# Patient Record
Sex: MALE | Race: WHITE | NOT HISPANIC OR LATINO | Employment: UNEMPLOYED | URBAN - METROPOLITAN AREA
[De-identification: names, ages, dates, MRNs, and addresses within clinical notes are randomized per-mention and may not be internally consistent; named-entity substitution may affect disease eponyms.]

---

## 2021-01-05 ENCOUNTER — HISTORICAL (OUTPATIENT)
Dept: ADMINISTRATIVE | Facility: HOSPITAL | Age: 66
End: 2021-01-05

## 2021-03-10 ENCOUNTER — HISTORICAL (OUTPATIENT)
Dept: ADMINISTRATIVE | Facility: HOSPITAL | Age: 66
End: 2021-03-10

## 2021-05-28 ENCOUNTER — HISTORICAL (OUTPATIENT)
Dept: ADMINISTRATIVE | Facility: HOSPITAL | Age: 66
End: 2021-05-28

## 2021-06-22 ENCOUNTER — HISTORICAL (OUTPATIENT)
Dept: CARDIOLOGY | Facility: HOSPITAL | Age: 66
End: 2021-06-22

## 2022-04-10 ENCOUNTER — HISTORICAL (OUTPATIENT)
Dept: ADMINISTRATIVE | Facility: HOSPITAL | Age: 67
End: 2022-04-10

## 2022-04-29 VITALS
SYSTOLIC BLOOD PRESSURE: 160 MMHG | BODY MASS INDEX: 29.55 KG/M2 | HEIGHT: 69 IN | WEIGHT: 199.5 LBS | DIASTOLIC BLOOD PRESSURE: 78 MMHG

## 2022-09-20 ENCOUNTER — HOSPITAL ENCOUNTER (OUTPATIENT)
Dept: WOUND CARE | Facility: HOSPITAL | Age: 67
Discharge: HOME | End: 2022-09-20
Attending: EMERGENCY MEDICINE
Payer: MEDICARE

## 2022-09-20 VITALS
BODY MASS INDEX: 22.07 KG/M2 | WEIGHT: 149 LBS | TEMPERATURE: 98 F | RESPIRATION RATE: 18 BRPM | DIASTOLIC BLOOD PRESSURE: 81 MMHG | HEART RATE: 97 BPM | HEIGHT: 69 IN | SYSTOLIC BLOOD PRESSURE: 153 MMHG

## 2022-09-20 DIAGNOSIS — I10 BENIGN HYPERTENSION: ICD-10-CM

## 2022-09-20 DIAGNOSIS — F17.290 CIGAR SMOKER: ICD-10-CM

## 2022-09-20 DIAGNOSIS — I70.243 ATHEROSCLEROSIS OF NATIVE ARTERIES OF LEFT LEG WITH ULCERATION OF ANKLE: ICD-10-CM

## 2022-09-20 DIAGNOSIS — G89.29 OTHER CHRONIC PAIN: ICD-10-CM

## 2022-09-20 DIAGNOSIS — E78.5 HYPERLIPIDEMIA, UNSPECIFIED HYPERLIPIDEMIA TYPE: ICD-10-CM

## 2022-09-20 DIAGNOSIS — L97.322 SKIN ULCER OF LEFT ANKLE WITH FAT LAYER EXPOSED: ICD-10-CM

## 2022-09-20 PROCEDURE — 27000999 HC MEDICAL RECORD PHOTO DOCUMENTATION

## 2022-09-20 PROCEDURE — 97597 DBRDMT OPN WND 1ST 20 CM/<: CPT

## 2022-09-20 RX ORDER — AMLODIPINE BESYLATE 10 MG/1
10 TABLET ORAL DAILY
Status: ON HOLD | COMMUNITY
Start: 2022-07-06

## 2022-09-20 RX ORDER — NAPROXEN SODIUM 220 MG/1
81 TABLET, FILM COATED ORAL DAILY
Status: ON HOLD | COMMUNITY

## 2022-09-20 RX ORDER — ALBUTEROL SULFATE 90 UG/1
AEROSOL, METERED RESPIRATORY (INHALATION) 4 TIMES DAILY PRN
Status: ON HOLD | COMMUNITY
Start: 2022-08-18

## 2022-09-20 RX ORDER — MULTIVITAMIN
1 TABLET ORAL DAILY
Status: ON HOLD | COMMUNITY

## 2022-09-20 RX ORDER — LOSARTAN POTASSIUM 100 MG/1
100 TABLET ORAL DAILY
Status: ON HOLD | COMMUNITY
Start: 2022-06-26

## 2022-09-20 RX ORDER — CILOSTAZOL 50 MG/1
50 TABLET ORAL 2 TIMES DAILY
Status: ON HOLD | COMMUNITY
Start: 2022-07-27

## 2022-09-20 RX ORDER — CLOPIDOGREL BISULFATE 75 MG/1
75 TABLET ORAL DAILY
Status: ON HOLD | COMMUNITY
Start: 2022-08-31

## 2022-09-20 RX ORDER — GABAPENTIN 300 MG/1
300 CAPSULE ORAL 3 TIMES DAILY PRN
Status: ON HOLD | COMMUNITY
Start: 2022-07-06 | End: 2023-07-06 | Stop reason: SDUPTHER

## 2022-09-20 NOTE — PROCEDURES
"Debridement    Date/Time: 9/20/2022 9:56 AM  Performed by: Lizabeth Mckeon MD  Authorized by: Lizabeth Mckeon MD   Associated wounds:        Altered Skin Integrity 09/20/22 1033 Left lateral Malleolus #1 Venous Ulcer Partial thickness tissue loss. Shallow open ulcer with a red or pink wound bed, without slough. Intact or Open/Ruptured Serum-filled blister.  Time out: Immediately prior to procedure a "time out" was called to verify the correct patient, procedure, equipment, support staff and site/side marked as required.    Consent Done?:  Yes (Written)    Preparation: Patient was prepped and draped in usual sterile fashion    Local anesthesia used?: Yes    Local anesthetic:  Topical anesthetic    Wound Details:    Location:  Left ankle    Type of Debridement:  Non-excisional       Length (cm):  1       Area (sq cm):  0.9       Width (cm):  0.9       Percent Debrided (%):  100       Depth (cm):  0.3       Total Area Debrided (sq cm):  0.9    Depth of debridement:  Epidermis/Dermis    Tissue debrided:  Dermis and Epidermis    Devitalized tissue debrided:  Biofilm and Slough    Instruments:  Curette    Bleeding:  None  Patient tolerance:  Patient tolerated the procedure well with no immediate complications  "

## 2022-09-20 NOTE — PROGRESS NOTES
Subjective:       Patient ID: Shaheen Christie is a 67 y.o. male.    Chief Complaint: Arterial Wound Initial      67-year-old white male chronic smoker with PAD, hypertension, neuropathy, dyslipidemia,  chronic venous hypertension and chronic back pain who has been referred to this Wound Care Clinic from [facility] to help treat a chronic left lateral arterial ulcer that began around may 2022.  PT feels it began from rubbing from his slipper. Pt tried to self treat but did see pCP Dr Russ once several months ago and received a rx for doxycycline. Pt's brother told pt to go to Dr Queen of [facility]. He saw them in July 2022 and arterial US noted occluded left SFA and left popliteal artery as well as monophasic waveforms in the infrapopliteal vessels via [facility] workup.  He underwent a left lower extremity angiogram on 8/29/22 with successful laser atherectomy balloon angioplasty and stenting of left SFA and popliteal arteries and laser atherectomy balloon angioplasty of the left posterior tibial artery. He was placed on plavix and pletal.    Patient followed up with [facility] on 9/07/22 after the angiogram and their noted indicates pt was going to see [facility] wound care on  9/9/22 but evidently that was canceled because of his insurance carrier so he was referred to this clinic.  He comes in today for his 1st visit on 09/20/2022.   He is still smoking 1/2 ppd (says years ago he was up to 3ppd). No fever/chills;some pain at ulcer    Past Medical History: : COPD (chronic obstructive pulmonary disease),  Hypertension,  Neuropathy  ,  chronic smoking, PAD, chronic venous hypertension, dyslipidemia, chronic pain    Past Surgical History:  No date: angiogram; Bilateral      Comment:  stents placed in left leg  No date: anterior neck surgery  No date: cataracts; Left  No date: CHOLECYSTECTOMY  No date: EYE SURGERY; Left  No date: HAND SURGERY; Left      Comment:  broken bone  No date: herina repair  No date: KNEE SURGERY; Bilateral  No date:  posterior neck surgery  No date: SPINE SURGERY  No date: TOTAL REPLACEMENT OF BOTH HIP JOINTS USING COMPUTER-ASSISTED   NAVIGATION; Bilateral    FH: brother with CAD/CABG  SH: +ETOH, +long term smoker; lives with his brother      Review of Systems   Constitutional: Negative.    HENT: Negative.     Respiratory: Negative.     Cardiovascular: Negative.    Gastrointestinal: Negative.    Genitourinary: Negative.    Musculoskeletal:  Positive for back pain.   Skin:  Positive for wound (see hpi). Negative for color change, pallor and rash.   Neurological: Negative.    Psychiatric/Behavioral: Negative.         Objective:      Vitals:    09/20/22 1010   BP: (!) 153/81   Pulse: 97   Resp: 18   Temp: 97.9 °F (36.6 °C)     @poctglucose@  No results for input(s): POCTGLUCOSE in the last 24 hours.  Physical Exam  Vitals reviewed.   Constitutional:       Appearance: He is normal weight.   HENT:      Head: Normocephalic and atraumatic.      Mouth/Throat:      Pharynx: Oropharynx is clear.   Eyes:      Conjunctiva/sclera: Conjunctivae normal.   Cardiovascular:      Pulses:           Dorsalis pedis pulses are detected w/ Doppler on the right side and detected w/ Doppler on the left side.      Comments: Faint palpable left dp; hair on both legs and dorsal feet;   Pulmonary:      Effort: Pulmonary effort is normal.   Abdominal:      General: Abdomen is flat.   Musculoskeletal:      Right lower leg: No edema.      Left lower leg: No edema.        Feet:    Skin:     General: Skin is warm and dry.      Capillary Refill: Capillary refill takes less than 2 seconds.   Neurological:      General: No focal deficit present.      Mental Status: He is alert and oriented to person, place, and time.            Altered Skin Integrity 09/20/22 1033 Left lateral Malleolus #1 Venous Ulcer Partial thickness tissue loss. Shallow open ulcer with a red or pink wound bed, without slough. Intact or Open/Ruptured Serum-filled blister. (Active)   09/20/22 1033    Altered Skin Integrity Present on Admission: yes   Side: Left   Orientation: lateral   Location: Malleolus   Wound Number: #1   Is this injury device related?: No   Primary Wound Type: Venous ulcer   Description of Altered Skin Integrity: Partial thickness tissue loss. Shallow open ulcer with a red or pink wound bed, without slough. Intact or Open/Ruptured Serum-filled blister.   Ankle-Brachial Index: done on 9/20/22 right dp 0.64 right pt 0.65 unable to assess left due to recent stents   Pulses: Doopler biphasic x 4   Removal Indication and Assessment:    Wound Outcome:    (Retired) Wound Length (cm):    (Retired) Wound Width (cm):    (Retired) Depth (cm):    Wound Description (Comments):    Removal Indications:    Wound Image   09/20/22 1036   Description of Altered Skin Integrity Partial thickness tissue loss. Shallow open ulcer with a red or pink wound bed, without slough. Intact or Open/Ruptured Serum-filled blister. 09/20/22 1036   Dressing Appearance Intact;Moist drainage 09/20/22 1036   Drainage Amount Moderate 09/20/22 1036   Drainage Characteristics/Odor Yellow 09/20/22 1036   Appearance Yellow 09/20/22 1036   Tissue loss description Full thickness 09/20/22 1036   Black (%), Wound Tissue Color 0 % 09/20/22 1036   Red (%), Wound Tissue Color 0 % 09/20/22 1036   Yellow (%), Wound Tissue Color 100 % 09/20/22 1036   Periwound Area Intact 09/20/22 1036   Wound Edges Defined 09/20/22 1036   Wound Length (cm) 1 cm 09/20/22 1036   Wound Width (cm) 0.9 cm 09/20/22 1036   Wound Depth (cm) 0.3 cm 09/20/22 1036   Wound Volume (cm^3) 0.27 cm^3 09/20/22 1036   Wound Surface Area (cm^2) 0.9 cm^2 09/20/22 1036   Care Cleansed with:;Wound cleanser;Applied: 09/20/22 1036   Dressing Removed;Changed;Honey;Absorptive Pad;Rolled gauze 09/20/22 1036           Assessment:       1. Skin ulcer of left ankle with fat layer exposed    2. 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 Benign  hypertension    4. Cigar smoker    5. Hyperlipidemia, unspecified hyperlipidemia type    6. Other chronic pain          Left lateral ankle ulcer: began early summer 2022: first clinic visit 9/20/22  Severe PAD  Arterial US July 2022 at [facility]:   RLE:: 30-49% stenosis right common femoral artery, right deep femoral artery with multiphasic waveforms right leg.  LLE: Totally occluded left distal superficial femoral artery and popliteal artery.  Monophasic waveforms infrapopliteal vessels  LLE angiogram on 8/29/22 with successful laser atherectomy balloon angioplasty and stenting of left SFA and popliteal arteries and laser atherectomy balloon angioplasty of the left posterior tibial artery.rx plavix and pletal  Chronic venous hypertension: venous US at [facility] 7/25/22:  Chronic smoker  Hypertension  Dyslipidemia  Chronic pain    Lab Results   Component Value Date    WBC 7.2 06/27/2021    HGB 12.2 (L) 06/27/2021    HCT 36.5 (L) 06/27/2021    MCV 97.1 (H) 06/27/2021     06/27/2021         CMP  Sodium Level   Date Value Ref Range Status   06/27/2021 133 (L) 136 - 145 mmol/L Final     Potassium Level   Date Value Ref Range Status   06/27/2021 4.7 3.5 - 5.1 mmol/L Final     Carbon Dioxide   Date Value Ref Range Status   06/27/2021 30 23 - 31 mmol/L Final     Blood Urea Nitrogen   Date Value Ref Range Status   06/27/2021 7.0 (L) 8.4 - 25.7 mg/dL Final     Creatinine   Date Value Ref Range Status   06/27/2021 0.65 (L) 0.73 - 1.18 mg/dL Final     Calcium Level Total   Date Value Ref Range Status   06/27/2021 9.7 8.8 - 10.0 mg/dL Final     Albumin Level   Date Value Ref Range Status   06/26/2021 3.7 3.4 - 4.8 gm/dL Final     Bilirubin Total   Date Value Ref Range Status   06/26/2021 0.5 <<=1.5 mg/dL Final     Alkaline Phosphatase   Date Value Ref Range Status   06/26/2021 77 40 - 150 unit/L Final     Aspartate Aminotransferase   Date Value Ref Range Status   06/26/2021 37 (H) 5 - 34 unit/L Final     Alanine Aminotransferase    Date Value Ref Range Status   06/26/2021 50 0 - 55 unit/L Final     Estimated GFR-Non    Date Value Ref Range Status   06/27/2021 >60 mL/min/1.73 m2 Final     No results found for: LABA1C, HGBA1C  No results found for: SEDRATE  No results found for: CRP    Plan:     Plan of Care:    This is a new patient to our clinic.  I reviewed the records available to me in epic as well as the notes sent from [facility]   He has a chronic left lateral malleolus arterial ulcer that began several months ago.  He attempted to self manage but did at  one point early on get a prescription from his PCP Dr. Russ for doxycycline.  His brother was the one that actually referred him to get a vascular evaluation with Dr. Queen of [facility].  He is now status post angiogram with intervention and hopefully this will help begin the healing process of this chronic ulcer.  Selective debridement today  Begin medihoney dressings and keep padded so nothing rubs on this ankle  Nutrition: Must have a high protein diet to support wound  healing; (if renal disease, see nephrologist for amount allowed):  this should be over 100g protein /day (if no kidney issues); Also rec MVI along with vit C, vit D, zinc and Pierre  Smoker: must stop smoking: I explained the negative impact this has own not only the wounds  but overall health as well.   It is absolutely vital that he stop smoking or that the intervention recently done on vasculature will be to no avail and he will ultimately have a nonhealing wound and may require future amputations.  He voiced understanding  Rtc 1 week:  patient says he cannot.  In 1 week he has an appointment with [facility] and he depends on his brother for transportation and his brother will not  be able to bring him another day so he will come back here in 2 weeks           The time spent including preparing to see the patient, obtaining patient history and assessment, evaluation of the plan of care, patient/caregiver counseling  and education, orders, documentation, coordination of care, and other professional medical management activities for today's encounter was 60 minutes.    Time spent performing procedures during today's encounter was 15 minutes.

## 2022-10-04 ENCOUNTER — HOSPITAL ENCOUNTER (OUTPATIENT)
Dept: WOUND CARE | Facility: HOSPITAL | Age: 67
Discharge: HOME | End: 2022-10-04
Attending: EMERGENCY MEDICINE
Payer: MEDICARE

## 2022-10-04 VITALS
SYSTOLIC BLOOD PRESSURE: 135 MMHG | BODY MASS INDEX: 22.22 KG/M2 | DIASTOLIC BLOOD PRESSURE: 64 MMHG | HEART RATE: 86 BPM | WEIGHT: 150 LBS | RESPIRATION RATE: 18 BRPM | HEIGHT: 69 IN | TEMPERATURE: 99 F

## 2022-10-04 DIAGNOSIS — I10 BENIGN HYPERTENSION: ICD-10-CM

## 2022-10-04 DIAGNOSIS — F17.290 CIGAR SMOKER: ICD-10-CM

## 2022-10-04 DIAGNOSIS — G89.29 OTHER CHRONIC PAIN: ICD-10-CM

## 2022-10-04 DIAGNOSIS — E78.5 HYPERLIPIDEMIA, UNSPECIFIED HYPERLIPIDEMIA TYPE: ICD-10-CM

## 2022-10-04 DIAGNOSIS — I70.243 ATHEROSCLEROSIS OF NATIVE ARTERIES OF LEFT LEG WITH ULCERATION OF ANKLE: ICD-10-CM

## 2022-10-04 DIAGNOSIS — L97.322 SKIN ULCER OF LEFT ANKLE WITH FAT LAYER EXPOSED: Primary | ICD-10-CM

## 2022-10-04 PROCEDURE — 97597 DBRDMT OPN WND 1ST 20 CM/<: CPT

## 2022-10-04 PROCEDURE — 27000999 HC MEDICAL RECORD PHOTO DOCUMENTATION

## 2022-10-04 NOTE — PROGRESS NOTES
Subjective:       Patient ID: Shaheen Christie is a 67 y.o. male.    Chief Complaint: No chief complaint on file.      67-year-old white male chronic smoker with PAD, hypertension, neuropathy, dyslipidemia,  chronic venous hypertension and chronic back pain who has been referred to this Wound Care Clinic from [facility] to help treat a chronic left lateral arterial ulcer that began around may 2022.  PT feels it began from rubbing from his slipper. Pt tried to self treat but did see pCP Dr Russ once several months ago and received a rx for doxycycline. Pt's brother told pt to go to Dr Queen of [facility]. He saw them in July 2022 and arterial US noted occluded left SFA and left popliteal artery as well as monophasic waveforms in the infrapopliteal vessels via [facility] workup.  He underwent a left lower extremity angiogram on 8/29/22 with successful laser atherectomy balloon angioplasty and stenting of left SFA and popliteal arteries and laser atherectomy balloon angioplasty of the left posterior tibial artery. He was placed on plavix and pletal.    Patient followed up with [facility] on 9/07/22 after the angiogram and their noted indicates pt was going to see [facility] wound care on  9/9/22 but evidently that was canceled because of his insurance carrier so he was referred to this clinic.  He came in for first evaluation on 9/20/22. I did a selective debridement, advised of dressings/wound care and of smoking cessation. He could not return the following week so comes in today on 10/4/22. Began medihoney dressings; no complaints today      Review of Systems   Constitutional: Negative.    HENT: Negative.     Respiratory: Negative.     Cardiovascular: Negative.    Gastrointestinal: Negative.    Genitourinary: Negative.    Musculoskeletal:  Positive for back pain.   Skin:  Positive for wound (see hpi). Negative for color change, pallor and rash.   Neurological: Negative.    Psychiatric/Behavioral: Negative.         Objective:      Vitals:    10/04/22  1011   BP: 135/64   Pulse: 86   Resp: 18   Temp: 98.5 °F (36.9 °C)     @poctglucose@  No results for input(s): POCTGLUCOSE in the last 24 hours.  Physical Exam  Vitals reviewed.   Constitutional:       Appearance: He is normal weight.   HENT:      Head: Normocephalic and atraumatic.      Mouth/Throat:      Pharynx: Oropharynx is clear.   Eyes:      Conjunctiva/sclera: Conjunctivae normal.   Cardiovascular:      Pulses:           Dorsalis pedis pulses are detected w/ Doppler on the right side and detected w/ Doppler on the left side.      Comments: Faint palpable left dp; hair on both legs and dorsal feet;   Pulmonary:      Effort: Pulmonary effort is normal.   Abdominal:      General: Abdomen is flat.   Musculoskeletal:      Right lower leg: No edema.      Left lower leg: No edema.        Feet:    Skin:     General: Skin is warm and dry.      Capillary Refill: Capillary refill takes less than 2 seconds.   Neurological:      General: No focal deficit present.      Mental Status: He is alert and oriented to person, place, and time.            Altered Skin Integrity 09/20/22 1033 Left lateral Malleolus #1 Arterial Ulcer Partial thickness tissue loss. Shallow open ulcer with a red or pink wound bed, without slough. Intact or Open/Ruptured Serum-filled blister. (Active)   09/20/22 1033   Altered Skin Integrity Present on Admission: yes   Side: Left   Orientation: lateral   Location: Malleolus   Wound Number: #1   Is this injury device related?: No   Primary Wound Type: Arterial ulc   Description of Altered Skin Integrity: Partial thickness tissue loss. Shallow open ulcer with a red or pink wound bed, without slough. Intact or Open/Ruptured Serum-filled blister.   Ankle-Brachial Index: done on 9/20/22 right dp 0.64 right pt 0.65 unable to assess left due to recent stents   Pulses: Doopler biphasic x 4   Removal Indication and Assessment:    Wound Outcome:    (Retired) Wound Length (cm):    (Retired) Wound Width (cm):     (Retired) Depth (cm):    Wound Description (Comments):    Removal Indications:    Wound Image   10/04/22 1022   Description of Altered Skin Integrity Partial thickness tissue loss. Shallow open ulcer with a red or pink wound bed, without slough. Intact or Open/Ruptured Serum-filled blister. 10/04/22 1022   Dressing Appearance Intact;Moist drainage 10/04/22 1022   Drainage Amount Moderate 10/04/22 1022   Drainage Characteristics/Odor Yellow;Serosanguineous 10/04/22 1022   Appearance Pink;Yellow 10/04/22 1022   Tissue loss description Full thickness 10/04/22 1022   Black (%), Wound Tissue Color 0 % 10/04/22 1022   Red (%), Wound Tissue Color 30 % 10/04/22 1022   Yellow (%), Wound Tissue Color 70 % 10/04/22 1022   Periwound Area Intact 10/04/22 1022   Wound Edges Defined 10/04/22 1022   Wound Length (cm) 1.2 cm 10/04/22 1022   Wound Width (cm) 0.9 cm 10/04/22 1022   Wound Depth (cm) 0.3 cm 10/04/22 1022   Wound Volume (cm^3) 0.324 cm^3 10/04/22 1022   Wound Surface Area (cm^2) 1.08 cm^2 10/04/22 1022   Care Cleansed with:;Wound cleanser;Applied: 10/04/22 1022   Dressing Removed;Changed;Honey;Bandaid 10/04/22 1022           Assessment:       1. Skin ulcer of left ankle with fat layer exposed    2. 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 Cigar smoker    4. Benign hypertension    5. Hyperlipidemia, unspecified hyperlipidemia type    6. Other chronic pain          Left lateral ankle ulcer: began early summer 2022: first clinic visit 9/20/22  Severe PAD  Arterial US July 2022 at [facility]:   RLE:: 30-49% stenosis right common femoral artery, right deep femoral artery with multiphasic waveforms right leg.  LLE: Totally occluded left distal superficial femoral artery and popliteal artery.  Monophasic waveforms infrapopliteal vessels  LLE angiogram on 8/29/22 with successful laser atherectomy balloon angioplasty and stenting of left SFA and popliteal arteries and laser atherectomy balloon  angioplasty of the left posterior tibial artery.rx plavix and pletal  Chronic venous hypertension: venous US at [facility] 7/25/22:  Chronic smoker  Hypertension  Dyslipidemia  Chronic pain        Plan:     Plan of Care:    Selective debridement today  Wound care: continue to apply the  medihoney and keep padded so nothing rubs on this ankle  Nutrition: Must have a high protein diet to support wound  healing; (if renal disease, see nephrologist for amount allowed):  this should be over 100g protein /day (if no kidney issues); Also rec MVI along with vit C, vit D, zinc and Pierre  Smoker: must stop smoking: I explained the negative impact this has own not only the wounds  but overall health as well.   It is absolutely vital that he stop smoking or that the intervention recently done on vasculature will be to no avail and he will ultimately have a nonhealing wound and may require future amputations.  He voiced understanding  Rtc 1 week           The time spent including preparing to see the patient, obtaining patient history and assessment, evaluation of the plan of care, patient/caregiver counseling and education, orders, documentation, coordination of care, and other professional medical management activities for today's encounter was 20 minutes.    Time spent performing procedures during today's encounter was 10 minutes.

## 2022-10-04 NOTE — PATIENT INSTRUCTIONS
Pt seen today by: Lizabeth Mckeon MD    Home health and self care DRESSING INSTRUCTIONS: 9/20/22 Wound care supplies ordered from [employer] [facility]        Wound location: Left lateral ankle    Dressings to be changed daily and as needed if soiled or not intact.      Cleanse wound with wound cleanser or saline  Apply thera honey gel to the wound bed  Cover with   exudry dressing and secure with silvia       Compression with: N/A    Return visit: October 18th at 10:00am      Wound may have been debrided in clinic: if so, WHAT YOU NEED TO KNOW:    Debridement is the removal of infected, damaged, or dead tissue so a wound can heal properly. Your wound may need more than one debridement. Debridement can cause bleeding, and a small amount of blood is expected.  AFTER A DEBRIDEMENT:    Keep your wound clean and dry. Do not remove the dressing unless instructed.  Follow the wound care orders provided to you or your home health care provider.  If you have pain, take over the counter pain relievers or pain medication if prescribed.  Elevate the wound and limit excessive activity to prevent bleeding and/or swelling in your wound.  If you see blood coming through the dressing, apply gauze and tape over the dressing and hold firm pressure to the wound with your hand for 5-10 minutes continuously, without peeking, to help the bleeding stop.  Contact [facility] wound care team at [phone] or go to the nearest Emergency department if:    You have a fever greater than 101 taken by mouth.  Your pain gets worse or does not go away, even after taking your regular pain medicine.  Your skin around your wound is red, hot, swollen, or draining pus.  You have bleeding that continues to come through the dressing after holding pressure for 10 minutes            Nutrition:  The current daily value (%DV) for protein is 50 grams per day and is meant as a general goal for most people. Further increasing your dietary protein intake is very  important for wound healing. Typically one needs over 100g of protein per day to help with wound healing needs.  If you are a dialysis patient or have problems with your kidneys, talk to your Nephrologist about how much protein you can take in with your condition.  Examples of high protein items that can be added to your diet include: eggs, chicken, red meats, almonds, cottage cheese, Greek yogurt, beans, and peanut butter.  Fortified protein bars, shakes and drinks can add 15-30 additional grams of protein per serving.   Also add:   1 daily general multivitamin   Pierre : 1 packet twice daily   Vitamin C : 500mg twice daily   Zinc 220 mg daily  Vit D : once daily        Call our [facility] wound clinic for questions/concerns a [phone] .

## 2022-10-04 NOTE — PROCEDURES
"Debridement    Date/Time: 10/4/2022 10:00 AM  Performed by: Lizabeth Mckeon MD  Authorized by: Lizabeth Mckeon MD   Associated wounds:        Altered Skin Integrity 09/20/22 1033 Left lateral Malleolus #1 Arterial Ulcer Partial thickness tissue loss. Shallow open ulcer with a red or pink wound bed, without slough. Intact or Open/Ruptured Serum-filled blister.  Time out: Immediately prior to procedure a "time out" was called to verify the correct patient, procedure, equipment, support staff and site/side marked as required.    Consent Done?:  Yes (Written)    Preparation: Patient was prepped and draped in usual sterile fashion    Local anesthesia used?: Yes    Local anesthetic:  Topical anesthetic    Wound Details:    Location:  Left ankle    Type of Debridement:  Non-excisional       Length (cm):  1.2       Area (sq cm):  1.08       Width (cm):  0.9       Percent Debrided (%):  100       Depth (cm):  0.3       Total Area Debrided (sq cm):  1.08    Depth of debridement:  Epidermis/Dermis    Tissue debrided:  Dermis and Epidermis    Devitalized tissue debrided:  Biofilm and Slough    Instruments:  Curette    Bleeding:  Minimal  Hemostasis Achieved: Yes    Method Used:  Pressure  Patient tolerance:  Patient tolerated the procedure well with no immediate complications  "

## 2022-10-18 ENCOUNTER — HOSPITAL ENCOUNTER (OUTPATIENT)
Dept: WOUND CARE | Facility: HOSPITAL | Age: 67
Discharge: HOME | End: 2022-10-18
Attending: EMERGENCY MEDICINE
Payer: MEDICARE

## 2022-10-18 DIAGNOSIS — I10 BENIGN HYPERTENSION: ICD-10-CM

## 2022-10-18 DIAGNOSIS — F17.290 CIGAR SMOKER: ICD-10-CM

## 2022-10-18 DIAGNOSIS — E78.5 HYPERLIPIDEMIA, UNSPECIFIED HYPERLIPIDEMIA TYPE: ICD-10-CM

## 2022-10-18 DIAGNOSIS — L97.322 SKIN ULCER OF LEFT ANKLE WITH FAT LAYER EXPOSED: Primary | ICD-10-CM

## 2022-10-18 DIAGNOSIS — I70.243 ATHEROSCLEROSIS OF NATIVE ARTERIES OF LEFT LEG WITH ULCERATION OF ANKLE: ICD-10-CM

## 2022-10-18 DIAGNOSIS — G89.29 OTHER CHRONIC PAIN: ICD-10-CM

## 2022-10-18 PROCEDURE — 11042 DBRDMT SUBQ TIS 1ST 20SQCM/<: CPT

## 2022-10-18 PROCEDURE — 27000999 HC MEDICAL RECORD PHOTO DOCUMENTATION

## 2022-10-18 NOTE — PATIENT INSTRUCTIONS
Pt seen today by: Lizabeth Mckeon MD    self care DRESSING INSTRUCTIONS:   Wound location: Left lateral ankle    Dressings to be changed daily and as needed if soiled or not intact.    Cleanse wound with wound cleanser or saline  Apply thera honey gel to the wound bed  Cover with   exudry dressing and secure with silvia    Compression with: N/A    Return visit: November 1 at 10:00am    Wound may have been debrided in clinic: if so, WHAT YOU NEED TO KNOW:    Debridement is the removal of infected, damaged, or dead tissue so a wound can heal properly. Your wound may need more than one debridement. Debridement can cause bleeding, and a small amount of blood is expected.  AFTER A DEBRIDEMENT:    Keep your wound clean and dry. Do not remove the dressing unless instructed.  Follow the wound care orders provided to you or your home health care provider.  If you have pain, take over the counter pain relievers or pain medication if prescribed.  Elevate the wound and limit excessive activity to prevent bleeding and/or swelling in your wound.  If you see blood coming through the dressing, apply gauze and tape over the dressing and hold firm pressure to the wound with your hand for 5-10 minutes continuously, without peeking, to help the bleeding stop.  Contact [facility] wound care team at [phone] or go to the nearest Emergency department if:    You have a fever greater than 101 taken by mouth.  Your pain gets worse or does not go away, even after taking your regular pain medicine.  Your skin around your wound is red, hot, swollen, or draining pus.  You have bleeding that continues to come through the dressing after holding pressure for 10 minutes     Nutrition:  The current daily value (%DV) for protein is 50 grams per day and is meant as a general goal for most people. Further increasing your dietary protein intake is very important for wound healing. Typically one needs over 100g of protein per day to help with wound  healing needs.  If you are a dialysis patient or have problems with your kidneys, talk to your Nephrologist about how much protein you can take in with your condition.  Examples of high protein items that can be added to your diet include: eggs, chicken, red meats, almonds, cottage cheese, Greek yogurt, beans, and peanut butter.  Fortified protein bars, shakes and drinks can add 15-30 additional grams of protein per serving.   Also add:   1 daily general multivitamin   Pierre : 1 packet twice daily   Vitamin C : 500mg twice daily   Zinc 220 mg daily  Vit D : once daily    Call our [facility] wound clinic for questions/concerns a [phone] .

## 2022-10-18 NOTE — PROGRESS NOTES
Subjective:       Patient ID: Shaheen Christie is a 67 y.o. male.    Chief Complaint: No chief complaint on file.      67-year-old white male chronic smoker with PAD, hypertension, neuropathy, dyslipidemia,  chronic venous hypertension and chronic back pain who has been referred to this Wound Care Clinic from [facility] to help treat a chronic left lateral arterial ulcer that began around may 2022.  PT feels it began from rubbing from his slipper. Pt tried to self treat but did see pCP Dr Russ once several months ago and received a rx for doxycycline. Pt's brother told pt to go to Dr Queen of [facility]. He saw them in July 2022 and arterial US noted occluded left SFA and left popliteal artery as well as monophasic waveforms in the infrapopliteal vessels via [facility] workup.  He underwent a left lower extremity angiogram on 8/29/22 with successful laser atherectomy balloon angioplasty and stenting of left SFA and popliteal arteries and laser atherectomy balloon angioplasty of the left posterior tibial artery. He was placed on plavix and pletal.    Patient followed up with [facility] on 9/07/22 after the angiogram and their noted indicates pt was going to see [facility] wound care on 9/9/22 but evidently that was canceled because of his insurance carrier so he was referred to this clinic.  He came in for first evaluation on 9/20/22.I debrided and advised of wound care and smoking cessation. He can only come in every 2 weeks he said....comes in today for 3rd visit on 10/18/22: he feels it is better and is pleased with progress thus far.  He says going back to [facility] this week to get report of repeat vascular US      Review of Systems   Constitutional: Negative.    HENT: Negative.     Respiratory: Negative.     Cardiovascular: Negative.    Gastrointestinal: Negative.    Genitourinary: Negative.    Musculoskeletal:  Positive for back pain.   Skin:  Positive for wound (see hpi). Negative for color change, pallor and rash.   Neurological: Negative.     Psychiatric/Behavioral: Negative.         Objective:        Physical Exam  Vitals reviewed.   Constitutional:       Appearance: He is normal weight.   HENT:      Head: Normocephalic and atraumatic.      Mouth/Throat:      Pharynx: Oropharynx is clear.   Eyes:      Conjunctiva/sclera: Conjunctivae normal.   Cardiovascular:      Pulses:           Dorsalis pedis pulses are detected w/ Doppler on the right side and detected w/ Doppler on the left side.      Comments: Faint palpable left dp; hair on both legs and dorsal feet;   Pulmonary:      Effort: Pulmonary effort is normal.   Abdominal:      General: Abdomen is flat.   Musculoskeletal:      Right lower leg: No edema.      Left lower leg: No edema.        Feet:    Skin:     General: Skin is warm and dry.      Capillary Refill: Capillary refill takes less than 2 seconds.   Neurological:      General: No focal deficit present.      Mental Status: He is alert and oriented to person, place, and time.            Altered Skin Integrity 09/20/22 1033 Left lateral Malleolus #1 Arterial Ulcer Partial thickness tissue loss. Shallow open ulcer with a red or pink wound bed, without slough. Intact or Open/Ruptured Serum-filled blister. (Active)   09/20/22 1033   Altered Skin Integrity Present on Admission:    Side: Left   Orientation: lateral   Location: Malleolus   Wound Number: #1   Is this injury device related?: No   Primary Wound Type: Arterial ulc   Description of Altered Skin Integrity: Partial thickness tissue loss. Shallow open ulcer with a red or pink wound bed, without slough. Intact or Open/Ruptured Serum-filled blister.   Ankle-Brachial Index: done on 9/20/22 right dp 0.64 right pt 0.65 unable to assess left due to recent stents   Pulses: Doopler biphasic x 4   Removal Indication and Assessment:    Wound Outcome:    (Retired) Wound Length (cm):    (Retired) Wound Width (cm):    (Retired) Depth (cm):    Wound Description (Comments):    Removal Indications:    Wound  Image   10/18/22 1114   Description of Altered Skin Integrity Full thickness tissue loss. Subcutaneous fat may be visible but bone, tendon or muscle are not exposed 10/18/22 1114   Dressing Appearance Intact 10/18/22 1114   Drainage Amount Moderate 10/18/22 1114   Drainage Characteristics/Odor Serosanguineous 10/18/22 1114   Appearance Red;Slough 10/18/22 1114   Tissue loss description Full thickness 10/18/22 1114   Black (%), Wound Tissue Color 0 % 10/18/22 1114   Red (%), Wound Tissue Color 75 % 10/18/22 1114   Yellow (%), Wound Tissue Color 25 % 10/18/22 1114   Periwound Area Intact;Dry 10/18/22 1114   Wound Edges Defined 10/18/22 1114   Wound Length (cm) 1 cm 10/18/22 1114   Wound Width (cm) 1 cm 10/18/22 1114   Wound Depth (cm) 0.2 cm 10/18/22 1114   Wound Volume (cm^3) 0.2 cm^3 10/18/22 1114   Wound Surface Area (cm^2) 1 cm^2 10/18/22 1114   Care Cleansed with:;Soap and water 10/18/22 1114   Dressing Applied 10/18/22 1114   Periwound Care Absorptive dressing applied 10/18/22 1114           Assessment:       1. Skin ulcer of left ankle with fat layer exposed    2. 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 Cigar smoker    4. Benign hypertension    5. Hyperlipidemia, unspecified hyperlipidemia type    6. Other chronic pain          Left lateral ankle ulcer: began early summer 2022: first clinic visit 9/20/22  Severe PAD  Arterial US July 2022 at [facility]:   RLE:: 30-49% stenosis right common femoral artery, right deep femoral artery with multiphasic waveforms right leg.  LLE: Totally occluded left distal superficial femoral artery and popliteal artery.  Monophasic waveforms infrapopliteal vessels  LLE angiogram on 8/29/22 with successful laser atherectomy balloon angioplasty and stenting of left SFA and popliteal arteries and laser atherectomy balloon angioplasty of the left posterior tibial artery.rx plavix and pletal  Chronic venous hypertension: venous US at [facility] 7/25/22:  Chronic  smoker  Hypertension  Dyslipidemia  Chronic pain        Plan:     Plan of Care:    Sharp subcutaneous debridement today  Wound care: continue to apply the  medihoney and keep padded so nothing rubs on this ankle  Nutrition: Must have a high protein diet to support wound  healing; (if renal disease, see nephrologist for amount allowed):  this should be over 100g protein /day (if no kidney issues); Also rec MVI along with vit C, vit D, zinc and Pierre  Smoker: must stop smoking: I explained the negative impact this has own not only the wounds  but overall health as well.   It is absolutely vital that he stop smoking or that the intervention recently done on vasculature will be to no avail and he will ultimately have a nonhealing wound and may require future amputations.  He voiced understanding  Rtc 1 week but he says he is going to have to stick with 2 weeks;   See [facility] as scheduled this week

## 2022-10-19 VITALS
HEART RATE: 72 BPM | TEMPERATURE: 98 F | BODY MASS INDEX: 22.21 KG/M2 | WEIGHT: 149.94 LBS | DIASTOLIC BLOOD PRESSURE: 74 MMHG | HEIGHT: 69 IN | RESPIRATION RATE: 16 BRPM | SYSTOLIC BLOOD PRESSURE: 135 MMHG

## 2022-10-19 NOTE — PROCEDURES
"Debridement    Date/Time: 10/18/2022 10:00 AM  Performed by: Lizabeth Mckeon MD  Authorized by: Lizabeth Mckeon MD   Associated wounds:        Altered Skin Integrity 09/20/22 1033 Left lateral Malleolus #1 Arterial Ulcer Partial thickness tissue loss. Shallow open ulcer with a red or pink wound bed, without slough. Intact or Open/Ruptured Serum-filled blister.  Time out: Immediately prior to procedure a "time out" was called to verify the correct patient, procedure, equipment, support staff and site/side marked as required.    Consent Done?:  Yes (Written)    Preparation: Patient was prepped and draped in usual sterile fashion    Local anesthesia used?: Yes    Local anesthetic:  Topical anesthetic    Wound Details:    Location:  Left ankle    Type of Debridement:  Excisional       Length (cm):  1       Area (sq cm):  1       Width (cm):  1       Percent Debrided (%):  100       Depth (cm):  0.2       Total Area Debrided (sq cm):  1    Depth of debridement:  Subcutaneous tissue    Tissue debrided:  Dermis, Epidermis and Subcutaneous    Devitalized tissue debrided:  Biofilm and Slough    Instruments:  Curette    Bleeding:  Minimal  Hemostasis Achieved: Yes    Method Used:  Pressure  Patient tolerance:  Patient tolerated the procedure well with no immediate complications  "

## 2022-11-01 ENCOUNTER — HOSPITAL ENCOUNTER (OUTPATIENT)
Dept: WOUND CARE | Facility: HOSPITAL | Age: 67
Discharge: HOME | End: 2022-11-01
Attending: EMERGENCY MEDICINE
Payer: MEDICARE

## 2022-11-01 VITALS
SYSTOLIC BLOOD PRESSURE: 122 MMHG | HEIGHT: 69 IN | DIASTOLIC BLOOD PRESSURE: 66 MMHG | RESPIRATION RATE: 18 BRPM | TEMPERATURE: 98 F | HEART RATE: 99 BPM | WEIGHT: 152 LBS | BODY MASS INDEX: 22.51 KG/M2

## 2022-11-01 DIAGNOSIS — L97.322 SKIN ULCER OF LEFT ANKLE WITH FAT LAYER EXPOSED: ICD-10-CM

## 2022-11-01 DIAGNOSIS — G89.29 OTHER CHRONIC PAIN: ICD-10-CM

## 2022-11-01 DIAGNOSIS — F17.290 CIGAR SMOKER: ICD-10-CM

## 2022-11-01 DIAGNOSIS — E78.5 HYPERLIPIDEMIA, UNSPECIFIED HYPERLIPIDEMIA TYPE: ICD-10-CM

## 2022-11-01 DIAGNOSIS — I70.243 ATHEROSCLEROSIS OF NATIVE ARTERIES OF LEFT LEG WITH ULCERATION OF ANKLE: ICD-10-CM

## 2022-11-01 DIAGNOSIS — I10 BENIGN HYPERTENSION: ICD-10-CM

## 2022-11-01 PROCEDURE — 27000999 HC MEDICAL RECORD PHOTO DOCUMENTATION

## 2022-11-01 PROCEDURE — 11042 DBRDMT SUBQ TIS 1ST 20SQCM/<: CPT

## 2022-11-01 NOTE — PATIENT INSTRUCTIONS
Pt seen today by: Lizabeth Mckeon MD    self care DRESSING INSTRUCTIONS:   Wound location: Left lateral ankle    Dressings to be changed daily and as needed if soiled or not intact.    Cleanse wound with wound cleanser or saline  Apply thera honey gel to the wound bed  Cover with   exudry dressing and secure with silvia    Compression with: N/A    Return visit: November 15th at 10:00am    Wound may have been debrided in clinic: if so, WHAT YOU NEED TO KNOW:    Debridement is the removal of infected, damaged, or dead tissue so a wound can heal properly. Your wound may need more than one debridement. Debridement can cause bleeding, and a small amount of blood is expected.  AFTER A DEBRIDEMENT:    Keep your wound clean and dry. Do not remove the dressing unless instructed.  Follow the wound care orders provided to you or your home health care provider.  If you have pain, take over the counter pain relievers or pain medication if prescribed.  Elevate the wound and limit excessive activity to prevent bleeding and/or swelling in your wound.  If you see blood coming through the dressing, apply gauze and tape over the dressing and hold firm pressure to the wound with your hand for 5-10 minutes continuously, without peeking, to help the bleeding stop.  Contact [facility] wound care team at [phone] or go to the nearest Emergency department if:    You have a fever greater than 101 taken by mouth.  Your pain gets worse or does not go away, even after taking your regular pain medicine.  Your skin around your wound is red, hot, swollen, or draining pus.  You have bleeding that continues to come through the dressing after holding pressure for 10 minutes     Nutrition:  The current daily value (%DV) for protein is 50 grams per day and is meant as a general goal for most people. Further increasing your dietary protein intake is very important for wound healing. Typically one needs over 100g of protein per day to help with wound  healing needs.  If you are a dialysis patient or have problems with your kidneys, talk to your Nephrologist about how much protein you can take in with your condition.  Examples of high protein items that can be added to your diet include: eggs, chicken, red meats, almonds, cottage cheese, Greek yogurt, beans, and peanut butter.  Fortified protein bars, shakes and drinks can add 15-30 additional grams of protein per serving.   Also add:   1 daily general multivitamin   Pierre : 1 packet twice daily   Vitamin C : 500mg twice daily   Zinc 220 mg daily  Vit D : once daily    Call our [facility] wound clinic for questions/concerns a [phone] .

## 2022-11-01 NOTE — PROCEDURES
"Debridement    Date/Time: 11/1/2022 9:57 AM  Performed by: Lizabeth Mckeon MD  Authorized by: Lizabeth Mckeon MD   Associated wounds:        Altered Skin Integrity 09/20/22 1033 Left lateral Malleolus #1 Arterial Ulcer Partial thickness tissue loss. Shallow open ulcer with a red or pink wound bed, without slough. Intact or Open/Ruptured Serum-filled blister.  Time out: Immediately prior to procedure a "time out" was called to verify the correct patient, procedure, equipment, support staff and site/side marked as required.    Consent Done?:  Yes (Written)    Preparation: Patient was prepped and draped in usual sterile fashion    Local anesthesia used?: Yes    Local anesthetic:  Topical anesthetic    Wound Details:    Location:  Left ankle    Type of Debridement:  Excisional       Length (cm):  1.2       Area (sq cm):  1.2       Width (cm):  1       Percent Debrided (%):  100       Depth (cm):  0.2       Total Area Debrided (sq cm):  1.2    Depth of debridement:  Subcutaneous tissue    Tissue debrided:  Dermis, Epidermis and Subcutaneous    Devitalized tissue debrided:  Biofilm and Slough    Instruments:  Curette    Bleeding:  Minimal  Hemostasis Achieved: Yes    Method Used:  Pressure  Patient tolerance:  Patient tolerated the procedure well with no immediate complications  "

## 2022-11-01 NOTE — PROGRESS NOTES
Subjective:       Patient ID: Shaheen Christie is a 67 y.o. male.    Chief Complaint: No chief complaint on file.      67-year-old white male chronic smoker with PAD, hypertension, neuropathy, dyslipidemia,  chronic venous hypertension and chronic back pain who has been referred to this Wound Care Clinic from [facility] to help treat a chronic left lateral arterial ulcer that began around may 2022.  PT feels it began from rubbing from his slipper. Pt tried to self treat but did see pCP Dr Russ once several months ago and received a rx for doxycycline. Pt's brother told pt to go to Dr Queen of [facility]. He saw them in July 2022 and arterial US noted occluded left SFA and left popliteal artery as well as monophasic waveforms in the infrapopliteal vessels via [facility] workup.  He underwent a left lower extremity angiogram on 8/29/22 with successful laser atherectomy balloon angioplasty and stenting of left SFA and popliteal arteries and laser atherectomy balloon angioplasty of the left posterior tibial artery. He was placed on plavix and pletal.    Patient followed up with [facility] on 9/07/22 after the angiogram and their noted indicates pt was going to see [facility] wound care on 9/9/22 but his insurance not taken there so he came here on 9/20/22.  Ulcer is improving; he can only come here every 2 weeks      Review of Systems   Constitutional: Negative.    HENT: Negative.     Respiratory: Negative.     Cardiovascular: Negative.    Gastrointestinal: Negative.    Genitourinary: Negative.    Musculoskeletal:  Positive for back pain.   Skin:  Positive for wound (see hpi). Negative for color change, pallor and rash.   Neurological: Negative.    Psychiatric/Behavioral: Negative.         Objective:      Vitals:    11/01/22 1009   BP: 122/66   Pulse: 99   Resp: 18   Temp: 98.3 °F (36.8 °C)       Physical Exam  Vitals reviewed.   Constitutional:       Appearance: He is normal weight.   HENT:      Head: Normocephalic and atraumatic.      Mouth/Throat:       Pharynx: Oropharynx is clear.   Eyes:      Conjunctiva/sclera: Conjunctivae normal.   Cardiovascular:      Pulses:           Dorsalis pedis pulses are detected w/ Doppler on the right side and detected w/ Doppler on the left side.      Comments: Faint palpable left dp; hair on both legs and dorsal feet;   Pulmonary:      Effort: Pulmonary effort is normal.   Abdominal:      General: Abdomen is flat.   Musculoskeletal:      Right lower leg: No edema.      Left lower leg: No edema.        Feet:    Skin:     General: Skin is warm and dry.      Capillary Refill: Capillary refill takes less than 2 seconds.   Neurological:      General: No focal deficit present.      Mental Status: He is alert and oriented to person, place, and time.            Altered Skin Integrity 09/20/22 1033 Left lateral Malleolus #1 Arterial Ulcer Partial thickness tissue loss. Shallow open ulcer with a red or pink wound bed, without slough. Intact or Open/Ruptured Serum-filled blister. (Active)   09/20/22 1033   Altered Skin Integrity Present on Admission: yes   Side: Left   Orientation: lateral   Location: Malleolus   Wound Number: #1   Is this injury device related?: No   Primary Wound Type: Arterial ulc   Description of Altered Skin Integrity: Partial thickness tissue loss. Shallow open ulcer with a red or pink wound bed, without slough. Intact or Open/Ruptured Serum-filled blister.   Ankle-Brachial Index: done on 11/01/22 L DP 0.97 PT 1.02   Pulses: Doopler biphasic x 4   Removal Indication and Assessment:    Wound Outcome:    (Retired) Wound Length (cm):    (Retired) Wound Width (cm):    (Retired) Depth (cm):    Wound Description (Comments):    Removal Indications:    Wound Image   11/01/22 1021   Description of Altered Skin Integrity Partial thickness tissue loss. Shallow open ulcer with a red or pink wound bed, without slough. Intact or Open/Ruptured Serum-filled blister. 11/01/22 1021   Dressing Appearance Intact;Moist drainage 11/01/22  1021   Drainage Amount Moderate 11/01/22 1021   Drainage Characteristics/Odor Yellow;Serosanguineous 11/01/22 1021   Appearance Pink;Yellow 11/01/22 1021   Tissue loss description Full thickness 11/01/22 1021   Black (%), Wound Tissue Color 0 % 11/01/22 1021   Red (%), Wound Tissue Color 75 % 11/01/22 1021   Yellow (%), Wound Tissue Color 25 % 11/01/22 1021   Periwound Area Intact 11/01/22 1021   Wound Edges Defined 11/01/22 1021   Wound Length (cm) 1.2 cm 11/01/22 1021   Wound Width (cm) 1 cm 11/01/22 1021   Wound Depth (cm) 0.2 cm 11/01/22 1021   Wound Volume (cm^3) 0.24 cm^3 11/01/22 1021   Wound Surface Area (cm^2) 1.2 cm^2 11/01/22 1021   Care Cleansed with:;Wound cleanser;Applied: 11/01/22 1021   Dressing Removed;Changed;Honey;Absorptive Pad;Rolled gauze 11/01/22 1021           Assessment:       1. Skin ulcer of left ankle with fat layer exposed    2. 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 Cigar smoker    4. Benign hypertension    5. Hyperlipidemia, unspecified hyperlipidemia type    6. Other chronic pain          Left lateral ankle ulcer: began early summer 2022: first clinic visit 9/20/22 : improving  Severe PAD  Arterial US July 2022 at [facility]:   RLE:: 30-49% stenosis right common femoral artery, right deep femoral artery with multiphasic waveforms right leg.  LLE: Totally occluded left distal superficial femoral artery and popliteal artery.  Monophasic waveforms infrapopliteal vessels  [facility] angiogram on 8/29/22 with successful laser atherectomy balloon angioplasty and stenting of left SFA and popliteal arteries and laser atherectomy balloon angioplasty of the left posterior tibial artery.rx plavix and pletal  Chronic venous hypertension: venous US at [facility] 7/25/22:  Chronic smoker  Hypertension  Dyslipidemia  Chronic pain        Plan:     Plan of Care:    Sharp subcutaneous debridement today  Wound care: continue to apply the  medihoney and keep padded so nothing rubs on  this ankle  Nutrition: Must have a high protein diet to support wound  healing; (if renal disease, see nephrologist for amount allowed):  this should be over 100g protein /day (if no kidney issues); Also rec MVI along with vit C, vit D, zinc and Pierre  Smoker: must stop smoking: I explained the negative impact this has own not only the wounds  but overall health as well.   It is absolutely vital that he stop smoking or that the intervention recently done on vasculature will be to no avail and he will ultimately have a nonhealing wound and may require future amputations.  He voiced understanding  Rtc  2 weeks per his request

## 2022-11-15 ENCOUNTER — HOSPITAL ENCOUNTER (OUTPATIENT)
Dept: WOUND CARE | Facility: HOSPITAL | Age: 67
Discharge: HOME | End: 2022-11-15
Attending: EMERGENCY MEDICINE
Payer: MEDICARE

## 2022-11-15 VITALS
DIASTOLIC BLOOD PRESSURE: 85 MMHG | TEMPERATURE: 98 F | WEIGHT: 152 LBS | BODY MASS INDEX: 22.51 KG/M2 | RESPIRATION RATE: 18 BRPM | HEART RATE: 99 BPM | SYSTOLIC BLOOD PRESSURE: 160 MMHG | HEIGHT: 69 IN

## 2022-11-15 DIAGNOSIS — E78.5 HYPERLIPIDEMIA, UNSPECIFIED HYPERLIPIDEMIA TYPE: ICD-10-CM

## 2022-11-15 DIAGNOSIS — I70.243 ATHEROSCLEROSIS OF NATIVE ARTERIES OF LEFT LEG WITH ULCERATION OF ANKLE: ICD-10-CM

## 2022-11-15 DIAGNOSIS — I10 BENIGN HYPERTENSION: ICD-10-CM

## 2022-11-15 DIAGNOSIS — L97.322 SKIN ULCER OF LEFT ANKLE WITH FAT LAYER EXPOSED: Primary | ICD-10-CM

## 2022-11-15 DIAGNOSIS — F17.290 CIGAR SMOKER: ICD-10-CM

## 2022-11-15 PROCEDURE — 27000999 HC MEDICAL RECORD PHOTO DOCUMENTATION

## 2022-11-15 PROCEDURE — 11042 DBRDMT SUBQ TIS 1ST 20SQCM/<: CPT

## 2022-11-15 NOTE — PROCEDURES
"Debridement    Date/Time: 11/15/2022 10:00 AM  Performed by: Lizabeth Mckeon MD  Authorized by: Lizabeth Mckeon MD   Associated wounds:        Altered Skin Integrity 11/15/22 1016 Left lateral Malleolus #1 Arterial Ulcer Partial thickness tissue loss. Shallow open ulcer with a red or pink wound bed, without slough. Intact or Open/Ruptured Serum-filled blister.  Time out: Immediately prior to procedure a "time out" was called to verify the correct patient, procedure, equipment, support staff and site/side marked as required.    Consent Done?:  Yes (Written)    Preparation: Patient was prepped and draped in usual sterile fashion    Local anesthesia used?: Yes    Local anesthetic:  Topical anesthetic    Wound Details:    Location:  Left ankle    Type of Debridement:  Excisional       Length (cm):  0.8       Area (sq cm):  0.72       Width (cm):  0.9       Percent Debrided (%):  100       Depth (cm):  0.3       Total Area Debrided (sq cm):  0.72    Depth of debridement:  Subcutaneous tissue    Tissue debrided:  Dermis, Epidermis and Subcutaneous    Devitalized tissue debrided:  Biofilm and Slough    Instruments:  Curette    Bleeding:  Minimal  Hemostasis Achieved: Yes    Method Used:  Pressure  Patient tolerance:  Patient tolerated the procedure well with no immediate complications  "

## 2022-11-15 NOTE — PROGRESS NOTES
Subjective:       Patient ID: Shaheen Christie is a 67 y.o. male.    Chief Complaint: Venous Stasis      67-year-old white male chronic smoker with PAD, hypertension, neuropathy, dyslipidemia,  chronic venous hypertension and chronic back pain who has been referred to this Wound Care Clinic from [facility] to help treat a chronic left lateral arterial ulcer that began around may 2022.  PT feels it began from rubbing from his slipper. Pt tried to self treat but did see pCP Dr Russ once several months ago and received a rx for doxycycline. Pt's brother told pt to go to Dr [facility] of [facility]. He saw them in July 2022 and arterial US noted occluded left SFA and left popliteal artery as well as monophasic waveforms in the infrapopliteal vessels via [facility] workup.  He underwent a left lower extremity angiogram on 8/29/22 with successful laser atherectomy balloon angioplasty and stenting of left SFA and popliteal arteries and laser atherectomy balloon angioplasty of the left posterior tibial artery. He was placed on plavix and pletal.  Made his way to this wound care clinic for first visit on  9/20/22.We have had him apply medihoney since then. He can only come here every 2 weeks. Ulcer overall better      Review of Systems   Constitutional: Negative.    HENT: Negative.     Respiratory: Negative.     Cardiovascular: Negative.    Gastrointestinal: Negative.    Genitourinary: Negative.    Musculoskeletal:  Positive for back pain.   Skin:  Positive for wound (see hpi). Negative for color change, pallor and rash.   Neurological: Negative.    Psychiatric/Behavioral: Negative.         Objective:      Vitals:    11/15/22 1004   BP: (!) 160/85   Pulse: 99   Resp: 18   Temp: 98 °F (36.7 °C)       Physical Exam  Vitals reviewed.   Constitutional:       Appearance: He is normal weight.   HENT:      Head: Normocephalic and atraumatic.      Mouth/Throat:      Pharynx: Oropharynx is clear.   Eyes:      Conjunctiva/sclera: Conjunctivae normal.    Cardiovascular:      Pulses:           Dorsalis pedis pulses are detected w/ Doppler on the right side and detected w/ Doppler on the left side.      Comments: Faint palpable left dp; hair on both legs and dorsal feet;   Pulmonary:      Effort: Pulmonary effort is normal.   Abdominal:      General: Abdomen is flat.   Musculoskeletal:      Right lower leg: No edema.      Left lower leg: No edema.        Feet:    Skin:     General: Skin is warm and dry.      Capillary Refill: Capillary refill takes less than 2 seconds.   Neurological:      General: No focal deficit present.      Mental Status: He is alert and oriented to person, place, and time. Mental status is at baseline.            Altered Skin Integrity 11/15/22 1016 Left lateral Malleolus #1 Arterial Ulcer Partial thickness tissue loss. Shallow open ulcer with a red or pink wound bed, without slough. Intact or Open/Ruptured Serum-filled blister. (Active)   11/15/22 1016   Altered Skin Integrity Present on Admission: yes   Side: Left   Orientation: lateral   Location: Malleolus   Wound Number: #1   Is this injury device related?: No   Primary Wound Type: Arterial ulc   Description of Altered Skin Integrity: Partial thickness tissue loss. Shallow open ulcer with a red or pink wound bed, without slough. Intact or Open/Ruptured Serum-filled blister.   Ankle-Brachial Index: done on 11/01/22 L DP 0.97 PT 1.02   Pulses: non palpable - +Doppler biphasic x 4   Removal Indication and Assessment:    Wound Outcome:    (Retired) Wound Length (cm):    (Retired) Wound Width (cm):    (Retired) Depth (cm):    Wound Description (Comments):    Removal Indications:    Wound Image   11/15/22 1017   Dressing Appearance Intact;Moist drainage 11/15/22 1017   Drainage Amount Moderate 11/15/22 1017   Drainage Characteristics/Odor Yellow;No odor 11/15/22 1017   Appearance Yellow;Pink 11/15/22 1017   Tissue loss description Full thickness 11/15/22 1017   Black (%), Wound Tissue Color 0 %  11/15/22 1017   Red (%), Wound Tissue Color 25 % 11/15/22 1017   Yellow (%), Wound Tissue Color 75 % 11/15/22 1017   Periwound Area Intact;Dry 11/15/22 1017   Wound Edges Defined 11/15/22 1017   Wound Length (cm) 0.8 cm 11/15/22 1017   Wound Width (cm) 0.9 cm 11/15/22 1017   Wound Depth (cm) 0.3 cm 11/15/22 1017   Wound Volume (cm^3) 0.216 cm^3 11/15/22 1017   Wound Surface Area (cm^2) 0.72 cm^2 11/15/22 1017   Care Cleansed with:;Wound cleanser;Applied: 11/15/22 1017   Dressing Applied;Collagen;Bandaid 11/15/22 1017   Periwound Care Absorptive dressing applied 11/15/22 1017           Assessment:       1. Skin ulcer of left ankle with fat layer exposed    2. 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 Cigar smoker    4. Benign hypertension    5. Hyperlipidemia, unspecified hyperlipidemia type          Left lateral ankle ulcer: began early summer 2022: first clinic visit 9/20/22 : improving  Severe PAD  Arterial US July 2022 at [facility]:   RLE:: 30-49% stenosis right common femoral artery, right deep femoral artery with multiphasic waveforms right leg.  LLE: Totally occluded left distal superficial femoral artery and popliteal artery.  Monophasic waveforms infrapopliteal vessels  LLE angiogram on 8/29/22 with successful laser atherectomy balloon angioplasty and stenting of left SFA and popliteal arteries and laser atherectomy balloon angioplasty of the left posterior tibial artery.rx plavix and pletal  Chronic venous hypertension: venous US at [facility] 7/25/22:  Chronic smoker  Hypertension  Dyslipidemia  Chronic pain        Plan:     Plan of Care:    Sharp subcutaneous debridement today  Overall better but not advancing much now so I would like to begin promogran collagen dressings. He can change every 2-3 days;   Nutrition: Must have a high protein diet to support wound  healing; (if renal disease, see nephrologist for amount allowed):  this should be over 100g protein /day (if no kidney  issues); Also rec MVI along with vit C, vit D, zinc and Pierre  Smoker: must stop smoking: I explained the negative impact this has own not only the wounds  but overall health as well.   It is absolutely vital that he stop smoking or that the intervention recently done on vasculature will be to no avail and he will ultimately have a nonhealing wound and may require future amputations.  He voiced understanding  Rtc  2 weeks per his request

## 2022-11-15 NOTE — PATIENT INSTRUCTIONS
Pt seen today by: Lizabeth Mckeon MD    self care DRESSING INSTRUCTIONS:   Wound location: Left lateral ankle    Dressings to be changed every other day and as needed if soiled or not intact.    Cleanse wound with wound cleanser or saline  Apply promogram to wound bed (if you run out of promogram go back to thera honey gel)  Cover with large bandaid    Compression with: N/A    Return visit: November 29, 2022 at 10:00am    Wound may have been debrided in clinic: if so, WHAT YOU NEED TO KNOW:    Debridement is the removal of infected, damaged, or dead tissue so a wound can heal properly. Your wound may need more than one debridement. Debridement can cause bleeding, and a small amount of blood is expected.  AFTER A DEBRIDEMENT:    Keep your wound clean and dry. Do not remove the dressing unless instructed.  Follow the wound care orders provided to you or your home health care provider.  If you have pain, take over the counter pain relievers or pain medication if prescribed.  Elevate the wound and limit excessive activity to prevent bleeding and/or swelling in your wound.  If you see blood coming through the dressing, apply gauze and tape over the dressing and hold firm pressure to the wound with your hand for 5-10 minutes continuously, without peeking, to help the bleeding stop.  Contact [facility] wound care team at [phone] or go to the nearest Emergency department if:    You have a fever greater than 101 taken by mouth.  Your pain gets worse or does not go away, even after taking your regular pain medicine.  Your skin around your wound is red, hot, swollen, or draining pus.  You have bleeding that continues to come through the dressing after holding pressure for 10 minutes     Nutrition:  The current daily value (%DV) for protein is 50 grams per day and is meant as a general goal for most people. Further increasing your dietary protein intake is very important for wound healing. Typically one needs over 100g of  protein per day to help with wound healing needs.  If you are a dialysis patient or have problems with your kidneys, talk to your Nephrologist about how much protein you can take in with your condition.  Examples of high protein items that can be added to your diet include: eggs, chicken, red meats, almonds, cottage cheese, Greek yogurt, beans, and peanut butter.  Fortified protein bars, shakes and drinks can add 15-30 additional grams of protein per serving.   Also add:   1 daily general multivitamin   Pierre : 1 packet twice daily   Vitamin C : 500mg twice daily   Zinc 220 mg daily  Vit D : once daily    Call our [facility] wound clinic for questions/concerns a [phone] .

## 2022-11-29 ENCOUNTER — HOSPITAL ENCOUNTER (OUTPATIENT)
Dept: WOUND CARE | Facility: HOSPITAL | Age: 67
Discharge: HOME | End: 2022-11-29
Attending: EMERGENCY MEDICINE
Payer: MEDICARE

## 2022-11-29 VITALS
HEIGHT: 69 IN | HEART RATE: 79 BPM | DIASTOLIC BLOOD PRESSURE: 70 MMHG | WEIGHT: 152 LBS | SYSTOLIC BLOOD PRESSURE: 119 MMHG | TEMPERATURE: 98 F | RESPIRATION RATE: 20 BRPM | BODY MASS INDEX: 22.51 KG/M2

## 2022-11-29 DIAGNOSIS — F17.290 CIGAR SMOKER: ICD-10-CM

## 2022-11-29 DIAGNOSIS — L97.322 SKIN ULCER OF LEFT ANKLE WITH FAT LAYER EXPOSED: Primary | ICD-10-CM

## 2022-11-29 DIAGNOSIS — E78.5 HYPERLIPIDEMIA, UNSPECIFIED HYPERLIPIDEMIA TYPE: ICD-10-CM

## 2022-11-29 DIAGNOSIS — I10 BENIGN HYPERTENSION: ICD-10-CM

## 2022-11-29 DIAGNOSIS — I70.243 ATHEROSCLEROSIS OF NATIVE ARTERIES OF LEFT LEG WITH ULCERATION OF ANKLE: ICD-10-CM

## 2022-11-29 DIAGNOSIS — G89.29 OTHER CHRONIC PAIN: ICD-10-CM

## 2022-11-29 PROCEDURE — 11042 DBRDMT SUBQ TIS 1ST 20SQCM/<: CPT

## 2022-11-29 PROCEDURE — 27000999 HC MEDICAL RECORD PHOTO DOCUMENTATION

## 2022-11-29 NOTE — PATIENT INSTRUCTIONS
Pt seen today by: Lizabeth Mckeon MD    self care DRESSING INSTRUCTIONS:   Wound location: Left lateral ankle    Dressings to be changed every other day and as needed if soiled or not intact.    Cleanse wound with wound cleanser or saline  Apply promogram to wound bed (if you run out of promogram go back to thera honey gel)  Cover with large bandaid    Compression with: N/A    Return visit: December 13, 2022 at 10:00 am    Wound may have been debrided in clinic: if so, WHAT YOU NEED TO KNOW:    Debridement is the removal of infected, damaged, or dead tissue so a wound can heal properly. Your wound may need more than one debridement. Debridement can cause bleeding, and a small amount of blood is expected.  AFTER A DEBRIDEMENT:    Keep your wound clean and dry. Do not remove the dressing unless instructed.  Follow the wound care orders provided to you or your home health care provider.  If you have pain, take over the counter pain relievers or pain medication if prescribed.  Elevate the wound and limit excessive activity to prevent bleeding and/or swelling in your wound.  If you see blood coming through the dressing, apply gauze and tape over the dressing and hold firm pressure to the wound with your hand for 5-10 minutes continuously, without peeking, to help the bleeding stop.  Contact [facility] wound care team at [phone] or go to the nearest Emergency department if:    You have a fever greater than 101 taken by mouth.  Your pain gets worse or does not go away, even after taking your regular pain medicine.  Your skin around your wound is red, hot, swollen, or draining pus.  You have bleeding that continues to come through the dressing after holding pressure for 10 minutes     Nutrition:  The current daily value (%DV) for protein is 50 grams per day and is meant as a general goal for most people. Further increasing your dietary protein intake is very important for wound healing. Typically one needs over 100g of  protein per day to help with wound healing needs.  If you are a dialysis patient or have problems with your kidneys, talk to your Nephrologist about how much protein you can take in with your condition.  Examples of high protein items that can be added to your diet include: eggs, chicken, red meats, almonds, cottage cheese, Greek yogurt, beans, and peanut butter.  Fortified protein bars, shakes and drinks can add 15-30 additional grams of protein per serving.   Also add:   1 daily general multivitamin   Pierre : 1 packet twice daily   Vitamin C : 500mg twice daily   Zinc 220 mg daily  Vit D : once daily    Call our [facility] wound clinic for questions/concerns a [phone] .

## 2022-11-29 NOTE — PROCEDURES
"Debridement    Date/Time: 11/29/2022 10:00 AM  Performed by: Lizabeth Mckeon MD  Authorized by: Lizabeth Mckeon MD   Associated wounds:        Altered Skin Integrity 11/29/22 1010 Left lateral Malleolus #1 Arterial Ulcer Partial thickness tissue loss. Shallow open ulcer with a red or pink wound bed, without slough. Intact or Open/Ruptured Serum-filled blister.  Time out: Immediately prior to procedure a "time out" was called to verify the correct patient, procedure, equipment, support staff and site/side marked as required.    Consent Done?:  Yes (Written)    Preparation: Patient was prepped and draped in usual sterile fashion    Local anesthesia used?: Yes    Local anesthetic:  Topical anesthetic    Wound Details:    Location:  Left ankle    Type of Debridement:  Excisional       Length (cm):  0.7       Area (sq cm):  0.56       Width (cm):  0.8       Percent Debrided (%):  100       Depth (cm):  0.3       Total Area Debrided (sq cm):  0.56    Depth of debridement:  Subcutaneous tissue    Tissue debrided:  Dermis, Epidermis and Subcutaneous    Devitalized tissue debrided:  Biofilm and Slough    Instruments:  Curette    Bleeding:  Minimal  Hemostasis Achieved: Yes    Method Used:  Pressure  Patient tolerance:  Patient tolerated the procedure well with no immediate complications  "

## 2022-11-29 NOTE — PROGRESS NOTES
Subjective:       Patient ID: Shaheen Christie is a 67 y.o. male.    Chief Complaint: skin ulcer of left ankle with fat layer exposed      67-year-old white male chronic smoker with PAD, hypertension, neuropathy, dyslipidemia,  chronic venous hypertension and chronic back pain who has been referred to this Wound Care Clinic from [facility] to help treat a chronic left lateral arterial ulcer that began around may 2022.  PT feels it began from rubbing from his slipper. Pt tried to self treat but did see pCP Dr Russ once several months ago and received a rx for doxycycline. Pt's brother told pt to go to Dr [facility]. He saw them in July 2022 and arterial US noted occluded left SFA and left popliteal artery as well as monophasic waveforms in the infrapopliteal vessels via [facility] workup.  He underwent a left lower extremity angiogram on 8/29/22 with successful laser atherectomy balloon angioplasty and stenting of left SFA and popliteal arteries and laser atherectomy balloon angioplasty of the left posterior tibial artery. He was placed on plavix and pletal.  Made his way to this wound care clinic for first visit on 9/20/22. I initially used medihoney and then 2 weeks ago in mid Nov switched to promogran. (He can only come here every 2 weeks). A bit better but still present      Review of Systems   Constitutional: Negative.    HENT: Negative.     Respiratory: Negative.     Cardiovascular: Negative.    Gastrointestinal: Negative.    Genitourinary: Negative.    Musculoskeletal:  Positive for back pain.   Skin:  Positive for wound (see hpi). Negative for color change, pallor and rash.   Neurological: Negative.    Psychiatric/Behavioral: Negative.         Objective:      Vitals:    11/29/22 1011   BP: 119/70   Pulse: 79   Resp: 20   Temp: 98 °F (36.7 °C)       Physical Exam  Vitals reviewed.   Constitutional:       Appearance: He is normal weight.      Comments: Smells heavily of cig smoke   HENT:      Head: Normocephalic and  atraumatic.      Mouth/Throat:      Pharynx: Oropharynx is clear.   Eyes:      Conjunctiva/sclera: Conjunctivae normal.   Cardiovascular:      Pulses:           Dorsalis pedis pulses are detected w/ Doppler on the right side and detected w/ Doppler on the left side.      Comments: Faint palpable left dp; hair on both legs and dorsal feet;   Pulmonary:      Effort: Pulmonary effort is normal.   Abdominal:      General: Abdomen is flat.   Musculoskeletal:      Right lower leg: No edema.      Left lower leg: No edema.        Feet:    Skin:     General: Skin is warm and dry.      Capillary Refill: Capillary refill takes less than 2 seconds.   Neurological:      General: No focal deficit present.      Mental Status: He is alert and oriented to person, place, and time. Mental status is at baseline.            Altered Skin Integrity 11/29/22 1010 Left lateral Malleolus #1 Arterial Ulcer Partial thickness tissue loss. Shallow open ulcer with a red or pink wound bed, without slough. Intact or Open/Ruptured Serum-filled blister. (Active)   11/29/22 1010   Altered Skin Integrity Present on Admission: yes   Side: Left   Orientation: lateral   Location: Malleolus   Wound Number: #1   Is this injury device related?: No   Primary Wound Type: Arterial ulc   Description of Altered Skin Integrity: Partial thickness tissue loss. Shallow open ulcer with a red or pink wound bed, without slough. Intact or Open/Ruptured Serum-filled blister.   Ankle-Brachial Index: done on 11/01/22 L DP 0.97 PT 1.02   Pulses: non palpable - +Doppler biphasic x 4   Removal Indication and Assessment:    Wound Outcome:    (Retired) Wound Length (cm):    (Retired) Wound Width (cm):    (Retired) Depth (cm):    Wound Description (Comments):    Removal Indications:    Wound Image   11/29/22 1023   Dressing Appearance Intact;Moist drainage 11/29/22 1023   Drainage Amount Moderate 11/29/22 1023   Drainage Characteristics/Odor Yellow;No odor 11/29/22 1023    Appearance Yellow;Pink 11/29/22 1023   Tissue loss description Full thickness 11/29/22 1023   Black (%), Wound Tissue Color 0 % 11/29/22 1023   Red (%), Wound Tissue Color 25 % 11/29/22 1023   Yellow (%), Wound Tissue Color 75 % 11/29/22 1023   Periwound Area Intact;Dry 11/29/22 1023   Wound Edges Defined 11/29/22 1023   Wound Length (cm) 0.7 cm 11/29/22 1023   Wound Width (cm) 0.8 cm 11/29/22 1023   Wound Depth (cm) 0.3 cm 11/29/22 1023   Wound Volume (cm^3) 0.168 cm^3 11/29/22 1023   Wound Surface Area (cm^2) 0.56 cm^2 11/29/22 1023   Care Cleansed with:;Wound cleanser;Applied: 11/29/22 1023   Dressing Applied;Collagen;Bandaid 11/29/22 1037           Assessment:       1. Skin ulcer of left ankle with fat layer exposed    2. 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 Cigar smoker    4. Benign hypertension    5. Hyperlipidemia, unspecified hyperlipidemia type    6. Other chronic pain          Left lateral ankle ulcer: began early summer 2022: first clinic visit 9/20/22 : improving  Severe PAD  Arterial US July 2022 at [facility]:   RLE:: 30-49% stenosis right common femoral artery, right deep femoral artery with multiphasic waveforms right leg.  LLE: Totally occluded left distal superficial femoral artery and popliteal artery.  Monophasic waveforms infrapopliteal vessels  LLE angiogram on 8/29/22 with successful laser atherectomy balloon angioplasty and stenting of left SFA and popliteal arteries and laser atherectomy balloon angioplasty of the left posterior tibial artery.rx plavix and pletal  Chronic venous hypertension: venous US at [facility] 7/25/22:  Chronic smoker: difficulty with cessation  Hypertension  Dyslipidemia  Chronic pain        Plan:     Plan of Care:     subcutaneous debridement today  Continue to apply the  promogran collagen dressings. He can change every 2-3 days;   Nutrition: Must have a high protein diet to support wound  healing; (if renal disease, see nephrologist for  amount allowed):  this should be over 100g protein /day (if no kidney issues); Also rec MVI along with vit C, vit D, zinc and Pierre  Smoker: must stop smoking: I explained the negative impact this has own not only the wounds  but overall health as well.   It is absolutely vital that he stop smoking or that the intervention recently done on vasculature will be to no avail and he will ultimately have a nonhealing wound and may require future amputations.  He voiced understanding but says it is hard; smells of cigarette smoke today quite a bit  Rtc  2 weeks per his request

## 2022-12-13 ENCOUNTER — HOSPITAL ENCOUNTER (OUTPATIENT)
Dept: WOUND CARE | Facility: HOSPITAL | Age: 67
Discharge: HOME | End: 2022-12-13
Attending: EMERGENCY MEDICINE
Payer: MEDICARE

## 2022-12-13 VITALS
BODY MASS INDEX: 21.48 KG/M2 | SYSTOLIC BLOOD PRESSURE: 145 MMHG | HEART RATE: 92 BPM | RESPIRATION RATE: 18 BRPM | DIASTOLIC BLOOD PRESSURE: 66 MMHG | TEMPERATURE: 99 F | WEIGHT: 145 LBS | HEIGHT: 69 IN

## 2022-12-13 DIAGNOSIS — G89.29 OTHER CHRONIC PAIN: ICD-10-CM

## 2022-12-13 DIAGNOSIS — E78.5 HYPERLIPIDEMIA, UNSPECIFIED HYPERLIPIDEMIA TYPE: ICD-10-CM

## 2022-12-13 DIAGNOSIS — I70.243 ATHEROSCLEROSIS OF NATIVE ARTERIES OF LEFT LEG WITH ULCERATION OF ANKLE: ICD-10-CM

## 2022-12-13 DIAGNOSIS — F17.290 CIGAR SMOKER: ICD-10-CM

## 2022-12-13 DIAGNOSIS — I10 BENIGN HYPERTENSION: ICD-10-CM

## 2022-12-13 DIAGNOSIS — L97.322 SKIN ULCER OF LEFT ANKLE WITH FAT LAYER EXPOSED: Primary | ICD-10-CM

## 2022-12-13 PROCEDURE — 27000999 HC MEDICAL RECORD PHOTO DOCUMENTATION

## 2022-12-13 PROCEDURE — 11042 DBRDMT SUBQ TIS 1ST 20SQCM/<: CPT

## 2022-12-13 NOTE — PATIENT INSTRUCTIONS
Pt seen today by: Lizabeth Mckeon MD    self care DRESSING INSTRUCTIONS:   Wound location: Left lateral ankle    Dressings to be changed daily and as needed if soiled or not intact.    Cleanse wound with wound cleanser or saline  Apply thera honey gel to wound bed   Cover with large bandaid  Gentian violet applied to periwound rash  Compression with: N/A    Return visit: December 27, 2022 at 10:00 am    Wound may have been debrided in clinic: if so, WHAT YOU NEED TO KNOW:    Debridement is the removal of infected, damaged, or dead tissue so a wound can heal properly. Your wound may need more than one debridement. Debridement can cause bleeding, and a small amount of blood is expected.  AFTER A DEBRIDEMENT:    Keep your wound clean and dry. Do not remove the dressing unless instructed.  Follow the wound care orders provided to you or your home health care provider.  If you have pain, take over the counter pain relievers or pain medication if prescribed.  Elevate the wound and limit excessive activity to prevent bleeding and/or swelling in your wound.  If you see blood coming through the dressing, apply gauze and tape over the dressing and hold firm pressure to the wound with your hand for 5-10 minutes continuously, without peeking, to help the bleeding stop.  Contact [facility] wound care team at [phone] or go to the nearest Emergency department if:    You have a fever greater than 101 taken by mouth.  Your pain gets worse or does not go away, even after taking your regular pain medicine.  Your skin around your wound is red, hot, swollen, or draining pus.  You have bleeding that continues to come through the dressing after holding pressure for 10 minutes     Nutrition:  The current daily value (%DV) for protein is 50 grams per day and is meant as a general goal for most people. Further increasing your dietary protein intake is very important for wound healing. Typically one needs over 100g of protein per day to  help with wound healing needs.  If you are a dialysis patient or have problems with your kidneys, talk to your Nephrologist about how much protein you can take in with your condition.  Examples of high protein items that can be added to your diet include: eggs, chicken, red meats, almonds, cottage cheese, Greek yogurt, beans, and peanut butter.  Fortified protein bars, shakes and drinks can add 15-30 additional grams of protein per serving.   Also add:   1 daily general multivitamin   Pierre : 1 packet twice daily   Vitamin C : 500mg twice daily   Zinc 220 mg daily  Vit D : once daily    Call our [facility] wound clinic for questions/concerns a [phone] .

## 2022-12-13 NOTE — PROGRESS NOTES
Subjective:       Patient ID: Shaheen Christie is a 67 y.o. male.    Chief Complaint: No chief complaint on file.        67-year-old white male chronic smoker with PAD, hypertension, neuropathy, dyslipidemia,  chronic venous hypertension and chronic back pain who has been referred to this Wound Care Clinic from [facility] to help treat a chronic left lateral arterial ulcer that began around may 2022.  PT feels it began from rubbing from his slipper. Pt tried to self treat but did see pCP Dr Russ once several months ago and received a rx for doxycycline. Pt's brother told pt to go to Dr Queen of [facility]. He saw them in July 2022 and arterial US noted occluded left SFA and left popliteal artery as well as monophasic waveforms in the infrapopliteal vessels via [facility] workup.  He underwent a left lower extremity angiogram on 8/29/22 with successful laser atherectomy balloon angioplasty and stenting of left SFA and popliteal arteries and laser atherectomy balloon angioplasty of the left posterior tibial artery. He was placed on plavix and pletal.  Made his way to this wound care clinic for first visit on 9/20/22. I initially used medihoney and then in mid Nov switched to promogran.  Unfortunately he still smokes and can only come here every 2 weeks.  He presents today on 12/13/2022 and it looks a little bit larger to me although still under 1 cm2 so we can not use a skin substitute.      Review of Systems   Constitutional: Negative.    HENT: Negative.     Respiratory: Negative.     Cardiovascular: Negative.    Gastrointestinal: Negative.    Genitourinary: Negative.    Musculoskeletal:  Positive for back pain.   Skin:  Positive for wound (see hpi). Negative for color change, pallor and rash.   Neurological: Negative.    Psychiatric/Behavioral: Negative.         Objective:      Vitals:    12/13/22 1013   BP: (!) 145/66   Pulse: 92   Resp: 18   Temp: 98.6 °F (37 °C)       Physical Exam  Vitals reviewed.   Constitutional:        Appearance: He is normal weight.      Comments: Smells heavily of cig smoke   HENT:      Head: Normocephalic and atraumatic.      Mouth/Throat:      Pharynx: Oropharynx is clear.   Eyes:      Conjunctiva/sclera: Conjunctivae normal.   Cardiovascular:      Pulses:           Dorsalis pedis pulses are detected w/ Doppler on the right side and detected w/ Doppler on the left side.      Comments: Faint palpable left dp; hair on both legs and dorsal feet;   Pulmonary:      Effort: Pulmonary effort is normal.   Abdominal:      General: Abdomen is flat.   Musculoskeletal:      Right lower leg: No edema.      Left lower leg: No edema.        Feet:    Skin:     General: Skin is warm and dry.      Capillary Refill: Capillary refill takes less than 2 seconds.   Neurological:      General: No focal deficit present.      Mental Status: He is alert and oriented to person, place, and time. Mental status is at baseline.            Altered Skin Integrity 11/29/22 1010 Left lateral Malleolus #1 Arterial Ulcer Full thickness tissue loss. Subcutaneous fat may be visible but bone, tendon or muscle are not exposed (Active)   11/29/22 1010   Altered Skin Integrity Present on Admission: yes   Side: Left   Orientation: lateral   Location: Malleolus   Wound Number: #1   Is this injury device related?: No   Primary Wound Type: Arterial ulc   Description of Altered Skin Integrity: Full thickness tissue loss. Subcutaneous fat may be visible but bone, tendon or muscle are not exposed   Ankle-Brachial Index: done on 12/13/22   LT  DP= 0.79  PT 0.63   Pulses: non palpable - +Doppler biphasic x 4   Removal Indication and Assessment:    Wound Outcome:    (Retired) Wound Length (cm):    (Retired) Wound Width (cm):    (Retired) Depth (cm):    Wound Description (Comments):    Removal Indications:    Wound Image   12/13/22 1028   Dressing Appearance Intact;Moist drainage 12/13/22 1028   Drainage Amount Small 12/13/22 1028   Drainage Characteristics/Odor  Serosanguineous 12/13/22 1028   Appearance Yellow;Pink;Red;Slough 12/13/22 1028   Black (%), Wound Tissue Color 0 % 12/13/22 1028   Red (%), Wound Tissue Color 30 % 12/13/22 1028   Yellow (%), Wound Tissue Color 70 % 12/13/22 1028   Periwound Area Redness 12/13/22 1028   Wound Edges Defined 12/13/22 1028   Wound Length (cm) 1 cm 12/13/22 1028   Wound Width (cm) 0.8 cm 12/13/22 1028   Wound Depth (cm) 0.3 cm 12/13/22 1028   Wound Volume (cm^3) 0.24 cm^3 12/13/22 1028   Wound Surface Area (cm^2) 0.8 cm^2 12/13/22 1028   Care Cleansed with:;Wound cleanser;Applied: 12/13/22 1028   Dressing Applied;Honey;Absorptive Pad;Bandaid 12/13/22 1028           Assessment:       1. Skin ulcer of left ankle with fat layer exposed    2. 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 Cigar smoker    4. Benign hypertension    5. Hyperlipidemia, unspecified hyperlipidemia type    6. Other chronic pain          Left lateral ankle ulcer: began early summer 2022: first clinic visit 9/20/22 :  recalcitrant  Severe PAD with ongoing smoking  Arterial US July 2022 at [facility]:   RLE:: 30-49% stenosis right common femoral artery, right deep femoral artery with multiphasic waveforms right leg.  LLE: Totally occluded left distal superficial femoral artery and popliteal artery.  Monophasic waveforms infrapopliteal vessels  LLE angiogram on 8/29/22 with successful laser atherectomy balloon angioplasty and stenting of left SFA and popliteal arteries and laser atherectomy balloon angioplasty of the left posterior tibial artery.rx plavix and pletal  Chronic venous hypertension: venous US at [facility] 7/25/22:  Chronic smoker: difficulty with cessation  Hypertension  Dyslipidemia  Chronic pain        Plan:     Plan of Care:     subcutaneous debridement today  It looks a bit larger to me although still under 1 sq cm.  I would have liked to have used skin substitute but it has to be bigger than that.  Unfortunately he is still smoking  and he can only come here every 2 weeks.  He will go back to using the Medihoney gel and I advised that he keep his follow-up appointment with [facility] because it is highly likely that he is close back down with a previously opened.  He denies rest pain or claudication   Nutrition: Must have a high protein diet to support wound  healing; (if renal disease, see nephrologist for amount allowed):  this should be over 100g protein /day (if no kidney issues); Also rec MVI along with vit C, vit D, zinc and Pierre  Smoker: must stop smoking: I explained the negative impact again  this has own not only the wounds  but overall health as well.   It is absolutely vital that he stop smoking or that the intervention recently done on vasculature will be to no avail and he will ultimately have a nonhealing wound and may require future amputations.    Rtc  2 weeks per his request

## 2022-12-13 NOTE — PROCEDURES
"Debridement    Date/Time: 12/13/2022 10:00 AM  Performed by: Lizabeth Mckeon MD  Authorized by: Lizabeth Mckeon MD   Associated wounds:        Altered Skin Integrity 11/29/22 1010 Left lateral Malleolus #1 Arterial Ulcer Full thickness tissue loss. Subcutaneous fat may be visible but bone, tendon or muscle are not exposed  Time out: Immediately prior to procedure a "time out" was called to verify the correct patient, procedure, equipment, support staff and site/side marked as required.    Consent Done?:  Yes (Written)    Preparation: Patient was prepped and draped in usual sterile fashion    Local anesthesia used?: Yes    Local anesthetic:  Topical anesthetic    Wound Details:    Location:  Left ankle    Type of Debridement:  Excisional       Length (cm):  1       Area (sq cm):  0.8       Width (cm):  0.8       Percent Debrided (%):  100       Depth (cm):  0.3       Total Area Debrided (sq cm):  0.8    Depth of debridement:  Subcutaneous tissue    Tissue debrided:  Dermis, Epidermis and Subcutaneous    Devitalized tissue debrided:  Biofilm and Slough    Instruments:  Curette    Bleeding:  Minimal  Hemostasis Achieved: Yes    Method Used:  Pressure  Patient tolerance:  Patient tolerated the procedure well with no immediate complications  "

## 2022-12-27 ENCOUNTER — HOSPITAL ENCOUNTER (OUTPATIENT)
Dept: WOUND CARE | Facility: HOSPITAL | Age: 67
Discharge: HOME | End: 2022-12-27
Attending: EMERGENCY MEDICINE
Payer: MEDICARE

## 2022-12-27 VITALS
HEART RATE: 73 BPM | HEIGHT: 69 IN | WEIGHT: 145 LBS | SYSTOLIC BLOOD PRESSURE: 125 MMHG | RESPIRATION RATE: 18 BRPM | DIASTOLIC BLOOD PRESSURE: 72 MMHG | BODY MASS INDEX: 21.48 KG/M2 | TEMPERATURE: 98 F

## 2022-12-27 DIAGNOSIS — E78.5 HYPERLIPIDEMIA, UNSPECIFIED HYPERLIPIDEMIA TYPE: ICD-10-CM

## 2022-12-27 DIAGNOSIS — G89.29 OTHER CHRONIC PAIN: ICD-10-CM

## 2022-12-27 DIAGNOSIS — I70.243 ATHEROSCLEROSIS OF NATIVE ARTERIES OF LEFT LEG WITH ULCERATION OF ANKLE: ICD-10-CM

## 2022-12-27 DIAGNOSIS — F17.210 CIGARETTE NICOTINE DEPENDENCE WITHOUT COMPLICATION: ICD-10-CM

## 2022-12-27 DIAGNOSIS — I10 BENIGN HYPERTENSION: ICD-10-CM

## 2022-12-27 DIAGNOSIS — L97.322 SKIN ULCER OF LEFT ANKLE WITH FAT LAYER EXPOSED: Primary | ICD-10-CM

## 2022-12-27 PROCEDURE — 99213 PR OFFICE/OUTPT VISIT, EST, LEVL III, 20-29 MIN: ICD-10-PCS | Mod: ,,, | Performed by: EMERGENCY MEDICINE

## 2022-12-27 PROCEDURE — 99213 OFFICE O/P EST LOW 20 MIN: CPT | Mod: ,,, | Performed by: EMERGENCY MEDICINE

## 2022-12-27 PROCEDURE — 99213 OFFICE O/P EST LOW 20 MIN: CPT

## 2022-12-27 PROCEDURE — 27000999 HC MEDICAL RECORD PHOTO DOCUMENTATION

## 2022-12-27 NOTE — PATIENT INSTRUCTIONS
Pt seen today by: MD Dr. iLnda Allen will send a message to get a closer appointment with [facility].    self care DRESSING INSTRUCTIONS:   Wound location: Left lateral ankle    Dressings to be changed daily and as needed if soiled or not intact.    - Cleanse wound with wound cleanser or saline  - Apply thera honey gel to wound bed   - Cover with large bandaid  - Gentian violet applied to periwound rash  - Compression with: N/A    Return visit: January 10, 2023 at 10:00 am    Wound may have been debrided in clinic: if so, WHAT YOU NEED TO KNOW:    Debridement is the removal of infected, damaged, or dead tissue so a wound can heal properly. Your wound may need more than one debridement. Debridement can cause bleeding, and a small amount of blood is expected.  AFTER A DEBRIDEMENT:    Keep your wound clean and dry. Do not remove the dressing unless instructed.  Follow the wound care orders provided to you or your home health care provider.  If you have pain, take over the counter pain relievers or pain medication if prescribed.  Elevate the wound and limit excessive activity to prevent bleeding and/or swelling in your wound.  If you see blood coming through the dressing, apply gauze and tape over the dressing and hold firm pressure to the wound with your hand for 5-10 minutes continuously, without peeking, to help the bleeding stop.  Contact [facility] wound care team at [phone] or go to the nearest Emergency department if:    You have a fever greater than 101 taken by mouth.  Your pain gets worse or does not go away, even after taking your regular pain medicine.  Your skin around your wound is red, hot, swollen, or draining pus.  You have bleeding that continues to come through the dressing after holding pressure for 10 minutes     Nutrition:  The current daily value (%DV) for protein is 50 grams per day and is meant as a general goal for most people. Further increasing your dietary protein intake is  very important for wound healing. Typically one needs over 100g of protein per day to help with wound healing needs.  If you are a dialysis patient or have problems with your kidneys, talk to your Nephrologist about how much protein you can take in with your condition.  Examples of high protein items that can be added to your diet include: eggs, chicken, red meats, almonds, cottage cheese, Greek yogurt, beans, and peanut butter.  Fortified protein bars, shakes and drinks can add 15-30 additional grams of protein per serving.   Also add:   1 daily general multivitamin   Pierre : 1 packet twice daily   Vitamin C : 500mg twice daily   Zinc 220 mg daily  Vit D : once daily    Call our [facility] wound clinic for questions/concerns a [phone] .

## 2022-12-27 NOTE — PROGRESS NOTES
Subjective:       Patient ID: Shaheen Christie is a 67 y.o. male.    Chief Complaint: Arterial Ulcer (Left Lateral Mallelolus)        67-year-old white male chronic smoker with PAD, hypertension, neuropathy, dyslipidemia,  chronic venous hypertension and chronic back pain who has been referred to this Wound Care Clinic from [facility] to help treat a chronic left lateral arterial ulcer that began around may 2022.  PT feels it began from rubbing from his slipper. Pt tried to self treat but did see pCP Dr Russ once several months ago and received a rx for doxycycline. Pt's brother told pt to go to Dr Queen of [facility]. He saw them in July 2022 and arterial US noted occluded left SFA and left popliteal artery as well as monophasic waveforms in the infrapopliteal vessels via [facility] workup.  He underwent a left lower extremity angiogram on 8/29/22 with successful laser atherectomy balloon angioplasty and stenting of left SFA and popliteal arteries and laser atherectomy balloon angioplasty of the left posterior tibial artery. He was placed on plavix and pletal.  Made his way to this wound care clinic for first visit on 9/20/22. I initially used medihoney and then in mid Nov switched to promogran.  Unfortunately he still smokes and can only come here every 2 weeks.  On  12/13/2022 visit , ulcer looked bigger to me and again today on 12/27/22. He admits today to not taking his anticoagulants for about a month but he just restarted it.       Review of Systems   Constitutional: Negative.    HENT: Negative.     Respiratory: Negative.     Cardiovascular: Negative.    Gastrointestinal: Negative.    Genitourinary: Negative.    Musculoskeletal:  Positive for back pain.   Skin:  Positive for wound (see hpi). Negative for color change, pallor and rash.   Neurological: Negative.    Psychiatric/Behavioral: Negative.         Objective:      Vitals:    12/27/22 1032   BP: 125/72   Pulse: 73   Resp: 18   Temp: 98.1 °F (36.7 °C)       Physical  Exam  Vitals reviewed.   Constitutional:       Appearance: He is normal weight.   HENT:      Head: Normocephalic and atraumatic.      Mouth/Throat:      Pharynx: Oropharynx is clear.   Eyes:      Conjunctiva/sclera: Conjunctivae normal.   Cardiovascular:      Pulses:           Dorsalis pedis pulses are detected w/ Doppler on the right side and detected w/ Doppler on the left side.      Comments: Faint palpable left dp; hair on both legs and dorsal feet;   Pulmonary:      Effort: Pulmonary effort is normal.   Abdominal:      General: Abdomen is flat.   Musculoskeletal:      Right lower leg: No edema.      Left lower leg: No edema.        Feet:    Skin:     General: Skin is warm and dry.      Capillary Refill: Capillary refill takes less than 2 seconds.   Neurological:      General: No focal deficit present.      Mental Status: He is alert and oriented to person, place, and time. Mental status is at baseline.            Altered Skin Integrity 12/27/22 1057 Left lateral Malleolus #1 Arterial Ulcer Full thickness tissue loss. Subcutaneous fat may be visible but bone, tendon or muscle are not exposed (Active)   12/27/22 1057   Altered Skin Integrity Present on Admission: yes   Side: Left   Orientation: lateral   Location: Malleolus   Wound Number: #1   Is this injury device related?: No   Primary Wound Type: Arterial ulc   Description of Altered Skin Integrity: Full thickness tissue loss. Subcutaneous fat may be visible but bone, tendon or muscle are not exposed   Ankle-Brachial Index: done on 12/13/22   LT  DP= 0.79  PT 0.63   Pulses: non palpable - +Doppler biphasic x 4   Removal Indication and Assessment:    Wound Outcome:    (Retired) Wound Length (cm):    (Retired) Wound Width (cm):    (Retired) Depth (cm):    Wound Description (Comments):    Removal Indications:    Wound Image   12/27/22 1047   Dressing Appearance Intact;Dried drainage 12/27/22 1047   Drainage Amount Moderate 12/27/22 1047   Drainage  Characteristics/Odor Serosanguineous;No odor 12/27/22 1047   Appearance Red;Yellow;Moist 12/27/22 1047   Tissue loss description Full thickness 12/27/22 1047   Black (%), Wound Tissue Color 0 % 12/27/22 1047   Red (%), Wound Tissue Color 80 % 12/27/22 1047   Yellow (%), Wound Tissue Color 20 % 12/27/22 1047   Periwound Area Intact;Dry 12/27/22 1047   Wound Edges Defined 12/27/22 1047   Wound Length (cm) 1.1 cm 12/27/22 1047   Wound Width (cm) 1.1 cm 12/27/22 1047   Wound Depth (cm) 0.3 cm 12/27/22 1047   Wound Volume (cm^3) 0.363 cm^3 12/27/22 1047   Wound Surface Area (cm^2) 1.21 cm^2 12/27/22 1047   Care Cleansed with:;Soap and water;Applied: 12/27/22 1047           Assessment:       1. Skin ulcer of left ankle with fat layer exposed    2. 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 Cigarette nicotine dependence without complication    4. Benign hypertension    5. Hyperlipidemia, unspecified hyperlipidemia type    6. Other chronic pain          Left lateral ankle ulcer: began early summer 2022: first clinic visit 9/20/22 :  recalcitrant and enlarging  Severe PAD with ongoing smoking  Arterial US July 2022 at [facility]:   RLE:: 30-49% stenosis right common femoral artery, right deep femoral artery with multiphasic waveforms right leg.  LLE: Totally occluded left distal superficial femoral artery and popliteal artery.  Monophasic waveforms infrapopliteal vessels  LLE angiogram on 8/29/22 with successful laser atherectomy balloon angioplasty and stenting of left SFA and popliteal arteries and laser atherectomy balloon angioplasty of the left posterior tibial artery.rx plavix and pletal  Chronic venous hypertension: venous US at [facility] 7/25/22:  Chronic smoker: difficulty with cessation  Hypertension  Dyslipidemia  Chronic pain        Plan:     Plan of Care:     This wound is enlarging and he told me he has been off of his anticoagulation for past 4 weeks of his own accord. Just started re  taking it. This in addition to his ongoing smoking: I suspect his vascular flow is down again. Seeing [facility] in jan 2023; denies rest pain or claudication  Nutrition: Must have a high protein diet to support wound  healing; (if renal disease, see nephrologist for amount allowed):  this should be over 100g protein /day (if no kidney issues); Also rec MVI along with vit C, vit D, zinc and Pierre  Smoker: must stop smoking: I explained the negative impact again  this has own not only the wounds  but overall health as well.   It is absolutely vital that he stop smoking or that the intervention recently done on vasculature will be to no avail and he will ultimately have a nonhealing wound and may require future amputations.    Rtc  2 weeks per his request  Prognosis poor         The time spent including preparing to see the patient, obtaining patient history and assessment, evaluation of the plan of care, patient/caregiver counseling and education, orders, documentation, coordination of care, and other professional medical management activities for today's encounter was 20 minutes.

## 2023-01-24 ENCOUNTER — HOSPITAL ENCOUNTER (OUTPATIENT)
Dept: WOUND CARE | Facility: HOSPITAL | Age: 68
Discharge: HOME | End: 2023-01-24
Attending: EMERGENCY MEDICINE
Payer: MEDICARE

## 2023-01-24 VITALS
SYSTOLIC BLOOD PRESSURE: 167 MMHG | TEMPERATURE: 99 F | WEIGHT: 145 LBS | DIASTOLIC BLOOD PRESSURE: 85 MMHG | BODY MASS INDEX: 21.48 KG/M2 | HEART RATE: 75 BPM | HEIGHT: 69 IN | RESPIRATION RATE: 20 BRPM

## 2023-01-24 DIAGNOSIS — L97.322 SKIN ULCER OF LEFT ANKLE WITH FAT LAYER EXPOSED: Primary | ICD-10-CM

## 2023-01-24 DIAGNOSIS — F17.290 CIGAR SMOKER: ICD-10-CM

## 2023-01-24 DIAGNOSIS — E78.5 HYPERLIPIDEMIA, UNSPECIFIED HYPERLIPIDEMIA TYPE: ICD-10-CM

## 2023-01-24 DIAGNOSIS — F17.210 CIGARETTE NICOTINE DEPENDENCE WITHOUT COMPLICATION: ICD-10-CM

## 2023-01-24 DIAGNOSIS — I70.243 ATHEROSCLEROSIS OF NATIVE ARTERIES OF LEFT LEG WITH ULCERATION OF ANKLE: ICD-10-CM

## 2023-01-24 DIAGNOSIS — I10 BENIGN HYPERTENSION: ICD-10-CM

## 2023-01-24 DIAGNOSIS — G89.29 OTHER CHRONIC PAIN: ICD-10-CM

## 2023-01-24 PROCEDURE — 99214 PR OFFICE/OUTPT VISIT, EST, LEVL IV, 30-39 MIN: ICD-10-PCS | Mod: ,,, | Performed by: EMERGENCY MEDICINE

## 2023-01-24 PROCEDURE — 99214 OFFICE O/P EST MOD 30 MIN: CPT | Mod: ,,, | Performed by: EMERGENCY MEDICINE

## 2023-01-24 PROCEDURE — 99214 OFFICE O/P EST MOD 30 MIN: CPT

## 2023-01-24 PROCEDURE — 27000999 HC MEDICAL RECORD PHOTO DOCUMENTATION

## 2023-01-24 NOTE — PROGRESS NOTES
Subjective:       Patient ID: Shaheen Christie is a 68 y.o. male.    Chief Complaint: Skin ulcer of left ankle with fat layer exposed      67 y/o WM chronic smoker with PAD, hypertension, neuropathy, dyslipidemia,  chronic venous hypertension and chronic back pain who has been referred to this Wound Care Clinic from [facility] to help treat a chronic left lateral arterial ulcer that began around may 2022. Pt saw PCP MICKIE Russ for it but it was pt's brother who rec pt get a vascular evaluation with Dr Queen of [facility].  July 2022 :arterial US noted occluded left SFA and left popliteal artery as well as monophasic waveforms in the infrapopliteal vessels via [facility] workup.  He underwent a left lower extremity angiogram on 8/29/22 with successful laser atherectomy balloon angioplasty and stenting of left SFA and popliteal arteries and laser atherectomy balloon angioplasty of the left posterior tibial artery. He was placed on plavix and pletal.  Made his way to this wound care clinic for first visit on 9/20/22. I initially used medihoney and then in mid Nov switched to promogran.  Unfortunately he still smokes and can only come here every 2 weeks and of late the wound has enlarged.. To further complicate his situation, he told me on last visit on 12/27/22 that he decided to self d/c his plavix for no particular reason for about one month but did restart it.  I felt this arterial status flow to foot/ankle may have declined and needed to go back to [facility] and he is scheduled to see them in a few days from now.   Pt  did not return as scheduled: comes in today on 1/24/23 and ulcer is larger again.      Review of Systems   Constitutional: Negative.    HENT: Negative.     Respiratory: Negative.     Cardiovascular: Negative.    Gastrointestinal: Negative.    Genitourinary: Negative.    Musculoskeletal:  Positive for back pain.   Skin:  Positive for wound (see hpi). Negative for color change, pallor and rash.   Neurological: Negative.     Psychiatric/Behavioral: Negative.         Objective:      Vitals:    01/24/23 1025   BP: (!) 167/85   Pulse: 75   Resp: 20   Temp: 98.9 °F (37.2 °C)       Physical Exam  Vitals reviewed.   Constitutional:       Appearance: He is normal weight.   HENT:      Head: Normocephalic and atraumatic.      Mouth/Throat:      Pharynx: Oropharynx is clear.   Eyes:      Conjunctiva/sclera: Conjunctivae normal.   Cardiovascular:      Pulses:           Dorsalis pedis pulses are detected w/ Doppler on the right side and detected w/ Doppler on the left side.      Comments: Faint palpable left dp; hair on both legs and dorsal feet;   Pulmonary:      Effort: Pulmonary effort is normal.   Abdominal:      General: Abdomen is flat.   Musculoskeletal:      Right lower leg: No edema.      Left lower leg: No edema.        Feet:    Skin:     General: Skin is warm and dry.      Capillary Refill: Capillary refill takes less than 2 seconds.   Neurological:      General: No focal deficit present.      Mental Status: He is alert and oriented to person, place, and time. Mental status is at baseline.            Altered Skin Integrity 01/24/23 1020 Left lateral Malleolus #1 Arterial Ulcer Full thickness tissue loss. Subcutaneous fat may be visible but bone, tendon or muscle are not exposed (Active)   01/24/23 1020   Altered Skin Integrity Present on Admission: yes   Side: Left   Orientation: lateral   Location: Malleolus   Wound Number: #1   Is this injury device related?: No   Primary Wound Type: Arterial ulc   Description of Altered Skin Integrity: Full thickness tissue loss. Subcutaneous fat may be visible but bone, tendon or muscle are not exposed   Ankle-Brachial Index: done on 1/24/23 - Left dp = , pt =   Pulses: right + palpable, left = non palpable - + Doppler right = biphasic x 2, left = monophasic x 2   Removal Indication and Assessment:    Wound Outcome:    (Retired) Wound Length (cm):    (Retired) Wound Width (cm):    (Retired) Depth  (cm):    Wound Description (Comments):    Removal Indications:    Wound Image   01/24/23 1040   Dressing Appearance Intact;Moist drainage 01/24/23 1040   Drainage Amount Moderate 01/24/23 1040   Drainage Characteristics/Odor Serosanguineous;Yellow;No odor 01/24/23 1040   Appearance White;Red 01/24/23 1040   Tissue loss description Full thickness 01/24/23 1040   Red (%), Wound Tissue Color 50 % 01/24/23 1040   Yellow (%), Wound Tissue Color 50 % 01/24/23 1040   Periwound Area Intact;Moist;Redness 01/24/23 1040   Wound Edges Defined 01/24/23 1040   Wound Length (cm) 1.5 cm 01/24/23 1040   Wound Width (cm) 1.3 cm 01/24/23 1040   Wound Depth (cm) 0.5 cm 01/24/23 1040   Wound Volume (cm^3) 0.975 cm^3 01/24/23 1040   Wound Surface Area (cm^2) 1.95 cm^2 01/24/23 1040   Care Cleansed with:;Antimicrobial agent;Applied: 01/24/23 1040   Dressing Applied;Calcium alginate;Silver;Absorptive Pad;Bandaid 01/24/23 1111   Periwound Care Topical treatment applied;Absorptive dressing applied 01/24/23 1111           Assessment:       1. Skin ulcer of left ankle with fat layer exposed    2. 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 Cigarette nicotine dependence without complication    4. Benign hypertension    5. Hyperlipidemia, unspecified hyperlipidemia type    6. Other chronic pain    7. Cigar smoker          Left lateral ankle ulcer: began early summer 2022: first clinic visit 9/20/22 :  recalcitrant and enlarging  Severe PAD with ongoing smoking  Arterial US July 2022 at [facility]:   RLE:: 30-49% stenosis right common femoral artery, right deep femoral artery with multiphasic waveforms right leg.  LLE: Totally occluded left distal superficial femoral artery and popliteal artery.  Monophasic waveforms infrapopliteal vessels  LLE angiogram on 8/29/22 with successful laser atherectomy balloon angioplasty and stenting of left SFA and popliteal arteries and laser atherectomy balloon angioplasty of the left  posterior tibial artery.rx plavix and pletal (self d/c plavix at end of 2022 but then resumed)  Chronic venous hypertension: venous US at [facility] 7/25/22:  Chronic smoker: difficulty with cessation  Hypertension  Dyslipidemia  Chronic pain        Plan:     Plan of Care:     This wound is enlarging ; he still is smoking although says he is cutting down (smells of cig smoke today).  I suspect his vascular flow is down again. Seeing [facility] in a few days  I will send in rx for doxycycline 100mg bid x 14 days  Nutrition: Must have a high protein diet to support wound  healing; (if renal disease, see nephrologist for amount allowed):  this should be over 100g protein /day (if no kidney issues); Also rec MVI along with vit C, vit D, zinc and Pierre  Smoker: must stop smoking: I explained the negative impact again  this has own not only the wounds  but overall health as well.   It is absolutely vital that he stop smoking or that the intervention recently done on vasculature will be to no avail and he will ultimately have a nonhealing wound and may require future amputations.    Rtc  2 weeks per his request  Prognosis poor         The time spent including preparing to see the patient, obtaining patient history and assessment, evaluation of the plan of care, patient/caregiver counseling and education, orders, documentation, coordination of care, and other professional medical management activities for today's encounter was 30 minutes.

## 2023-01-24 NOTE — PATIENT INSTRUCTIONS
Pt seen today by: Lizabeth Mckeon MD    Keep appointment with [facility].    We will send in some antibiotics (doxycycline) to [facility] in [place].    Self care DRESSING INSTRUCTIONS:     Wound location: Left lateral ankle    - Cleanse wound with wound cleanser or saline  - Apply alginate silver to wound bed   - Apply a piece of abd pad over alginate silver  - Cover with large bandaids  - Mix of z-past, triamcinolone and remedy to periwound rash today  - change dressing daily and as needed if soiled or not intact        Return visit: February 7, 2023 at 10:00 am    Wound may have been debrided in clinic: if so, WHAT YOU NEED TO KNOW:    Debridement is the removal of infected, damaged, or dead tissue so a wound can heal properly. Your wound may need more than one debridement. Debridement can cause bleeding, and a small amount of blood is expected.  AFTER A DEBRIDEMENT:    Keep your wound clean and dry. Do not remove the dressing unless instructed.  Follow the wound care orders provided to you or your home health care provider.  If you have pain, take over the counter pain relievers or pain medication if prescribed.  Elevate the wound and limit excessive activity to prevent bleeding and/or swelling in your wound.  If you see blood coming through the dressing, apply gauze and tape over the dressing and hold firm pressure to the wound with your hand for 5-10 minutes continuously, without peeking, to help the bleeding stop.  Contact [facility] wound care team at [phone] or go to the nearest Emergency department if:    You have a fever greater than 101 taken by mouth.  Your pain gets worse or does not go away, even after taking your regular pain medicine.  Your skin around your wound is red, hot, swollen, or draining pus.  You have bleeding that continues to come through the dressing after holding pressure for 10 minutes     Nutrition:  The current daily value (%DV) for protein is 50 grams per day and is meant as a general  goal for most people. Further increasing your dietary protein intake is very important for wound healing. Typically one needs over 100g of protein per day to help with wound healing needs.  If you are a dialysis patient or have problems with your kidneys, talk to your Nephrologist about how much protein you can take in with your condition.  Examples of high protein items that can be added to your diet include: eggs, chicken, red meats, almonds, cottage cheese, Greek yogurt, beans, and peanut butter.  Fortified protein bars, shakes and drinks can add 15-30 additional grams of protein per serving.   Also add:   1 daily general multivitamin   Pierre : 1 packet twice daily   Vitamin C : 500mg twice daily   Zinc 220 mg daily  Vit D : once daily    Call our [facility] wound clinic for questions/concerns a [phone] .

## 2023-02-02 ENCOUNTER — PATIENT MESSAGE (OUTPATIENT)
Dept: WOUND CARE | Facility: HOSPITAL | Age: 68
End: 2023-02-02
Payer: MEDICARE

## 2023-02-07 ENCOUNTER — HOSPITAL ENCOUNTER (OUTPATIENT)
Dept: WOUND CARE | Facility: HOSPITAL | Age: 68
Discharge: HOME | End: 2023-02-07
Attending: EMERGENCY MEDICINE
Payer: MEDICARE

## 2023-02-07 VITALS
TEMPERATURE: 98 F | RESPIRATION RATE: 18 BRPM | DIASTOLIC BLOOD PRESSURE: 86 MMHG | SYSTOLIC BLOOD PRESSURE: 172 MMHG | BODY MASS INDEX: 23.85 KG/M2 | HEART RATE: 84 BPM | HEIGHT: 69 IN | WEIGHT: 161 LBS

## 2023-02-07 DIAGNOSIS — F17.290 CIGAR SMOKER: ICD-10-CM

## 2023-02-07 DIAGNOSIS — G89.29 OTHER CHRONIC PAIN: ICD-10-CM

## 2023-02-07 DIAGNOSIS — I10 BENIGN HYPERTENSION: ICD-10-CM

## 2023-02-07 DIAGNOSIS — E78.5 HYPERLIPIDEMIA, UNSPECIFIED HYPERLIPIDEMIA TYPE: ICD-10-CM

## 2023-02-07 DIAGNOSIS — I70.243 ATHEROSCLEROSIS OF NATIVE ARTERIES OF LEFT LEG WITH ULCERATION OF ANKLE: ICD-10-CM

## 2023-02-07 DIAGNOSIS — L97.322 SKIN ULCER OF LEFT ANKLE WITH FAT LAYER EXPOSED: Primary | ICD-10-CM

## 2023-02-07 DIAGNOSIS — F17.210 CIGARETTE NICOTINE DEPENDENCE WITHOUT COMPLICATION: ICD-10-CM

## 2023-02-07 PROCEDURE — 99213 PR OFFICE/OUTPT VISIT, EST, LEVL III, 20-29 MIN: ICD-10-PCS | Mod: ,,, | Performed by: EMERGENCY MEDICINE

## 2023-02-07 PROCEDURE — 99213 OFFICE O/P EST LOW 20 MIN: CPT | Mod: ,,, | Performed by: EMERGENCY MEDICINE

## 2023-02-07 PROCEDURE — 99213 OFFICE O/P EST LOW 20 MIN: CPT

## 2023-02-07 PROCEDURE — 27000999 HC MEDICAL RECORD PHOTO DOCUMENTATION

## 2023-02-07 NOTE — PROGRESS NOTES
Subjective:       Patient ID: Shaheen Christie is a 68 y.o. male.    Chief Complaint: Arterial Wound Follow Up      69 y/o WM  with severe PAD, ongoing cigarette smoking, hypertension, neuropathy, dyslipidemia,  chronic venous hypertension and chronic back pain who has been referred to this Wound Care Clinic from [facility] to help treat a chronic left lateral arterial ulcer that began around may 2022. Pt saw PCP MICKIE Russ for it but it was pt's brother who rec pt get a vascular evaluation with Dr Queen of [facility].  July 2022 :arterial US noted occluded left SFA and left popliteal artery as well as monophasic waveforms in the infrapopliteal vessels via [facility] workup.  He underwent a left lower extremity angiogram on 8/29/22 with successful laser atherectomy balloon angioplasty and stenting of left SFA and popliteal arteries and laser atherectomy balloon angioplasty of the left posterior tibial artery. He was placed on plavix and pletal.  Made his way to this wound care clinic for first visit on 9/20/22. I initially used medihoney and then in mid Nov switched to promogran.  Unfortunately he still smokes and can only come here every 2 weeks and of late the wound has enlarged.. To further complicate his situation, he stopped taking his plavix in Dec for no particular reason but has since resumed   I felt the LLE  arterial status flow to foot/ankle has declined and sent him back to [facility]. Pt  last here on 1/24/23 and ulcer larger again. I called in rx for minocycline as well. Pt saw [facility] on 1/26/23 to re-evaluate his vascular flow : arterial US: monophasic waveforms in left proximal PTA, left mid PTA and left DPA. Left distal PTA occluded. Patent stents left distal SFA and popliteal arteries. ( No mention of KAYLAN)  Angiogram planned for 3/6/23; pt tells me today he won't return here until after that angiogram      Review of Systems   Constitutional: Negative.    HENT: Negative.     Respiratory: Negative.     Cardiovascular: Negative.     Gastrointestinal: Negative.    Genitourinary: Negative.    Musculoskeletal:  Positive for back pain.   Skin:  Positive for wound (see hpi). Negative for color change, pallor and rash.   Neurological: Negative.    Psychiatric/Behavioral: Negative.         Objective:      Vitals:    02/07/23 1011   BP: (!) 172/86   Pulse: 84   Resp: 18   Temp: 98.2 °F (36.8 °C)       Physical Exam  Vitals reviewed.   Constitutional:       Appearance: He is normal weight.   HENT:      Head: Normocephalic and atraumatic.      Mouth/Throat:      Pharynx: Oropharynx is clear.   Eyes:      Conjunctiva/sclera: Conjunctivae normal.   Cardiovascular:      Pulses:           Dorsalis pedis pulses are detected w/ Doppler on the right side and detected w/ Doppler on the left side.      Comments: Faint palpable left dp; hair on both legs and dorsal feet;   Pulmonary:      Effort: Pulmonary effort is normal.      Comments: Moist cough noted today  Abdominal:      General: Abdomen is flat.   Musculoskeletal:      Right lower leg: No edema.      Left lower leg: No edema.        Feet:    Skin:     General: Skin is warm and dry.      Capillary Refill: Capillary refill takes less than 2 seconds.   Neurological:      General: No focal deficit present.      Mental Status: He is alert and oriented to person, place, and time. Mental status is at baseline.            Altered Skin Integrity 02/07/23 1004 Left lateral Malleolus #1 Arterial Ulcer (Active)   02/07/23 1004   Altered Skin Integrity Present on Admission: yes   Side: Left   Orientation: lateral   Location: Malleolus   Wound Number: #1   Is this injury device related?: No   Primary Wound Type: Arterial ulc   Description of Altered Skin Integrity:    Ankle-Brachial Index: done on 1/24/23 - Left dp = , pt =   Pulses: right + palpable, left = non palpable - + Doppler right = biphasic x 2, left = monophasic x 2   Removal Indication and Assessment:    Wound Outcome:    (Retired) Wound Length (cm):     (Retired) Wound Width (cm):    (Retired) Depth (cm):    Wound Description (Comments):    Removal Indications:    Wound Image   02/07/23 1008   Dressing Appearance Intact;Moist drainage 02/07/23 1008   Drainage Amount Small 02/07/23 1008   Drainage Characteristics/Odor Serosanguineous;No odor 02/07/23 1008   Appearance Red;Yellow 02/07/23 1008   Tissue loss description Full thickness 02/07/23 1008   Black (%), Wound Tissue Color 0 % 02/07/23 1008   Red (%), Wound Tissue Color 90 % 02/07/23 1008   Yellow (%), Wound Tissue Color 10 % 02/07/23 1008   Periwound Area Intact;Dry 02/07/23 1008   Wound Edges Defined 02/07/23 1008   Wound Length (cm) 1.5 cm 02/07/23 1008   Wound Width (cm) 1.2 cm 02/07/23 1008   Wound Depth (cm) 0.3 cm 02/07/23 1008   Wound Volume (cm^3) 0.54 cm^3 02/07/23 1008   Wound Surface Area (cm^2) 1.8 cm^2 02/07/23 1008   Care Cleansed with:;Soap and water;Applied: 02/07/23 1008           Assessment:       1. Skin ulcer of left ankle with fat layer exposed    2. 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 Cigarette nicotine dependence without complication    4. Benign hypertension    5. Hyperlipidemia, unspecified hyperlipidemia type    6. Other chronic pain    7. Cigar smoker          Left lateral ankle ulcer: began early summer 2022: first clinic visit 9/20/22 :  recalcitrant and enlarging  Severe PAD with ongoing smoking  Arterial US July 2022 at [facility]:   RLE:: 30-49% stenosis right common femoral artery, right deep femoral artery with multiphasic waveforms right leg.  LLE: Totally occluded left distal superficial femoral artery and popliteal artery.  Monophasic waveforms infrapopliteal vessels  LLE angiogram on 8/29/22 with successful laser atherectomy balloon angioplasty and stenting of left SFA and popliteal arteries and laser atherectomy balloon angioplasty of the left posterior tibial artery.rx plavix and pletal (self d/c plavix at end of 2022 but then  resumed)  Repeat arterial ultrasound 1/26/23:   Multiphasic waveforms obtained to right lower extremity.  Monophasic waveforms left proximal PTA, left mid PTA and left DPA.  The left distal PTA is occluded.  Mild diffuse plaque bilateral lower extremities.  Patent stents left distal SFA and popliteal arteries.  50-75% stenosis right distal KAYLAN, 30-49% stenosis left profunda  Chronic venous hypertension: venous US at [facility] 7/25/22:  Chronic smoker: difficulty with cessation  Hypertension  Dyslipidemia  Chronic pain        Plan:     Plan of Care:     I reviewed the recent progress note with [facility]  01/26/2023.  He had another arterial ultrasound which   Indicates ongoing infrapopliteal blockages. An angiogram will be done on 03/06/2023.  Patient does not want to return before then.  He knows that he is in a holding pattern and will return after that procedure.  Nutrition: Must have a high protein diet to support wound  healing; (if renal disease, see nephrologist for amount allowed):  this should be over 100g protein /day (if no kidney issues); Also rec MVI along with vit C, vit D, zinc and Pierre  Smoker: must stop smoking: I explained the negative impact again  this has own not only the wounds  but overall health as well.   It is absolutely vital that he stop smoking or that the intervention recently done on vasculature will be to no avail and he will ultimately have a nonhealing wound and may require future amputations.    Rtc  4 weeks per his request  Prognosis poor         The time spent including preparing to see the patient, obtaining patient history and assessment, evaluation of the plan of care, patient/caregiver counseling and education, orders, documentation, coordination of care, and other professional medical management activities for today's encounter was 20 minutes.

## 2023-02-07 NOTE — PATIENT INSTRUCTIONS
Pt seen today by: Lizabeth Mckeon MD    Keep appointment with [facility].    We will send in some antibiotics (doxycycline) to [facility] in [place].    Self care DRESSING INSTRUCTIONS:     Wound location: Left lateral ankle    - Cleanse wound with wound cleanser or saline  - Apply alginate silver or honey to wound bed (alternate silver and honey)  - Apply a piece of abd pad   - Cover with large bandaids  - Mix of z-past, triamcinolone and remedy to periwound rash today  - change dressing daily and as needed if soiled or not intact        Return visit: March 14, 2023 at 10:00 am    Wound may have been debrided in clinic: if so, WHAT YOU NEED TO KNOW:    Debridement is the removal of infected, damaged, or dead tissue so a wound can heal properly. Your wound may need more than one debridement. Debridement can cause bleeding, and a small amount of blood is expected.  AFTER A DEBRIDEMENT:    Keep your wound clean and dry. Do not remove the dressing unless instructed.  Follow the wound care orders provided to you or your home health care provider.  If you have pain, take over the counter pain relievers or pain medication if prescribed.  Elevate the wound and limit excessive activity to prevent bleeding and/or swelling in your wound.  If you see blood coming through the dressing, apply gauze and tape over the dressing and hold firm pressure to the wound with your hand for 5-10 minutes continuously, without peeking, to help the bleeding stop.  Contact [facility] wound care team at [phone] or go to the nearest Emergency department if:    You have a fever greater than 101 taken by mouth.  Your pain gets worse or does not go away, even after taking your regular pain medicine.  Your skin around your wound is red, hot, swollen, or draining pus.  You have bleeding that continues to come through the dressing after holding pressure for 10 minutes     Nutrition:  The current daily value (%DV) for protein is 50 grams per day and is meant  as a general goal for most people. Further increasing your dietary protein intake is very important for wound healing. Typically one needs over 100g of protein per day to help with wound healing needs.  If you are a dialysis patient or have problems with your kidneys, talk to your Nephrologist about how much protein you can take in with your condition.  Examples of high protein items that can be added to your diet include: eggs, chicken, red meats, almonds, cottage cheese, Greek yogurt, beans, and peanut butter.  Fortified protein bars, shakes and drinks can add 15-30 additional grams of protein per serving.   Also add:   1 daily general multivitamin   Pierre : 1 packet twice daily   Vitamin C : 500mg twice daily   Zinc 220 mg daily  Vit D : once daily    Call our [facility] wound clinic for questions/concerns a [phone] .

## 2023-06-22 ENCOUNTER — HOSPITAL ENCOUNTER (INPATIENT)
Facility: HOSPITAL | Age: 68
LOS: 14 days | Discharge: NURSING, REHAB OR LONG-TERM CARE | DRG: 300 | End: 2023-07-06
Attending: HOSPITALIST | Admitting: HOSPITALIST
Payer: MEDICARE

## 2023-06-22 DIAGNOSIS — M86.172 ACUTE OSTEOMYELITIS-ANKLE/FOOT, LEFT: Primary | ICD-10-CM

## 2023-06-22 DIAGNOSIS — M00.9 SEPTIC ARTHRITIS OF LEFT ANKLE, DUE TO UNSPECIFIED ORGANISM: ICD-10-CM

## 2023-06-22 DIAGNOSIS — L97.322 SKIN ULCER OF LEFT ANKLE WITH FAT LAYER EXPOSED: ICD-10-CM

## 2023-06-22 DIAGNOSIS — R07.9 CHEST PAIN: ICD-10-CM

## 2023-06-22 DIAGNOSIS — L98.499 SKIN ULCER: ICD-10-CM

## 2023-06-22 DIAGNOSIS — I73.9 PAD (PERIPHERAL ARTERY DISEASE): ICD-10-CM

## 2023-06-22 DIAGNOSIS — R52 PAIN: ICD-10-CM

## 2023-06-22 LAB
ALBUMIN SERPL-MCNC: 3.7 G/DL (ref 3.4–4.8)
ALBUMIN/GLOB SERPL: 1 RATIO (ref 1.1–2)
ALP SERPL-CCNC: 136 UNIT/L (ref 40–150)
ALT SERPL-CCNC: 14 UNIT/L (ref 0–55)
APPEARANCE UR: CLEAR
AST SERPL-CCNC: 19 UNIT/L (ref 5–34)
BACTERIA #/AREA URNS AUTO: NORMAL /HPF
BASOPHILS # BLD AUTO: 0.11 X10(3)/MCL
BASOPHILS NFR BLD AUTO: 1.8 %
BILIRUB UR QL STRIP.AUTO: NEGATIVE MG/DL
BILIRUBIN DIRECT+TOT PNL SERPL-MCNC: 0.9 MG/DL
BUN SERPL-MCNC: 5.7 MG/DL (ref 8.4–25.7)
CALCIUM SERPL-MCNC: 9.7 MG/DL (ref 8.8–10)
CHLORIDE SERPL-SCNC: 92 MMOL/L (ref 98–107)
CO2 SERPL-SCNC: 26 MMOL/L (ref 23–31)
COLOR UR: YELLOW
CREAT SERPL-MCNC: 0.81 MG/DL (ref 0.73–1.18)
EOSINOPHIL # BLD AUTO: 0.07 X10(3)/MCL (ref 0–0.9)
EOSINOPHIL NFR BLD AUTO: 1.2 %
ERYTHROCYTE [DISTWIDTH] IN BLOOD BY AUTOMATED COUNT: 14.5 % (ref 11.5–17)
GFR SERPLBLD CREATININE-BSD FMLA CKD-EPI: >60 MLS/MIN/1.73/M2
GLOBULIN SER-MCNC: 3.7 GM/DL (ref 2.4–3.5)
GLUCOSE SERPL-MCNC: 102 MG/DL (ref 82–115)
GLUCOSE UR QL STRIP.AUTO: NEGATIVE MG/DL
HCT VFR BLD AUTO: 44.1 % (ref 42–52)
HGB BLD-MCNC: 14.8 G/DL (ref 14–18)
IMM GRANULOCYTES # BLD AUTO: 0.02 X10(3)/MCL (ref 0–0.04)
IMM GRANULOCYTES NFR BLD AUTO: 0.3 %
KETONES UR QL STRIP.AUTO: ABNORMAL MG/DL
LEUKOCYTE ESTERASE UR QL STRIP.AUTO: NEGATIVE UNIT/L
LYMPHOCYTES # BLD AUTO: 1.1 X10(3)/MCL (ref 0.6–4.6)
LYMPHOCYTES NFR BLD AUTO: 18.4 %
MAGNESIUM SERPL-MCNC: 2 MG/DL (ref 1.6–2.6)
MCH RBC QN AUTO: 30.4 PG (ref 27–31)
MCHC RBC AUTO-ENTMCNC: 33.6 G/DL (ref 33–36)
MCV RBC AUTO: 90.6 FL (ref 80–94)
MONOCYTES # BLD AUTO: 0.58 X10(3)/MCL (ref 0.1–1.3)
MONOCYTES NFR BLD AUTO: 9.7 %
NEUTROPHILS # BLD AUTO: 4.09 X10(3)/MCL (ref 2.1–9.2)
NEUTROPHILS NFR BLD AUTO: 68.6 %
NITRITE UR QL STRIP.AUTO: NEGATIVE
NRBC BLD AUTO-RTO: 0 %
PH UR STRIP.AUTO: 7 [PH]
PLATELET # BLD AUTO: 324 X10(3)/MCL (ref 130–400)
PMV BLD AUTO: 8.7 FL (ref 7.4–10.4)
POTASSIUM SERPL-SCNC: 4.2 MMOL/L (ref 3.5–5.1)
PROT SERPL-MCNC: 7.4 GM/DL (ref 5.8–7.6)
PROT UR QL STRIP.AUTO: NEGATIVE MG/DL
RBC # BLD AUTO: 4.87 X10(6)/MCL (ref 4.7–6.1)
RBC #/AREA URNS AUTO: <5 /HPF
RBC UR QL AUTO: NEGATIVE UNIT/L
SODIUM SERPL-SCNC: 128 MMOL/L (ref 136–145)
SP GR UR STRIP.AUTO: 1.01 (ref 1–1.03)
SQUAMOUS #/AREA URNS AUTO: <5 /HPF
UROBILINOGEN UR STRIP-ACNC: 0.2 MG/DL
WBC # SPEC AUTO: 5.97 X10(3)/MCL (ref 4.5–11.5)
WBC #/AREA URNS AUTO: <5 /HPF

## 2023-06-22 PROCEDURE — 96375 TX/PRO/DX INJ NEW DRUG ADDON: CPT

## 2023-06-22 PROCEDURE — 80053 COMPREHEN METABOLIC PANEL: CPT | Performed by: PHYSICIAN ASSISTANT

## 2023-06-22 PROCEDURE — 85025 COMPLETE CBC W/AUTO DIFF WBC: CPT | Performed by: PHYSICIAN ASSISTANT

## 2023-06-22 PROCEDURE — 96361 HYDRATE IV INFUSION ADD-ON: CPT

## 2023-06-22 PROCEDURE — 25000003 PHARM REV CODE 250: Performed by: PHYSICIAN ASSISTANT

## 2023-06-22 PROCEDURE — 87077 CULTURE AEROBIC IDENTIFY: CPT | Performed by: NURSE PRACTITIONER

## 2023-06-22 PROCEDURE — 99285 EMERGENCY DEPT VISIT HI MDM: CPT | Mod: 25

## 2023-06-22 PROCEDURE — 25000003 PHARM REV CODE 250: Performed by: NURSE PRACTITIONER

## 2023-06-22 PROCEDURE — 63600175 PHARM REV CODE 636 W HCPCS: Performed by: NURSE PRACTITIONER

## 2023-06-22 PROCEDURE — 81001 URINALYSIS AUTO W/SCOPE: CPT | Performed by: PHYSICIAN ASSISTANT

## 2023-06-22 PROCEDURE — 11000001 HC ACUTE MED/SURG PRIVATE ROOM

## 2023-06-22 PROCEDURE — 96374 THER/PROPH/DIAG INJ IV PUSH: CPT

## 2023-06-22 PROCEDURE — 87040 BLOOD CULTURE FOR BACTERIA: CPT | Performed by: NURSE PRACTITIONER

## 2023-06-22 PROCEDURE — 83735 ASSAY OF MAGNESIUM: CPT | Performed by: PHYSICIAN ASSISTANT

## 2023-06-22 RX ORDER — POLYETHYLENE GLYCOL 3350 17 G/17G
17 POWDER, FOR SOLUTION ORAL 2 TIMES DAILY PRN
Status: DISCONTINUED | OUTPATIENT
Start: 2023-06-22 | End: 2023-07-02

## 2023-06-22 RX ORDER — TALC
6 POWDER (GRAM) TOPICAL NIGHTLY PRN
Status: DISCONTINUED | OUTPATIENT
Start: 2023-06-22 | End: 2023-07-06 | Stop reason: HOSPADM

## 2023-06-22 RX ORDER — SIMETHICONE 80 MG
1 TABLET,CHEWABLE ORAL 4 TIMES DAILY PRN
Status: DISCONTINUED | OUTPATIENT
Start: 2023-06-22 | End: 2023-07-06 | Stop reason: HOSPADM

## 2023-06-22 RX ORDER — MAG HYDROX/ALUMINUM HYD/SIMETH 200-200-20
30 SUSPENSION, ORAL (FINAL DOSE FORM) ORAL 4 TIMES DAILY PRN
Status: DISCONTINUED | OUTPATIENT
Start: 2023-06-22 | End: 2023-07-06 | Stop reason: HOSPADM

## 2023-06-22 RX ORDER — NALOXONE HCL 0.4 MG/ML
0.02 VIAL (ML) INJECTION
Status: DISCONTINUED | OUTPATIENT
Start: 2023-06-22 | End: 2023-07-06 | Stop reason: HOSPADM

## 2023-06-22 RX ORDER — ACETAMINOPHEN 325 MG/1
650 TABLET ORAL EVERY 6 HOURS PRN
Status: DISCONTINUED | OUTPATIENT
Start: 2023-06-22 | End: 2023-07-06 | Stop reason: HOSPADM

## 2023-06-22 RX ORDER — SODIUM CHLORIDE 9 MG/ML
1000 INJECTION, SOLUTION INTRAVENOUS
Status: COMPLETED | OUTPATIENT
Start: 2023-06-22 | End: 2023-06-22

## 2023-06-22 RX ORDER — SODIUM CHLORIDE 9 MG/ML
INJECTION, SOLUTION INTRAVENOUS CONTINUOUS
Status: DISCONTINUED | OUTPATIENT
Start: 2023-06-22 | End: 2023-06-24

## 2023-06-22 RX ORDER — HYDROMORPHONE HYDROCHLORIDE 2 MG/ML
1 INJECTION, SOLUTION INTRAMUSCULAR; INTRAVENOUS; SUBCUTANEOUS
Status: COMPLETED | OUTPATIENT
Start: 2023-06-22 | End: 2023-06-22

## 2023-06-22 RX ORDER — ENOXAPARIN SODIUM 100 MG/ML
40 INJECTION SUBCUTANEOUS EVERY 24 HOURS
Status: DISCONTINUED | OUTPATIENT
Start: 2023-06-23 | End: 2023-06-28

## 2023-06-22 RX ORDER — ONDANSETRON 2 MG/ML
4 INJECTION INTRAMUSCULAR; INTRAVENOUS
Status: COMPLETED | OUTPATIENT
Start: 2023-06-22 | End: 2023-06-22

## 2023-06-22 RX ORDER — HYDRALAZINE HYDROCHLORIDE 20 MG/ML
10 INJECTION INTRAMUSCULAR; INTRAVENOUS
Status: COMPLETED | OUTPATIENT
Start: 2023-06-22 | End: 2023-06-22

## 2023-06-22 RX ORDER — HYDROCODONE BITARTRATE AND ACETAMINOPHEN 7.5; 325 MG/1; MG/1
1 TABLET ORAL EVERY 4 HOURS PRN
Status: DISCONTINUED | OUTPATIENT
Start: 2023-06-22 | End: 2023-06-29

## 2023-06-22 RX ORDER — ONDANSETRON 2 MG/ML
4 INJECTION INTRAMUSCULAR; INTRAVENOUS EVERY 4 HOURS PRN
Status: DISCONTINUED | OUTPATIENT
Start: 2023-06-22 | End: 2023-07-06 | Stop reason: HOSPADM

## 2023-06-22 RX ORDER — PROCHLORPERAZINE EDISYLATE 5 MG/ML
5 INJECTION INTRAMUSCULAR; INTRAVENOUS EVERY 6 HOURS PRN
Status: DISCONTINUED | OUTPATIENT
Start: 2023-06-22 | End: 2023-07-06 | Stop reason: HOSPADM

## 2023-06-22 RX ORDER — MORPHINE SULFATE 4 MG/ML
4 INJECTION, SOLUTION INTRAMUSCULAR; INTRAVENOUS
Status: COMPLETED | OUTPATIENT
Start: 2023-06-22 | End: 2023-06-22

## 2023-06-22 RX ADMIN — SODIUM CHLORIDE 1000 ML: 9 INJECTION, SOLUTION INTRAVENOUS at 02:06

## 2023-06-22 RX ADMIN — ONDANSETRON 4 MG: 2 INJECTION INTRAMUSCULAR; INTRAVENOUS at 05:06

## 2023-06-22 RX ADMIN — HYDROMORPHONE HYDROCHLORIDE 1 MG: 2 INJECTION INTRAMUSCULAR; INTRAVENOUS; SUBCUTANEOUS at 07:06

## 2023-06-22 RX ADMIN — VANCOMYCIN HYDROCHLORIDE 1500 MG: 1.5 INJECTION, POWDER, LYOPHILIZED, FOR SOLUTION INTRAVENOUS at 07:06

## 2023-06-22 RX ADMIN — PIPERACILLIN AND TAZOBACTAM 4.5 G: 4; .5 INJECTION, POWDER, LYOPHILIZED, FOR SOLUTION INTRAVENOUS; PARENTERAL at 07:06

## 2023-06-22 RX ADMIN — SODIUM CHLORIDE: 9 INJECTION, SOLUTION INTRAVENOUS at 11:06

## 2023-06-22 RX ADMIN — SODIUM CHLORIDE 500 ML: 9 INJECTION, SOLUTION INTRAVENOUS at 07:06

## 2023-06-22 RX ADMIN — HYDRALAZINE HYDROCHLORIDE 10 MG: 20 INJECTION INTRAMUSCULAR; INTRAVENOUS at 05:06

## 2023-06-22 RX ADMIN — MORPHINE SULFATE 4 MG: 4 INJECTION INTRAVENOUS at 05:06

## 2023-06-22 NOTE — ED NOTES
Lobby Round. Pt found sitting in wheelchair alert and oriented. Pt does not appear to be in any immediate distress at this time.

## 2023-06-22 NOTE — FIRST PROVIDER EVALUATION
"Medical screening examination initiated.  I have conducted a focused provider triage encounter, findings are as follows:    Brief history of present illness:  68-year-old male presents to ED for evaluation of increasing generalized weakness over the last 3 weeks.  Patient reports to pain and swelling from skin ulcer to his left ankle worsening over the past several days.    Vitals:    06/22/23 1137   BP: (!) 170/97   Pulse: 90   Resp: 19   Temp: 98.4 °F (36.9 °C)   TempSrc: Oral   SpO2: 97%   Weight: 77.1 kg (170 lb)   Height: 5' 9" (1.753 m)       Pertinent physical exam:  Patient awake and alert sitting on stretcher.    Brief workup plan:  Labs, UA, x-ray    Preliminary workup initiated; this workup will be continued and followed by the physician or advanced practice provider that is assigned to the patient when roomed.  "

## 2023-06-22 NOTE — PROGRESS NOTES
Pharmacokinetic Initial Assessment: IV Vancomycin    Assessment/Plan:    Initiate intravenous vancomycin with loading dose of 1500 mg once followed by a maintenance dose of vancomycin 750mg IV every 12 hours  Desired empiric serum trough concentration is 15 to 20 mcg/mL  Draw vancomycin trough level 60 min prior to fourth dose on 06/24 at approximately 0600  Pharmacy will continue to follow and monitor vancomycin.      Please contact pharmacy at extension [phone] with any questions regarding this assessment.     Thank you for the consult,   Xiao Higuera       Patient brief summary:  Shaheen Christie is a 68 y.o. male initiated on antimicrobial therapy with IV Vancomycin for treatment of suspected bone/joint infection    Drug Allergies:   Review of patient's allergies indicates:  No Known Allergies    Actual Body Weight:   77.1 kg    Renal Function:   Estimated Creatinine Clearance: 84.9 mL/min (based on SCr of 0.81 mg/dL).,     Dialysis Method (if applicable):  N/A    CBC (last 72 hours):  Recent Labs   Lab Result Units 06/22/23  1207   WBC x10(3)/mcL 5.97   Hgb g/dL 14.8   Hct % 44.1   Platelet x10(3)/mcL 324   Mono % % 9.7   Eos % % 1.2   Basophil % % 1.8       Metabolic Panel (last 72 hours):  Recent Labs   Lab Result Units 06/22/23  1207   Sodium Level mmol/L 128*   Potassium Level mmol/L 4.2   Chloride mmol/L 92*   Carbon Dioxide mmol/L 26   Glucose Level mg/dL 102   Blood Urea Nitrogen mg/dL 5.7*   Creatinine mg/dL 0.81   Albumin Level g/dL 3.7   Bilirubin Total mg/dL 0.9   Alkaline Phosphatase unit/L 136   Aspartate Aminotransferase unit/L 19   Alanine Aminotransferase unit/L 14   Magnesium Level mg/dL 2.00       Drug levels (last 3 results):  No results for input(s): VANCOMYCINRA, VANCORANDOM, VANCOMYCINPE, VANCOPEAK, VANCOMYCINTR, VANCOTROUGH in the last 72 hours.    Microbiologic Results:  Microbiology Results (last 7 days)       Procedure Component Value Units Date/Time    Wound Culture [[id number]]     Order  Status: Sent Specimen: Wound from Ankle, Left     Wound Culture [[id number]]     Order Status: Canceled Specimen: Wound from Toe, Left Foot     Blood culture #1 **CANNOT BE ORDERED STAT** [[id number]]     Order Status: Sent Specimen: Blood     Blood culture #2 **CANNOT BE ORDERED STAT** [[id number]]     Order Status: Sent Specimen: Blood     Wound Culture [[id number]]     Order Status: Sent Specimen: Wound from Toe, Left Foot

## 2023-06-22 NOTE — ED PROVIDER NOTES
Encounter Date: 6/22/2023       History     Chief Complaint   Patient presents with    Skin Ulcer     Pt reports per aasi c/o skin ulcer to L lateral ankle with worsening pain x3 weeks. Per EMS, pt is not bed bound. GCS15.     See MDM    The history is provided by the patient. No  was used.   Review of patient's allergies indicates:  No Known Allergies  Past Medical History:   Diagnosis Date    COPD (chronic obstructive pulmonary disease)     Depression     Hypertension     Neuropathy      Past Surgical History:   Procedure Laterality Date    angiogram Bilateral     stents placed in left leg    anterior neck surgery      cataracts Left     CHOLECYSTECTOMY      EYE SURGERY Left     HAND SURGERY Left     broken bone    herina repair      KNEE SURGERY Bilateral     posterior neck surgery      SPINE SURGERY      TOTAL REPLACEMENT OF BOTH HIP JOINTS USING COMPUTER-ASSISTED NAVIGATION Bilateral      Family History   Problem Relation Age of Onset    Heart disease Mother     Cancer Father     Lung disease Father      Social History     Tobacco Use    Smoking status: Every Day     Packs/day: 0.50     Years: 15.00     Pack years: 7.50     Types: Cigarettes    Smokeless tobacco: Never   Substance Use Topics    Alcohol use: Yes     Alcohol/week: 3.0 - 6.0 standard drinks     Types: 3 - 6 Cans of beer per week    Drug use: Yes     Types: Marijuana     Review of Systems   Constitutional:  Negative for fever.   Respiratory:  Negative for cough and shortness of breath.    Cardiovascular:  Negative for chest pain.   Gastrointestinal:  Negative for abdominal pain.   Genitourinary:  Negative for difficulty urinating and dysuria.   Musculoskeletal:  Negative for gait problem.   Skin:  Positive for wound. Negative for color change.   Neurological:  Negative for dizziness, speech difficulty and headaches.   Psychiatric/Behavioral:  Negative for hallucinations and suicidal ideas.    All other systems reviewed and are  Pt calling looking for results of cervical xray done 6/22.  Advised her we would call her back after Dr. Dubon had reviewed this with her comments.   negative.    Physical Exam     Initial Vitals [06/22/23 1137]   BP Pulse Resp Temp SpO2   (!) 170/97 90 19 98.4 °F (36.9 °C) 97 %      MAP       --         Physical Exam    Nursing note and vitals reviewed.  Constitutional: He appears well-developed and well-nourished.   HENT:   Head: Normocephalic.   Eyes: EOM are normal.   Neck: Neck supple.   Normal range of motion.  Cardiovascular:  Normal rate, regular rhythm, normal heart sounds and intact distal pulses.           Pulmonary/Chest: Breath sounds normal.   Abdominal: Abdomen is soft. Bowel sounds are normal.   Musculoskeletal:         General: Normal range of motion.      Cervical back: Normal range of motion and neck supple.      Comments: Left lateral ankle wound     Neurological: He is alert and oriented to person, place, and time. He has normal strength.   Skin: Skin is warm and dry. Capillary refill takes less than 2 seconds.   Psychiatric: He has a normal mood and affect. His behavior is normal. Judgment and thought content normal.       ED Course   Procedures  Labs Reviewed   COMPREHENSIVE METABOLIC PANEL - Abnormal; Notable for the following components:       Result Value    Sodium Level 128 (*)     Chloride 92 (*)     Blood Urea Nitrogen 5.7 (*)     Globulin 3.7 (*)     Albumin/Globulin Ratio 1.0 (*)     All other components within normal limits   MAGNESIUM - Normal   WOUND CULTURE ([facility])   BLOOD CULTURE [facility]   BLOOD CULTURE [facility]   WOUND CULTURE ([facility])   CBC W/ AUTO DIFFERENTIAL    Narrative:     The following orders were created for panel order CBC auto differential.  Procedure                               Abnormality         Status                     ---------                               -----------         ------                     CBC with Differential[[id number]]                            Final result                 Please view results for these tests on the individual orders.   CBC WITH DIFFERENTIAL   URINALYSIS, REFLEX TO URINE CULTURE           Imaging Results              X-Ray Ankle Complete Left (Final result)  Result time 06/22/23 14:39:52   Procedure changed from X-Ray Ankle Complete Right     Final result by Kartik Chavez MD (06/22/23 14:39:52)                   Impression:      As above.      Electronically signed by: Kartik Chavez  Date:    06/22/2023  Time:    14:39               Narrative:    EXAMINATION:  XR ANKLE COMPLETE 3 VIEW LEFT    CLINICAL HISTORY:  skin ulcer ;  Non-pressure chronic ulcer of skin of other sites with unspecified severity    TECHNIQUE:  Radiographs of the left ankle with AP, lateral and oblique  views.    COMPARISON:  No prior imaging available for comparison    FINDINGS:  Lucency overlies the lateral malleolus suggesting open injury.  There is a mottled appearance throughout the bones as may be seen with disuse osteopenia is multiple bones are infected.  Superimposed osteomyelitis is not excluded.  Soft tissue edema noted throughout.                                       Medications   piperacillin-tazobactam (ZOSYN) 4.5 g in dextrose 5 % in water (D5W) 5 % 100 mL IVPB (MB+) (has no administration in time range)   vancomycin - pharmacy to dose (has no administration in time range)   vancomycin 1.5 g in dextrose 5 % 250 mL IVPB (ready to mix) (has no administration in time range)     Followed by   vancomycin 750 mg in dextrose 5 % 250 mL IVPB (ready to mix) (has no administration in time range)   sodium chloride 0.9% bolus 500 mL 500 mL (has no administration in time range)   0.9%  NaCl infusion (1,000 mLs Intravenous New Bag 6/22/23 1409)   hydrALAZINE injection 10 mg (10 mg Intravenous Given 6/22/23 1754)   morphine injection 4 mg (4 mg Intravenous Given 6/22/23 1754)   ondansetron injection 4 mg (4 mg Intravenous Given 6/22/23 1754)     Medical Decision Making:   Initial Assessment:   Historian:  Patient.  Patient is a 68-year-old male  that presents with wound to left ankle and left foot that has been present 3  weeks. Associated symptoms nothing. Surrounding information is has been doing wound care. Exacerbated by nothing. Relieved by nothing. Patient treatment prior to arrival wound care. Risk factors include neuropathy. Other history pertaining to this complaint nothing.   Assessment:  See physical exam.    Differential Diagnosis:   Ankle ulcer, pressure ulcer, osteomyelitis  ED Management:  History was obtained.  Physical performed.  I did review patient's wound care records.  Reviewing patient's labs and x-ray it appears he has osteomyelitis in that ankle.  I will get wound cultures on the left ankle and left foot, blood cultures, start patient on vanc and Zosyn, and paged Hospital Medicine for admission.  Did speak to Cynthia SHORT with [facility].  I discussed lab findings, x-rays, history of present illness, future orders, and admission.  She accepts admission.  I did speak to the emergency room physician, Dr. Maria, and he agrees with admission and will see patient in a face-to-face interaction.  No social determinants that affect healthcare were noted.           ED Course as of 06/22/23 1807   Thu Jun 22, 2023   1739  paged [CL]      ED Course User Index  [CL] ANGEL De Leon                 Clinical Impression:   Final diagnoses:  [L98.499] Skin ulcer  [M86.172] Acute osteomyelitis-ankle/foot, left (Primary)        ED Disposition Condition    Admit Stable                ANGEL De Leon  06/22/23 1807

## 2023-06-23 LAB
ALBUMIN SERPL-MCNC: 3.1 G/DL (ref 3.4–4.8)
ALBUMIN/GLOB SERPL: 1 RATIO (ref 1.1–2)
ALP SERPL-CCNC: 107 UNIT/L (ref 40–150)
ALT SERPL-CCNC: 10 UNIT/L (ref 0–55)
AST SERPL-CCNC: 14 UNIT/L (ref 5–34)
BASOPHILS # BLD AUTO: 0.12 X10(3)/MCL
BASOPHILS NFR BLD AUTO: 1.8 %
BILIRUBIN DIRECT+TOT PNL SERPL-MCNC: 0.5 MG/DL
BUN SERPL-MCNC: 6.9 MG/DL (ref 8.4–25.7)
CALCIUM SERPL-MCNC: 9 MG/DL (ref 8.8–10)
CHLORIDE SERPL-SCNC: 98 MMOL/L (ref 98–107)
CO2 SERPL-SCNC: 26 MMOL/L (ref 23–31)
CREAT SERPL-MCNC: 0.77 MG/DL (ref 0.73–1.18)
EOSINOPHIL # BLD AUTO: 0.13 X10(3)/MCL (ref 0–0.9)
EOSINOPHIL NFR BLD AUTO: 1.9 %
ERYTHROCYTE [DISTWIDTH] IN BLOOD BY AUTOMATED COUNT: 14.6 % (ref 11.5–17)
EST. AVERAGE GLUCOSE BLD GHB EST-MCNC: 96.8 MG/DL
GFR SERPLBLD CREATININE-BSD FMLA CKD-EPI: >60 MLS/MIN/1.73/M2
GLOBULIN SER-MCNC: 3 GM/DL (ref 2.4–3.5)
GLUCOSE SERPL-MCNC: 97 MG/DL (ref 82–115)
HBA1C MFR BLD: 5 %
HCT VFR BLD AUTO: 39.8 % (ref 42–52)
HGB BLD-MCNC: 13.3 G/DL (ref 14–18)
IMM GRANULOCYTES # BLD AUTO: 0.03 X10(3)/MCL (ref 0–0.04)
IMM GRANULOCYTES NFR BLD AUTO: 0.4 %
LYMPHOCYTES # BLD AUTO: 1.63 X10(3)/MCL (ref 0.6–4.6)
LYMPHOCYTES NFR BLD AUTO: 23.8 %
MAGNESIUM SERPL-MCNC: 2 MG/DL (ref 1.6–2.6)
MCH RBC QN AUTO: 30.5 PG (ref 27–31)
MCHC RBC AUTO-ENTMCNC: 33.4 G/DL (ref 33–36)
MCV RBC AUTO: 91.3 FL (ref 80–94)
MONOCYTES # BLD AUTO: 0.75 X10(3)/MCL (ref 0.1–1.3)
MONOCYTES NFR BLD AUTO: 11 %
NEUTROPHILS # BLD AUTO: 4.18 X10(3)/MCL (ref 2.1–9.2)
NEUTROPHILS NFR BLD AUTO: 61.1 %
NRBC BLD AUTO-RTO: 0 %
PHOSPHATE SERPL-MCNC: 3.4 MG/DL (ref 2.3–4.7)
PLATELET # BLD AUTO: 267 X10(3)/MCL (ref 130–400)
PMV BLD AUTO: 9.1 FL (ref 7.4–10.4)
POTASSIUM SERPL-SCNC: 4.2 MMOL/L (ref 3.5–5.1)
PROT SERPL-MCNC: 6.1 GM/DL (ref 5.8–7.6)
RBC # BLD AUTO: 4.36 X10(6)/MCL (ref 4.7–6.1)
SODIUM SERPL-SCNC: 132 MMOL/L (ref 136–145)
WBC # SPEC AUTO: 6.84 X10(3)/MCL (ref 4.5–11.5)

## 2023-06-23 PROCEDURE — 25000003 PHARM REV CODE 250: Performed by: NURSE PRACTITIONER

## 2023-06-23 PROCEDURE — 84100 ASSAY OF PHOSPHORUS: CPT | Performed by: NURSE PRACTITIONER

## 2023-06-23 PROCEDURE — 63600175 PHARM REV CODE 636 W HCPCS: Performed by: NURSE PRACTITIONER

## 2023-06-23 PROCEDURE — 25000003 PHARM REV CODE 250: Performed by: INTERNAL MEDICINE

## 2023-06-23 PROCEDURE — 85025 COMPLETE CBC W/AUTO DIFF WBC: CPT | Performed by: NURSE PRACTITIONER

## 2023-06-23 PROCEDURE — 83036 HEMOGLOBIN GLYCOSYLATED A1C: CPT | Performed by: NURSE PRACTITIONER

## 2023-06-23 PROCEDURE — 11000001 HC ACUTE MED/SURG PRIVATE ROOM

## 2023-06-23 PROCEDURE — 25500020 PHARM REV CODE 255: Performed by: INTERNAL MEDICINE

## 2023-06-23 PROCEDURE — 83735 ASSAY OF MAGNESIUM: CPT | Performed by: NURSE PRACTITIONER

## 2023-06-23 PROCEDURE — 80053 COMPREHEN METABOLIC PANEL: CPT | Performed by: NURSE PRACTITIONER

## 2023-06-23 RX ORDER — GABAPENTIN 300 MG/1
300 CAPSULE ORAL 3 TIMES DAILY PRN
Status: DISCONTINUED | OUTPATIENT
Start: 2023-06-23 | End: 2023-06-26

## 2023-06-23 RX ORDER — LOSARTAN POTASSIUM 50 MG/1
100 TABLET ORAL DAILY
Status: DISCONTINUED | OUTPATIENT
Start: 2023-06-23 | End: 2023-07-06 | Stop reason: HOSPADM

## 2023-06-23 RX ORDER — AMLODIPINE BESYLATE 5 MG/1
10 TABLET ORAL DAILY
Status: DISCONTINUED | OUTPATIENT
Start: 2023-06-23 | End: 2023-07-06 | Stop reason: HOSPADM

## 2023-06-23 RX ORDER — CILOSTAZOL 50 MG/1
50 TABLET ORAL 2 TIMES DAILY
Status: DISCONTINUED | OUTPATIENT
Start: 2023-06-23 | End: 2023-07-06 | Stop reason: HOSPADM

## 2023-06-23 RX ORDER — CLOPIDOGREL BISULFATE 75 MG/1
75 TABLET ORAL DAILY
Status: DISCONTINUED | OUTPATIENT
Start: 2023-06-23 | End: 2023-07-06 | Stop reason: HOSPADM

## 2023-06-23 RX ORDER — ASPIRIN 81 MG/1
81 TABLET ORAL DAILY
Status: DISCONTINUED | OUTPATIENT
Start: 2023-06-23 | End: 2023-07-06 | Stop reason: HOSPADM

## 2023-06-23 RX ADMIN — IOPAMIDOL 100 ML: 755 INJECTION, SOLUTION INTRAVENOUS at 09:06

## 2023-06-23 RX ADMIN — HYDROCODONE BITARTRATE AND ACETAMINOPHEN 1 TABLET: 7.5; 325 TABLET ORAL at 11:06

## 2023-06-23 RX ADMIN — VANCOMYCIN HYDROCHLORIDE 750 MG: 750 INJECTION, POWDER, LYOPHILIZED, FOR SOLUTION INTRAVENOUS at 06:06

## 2023-06-23 RX ADMIN — VANCOMYCIN HYDROCHLORIDE 750 MG: 750 INJECTION, POWDER, LYOPHILIZED, FOR SOLUTION INTRAVENOUS at 07:06

## 2023-06-23 RX ADMIN — CILOSTAZOL 50 MG: 50 TABLET ORAL at 09:06

## 2023-06-23 RX ADMIN — PIPERACILLIN AND TAZOBACTAM 4.5 G: 4; .5 INJECTION, POWDER, LYOPHILIZED, FOR SOLUTION INTRAVENOUS; PARENTERAL at 08:06

## 2023-06-23 RX ADMIN — ENOXAPARIN SODIUM 40 MG: 40 INJECTION SUBCUTANEOUS at 04:06

## 2023-06-23 RX ADMIN — THERA TABS 1 TABLET: TAB at 08:06

## 2023-06-23 RX ADMIN — LOSARTAN POTASSIUM 100 MG: 50 TABLET, FILM COATED ORAL at 08:06

## 2023-06-23 RX ADMIN — AMLODIPINE BESYLATE 10 MG: 5 TABLET ORAL at 08:06

## 2023-06-23 RX ADMIN — PIPERACILLIN AND TAZOBACTAM 4.5 G: 4; .5 INJECTION, POWDER, LYOPHILIZED, FOR SOLUTION INTRAVENOUS; PARENTERAL at 03:06

## 2023-06-23 RX ADMIN — CLOPIDOGREL BISULFATE 75 MG: 75 TABLET ORAL at 11:06

## 2023-06-23 RX ADMIN — HYDROCODONE BITARTRATE AND ACETAMINOPHEN 1 TABLET: 7.5; 325 TABLET ORAL at 07:06

## 2023-06-23 RX ADMIN — PIPERACILLIN AND TAZOBACTAM 4.5 G: 4; .5 INJECTION, POWDER, LYOPHILIZED, FOR SOLUTION INTRAVENOUS; PARENTERAL at 05:06

## 2023-06-23 RX ADMIN — HYDROCODONE BITARTRATE AND ACETAMINOPHEN 1 TABLET: 7.5; 325 TABLET ORAL at 03:06

## 2023-06-23 RX ADMIN — ASPIRIN 81 MG: 81 TABLET, COATED ORAL at 08:06

## 2023-06-23 RX ADMIN — Medication 6 MG: at 09:06

## 2023-06-23 RX ADMIN — HYDROCODONE BITARTRATE AND ACETAMINOPHEN 1 TABLET: 7.5; 325 TABLET ORAL at 09:06

## 2023-06-23 RX ADMIN — COLLAGENASE SANTYL: 250 OINTMENT TOPICAL at 05:06

## 2023-06-23 RX ADMIN — HYDROCODONE BITARTRATE AND ACETAMINOPHEN 1 TABLET: 7.5; 325 TABLET ORAL at 04:06

## 2023-06-23 NOTE — H&P
[facility] Hospital Medicine History & Physical Examination       Patient Name: Shaheen Christie  MRN: [id number]  Patient Class: IP- Inpatient   Admission Date: 6/22/2023 11:39 AM  Length of Stay: 0  Admitting Service: Hospital Medicine   Attending Physician: Kiel Hong MD   Primary Care Provider: Viktor Russ MD  History source: EMR, patient and/or patient's family    CHIEF COMPLAINT   Skin Ulcer (Pt reports per aasi c/o skin ulcer to L lateral ankle with worsening pain x3 weeks. Per EMS, pt is not bed bound. GCS15.)      HISTORY OF PRESENT ILLNESS:   Mr. Christie is a 68 year old male with a PMH of HTN, HLD, Severe PAD,  COPD, Cervical stenosis, neuropathy, depression who presented to the ED with complaints of left lateral ankle foot wound, getting progressively worse.  He states that the wound has been present for about a year, and he has been going to wound care up until February.  At that time, it was felt that the wound was not improving, and he needed to follow-up with [facility] and possibly get another vascular evaluation.  The patient states that since then, he has been having issues with transportation and was never able to follow-up.  Since then, his wound has gotten progressively worse and more painful.  He denies any fever, chills, chest pain, shortness of breath. He states he still smokes.    Today's ED lab work revealed Na 128, all other indices unremarkable.  Left ankle x-ray showed lucency overlies the lateral malleolus suggesting open injury.  There is a mottled appearance throughout the bones that may be seen with diffuse osteopenia as multiple bones are infected osteomyelitis is not excluded.  ED vitals:  Temp 98.4° F, pulse 90, resp 19, /97, SpO2 97% on RA.  He was started on vancomycin and Zosyn and admitted to hospital medicine for management.      PAST MEDICAL HISTORY:     Sever PAD  Hypertension  Hyperlipidemia  COPD  Cervical  Stenosis  Neuropathy  Depression    PAST SURGICAL HISTORY:     Past Surgical History:   Procedure Laterality Date    angiogram Bilateral     stents placed in left leg    anterior neck surgery      cataracts Left     CHOLECYSTECTOMY      EYE SURGERY Left     HAND SURGERY Left     broken bone    herina repair      KNEE SURGERY Bilateral     posterior neck surgery      SPINE SURGERY      TOTAL REPLACEMENT OF BOTH HIP JOINTS USING COMPUTER-ASSISTED NAVIGATION Bilateral        ALLERGIES:   Patient has no known allergies.    FAMILY HISTORY:   Reviewed and non-contributory     SOCIAL HISTORY:     Social History     Tobacco Use    Smoking status: Every Day     Packs/day: 0.50     Years: 15.00     Pack years: 7.50     Types: Cigarettes    Smokeless tobacco: Never   Substance Use Topics    Alcohol use: Yes     Alcohol/week: 3.0 - 6.0 standard drinks     Types: 3 - 6 Cans of beer per week        HOME MEDICATIONS:     Prior to Admission medications    Medication Sig Start Date End Date Taking? Authorizing Provider   albuterol (PROVENTIL/VENTOLIN HFA) 90 mcg/actuation inhaler Inhale into the lungs 4 (four) times daily as needed. 8/18/22   Historical Provider   amLODIPine (NORVASC) 10 MG tablet Take 10 mg by mouth once daily. 7/6/22   Historical Provider   aspirin (ECOTRIN) 81 MG EC tablet Take 81 mg by mouth once daily.    Historical Provider   cilostazoL (PLETAL) 50 MG Tab Take 50 mg by mouth 2 (two) times daily. 7/27/22   Historical Provider   clopidogreL (PLAVIX) 75 mg tablet Take 75 mg by mouth once daily. 8/31/22   Historical Provider   gabapentin (NEURONTIN) 300 MG capsule Take 300 mg by mouth 3 (three) times daily as needed. 7/6/22   Historical Provider   losartan (COZAAR) 100 MG tablet Take 100 mg by mouth once daily. 6/26/22   Historical Provider   multivitamin (THERAGRAN) per tablet Take 1 tablet by mouth once daily.    Historical Provider       REVIEW OF SYSTEMS:   Except as documented, all other systems reviewed and  negative     PHYSICAL EXAM:   T 98.4 °F (36.9 °C)   BP (!) 154/129   P 106   RR 16   O2 95 %  GENERAL: Well developed, well nourished. In no acute distress, non-toxic appearing.   HEENT: normocephalic atraumatic   NECK: supple   LUNGS: Clear bilaterally, no wheezing or rales, no accessory muscle use   CVS: Regular rate and rhythm, normal peripheral perfusion  ABD: Soft, non-tender, non-distended, bowel sounds present  EXTREMITIES: no clubbing or cyanosis  SKIN: Warm, dry. There is an approximately quarter sized left lateral ankle wound with foul smelling drainage and surrounding erythema.  NEURO: alert and oriented, grossly without focal deficits   PSYCHIATRIC: Cooperative    LABS AND IMAGING:     Recent Labs     06/22/23  1207   WBC 5.97   RBC 4.87   HGB 14.8   HCT 44.1   MCV 90.6   MCH 30.4   MCHC 33.6   RDW 14.5        No results for input(s): LACTIC in the last 72 hours.  No results for input(s): INR, APTT, D-DIMER in the last 72 hours.  No results for input(s): HGBA1C, CHOL, TRIG, LDL, VLDL, HDL in the last 72 hours.   Recent Labs     06/22/23  1207   *   K 4.2   CHLORIDE 92*   CO2 26   BUN 5.7*   CREATININE 0.81   GLUCOSE 102   CALCIUM 9.7   MG 2.00   ALBUMIN 3.7   GLOBULIN 3.7*   ALKPHOS 136   ALT 14   AST 19   BILITOT 0.9     No results for input(s): BNP, CPK, TROPONINI in the last 72 hours.       X-Ray Ankle Complete Left  Narrative: EXAMINATION:  XR ANKLE COMPLETE 3 VIEW LEFT    CLINICAL HISTORY:  skin ulcer ;  Non-pressure chronic ulcer of skin of other sites with unspecified severity    TECHNIQUE:  Radiographs of the left ankle with AP, lateral and oblique  views.    COMPARISON:  No prior imaging available for comparison    FINDINGS:  Lucency overlies the lateral malleolus suggesting open injury.  There is a mottled appearance throughout the bones as may be seen with disuse osteopenia is multiple bones are infected.  Superimposed osteomyelitis is not excluded.  Soft tissue edema noted  throughout.  Impression: As above.    Electronically signed by: Kartik Chavez  Date:    06/22/2023  Time:    14:39      ASSESSMENT & PLAN:     1.  Infected left lateral ankle arterial ulcer  2.  Possible osteomyelitis  3.  Hyponatremia  4.  Chronic tobacco use    History:  HTN, HLD, Severe PAD,  COPD, Cervical stenosis, neuropathy, depression    PLAN:  -Vancomycin pharmacy to dose  -Zosyn 4.5 g q 8 hours  -Wound care consult  -Podiatry consult  -Cardiology consult  -NS @ 75 ml/hr  -Wound culture pending  -Blood cultures x 2 pending  -Antihypertensives as needed  -Resume home meds as appropriate once available  -Labs in AM    I, Maribel Person NP have reviewed and discussed this case with Dr. Hong.  Please see addendum for further assessment and plan from attending MD.    DVT prophylaxis: Lovenox  Code status: full      ________________________________________________________________________  I, Dr. Kiel Hong assumed care of this patient.  For the patient encounter, I performed the substantive portion of the visit, I reviewed the NPPA documentation, treatment plan, and medical decision making.  I had face to face time with this patient.  I have personally reviewed the labs and test results that are presently available. I have reviewed or attempted to review medical records based upon their availability. If patient was admitted under observational status it is with my approval.      68-year-old male with extensive lower extremity peripheral arterial disease, prior PTA of his left lower extremity and ongoing left lateral ankle wound likely representing ischemic ulcer.  His ulcer has been present since 2022 and he has been seen outpatient by wound care.  He was also followed by [facility] for peripheral arterial disease.  He has an open ulceration to his left lateral malleolus, surrounding erythema with foul-smelling drainage.  Recommend MRI of the foot to assess for osteomyelitis, arterial ultrasounds of the left  lower extremity, consultation to [facility] regarding his peripheral arterial disease.      Time seen: 10PM 6/22/23  Kiel Hong MD

## 2023-06-23 NOTE — NURSING
Nurses Note -- 4 Eyes      6/23/2023   12:57 PM      Skin assessed during: Admit      [] No Altered Skin Integrity Present    []Prevention Measures Documented      [x] Yes- Altered Skin Integrity Present or Discovered   [] LDA Added if Not in Epic (Describe Wound)   [] New Altered Skin Integrity was Present on Admit and Documented in LDA   [] Wound Image Taken    Wound Care Consulted? Yes    Attending Nurse:  Sriram Conklin LPN     Second RN/Staff Member:  Michael Oliver RN

## 2023-06-23 NOTE — PLAN OF CARE
"Pt lives at [address] [place] [address] with his brother Angelito HANCOCK" [phone]. There are 2 steps and no stairs at the residence. Pt is  with 2 children. He does not have a living will or POA. He has paperwork for body to be donated to [employer] with the [employer] in [facility]. PCP Dr. Russ. No agency involvement. Pt has rollator, shower chair and BP machine. He fills with [facility] in [facility]. He would like assistance with SNF admit to [facility]. Messaged Dr. Will for PT/ OT and CM consults to coordinate. If not, pt reported his brother will transport home if discharged.    06/23/23 1051   Discharge Assessment   Assessment Type Discharge Planning Assessment   Confirmed/corrected address, phone number and insurance Yes   Confirmed Demographics Correct on Facesheet   Source of Information patient   When was your last doctors appointment?   (PCP Viktor Russ)   Communicated SIVA with patient/caregiver Date not available/Unable to determine   People in Home sibling(s)   Do you expect to return to your current living situation? Other (see comments)  (Pt asking for assistance with SNF placement at [facility])   Do you have help at home or someone to help you manage your care at home? Yes   Who are your caregiver(s) and their phone number(s)? brother   Prior to hospitilization cognitive status: Unable to Assess   Current cognitive status: Alert/Oriented   Walking or Climbing Stairs ambulation difficulty, requires equipment   Mobility Management rollator   Dressing/Bathing bathing difficulty, requires equipment   Dressing/Bathing Management shower chair   Home Accessibility stairs to enter home   Number of Stairs, Main Entrance two   Equipment Currently Used at Home rollator;shower chair;blood pressure machine   Readmission within 30 days? No   Patient currently being followed by outpatient case management? No   Do you currently have service(s) that help you manage your care at " "home? No   Do you take prescription medications? Yes   Do you have prescription coverage? Yes   Coverage [employer]   Do you have any problems affording any of your prescribed medications? No   Is the patient taking medications as prescribed? yes   Who is going to help you get home at discharge? asking for SNF placement. If not, brother can assist   How do you get to doctors appointments? family or friend will provide   Are you on dialysis? No   Do you take coumadin? No   Discharge Plan A Other  (TBD)   DME Needed Upon Discharge  none   Discharge Plan discussed with: Patient   Transition of Care Barriers None   OTHER   Name(s) of People in Home lives with brotherAngelito "ISSA" [phone]       "

## 2023-06-23 NOTE — CONSULTS
Inpatient consult to Cardiology  Consult performed by: ANGEL Tuttle  Consult ordered by: Kiel Hong MD        [facility] - Emergency Dept  Cardiology  Consult Note    Patient Name: Shaheen Christie  MRN: [id number]  Admission Date: 6/22/2023  Hospital Length of Stay: 1 days  Code Status: Full Code   Attending Provider:   Consulting Provider: ANGEL Tuttle  Primary Care Physician: Viktor Russ MD  Principal Problem:<principal problem not specified>    Patient information was obtained from .     Subjective:     Chief Complaint:  PAD    HPI:   Mr. Christie is a 69 y/o male, followed by Dr. Queen, with PMHs significant PAD/interventions, HTN, DM, COPD, and tobacco dependence who presented to ED with c/o worsening wound to left ankle x 3 weeks. Workup revealed osteomyelitis in left ankle. Cultures obtained, He was started on antibiotics, admitted to hospital medicine with consult to [facility] for PAD. He reports sharp pain to left foot with palpation. BLE warm. + movement with decreased sensation. CTA aorta with runoff obtained revealed LLE patent to ankle with 3 vessel runoff.       PMH: HTN, HLD, COPD, chronic neck pain, neuropathy, PAD, DM  PSH: peripheral angiogram/interventions, ACDF 02.21, hip surgery, knee surgery, cholecystectomy, eye surgery  Family History: mother- family hx early CAD  Social History: smoker; ETOH 12 pack beer daily; denies illicit drug use    Previous Cardiac Diagnostics:   Arterial US BLE 06.23.23:  Patent RLE arterial system with no evidence of focal stenosis. Diffuse plaquing with biphasic waveforms identified throughout the lower extremity.  Patent LLE arterial system with no evidence of focal stenosis. Diffuse calcific plaquing and monophasic waveforms identified throughout the lower extremity.     A separate NICK was performed that supports bilateral non compressible vessels at the ankle.    Peripheral angiogram 04.03.23:  RLE:  R CFA 99% critical 99%  stenosis. S/p laser atherectomy/PTA  LLE:  L SFA- no critical stenosis L TPT and peroneal artery 90% diffuse stenosis. L PT artery occluded. S/p PTA left PT and peroneal artery    Peripheral angiogram 08.29.22:  Left SFA popliteal segment 100% occluded. Reconstitution at trifurcation. L PT artery 90% stenosis. S/p successful laser atherectomy, PTA, and LOUISE of L SFA and popliteal artery and laser atherectomy/PTA of left PT artery.     Carotid US 6.22.21:  LICA < 50% stenosis  Left vertebral artery patent with antegrade flow        Past Medical History:   Diagnosis Date    COPD (chronic obstructive pulmonary disease)     Depression     Hypertension     Neuropathy        Past Surgical History:   Procedure Laterality Date    angiogram Bilateral     stents placed in left leg    anterior neck surgery      cataracts Left     CHOLECYSTECTOMY      EYE SURGERY Left     HAND SURGERY Left     broken bone    herina repair      KNEE SURGERY Bilateral     posterior neck surgery      SPINE SURGERY      TOTAL REPLACEMENT OF BOTH HIP JOINTS USING COMPUTER-ASSISTED NAVIGATION Bilateral        Review of patient's allergies indicates:  No Known Allergies    No current facility-administered medications on file prior to encounter.     Current Outpatient Medications on File Prior to Encounter   Medication Sig    albuterol (PROVENTIL/VENTOLIN HFA) 90 mcg/actuation inhaler Inhale into the lungs 4 (four) times daily as needed.    amLODIPine (NORVASC) 10 MG tablet Take 10 mg by mouth once daily.    aspirin (ECOTRIN) 81 MG EC tablet Take 81 mg by mouth once daily.    cilostazoL (PLETAL) 50 MG Tab Take 50 mg by mouth 2 (two) times daily.    clopidogreL (PLAVIX) 75 mg tablet Take 75 mg by mouth once daily.    gabapentin (NEURONTIN) 300 MG capsule Take 300 mg by mouth 3 (three) times daily as needed.    losartan (COZAAR) 100 MG tablet Take 100 mg by mouth once daily.    multivitamin (THERAGRAN) per tablet Take 1 tablet by mouth once daily.      Family History       Problem Relation (Age of Onset)    Cancer Father    Heart disease Mother    Lung disease Father          Tobacco Use    Smoking status: Every Day     Packs/day: 0.50     Years: 15.00     Pack years: 7.50     Types: Cigarettes    Smokeless tobacco: Never   Substance and Sexual Activity    Alcohol use: Yes     Alcohol/week: 3.0 - 6.0 standard drinks     Types: 3 - 6 Cans of beer per week    Drug use: Yes     Types: Marijuana    Sexual activity: Not Currently       Review of Systems   Constitutional: Negative.    Respiratory:  Negative for cough and shortness of breath.    Cardiovascular:  Negative for chest pain.        Claudication to BLE   Gastrointestinal: Negative.    Psychiatric/Behavioral: Negative.       Objective:     Vital Signs (Most Recent):  Temp: 98.4 °F (36.9 °C) (06/22/23 1137)  Pulse: 71 (06/23/23 0702)  Resp: 18 (06/23/23 0707)  BP: 137/68 (06/23/23 0702)  SpO2: 98 % (06/23/23 0702) Vital Signs (24h Range):  Temp:  [98.4 °F (36.9 °C)] 98.4 °F (36.9 °C)  Pulse:  [] 71  Resp:  [16-19] 18  SpO2:  [94 %-100 %] 98 %  BP: (110-197)/() 137/68     Weight: 77.1 kg (170 lb)  Body mass index is 25.1 kg/m².    SpO2: 98 %         Intake/Output Summary (Last 24 hours) at 6/23/2023 0756  Last data filed at 6/23/2023 0352  Gross per 24 hour   Intake 200 ml   Output --   Net 200 ml       Lines/Drains/Airways       Peripheral Intravenous Line  Duration                  Peripheral IV - Single Lumen 06/22/23 1348 20 G Left <1 day                    Significant Labs:  Recent Results (from the past 72 hour(s))   Comprehensive metabolic panel    Collection Time: 06/22/23 12:07 PM   Result Value Ref Range    Sodium Level 128 (L) 136 - 145 mmol/L    Potassium Level 4.2 3.5 - 5.1 mmol/L    Chloride 92 (L) 98 - 107 mmol/L    Carbon Dioxide 26 23 - 31 mmol/L    Glucose Level 102 82 - 115 mg/dL    Blood Urea Nitrogen 5.7 (L) 8.4 - 25.7 mg/dL    Creatinine 0.81 0.73 - 1.18 mg/dL    Calcium  Level Total 9.7 8.8 - 10.0 mg/dL    Protein Total 7.4 5.8 - 7.6 gm/dL    Albumin Level 3.7 3.4 - 4.8 g/dL    Globulin 3.7 (H) 2.4 - 3.5 gm/dL    Albumin/Globulin Ratio 1.0 (L) 1.1 - 2.0 ratio    Bilirubin Total 0.9 <=1.5 mg/dL    Alkaline Phosphatase 136 40 - 150 unit/L    Alanine Aminotransferase 14 0 - 55 unit/L    Aspartate Aminotransferase 19 5 - 34 unit/L    eGFR >60 mls/min/1.73/m2   Magnesium    Collection Time: 06/22/23 12:07 PM   Result Value Ref Range    Magnesium Level 2.00 1.60 - 2.60 mg/dL   CBC with Differential    Collection Time: 06/22/23 12:07 PM   Result Value Ref Range    WBC 5.97 4.50 - 11.50 x10(3)/mcL    RBC 4.87 4.70 - 6.10 x10(6)/mcL    Hgb 14.8 14.0 - 18.0 g/dL    Hct 44.1 42.0 - 52.0 %    MCV 90.6 80.0 - 94.0 fL    MCH 30.4 27.0 - 31.0 pg    MCHC 33.6 33.0 - 36.0 g/dL    RDW 14.5 11.5 - 17.0 %    Platelet 324 130 - 400 x10(3)/mcL    MPV 8.7 7.4 - 10.4 fL    Neut % 68.6 %    Lymph % 18.4 %    Mono % 9.7 %    Eos % 1.2 %    Basophil % 1.8 %    Lymph # 1.10 0.6 - 4.6 x10(3)/mcL    Neut # 4.09 2.1 - 9.2 x10(3)/mcL    Mono # 0.58 0.1 - 1.3 x10(3)/mcL    Eos # 0.07 0 - 0.9 x10(3)/mcL    Baso # 0.11 <=0.2 x10(3)/mcL    IG# 0.02 0 - 0.04 x10(3)/mcL    IG% 0.3 %    NRBC% 0.0 %   Urinalysis, Reflex to Urine Culture    Collection Time: 06/22/23  7:13 PM    Specimen: Urine   Result Value Ref Range    Color, UA Yellow Yellow, Light-Yellow, Dark Yellow, [place], Straw    Appearance, UA Clear Clear    Specific Gravity, UA 1.006 1.005 - 1.030    pH, UA 7.0 5.0 - 8.5    Protein, UA Negative Negative mg/dL    Glucose, UA Negative Negative, Normal mg/dL    Ketones, UA 1+ (A) Negative mg/dL    Blood, UA Negative Negative unit/L    Bilirubin, UA Negative Negative mg/dL    Urobilinogen, UA 0.2 0.2, 1.0, Normal mg/dL    Nitrites, UA Negative Negative    Leukocyte Esterase, UA Negative Negative unit/L   Wound Culture    Collection Time: 06/22/23  7:13 PM    Specimen: Toe, Left Foot; Wound   Result Value Ref Range     Wound Culture No Growth At 24 Hours    Wound Culture    Collection Time: 06/22/23  7:13 PM    Specimen: Ankle, Left; Wound   Result Value Ref Range    Wound Culture No Growth At 24 Hours    Urinalysis, Microscopic    Collection Time: 06/22/23  7:13 PM   Result Value Ref Range    RBC, UA <5 <=5 /HPF    WBC, UA <5 <=5 /HPF    Squamous Epithelial Cells, UA <5 <=5 /HPF    Bacteria, UA None Seen None Seen, Rare, Occasional /HPF   Comprehensive Metabolic Panel (CMP)    Collection Time: 06/23/23  3:19 AM   Result Value Ref Range    Sodium Level 132 (L) 136 - 145 mmol/L    Potassium Level 4.2 3.5 - 5.1 mmol/L    Chloride 98 98 - 107 mmol/L    Carbon Dioxide 26 23 - 31 mmol/L    Glucose Level 97 82 - 115 mg/dL    Blood Urea Nitrogen 6.9 (L) 8.4 - 25.7 mg/dL    Creatinine 0.77 0.73 - 1.18 mg/dL    Calcium Level Total 9.0 8.8 - 10.0 mg/dL    Protein Total 6.1 5.8 - 7.6 gm/dL    Albumin Level 3.1 (L) 3.4 - 4.8 g/dL    Globulin 3.0 2.4 - 3.5 gm/dL    Albumin/Globulin Ratio 1.0 (L) 1.1 - 2.0 ratio    Bilirubin Total 0.5 <=1.5 mg/dL    Alkaline Phosphatase 107 40 - 150 unit/L    Alanine Aminotransferase 10 0 - 55 unit/L    Aspartate Aminotransferase 14 5 - 34 unit/L    eGFR >60 mls/min/1.73/m2   Magnesium    Collection Time: 06/23/23  3:19 AM   Result Value Ref Range    Magnesium Level 2.00 1.60 - 2.60 mg/dL   Phosphorus    Collection Time: 06/23/23  3:19 AM   Result Value Ref Range    Phosphorus Level 3.4 2.3 - 4.7 mg/dL   CBC with Differential    Collection Time: 06/23/23  3:19 AM   Result Value Ref Range    WBC 6.84 4.50 - 11.50 x10(3)/mcL    RBC 4.36 (L) 4.70 - 6.10 x10(6)/mcL    Hgb 13.3 (L) 14.0 - 18.0 g/dL    Hct 39.8 (L) 42.0 - 52.0 %    MCV 91.3 80.0 - 94.0 fL    MCH 30.5 27.0 - 31.0 pg    MCHC 33.4 33.0 - 36.0 g/dL    RDW 14.6 11.5 - 17.0 %    Platelet 267 130 - 400 x10(3)/mcL    MPV 9.1 7.4 - 10.4 fL    Neut % 61.1 %    Lymph % 23.8 %    Mono % 11.0 %    Eos % 1.9 %    Basophil % 1.8 %    Lymph # 1.63 0.6 - 4.6  x10(3)/mcL    Neut # 4.18 2.1 - 9.2 x10(3)/mcL    Mono # 0.75 0.1 - 1.3 x10(3)/mcL    Eos # 0.13 0 - 0.9 x10(3)/mcL    Baso # 0.12 <=0.2 x10(3)/mcL    IG# 0.03 0 - 0.04 x10(3)/mcL    IG% 0.4 %    NRBC% 0.0 %   Ankle Brachial Indices (NICK)    Collection Time: 06/23/23  7:44 AM   Result Value Ref Range    Left arm  mmHg    Right arm  mmHg    Left posterior tibial 280 mmHg    Right posterior tibial 280 mmHg    Left dorsalis pedis 280 mmHg    Right dorsalis pedis 280 mmHg    Left NICK 2.04     Right NICK 2.04    CV Ultrasound doppler arterial legs bilat    Collection Time: 06/23/23  7:46 AM   Result Value Ref Range    Left NICK 1.6     Right NICK 1.6        Significant Imaging:  Imaging Results              X-Ray Ankle Complete Left (Final result)  Result time 06/22/23 14:39:52   Procedure changed from X-Ray Ankle Complete Right     Final result by Kartik Chavez MD (06/22/23 14:39:52)                   Impression:      As above.      Electronically signed by: Kartik Chavez  Date:    06/22/2023  Time:    14:39               Narrative:    EXAMINATION:  XR ANKLE COMPLETE 3 VIEW LEFT    CLINICAL HISTORY:  skin ulcer ;  Non-pressure chronic ulcer of skin of other sites with unspecified severity    TECHNIQUE:  Radiographs of the left ankle with AP, lateral and oblique  views.    COMPARISON:  No prior imaging available for comparison    FINDINGS:  Lucency overlies the lateral malleolus suggesting open injury.  There is a mottled appearance throughout the bones as may be seen with disuse osteopenia is multiple bones are infected.  Superimposed osteomyelitis is not excluded.  Soft tissue edema noted throughout.                                    Physical Exam  HENT:      Mouth/Throat:      Mouth: Mucous membranes are dry.   Cardiovascular:      Rate and Rhythm: Normal rate and regular rhythm.      Heart sounds: Normal heart sounds.      Comments: Monophasic pulses to PT/DP  Pulmonary:      Effort: Pulmonary effort  is normal.      Breath sounds: Normal breath sounds.   Abdominal:      Palpations: Abdomen is soft.   Skin:     General: Skin is warm and dry.      Capillary Refill: Capillary refill takes 2 to 3 seconds.      Comments: Dry scaly skin   Neurological:      General: No focal deficit present.      Mental Status: He is alert.   Psychiatric:         Mood and Affect: Mood normal.       Home Medications:   No current facility-administered medications on file prior to encounter.     Current Outpatient Medications on File Prior to Encounter   Medication Sig Dispense Refill    albuterol (PROVENTIL/VENTOLIN HFA) 90 mcg/actuation inhaler Inhale into the lungs 4 (four) times daily as needed.      amLODIPine (NORVASC) 10 MG tablet Take 10 mg by mouth once daily.      aspirin (ECOTRIN) 81 MG EC tablet Take 81 mg by mouth once daily.      cilostazoL (PLETAL) 50 MG Tab Take 50 mg by mouth 2 (two) times daily.      clopidogreL (PLAVIX) 75 mg tablet Take 75 mg by mouth once daily.      gabapentin (NEURONTIN) 300 MG capsule Take 300 mg by mouth 3 (three) times daily as needed.      losartan (COZAAR) 100 MG tablet Take 100 mg by mouth once daily.      multivitamin (THERAGRAN) per tablet Take 1 tablet by mouth once daily.         Current Inpatient Medications:    Current Facility-Administered Medications:     0.9%  NaCl infusion, , Intravenous, Continuous, VICKY Acuna, Last Rate: 75 mL/hr at 06/22/23 2313, New Bag at 06/22/23 2313    acetaminophen tablet 650 mg, 650 mg, Oral, Q6H PRN, VICKY Acuna    aluminum-magnesium hydroxide-simethicone 200-200-20 mg/5 mL suspension 30 mL, 30 mL, Oral, QID PRN, VICKY Acuna    amLODIPine tablet 10 mg, 10 mg, Oral, Daily, VICKY Acuna    aspirin EC tablet 81 mg, 81 mg, Oral, Daily, VICKY Acuna    cilostazoL tablet 50 mg, 50 mg, Oral, BID, VICKY Acuna    enoxaparin injection 40 mg, 40 mg, Subcutaneous, Daily, Maribel  JOSE DE JESUS Person, AGACNP-BC    gabapentin capsule 300 mg, 300 mg, Oral, TID PRN, Maribel G Naya, AGACNP-BC    HYDROcodone-acetaminophen 7.5-325 mg per tablet 1 tablet, 1 tablet, Oral, Q4H PRN, Maribel G Naya AGACNP-BC, 1 tablet at 06/23/23 0707    losartan tablet 100 mg, 100 mg, Oral, Daily, Maribel Person AGACNP-BC    melatonin tablet 6 mg, 6 mg, Oral, Nightly PRN, Maribel JOSE DE JESUS Naya, AGACNP-BC    multivitamin tablet, 1 tablet, Oral, Daily, Maribel JOSE DE JESUS Naya AGACNP-BC    naloxone 0.4 mg/mL injection 0.02 mg, 0.02 mg, Intravenous, PRN, Maribel Person, AGACNP-BC    ondansetron injection 4 mg, 4 mg, Intravenous, Q4H PRN, Maribel Person AGACNP-BC    piperacillin-tazobactam (ZOSYN) 4.5 g in dextrose 5 % in water (D5W) 5 % 100 mL IVPB (MB+), 4.5 g, Intravenous, Q8H, ANGEL De Leon, Stopped at 06/23/23 0352    polyethylene glycol packet 17 g, 17 g, Oral, BID PRN, Maribel Person AGACNP-BC    prochlorperazine injection Soln 5 mg, 5 mg, Intravenous, Q6H PRN, Maribel Person AGACNP-BC    simethicone chewable tablet 80 mg, 1 tablet, Oral, QID PRN, Maribel JOSE DE JESUS Naya, AGACNP-BC    Pharmacy to dose Vancomycin consult, , , Once **AND** vancomycin - pharmacy to dose, , Intravenous, pharmacy to manage frequency, ANGEL De Leon    [COMPLETED] vancomycin 1.5 g in dextrose 5 % 250 mL IVPB (ready to mix), 20 mg/kg, Intravenous, Once, Stopped at 06/22/23 2123 **FOLLOWED BY** vancomycin 750 mg in dextrose 5 % 250 mL IVPB (ready to mix), 750 mg, Intravenous, Q12H, ANGEL De Leon, Last Rate: 250 mL/hr at 06/23/23 0748, 750 mg at 06/23/23 0748    Current Outpatient Medications:     albuterol (PROVENTIL/VENTOLIN HFA) 90 mcg/actuation inhaler, Inhale into the lungs 4 (four) times daily as needed., Disp: , Rfl:     amLODIPine (NORVASC) 10 MG tablet, Take 10 mg by mouth once daily., Disp: , Rfl:     aspirin (ECOTRIN) 81 MG EC tablet, Take 81 mg by mouth once daily., Disp: , Rfl:     cilostazoL (PLETAL) 50 MG Tab, Take 50 mg by  mouth 2 (two) times daily., Disp: , Rfl:     clopidogreL (PLAVIX) 75 mg tablet, Take 75 mg by mouth once daily., Disp: , Rfl:     gabapentin (NEURONTIN) 300 MG capsule, Take 300 mg by mouth 3 (three) times daily as needed., Disp: , Rfl:     losartan (COZAAR) 100 MG tablet, Take 100 mg by mouth once daily., Disp: , Rfl:     multivitamin (THERAGRAN) per tablet, Take 1 tablet by mouth once daily., Disp: , Rfl:          VTE Risk Mitigation (From admission, onward)           Ordered     enoxaparin injection 40 mg  Daily         06/22/23 2203     IP VTE HIGH RISK PATIENT  Once         06/22/23 2203     Place sequential compression device  Until discontinued         06/22/23 2203                    Assessment:   PAD BLE with nonhealing wound Left ankle and claudication pain  --Peripheral Angiogram 4.23: atherectomy/PTA R CFA and PTA left PT/ peroneal artery  --Peripheral angiogram 08.22: laser atherectomy, PTA, and LOUISE of L SFA/ popliteal artery and laser atherectomy/PTA of L PT artery  Possible Osteomyelitis  --wound cultures obtained  HTN  HLD  COPD  DM II  Tobacco dependence    Plan:   CTA aorta reviewed. LLE patent to ankle. No indication for additional interventions.   Consider wound care consult  Continue plavix, pletal, and aspirin. Start atorvastatin 40 mg daily  Encouraged smoking cessation  Obtain A1c and lipid panel.    [facility] signing off. Reconsult if needed.     Lyubov Hdez, ANGEL  Cardiology  [facility] - Emergency Dept  06/23/2023 7:56 AM

## 2023-06-23 NOTE — PROGRESS NOTES
[facility] Hospital Medicine Progress Note        Chief Complaint: Inpatient Follow-up for     HPI: 68 year old male with a PMH of HTN, HLD, Severe PAD,  COPD, Cervical stenosis, neuropathy, depression who presented to the ED with complaints of left lateral ankle foot wound, getting progressively worse.  He states that the wound has been present for about a year, and he has been going to wound care up until February.  At that time, it was felt that the wound was not improving, and he needed to follow-up with [facility] and possibly get another vascular evaluation.  The patient states that since then, he has been having issues with transportation and was never able to follow-up.  Since then, his wound has gotten progressively worse and more painful.  He denies any fever, chills, chest pain, shortness of breath. He states he still smokes.     Today's ED lab work revealed Na 128, all other indices unremarkable.  Left ankle x-ray showed lucency overlies the lateral malleolus suggesting open injury.  There is a mottled appearance throughout the bones that may be seen with diffuse osteopenia as multiple bones are infected osteomyelitis is not excluded.  ED vitals:  Temp 98.4° F, pulse 90, resp 19, /97, SpO2 97% on RA.  He was started on vancomycin and Zosyn and admitted to hospital medicine for management.  MRI ordered Podiatry Wound Care cardiology consulted    Interval Hx:   Patient seen and examined this morning all vitals fairly stable no issues reported    Objective/physical exam:  General: In no acute distress, afebrile  Chest: Clear to auscultation bilaterally  Heart: RRR, +S1, S2, no appreciable murmur  Abdomen: Soft, nontender, BS +  MSK: Warm, no lower extremity edema, no clubbing or cyanosis  Left foot wrapped  Neurologic: Alert and oriented x4,   VITAL SIGNS: 24 HRS MIN & MAX LAST   Temp  Min: 98.6 °F (37 °C)  Max: 98.7 °F (37.1 °C) 98.6 °F (37 °C)   BP  Min: 110/65  Max: 197/100  (!) 152/80   Pulse  Min: 62  Max: 106  74   Resp  Min: 16  Max: 18 18   SpO2  Min: 94 %  Max: 100 % 96 %     I have reviewed the following labs:    Recent Labs   Lab 06/22/23  1207 06/23/23 0319   WBC 5.97 6.84   RBC 4.87 4.36*   HGB 14.8 13.3*   HCT 44.1 39.8*   MCV 90.6 91.3   MCH 30.4 30.5   MCHC 33.6 33.4   RDW 14.5 14.6    267   MPV 8.7 9.1       Recent Labs   Lab 06/22/23  1207 06/23/23 0319   * 132*   K 4.2 4.2   CO2 26 26   BUN 5.7* 6.9*   CREATININE 0.81 0.77   CALCIUM 9.7 9.0   MG 2.00 2.00   ALBUMIN 3.7 3.1*   ALKPHOS 136 107   ALT 14 10   AST 19 14   BILITOT 0.9 0.5          Microbiology Results (last 7 days)       Procedure Component Value Units Date/Time    Wound Culture [[id number]] Collected: 06/22/23 1913    Order Status: Completed Specimen: Wound from Toe, Left Foot Updated: 06/23/23 0633     Wound Culture No Growth At 24 Hours    Wound Culture [[id number]] Collected: 06/22/23 1913    Order Status: Completed Specimen: Wound from Ankle, Left Updated: 06/23/23 0629     Wound Culture No Growth At 24 Hours    Blood culture #1 **CANNOT BE ORDERED STAT** [[id number]] Collected: 06/22/23 1754    Order Status: Resulted Specimen: Blood Updated: 06/22/23 1828    Blood culture #2 **CANNOT BE ORDERED STAT** [[id number]] Collected: 06/22/23 1754    Order Status: Resulted Specimen: Blood Updated: 06/22/23 1828    Wound Culture [[id number]]     Order Status: Canceled Specimen: Wound from Toe, Left Foot              See below for Radiology    Scheduled Med:   amLODIPine  10 mg Oral Daily    aspirin  81 mg Oral Daily    cilostazoL  50 mg Oral BID    clopidogreL  75 mg Oral Daily    enoxparin  40 mg Subcutaneous Daily    losartan  100 mg Oral Daily    multivitamin  1 tablet Oral Daily    piperacillin-tazobactam (Zosyn) IV (PEDS and ADULTS) (extended infusion is not appropriate)  4.5 g Intravenous Q8H    vancomycin (VANCOCIN) IVPB  750 mg Intravenous Q12H        Continuous Infusions:   sodium chloride 0.9%  75 mL/hr at 06/22/23 2313        PRN Meds:  acetaminophen, aluminum-magnesium hydroxide-simethicone, gabapentin, HYDROcodone-acetaminophen, melatonin, naloxone, ondansetron, polyethylene glycol, prochlorperazine, simethicone, Pharmacy to dose Vancomycin consult **AND** vancomycin - pharmacy to dose       Assessment/Plan:  Left lateral ankle infected ulcer  Rule out osteomyelitis  Hyponatremia   Severe peripheral arterial disease  Essential hypertension  Hyperlipidemia  COPD not in exacerbation  Diabetes mellitus type 2 with hyperglycemia  Tobacco use     Continue with vancomycin and Zosyn, MRI ordered to rule out osteomyelitis   CTA runoff with results as below   Podiatry consulted will await MRI results wound cultures blood cultures ordered and pending  Will resume home medications      VTE prophylaxis:  Lovenox    Patient condition:  Stable/Fair/Guarded/ Serious/ Critical    Anticipated discharge and Disposition:         All diagnosis and differential diagnosis have been reviewed; assessment and plan has been documented; I have personally reviewed the labs and test results that are presently available; I have reviewed the patients medication list; I have reviewed the consulting providers response and recommendations. I have reviewed or attempted to review medical records based upon their availability    All of the patient's questions have been  addressed and answered. Patient's is agreeable to the above stated plan. I will continue to monitor closely and make adjustments to medical management as needed.  _____________________________________________________________________    Nutrition Status:    Radiology:  I have personally reviewed the following imaging and agree with the radiologist.     CTA Runoff ABD PEL Bilat Lower Ext  Narrative: EXAMINATION:  CTA RUNOFF ABD PEL BILAT LOWER EXT    CLINICAL HISTORY:  Claudication or leg ischemia;   left ankle wound.    TECHNIQUE:  Helically acquired images were obtained  from the lung bases through the lower extremities prior to and after IV administration of contrast. Axial, sagittal, coronal and MIP reformations were interpreted.    Automated tube current modulation, weight-based exposure dosing, and/or iterative reconstruction technique utilized to reach lowest reasonably achievable exposure rate.    DLP: 1493 mGy*cm    COMPARISON:  No relevant prior available for comparison at the time of dictation.    FINDINGS:  VASCULATURE:    Aorta: Atherosclerosis.  Infrarenal abdominal aorta measures 4 x 3.6 cm in diameter.    Mesenteric arteries: Celiac origin not imaged.  SMA is patent.  Origin of the SUMIT is not seen to enhance.  There is collateral supply of the SUMIT..    Renal arteries:Atherosclerosis without significant stenosis.    Right:    Iliac arteries: Atherosclerosis without significant stenosis.  Contour irregularity at the right external iliac artery may be related to small aneurysm.  Enough to the groin.    Femoral: Atherosclerosis.  Beam hardening artifact versus short segment non propagating flap at the common femoral artery.  Aneurysmal dilatation of the common femoral artery measuring 1.3 cm in diameter.  Diffuse atherosclerotic change at the superficial femoral artery with without appreciable hemodynamically significant stenosis.  Contrast fades at the distal superficial femoral artery on initial bolus related to poor inflow.  Better filling seen on delayed phase imaging.    Popliteal: Obscured by beam hardening artifact related to knee arthroplasty.    Tibioperoneal trunk: Patent tibioperoneal trunk.  Diffuse calcific atherosclerosis at the anterior tibial and posterior tibial arteries with 2 vessel runoff to the ankle.  Contrast fades at the peroneal artery just proximal to the ankle.    Left:    Iliac arteries: Atherosclerosis without significant stenosis.  Patent runoff to the groin.    Femoral: Mild narrowing at the proximal superficial femoral artery.  Diffuse  atherosclerosis at the SFA.  Stent at the distal superficial femoral artery through the popliteal artery.  No appreciable in stent stenosis.    Popliteal: Patent    Tibioperoneal trunk: Patent tibioperoneal trunk.  Stenosis at the proximal anterior tibial artery.  Three-vessel runoff to the ankle.    HEART: Not imaged    LUNG BASES: Not imaged    LIVER: Incompletely imaged.  No gross mass.    BILIARY: Gallbladder not seen.    PANCREAS: Incompletely imaged.  No gross abnormality.    SPLEEN: Incompletely imaged.  No gross abnormality.    ADRENALS: Not imaged.    KIDNEYS/URETERS: The kidneys enhance symmetrically. No hydronephrosis.    GI TRACT/MESENTERY:  No evidence of bowel obstruction or inflammation.    PERITONEUM: No free fluid.No free air.    LYMPH NODES: No enlarged lymph nodes by size criteria.    BLADDER: Obscured by beam hardening artifact.    REPRODUCTIVE ORGANS: Obscured by beam hardening artifact.    SOFT TISSUES: Unremarkable.    BONES: Ankylosis at the right sacroiliac joint.  There are postsurgical changes of bilateral hip arthroplasty with associated beam hardening artifact.  Postop right knee arthroplasty.  Screw seen at the proximal left tibia.  There are degenerative changes and demineralization at the bilateral ankle and foot.  Impression: 1. Diffuse atherosclerotic change as above.  2. Arterial phase scan out paces the contrast bolus at the right lower leg with improved filling on delayed phase compatible with delayed inflow.  Two vessel runoff to the right ankle  3. Three vessel runoff to the left ankle  4. Abdominal aortic aneurysm    Electronically signed by: Allyson Pierce  Date:    06/23/2023  Time:    10:54      Taylor Rizvi MD   06/23/2023

## 2023-06-23 NOTE — PROGRESS NOTES
[facility] - 9th Floor Med Surg  Wound Care    Patient Name:  Shaheen Christie   MRN:  [id number]  Date: 6/23/2023  Diagnosis: <principal problem not specified>    History:     Past Medical History:   Diagnosis Date    COPD (chronic obstructive pulmonary disease)     Depression     Hypertension     Neuropathy        Social History     Socioeconomic History    Marital status:    Occupational History    Occupation: retired   Tobacco Use    Smoking status: Every Day     Packs/day: 0.50     Years: 15.00     Pack years: 7.50     Types: Cigarettes    Smokeless tobacco: Never   Substance and Sexual Activity    Alcohol use: Yes     Alcohol/week: 3.0 - 6.0 standard drinks     Types: 3 - 6 Cans of beer per week    Drug use: Yes     Types: Marijuana    Sexual activity: Not Currently       Precautions:     Allergies as of 06/22/2023    (No Known Allergies)       WOC Assessment Details/Treatment        06/23/23 0943        Altered Skin Integrity 06/23/23 0930 Left lateral Malleolus Arterial Ulcer   Date First Assessed/Time First Assessed: 06/23/23 0930   Altered Skin Integrity Present on Admission - Did Patient arrive to the hospital with altered skin?: yes  Side: Left  Orientation: lateral  Location: Malleolus  Primary Wound Type: Arterial Ulcer   Wound Image    Dressing Appearance Dry;Intact;Clean   Drainage Amount Small   Drainage Characteristics/Odor Serosanguineous   Appearance Yellow   Tissue loss description Full thickness   Yellow (%), Wound Tissue Color 75 %   Periwound Area Redness   Wound Edges Defined   Wound Length (cm) 1.5 cm   Wound Width (cm) 2 cm   Wound Depth (cm) 1 cm   Wound Volume (cm^3) 3 cm^3   Wound Surface Area (cm^2) 3 cm^2   Care Cleansed with:;Sterile normal saline   Dressing Applied     WOCN consulted for Left lateral ankle. Treatment recommendations put into place. Cleanse with vashe. Apply santyl, cover with vashe moistened 4x4, abd pad, Wrap with Kerlix. [facility]  [facility]. Change BID and PRN soilage. Nursing to continue with turning every 2 hours, wedge and float heels. DPM consulted for evaluation. Will follow up.     06/23/2023

## 2023-06-24 LAB
ANION GAP SERPL CALC-SCNC: 8 MEQ/L
BASOPHILS # BLD AUTO: 0.14 X10(3)/MCL
BASOPHILS NFR BLD AUTO: 2.6 %
BUN SERPL-MCNC: 5.2 MG/DL (ref 8.4–25.7)
CALCIUM SERPL-MCNC: 9.2 MG/DL (ref 8.8–10)
CHLORIDE SERPL-SCNC: 98 MMOL/L (ref 98–107)
CHOLEST SERPL-MCNC: 97 MG/DL
CHOLEST/HDLC SERPL: 2 {RATIO} (ref 0–5)
CO2 SERPL-SCNC: 25 MMOL/L (ref 23–31)
CREAT SERPL-MCNC: 0.74 MG/DL (ref 0.73–1.18)
CREAT/UREA NIT SERPL: 7
EOSINOPHIL # BLD AUTO: 0.25 X10(3)/MCL (ref 0–0.9)
EOSINOPHIL NFR BLD AUTO: 4.6 %
ERYTHROCYTE [DISTWIDTH] IN BLOOD BY AUTOMATED COUNT: 14.7 % (ref 11.5–17)
GFR SERPLBLD CREATININE-BSD FMLA CKD-EPI: >60 MLS/MIN/1.73/M2
GLUCOSE SERPL-MCNC: 99 MG/DL (ref 82–115)
HCT VFR BLD AUTO: 40.5 % (ref 42–52)
HDLC SERPL-MCNC: 45 MG/DL (ref 35–60)
HGB BLD-MCNC: 13.1 G/DL (ref 14–18)
IMM GRANULOCYTES # BLD AUTO: 0.02 X10(3)/MCL (ref 0–0.04)
IMM GRANULOCYTES NFR BLD AUTO: 0.4 %
LDLC SERPL CALC-MCNC: 41 MG/DL (ref 50–140)
LYMPHOCYTES # BLD AUTO: 1.29 X10(3)/MCL (ref 0.6–4.6)
LYMPHOCYTES NFR BLD AUTO: 23.5 %
MCH RBC QN AUTO: 29.6 PG (ref 27–31)
MCHC RBC AUTO-ENTMCNC: 32.3 G/DL (ref 33–36)
MCV RBC AUTO: 91.4 FL (ref 80–94)
MONOCYTES # BLD AUTO: 0.59 X10(3)/MCL (ref 0.1–1.3)
MONOCYTES NFR BLD AUTO: 10.8 %
NEUTROPHILS # BLD AUTO: 3.19 X10(3)/MCL (ref 2.1–9.2)
NEUTROPHILS NFR BLD AUTO: 58.1 %
NRBC BLD AUTO-RTO: 0 %
PLATELET # BLD AUTO: 279 X10(3)/MCL (ref 130–400)
PMV BLD AUTO: 8.9 FL (ref 7.4–10.4)
POTASSIUM SERPL-SCNC: 4.5 MMOL/L (ref 3.5–5.1)
RBC # BLD AUTO: 4.43 X10(6)/MCL (ref 4.7–6.1)
SODIUM SERPL-SCNC: 131 MMOL/L (ref 136–145)
TRIGL SERPL-MCNC: 57 MG/DL (ref 34–140)
VANCOMYCIN TROUGH SERPL-MCNC: 11.6 UG/ML (ref 15–20)
VLDLC SERPL CALC-MCNC: 11 MG/DL
WBC # SPEC AUTO: 5.48 X10(3)/MCL (ref 4.5–11.5)

## 2023-06-24 PROCEDURE — 25000003 PHARM REV CODE 250: Performed by: NURSE PRACTITIONER

## 2023-06-24 PROCEDURE — 63600175 PHARM REV CODE 636 W HCPCS: Performed by: NURSE PRACTITIONER

## 2023-06-24 PROCEDURE — 97161 PT EVAL LOW COMPLEX 20 MIN: CPT

## 2023-06-24 PROCEDURE — 85025 COMPLETE CBC W/AUTO DIFF WBC: CPT | Performed by: INTERNAL MEDICINE

## 2023-06-24 PROCEDURE — 25500020 PHARM REV CODE 255: Performed by: INTERNAL MEDICINE

## 2023-06-24 PROCEDURE — 80061 LIPID PANEL: CPT | Performed by: NURSE PRACTITIONER

## 2023-06-24 PROCEDURE — 11000001 HC ACUTE MED/SURG PRIVATE ROOM

## 2023-06-24 PROCEDURE — 80048 BASIC METABOLIC PNL TOTAL CA: CPT | Performed by: INTERNAL MEDICINE

## 2023-06-24 PROCEDURE — 25000003 PHARM REV CODE 250: Performed by: INTERNAL MEDICINE

## 2023-06-24 PROCEDURE — A9577 INJ MULTIHANCE: HCPCS | Performed by: INTERNAL MEDICINE

## 2023-06-24 PROCEDURE — 80202 ASSAY OF VANCOMYCIN: CPT | Performed by: HOSPITALIST

## 2023-06-24 PROCEDURE — 63600175 PHARM REV CODE 636 W HCPCS: Performed by: INTERNAL MEDICINE

## 2023-06-24 RX ORDER — VANCOMYCIN HCL IN 5 % DEXTROSE 1G/250ML
1000 PLASTIC BAG, INJECTION (ML) INTRAVENOUS
Status: DISCONTINUED | OUTPATIENT
Start: 2023-06-24 | End: 2023-06-25

## 2023-06-24 RX ADMIN — HYDROCODONE BITARTRATE AND ACETAMINOPHEN 1 TABLET: 7.5; 325 TABLET ORAL at 11:06

## 2023-06-24 RX ADMIN — HYDROCODONE BITARTRATE AND ACETAMINOPHEN 1 TABLET: 7.5; 325 TABLET ORAL at 06:06

## 2023-06-24 RX ADMIN — ENOXAPARIN SODIUM 40 MG: 40 INJECTION SUBCUTANEOUS at 04:06

## 2023-06-24 RX ADMIN — COLLAGENASE SANTYL: 250 OINTMENT TOPICAL at 08:06

## 2023-06-24 RX ADMIN — HYDROCODONE BITARTRATE AND ACETAMINOPHEN 1 TABLET: 7.5; 325 TABLET ORAL at 03:06

## 2023-06-24 RX ADMIN — PIPERACILLIN AND TAZOBACTAM 4.5 G: 4; .5 INJECTION, POWDER, LYOPHILIZED, FOR SOLUTION INTRAVENOUS; PARENTERAL at 04:06

## 2023-06-24 RX ADMIN — LOSARTAN POTASSIUM 100 MG: 50 TABLET, FILM COATED ORAL at 09:06

## 2023-06-24 RX ADMIN — VANCOMYCIN HYDROCHLORIDE 1000 MG: 1 INJECTION, POWDER, LYOPHILIZED, FOR SOLUTION INTRAVENOUS at 10:06

## 2023-06-24 RX ADMIN — THERA TABS 1 TABLET: TAB at 09:06

## 2023-06-24 RX ADMIN — CLOPIDOGREL BISULFATE 75 MG: 75 TABLET ORAL at 09:06

## 2023-06-24 RX ADMIN — AMLODIPINE BESYLATE 10 MG: 5 TABLET ORAL at 09:06

## 2023-06-24 RX ADMIN — PIPERACILLIN AND TAZOBACTAM 4.5 G: 4; .5 INJECTION, POWDER, LYOPHILIZED, FOR SOLUTION INTRAVENOUS; PARENTERAL at 09:06

## 2023-06-24 RX ADMIN — GABAPENTIN 300 MG: 300 CAPSULE ORAL at 10:06

## 2023-06-24 RX ADMIN — HYDROCODONE BITARTRATE AND ACETAMINOPHEN 1 TABLET: 7.5; 325 TABLET ORAL at 08:06

## 2023-06-24 RX ADMIN — CILOSTAZOL 50 MG: 50 TABLET ORAL at 08:06

## 2023-06-24 RX ADMIN — PIPERACILLIN AND TAZOBACTAM 4.5 G: 4; .5 INJECTION, POWDER, LYOPHILIZED, FOR SOLUTION INTRAVENOUS; PARENTERAL at 03:06

## 2023-06-24 RX ADMIN — Medication 6 MG: at 08:06

## 2023-06-24 RX ADMIN — ASPIRIN 81 MG: 81 TABLET, COATED ORAL at 09:06

## 2023-06-24 RX ADMIN — COLLAGENASE SANTYL: 250 OINTMENT TOPICAL at 01:06

## 2023-06-24 RX ADMIN — COLLAGENASE SANTYL: 250 OINTMENT TOPICAL at 11:06

## 2023-06-24 RX ADMIN — HYDROCODONE BITARTRATE AND ACETAMINOPHEN 1 TABLET: 7.5; 325 TABLET ORAL at 01:06

## 2023-06-24 RX ADMIN — GADOBENATE DIMEGLUMINE 15 ML: 529 INJECTION, SOLUTION INTRAVENOUS at 03:06

## 2023-06-24 RX ADMIN — CILOSTAZOL 50 MG: 50 TABLET ORAL at 09:06

## 2023-06-24 NOTE — PROGRESS NOTES
Pharmacokinetic Assessment Follow Up: IV Vancomycin    Vancomycin serum concentration assessment(s):    The trough level was drawn correctly and can be used to guide therapy at this time. The measurement is below the desired definitive target range of 15 to 20 mcg/mL.    Vancomycin Regimen Plan:  Patient was on Vancomycin 750 mg IV every 12 hours  Change regimen to Vancomycin 1000 mg IV every 12 hours with next serum trough concentration measured at 0900 prior to 5th dose on 06/26    Drug levels (last 3 results):  Recent Labs   Lab Result Units 06/24/23  0528   Vancomycin Trough ug/ml 11.6*       Vancomycin Administrations:  vancomycin given in the last 96 hours                     vancomycin 750 mg in dextrose 5 % 250 mL IVPB (ready to mix) (mg) 750 mg New Bag 06/23/23 1852     750 mg New Bag  0748    vancomycin 1.5 g in dextrose 5 % 250 mL IVPB (ready to mix) (mg) 1,500 mg New Bag 06/1953                    Pharmacy will continue to follow and monitor vancomycin.    Please contact pharmacy at extension [phone] for questions regarding this assessment.    Thank you for the consult,   Sudarshan Fuller       Patient brief summary:  Shaheen Christie is a 68 y.o. male initiated on antimicrobial therapy with IV Vancomycin for treatment of Infected ulcer. Possilbe osteomyelitis    The patient's current regimen is 1000 mg IV every 12 hours    Drug Allergies:   Review of patient's allergies indicates:  No Known Allergies    Actual Body Weight:   77 kg    Renal Function:   Estimated Creatinine Clearance: 92.9 mL/min (based on SCr of 0.74 mg/dL).,     Dialysis Method (if applicable):  N/A    CBC (last 72 hours):  Recent Labs   Lab Result Units 06/22/23  1207 06/23/23  0319 06/24/23  0528   WBC x10(3)/mcL 5.97 6.84 5.48   Hgb g/dL 14.8 13.3* 13.1*   Hemoglobin A1c %  --  5.0  --    Hct % 44.1 39.8* 40.5*   Platelet x10(3)/mcL 324 267 279   Mono % % 9.7 11.0 10.8   Eos % % 1.2 1.9 4.6   Basophil % % 1.8 1.8 2.6       Metabolic  Panel (last 72 hours):  Recent Labs   Lab Result Units 06/22/23  1207 06/22/23 1913 06/23/23  0319 06/24/23  0528   Sodium Level mmol/L 128*  --  132* 131*   Potassium Level mmol/L 4.2  --  4.2 4.5   Chloride mmol/L 92*  --  98 98   Carbon Dioxide mmol/L 26  --  26 25   Glucose Level mg/dL 102  --  97 99   Glucose, UA mg/dL  --  Negative  --   --    Blood Urea Nitrogen mg/dL 5.7*  --  6.9* 5.2*   Creatinine mg/dL 0.81  --  0.77 0.74   Albumin Level g/dL 3.7  --  3.1*  --    Bilirubin Total mg/dL 0.9  --  0.5  --    Alkaline Phosphatase unit/L 136  --  107  --    Aspartate Aminotransferase unit/L 19  --  14  --    Alanine Aminotransferase unit/L 14  --  10  --    Magnesium Level mg/dL 2.00  --  2.00  --    Phosphorus Level mg/dL  --   --  3.4  --        Microbiologic Results:  Microbiology Results (last 7 days)       Procedure Component Value Units Date/Time    Blood culture #1 **CANNOT BE ORDERED STAT** [[id number]]  (Normal) Collected: 06/22/23 1754    Order Status: Completed Specimen: Blood Updated: 06/23/23 2100     CULTURE, BLOOD ([facility]) No Growth At 24 Hours    Blood culture #2 **CANNOT BE ORDERED STAT** [[id number]]  (Normal) Collected: 06/22/23 1754    Order Status: Completed Specimen: Blood Updated: 06/23/23 2100     CULTURE, BLOOD ([facility]) No Growth At 24 Hours    Wound Culture [[id number]] Collected: 06/22/23 1913    Order Status: Completed Specimen: Wound from Toe, Left Foot Updated: 06/23/23 0633     Wound Culture No Growth At 24 Hours    Wound Culture [[id number]] Collected: 06/22/23 1913    Order Status: Completed Specimen: Wound from Ankle, Left Updated: 06/23/23 0629     Wound Culture No Growth At 24 Hours    Wound Culture [[id number]]     Order Status: Canceled Specimen: Wound from Toe, Left Foot

## 2023-06-24 NOTE — PLAN OF CARE
Problem: Physical Therapy  Goal: Physical Therapy Goal  Description: Goals to be met by: Discharge     Patient will increase functional independence with mobility by performin. Supine to sit with Modified [place]  2. Sit to supine with Modified [place]  3. Sit to stand transfer with Modified [place]  4. Bed to chair transfer with Modified [place] using LRAD, begin with squat pivot progressing to stand pivot and stand step  5. Gait  to be assessed.    Outcome: Ongoing, Progressing

## 2023-06-24 NOTE — PT/OT/SLP EVAL
Physical Therapy Evaluation    Patient Name:  Shaheen Christie   MRN:  [id number]    Recommendations:     Discharge Recommendations: nursing facility, skilled   Discharge Equipment Recommendations:  (equipment needs TBD prior to discharge)   Barriers to discharge: Decreased caregiver support, Impaired mobility, and Ongoing medical needs    Assessment:     Shaheen Christie is a 68 y.o. male admitted with a medical diagnosis of Skin ulcer, PAD, chest pain.  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    Recent Surgery: * No surgery found *      Plan:     During this hospitalization, patient to be seen 5 x/week to address the identified rehab impairments via gait training, therapeutic activities, therapeutic exercises, neuromuscular re-education and progress toward the following goals:    Plan of Care Expires:  07/22/23    Subjective     Chief Complaint: L ankle pain  Patient/Family Comments/goals: To walk with rollator.  Pain/Comfort:  Pain Rating 1: 10/10  Location - Side 1: Left  Location 1: ankle  Pain Addressed 1: Reposition, Cessation of Activity  Pain Rating Post-Intervention 1: 10/10  Pain Addressed 2: Cessation of Activity, Reposition, Nurse notified    Patients cultural, spiritual, Latter day conflicts given the current situation: yes    Living Environment:  Home with brother   Prior to admission, patients level of function was independent with rollator.  Equipment  used at home: rollator.  DME owned (not currently used):  None .  Upon discharge, patient will have assistance from no one.    Objective:     Communicated with RN prior to session.  Patient found supine with peripheral IV  upon PT entry to room.    General Precautions: Standard,    Orthopedic Precautions:    Braces:    Respiratory Status: Room air        Exams:  Cognitive Exam:  Patient is oriented to Person, Place, Time, and Situation  RLE ROM: WNL  RLE Strength: WFL  LLE ROM: WFL except ankle and foot not tested  LLE Strength: WFL except ankle and foot not tested  Skin integrity: Wound is recently dressed and wrapped with kerlix.      Functional Mobility:  Bed Mobility:     Rolling Left:  moderate assistance  Supine to Sit: moderate assistance  Sit to Supine: moderate assistance  Transfers:     Sit to Stand:  moderate assistance with rolling walker  Gait: Not attempted 2/2         Treatment & Education:  Safety    Patient provided with verbal education regarding safe mobility.  Understanding was verbalized.     Patient left supine with all lines intact and RN notified.    GOALS:   Multidisciplinary Problems       Physical Therapy Goals          Problem: Physical Therapy    Goal Priority Disciplines Outcome Goal Variances Interventions   Physical Therapy Goal     PT, PT/OT Ongoing, Progressing     Description: Goals to be met by: Discharge     Patient will increase functional independence with mobility by performin. Supine to sit with Modified [place]  2. Sit to supine with Modified [place]  3. Sit to stand transfer with Modified [place]  4. Bed to chair transfer with Modified [place] using LRAD, begin with squat pivot progressing to stand pivot and stand step  5. Gait  to be assessed.                         History:     Past Medical History:   Diagnosis Date    COPD (chronic obstructive pulmonary disease)     Depression     Hypertension     Neuropathy        Past Surgical History:    Procedure Laterality Date    angiogram Bilateral     stents placed in left leg    anterior neck surgery      cataracts Left     CHOLECYSTECTOMY      EYE SURGERY Left     HAND SURGERY Left     broken bone    herina repair      KNEE SURGERY Bilateral     posterior neck surgery      SPINE SURGERY      TOTAL REPLACEMENT OF BOTH HIP JOINTS USING COMPUTER-ASSISTED NAVIGATION Bilateral        Time Tracking:     PT Received On: 06/24/23  PT Start Time: 1145     PT Stop Time: 1205  PT Total Time (min): 20 min     Billable Minutes: Evaluation LOW 20 mins       06/24/2023

## 2023-06-24 NOTE — CONSULTS
[facility]                       [address] [place] [address]    PATIENT NAME:       HAWA CHRISTIE  YOB: 1955  CSN:                [id number]   MRN:                [id number]  ADMIT DATE:         06/22/2023 11:39:00  PHYSICIAN:          Tom Nichole DPM                            CONSULTATION    DATE OF CONSULT:  06/23/2023 00:00:00    HISTORY OF PRESENT ILLNESS:  Mr. Christie is a 68-year-old  gentleman with   multiple comorbidities, who presented to the emergency room with worsening left   lateral ankle wound with associated redness, swelling, and pain.  The patient   has had this wounds for minimum of a year or more, which he originally was   following up with the [facility] here at [facility].    Unfortunately, he stopped going after February of this year, trying to take care   of it himself.  He has a known history of arterial disease in which he was   being seen by [facility].  They have been consulted for evaluation.  The patient has   been started on antibiotics.  His main complaint to me is he needs something for   pain and chronic tobacco usage in which he refuse the stop.  No other   complaints at this point.    PAST MEDICAL HISTORY:  Notable for severe peripheral artery disease,   hypertension, hyperlipidemia, COPD, cervical stenosis, peripheral neuropathy,   depression.    PAST SURGICAL HISTORY:  He has had neck surgery, cataracts, PTA and stenting   procedures, left leg and hand surgery, eye surgery, knee surgery, hip   replacements.    MEDICATIONS:  As per the chart.    ALLERGIES:  NO KNOWN DRUG OR FOOD ALLERGIES.     SOCIAL HISTORY:  Chronic tobacco user along with alcohol.  Denies IV drug abuse.    FAMILY HISTORY:  Noncontributory.    PHYSICAL EXAMINATION:  GENERAL:  Reveals a thin, elderly gentleman, currently lying in bed, does not   appear to be in any distress.  VITAL  SIGNS:  His current vital signs are stable and he is afebrile.  HEART:  Deferred.  LUNGS:  Deferred.  ABDOMEN:  Deferred.  EXTREMITY:  He has trophic skin changes throughout.  Unable to palpate any pedal   pulses.  Toes are warm.  Still seems to have some capillary refill noted.    Elevation test has been deferred.  Attempted to do elevation testing.    Unfortunately, he is complaining about pain in the extremity.  NEUROLOGICAL:  Again, he is hyperesthetic to touch on the left side.  MUSCULOSKELETAL:  There were no gross pedal deformities.  DERMATOLOGIC:  He has large punched out ulceration lateral aspect of his ankle,   some surrounding trophic skin changes, erythema.  Deeper structures are fibrotic   extending down to the level of the fibula.  No pus.  No odor.    ASSESSMENT:  Nonhealing ischemic wound, left ankle.    PLAN:  The patient instructed the findings of physical exam.  Unfortunately,   failed to follow up with Wound Care and now, he is left with a wound that is   markedly worse now with possibly some secondary infection including involvement   of the bone.  His x-rays are relatively unremarkable, but notably osteopenic.    White cell counts are normal.  No inflammatory markers were done.  We will order   these for in the morning.  They did obtain wound cultures, which are still   pending.  They also did arterial ultrasounds and a CTA.    We will continue with current wound care that has been ordered and we will   follow up tomorrow with all other testing.  MRI has been ordered.  We will wait   for those results, but that may be delayed.  I will continue to follow here.    Thanks for the consultation.        ______________________________  XIMENA Huang/LUIS  DD:  06/23/2023  Time:  05:16PM  DT:  06/23/2023  Time:  05:59PM  Job #:  [id number]      CONSULTATION

## 2023-06-24 NOTE — PROGRESS NOTES
[facility] - 9th Floor [facility] MEDICINE ~ PROGRESS NOTE        CHIEF COMPLAINT   Hospital follow up    HOSPITAL COURSE   68 year old male with a PMH of HTN, HLD, Severe PAD,  COPD, Cervical stenosis, neuropathy, depression who presented to the ED with complaints of left lateral ankle foot wound, getting progressively worse.  He states that the wound has been present for about a year, and he has been going to wound care up until February.  At that time, it was felt that the wound was not improving, and he needed to follow-up with [facility] and possibly get another vascular evaluation.  The patient states that since then, he has been having issues with transportation and was never able to follow-up.  Since then, his wound has gotten progressively worse and more painful.  He denies any fever, chills, chest pain, shortness of breath. He states he still smokes.   Today's ED lab work revealed Na 128, all other indices unremarkable.  Left ankle x-ray showed lucency overlies the lateral malleolus suggesting open injury.  There is a mottled appearance throughout the bones that may be seen with diffuse osteopenia as multiple bones are infected osteomyelitis is not excluded.  ED vitals:  Temp 98.4° F, pulse 90, resp 19, /97, SpO2 97% on RA.  He was started on vancomycin and Zosyn and admitted to hospital medicine for management.  MRI ordered Podiatry Wound Care cardiology consulted    Today   Seen examined this morning.  Complains of pain to the left ankle.  Waiting to see what podiatry has to say.  MRI shows septic joint , tenosynovitis, myositis.        OBJECTIVE/PHYSICAL EXAM     VITAL SIGNS (MOST RECENT):  Temp: 97.6 °F (36.4 °C) (06/24/23 0743)  Pulse: 65 (06/24/23 0743)  Resp: 16 (06/24/23 0623)  BP: (!) 169/75 (06/24/23 0743)  SpO2: 95 % (06/24/23 0743) VITAL SIGNS (24 HOUR RANGE):  Temp:  [97.6 °F (36.4 °C)-98.6 °F (37 °C)] 97.6 °F (36.4 °C)  Pulse:  [62-74] 65  Resp:  [16-20] 16  SpO2:  [92  %-97 %] 95 %  BP: (130-169)/(62-86) 169/75   GENERAL: In no acute distress, afebrile  HEENT:  CHEST: Clear to auscultation bilaterally  HEART: S1, S2, no appreciable murmur  ABDOMEN: Soft, nontender, BS +  MSK: Warm, no lower extremity edema, no clubbing or cyanosis  NEUROLOGIC: Alert and oriented x4, moving all extremities with good strength   INTEGUMENTARY: left ankle wrapped per Podiatry  PSYCHIATRY:        ASSESSMENT/PLAN   Chronic left lateral ankle nonhealing ulcer  Tibiotalar joint septic arthritis  Flexor digitorum longus and flexor hallucis longus tenosynovitis     History of: COPD, hyperlipidemia, essential hypertension, peripheral artery disease, diabetes mellitus type 2, tobacco use      Podiatry following.  Pending further plans.    Will continue on vancomycin and Zosyn for now but cultures so far are negative and MRI notes possible concern for septic joint.  Arterial flow has three-vessel runoff to the left ankle, [facility] signed off.     DVT prophylaxis:   Lovenox 40    Anticipated discharge and disposition:   __________________________________________________________________________    LABS/MICRO/MEDS/DIAGNOSTICS       LABS  Recent Labs     06/23/23  0319 06/24/23  0528   * 131*   K 4.2 4.5   CHLORIDE 98 98   CO2 26 25   BUN 6.9* 5.2*   CREATININE 0.77 0.74   GLUCOSE 97 99   CALCIUM 9.0 9.2   ALKPHOS 107  --    AST 14  --    ALT 10  --    ALBUMIN 3.1*  --      Recent Labs     06/24/23  0528   WBC 5.48   RBC 4.43*   HCT 40.5*   MCV 91.4          MICROBIOLOGY  Microbiology Results (last 7 days)       Procedure Component Value Units Date/Time    Blood culture #1 **CANNOT BE ORDERED STAT** [[id number]]  (Normal) Collected: 06/22/23 2214    Order Status: Completed Specimen: Blood Updated: 06/23/23 2100     CULTURE, BLOOD ([facility]) No Growth At 24 Hours    Blood culture #2 **CANNOT BE ORDERED STAT** [[id number]]  (Normal) Collected: 06/22/23 7690    Order Status: Completed Specimen: Blood Updated:  06/23/23 2100     CULTURE, BLOOD ([facility]) No Growth At 24 Hours    Wound Culture [[id number]] Collected: 06/22/23 1913    Order Status: Completed Specimen: Wound from Toe, Left Foot Updated: 06/23/23 0633     Wound Culture No Growth At 24 Hours    Wound Culture [[id number]] Collected: 06/22/23 1913    Order Status: Completed Specimen: Wound from Ankle, Left Updated: 06/23/23 0629     Wound Culture No Growth At 24 Hours    Wound Culture [[id number]]     Order Status: Canceled Specimen: Wound from Toe, Left Foot                MEDICATIONS   amLODIPine  10 mg Oral Daily    aspirin  81 mg Oral Daily    cilostazoL  50 mg Oral BID    clopidogreL  75 mg Oral Daily    collagenase   Topical (Top) BID    enoxparin  40 mg Subcutaneous Daily    losartan  100 mg Oral Daily    multivitamin  1 tablet Oral Daily    piperacillin-tazobactam (Zosyn) IV (PEDS and ADULTS) (extended infusion is not appropriate)  4.5 g Intravenous Q8H    vancomycin (VANCOCIN) IVPB  1,000 mg Intravenous Q12H         INFUSIONS   sodium chloride 0.9% 75 mL/hr at 06/22/23 2313          DIAGNOSTIC TESTS  MRI Foot (Hindfoot) Left W W/O Contrast         CTA Runoff ABD PEL Bilat Lower Ext   Final Result      1. Diffuse atherosclerotic change as above.   2. Arterial phase scan out paces the contrast bolus at the right lower leg with improved filling on delayed phase compatible with delayed inflow.  Two vessel runoff to the right ankle   3. Three vessel runoff to the left ankle   4. Abdominal aortic aneurysm         Electronically signed by: Allyson Pierce   Date:    06/23/2023   Time:    10:54      X-Ray Ankle Complete Left   Final Result      As above.         Electronically signed by: Kartik Chavez   Date:    06/22/2023   Time:    14:39           No results found for: EF         Case related differential diagnoses have been reviewed; assessment and plan has been documented. I have personally reviewed the labs and test results that are currently available; I have  reviewed the patients medication list. I have reviewed the consulting providers recommendations. I have reviewed or attempted to review medical records based upon their availability.  All of the patient's and/or family's questions have been addressed and answered to the best of my ability.  I will continue to monitor closely and make adjustments to medical management as needed.  This document was created using [employer] Fluency Direct.  Transcription errors may have been made.  Please contact me if any questions may rise regarding documentation to clarify transcription.        Ross Chapman MD   Internal Medicine  Department of [facility]  [facility] - 9th Floor Med Surg

## 2023-06-24 NOTE — PROGRESS NOTES
[facility]                       [address] [address]    PATIENT NAME:       HAWA KUMAR  YOB: 1955  CSN:                [id number]   MRN:                [id number]  ADMIT DATE:         06/22/2023 11:39:00  PHYSICIAN:          Tom Nichole DPM                            PROGRESS NOTE    DATE:  06/24/2023 00:00:00    SUBJECTIVE:  The patient re-evaluated today.  He is currently doing well.  Still   complains of discomfort.  He did have his MRI done early this morning.  No   results have been noted yet.    PHYSICAL EXAMINATION:  VITAL SIGNS:  Stable.  Afebrile.    Dressings are clean, dry, intact.  Trophic skin changes throughout.    Arterial ultrasounds obtained.  There is no evidence of focal stenosis, but he   does have extensive calcific plaquing and monophasic waveforms throughout the   lower extremity.    MRI again report is pending.    ASSESSMENT:  Chronic left ankle wound attempting to rule out infectious process.    PLAN:  We will continue current care.  I will reassess him tomorrow, await for   findings on the MRI.  Preliminary wound cultures growing out gram-negative rods.    We will continue with the current vancomycin and Zosyn regimen.        ______________________________  Tom Nichole DPM    GAS/AQS  DD:  06/24/2023  Time:  10:51AM  DT:  06/24/2023  Time:  10:56AM  Job #:  [id number]      PROGRESS NOTE

## 2023-06-25 LAB
BACTERIA SPEC CULT: ABNORMAL
LEFT ABI: 2.04
LEFT ARM BP: 137 MMHG
LEFT DORSALIS PEDIS: 280 MMHG
LEFT POSTERIOR TIBIAL: 280 MMHG
OHS CV LEFT LOWER EXTREMITY ABI (NO CALC): 1.6
OHS CV RIGHT ABI LOWER EXTREMITY (NO CALC): 1.6
RIGHT ABI: 2.04
RIGHT ARM BP: 132 MMHG
RIGHT DORSALIS PEDIS: 280 MMHG
RIGHT POSTERIOR TIBIAL: 280 MMHG

## 2023-06-25 PROCEDURE — 11000001 HC ACUTE MED/SURG PRIVATE ROOM

## 2023-06-25 PROCEDURE — 25000003 PHARM REV CODE 250: Performed by: INTERNAL MEDICINE

## 2023-06-25 PROCEDURE — 63600175 PHARM REV CODE 636 W HCPCS: Performed by: NURSE PRACTITIONER

## 2023-06-25 PROCEDURE — 25000003 PHARM REV CODE 250: Performed by: NURSE PRACTITIONER

## 2023-06-25 PROCEDURE — 63600175 PHARM REV CODE 636 W HCPCS: Performed by: INTERNAL MEDICINE

## 2023-06-25 RX ADMIN — CILOSTAZOL 50 MG: 50 TABLET ORAL at 10:06

## 2023-06-25 RX ADMIN — VANCOMYCIN HYDROCHLORIDE 1000 MG: 1 INJECTION, POWDER, LYOPHILIZED, FOR SOLUTION INTRAVENOUS at 10:06

## 2023-06-25 RX ADMIN — ENOXAPARIN SODIUM 40 MG: 40 INJECTION SUBCUTANEOUS at 05:06

## 2023-06-25 RX ADMIN — LOSARTAN POTASSIUM 100 MG: 50 TABLET, FILM COATED ORAL at 10:06

## 2023-06-25 RX ADMIN — COLLAGENASE SANTYL: 250 OINTMENT TOPICAL at 10:06

## 2023-06-25 RX ADMIN — HYDROCODONE BITARTRATE AND ACETAMINOPHEN 1 TABLET: 7.5; 325 TABLET ORAL at 02:06

## 2023-06-25 RX ADMIN — PIPERACILLIN AND TAZOBACTAM 4.5 G: 4; .5 INJECTION, POWDER, LYOPHILIZED, FOR SOLUTION INTRAVENOUS; PARENTERAL at 02:06

## 2023-06-25 RX ADMIN — THERA TABS 1 TABLET: TAB at 10:06

## 2023-06-25 RX ADMIN — ASPIRIN 81 MG: 81 TABLET, COATED ORAL at 10:06

## 2023-06-25 RX ADMIN — CLOPIDOGREL BISULFATE 75 MG: 75 TABLET ORAL at 10:06

## 2023-06-25 RX ADMIN — HYDROCODONE BITARTRATE AND ACETAMINOPHEN 1 TABLET: 7.5; 325 TABLET ORAL at 06:06

## 2023-06-25 RX ADMIN — PIPERACILLIN AND TAZOBACTAM 4.5 G: 4; .5 INJECTION, POWDER, LYOPHILIZED, FOR SOLUTION INTRAVENOUS; PARENTERAL at 05:06

## 2023-06-25 RX ADMIN — PIPERACILLIN AND TAZOBACTAM 4.5 G: 4; .5 INJECTION, POWDER, LYOPHILIZED, FOR SOLUTION INTRAVENOUS; PARENTERAL at 10:06

## 2023-06-25 RX ADMIN — HYDROCODONE BITARTRATE AND ACETAMINOPHEN 1 TABLET: 7.5; 325 TABLET ORAL at 10:06

## 2023-06-25 RX ADMIN — AMLODIPINE BESYLATE 10 MG: 5 TABLET ORAL at 10:06

## 2023-06-25 NOTE — PROGRESS NOTES
[facility]                       [address] [address]    PATIENT NAME:       HAWA CHRISTIE  YOB: 1955  CSN:                [id number]   MRN:                [id number]  ADMIT DATE:         06/22/2023 11:39:00  PHYSICIAN:          Tom Nichole DPM                            PROGRESS NOTE    DATE:  06/25/2023 00:00:00    SUBJECTIVE:  Mr. Christie is seen today.  He is sitting on the commode.  No major   issues reported.  Still complaining of some ankle pain.  No fevers, no chills.    PHYSICAL EXAMINATION:  VITAL SIGNS:  Stable and he is afebrile.    No labs reported today.    His current wound cultures growing out strep group B and gram-negative rods,   finals are pending.  Blood cultures negative to date.    Dressings are intact.  Unable to look at the wound.    His MRI was reviewed.  There are no findings to suggest osteomyelitis of the   fibula area, although there is a small enhancing joint effusion at the   tibiotalar and subtalar joints.  Some tenosynovitis noted, but this is to the   medial tendons.    ASSESSMENT:  Chronic left ankle wound, possibly with some evolving joint   effusions, septic arthrosis.    PLAN:  We will continue with current care.  Draw some inflammatory markers in   the morning for further delineation.  Cardiology has no plans on peripheral   intervention for his vascular disease.  We will speak with him tomorrow   concerning the possible need for debridements and aspiration of the ankle or   arthrotomy.        ______________________________  Tmo Nichole DPM    GAS/AQS  DD:  06/25/2023  Time:  11:45AM  DT:  06/25/2023  Time:  12:53PM  Job #:  [id number]      PROGRESS NOTE

## 2023-06-25 NOTE — PT/OT/SLP PROGRESS
Occupational Therapy      Patient Name:  Shaheen Christie   MRN:  [id number]    Case discussed with Dr Nichole who reports that pt has no restrictions from podiatry standpoint. OT eval initiated. History taken. Pt reports that he was living with his brother prior to admit and performing ADLs with cane/wlaker without assist, but with significant difficulty. His plan is to d/c to SNF when medically cleared and then transition to long-term care at the NH as he feels that he is no longer able to care for himself. Pt declined to complete OT eval at this time d/t fatigue and wanting to eat lunch, but agreeable to OT f/u tomorrow. Will reattempt OT eval tomorrow as appropriate.    6/25/2023

## 2023-06-25 NOTE — PROGRESS NOTES
[facility] - 9th Floor [facility] MEDICINE ~ PROGRESS NOTE        CHIEF COMPLAINT   Hospital follow up    HOSPITAL COURSE   68 year old male with a PMH of HTN, HLD, Severe PAD,  COPD, Cervical stenosis, neuropathy, depression who presented to the ED with complaints of left lateral ankle foot wound, getting progressively worse.  He states that the wound has been present for about a year, and he has been going to wound care up until February.  At that time, it was felt that the wound was not improving, and he needed to follow-up with [facility] and possibly get another vascular evaluation.  The patient states that since then, he has been having issues with transportation and was never able to follow-up.  Since then, his wound has gotten progressively worse and more painful.  He denies any fever, chills, chest pain, shortness of breath. He states he still smokes.   Today's ED lab work revealed Na 128, all other indices unremarkable.  Left ankle x-ray showed lucency overlies the lateral malleolus suggesting open injury.  There is a mottled appearance throughout the bones that may be seen with diffuse osteopenia as multiple bones are infected osteomyelitis is not excluded.  ED vitals:  Temp 98.4° F, pulse 90, resp 19, /97, SpO2 97% on RA.  He was started on vancomycin and Zosyn and admitted to hospital medicine for management.  MRI ordered Podiatry Wound Care cardiology consulted    Today  Seen examined this morning.  Complains of pain to the ankle especially with range of motion.  Continue Zosyn, discontinue vancomycin, no evidence of MRSA on culture.        OBJECTIVE/PHYSICAL EXAM     VITAL SIGNS (MOST RECENT):  Temp: 97.8 °F (36.6 °C) (06/25/23 0730)  Pulse: 61 (06/25/23 0730)  Resp: 18 (06/25/23 0608)  BP: (!) 155/75 (06/25/23 0730)  SpO2: (!) 92 % (06/25/23 0730) VITAL SIGNS (24 HOUR RANGE):  Temp:  [97.4 °F (36.3 °C)-98 °F (36.7 °C)] 97.8 °F (36.6 °C)  Pulse:  [58-78] 61  Resp:  [16-20]  18  SpO2:  [92 %-95 %] 92 %  BP: (126-156)/(69-86) 155/75   GENERAL: In no acute distress, afebrile  HEENT:  CHEST: Clear to auscultation bilaterally  HEART: S1, S2, no appreciable murmur  ABDOMEN: Soft, nontender, BS +  MSK: Warm, no lower extremity edema, no clubbing or cyanosis  NEUROLOGIC: Alert and oriented x4, moving all extremities with good strength   INTEGUMENTARY: left ankle wrapped per Podiatry  PSYCHIATRY:        ASSESSMENT/PLAN   Chronic left lateral ankle nonhealing ulcer  Tibiotalar joint septic arthritis  Flexor digitorum longus and flexor hallucis longus tenosynovitis     History of: COPD, hyperlipidemia, essential hypertension, peripheral artery disease, diabetes mellitus type 2, tobacco use      Podiatry following.  Pending further plans.    Will continue on Zosyn for now MRI notes possible concern for septic joint.  Arterial flow has three-vessel runoff to the left ankle, [facility] signed off.     DVT prophylaxis:   Lovenox 40    Anticipated discharge and disposition:   __________________________________________________________________________    LABS/MICRO/MEDS/DIAGNOSTICS       LABS  Recent Labs     06/23/23 0319 06/24/23  0528   * 131*   K 4.2 4.5   CHLORIDE 98 98   CO2 26 25   BUN 6.9* 5.2*   CREATININE 0.77 0.74   GLUCOSE 97 99   CALCIUM 9.0 9.2   ALKPHOS 107  --    AST 14  --    ALT 10  --    ALBUMIN 3.1*  --        Recent Labs     06/24/23  0528   WBC 5.48   RBC 4.43*   HCT 40.5*   MCV 91.4            MICROBIOLOGY  Microbiology Results (last 7 days)       Procedure Component Value Units Date/Time    Wound Culture [[id number]]  (Abnormal)  (Susceptibility) Collected: 06/22/23 1913    Order Status: Completed Specimen: Wound from Ankle, Left Updated: 06/25/23 0948     Wound Culture Moderate Providencia rettgeri     Comment: with normal skin ishmael       Wound Culture [[id number]]  (Abnormal) Collected: 06/22/23 1913    Order Status: Completed Specimen: Wound from Toe, Left Foot Updated:  06/25/23 0944     Wound Culture Few Gram-negative Rods      Few Streptococcus agalactiae (Group B)     Comment: with normal skin ishmael       Blood culture #1 **CANNOT BE ORDERED STAT** [[id number]]  (Normal) Collected: 06/22/23 1754    Order Status: Completed Specimen: Blood Updated: 06/24/23 2101     CULTURE, BLOOD ([facility]) No Growth At 48 Hours    Blood culture #2 **CANNOT BE ORDERED STAT** [[id number]]  (Normal) Collected: 06/22/23 1754    Order Status: Completed Specimen: Blood Updated: 06/24/23 2101     CULTURE, BLOOD ([facility]) No Growth At 48 Hours    Wound Culture [[id number]]     Order Status: Canceled Specimen: Wound from Toe, Left Foot                MEDICATIONS   amLODIPine  10 mg Oral Daily    aspirin  81 mg Oral Daily    cilostazoL  50 mg Oral BID    clopidogreL  75 mg Oral Daily    collagenase   Topical (Top) BID    enoxparin  40 mg Subcutaneous Daily    losartan  100 mg Oral Daily    multivitamin  1 tablet Oral Daily    piperacillin-tazobactam (Zosyn) IV (PEDS and ADULTS) (extended infusion is not appropriate)  4.5 g Intravenous Q8H    vancomycin (VANCOCIN) IVPB  1,000 mg Intravenous Q12H         INFUSIONS           DIAGNOSTIC TESTS  MRI Foot (Hindfoot) Left W W/O Contrast   Final Result      1. There is a small subtalar joint effusion with synovial enhancement. There is associated diffuse surrounding marrow edema and enhancement involving the subtalar joint (series 12 image 11). However, no definite cortical erosion is seen. There is also a small joint effusion in the tibiotalar joint with enhancement of the joint capsule (series 11 image 11). There is enhancing soft tissue component in the Kager's fat pad (series 12 image 11). These findings are likely related to septic or inflammatory arthritis. However, no bony destruction is seen.      2. There is enhancement of the flexor digitorum longus and flexor hallucis longus tendons (series 13 image 7-23) with surrounding edema. This is likely related to  tenosynovitis.      3. There is mild edema and enhancement of the intrinsic muscles of the foot (series 6 and 13 image 1-11). This is suggestive of myositis.      4. Details and other findings as noted above.         Electronically signed by: Neftaly Bal MD   Date:    06/24/2023   Time:    11:56      CTA Runoff ABD PEL Bilat Lower Ext   Final Result      1. Diffuse atherosclerotic change as above.   2. Arterial phase scan out paces the contrast bolus at the right lower leg with improved filling on delayed phase compatible with delayed inflow.  Two vessel runoff to the right ankle   3. Three vessel runoff to the left ankle   4. Abdominal aortic aneurysm         Electronically signed by: Allyson Pierce   Date:    06/23/2023   Time:    10:54      X-Ray Ankle Complete Left   Final Result      As above.         Electronically signed by: Kartik Chavez   Date:    06/22/2023   Time:    14:39           No results found for: EF         Case related differential diagnoses have been reviewed; assessment and plan has been documented. I have personally reviewed the labs and test results that are currently available; I have reviewed the patients medication list. I have reviewed the consulting providers recommendations. I have reviewed or attempted to review medical records based upon their availability.  All of the patient's and/or family's questions have been addressed and answered to the best of my ability.  I will continue to monitor closely and make adjustments to medical management as needed.  This document was created using [employer] Fluency Direct.  Transcription errors may have been made.  Please contact me if any questions may rise regarding documentation to clarify transcription.        Ross Chapman MD   Internal Medicine  Department of Hospital Medicine [facility] - 9th Floor Med Surg

## 2023-06-26 LAB
ANION GAP SERPL CALC-SCNC: 6 MEQ/L
BACTERIA SPEC CULT: ABNORMAL
BACTERIA SPEC CULT: ABNORMAL
BASOPHILS # BLD AUTO: 0.13 X10(3)/MCL
BASOPHILS NFR BLD AUTO: 1.9 %
BUN SERPL-MCNC: 5 MG/DL (ref 8.4–25.7)
CALCIUM SERPL-MCNC: 9.1 MG/DL (ref 8.8–10)
CHLORIDE SERPL-SCNC: 98 MMOL/L (ref 98–107)
CO2 SERPL-SCNC: 26 MMOL/L (ref 23–31)
CREAT SERPL-MCNC: 0.78 MG/DL (ref 0.73–1.18)
CREAT/UREA NIT SERPL: 6
CRP SERPL-MCNC: 7.2 MG/L
EOSINOPHIL # BLD AUTO: 0.41 X10(3)/MCL (ref 0–0.9)
EOSINOPHIL NFR BLD AUTO: 5.9 %
ERYTHROCYTE [DISTWIDTH] IN BLOOD BY AUTOMATED COUNT: 14.7 % (ref 11.5–17)
ERYTHROCYTE [SEDIMENTATION RATE] IN BLOOD: 19 MM/HR (ref 0–15)
GFR SERPLBLD CREATININE-BSD FMLA CKD-EPI: >60 MLS/MIN/1.73/M2
GLUCOSE SERPL-MCNC: 90 MG/DL (ref 82–115)
HCT VFR BLD AUTO: 41.8 % (ref 42–52)
HGB BLD-MCNC: 14 G/DL (ref 14–18)
IMM GRANULOCYTES # BLD AUTO: 0.02 X10(3)/MCL (ref 0–0.04)
IMM GRANULOCYTES NFR BLD AUTO: 0.3 %
LYMPHOCYTES # BLD AUTO: 1.22 X10(3)/MCL (ref 0.6–4.6)
LYMPHOCYTES NFR BLD AUTO: 17.6 %
MCH RBC QN AUTO: 30.2 PG (ref 27–31)
MCHC RBC AUTO-ENTMCNC: 33.5 G/DL (ref 33–36)
MCV RBC AUTO: 90.3 FL (ref 80–94)
MONOCYTES # BLD AUTO: 0.77 X10(3)/MCL (ref 0.1–1.3)
MONOCYTES NFR BLD AUTO: 11.1 %
NEUTROPHILS # BLD AUTO: 4.38 X10(3)/MCL (ref 2.1–9.2)
NEUTROPHILS NFR BLD AUTO: 63.2 %
NRBC BLD AUTO-RTO: 0 %
PLATELET # BLD AUTO: 300 X10(3)/MCL (ref 130–400)
PMV BLD AUTO: 8.8 FL (ref 7.4–10.4)
POTASSIUM SERPL-SCNC: 3.6 MMOL/L (ref 3.5–5.1)
RBC # BLD AUTO: 4.63 X10(6)/MCL (ref 4.7–6.1)
SODIUM SERPL-SCNC: 130 MMOL/L (ref 136–145)
WBC # SPEC AUTO: 6.93 X10(3)/MCL (ref 4.5–11.5)

## 2023-06-26 PROCEDURE — 97530 THERAPEUTIC ACTIVITIES: CPT | Mod: CQ

## 2023-06-26 PROCEDURE — 25000003 PHARM REV CODE 250: Performed by: NURSE PRACTITIONER

## 2023-06-26 PROCEDURE — 99223 PR INITIAL HOSPITAL CARE,LEVL III: ICD-10-PCS | Mod: FS,,, | Performed by: GENERAL PRACTICE

## 2023-06-26 PROCEDURE — 63600175 PHARM REV CODE 636 W HCPCS: Performed by: NURSE PRACTITIONER

## 2023-06-26 PROCEDURE — 85025 COMPLETE CBC W/AUTO DIFF WBC: CPT | Performed by: INTERNAL MEDICINE

## 2023-06-26 PROCEDURE — 11000001 HC ACUTE MED/SURG PRIVATE ROOM

## 2023-06-26 PROCEDURE — 97166 OT EVAL MOD COMPLEX 45 MIN: CPT

## 2023-06-26 PROCEDURE — 86140 C-REACTIVE PROTEIN: CPT | Performed by: PODIATRIST

## 2023-06-26 PROCEDURE — 80048 BASIC METABOLIC PNL TOTAL CA: CPT | Performed by: INTERNAL MEDICINE

## 2023-06-26 PROCEDURE — 85652 RBC SED RATE AUTOMATED: CPT | Performed by: PODIATRIST

## 2023-06-26 PROCEDURE — 25000003 PHARM REV CODE 250: Performed by: INTERNAL MEDICINE

## 2023-06-26 PROCEDURE — 99223 1ST HOSP IP/OBS HIGH 75: CPT | Mod: FS,,, | Performed by: GENERAL PRACTICE

## 2023-06-26 RX ORDER — GABAPENTIN 300 MG/1
300 CAPSULE ORAL 3 TIMES DAILY
Status: DISCONTINUED | OUTPATIENT
Start: 2023-06-26 | End: 2023-07-06 | Stop reason: HOSPADM

## 2023-06-26 RX ADMIN — GABAPENTIN 300 MG: 300 CAPSULE ORAL at 09:06

## 2023-06-26 RX ADMIN — LOSARTAN POTASSIUM 100 MG: 50 TABLET, FILM COATED ORAL at 09:06

## 2023-06-26 RX ADMIN — ASPIRIN 81 MG: 81 TABLET, COATED ORAL at 09:06

## 2023-06-26 RX ADMIN — PIPERACILLIN AND TAZOBACTAM 4.5 G: 4; .5 INJECTION, POWDER, LYOPHILIZED, FOR SOLUTION INTRAVENOUS; PARENTERAL at 02:06

## 2023-06-26 RX ADMIN — HYDROCODONE BITARTRATE AND ACETAMINOPHEN 1 TABLET: 7.5; 325 TABLET ORAL at 10:06

## 2023-06-26 RX ADMIN — HYDROCODONE BITARTRATE AND ACETAMINOPHEN 1 TABLET: 7.5; 325 TABLET ORAL at 02:06

## 2023-06-26 RX ADMIN — COLLAGENASE SANTYL: 250 OINTMENT TOPICAL at 10:06

## 2023-06-26 RX ADMIN — HYDROCODONE BITARTRATE AND ACETAMINOPHEN 1 TABLET: 7.5; 325 TABLET ORAL at 06:06

## 2023-06-26 RX ADMIN — THERA TABS 1 TABLET: TAB at 09:06

## 2023-06-26 RX ADMIN — AMLODIPINE BESYLATE 10 MG: 5 TABLET ORAL at 09:06

## 2023-06-26 RX ADMIN — CILOSTAZOL 50 MG: 50 TABLET ORAL at 09:06

## 2023-06-26 RX ADMIN — ENOXAPARIN SODIUM 40 MG: 40 INJECTION SUBCUTANEOUS at 04:06

## 2023-06-26 RX ADMIN — PIPERACILLIN AND TAZOBACTAM 4.5 G: 4; .5 INJECTION, POWDER, LYOPHILIZED, FOR SOLUTION INTRAVENOUS; PARENTERAL at 10:06

## 2023-06-26 RX ADMIN — Medication 6 MG: at 09:06

## 2023-06-26 RX ADMIN — COLLAGENASE SANTYL: 250 OINTMENT TOPICAL at 09:06

## 2023-06-26 RX ADMIN — GABAPENTIN 300 MG: 300 CAPSULE ORAL at 12:06

## 2023-06-26 RX ADMIN — CLOPIDOGREL BISULFATE 75 MG: 75 TABLET ORAL at 09:06

## 2023-06-26 NOTE — PLAN OF CARE
06/26/23 0820   Discharge Reassessment   Assessment Type Discharge Planning Reassessment   Discharge Plan discussed with: Patient   Discharge Plan A Skilled Nursing Facility   Discharge Plan B Skilled Nursing Facility   Post-Acute Status   Post-Acute Authorization Placement   Post-Acute Placement Status Referrals Sent  ([facility])

## 2023-06-26 NOTE — PROGRESS NOTES
[facility] - 9th Floor [facility] MEDICINE ~ PROGRESS NOTE        CHIEF COMPLAINT   Hospital follow up    HOSPITAL COURSE   68 year old male with a PMH of HTN, HLD, Severe PAD,  COPD, Cervical stenosis, neuropathy, depression who presented to the ED with complaints of left lateral ankle foot wound, getting progressively worse.  He states that the wound has been present for about a year, and he has been going to wound care up until February.  At that time, it was felt that the wound was not improving, and he needed to follow-up with [facility] and possibly get another vascular evaluation.  The patient states that since then, he has been having issues with transportation and was never able to follow-up.  Since then, his wound has gotten progressively worse and more painful.  He denies any fever, chills, chest pain, shortness of breath. He states he still smokes.   Today's ED lab work revealed Na 128, all other indices unremarkable.  Left ankle x-ray showed lucency overlies the lateral malleolus suggesting open injury.  There is a mottled appearance throughout the bones that may be seen with diffuse osteopenia as multiple bones are infected osteomyelitis is not excluded.  ED vitals:  Temp 98.4° F, pulse 90, resp 19, /97, SpO2 97% on RA.  He was started on vancomycin and Zosyn and admitted to hospital medicine for management.  MRI ordered Podiatry Wound Care cardiology consulted    Today  Seen and examined this morning.  A little bit of pain to the ankle area but otherwise stable.  I will also consult Infectious Disease to help with antibiotics given septic joint noted.        OBJECTIVE/PHYSICAL EXAM     VITAL SIGNS (MOST RECENT):  Temp: 98.1 °F (36.7 °C) (06/26/23 0736)  Pulse: 69 (06/26/23 0736)  Resp: 16 (06/26/23 0639)  BP: (!) 146/73 (06/26/23 0900)  SpO2: (!) 92 % (06/26/23 0736) VITAL SIGNS (24 HOUR RANGE):  Temp:  [98 °F (36.7 °C)-98.7 °F (37.1 °C)] 98.1 °F (36.7 °C)  Pulse:  [63-85]  69  Resp:  [16-18] 16  SpO2:  [92 %-96 %] 92 %  BP: (126-149)/(73-78) 146/73   GENERAL: In no acute distress, afebrile  HEENT:  CHEST: Clear to auscultation bilaterally  HEART: S1, S2, no appreciable murmur  ABDOMEN: Soft, nontender, BS +  MSK: Warm, no lower extremity edema, no clubbing or cyanosis  NEUROLOGIC: Alert and oriented x4, moving all extremities with good strength   INTEGUMENTARY: left ankle wrapped per Podiatry  PSYCHIATRY:        ASSESSMENT/PLAN   Chronic left lateral ankle nonhealing ulcer  Tibiotalar joint septic arthritis  Flexor digitorum longus and flexor hallucis longus tenosynovitis     History of: COPD, hyperlipidemia, essential hypertension, peripheral artery disease, diabetes mellitus type 2, tobacco use      Podiatry following.  Pending further plans.    Infectious disease consulted.  Will continue on Zosyn for now MRI notes possible concern for septic joint.  Arterial flow has three-vessel runoff to the left ankle, [facility] signed off.     DVT prophylaxis:   Lovenox 40    Anticipated discharge and disposition:   __________________________________________________________________________    LABS/MICRO/MEDS/DIAGNOSTICS       LABS  Recent Labs     06/26/23  0559   *   K 3.6   CHLORIDE 98   CO2 26   BUN 5.0*   CREATININE 0.78   GLUCOSE 90   CALCIUM 9.1       Recent Labs     06/26/23  0559   WBC 6.93   RBC 4.63*   HCT 41.8*   MCV 90.3            MICROBIOLOGY  Microbiology Results (last 7 days)       Procedure Component Value Units Date/Time    Blood culture #1 **CANNOT BE ORDERED STAT** [[id number]]  (Normal) Collected: 06/22/23 1754    Order Status: Completed Specimen: Blood Updated: 06/25/23 2101     CULTURE, BLOOD ([facility]) No Growth At 72 Hours    Blood culture #2 **CANNOT BE ORDERED STAT** [[id number]]  (Normal) Collected: 06/22/23 1754    Order Status: Completed Specimen: Blood Updated: 06/25/23 2101     CULTURE, BLOOD ([facility]) No Growth At 72 Hours    Wound Culture [[id number]]  (Abnormal)   (Susceptibility) Collected: 06/22/23 1913    Order Status: Completed Specimen: Wound from Ankle, Left Updated: 06/25/23 0948     Wound Culture Moderate Providencia rettgeri     Comment: with normal skin ishmael       Wound Culture [[id number]]  (Abnormal) Collected: 06/22/23 1913    Order Status: Completed Specimen: Wound from Toe, Left Foot Updated: 06/25/23 0944     Wound Culture Few Gram-negative Rods      Few Streptococcus agalactiae (Group B)     Comment: with normal skin ishmael       Wound Culture [[id number]]     Order Status: Canceled Specimen: Wound from Toe, Left Foot                MEDICATIONS   amLODIPine  10 mg Oral Daily    aspirin  81 mg Oral Daily    cilostazoL  50 mg Oral BID    clopidogreL  75 mg Oral Daily    collagenase   Topical (Top) BID    enoxparin  40 mg Subcutaneous Daily    losartan  100 mg Oral Daily    multivitamin  1 tablet Oral Daily    piperacillin-tazobactam (Zosyn) IV (PEDS and ADULTS) (extended infusion is not appropriate)  4.5 g Intravenous Q8H         INFUSIONS           DIAGNOSTIC TESTS  MRI Foot ([place]) Left W W/O Contrast   Final Result      1. There is a small subtalar joint effusion with synovial enhancement. There is associated diffuse surrounding marrow edema and enhancement involving the subtalar joint (series 12 image 11). However, no definite cortical erosion is seen. There is also a small joint effusion in the tibiotalar joint with enhancement of the joint capsule (series 11 image 11). There is enhancing soft tissue component in the Kager's fat pad (series 12 image 11). These findings are likely related to septic or inflammatory arthritis. However, no bony destruction is seen.      2. There is enhancement of the flexor digitorum longus and flexor hallucis longus tendons (series 13 image 7-23) with surrounding edema. This is likely related to tenosynovitis.      3. There is mild edema and enhancement of the intrinsic muscles of the foot (series 6 and 13 image 1-11). This  is suggestive of myositis.      4. Details and other findings as noted above.         Electronically signed by: Neftaly Bal MD   Date:    06/24/2023   Time:    11:56      CTA Runoff ABD PEL Bilat Lower Ext   Final Result      1. Diffuse atherosclerotic change as above.   2. Arterial phase scan out paces the contrast bolus at the right lower leg with improved filling on delayed phase compatible with delayed inflow.  Two vessel runoff to the right ankle   3. Three vessel runoff to the left ankle   4. Abdominal aortic aneurysm         Electronically signed by: Allyson Pierce   Date:    06/23/2023   Time:    10:54      X-Ray Ankle Complete Left   Final Result      As above.         Electronically signed by: Kartik Chavez   Date:    06/22/2023   Time:    14:39           No results found for: EF         Case related differential diagnoses have been reviewed; assessment and plan has been documented. I have personally reviewed the labs and test results that are currently available; I have reviewed the patients medication list. I have reviewed the consulting providers recommendations. I have reviewed or attempted to review medical records based upon their availability.  All of the patient's and/or family's questions have been addressed and answered to the best of my ability.  I will continue to monitor closely and make adjustments to medical management as needed.  This document was created using M*Modal Fluency Direct.  Transcription errors may have been made.  Please contact me if any questions may rise regarding documentation to clarify transcription.        Ross Chapman MD   Internal Medicine  Department of Hospital Medicine  [facility] - 9th Floor Med Surg

## 2023-06-26 NOTE — PT/OT/SLP PROGRESS
Physical Therapy Treatment    Patient Name:  Shaheen Christie   MRN:  [id number]    Recommendations:     Discharge Recommendations: nursing facility, skilled with HH  Discharge Equipment Recommendations: none  Barriers to discharge:     Assessment:     Shaheen Christie is a 68 y.o. male admitted with a medical diagnosis of L ankle nonhealing ulcer..  He presents with the following impairments/functional limitations: weakness, gait instability, impaired endurance, impaired balance, impaired self care skills, impaired functional mobility.    Rehab Prognosis: Good; patient would benefit from acute skilled PT services to address these deficits and reach maximum level of function.    Recent Surgery: * No surgery found *      Plan:     During this hospitalization, patient to be seen 5 x/week to address the identified rehab impairments via gait training, therapeutic activities, therapeutic exercises, neuromuscular re-education and progress toward the following goals:    Plan of Care Expires:  07/22/23    Subjective     Chief Complaint: Pt states that he receive pain meds an hour prior to PTA's arrival. However, pt c/o L ankle pain and declined to attempt to walk or to transfer to the chair. Pt request to transfer to bedside commode.  Patient/Family Comments/goals:   Pain/Comfort:  Pain Rating 1: 0/10      Objective:     Communicated with NSG prior to session.  Patient found HOB elevated with peripheral IV upon PTA entry to room.     General Precautions: Standard, fall  Orthopedic Precautions: N/A  Braces: N/A  Respiratory Status: Room air  Blood Pressure:   Skin Integrity:  wound on L ankle covered with dressing      Functional Mobility:  Bed: Lyric supine <-> sit EOB   T/F: Lyric sit <-> stand pivot transfer from bed to bedside commode in attempt to have BM      Education:  Patient provided with verbal education regarding safety with mobilizing out of bed and participating with therapy.  Understanding was verbalized.     Patient  left HOB elevated with all lines intact and call button in reach..    GOALS:   Multidisciplinary Problems       Physical Therapy Goals          Problem: Physical Therapy    Goal Priority Disciplines Outcome Goal Variances Interventions   Physical Therapy Goal     PT, PT/OT Ongoing, Progressing     Description: Goals to be met by: Discharge     Patient will increase functional independence with mobility by performin. Supine to sit with Modified [place]  2. Sit to supine with Modified [place]  3. Sit to stand transfer with Modified [place]  4. Bed to chair transfer with Modified [place] using LRAD, begin with squat pivot progressing to stand pivot and stand step  5. Gait  to be assessed.                         Time Tracking:     PT Received On:    PT Start Time: 1130     PT Stop Time: 1143  PT Total Time (min): 13 min     Billable Minutes: Therapeutic Activity 13    Treatment Type: Treatment  PT/PTA: PTA     Number of PTA visits since last PT visit: 2023

## 2023-06-26 NOTE — PROGRESS NOTES
[facility]                       [address] [address]    PATIENT NAME:       HAWA KUMAR  YOB: 1955  CSN:                [id number]   MRN:                [id number]  ADMIT DATE:         06/22/2023 11:39:00  PHYSICIAN:          Tom Nichole DPM                            PROGRESS NOTE    DATE:  06/26/2023 00:36:36    SUBJECTIVE:  The patient is seen today.  He is doing well.  Some pain in the   ankle, but otherwise no complaints.  No fevers, no chills.  He has remained   afebrile.    PHYSICAL EXAMINATION:  VITAL SIGNS:  Stable and he is currently afebrile.  EXTREMITIES:  Dressings are intact.  Wound was inspected.  Again, the wound   itself is fibro-granular.  Inflammatory changes pretty much resolved.  Trophic   skin changes throughout.  There is no effusion to the ankle area or subtalar   joint region.  Some discomfort with range of motion.    LABORATORY DATA:  Labs reviewed.  White cell count 6.9.  BUN and creatinine 5   and 0.78.  His sed rate was 19 and CRP 7.2.  Wound cultures again grew out few   strep group B    Again, his recent MRI revealed no inflammatory process to the fibula directly   subjacent.  There may have been a little bit of enhancement within the areas   surrounding the subtalar joint with no erosive changes.  Small effusions within   the tibiotalar and subtalar joints.  Some tenosynovitis noted.    ASSESSMENT:  Chronic left ankle wound with secondary cellulitis with some   questionable inflammatory process to adjacent structures.    PLAN:  We will continue with the current care.  ID has been consulted.  We will   wait for their input.  I would find it quite hard to believe that an infectious   process would completely avoid the underlying osseous structures that being the   fibula, but had some inflammatory change due to some of the adjacent structures.    Most of the  inflammatory process is now resolved.  There does not appear to be   any significant effusion within the joints.  If ID still think that we need to   wash this, we will go ahead and put him on the schedule, but let me get their   input first before making any attempts.    ______________________________  Tom Nichole DPM    GAS/AQS  DD:  06/26/2023  Time:  02:02PM  DT:  06/26/2023  Time:  02:15PM  Job #:  [id number]      PROGRESS NOTE

## 2023-06-26 NOTE — PT/OT/SLP EVAL
Occupational Therapy  Evaluation    Name: Shaheen Christie  MRN: [id number]  Admitting Diagnosis: <principal problem not specified>  Recent Surgery: * No surgery found *      Recommendations:     Discharge Recommendations: nursing facility, skilled  Discharge Equipment Recommendations:     Barriers to discharge:       Assessment:     Shaheen Christie is a 68 y.o. male with a medical diagnosis of L lateral ankle ft wound, septic joint tenosynovitis, myositis, no WB restrictions per podiatrist.  He presents with the following performance deficits affecting function: weakness, impaired endurance, impaired self care skills, impaired functional mobility, gait instability, impaired balance.    Pt lives with brother, admits needing more assist with ADLs, recommend SNF. Today pt completed x1 sit <> stand with RW, increased pain LLE and unable to take steps, stood with min A x1min. Unable to dress BLE.     Rehab Prognosis: Good; patient would benefit from acute skilled OT services to address these deficits and reach maximum level of function.       Plan:     Patient to be seen 5 x/week to address the above listed problems via self-care/home management, therapeutic activities, therapeutic exercises  Plan of Care Expires:    Plan of Care Reviewed with: patient    Subjective     Chief Complaint:   Patient/Family Comments/goals:     Occupational Profile:  Living Environment: lives with brother, tub/shower  Previous level of function: assist with ADLs  Roles and Routines: no driving  Equipment Used at Home: other (see comments) (cane, crutches, RW)  Assistance upon Discharge: none     Pain/Comfort:  Pain Rating 1: 10/10  Location - Side 1: Left  Location 1: ankle  Pain Addressed 1: Reposition    Patients cultural, spiritual, Yazidi conflicts given the current situation:      Objective:     Communicated with: nrsg prior to session.  Patient found HOB elevated with   upon OT entry to room.    General Precautions: Standard,     Orthopedic Precautions:    Braces:    Respiratory Status: Room air  Vital Signs:     Occupational Performance:    Bed Mobility:    Patient completed Supine to Sit with minimum assistance  Patient completed Sit to Supine with minimum assistance    Functional Mobility/Transfers:  Patient completed Sit <> Stand Transfer with minimum assistance  with  rolling walker   Functional Mobility: refused to take steps 2/2 pain     Activities of Daily Living:  Lower Body Dressing: maximal assistance    Toileting: maximal assistance      Cognitive/Visual Perceptual:  Cognitive/Psychosocial Skills:     -       Oriented to: Person, Place, Time, and Situation     Physical Exam:  Upper Extremity Strength:    -       Right Upper Extremity: WFL  -       Left Upper Extremity: WFL    Therapeutic Positioning  Risk for acquired pressure injuries is increased due to impaired mobility.    OT interventions performed during the course of today's session in an effort to prevent and/or reduce acquired pressure injuries:   Therapeutic positioning completed     Skin assessment: all bony prominences were assessed    Findings: known area of altered skin integrity at L foot and sacrum     OT recommendations for therapeutic positioning throughout hospitalization:   Follow [facility] Pressure Injury Prevention Protocol  Geomat recommended for sacral protection while [employer]      AMPAC 6 Click ADL:  AMPAC Total Score:      Additional Treatment:       Patient Education:  Patient provided with verbal education regarding OT role/goals/POC, fall prevention, safety awareness, Discharge/DME recommendations, and pressure ulcer prevention.  Understanding was verbalized.     Patient left HOB elevated with all lines intact and call button in reach    GOALS:   Multidisciplinary Problems       Occupational Therapy Goals          Problem: Occupational Therapy    Goal Priority Disciplines Outcome Interventions   Occupational Therapy Goal     OT, PT/OT  Ongoing, Progressing    Description: Goals to be met by: 7/24/2023     Patient will increase functional independence with ADLs by performing:    LE Dressing with Modified [place].  Grooming while EOB with Modified [place].  Toileting from toilet with Modified [place] for hygiene and clothing management.   Toilet transfer to bedside commode with Modified [place].                         History:     Past Medical History:   Diagnosis Date    COPD (chronic obstructive pulmonary disease)     Depression     Hypertension     Neuropathy          Past Surgical History:   Procedure Laterality Date    angiogram Bilateral     stents placed in left leg    anterior neck surgery      cataracts Left     CHOLECYSTECTOMY      EYE SURGERY Left     HAND SURGERY Left     broken bone    herina repair      KNEE SURGERY Bilateral     posterior neck surgery      SPINE SURGERY      TOTAL REPLACEMENT OF BOTH HIP JOINTS USING COMPUTER-ASSISTED NAVIGATION Bilateral        Time Tracking:     OT Date of Treatment:    OT Start Time: 1045  OT Stop Time: 1102  OT Total Time (min): 17 min    Billable Minutes:Evaluation Moderate Complexity     6/26/2023

## 2023-06-26 NOTE — PLAN OF CARE
Problem: Occupational Therapy  Goal: Occupational Therapy Goal  Description: Goals to be met by: 7/24/2023     Patient will increase functional independence with ADLs by performing:    LE Dressing with Modified [place].  Grooming while EOB with Modified [place].  Toileting from toilet with Modified [place] for hygiene and clothing management.   Toilet transfer to bedside commode with Modified [place].    Outcome: Ongoing, Progressing

## 2023-06-26 NOTE — CONSULTS
[facility]  Infectious Disease Consult        HISTORY OF PRESENT ILLNESS:   He is a 68-year-old male with a past medical history of hypertension, severe CAD, COPD, regular tobacco use, and neuropathy who presented to the emergency room with worsening left ankle wound.  Reports when he had been present for about a year and he had been following with wound care up until February.  He reported needing additional vascular workup, however having issues with transportation, therefore never followed up.  Labs were essentially unremarkable.  X-ray showed diffuse osteopenia with possible superimposed osteomyelitis.  MRI of the left foot with and without contrast showed possible septic versus inflammatory arthritis, however no bony destruction was notice.  Additionally findings were suggestive of tenosynovitis and myositis.  He has been receiving empiric vancomycin and Zosyn since admit on 06/22 and remains afebrile and hemodynamically stable.  Podiatry is following, considering debridement and ankle aspiration.  Cardiology evaluated patient for need for any vascular intervention, however no plans at this time.  We have been consulted for assistance with concern for septic arthritis.    Denies any recent fevers, chills, or sweats.  Reports improved LLE pain / swelling since admit.      PAST MEDICAL HISTORY:     Past Medical History:   Diagnosis Date    COPD (chronic obstructive pulmonary disease)     Depression     Hypertension     Neuropathy        PAST SURGICAL HISTORY:     Past Surgical History:   Procedure Laterality Date    angiogram Bilateral     stents placed in left leg    anterior neck surgery      cataracts Left     CHOLECYSTECTOMY      EYE SURGERY Left     HAND SURGERY Left     broken bone    herina repair      KNEE SURGERY Bilateral     posterior neck surgery      SPINE SURGERY      TOTAL REPLACEMENT OF BOTH HIP JOINTS USING COMPUTER-ASSISTED NAVIGATION Bilateral        ALLERGIES:    Patient has no known allergies.    FAMILY HISTORY:   Reviewed and non-contributory     SOCIAL HISTORY:     Social History     Tobacco Use    Smoking status: Every Day     Packs/day: 0.50     Years: 15.00     Pack years: 7.50     Types: Cigarettes    Smokeless tobacco: Never   Substance Use Topics    Alcohol use: Yes     Alcohol/week: 3.0 - 6.0 standard drinks     Types: 3 - 6 Cans of beer per week        MEDICATIONS:   Reviewed in EMR    REVIEW OF SYSTEMS:   Except as documented, all other systems reviewed and negative     PHYSICAL EXAM:   T 97.6 °F (36.4 °C)   /77   P 87   RR 18   O2 95 %  GENERAL: Older male, chronically ill, NAD; does not appear toxic  SKIN: no rash  HEENT: sclera non-icteric; PERRL  NECK: supple; no LAD  CHEST: CTA; nonlabored, equal expansion; no adventitious BS  CARDIOVASCULAR: RRR, S1S2; no murmur; equal peripheral pulses; no edema  ABDOMEN:  active bowel sounds; abdomen soft, nondistended, nontender to palpation  EXTREMITIES: LLE with some edema, no erythema - wound w dressing in place, recently changed   NEURO: AAO x4; CN II-XII grossly intact  PSYCH: Mentation and affect appropriate     LABS AND IMAGING:     Recent Labs     06/24/23 0528 06/26/23  0559   WBC 5.48 6.93   RBC 4.43* 4.63*   HGB 13.1* 14.0   HCT 40.5* 41.8*   MCV 91.4 90.3   MCH 29.6 30.2   MCHC 32.3* 33.5   RDW 14.7 14.7    300     No results for input(s): LACTIC in the last 72 hours.  No results for input(s): INR, APTT, D-DIMER in the last 72 hours.  Recent Labs     06/24/23  0528   CHOL 97   TRIG 57   LDL 41.00*   VLDL 11   HDL 45      Recent Labs     06/24/23 0528 06/26/23  0559   * 130*   K 4.5 3.6   CHLORIDE 98 98   CO2 25 26   BUN 5.2* 5.0*   CREATININE 0.74 0.78   GLUCOSE 99 90   CALCIUM 9.2 9.1   CRP  --  7.20*     No results for input(s): BNP, CPK, TROPONINI in the last 72 hours.       Ankle Brachial Indices (NICK)  Bilateral non compressible lower extremity ankle-brachial indices.  CV  Ultrasound doppler arterial legs bilat  Patent right lower extremity arterial system with no evidence of focal   stenosis. Diffuse plaquing with biphasic waveforms identified throughout   the lower extremity.  Patent left lower extremity arterial system with no evidence of focal   stenosis. Diffuse calcific plaquing and monophasic waveforms identified   throughout the lower extremity.    A separate ankle-brachial index was performed that supports bilateral non   compressible vessels at the ankle.      ASSESSMENT & PLAN:   68-year-old male with past medical history of hypertension, severe CAD, COPD, and regular tobacco use presenting with worsening appearance of a left ankle wound.  MRI showing septic versus inflammatory arthritis as well as tenosynovitis and myositis.  Podiatry planning on debridement.  Patient on empiric vancomycin and Zosyn.  Id consulted for assistance.    1.  Chronic left ankle wound with recent worsening, imaging concerning for septic arthritis/tenosynovitis/myositis  2.  Severe PAD  3.  COPD  4.  HTN  5.  Tobacco abuse      PLAN:  No systemic signs of infection.  Podiatry planning on debridement - f/u findings, cultures, pathology.   DC abx and monitor off.   Plan to resume following culture results unless patient would to become septic.   Tobacco cessation encouraged.   Discussed with patient and nursing.           ANGEL Bravo  Infectious Disease

## 2023-06-27 LAB
BACTERIA BLD CULT: NORMAL
BACTERIA BLD CULT: NORMAL
HBV CORE AB SERPL QL IA: NONREACTIVE
HBV CORE IGM SERPL QL IA: NONREACTIVE
HBV SURFACE AB SER-ACNC: 0.43 MIU/ML
HBV SURFACE AB SERPL IA-ACNC: NONREACTIVE M[IU]/ML
HBV SURFACE AG SERPL QL IA: NONREACTIVE
HCV AB SERPL QL IA: NONREACTIVE
HIV 1+2 AB+HIV1 P24 AG SERPL QL IA: NONREACTIVE
T PALLIDUM AB SER QL: NONREACTIVE

## 2023-06-27 PROCEDURE — 87389 HIV-1 AG W/HIV-1&-2 AB AG IA: CPT | Performed by: GENERAL PRACTICE

## 2023-06-27 PROCEDURE — 86780 TREPONEMA PALLIDUM: CPT | Performed by: GENERAL PRACTICE

## 2023-06-27 PROCEDURE — 11000001 HC ACUTE MED/SURG PRIVATE ROOM

## 2023-06-27 PROCEDURE — 87340 HEPATITIS B SURFACE AG IA: CPT | Performed by: GENERAL PRACTICE

## 2023-06-27 PROCEDURE — 97535 SELF CARE MNGMENT TRAINING: CPT | Mod: CO

## 2023-06-27 PROCEDURE — 86704 HEP B CORE ANTIBODY TOTAL: CPT | Performed by: GENERAL PRACTICE

## 2023-06-27 PROCEDURE — 63600175 PHARM REV CODE 636 W HCPCS: Performed by: NURSE PRACTITIONER

## 2023-06-27 PROCEDURE — 86803 HEPATITIS C AB TEST: CPT | Performed by: GENERAL PRACTICE

## 2023-06-27 PROCEDURE — 25000003 PHARM REV CODE 250: Performed by: NURSE PRACTITIONER

## 2023-06-27 PROCEDURE — 25000003 PHARM REV CODE 250: Performed by: INTERNAL MEDICINE

## 2023-06-27 PROCEDURE — 86706 HEP B SURFACE ANTIBODY: CPT | Performed by: GENERAL PRACTICE

## 2023-06-27 PROCEDURE — 86709 HEPATITIS A IGM ANTIBODY: CPT | Performed by: GENERAL PRACTICE

## 2023-06-27 RX ADMIN — COLLAGENASE SANTYL: 250 OINTMENT TOPICAL at 08:06

## 2023-06-27 RX ADMIN — THERA TABS 1 TABLET: TAB at 08:06

## 2023-06-27 RX ADMIN — COLLAGENASE SANTYL: 250 OINTMENT TOPICAL at 09:06

## 2023-06-27 RX ADMIN — HYDROCODONE BITARTRATE AND ACETAMINOPHEN 1 TABLET: 7.5; 325 TABLET ORAL at 10:06

## 2023-06-27 RX ADMIN — CILOSTAZOL 50 MG: 50 TABLET ORAL at 08:06

## 2023-06-27 RX ADMIN — ENOXAPARIN SODIUM 40 MG: 40 INJECTION SUBCUTANEOUS at 05:06

## 2023-06-27 RX ADMIN — GABAPENTIN 300 MG: 300 CAPSULE ORAL at 08:06

## 2023-06-27 RX ADMIN — HYDROCODONE BITARTRATE AND ACETAMINOPHEN 1 TABLET: 7.5; 325 TABLET ORAL at 03:06

## 2023-06-27 RX ADMIN — AMLODIPINE BESYLATE 10 MG: 5 TABLET ORAL at 08:06

## 2023-06-27 RX ADMIN — ASPIRIN 81 MG: 81 TABLET, COATED ORAL at 08:06

## 2023-06-27 RX ADMIN — LOSARTAN POTASSIUM 100 MG: 50 TABLET, FILM COATED ORAL at 08:06

## 2023-06-27 RX ADMIN — HYDROCODONE BITARTRATE AND ACETAMINOPHEN 1 TABLET: 7.5; 325 TABLET ORAL at 08:06

## 2023-06-27 RX ADMIN — CLOPIDOGREL BISULFATE 75 MG: 75 TABLET ORAL at 08:06

## 2023-06-27 RX ADMIN — GABAPENTIN 300 MG: 300 CAPSULE ORAL at 03:06

## 2023-06-27 RX ADMIN — Medication 6 MG: at 08:06

## 2023-06-27 RX ADMIN — HYDROCODONE BITARTRATE AND ACETAMINOPHEN 1 TABLET: 7.5; 325 TABLET ORAL at 05:06

## 2023-06-27 NOTE — PT/OT/SLP PROGRESS
Occupational Therapy   Treatment    Name: Shaheen Christie  MRN: [id number]  Admitting Diagnosis:  Skin ulcer       Recommendations:     Discharge Recommendations: nursing facility, skilled  Discharge Equipment Recommendations:     Barriers to discharge:       Assessment:     Shaheen Christie is a 68 y.o. male with a medical diagnosis of skin ulcer. Performance deficits affecting function are weakness, impaired endurance, impaired balance, gait instability, impaired self care skills, impaired functional mobility, decreased safety awareness, decreased lower extremity function, pain. Mobility limited by pain and difficulty bearing weight through LLE.     Rehab Prognosis:  Good; patient would benefit from acute skilled OT services to address these deficits and reach maximum level of function.       Plan:     Patient to be seen 5 x/week to address the above listed problems via self-care/home management, therapeutic activities, therapeutic exercises  Plan of Care Expires:    Plan of Care Reviewed with: patient    Subjective     Pain/Comfort:  Pain Rating 1: 7/10  Location - Side 1: Left  Location 1: ankle  Pain Addressed 1: Reposition, Distraction    Objective:     Communicated with: RN prior to session.  Patient found supine with peripheral IV, telemetry upon OT entry to room.    General Precautions: Standard, fall    Orthopedic Precautions:N/A  Braces: N/A  Respiratory Status: Room air     Occupational Performance:     Bed Mobility:    Patient completed Supine to Sit with contact guard assistance  Patient completed Sit to Supine with contact guard assistance     Functional Mobility/Transfers:  Patient completed Sit <> Stand Transfer with minimum assistance  with  hand-held assist   Functional mobility: Min A stand pivot t/f on RLE. Does not WB on L foot 2/2 pain.     Activities of Daily Living:  Toileting: performed stand pivot t/f on RLE from EOB>BSC with Min HHA. Independent with use of urinal in supine.     Therapeutic  Positioning    OT interventions performed during the course of today's session in an effort to prevent and/or reduce acquired pressure injuries:   Education on Pressure Ulcer Prevention provided  Therapeutic positioning completed     Skin assessment: all visible skin assessed   Findings: known area of altered skin integrity at L foot/ankle    [facility] 6 Click ADL: 19    Patient Education:  Patient provided with verbal education regarding OT role/goals/POC, fall prevention, safety awareness, and pressure ulcer prevention.  Understanding was verbalized.      Patient left supine with all lines intact and call button in reach    GOALS:   Multidisciplinary Problems       Occupational Therapy Goals          Problem: Occupational Therapy    Goal Priority Disciplines Outcome Interventions   Occupational Therapy Goal     OT, PT/OT Ongoing, Progressing    Description: Goals to be met by: 7/24/2023     Patient will increase functional independence with ADLs by performing:    LE Dressing with Modified [place].  Grooming while EOB with Modified [place].  Toileting from toilet with Modified [place] for hygiene and clothing management.   Toilet transfer to bedside commode with Modified [place].                         Time Tracking:     OT Date of Treatment: 06/27/23  OT Start Time: 1414  OT Stop Time: 1426  OT Total Time (min): 12 min    Billable Minutes:Self Care/Home Management 12    OT/ETHAN: ETHAN     Number of ETHAN visits since last OT visit: 1    6/27/2023

## 2023-06-27 NOTE — PT/OT/SLP PROGRESS
Attempted at 1040; pt declined. Pt stated he is in pain and requested for pain meds from nurse. Will f/u at later time if schedule allows.

## 2023-06-27 NOTE — PROGRESS NOTES
[facility] - 9th Floor [facility] MEDICINE ~ PROGRESS NOTE        CHIEF COMPLAINT   Hospital follow up    HOSPITAL COURSE   68 year old male with a PMH of HTN, HLD, Severe PAD,  COPD, Cervical stenosis, neuropathy, depression who presented to the ED with complaints of left lateral ankle foot wound, getting progressively worse.  He states that the wound has been present for about a year, and he has been going to wound care up until February.  At that time, it was felt that the wound was not improving, and he needed to follow-up with [facility] and possibly get another vascular evaluation.  The patient states that since then, he has been having issues with transportation and was never able to follow-up.  Since then, his wound has gotten progressively worse and more painful.  He denies any fever, chills, chest pain, shortness of breath. He states he still smokes.   Today's ED lab work revealed Na 128, all other indices unremarkable.  Left ankle x-ray showed lucency overlies the lateral malleolus suggesting open injury.  There is a mottled appearance throughout the bones that may be seen with diffuse osteopenia as multiple bones are infected osteomyelitis is not excluded.  ED vitals:  Temp 98.4° F, pulse 90, resp 19, /97, SpO2 97% on RA.  He was started on vancomycin and Zosyn and admitted to hospital medicine for management.  MRI ordered Podiatry Wound Care cardiology consulted    Today  Seen examined this morning.  Infectious Disease attending recommending washout with cultures given MRI findings.  I have reached out to podiatrist to see if this can be done today as I told him not to eat this morning.        OBJECTIVE/PHYSICAL EXAM     VITAL SIGNS (MOST RECENT):  Temp: 97.9 °F (36.6 °C) (06/27/23 0700)  Pulse: 67 (06/27/23 0700)  Resp: 18 (06/27/23 0556)  BP: (!) 134/55 (06/27/23 0700)  SpO2: (!) 93 % (06/27/23 0700) VITAL SIGNS (24 HOUR RANGE):  Temp:  [97.3 °F (36.3 °C)-98.6 °F (37 °C)] 97.9 °F  (36.6 °C)  Pulse:  [63-87] 67  Resp:  [18] 18  SpO2:  [92 %-95 %] 93 %  BP: (109-137)/(55-77) 134/55   GENERAL: In no acute distress, afebrile  HEENT:  CHEST: Clear to auscultation bilaterally  HEART: S1, S2, no appreciable murmur  ABDOMEN: Soft, nontender, BS +  MSK: Warm, no lower extremity edema, no clubbing or cyanosis  NEUROLOGIC: Alert and oriented x4, moving all extremities with good strength   INTEGUMENTARY: left ankle wrapped per Podiatry  PSYCHIATRY:        ASSESSMENT/PLAN   Chronic left lateral ankle nonhealing ulcer  Tibiotalar joint septic arthritis  Flexor digitorum longus and flexor hallucis longus tenosynovitis     History of: COPD, hyperlipidemia, essential hypertension, peripheral artery disease, diabetes mellitus type 2, tobacco use      Podiatry following.  Pending further plans.  Continue local wound care.  Infectious disease following.  Monitor off of antibiotics.  Requested washout with cultures.  Arterial flow has three-vessel runoff to the left ankle, [facility] signed off.     DVT prophylaxis:   Lovenox 40    Anticipated discharge and disposition:   __________________________________________________________________________    LABS/MICRO/MEDS/DIAGNOSTICS       LABS  Recent Labs     06/26/23  0559   *   K 3.6   CHLORIDE 98   CO2 26   BUN 5.0*   CREATININE 0.78   GLUCOSE 90   CALCIUM 9.1       Recent Labs     06/26/23  0559   WBC 6.93   RBC 4.63*   HCT 41.8*   MCV 90.3            MICROBIOLOGY  Microbiology Results (last 7 days)       Procedure Component Value Units Date/Time    Blood culture #1 **CANNOT BE ORDERED STAT** [[id number]]  (Normal) Collected: 06/22/23 1754    Order Status: Completed Specimen: Blood Updated: 06/26/23 2100     CULTURE, BLOOD ([facility]) No Growth At 96 Hours    Blood culture #2 **CANNOT BE ORDERED STAT** [[id number]]  (Normal) Collected: 06/22/23 1754    Order Status: Completed Specimen: Blood Updated: 06/26/23 2100     CULTURE, BLOOD ([facility]) No Growth At 96 Hours     Wound Culture [[id number]]  (Abnormal)  (Susceptibility) Collected: 06/22/23 1913    Order Status: Completed Specimen: Wound from Toe, Left Foot Updated: 06/26/23 1211     Wound Culture Few Alcaligenes faecalis ssp faecalis      Few Streptococcus agalactiae (Group B)     Comment: with normal skin ishmael       Wound Culture [[id number]]  (Abnormal)  (Susceptibility) Collected: 06/22/23 1913    Order Status: Completed Specimen: Wound from Ankle, Left Updated: 06/25/23 0948     Wound Culture Moderate Providencia rettgeri     Comment: with normal skin ishmael       Wound Culture [[id number]]     Order Status: Canceled Specimen: Wound from Toe, Left Foot                MEDICATIONS   amLODIPine  10 mg Oral Daily    aspirin  81 mg Oral Daily    cilostazoL  50 mg Oral BID    clopidogreL  75 mg Oral Daily    collagenase   Topical (Top) BID    enoxparin  40 mg Subcutaneous Daily    gabapentin  300 mg Oral TID    losartan  100 mg Oral Daily    multivitamin  1 tablet Oral Daily         INFUSIONS           DIAGNOSTIC TESTS  MRI Foot ([place]) Left W W/O Contrast   Final Result      1. There is a small subtalar joint effusion with synovial enhancement. There is associated diffuse surrounding marrow edema and enhancement involving the subtalar joint (series 12 image 11). However, no definite cortical erosion is seen. There is also a small joint effusion in the tibiotalar joint with enhancement of the joint capsule (series 11 image 11). There is enhancing soft tissue component in the Kager's fat pad (series 12 image 11). These findings are likely related to septic or inflammatory arthritis. However, no bony destruction is seen.      2. There is enhancement of the flexor digitorum longus and flexor hallucis longus tendons (series 13 image 7-23) with surrounding edema. This is likely related to tenosynovitis.      3. There is mild edema and enhancement of the intrinsic muscles of the foot (series 6 and 13 image 1-11). This is suggestive  of myositis.      4. Details and other findings as noted above.         Electronically signed by: Neftaly Bal MD   Date:    06/24/2023   Time:    11:56      CTA Runoff ABD PEL Bilat Lower Ext   Final Result      1. Diffuse atherosclerotic change as above.   2. Arterial phase scan out paces the contrast bolus at the right lower leg with improved filling on delayed phase compatible with delayed inflow.  Two vessel runoff to the right ankle   3. Three vessel runoff to the left ankle   4. Abdominal aortic aneurysm         Electronically signed by: Allyson Pierce   Date:    06/23/2023   Time:    10:54      X-Ray Ankle Complete Left   Final Result      As above.         Electronically signed by: Kartik Chavez   Date:    06/22/2023   Time:    14:39           No results found for: EF         Case related differential diagnoses have been reviewed; assessment and plan has been documented. I have personally reviewed the labs and test results that are currently available; I have reviewed the patients medication list. I have reviewed the consulting providers recommendations. I have reviewed or attempted to review medical records based upon their availability.  All of the patient's and/or family's questions have been addressed and answered to the best of my ability.  I will continue to monitor closely and make adjustments to medical management as needed.  This document was created using M*Modal Fluency Direct.  Transcription errors may have been made.  Please contact me if any questions may rise regarding documentation to clarify transcription.        Ross Chapman MD   Internal Medicine  Department of Hospital Medicine  [facility] - 9th Floor Med Surg

## 2023-06-28 ENCOUNTER — ANESTHESIA (OUTPATIENT)
Dept: SURGERY | Facility: HOSPITAL | Age: 68
DRG: 300 | End: 2023-06-28
Payer: MEDICARE

## 2023-06-28 ENCOUNTER — ANESTHESIA EVENT (OUTPATIENT)
Dept: SURGERY | Facility: HOSPITAL | Age: 68
DRG: 300 | End: 2023-06-28
Payer: MEDICARE

## 2023-06-28 LAB
ANION GAP SERPL CALC-SCNC: 4 MEQ/L
BASOPHILS # BLD AUTO: 0.14 X10(3)/MCL
BASOPHILS NFR BLD AUTO: 1.5 %
BUN SERPL-MCNC: 6.6 MG/DL (ref 8.4–25.7)
CALCIUM SERPL-MCNC: 8.9 MG/DL (ref 8.8–10)
CHLORIDE SERPL-SCNC: 100 MMOL/L (ref 98–107)
CO2 SERPL-SCNC: 25 MMOL/L (ref 23–31)
CREAT SERPL-MCNC: 0.78 MG/DL (ref 0.73–1.18)
CREAT/UREA NIT SERPL: 8
EOSINOPHIL # BLD AUTO: 0.46 X10(3)/MCL (ref 0–0.9)
EOSINOPHIL NFR BLD AUTO: 5 %
ERYTHROCYTE [DISTWIDTH] IN BLOOD BY AUTOMATED COUNT: 14.9 % (ref 11.5–17)
GFR SERPLBLD CREATININE-BSD FMLA CKD-EPI: >60 MLS/MIN/1.73/M2
GLUCOSE SERPL-MCNC: 95 MG/DL (ref 82–115)
HCT VFR BLD AUTO: 40.4 % (ref 42–52)
HGB BLD-MCNC: 13.1 G/DL (ref 14–18)
IMM GRANULOCYTES # BLD AUTO: 0.03 X10(3)/MCL (ref 0–0.04)
IMM GRANULOCYTES NFR BLD AUTO: 0.3 %
LYMPHOCYTES # BLD AUTO: 1.47 X10(3)/MCL (ref 0.6–4.6)
LYMPHOCYTES NFR BLD AUTO: 15.9 %
MCH RBC QN AUTO: 30.1 PG (ref 27–31)
MCHC RBC AUTO-ENTMCNC: 32.4 G/DL (ref 33–36)
MCV RBC AUTO: 92.9 FL (ref 80–94)
MONOCYTES # BLD AUTO: 0.75 X10(3)/MCL (ref 0.1–1.3)
MONOCYTES NFR BLD AUTO: 8.1 %
NEUTROPHILS # BLD AUTO: 6.4 X10(3)/MCL (ref 2.1–9.2)
NEUTROPHILS NFR BLD AUTO: 69.2 %
NRBC BLD AUTO-RTO: 0 %
PLATELET # BLD AUTO: 295 X10(3)/MCL (ref 130–400)
PMV BLD AUTO: 8.6 FL (ref 7.4–10.4)
POTASSIUM SERPL-SCNC: 4.2 MMOL/L (ref 3.5–5.1)
RBC # BLD AUTO: 4.35 X10(6)/MCL (ref 4.7–6.1)
SODIUM SERPL-SCNC: 129 MMOL/L (ref 136–145)
WBC # SPEC AUTO: 9.25 X10(3)/MCL (ref 4.5–11.5)

## 2023-06-28 PROCEDURE — 25000003 PHARM REV CODE 250: Performed by: NURSE PRACTITIONER

## 2023-06-28 PROCEDURE — D9220A PRA ANESTHESIA: Mod: CRNA,,, | Performed by: NURSE ANESTHETIST, CERTIFIED REGISTERED

## 2023-06-28 PROCEDURE — 25000003 PHARM REV CODE 250: Performed by: PODIATRIST

## 2023-06-28 PROCEDURE — 87070 CULTURE OTHR SPECIMN AEROBIC: CPT | Performed by: PODIATRIST

## 2023-06-28 PROCEDURE — 36000706: Performed by: PODIATRIST

## 2023-06-28 PROCEDURE — 87075 CULTR BACTERIA EXCEPT BLOOD: CPT | Performed by: PODIATRIST

## 2023-06-28 PROCEDURE — D9220A PRA ANESTHESIA: ICD-10-PCS | Mod: ANES,,, | Performed by: ANESTHESIOLOGY

## 2023-06-28 PROCEDURE — 27201423 OPTIME MED/SURG SUP & DEVICES STERILE SUPPLY: Performed by: PODIATRIST

## 2023-06-28 PROCEDURE — 37000008 HC ANESTHESIA 1ST 15 MINUTES: Performed by: PODIATRIST

## 2023-06-28 PROCEDURE — 11000001 HC ACUTE MED/SURG PRIVATE ROOM

## 2023-06-28 PROCEDURE — 63600175 PHARM REV CODE 636 W HCPCS: Performed by: NURSE ANESTHETIST, CERTIFIED REGISTERED

## 2023-06-28 PROCEDURE — 25000003 PHARM REV CODE 250: Performed by: NURSE ANESTHETIST, CERTIFIED REGISTERED

## 2023-06-28 PROCEDURE — 25000003 PHARM REV CODE 250: Performed by: INTERNAL MEDICINE

## 2023-06-28 PROCEDURE — 80048 BASIC METABOLIC PNL TOTAL CA: CPT | Performed by: INTERNAL MEDICINE

## 2023-06-28 PROCEDURE — 37000009 HC ANESTHESIA EA ADD 15 MINS: Performed by: PODIATRIST

## 2023-06-28 PROCEDURE — 36000707: Performed by: PODIATRIST

## 2023-06-28 PROCEDURE — D9220A PRA ANESTHESIA: Mod: ANES,,, | Performed by: ANESTHESIOLOGY

## 2023-06-28 PROCEDURE — D9220A PRA ANESTHESIA: ICD-10-PCS | Mod: CRNA,,, | Performed by: NURSE ANESTHETIST, CERTIFIED REGISTERED

## 2023-06-28 PROCEDURE — 85025 COMPLETE CBC W/AUTO DIFF WBC: CPT | Performed by: INTERNAL MEDICINE

## 2023-06-28 RX ORDER — SODIUM CHLORIDE, SODIUM LACTATE, POTASSIUM CHLORIDE, CALCIUM CHLORIDE 600; 310; 30; 20 MG/100ML; MG/100ML; MG/100ML; MG/100ML
INJECTION, SOLUTION INTRAVENOUS CONTINUOUS
Status: CANCELLED | OUTPATIENT
Start: 2023-06-28

## 2023-06-28 RX ORDER — PROPOFOL 10 MG/ML
VIAL (ML) INTRAVENOUS
Status: DISCONTINUED | OUTPATIENT
Start: 2023-06-28 | End: 2023-06-28

## 2023-06-28 RX ORDER — LIDOCAINE HYDROCHLORIDE 10 MG/ML
1 INJECTION, SOLUTION EPIDURAL; INFILTRATION; INTRACAUDAL; PERINEURAL ONCE
Status: CANCELLED | OUTPATIENT
Start: 2023-06-28 | End: 2023-06-28

## 2023-06-28 RX ORDER — MIDAZOLAM HYDROCHLORIDE 1 MG/ML
INJECTION INTRAMUSCULAR; INTRAVENOUS
Status: DISCONTINUED | OUTPATIENT
Start: 2023-06-28 | End: 2023-06-28

## 2023-06-28 RX ORDER — ONDANSETRON 2 MG/ML
4 INJECTION INTRAMUSCULAR; INTRAVENOUS ONCE AS NEEDED
Status: CANCELLED | OUTPATIENT
Start: 2023-06-28 | End: 2034-11-24

## 2023-06-28 RX ORDER — BUPIVACAINE HYDROCHLORIDE 5 MG/ML
INJECTION, SOLUTION EPIDURAL; INTRACAUDAL
Status: DISCONTINUED | OUTPATIENT
Start: 2023-06-28 | End: 2023-06-28 | Stop reason: HOSPADM

## 2023-06-28 RX ORDER — LIDOCAINE HYDROCHLORIDE 20 MG/ML
INJECTION, SOLUTION EPIDURAL; INFILTRATION; INTRACAUDAL; PERINEURAL
Status: DISCONTINUED | OUTPATIENT
Start: 2023-06-28 | End: 2023-06-28

## 2023-06-28 RX ADMIN — CLOPIDOGREL BISULFATE 75 MG: 75 TABLET ORAL at 08:06

## 2023-06-28 RX ADMIN — CILOSTAZOL 50 MG: 50 TABLET ORAL at 08:06

## 2023-06-28 RX ADMIN — SODIUM CHLORIDE, SODIUM GLUCONATE, SODIUM ACETATE, POTASSIUM CHLORIDE AND MAGNESIUM CHLORIDE: 526; 502; 368; 37; 30 INJECTION, SOLUTION INTRAVENOUS at 12:06

## 2023-06-28 RX ADMIN — LIDOCAINE HYDROCHLORIDE 60 MG: 20 INJECTION, SOLUTION EPIDURAL; INFILTRATION; INTRACAUDAL; PERINEURAL at 12:06

## 2023-06-28 RX ADMIN — GABAPENTIN 300 MG: 300 CAPSULE ORAL at 08:06

## 2023-06-28 RX ADMIN — LOSARTAN POTASSIUM 100 MG: 50 TABLET, FILM COATED ORAL at 08:06

## 2023-06-28 RX ADMIN — HYDROCODONE BITARTRATE AND ACETAMINOPHEN 1 TABLET: 7.5; 325 TABLET ORAL at 03:06

## 2023-06-28 RX ADMIN — AMLODIPINE BESYLATE 10 MG: 5 TABLET ORAL at 08:06

## 2023-06-28 RX ADMIN — COLLAGENASE SANTYL: 250 OINTMENT TOPICAL at 08:06

## 2023-06-28 RX ADMIN — HYDROCODONE BITARTRATE AND ACETAMINOPHEN 1 TABLET: 7.5; 325 TABLET ORAL at 08:06

## 2023-06-28 RX ADMIN — HYDROCODONE BITARTRATE AND ACETAMINOPHEN 1 TABLET: 7.5; 325 TABLET ORAL at 04:06

## 2023-06-28 RX ADMIN — PROPOFOL 40 MG: 10 INJECTION, EMULSION INTRAVENOUS at 01:06

## 2023-06-28 RX ADMIN — ASPIRIN 81 MG: 81 TABLET, COATED ORAL at 08:06

## 2023-06-28 RX ADMIN — PROPOFOL 50 MG: 10 INJECTION, EMULSION INTRAVENOUS at 12:06

## 2023-06-28 RX ADMIN — THERA TABS 1 TABLET: TAB at 08:06

## 2023-06-28 RX ADMIN — GABAPENTIN 300 MG: 300 CAPSULE ORAL at 03:06

## 2023-06-28 RX ADMIN — Medication 6 MG: at 08:06

## 2023-06-28 RX ADMIN — MIDAZOLAM HYDROCHLORIDE 2 MG: 1 INJECTION, SOLUTION INTRAMUSCULAR; INTRAVENOUS at 12:06

## 2023-06-28 RX ADMIN — HYDROCODONE BITARTRATE AND ACETAMINOPHEN 1 TABLET: 7.5; 325 TABLET ORAL at 12:06

## 2023-06-28 NOTE — TRANSFER OF CARE
"Anesthesia Transfer of Care Note    Patient: Shaheen Christie    Procedure(s) Performed: Procedure(s) (LRB):  ARTHROTOMY, ANKLE (Left)    Patient location: [facility] Surgical Floor    Anesthesia Type: general    Transport from OR: Transported from OR on room air with adequate spontaneous ventilation    Post pain: adequate analgesia    Post vital signs: stable    Level of consciousness: sedated and responds to stimulation    Nausea/Vomiting: no nausea/vomiting    Complications: none    Transfer of care protocol was followed      Last vitals:   Visit Vitals  /62   Pulse 78   Temp 36 °C (96.8 °F)   Resp 16   Ht 5' 9" (1.753 m)   Wt 77.1 kg (170 lb)   SpO2 100%   BMI 25.10 kg/m²     "

## 2023-06-28 NOTE — PROGRESS NOTES
Inpatient Nutrition Assessment    Admit Date: 6/22/2023   Total duration of encounter: 6 days     Nutrition Recommendation/Prescription     Continue current diet as tolerated, add double portions  Encouraged adequate PO intake   Monitor appetite/PO intake, weight, and labs    Communication of Recommendations: reviewed with patient    Nutrition Assessment     Malnutrition Assessment/Nutrition-Focused Physical Exam    Malnutrition Context: chronic illness  Malnutrition Level: severe  Energy Intake (Malnutrition): less than or equal to 75% for greater than or equal to 1 month     Subcutaneous Fat (Malnutrition): severe depletion     Upper Arm Region (Subcutaneous Fat Loss): severe depletion  Thoracic and Lumbar Region: severe depletion  Muscle Mass (Malnutrition): severe depletion  [place] Region (Muscle Loss): severe depletion  Clavicle Bone Region (Muscle Loss): severe depletion  Clavicle and Acromion Bone Region (Muscle Loss): severe depletion  Scapular Bone Region (Muscle Loss): severe depletion                       A minimum of two characteristics is recommended for diagnosis of either severe or non-severe malnutrition.    Chart Review    Reason Seen: length of stay    Malnutrition Screening Tool Results   Have you recently lost weight without trying?: No  Have you been eating poorly because of a decreased appetite?: No   MST Score: 0     Diagnosis:  Chronic left lateral ankle nonhealing ulcer  Tibiotalar joint septic arthritis  Flexor digitorum longus and flexor hallucis longus tenosynovitis  Chronic hyponatremia-stable    Relevant Medical History:   Sever PAD  Hypertension  Hyperlipidemia  COPD  Cervical Stenosis  Neuropathy  Depression    Nutrition-Related Medications: Aspirin, multivitamin  Calorie Containing IV Medications: no significant kcals from medications at this time    Nutrition-Related Labs:  6/28/2023: Na 129, BUN 6.6, Gluc 95    Diet/PN Order: Diet Adult Regular  Oral Supplement Order: none  Tube  "Feeding Order: none  Appetite/Oral Intake: good/% of meals  Factors Affecting Nutritional Intake: decreased appetite (previous)  Food/Orthodoxy/Cultural Preferences: none reported  Food Allergies: none reported    Skin Integrity: wound  Wound(s):      Altered Skin Integrity 23 0930 Left lateral Malleolus Arterial Ulcer-Tissue loss description: Partial thickness noted    Comments    2023: Pt reports poor appetite/PO intake ~ 1 month prior to admit. Pt reports good appetite/PO intake currently. Pt denies N/V/D and chewing/swallowing difficulties. Pt reports constipation. Last BM documented as 2023. Pt wears dentures but does not have with them. Pt reports weighing 113.6 kg in , reports UBW as 68.18 kg currently. Pt declined ONS. Pt requests double portions. Encouraged adequate PO intake. Will monitor.    Anthropometrics    Height: 5' 9" (175.3 cm)    Last Weight: 77.1 kg (170 lb) (23 1137) Weight Method: Bed Scale  BMI (Calculated): 25.1  BMI Classification: overweight (BMI 25-29.9)        Ideal Body Weight (IBW), Male: 160 lb     % Ideal Body Weight, Male (lb): 106.25 %                 Usual Body Weight (UBW), k.18 kg  % Usual Body Weight: 113.34     Usual Weight Provided By: patient    Wt Readings from Last 5 Encounters:   23 77.1 kg (170 lb)   23 73 kg (161 lb)   23 65.8 kg (145 lb)   22 65.8 kg (145 lb)   22 65.8 kg (145 lb)     Weight Change(s) Since Admission:  Admit Weight: 77.1 kg (170 lb) (23 1137)  2023: 77.1 kg    Estimated Needs    Weight Used For Calorie Calculations: 77.1 kg (169 lb 15.6 oz)  Energy Calorie Requirements (kcal): 6640-8564 (25-30 kcal/kg)  Energy Need Method: Kcal/kg  Weight Used For Protein Calculations: 77.1 kg (169 lb 15.6 oz)  Protein Requirements: 77-93 (1.0-1.2 g/kg)  Fluid Requirements (mL): 1928 (1 mL/kcal)  Temp (24hrs), Av.9 °F (36.6 °C), Min:96.8 °F (36 °C), Max:99.7 °F (37.6 °C)       Enteral " Nutrition    Patient not receiving enteral nutrition at this time.    Parenteral Nutrition    Patient not receiving parenteral nutrition support at this time.    Evaluation of Received Nutrient Intake    Calories: meeting estimated needs  Protein: meeting estimated needs    Patient Education    Not applicable.    Nutrition Diagnosis     PES: Malnutrition related to inability to consume sufficient nutrients as evidenced by decreased PO intake ~ 1 month and severe fat/muscle wasting. (new)    Interventions/Goals     Intervention(s): general/healthful diet  Goal: Meet greater than 75% of nutritional needs by follow-up. (new)    Monitoring & Evaluation     Dietitian will monitor food and beverage intake, weight, electrolyte/renal panel, glucose/endocrine profile, and gastrointestinal profile.  Nutrition Risk/Follow-Up: moderate (follow-up in 3-5 days)   Please consult if re-assessment needed sooner.

## 2023-06-28 NOTE — PT/OT/SLP PROGRESS
Physical Therapy      Patient Name:  Shaheen Christie   MRN:  [id number]    Patient off floor for procedure.

## 2023-06-28 NOTE — PROGRESS NOTES
[facility]                       [address] [place] [address]    PATIENT NAME:       HAWA KUMAR  YOB: 1955  CSN:                [id number]   MRN:                [id number]  ADMIT DATE:         06/22/2023 11:39:00  PHYSICIAN:          Tom Nichole DPM                            PROGRESS NOTE    DATE:  06/27/2023 00:00:00    SUBJECTIVE:  The patient is seen today.  He is doing well, not voicing any major   complaints.  Still with some discomfort, requiring narcotics.  Antibiotics put   on hold in anticipation of possible need for debridement and arthrotomy.  No   other issues.  Was evaluated by Infectious Disease and I had recommended at   least irrigation of the ankle subtalar area.    PHYSICAL EXAMINATION:  VITAL SIGNS:  Stable, currently afebrile.    LABORATORY DATA:  No labs posted for today.    Again, sed rate yesterday was 19, CRP was 7.2.    The wound area actually looks much  and most of the inflammatory changes   are now resolved.  He still with a little bit of discomfort with range of   motion, but there is no crepitation to any area.  There is no substantial warmth   to the ankle joint at this point.    ASSESSMENT:  Chronic left ankle wound with MRI findings suggesting possible   evolving septic arthritis.    PLAN:  The patient instructed the findings of physical exam.  At this point, I   have discussed the potential findings with the patient including the MRI, his   recent lab work, and findings from Infectious Disease.  I believe the main   concern would be to ensure that there was no infectious process that was setting   up in this particular area, which necessitated coming to the hospital.  My firm   belief is that negligence in care was the main source of the problem, but I   believe it probably is in his best interest to ensure that there is nothing   going on.  The patient is in  agreement with that plan.  He wishes to proceed.    His only other alternative at this point would be just continue wound care and   IV antibiotics and treated empirically.  Again, he is in agreement with the   surgery.  I have reviewed the case including inherent risks and complications   associated with such.  We are going to put him on the schedule for tomorrow,   hold him n.p.o. past midnight.  All questions answered fully.  No guarantees   given or implied.        ______________________________  Tom Nichole DPM    GAS/AQS  DD:  06/27/2023  Time:  05:58PM  DT:  06/27/2023  Time:  06:16PM  Job #:  [id number]      PROGRESS NOTE

## 2023-06-28 NOTE — PROGRESS NOTES
[facility] - 9th Floor [facility] MEDICINE ~ PROGRESS NOTE        CHIEF COMPLAINT   Hospital follow up    HOSPITAL COURSE   68 year old male with a PMH of HTN, HLD, Severe PAD,  COPD, Cervical stenosis, neuropathy, depression who presented to the ED with complaints of left lateral ankle foot wound, getting progressively worse.  He states that the wound has been present for about a year, and he has been going to wound care up until February.  At that time, it was felt that the wound was not improving, and he needed to follow-up with [facility] and possibly get another vascular evaluation.  The patient states that since then, he has been having issues with transportation and was never able to follow-up.  Since then, his wound has gotten progressively worse and more painful.  He denies any fever, chills, chest pain, shortness of breath. He states he still smokes.   Today's ED lab work revealed Na 128, all other indices unremarkable.  Left ankle x-ray showed lucency overlies the lateral malleolus suggesting open injury.  There is a mottled appearance throughout the bones that may be seen with diffuse osteopenia as multiple bones are infected osteomyelitis is not excluded.  ED vitals:  Temp 98.4° F, pulse 90, resp 19, /97, SpO2 97% on RA.  He was started on vancomycin and Zosyn and admitted to hospital medicine for management.  MRI ordered Podiatry Wound Care cardiology consulted    Today  Seen examined this morning.  No new issues at this time.  Pain is improved.  Planning for OR today.        OBJECTIVE/PHYSICAL EXAM     VITAL SIGNS (MOST RECENT):  Temp: 98 °F (36.7 °C) (06/28/23 0743)  Pulse: 63 (06/28/23 0743)  Resp: 18 (06/28/23 0825)  BP: 115/76 (06/28/23 0743)  SpO2: 95 % (06/28/23 0743) VITAL SIGNS (24 HOUR RANGE):  Temp:  [97.5 °F (36.4 °C)-98.8 °F (37.1 °C)] 98 °F (36.7 °C)  Pulse:  [62-76] 63  Resp:  [18] 18  SpO2:  [92 %-96 %] 95 %  BP: ()/(59-76) 115/76   GENERAL: In no acute  distress, afebrile  HEENT:  CHEST: Clear to auscultation bilaterally  HEART: S1, S2, no appreciable murmur  ABDOMEN: Soft, nontender, BS +  MSK: Warm, no lower extremity edema, no clubbing or cyanosis  NEUROLOGIC: Alert and oriented x4, moving all extremities with good strength   INTEGUMENTARY: left ankle wrapped per Podiatry  PSYCHIATRY:        ASSESSMENT/PLAN   Chronic left lateral ankle nonhealing ulcer  Tibiotalar joint septic arthritis  Flexor digitorum longus and flexor hallucis longus tenosynovitis  Chronic hyponatremia-stable     History of: COPD, hyperlipidemia, essential hypertension, peripheral artery disease, diabetes mellitus type 2, tobacco use      Podiatry following.  Continue local wound care.  OR today.  Infectious disease following.  Monitor off of antibiotics.  Requested washout with cultures.  Arterial flow has three-vessel runoff to the left ankle, [facility] signed off.   Will follow-up on findings and cultures obtained from today    DVT prophylaxis:   Lovenox 40    Anticipated discharge and disposition:   __________________________________________________________________________    LABS/MICRO/MEDS/DIAGNOSTICS       LABS  Recent Labs     06/28/23  0403   *   K 4.2   CHLORIDE 100   CO2 25   BUN 6.6*   CREATININE 0.78   GLUCOSE 95   CALCIUM 8.9       Recent Labs     06/28/23  0403   WBC 9.25   RBC 4.35*   HCT 40.4*   MCV 92.9            MICROBIOLOGY  Microbiology Results (last 7 days)       Procedure Component Value Units Date/Time    Blood culture #1 **CANNOT BE ORDERED STAT** [[id number]]  (Normal) Collected: 06/22/23 1754    Order Status: Completed Specimen: Blood Updated: 06/27/23 2101     CULTURE, BLOOD ([facility]) No Growth at 5 days    Blood culture #2 **CANNOT BE ORDERED STAT** [[id number]]  (Normal) Collected: 06/22/23 1754    Order Status: Completed Specimen: Blood Updated: 06/27/23 2101     CULTURE, BLOOD ([facility]) No Growth at 5 days    Wound Culture [[id number]]  (Abnormal)   (Susceptibility) Collected: 06/22/23 1913    Order Status: Completed Specimen: Wound from Toe, Left Foot Updated: 06/26/23 1211     Wound Culture Few Alcaligenes faecalis ssp faecalis      Few Streptococcus agalactiae (Group B)     Comment: with normal skin ishmael       Wound Culture [[id number]]  (Abnormal)  (Susceptibility) Collected: 06/22/23 1913    Order Status: Completed Specimen: Wound from Ankle, Left Updated: 06/25/23 0948     Wound Culture Moderate Providencia rettgeri     Comment: with normal skin ishmael       Wound Culture [[id number]]     Order Status: Canceled Specimen: Wound from Toe, Left Foot                MEDICATIONS   amLODIPine  10 mg Oral Daily    aspirin  81 mg Oral Daily    cilostazoL  50 mg Oral BID    clopidogreL  75 mg Oral Daily    collagenase   Topical (Top) BID    enoxparin  40 mg Subcutaneous Daily    gabapentin  300 mg Oral TID    losartan  100 mg Oral Daily    multivitamin  1 tablet Oral Daily         INFUSIONS           DIAGNOSTIC TESTS  MRI Foot ([place]) Left W W/O Contrast   Final Result      1. There is a small subtalar joint effusion with synovial enhancement. There is associated diffuse surrounding marrow edema and enhancement involving the subtalar joint (series 12 image 11). However, no definite cortical erosion is seen. There is also a small joint effusion in the tibiotalar joint with enhancement of the joint capsule (series 11 image 11). There is enhancing soft tissue component in the Kager's fat pad (series 12 image 11). These findings are likely related to septic or inflammatory arthritis. However, no bony destruction is seen.      2. There is enhancement of the flexor digitorum longus and flexor hallucis longus tendons (series 13 image 7-23) with surrounding edema. This is likely related to tenosynovitis.      3. There is mild edema and enhancement of the intrinsic muscles of the foot (series 6 and 13 image 1-11). This is suggestive of myositis.      4. Details and other  findings as noted above.         Electronically signed by: Neftaly Bal MD   Date:    06/24/2023   Time:    11:56      CTA Runoff ABD PEL Bilat Lower Ext   Final Result      1. Diffuse atherosclerotic change as above.   2. Arterial phase scan out paces the contrast bolus at the right lower leg with improved filling on delayed phase compatible with delayed inflow.  Two vessel runoff to the right ankle   3. Three vessel runoff to the left ankle   4. Abdominal aortic aneurysm         Electronically signed by: Allyson Pierce   Date:    06/23/2023   Time:    10:54      X-Ray Ankle Complete Left   Final Result      As above.         Electronically signed by: Kartik Chavez   Date:    06/22/2023   Time:    14:39           No results found for: EF         Case related differential diagnoses have been reviewed; assessment and plan has been documented. I have personally reviewed the labs and test results that are currently available; I have reviewed the patients medication list. I have reviewed the consulting providers recommendations. I have reviewed or attempted to review medical records based upon their availability.  All of the patient's and/or family's questions have been addressed and answered to the best of my ability.  I will continue to monitor closely and make adjustments to medical management as needed.  This document was created using M*Modal Fluency Direct.  Transcription errors may have been made.  Please contact me if any questions may rise regarding documentation to clarify transcription.        Ross Chapman MD   Internal Medicine  Department of Hospital Medicine [facility] - 9th Floor Med Surg

## 2023-06-28 NOTE — ANESTHESIA PREPROCEDURE EVALUATION
06/28/2023  Shaheen Christie is a 68 y.o., male , who presents for the following:    Procedure: ARTHROTOMY, TOE (Left: Toe) - LEFT   Anesthesia type: Local MAC   Diagnosis: Septic arthritis of left ankle, due to unspecified organism [M00.9]   Pre-op diagnosis: Septic arthritis of left ankle, due to unspecified organism [M00.9]   Location: [facility] OR 06 / [facility] OR   Surgeons: Tom Nichole [facility]           HOSPITAL COURSE   68 year old male with a PMH of HTN, HLD, Severe PAD,  COPD, Cervical stenosis, neuropathy, depression who presented to the ED with complaints of left lateral ankle foot wound, getting progressively worse.  He states that the wound has been present for about a year, and he has been going to wound care up until February.  At that time, it was felt that the wound was not improving, and he needed to follow-up with [facility] and possibly get another vascular evaluation.  The patient states that since then, he has been having issues with transportation and was never able to follow-up.  Since then, his wound has gotten progressively worse and more painful.  He denies any fever, chills, chest pain, shortness of breath. He states he still smokes.   Today's ED lab work revealed Na 128, all other indices unremarkable.  Left ankle x-ray showed lucency overlies the lateral malleolus suggesting open injury.  There is a mottled appearance throughout the bones that may be seen with diffuse osteopenia as multiple bones are infected osteomyelitis is not excluded.  ED vitals:  Temp 98.4° F, pulse 90, resp 19, /97, SpO2 97% on RA.  He was started on vancomycin and Zosyn and admitted to hospital medicine for management.  MRI ordered Podiatry Wound Care cardiology consulted  ASSESSMENT/PLAN   Chronic left lateral ankle nonhealing ulcer  Tibiotalar joint septic arthritis  Flexor digitorum longus and flexor hallucis  longus tenosynovitis  Chronic hyponatremia-stable      History of: COPD, hyperlipidemia, essential hypertension, peripheral artery disease, diabetes mellitus type 2, tobacco use        Podiatry following.  Continue local wound care.  OR today.  Infectious disease following.  Monitor off of antibiotics.  Requested washout with cultures.  Arterial flow has three-vessel runoff to the left ankle, [facility] signed off.         Pre-op Assessment    I have reviewed the Patient Summary Reports.     I have reviewed the Nursing Notes. I have reviewed the NPO Status.   I have reviewed the Medications.     Review of Systems  Anesthesia Hx:  No problems with previous Anesthesia  Denies Family Hx of Anesthesia complications.   Denies Personal Hx of Anesthesia complications.       Physical Exam  General: Alert and Oriented    Airway:  Mallampati: II   Mouth Opening: Normal  TM Distance: Normal  Tongue: Normal  Neck ROM: Normal ROM    Dental:  Intact    Chest/Lungs:  Normal Respiratory Rate    Heart:  Rate: Normal  Rhythm: Regular Rhythm        Anesthesia Plan  Type of Anesthesia, risks & benefits discussed:    Anesthesia Type: Gen Natural Airway  Intra-op Monitoring Plan: Standard ASA Monitors  Post Op Pain Control Plan: IV/PO Opioids PRN  Induction:  IV  Airway Plan: Direct  Informed Consent: Informed consent signed with the Patient and all parties understand the risks and agree with anesthesia plan.  All questions answered. Patient consented to blood products? No  ASA Score: 4  Day of Surgery Review of History & Physical: H&P Update referred to the surgeon/provider.  Anesthesia Plan Notes: Nasal cannula vs facemask supplemental oxygenation   For patients with ISAAC/obesity, may consider SuperNoval Nasal CPAP  Local Anesthesia per Dr. Nichole      Ready For Surgery From Anesthesia Perspective.     .

## 2023-06-28 NOTE — PLAN OF CARE
[facility] willing to accept patient and submit for insurance authorization once medically stable.

## 2023-06-29 PROCEDURE — 11000001 HC ACUTE MED/SURG PRIVATE ROOM

## 2023-06-29 PROCEDURE — 25000003 PHARM REV CODE 250: Performed by: PODIATRIST

## 2023-06-29 PROCEDURE — 99232 PR SUBSEQUENT HOSPITAL CARE,LEVL II: ICD-10-PCS | Mod: FS,,, | Performed by: GENERAL PRACTICE

## 2023-06-29 PROCEDURE — 97110 THERAPEUTIC EXERCISES: CPT | Mod: CQ

## 2023-06-29 PROCEDURE — 99232 SBSQ HOSP IP/OBS MODERATE 35: CPT | Mod: FS,,, | Performed by: GENERAL PRACTICE

## 2023-06-29 PROCEDURE — 25000003 PHARM REV CODE 250: Performed by: INTERNAL MEDICINE

## 2023-06-29 RX ORDER — HYDROCODONE BITARTRATE AND ACETAMINOPHEN 10; 325 MG/1; MG/1
1 TABLET ORAL EVERY 4 HOURS PRN
Status: DISCONTINUED | OUTPATIENT
Start: 2023-06-29 | End: 2023-07-05

## 2023-06-29 RX ORDER — MICONAZOLE NITRATE 2 %
POWDER (GRAM) TOPICAL 2 TIMES DAILY
Status: DISCONTINUED | OUTPATIENT
Start: 2023-06-29 | End: 2023-07-06 | Stop reason: HOSPADM

## 2023-06-29 RX ADMIN — ASPIRIN 81 MG: 81 TABLET, COATED ORAL at 08:06

## 2023-06-29 RX ADMIN — GABAPENTIN 300 MG: 300 CAPSULE ORAL at 08:06

## 2023-06-29 RX ADMIN — Medication 6 MG: at 08:06

## 2023-06-29 RX ADMIN — HYDROCODONE BITARTRATE AND ACETAMINOPHEN 1 TABLET: 7.5; 325 TABLET ORAL at 05:06

## 2023-06-29 RX ADMIN — COLLAGENASE SANTYL: 250 OINTMENT TOPICAL at 09:06

## 2023-06-29 RX ADMIN — Medication: at 08:06

## 2023-06-29 RX ADMIN — THERA TABS 1 TABLET: TAB at 08:06

## 2023-06-29 RX ADMIN — HYDROCODONE BITARTRATE AND ACETAMINOPHEN 1 TABLET: 7.5; 325 TABLET ORAL at 12:06

## 2023-06-29 RX ADMIN — COLLAGENASE SANTYL: 250 OINTMENT TOPICAL at 08:06

## 2023-06-29 RX ADMIN — Medication: at 12:06

## 2023-06-29 RX ADMIN — AMLODIPINE BESYLATE 10 MG: 5 TABLET ORAL at 08:06

## 2023-06-29 RX ADMIN — CILOSTAZOL 50 MG: 50 TABLET ORAL at 08:06

## 2023-06-29 RX ADMIN — LOSARTAN POTASSIUM 100 MG: 50 TABLET, FILM COATED ORAL at 08:06

## 2023-06-29 RX ADMIN — HYDROCODONE BITARTRATE AND ACETAMINOPHEN 1 TABLET: 7.5; 325 TABLET ORAL at 01:06

## 2023-06-29 RX ADMIN — MICONAZOLE NITRATE 2 % TOPICAL POWDER: at 09:06

## 2023-06-29 RX ADMIN — GABAPENTIN 300 MG: 300 CAPSULE ORAL at 03:06

## 2023-06-29 RX ADMIN — HYDROCODONE BITARTRATE AND ACETAMINOPHEN 1 TABLET: 7.5; 325 TABLET ORAL at 09:06

## 2023-06-29 RX ADMIN — CLOPIDOGREL BISULFATE 75 MG: 75 TABLET ORAL at 08:06

## 2023-06-29 RX ADMIN — HYDROCODONE BITARTRATE AND ACETAMINOPHEN 1 TABLET: 10; 325 TABLET ORAL at 09:06

## 2023-06-29 NOTE — PROGRESS NOTES
[facility] Hospital Medicine Progress Note        Chief Complaint: Inpatient Follow-up for left ankle wound     HPI:   68 year old male with a PMH of HTN, HLD, Severe PAD,  COPD, Cervical stenosis, neuropathy, depression who presented to the ED with complaints of left lateral ankle foot wound, getting progressively worse.  He states that the wound has been present for about a year, and he has been going to wound care up until February.  At that time, it was felt that the wound was not improving, and he needed to follow-up with [facility] and possibly get another vascular evaluation.  The patient states that since then, he has been having issues with transportation and was never able to follow-up.  Since then, his wound has gotten progressively worse and more painful.  He denies any fever, chills, chest pain, shortness of breath. He states he still smokes.   Today's ED lab work revealed Na 128, all other indices unremarkable.  Left ankle x-ray showed lucency overlies the lateral malleolus suggesting open injury.  There is a mottled appearance throughout the bones that may be seen with diffuse osteopenia as multiple bones are infected osteomyelitis is not excluded.  ED vitals:  Temp 98.4° F, pulse 90, resp 19, /97, SpO2 97% on RA.  He was started on vancomycin and Zosyn and admitted to hospital medicine for management.  MRI ordered Podiatry Wound Care cardiology consulted  Interval Hx:   No acute events reported overnight, pt is Status post left ankle subtalar joint arthrotomy and debridement of ankle wound yesterday .Pt offered no complaints reported pain is controlled   Case was discussed with patient's nurse and  on the floor.    Objective/physical exam:  General: In no acute distress, afebrile  Chest: Clear to auscultation bilaterally  Heart:  +S1, S2, no appreciable murmur  Abdomen: Soft, nontender, BS +  MSK: Warm, left ankle Dressings are clean, dry, and  intact  Neurologic: Alert and oriented x4,   VITAL SIGNS: 24 HRS MIN & MAX LAST   Temp  Min: 96.8 °F (36 °C)  Max: 99.7 °F (37.6 °C) 97.8 °F (36.6 °C)   BP  Min: 100/70  Max: 138/77 (!) 103/59   Pulse  Min: 58  Max: 85  78   Resp  Min: 16  Max: 18 18   SpO2  Min: 91 %  Max: 100 % (!) 94 %     I have reviewed the following labs:    Recent Labs   Lab 06/24/23  0528 06/26/23  0559 06/28/23  0403   WBC 5.48 6.93 9.25   RBC 4.43* 4.63* 4.35*   HGB 13.1* 14.0 13.1*   HCT 40.5* 41.8* 40.4*   MCV 91.4 90.3 92.9   MCH 29.6 30.2 30.1   MCHC 32.3* 33.5 32.4*   RDW 14.7 14.7 14.9    300 295   MPV 8.9 8.8 8.6       Recent Labs   Lab 06/22/23  1207 06/23/23  0319 06/24/23  0528 06/26/23  0559 06/28/23  0403   * 132* 131* 130* 129*   K 4.2 4.2 4.5 3.6 4.2   CO2 26 26 25 26 25   BUN 5.7* 6.9* 5.2* 5.0* 6.6*   CREATININE 0.81 0.77 0.74 0.78 0.78   CALCIUM 9.7 9.0 9.2 9.1 8.9   MG 2.00 2.00  --   --   --    ALBUMIN 3.7 3.1*  --   --   --    ALKPHOS 136 107  --   --   --    ALT 14 10  --   --   --    AST 19 14  --   --   --    BILITOT 0.9 0.5  --   --   --           Microbiology Results (last 7 days)       Procedure Component Value Units Date/Time    Tissue Culture - Aerobic [[id number]] Collected: 06/28/23 1310    Order Status: Completed Specimen: Tissue from Ankle, Left Updated: 06/29/23 0746     CULTURE, TISSUE- AEROBIC ([facility]) No Growth At 24 Hours    Anaerobic Culture [[id number]] Collected: 06/28/23 1310    Order Status: Sent Specimen: Tissue from Ankle, Left Updated: 06/28/23 1341    Blood culture #1 **CANNOT BE ORDERED STAT** [[id number]]  (Normal) Collected: 06/22/23 1754    Order Status: Completed Specimen: Blood Updated: 06/27/23 2101     CULTURE, BLOOD ([facility]) No Growth at 5 days    Blood culture #2 **CANNOT BE ORDERED STAT** [[id number]]  (Normal) Collected: 06/22/23 1754    Order Status: Completed Specimen: Blood Updated: 06/27/23 2101     CULTURE, BLOOD ([facility]) No Growth at 5 days    Wound Culture [[id number]]   (Abnormal)  (Susceptibility) Collected: 06/22/23 1913    Order Status: Completed Specimen: Wound from Toe, Left Foot Updated: 06/26/23 1211     Wound Culture Few Alcaligenes faecalis ssp faecalis      Few Streptococcus agalactiae (Group B)     Comment: with normal skin ishmael       Wound Culture [[id number]]  (Abnormal)  (Susceptibility) Collected: 06/22/23 1913    Order Status: Completed Specimen: Wound from Ankle, Left Updated: 06/25/23 0948     Wound Culture Moderate Providencia rettgeri     Comment: with normal skin ishmael       Wound Culture [[id number]]     Order Status: Canceled Specimen: Wound from Toe, Left Foot              See below for Radiology    Scheduled Med:   amLODIPine  10 mg Oral Daily    aspirin  81 mg Oral Daily    cilostazoL  50 mg Oral BID    clopidogreL  75 mg Oral Daily    collagenase   Topical (Top) BID    gabapentin  300 mg Oral TID    losartan  100 mg Oral Daily    multivitamin  1 tablet Oral Daily        Continuous Infusions:       PRN Meds:  acetaminophen, aluminum-magnesium hydroxide-simethicone, HYDROcodone-acetaminophen, melatonin, naloxone, ondansetron, polyethylene glycol, prochlorperazine, simethicone       Assessment/Plan:  Chronic left lateral ankle nonhealing ulcer s/p left ankle subtalar joint arthrotomy and debridement of ankle wound  Tibiotalar joint septic arthritis  Flexor digitorum longus and flexor hallucis longus tenosynovitis  Chronic hyponatremia-stable      History of: COPD, hyperlipidemia, essential hypertension, peripheral artery disease, diabetes mellitus type 2, tobacco use    Podiatry following.  Continue local wound care  F/u intra operative cx, so far no growth  ID following, case discussed recommended to monitor off of antibiotics  Arterial flow has three-vessel runoff to the left ankle, [facility] signed off  Get PT once cleared by podiatry  Cont current meds  Case discussed with case management      VTE prophylaxis: scds    Patient condition:  Stable    Anticipated  discharge and Disposition:   TBD      _____________________________________________________________________    Nutrition Status:    Radiology:  I have personally reviewed the following imaging and agree with the radiologist.     Ankle Brachial Indices (NICK)  Bilateral non compressible lower extremity ankle-brachial indices.  CV Ultrasound doppler arterial legs bilat  Patent right lower extremity arterial system with no evidence of focal   stenosis. Diffuse plaquing with biphasic waveforms identified throughout   the lower extremity.  Patent left lower extremity arterial system with no evidence of focal   stenosis. Diffuse calcific plaquing and monophasic waveforms identified   throughout the lower extremity.    A separate ankle-brachial index was performed that supports bilateral non   compressible vessels at the ankle.      Sayda Robles MD   06/29/2023

## 2023-06-29 NOTE — NURSING
Subjective:      Patient ID: Shaheen Christie is a 68 y.o. male.    Chief Complaint: Skin Ulcer (Pt reports per aasi c/o skin ulcer to L lateral ankle with worsening pain x3 weeks. Per EMS, pt is not bed bound. GCS15.)    HPI  Review of Systems   Objective:     Physical Exam   Assessment:     1. Acute osteomyelitis-ankle/foot, left    2. Skin ulcer    3. Chest pain    4. PAD (peripheral artery disease)    5. Pain    6. Septic arthritis of left ankle, due to unspecified organism           Altered Skin Integrity 06/23/23 0930 Left lateral Malleolus Arterial Ulcer (Active)   06/23/23 0930   Altered Skin Integrity Present on Admission - Did Patient arrive to the hospital with altered skin?: yes   Side: Left   Orientation: lateral   Location: Malleolus   Wound Number:    Is this injury device related?:    Primary Wound Type: Arterial ulc   Description of Altered Skin Integrity:    Ankle-Brachial Index:    Pulses:    Removal Indication and Assessment:    Wound Outcome:    (Retired) Wound Length (cm):    (Retired) Wound Width (cm):    (Retired) Depth (cm):    Wound Description (Comments):    Removal Indications:    Wound Image   06/23/23 0943   Dressing Appearance Dry;Intact;Clean 06/28/23 2000   Drainage Amount Scant 06/25/23 2000   Drainage Characteristics/Odor Serosanguineous 06/25/23 2000   Appearance Dressing in place, unable to visualize 06/28/23 2000   Tissue loss description Partial thickness 06/25/23 2000   Red (%), Wound Tissue Color 25 % 06/24/23 1137   Yellow (%), Wound Tissue Color 75 % 06/24/23 1137   Periwound Area Redness 06/24/23 1137   Wound Edges Defined 06/23/23 0943   Wound Length (cm) 1.5 cm 06/23/23 0943   Wound Width (cm) 2 cm 06/23/23 0943   Wound Depth (cm) 1 cm 06/23/23 0943   Wound Volume (cm^3) 3 cm^3 06/23/23 0943   Wound Surface Area (cm^2) 3 cm^2 06/23/23 0943   Care Cleansed with:;Wound cleanser;Applied:;Antimicrobial agent 06/25/23 2000   Dressing Changed;Rolled gauze 06/25/23 2000             Altered Skin Integrity 06/29/23 1100 medial Buttocks Other (comment) (Active)   06/29/23 1100   Altered Skin Integrity Present on Admission - Did Patient arrive to the hospital with altered skin?:    Side:    Orientation: medial   Location: Buttocks   Wound Number:    Is this injury device related?: No   Primary Wound Type: Other   Description of Altered Skin Integrity:    Ankle-Brachial Index:    Pulses:    Removal Indication and Assessment:    Wound Outcome:    (Retired) Wound Length (cm):    (Retired) Wound Width (cm):    (Retired) Depth (cm):    Wound Description (Comments):    Removal Indications:    Wound Image   06/29/23 1100   Dressing Appearance Open to air 06/29/23 1100   Drainage Amount None 06/29/23 1100   Drainage Characteristics/Odor No odor 06/29/23 1100   Appearance Red;Dry 06/29/23 1100   Tissue loss description Not applicable 06/29/23 1100   Care Cleansed with:;Wound cleanser 06/29/23 1100            Incision/Site 06/28/23 1321 Left Foot (Active)   06/28/23 1321   Present Prior to Hospital Arrival?:    Side: Left   Location: Foot   Orientation:    Incision Type:    Closure Method:    Additional Comments:    Removal Indication and Assessment:    Wound Outcome:    Removal Indications:    Dressing Appearance Intact;Clean;Dry 06/29/23 0921   Appearance Dressing in place, unable to visualize 06/29/23 0921            Rash 06/29/23 1100 medial Groin (Active)   06/29/23 1100   Present Prior to Hospital Arrival?: Yes   Side:    Orientation: medial   Location: Groin   Type:    Additional Comments: fungal like red rash   Removal Indications:    Distribution localized 06/29/23 1100   Characteristics pruritus/itching;redness/erythema 06/29/23 1100   Color red 06/29/23 1100       Plan:            WOCN consulted for groin rash.  Pt awake alert oriented.   Upon exam he has an itchy red rash to groins scrotum perineum and some redness to buttock crease.  Care put in place with instructions to nurse Dat.  Will  recheck in a few days for improvement.

## 2023-06-29 NOTE — PT/OT/SLP PROGRESS
Attempted OT session x2 today. 1st attempt pt declined 2/2 L ankle pain. Returned in PM and pt just finishing with PT. Will f/u tomorrow.

## 2023-06-29 NOTE — OP NOTE
[facility]                       [address]                      [place] [address]    PATIENT NAME:      HAWA KUMAR  YOB: 1955  CSN:               [id number]  MRN:               [id number]  ADMIT DATE:        06/22/2023 11:39:00  PHYSICIAN:         Tom Nichole DPM                          OPERATIVE REPORT      DATE OF SURGERY:    06/28/2023 00:00:00    SURGEON:  Tom Ncihole DPM    PREOPERATIVE DIAGNOSES:    1. Left ankle wound.  2. Possible septic arthritis of ankle and subtalar joint.    POSTOPERATIVE DIAGNOSES:    1. Left ankle wound.  2. Possible septic arthritis of ankle and subtalar joint.    PROCEDURES:    1. Excisional debridement of left ankle wound.  2. Arthrotomy of left ankle and subtalar joint.    ANESTHESIA:  Local with MAC.    HEMOSTASIS:  None.    ESTIMATED BLOOD LOSS:  Less than 10 mL.    MATERIALS:  None.    INJECTABLES:  None.    PATHOLOGY:  Cultures obtained of the ankle subtalar region.    COMPLICATIONS:  None.    DESCRIPTION OF PROCEDURE:  The patient was transferred from the floor to   holding, having been n.p.o. past midnight.  History and physical preop studies   reviewed and there were no contraindications noted.  While in holding,   appropriate IVs were started.  The patient was taken to the OR, placed in supine   position, after which appropriate anesthetics administered.  This was   supplemented with a total of 10 mL of 0.5% Marcaine plain infiltrated in a left   ankle block.  The extremity was then prepped and draped aseptically.    Attention was directed to the left ankle where there was a wound overlying the   lateral malleolus, which is fibro-granular.  We identified the ankle and   subtalar region and made anterior curvilinear incision extending from just at   the inferior margin of the ankle joint extending inferior and around towards the  lateral malleolus  to obtain access to the  ankle and subtalar region.  Dissection   was carried down through subcutaneous tissues with care to clamp cauterize all   bleeders as necessary.  The retinacular and ligament structures were identified.    We directed our attention to the ankle joint first again small incision placed   into the ankle joint at that superior margin.  The joint was opened up and   realistically there was no purulence.  There was just moderate amount of blood   that came out of the area.  There was no purulence.  The articular surface   appeared to be viable.  This wound was copiously irrigated with normal saline.    At the inferior margin, we were able to gain access to the subtalar region   primarily around the posterior middle facet area and again opened up this area   through a small incision and again primarily just blood coming out from this   particular area also.  Did not appear to be any purulence.  I did obtain deep   cultures of this and the ankle area.  These structures were then copiously   irrigated with normal saline about 500 mL.  The incisions were evaluated.    Hemostasis obtained.  There did not appear to be anything actively bleeding from   the sites once I was completed.  I went ahead and closed our joint surfaces with   4-0 Vicryl in simple interrupted type sutures and the skin was then closed with   2-0 nylon in simple interrupted type fashion.    We then directed attention posterior laterally towards the lateral aspect of the   ankle where the wound was noted.  Again measured about 2 to 2.5 cm.  Utilizing   scalpel forceps, we incised slightly superficial around the margins.  The margin   tissues actually were clean and viable.  He had one area of little bit more   fibrotic debris, which was actually directly over the malleolus itself just   superficial to the periosteum.  The tissues were not necrotic.  Did not feel   anything need to be cultured as already obtained cultures from this area.  This   wound was  copiously irrigated with normal saline.  Hemostasis obtained and   sterile dressings placed.  The patient tolerated procedure and anesthesia well,   left the OR to recovery with vital signs stable and vascular status intact to   the left lower extremity.  Once recovered, the patient was transferred back to   the floor for postop management.        ______________________________  XIMENA Huang/AQS  DD:  06/28/2023  Time:  01:29PM  DT:  06/28/2023  Time:  07:46PM  Job #:  [id number]      OPERATIVE REPORT

## 2023-06-29 NOTE — PT/OT/SLP PROGRESS
Physical Therapy Treatment    Patient Name:  Shaheen Christie   MRN:  [id number]    Recommendations:     Discharge Recommendations: nursing facility, skilled  Discharge Equipment Recommendations: none  Barriers to discharge:  PLACEMENT    Assessment:     Shaheen Christie is a 68 y.o. male admitted with a medical diagnosis of  L ankle nonhealing ulcer.  He presents with the following impairments/functional limitations: weakness, gait instability, pain, impaired functional mobility .    Pt with c/o L ankle and R ankle pain upon arrival. Pt declined TF training d/t pain    Rehab Prognosis: Good; patient would benefit from acute skilled PT services to address these deficits and reach maximum level of function.    Recent Surgery: Procedure(s) (LRB):  ARTHROTOMY, ANKLE (Left) 1 Day Post-Op    Plan:     During this hospitalization, patient to be seen 5 x/week to address the identified rehab impairments via gait training, therapeutic activities, therapeutic exercises and progress toward the following goals:    Plan of Care Expires:  07/22/23    Subjective     Chief Complaint: L ankle pain  Patient/Family Comments/goals:   Pain/Comfort:         Objective:     Communicated with RN prior to session.  Patient found HOB elevated with   upon PT entry to room.     General Precautions: Standard, fall  Orthopedic Precautions: N/A  Braces: N/A  Respiratory Status: Room air  Skin Integrity:  Rash on inner thighs reported by pt      Functional Mobility:  Bed Mobility:     Supine to Sit: stand by assistance  Sit to Supine: modified independence    Therapeutic Activities/Exercises:  Pt sat EOB 10min and performed LAQ, seated marches, heel raises on RLE, 10X1.    Education:  Patient provided with verbal education regarding POC.  Understanding was verbalized, however additional teaching warranted.     Patient left HOB elevated with all lines intact, call button in reach, and heel floats donned ..    GOALS:   Multidisciplinary Problems        Physical Therapy Goals          Problem: Physical Therapy    Goal Priority Disciplines Outcome Goal Variances Interventions   Physical Therapy Goal     PT, PT/OT Ongoing, Progressing     Description: Goals to be met by: Discharge     Patient will increase functional independence with mobility by performin. Supine to sit with Modified [place]  2. Sit to supine with Modified [place]  3. Sit to stand transfer with Modified [place]  4. Bed to chair transfer with Modified [place] using LRAD, begin with squat pivot progressing to stand pivot and stand step  5. Gait  to be assessed.                         Time Tracking:     PT Received On: 23  PT Start Time: 1342     PT Stop Time: 1358  PT Total Time (min): 16 min     Billable Minutes: Therapeutic Exercise 16    Treatment Type: Treatment  PT/PTA: PTA     Number of PTA visits since last PT visit: 2     2023

## 2023-06-30 LAB — POCT GLUCOSE: 100 MG/DL (ref 70–110)

## 2023-06-30 PROCEDURE — 97535 SELF CARE MNGMENT TRAINING: CPT | Mod: CO

## 2023-06-30 PROCEDURE — 99232 PR SUBSEQUENT HOSPITAL CARE,LEVL II: ICD-10-PCS | Mod: FS,,, | Performed by: GENERAL PRACTICE

## 2023-06-30 PROCEDURE — 11000001 HC ACUTE MED/SURG PRIVATE ROOM

## 2023-06-30 PROCEDURE — 97530 THERAPEUTIC ACTIVITIES: CPT | Mod: CQ

## 2023-06-30 PROCEDURE — 25000003 PHARM REV CODE 250: Performed by: PODIATRIST

## 2023-06-30 PROCEDURE — 25000003 PHARM REV CODE 250: Performed by: INTERNAL MEDICINE

## 2023-06-30 PROCEDURE — 99232 SBSQ HOSP IP/OBS MODERATE 35: CPT | Mod: FS,,, | Performed by: GENERAL PRACTICE

## 2023-06-30 PROCEDURE — 97110 THERAPEUTIC EXERCISES: CPT | Mod: CQ

## 2023-06-30 RX ADMIN — HYDROCODONE BITARTRATE AND ACETAMINOPHEN 1 TABLET: 10; 325 TABLET ORAL at 12:06

## 2023-06-30 RX ADMIN — Medication: at 08:06

## 2023-06-30 RX ADMIN — COLLAGENASE SANTYL: 250 OINTMENT TOPICAL at 08:06

## 2023-06-30 RX ADMIN — HYDROCODONE BITARTRATE AND ACETAMINOPHEN 1 TABLET: 10; 325 TABLET ORAL at 08:06

## 2023-06-30 RX ADMIN — ASPIRIN 81 MG: 81 TABLET, COATED ORAL at 08:06

## 2023-06-30 RX ADMIN — CLOPIDOGREL BISULFATE 75 MG: 75 TABLET ORAL at 08:06

## 2023-06-30 RX ADMIN — HYDROCODONE BITARTRATE AND ACETAMINOPHEN 1 TABLET: 10; 325 TABLET ORAL at 04:06

## 2023-06-30 RX ADMIN — GABAPENTIN 300 MG: 300 CAPSULE ORAL at 04:06

## 2023-06-30 RX ADMIN — GABAPENTIN 300 MG: 300 CAPSULE ORAL at 08:06

## 2023-06-30 RX ADMIN — LOSARTAN POTASSIUM 100 MG: 50 TABLET, FILM COATED ORAL at 08:06

## 2023-06-30 RX ADMIN — MICONAZOLE NITRATE 2 % TOPICAL POWDER: at 08:06

## 2023-06-30 RX ADMIN — CILOSTAZOL 50 MG: 50 TABLET ORAL at 08:06

## 2023-06-30 RX ADMIN — THERA TABS 1 TABLET: TAB at 08:06

## 2023-06-30 RX ADMIN — AMLODIPINE BESYLATE 10 MG: 5 TABLET ORAL at 08:06

## 2023-06-30 NOTE — PROGRESS NOTES
[facility]                       [address] [address]    PATIENT NAME:       HAWA KUMAR  YOB: 1955  CSN:                [id number]   MRN:                [id number]  ADMIT DATE:         06/22/2023 11:39:00  PHYSICIAN:          Tom Nichole DPM                            PROGRESS NOTE    DATE:  06/30/2023 00:00:00    SUBJECTIVE:  The patient was seen today.  He is working with Physical therapy.    Seems to be tolerating this well.  Pain is in the ankle getting better.  He is   status post arthrotomy and debridement.    PHYSICAL EXAMINATION:  VITAL SIGNS:  Stable and afebrile.    Intraoperative cultures thus far are negative to date.    Dressings are clean, dry, intact.  Good range of motion.    ASSESSMENT:  Status post left ankle arthrotomy and wound debridement.    PLAN:  Continue with current care.  Wait for final cultures.  We will take   dressings down again tomorrow.        ______________________________  Tom Nichole DPM    GAS/AQS  DD:  06/30/2023  Time:  03:07PM  DT:  06/30/2023  Time:  05:10PM  Job #:  [id number]      PROGRESS NOTE

## 2023-06-30 NOTE — ANESTHESIA POSTPROCEDURE EVALUATION
Anesthesia Post Evaluation    Patient: Shaheen Christie    Procedure(s) Performed: Procedure(s) (LRB):  ARTHROTOMY, ANKLE (Left)    Final Anesthesia Type: general      Patient location during evaluation: PACU  Patient participation: Yes- Able to Participate  Level of consciousness: awake and alert  Post-procedure vital signs: reviewed and stable  Pain management: adequate  Airway patency: patent      Anesthetic complications: no      Cardiovascular status: blood pressure returned to baseline  Respiratory status: unassisted  Hydration status: euvolemic  Follow-up not needed.          Vitals Value Taken Time   /56 06/30/23 1036   Temp 36.6 °C (97.9 °F) 06/30/23 1036   Pulse 71 06/30/23 1036   Resp 18 06/30/23 1234   SpO2 92 % 06/30/23 1036         No case tracking events are documented in the log.      Pain/Khadra Score: Pain Rating Prior to Med Admin: 10 (6/30/2023 12:34 PM)  Pain Rating Post Med Admin: 3 (6/30/2023  5:05 AM)

## 2023-06-30 NOTE — PT/OT/SLP PROGRESS
Physical Therapy Treatment    Patient Name:  Shaheen Christie   MRN:  [id number]    Recommendations:     Discharge Recommendations: nursing facility, skilled  Discharge Equipment Recommendations: none  Barriers to discharge:  PLACEMENT    Assessment:     Shaheen Christie is a 68 y.o. male admitted with a medical diagnosis of <principal problem not specified>.  He presents with the following impairments/functional limitations: weakness, impaired endurance, impaired self care skills, gait instability, impaired balance, impaired functional mobility .    Rehab Prognosis: Good; patient would benefit from acute skilled PT services to address these deficits and reach maximum level of function.    Recent Surgery: Procedure(s) (LRB):  ARTHROTOMY, ANKLE (Left) 2 Days Post-Op    Plan:     During this hospitalization, patient to be seen 5 x/week to address the identified rehab impairments via gait training, therapeutic activities, therapeutic exercises and progress toward the following goals:    Plan of Care Expires:  07/22/23    Subjective     Chief Complaint: L ankle pain  Patient/Family Comments/goals:   Pain/Comfort:         Objective:     Communicated with RN prior to session.  Patient found HOB elevated with   upon PT entry to room.     General Precautions: Standard, fall  Orthopedic Precautions: N/A  Braces: N/A  Respiratory Status: Room air  Skin Integrity:  redness in groin area      Functional Mobility:  Bed Mobility:     Scooting: independence  Supine to Sit: independence  Sit to Supine: independence  Transfers:     Sit to Stand:  minimum assistance with rolling walker    Therapeutic Activities/Exercises:  Performed 2 sit<>stands with RW Terell. Performed LE therex EOB, LAQ, seated marches, AP 15x1    Education:  Patient provided with verbal education regarding POC.  Understanding was verbalized, however additional teaching warranted.     Patient left HOB elevated with all lines intact, call button in reach, and wedge and  heel floats donned ..    GOALS:   Multidisciplinary Problems       Physical Therapy Goals          Problem: Physical Therapy    Goal Priority Disciplines Outcome Goal Variances Interventions   Physical Therapy Goal     PT, PT/OT Ongoing, Progressing     Description: Goals to be met by: Discharge     Patient will increase functional independence with mobility by performin. Supine to sit with Modified [place]  2. Sit to supine with Modified [place]  3. Sit to stand transfer with Modified [place]  4. Bed to chair transfer with Modified [place] using LRAD, begin with squat pivot progressing to stand pivot and stand step  5. Gait  to be assessed.                         Time Tracking:     PT Received On: 23  PT Start Time: 1448     PT Stop Time: 1512  PT Total Time (min): 24 min     Billable Minutes: Therapeutic Activity 15 and Therapeutic Exercise 9    Treatment Type: Treatment  PT/PTA: PTA     Number of PTA visits since last PT visit: 3     2023

## 2023-06-30 NOTE — PROGRESS NOTES
[facility]  Infectious Disease Progress Note        SUBJECTIVE:   AF, VSS.  No issues overnight.  No complaints.    MEDICATIONS:   Reviewed in EMR    REVIEW OF SYSTEMS:   Except as documented, all other systems reviewed and negative     PHYSICAL EXAM:   T 98.9 °F (37.2 °C)   /67   P 101   RR 18   O2 (!) 94 %  GENERAL: Older male, chronically ill, NAD; does not appear toxic  SKIN: no rash  HEENT: sclera non-icteric; PERRL  NECK: supple; no LAD  CHEST: CTA; nonlabored, equal expansion; no adventitious BS  CARDIOVASCULAR: RRR, S1S2; no murmur; equal peripheral pulses; no edema  ABDOMEN:  active bowel sounds; abdomen soft, nondistended, nontender to palpation  EXTREMITIES: LLE with some edema, no erythema - surgical dressing in place  NEURO: AAO x4; CN II-XII grossly intact  PSYCH: Mentation and affect appropriate     LABS AND IMAGING:     Recent Labs     06/28/23  0403   WBC 9.25   RBC 4.35*   HGB 13.1*   HCT 40.4*   MCV 92.9   MCH 30.1   MCHC 32.4*   RDW 14.9        No results for input(s): LACTIC in the last 72 hours.  No results for input(s): INR, APTT, D-DIMER in the last 72 hours.  No results for input(s): HGBA1C, CHOL, TRIG, LDL, VLDL, HDL in the last 72 hours.   Recent Labs     06/28/23  0403   *   K 4.2   CHLORIDE 100   CO2 25   BUN 6.6*   CREATININE 0.78   GLUCOSE 95   CALCIUM 8.9     No results for input(s): BNP, CPK, TROPONINI in the last 72 hours.       Ankle Brachial Indices (NICK)  Bilateral non compressible lower extremity ankle-brachial indices.  CV Ultrasound doppler arterial legs bilat  Patent right lower extremity arterial system with no evidence of focal   stenosis. Diffuse plaquing with biphasic waveforms identified throughout   the lower extremity.  Patent left lower extremity arterial system with no evidence of focal   stenosis. Diffuse calcific plaquing and monophasic waveforms identified   throughout the lower extremity.    A separate  ankle-brachial index was performed that supports bilateral non   compressible vessels at the ankle.      ASSESSMENT & PLAN:   68-year-old male with past medical history of hypertension, severe CAD, COPD, and regular tobacco use presenting with worsening appearance of a left ankle wound.  MRI showing septic versus inflammatory arthritis as well as tenosynovitis and myositis.  Podiatry planning on debridement.  Patient on empiric vancomycin and Zosyn.  Id consulted for assistance.     1.  Chronic left ankle wound with recent worsening, imaging concerning for septic arthritis/tenosynovitis/myositis  2.  Severe PAD  3.  COPD  4.  HTN  5.  Tobacco abuse        PLAN:  F/u cultures.   Maintain off abx.   Discussed with patient and nursing.     ANGEL Bravo  Infectious Disease

## 2023-06-30 NOTE — PLAN OF CARE
Problem: Adult Inpatient Plan of Care  Goal: Plan of Care Review  Outcome: Ongoing, Progressing  Flowsheets (Taken 6/30/2023 2645)  Plan of Care Reviewed With: patient  Goal: Patient-Specific Goal (Individualized)  Outcome: Ongoing, Progressing  Goal: Absence of Hospital-Acquired Illness or Injury  Outcome: Ongoing, Progressing  Goal: Optimal Comfort and Wellbeing  Outcome: Ongoing, Progressing  Goal: Readiness for Transition of Care  Outcome: Ongoing, Progressing     Problem: Skin Injury Risk Increased  Goal: Skin Health and Integrity  Outcome: Ongoing, Progressing     Problem: Impaired Wound Healing  Goal: Optimal Wound Healing  Outcome: Ongoing, Progressing

## 2023-06-30 NOTE — PROGRESS NOTES
[facility]                       [address] [address]    PATIENT NAME:       HAWA KUMAR  YOB: 1955  CSN:                [id number]   MRN:                [id number]  ADMIT DATE:         06/22/2023 11:39:00  PHYSICIAN:          Tom Nichole DPM                            PROGRESS NOTE    DATE:  06/29/2023 00:00:00    SUBJECTIVE:  The patient is seen today, doing well.  No major complaints.  Pains   have been controlled.  Underwent arthrotomy of the left ankle yesterday along   with wound debridement.    PHYSICAL EXAMINATION:  VITAL SIGNS:  Stable and he is currently afebrile.  Dressings are clean, dry, and intact.  No wound inspection today.    Intraoperative cultures currently coming back negative from the joint.    ASSESSMENT:  Status post left ankle subtalar joint arthrotomy and debridement of   ankle wound.    PLAN:    1. Continue with current care.  2. Antibiotics as per ID.  3. We will reassess him tomorrow.  4. The patient may mobilize.        ______________________________  Tom Nichole DPM    GAS/AQS  DD:  06/29/2023  Time:  05:19PM  DT:  06/29/2023  Time:  07:30PM  Job #:  [id number]      PROGRESS NOTE

## 2023-06-30 NOTE — PROGRESS NOTES
[facility] Hospital Medicine Progress Note        Chief Complaint: Inpatient Follow-up for left ankle wound     HPI:   68 year old male with a PMH of HTN, HLD, Severe PAD,  COPD, Cervical stenosis, neuropathy, depression who presented to the ED with complaints of left lateral ankle foot wound, getting progressively worse.  He states that the wound has been present for about a year, and he has been going to wound care up until February.  At that time, it was felt that the wound was not improving, and he needed to follow-up with [facility] and possibly get another vascular evaluation.  The patient states that since then, he has been having issues with transportation and was never able to follow-up.  Since then, his wound has gotten progressively worse and more painful.  He denies any fever, chills, chest pain, shortness of breath. He states he still smokes.   Today's ED lab work revealed Na 128, all other indices unremarkable.  Left ankle x-ray showed lucency overlies the lateral malleolus suggesting open injury.  There is a mottled appearance throughout the bones that may be seen with diffuse osteopenia as multiple bones are infected osteomyelitis is not excluded.  ED vitals:  Temp 98.4° F, pulse 90, resp 19, /97, SpO2 97% on RA.  He was started on vancomycin and Zosyn and admitted to hospital medicine for management.  MRI ordered Podiatry Wound Care cardiology consulted.    pt is Status post left ankle subtalar joint arthrotomy and debridement of ankle wound .  Interval Hx:   No acute events reported overnight, seen and examined, reported pain in the ankle otherwise no complaints discussed that he should work with physical therapy and podiatry cleared for which patient was hesitant due to his pain.      Objective/physical exam:  General: In no acute distress, afebrile  Chest: Clear to auscultation bilaterally  Heart:  +S1, S2, no appreciable murmur  Abdomen: Soft, nontender, BS +  MSK:  Warm, left ankle Dressings are clean, dry, and intact  Neurologic: Alert and oriented x4,   VITAL SIGNS: 24 HRS MIN & MAX LAST   Temp  Min: 97.6 °F (36.4 °C)  Max: 98.9 °F (37.2 °C) 97.9 °F (36.6 °C)   BP  Min: 109/63  Max: 129/71 129/71   Pulse  Min: 67  Max: 101  67   Resp  Min: 18  Max: 18 18   SpO2  Min: 92 %  Max: 96 % 96 %     I have reviewed the following labs:    Recent Labs   Lab 06/24/23 0528 06/26/23  0559 06/28/23  0403   WBC 5.48 6.93 9.25   RBC 4.43* 4.63* 4.35*   HGB 13.1* 14.0 13.1*   HCT 40.5* 41.8* 40.4*   MCV 91.4 90.3 92.9   MCH 29.6 30.2 30.1   MCHC 32.3* 33.5 32.4*   RDW 14.7 14.7 14.9    300 295   MPV 8.9 8.8 8.6       Recent Labs   Lab 06/24/23 0528 06/26/23  0559 06/28/23  0403   * 130* 129*   K 4.5 3.6 4.2   CO2 25 26 25   BUN 5.2* 5.0* 6.6*   CREATININE 0.74 0.78 0.78   CALCIUM 9.2 9.1 8.9          Microbiology Results (last 7 days)       Procedure Component Value Units Date/Time    Tissue Culture - Aerobic [[id number]] Collected: 06/28/23 1310    Order Status: Completed Specimen: Tissue from Ankle, Left Updated: 06/29/23 0746     CULTURE, TISSUE- AEROBIC ([facility]) No Growth At 24 Hours    Anaerobic Culture [[id number]] Collected: 06/28/23 1310    Order Status: Sent Specimen: Tissue from Ankle, Left Updated: 06/28/23 1341    Blood culture #1 **CANNOT BE ORDERED STAT** [[id number]]  (Normal) Collected: 06/22/23 1754    Order Status: Completed Specimen: Blood Updated: 06/27/23 2101     CULTURE, BLOOD ([facility]) No Growth at 5 days    Blood culture #2 **CANNOT BE ORDERED STAT** [[id number]]  (Normal) Collected: 06/22/23 1754    Order Status: Completed Specimen: Blood Updated: 06/27/23 2101     CULTURE, BLOOD ([facility]) No Growth at 5 days    Wound Culture [[id number]]  (Abnormal)  (Susceptibility) Collected: 06/22/23 1913    Order Status: Completed Specimen: Wound from Toe, Left Foot Updated: 06/26/23 1211     Wound Culture Few Alcaligenes faecalis ssp faecalis      Few Streptococcus agalactiae  (Group B)     Comment: with normal skin ishmael       Wound Culture [[id number]]  (Abnormal)  (Susceptibility) Collected: 06/22/23 1913    Order Status: Completed Specimen: Wound from Ankle, Left Updated: 06/25/23 0948     Wound Culture Moderate Providencia rettgeri     Comment: with normal skin ishmael                See below for Radiology    Scheduled Med:   amLODIPine  10 mg Oral Daily    aspirin  81 mg Oral Daily    cilostazoL  50 mg Oral BID    clopidogreL  75 mg Oral Daily    collagenase   Topical (Top) BID    gabapentin  300 mg Oral TID    losartan  100 mg Oral Daily    miconazole NITRATE 2 %   Topical (Top) BID    multivitamin  1 tablet Oral Daily    zinc oxide-cod liver oil   Topical (Top) BID        Continuous Infusions:       PRN Meds:  acetaminophen, aluminum-magnesium hydroxide-simethicone, HYDROcodone-acetaminophen, melatonin, naloxone, ondansetron, polyethylene glycol, prochlorperazine, simethicone       Assessment/Plan:  Chronic left lateral ankle nonhealing ulcer s/p left ankle subtalar joint arthrotomy and debridement of ankle wound  Tibiotalar joint septic arthritis  Flexor digitorum longus and flexor hallucis longus tenosynovitis  Chronic hyponatremia-stable      History of: COPD, hyperlipidemia, essential hypertension, peripheral artery disease, diabetes mellitus type 2, tobacco use    Intraoperative cultures no growth so far preliminarily follow up final cultures   Podiatry following.  Noted input, appreciate assistance   ID following, noted input recommended to monitor off of antibiotics  Arterial flow has three-vessel runoff to the left ankle, [facility] signed off  Pain control with multimodal  Physical therapy eval  Cont current meds  Case discussed with case management      VTE prophylaxis: scds    Patient condition:  Stable    Anticipated discharge and Disposition:   TBD      _____________________________________________________________________    Nutrition Status:    Radiology:  I have personally  reviewed the following imaging and agree with the radiologist.     Ankle Brachial Indices (NICK)  Bilateral non compressible lower extremity ankle-brachial indices.  CV Ultrasound doppler arterial legs bilat  Patent right lower extremity arterial system with no evidence of focal   stenosis. Diffuse plaquing with biphasic waveforms identified throughout   the lower extremity.  Patent left lower extremity arterial system with no evidence of focal   stenosis. Diffuse calcific plaquing and monophasic waveforms identified   throughout the lower extremity.    A separate ankle-brachial index was performed that supports bilateral non   compressible vessels at the ankle.      Sayda Robles MD   06/30/2023

## 2023-06-30 NOTE — PT/OT/SLP PROGRESS
Occupational Therapy   Treatment    Name: Shaheen Christie  MRN: [id number]  Admitting Diagnosis:  Skin ulcer, acute osteomyelitis L ankle/foot  2 Days Post-Op    Recommendations:     Discharge Recommendations: nursing facility, skilled  Discharge Equipment Recommendations:     Barriers to discharge:       Assessment:     Shaheen Christie is a 68 y.o. male with a medical diagnosis of skin ulcer, acute osteomyelitis L ankle/foot. Performance deficits affecting function are weakness, gait instability, impaired endurance, impaired balance, impaired functional mobility, impaired self care skills.     Rehab Prognosis:  Good; patient would benefit from acute skilled OT services to address these deficits and reach maximum level of function.       Plan:     Patient to be seen 5 x/week to address the above listed problems via self-care/home management, therapeutic activities, therapeutic exercises  Plan of Care Expires:    Plan of Care Reviewed with: patient    Subjective     Pain/Comfort:  Pain Rating 1: 7/10  Location - Side 1: Left  Location 1: ankle  Pain Addressed 1: Reposition, Distraction    Objective:     Communicated with: RN prior to session.  Patient found supine with telemetry, pressure relief boots, peripheral IV upon OT entry to room.    General Precautions: Standard, fall    Orthopedic Precautions:N/A  Braces: N/A  Respiratory Status: Room air     Occupational Performance:     Bed Mobility:    Patient completed Supine to Sit with stand by assistance  Patient completed Sit to Supine with stand by assistance     Functional Mobility/Transfers:  Patient completed Sit <> Stand Transfer with minimum assistance  with  hand-held assist   Functional Mobility: Min A stand pivot t/f on RLE. Pt is full weight bearing on LLE but declines 2/2 pain.     Activities of Daily Living:  Toileting: Performed BSC t/f with Min HHA. Able to perform posterior pericare in standing following +BM with Min-CGA for standing balance.      Therapeutic Positioning    OT interventions performed during the course of today's session in an effort to prevent and/or reduce acquired pressure injuries:   Education on Pressure Ulcer Prevention provided  Therapeutic positioning completed     Skin assessment: all visible skin assessed   Findings: known area of altered skin integrity at L ankle    [facility] 6 Click ADL: 21    Patient Education:  Patient provided with verbal education regarding OT role/goals/POC, fall prevention, and safety awareness.  Understanding was verbalized.      Patient left supine with all lines intact and call button in reach    GOALS:   Multidisciplinary Problems       Occupational Therapy Goals          Problem: Occupational Therapy    Goal Priority Disciplines Outcome Interventions   Occupational Therapy Goal     OT, PT/OT Ongoing, Progressing    Description: Goals to be met by: 7/24/2023     Patient will increase functional independence with ADLs by performing:    LE Dressing with Modified [place].  Grooming while EOB with Modified [place].  Toileting from toilet with Modified [place] for hygiene and clothing management.   Toilet transfer to bedside commode with Modified [place].                         Time Tracking:     OT Date of Treatment: 06/30/23  OT Start Time: 1120  OT Stop Time: 1132  OT Total Time (min): 12 min    Billable Minutes:Self Care/Home Management 12    OT/ETHAN: ETHAN     Number of ETHAN visits since last OT visit: 2    6/30/2023

## 2023-07-01 LAB
ANION GAP SERPL CALC-SCNC: 7 MEQ/L
BACTERIA SPEC ANAEROBE CULT: NORMAL
BASOPHILS # BLD AUTO: 0.12 X10(3)/MCL
BASOPHILS NFR BLD AUTO: 1.8 %
BUN SERPL-MCNC: 12.5 MG/DL (ref 8.4–25.7)
CALCIUM SERPL-MCNC: 8.7 MG/DL (ref 8.8–10)
CHLORIDE SERPL-SCNC: 101 MMOL/L (ref 98–107)
CO2 SERPL-SCNC: 27 MMOL/L (ref 23–31)
CREAT SERPL-MCNC: 0.72 MG/DL (ref 0.73–1.18)
CREAT/UREA NIT SERPL: 17
EOSINOPHIL # BLD AUTO: 0.35 X10(3)/MCL (ref 0–0.9)
EOSINOPHIL NFR BLD AUTO: 5.3 %
ERYTHROCYTE [DISTWIDTH] IN BLOOD BY AUTOMATED COUNT: 14.9 % (ref 11.5–17)
GFR SERPLBLD CREATININE-BSD FMLA CKD-EPI: >60 MLS/MIN/1.73/M2
GLUCOSE SERPL-MCNC: 94 MG/DL (ref 82–115)
HCT VFR BLD AUTO: 39.5 % (ref 42–52)
HGB BLD-MCNC: 12.9 G/DL (ref 14–18)
IMM GRANULOCYTES # BLD AUTO: 0.02 X10(3)/MCL (ref 0–0.04)
IMM GRANULOCYTES NFR BLD AUTO: 0.3 %
LYMPHOCYTES # BLD AUTO: 1.46 X10(3)/MCL (ref 0.6–4.6)
LYMPHOCYTES NFR BLD AUTO: 22.2 %
MCH RBC QN AUTO: 30.4 PG (ref 27–31)
MCHC RBC AUTO-ENTMCNC: 32.7 G/DL (ref 33–36)
MCV RBC AUTO: 92.9 FL (ref 80–94)
MONOCYTES # BLD AUTO: 0.64 X10(3)/MCL (ref 0.1–1.3)
MONOCYTES NFR BLD AUTO: 9.7 %
NEUTROPHILS # BLD AUTO: 3.99 X10(3)/MCL (ref 2.1–9.2)
NEUTROPHILS NFR BLD AUTO: 60.7 %
NRBC BLD AUTO-RTO: 0 %
PLATELET # BLD AUTO: 290 X10(3)/MCL (ref 130–400)
PMV BLD AUTO: 8.8 FL (ref 7.4–10.4)
POTASSIUM SERPL-SCNC: 4.9 MMOL/L (ref 3.5–5.1)
RBC # BLD AUTO: 4.25 X10(6)/MCL (ref 4.7–6.1)
SODIUM SERPL-SCNC: 135 MMOL/L (ref 136–145)
WBC # SPEC AUTO: 6.58 X10(3)/MCL (ref 4.5–11.5)

## 2023-07-01 PROCEDURE — 11000001 HC ACUTE MED/SURG PRIVATE ROOM

## 2023-07-01 PROCEDURE — 80048 BASIC METABOLIC PNL TOTAL CA: CPT | Performed by: INTERNAL MEDICINE

## 2023-07-01 PROCEDURE — 85025 COMPLETE CBC W/AUTO DIFF WBC: CPT | Performed by: INTERNAL MEDICINE

## 2023-07-01 PROCEDURE — 25000003 PHARM REV CODE 250: Performed by: PODIATRIST

## 2023-07-01 PROCEDURE — 25000003 PHARM REV CODE 250: Performed by: INTERNAL MEDICINE

## 2023-07-01 RX ADMIN — THERA TABS 1 TABLET: TAB at 09:07

## 2023-07-01 RX ADMIN — Medication: at 09:07

## 2023-07-01 RX ADMIN — HYDROCODONE BITARTRATE AND ACETAMINOPHEN 1 TABLET: 10; 325 TABLET ORAL at 01:07

## 2023-07-01 RX ADMIN — COLLAGENASE SANTYL: 250 OINTMENT TOPICAL at 09:07

## 2023-07-01 RX ADMIN — GABAPENTIN 300 MG: 300 CAPSULE ORAL at 02:07

## 2023-07-01 RX ADMIN — HYDROCODONE BITARTRATE AND ACETAMINOPHEN 1 TABLET: 10; 325 TABLET ORAL at 09:07

## 2023-07-01 RX ADMIN — ASPIRIN 81 MG: 81 TABLET, COATED ORAL at 09:07

## 2023-07-01 RX ADMIN — GABAPENTIN 300 MG: 300 CAPSULE ORAL at 09:07

## 2023-07-01 RX ADMIN — Medication 6 MG: at 09:07

## 2023-07-01 RX ADMIN — HYDROCODONE BITARTRATE AND ACETAMINOPHEN 1 TABLET: 10; 325 TABLET ORAL at 05:07

## 2023-07-01 RX ADMIN — CILOSTAZOL 50 MG: 50 TABLET ORAL at 09:07

## 2023-07-01 RX ADMIN — HYDROCODONE BITARTRATE AND ACETAMINOPHEN 1 TABLET: 10; 325 TABLET ORAL at 10:07

## 2023-07-01 RX ADMIN — MICONAZOLE NITRATE 2 % TOPICAL POWDER: at 09:07

## 2023-07-01 RX ADMIN — HYDROCODONE BITARTRATE AND ACETAMINOPHEN 1 TABLET: 10; 325 TABLET ORAL at 06:07

## 2023-07-01 RX ADMIN — CLOPIDOGREL BISULFATE 75 MG: 75 TABLET ORAL at 09:07

## 2023-07-01 RX ADMIN — HYDROCODONE BITARTRATE AND ACETAMINOPHEN 1 TABLET: 10; 325 TABLET ORAL at 12:07

## 2023-07-01 RX ADMIN — MICONAZOLE NITRATE 2 % TOPICAL POWDER: at 10:07

## 2023-07-01 NOTE — PROGRESS NOTES
[facility]                       [address] [address]    PATIENT NAME:       HAWA KUMAR  YOB: 1955  CSN:                [id number]   MRN:                [id number]  ADMIT DATE:         06/22/2023 11:39:00  PHYSICIAN:          Tom Nichole DPM                            PROGRESS NOTE    DATE:  07/01/2023 00:00:00    SUBJECTIVE:  The patient was seen this morning, doing well.  No major   complaints.    OBJECTIVE:  VITAL SIGNS:  Stable and afebrile.    Intraoperative cultures are negative from the joint.  Finals are pending.    Surgical sites look great.  Ankle wound is granulating in nicely.  The incision   site is well approximated.  Absolutely no inflammatory changes or signs of   compromise or infection.    ASSESSMENT:  Status post left ankle arthrotomy and wound debridement, currently   doing well.  Joint cultures negative at this time.    PLAN:  Dressings were placed.  Again, no feel that there is an infectious   process within the joint area and I think the inflammatory changes within the   bony structures are probably post reactive due to probably an inadvertent   trauma.  I explained to the gentleman he needs to mobilize and increase activity   levels and range of motion of that extremity.        ______________________________  Tom Nichole DPM    GAS/AQS  DD:  07/01/2023  Time:  09:52AM  DT:  07/01/2023  Time:  10:01AM  Job #:  [id number]      PROGRESS NOTE

## 2023-07-01 NOTE — PROGRESS NOTES
[facility]  Infectious Disease Progress Note        SUBJECTIVE:   AF, VSS.  No events.  No complaints.      MEDICATIONS:   Reviewed in EMR    REVIEW OF SYSTEMS:   Except as documented, all other systems reviewed and negative     PHYSICAL EXAM:   T 98.1 °F (36.7 °C)   /62   P 72   RR 16   O2 (!) 94 %  GENERAL: Older male, chronically ill, NAD; does not appear toxic  SKIN: no rash  HEENT: sclera non-icteric; PERRL  NECK: supple; no LAD  CHEST: CTA; nonlabored, equal expansion; no adventitious BS  CARDIOVASCULAR: RRR, S1S2; no murmur; equal peripheral pulses; no edema  ABDOMEN:  active bowel sounds; abdomen soft, nondistended, nontender to palpation  EXTREMITIES: LLE with some edema, no erythema - surgical dressing in place  NEURO: AAO x4; CN II-XII grossly intact  PSYCH: Mentation and affect appropriate     LABS AND IMAGING:     Recent Labs     06/28/23  0403   WBC 9.25   RBC 4.35*   HGB 13.1*   HCT 40.4*   MCV 92.9   MCH 30.1   MCHC 32.4*   RDW 14.9          No results for input(s): LACTIC in the last 72 hours.  No results for input(s): INR, APTT, D-DIMER in the last 72 hours.  No results for input(s): HGBA1C, CHOL, TRIG, LDL, VLDL, HDL in the last 72 hours.   Recent Labs     06/28/23  0403   *   K 4.2   CHLORIDE 100   CO2 25   BUN 6.6*   CREATININE 0.78   GLUCOSE 95   CALCIUM 8.9       No results for input(s): BNP, CPK, TROPONINI in the last 72 hours.       Ankle Brachial Indices (NICK)  Bilateral non compressible lower extremity ankle-brachial indices.  CV Ultrasound doppler arterial legs bilat  Patent right lower extremity arterial system with no evidence of focal   stenosis. Diffuse plaquing with biphasic waveforms identified throughout   the lower extremity.  Patent left lower extremity arterial system with no evidence of focal   stenosis. Diffuse calcific plaquing and monophasic waveforms identified   throughout the lower extremity.    A separate ankle-brachial  index was performed that supports bilateral non   compressible vessels at the ankle.      ASSESSMENT & PLAN:   68-year-old male with past medical history of hypertension, severe CAD, COPD, and regular tobacco use presenting with worsening appearance of a left ankle wound.  MRI showing septic versus inflammatory arthritis as well as tenosynovitis and myositis.  Podiatry planning on debridement.  Patient on empiric vancomycin and Zosyn.  Id consulted for assistance.     1.  Chronic left ankle wound with recent worsening, imaging concerning for septic arthritis/tenosynovitis/myositis, s/p I&D with negative operative findings, cultures negative  2.  Severe PAD  3.  COPD  4.  HTN  5.  Tobacco abuse        PLAN:  Cultures remain negative.   No indication for abx.  If cultures should return positive, please call back.  Will sign off.   Discussed with patient.    ANGEL Bravo  Infectious Disease

## 2023-07-01 NOTE — PROGRESS NOTES
[facility] Hospital Medicine Progress Note        Chief Complaint: Inpatient Follow-up for left ankle wound     HPI:   68 year old male with a PMH of HTN, HLD, Severe PAD,  COPD, Cervical stenosis, neuropathy, depression who presented to the ED with complaints of left lateral ankle foot wound, getting progressively worse.  He states that the wound has been present for about a year, and he has been going to wound care up until February.  At that time, it was felt that the wound was not improving, and he needed to follow-up with [facility] and possibly get another vascular evaluation.  The patient states that since then, he has been having issues with transportation and was never able to follow-up.  Since then, his wound has gotten progressively worse and more painful.  He denies any fever, chills, chest pain, shortness of breath. He states he still smokes.   Today's ED lab work revealed Na 128, all other indices unremarkable.  Left ankle x-ray showed lucency overlies the lateral malleolus suggesting open injury.  There is a mottled appearance throughout the bones that may be seen with diffuse osteopenia as multiple bones are infected osteomyelitis is not excluded.  ED vitals:  Temp 98.4° F, pulse 90, resp 19, /97, SpO2 97% on RA.  He was started on vancomycin and Zosyn and admitted to hospital medicine for management.  MRI ordered Podiatry Wound Care cardiology consulted.    pt is Status post left ankle subtalar joint arthrotomy and debridement of ankle wound .  Interval Hx:   No events reported, patient complains of pain in the left ankle but tolerable with current pain meds otherwise no complaints    Objective/physical exam:  General: In no acute distress, afebrile  Chest: Clear to auscultation bilaterally  Heart:  +S1, S2, no appreciable murmur  Abdomen: Soft, nontender, BS +  MSK: Warm, left ankle Dressings are clean, dry, and intact  Neurologic: Alert and oriented x4,   VITAL SIGNS:  24 HRS MIN & MAX LAST   Temp  Min: 97.9 °F (36.6 °C)  Max: 99 °F (37.2 °C) 98.6 °F (37 °C)   BP  Min: 100/51  Max: 117/62 (!) 107/58   Pulse  Min: 71  Max: 82  72   Resp  Min: 16  Max: 20 20   SpO2  Min: 90 %  Max: 94 % (!) 90 %     I have reviewed the following labs:    Recent Labs   Lab 06/26/23 0559 06/28/23  0403 07/01/23  0811   WBC 6.93 9.25 6.58   RBC 4.63* 4.35* 4.25*   HGB 14.0 13.1* 12.9*   HCT 41.8* 40.4* 39.5*   MCV 90.3 92.9 92.9   MCH 30.2 30.1 30.4   MCHC 33.5 32.4* 32.7*   RDW 14.7 14.9 14.9    295 290   MPV 8.8 8.6 8.8       Recent Labs   Lab 06/26/23 0559 06/28/23 0403 07/01/23  0811   * 129* 135*   K 3.6 4.2 4.9   CO2 26 25 27   BUN 5.0* 6.6* 12.5   CREATININE 0.78 0.78 0.72*   CALCIUM 9.1 8.9 8.7*          Microbiology Results (last 7 days)       Procedure Component Value Units Date/Time    Anaerobic Culture [[id number]] Collected: 06/28/23 1310    Order Status: Completed Specimen: Tissue from Ankle, Left Updated: 07/01/23 0904     Anaerobe Culture No Anaerobes Isolated    Tissue Culture - Aerobic [[id number]] Collected: 06/28/23 1310    Order Status: Completed Specimen: Tissue from Ankle, Left Updated: 06/30/23 1017     CULTURE, TISSUE- AEROBIC ([facility]) No Growth At 48 Hours    Blood culture #1 **CANNOT BE ORDERED STAT** [[id number]]  (Normal) Collected: 06/22/23 1754    Order Status: Completed Specimen: Blood Updated: 06/27/23 2101     CULTURE, BLOOD ([facility]) No Growth at 5 days    Blood culture #2 **CANNOT BE ORDERED STAT** [[id number]]  (Normal) Collected: 06/22/23 1754    Order Status: Completed Specimen: Blood Updated: 06/27/23 2101     CULTURE, BLOOD ([facility]) No Growth at 5 days    Wound Culture [[id number]]  (Abnormal)  (Susceptibility) Collected: 06/22/23 1913    Order Status: Completed Specimen: Wound from Toe, Left Foot Updated: 06/26/23 1211     Wound Culture Few Alcaligenes faecalis ssp faecalis      Few Streptococcus agalactiae (Group B)     Comment: with normal skin ishmael        Wound Culture [[id number]]  (Abnormal)  (Susceptibility) Collected: 06/22/23 1913    Order Status: Completed Specimen: Wound from Ankle, Left Updated: 06/25/23 0948     Wound Culture Moderate Providencia rettgeri     Comment: with normal skin ishmael                See below for Radiology    Scheduled Med:   amLODIPine  10 mg Oral Daily    aspirin  81 mg Oral Daily    cilostazoL  50 mg Oral BID    clopidogreL  75 mg Oral Daily    collagenase   Topical (Top) BID    gabapentin  300 mg Oral TID    losartan  100 mg Oral Daily    miconazole NITRATE 2 %   Topical (Top) BID    multivitamin  1 tablet Oral Daily    zinc oxide-cod liver oil   Topical (Top) BID        Continuous Infusions:       PRN Meds:  acetaminophen, aluminum-magnesium hydroxide-simethicone, HYDROcodone-acetaminophen, melatonin, naloxone, ondansetron, polyethylene glycol, prochlorperazine, simethicone       Assessment/Plan:  Chronic left lateral ankle nonhealing ulcer s/p left ankle subtalar joint arthrotomy and debridement of ankle wound  Tibiotalar joint septic arthritis  Flexor digitorum longus and flexor hallucis longus tenosynovitis  Chronic hyponatremia-stable      History of: COPD, hyperlipidemia, essential hypertension, peripheral artery disease, diabetes mellitus type 2, tobacco use    Labs this morning showed no leukocytosis, stable hemoglobin 12.9, sodium 135 stable renal function   Intraoperative cultures no growth so far   Podiatry following.  Noted inputs, appreciate assistance   ID following, noted inputs recommended to monitor off of antibiotics  Patient worked with Physical therapy recommended skilled nursing facility  Arterial flow has three-vessel runoff to the left ankle, [facility] signed off  Pain control with multimodal  Bowel regimen   Cont current meds  Case discussed with patient's nurse,   sent clinical updates to [facility] [place]     VTE prophylaxis: scds    Patient condition:  Stable    Anticipated discharge and Disposition:     SNF once cultures finalized      _____________________________________________________________________    Nutrition Status:    Radiology:  I have personally reviewed the following imaging and agree with the radiologist.     Ankle Brachial Indices (NICK)  Bilateral non compressible lower extremity ankle-brachial indices.  CV Ultrasound doppler arterial legs bilat  Patent right lower extremity arterial system with no evidence of focal   stenosis. Diffuse plaquing with biphasic waveforms identified throughout   the lower extremity.  Patent left lower extremity arterial system with no evidence of focal   stenosis. Diffuse calcific plaquing and monophasic waveforms identified   throughout the lower extremity.    A separate ankle-brachial index was performed that supports bilateral non   compressible vessels at the ankle.      Sayda Robles MD   07/01/2023

## 2023-07-02 PROCEDURE — 11000001 HC ACUTE MED/SURG PRIVATE ROOM

## 2023-07-02 PROCEDURE — 25000003 PHARM REV CODE 250: Performed by: PODIATRIST

## 2023-07-02 PROCEDURE — 25000003 PHARM REV CODE 250: Performed by: INTERNAL MEDICINE

## 2023-07-02 RX ORDER — POLYETHYLENE GLYCOL 3350 17 G/17G
17 POWDER, FOR SOLUTION ORAL DAILY
Status: DISCONTINUED | OUTPATIENT
Start: 2023-07-02 | End: 2023-07-06 | Stop reason: HOSPADM

## 2023-07-02 RX ADMIN — AMLODIPINE BESYLATE 10 MG: 5 TABLET ORAL at 10:07

## 2023-07-02 RX ADMIN — THERA TABS 1 TABLET: TAB at 10:07

## 2023-07-02 RX ADMIN — GABAPENTIN 300 MG: 300 CAPSULE ORAL at 10:07

## 2023-07-02 RX ADMIN — COLLAGENASE SANTYL: 250 OINTMENT TOPICAL at 10:07

## 2023-07-02 RX ADMIN — HYDROCODONE BITARTRATE AND ACETAMINOPHEN 1 TABLET: 10; 325 TABLET ORAL at 10:07

## 2023-07-02 RX ADMIN — GABAPENTIN 300 MG: 300 CAPSULE ORAL at 02:07

## 2023-07-02 RX ADMIN — CILOSTAZOL 50 MG: 50 TABLET ORAL at 10:07

## 2023-07-02 RX ADMIN — HYDROCODONE BITARTRATE AND ACETAMINOPHEN 1 TABLET: 10; 325 TABLET ORAL at 05:07

## 2023-07-02 RX ADMIN — Medication: at 10:07

## 2023-07-02 RX ADMIN — LOSARTAN POTASSIUM 100 MG: 50 TABLET, FILM COATED ORAL at 10:07

## 2023-07-02 RX ADMIN — MICONAZOLE NITRATE 2 % TOPICAL POWDER: at 10:07

## 2023-07-02 RX ADMIN — HYDROCODONE BITARTRATE AND ACETAMINOPHEN 1 TABLET: 10; 325 TABLET ORAL at 02:07

## 2023-07-02 RX ADMIN — CLOPIDOGREL BISULFATE 75 MG: 75 TABLET ORAL at 10:07

## 2023-07-02 RX ADMIN — ASPIRIN 81 MG: 81 TABLET, COATED ORAL at 10:07

## 2023-07-02 RX ADMIN — CILOSTAZOL 50 MG: 50 TABLET ORAL at 09:07

## 2023-07-02 RX ADMIN — HYDROCODONE BITARTRATE AND ACETAMINOPHEN 1 TABLET: 10; 325 TABLET ORAL at 07:07

## 2023-07-02 RX ADMIN — GABAPENTIN 300 MG: 300 CAPSULE ORAL at 09:07

## 2023-07-02 NOTE — PROGRESS NOTES
[facility] Hospital Medicine Progress Note        Chief Complaint: Inpatient Follow-up for left ankle wound     HPI:   68 year old male with a PMH of HTN, HLD, Severe PAD,  COPD, Cervical stenosis, neuropathy, depression who presented to the ED with complaints of left lateral ankle foot wound, getting progressively worse.  He states that the wound has been present for about a year, and he has been going to wound care up until February.  At that time, it was felt that the wound was not improving, and he needed to follow-up with [facility] and possibly get another vascular evaluation.  The patient states that since then, he has been having issues with transportation and was never able to follow-up.  Since then, his wound has gotten progressively worse and more painful.  He denies any fever, chills, chest pain, shortness of breath. He states he still smokes.   Today's ED lab work revealed Na 128, all other indices unremarkable.  Left ankle x-ray showed lucency overlies the lateral malleolus suggesting open injury.  There is a mottled appearance throughout the bones that may be seen with diffuse osteopenia as multiple bones are infected osteomyelitis is not excluded.  ED vitals:  Temp 98.4° F, pulse 90, resp 19, /97, SpO2 97% on RA.  He was started on vancomycin and Zosyn and admitted to hospital medicine for management.  MRI ordered Podiatry Wound Care cardiology consulted.    pt is Status post left ankle subtalar joint arthrotomy and debridement of ankle wound .  Interval Hx:   No events reported,resting comfortably on bed, reported tolerable pain otherwise no issues, discussed ID, podiatry recommendations . Encouraged pt to work with PT.     Objective/physical exam:  General: In no acute distress, afebrile  Chest: Clear to auscultation bilaterally  Heart:  +S1, S2, no appreciable murmur  Abdomen: Soft, nontender, BS +  MSK: Warm, left ankle Dressings are clean, dry, and intact, wound  exam per podiatry  Neurologic: Alert and oriented x4, distal senses intact left foot, able to move toes left foot  VITAL SIGNS: 24 HRS MIN & MAX LAST   Temp  Min: 97.9 °F (36.6 °C)  Max: 98.5 °F (36.9 °C) 98.3 °F (36.8 °C)   BP  Min: 103/55  Max: 144/70 128/60   Pulse  Min: 46  Max: 70  (!) 46   Resp  Min: 18  Max: 18 18   SpO2  Min: 93 %  Max: 96 % 95 %     I have reviewed the following labs:    Recent Labs   Lab 06/26/23  0559 06/28/23  0403 07/01/23  0811   WBC 6.93 9.25 6.58   RBC 4.63* 4.35* 4.25*   HGB 14.0 13.1* 12.9*   HCT 41.8* 40.4* 39.5*   MCV 90.3 92.9 92.9   MCH 30.2 30.1 30.4   MCHC 33.5 32.4* 32.7*   RDW 14.7 14.9 14.9    295 290   MPV 8.8 8.6 8.8       Recent Labs   Lab 06/26/23 0559 06/28/23  0403 07/01/23  0811   * 129* 135*   K 3.6 4.2 4.9   CO2 26 25 27   BUN 5.0* 6.6* 12.5   CREATININE 0.78 0.78 0.72*   CALCIUM 9.1 8.9 8.7*          Microbiology Results (last 7 days)       Procedure Component Value Units Date/Time    Tissue Culture - Aerobic [[id number]] Collected: 06/28/23 1310    Order Status: Completed Specimen: Tissue from Ankle, Left Updated: 07/02/23 0907     CULTURE, TISSUE- AEROBIC ([facility]) No growth at 4 days    Anaerobic Culture [[id number]] Collected: 06/28/23 1310    Order Status: Completed Specimen: Tissue from Ankle, Left Updated: 07/01/23 0904     Anaerobe Culture No Anaerobes Isolated    Blood culture #1 **CANNOT BE ORDERED STAT** [[id number]]  (Normal) Collected: 06/22/23 1754    Order Status: Completed Specimen: Blood Updated: 06/27/23 2101     CULTURE, BLOOD ([facility]) No Growth at 5 days    Blood culture #2 **CANNOT BE ORDERED STAT** [[id number]]  (Normal) Collected: 06/22/23 1754    Order Status: Completed Specimen: Blood Updated: 06/27/23 2101     CULTURE, BLOOD ([facility]) No Growth at 5 days    Wound Culture [[id number]]  (Abnormal)  (Susceptibility) Collected: 06/22/23 1913    Order Status: Completed Specimen: Wound from Toe, Left Foot Updated: 06/26/23 1211     Wound Culture  Few Alcaligenes faecalis ssp faecalis      Few Streptococcus agalactiae (Group B)     Comment: with normal skin ishmael       Wound Culture [[id number]]  (Abnormal)  (Susceptibility) Collected: 06/22/23 1913    Order Status: Completed Specimen: Wound from Ankle, Left Updated: 06/25/23 0948     Wound Culture Moderate Providencia rettgeri     Comment: with normal skin ishmael                See below for Radiology    Scheduled Med:   amLODIPine  10 mg Oral Daily    aspirin  81 mg Oral Daily    cilostazoL  50 mg Oral BID    clopidogreL  75 mg Oral Daily    collagenase   Topical (Top) BID    gabapentin  300 mg Oral TID    losartan  100 mg Oral Daily    miconazole NITRATE 2 %   Topical (Top) BID    multivitamin  1 tablet Oral Daily    zinc oxide-cod liver oil   Topical (Top) BID        Continuous Infusions:       PRN Meds:  acetaminophen, aluminum-magnesium hydroxide-simethicone, HYDROcodone-acetaminophen, melatonin, naloxone, ondansetron, polyethylene glycol, prochlorperazine, simethicone       Assessment/Plan:  Chronic left lateral ankle nonhealing ulcer s/p left ankle subtalar joint arthrotomy and debridement of ankle wound  Tibiotalar joint septic arthritis  Flexor digitorum longus and flexor hallucis longus tenosynovitis  Chronic hyponatremia-stable      History of: COPD, hyperlipidemia, essential hypertension, peripheral artery disease, diabetes mellitus type 2, tobacco use    Noted podiatry inputs  Suspected inflammatory changes from an inadverent trauma, not concerning of infection  Intra operative cultures neg so far  Aerobic no growth at 4 days prelim, will be finalized tmrw  Anaerobic no growth final  ID signed off, monitor off of antibiotics  Patient worked with Physical therapy recommended skilled nursing facility  Arterial flow has three-vessel runoff to the left ankle, [facility] signed off  Pain control with multimodal  PT  Bowel regimen   Cont current meds  Case discussed with patient's nurse,     sent  clinical updates to [facility] [place]     VTE prophylaxis: scds    Patient condition:  Stable    Anticipated discharge and Disposition:    SNF , pt is medically stable       _____________________________________________________________________    Nutrition Status:    Radiology:  I have personally reviewed the following imaging and agree with the radiologist.     Ankle Brachial Indices (NICK)  Bilateral non compressible lower extremity ankle-brachial indices.  CV Ultrasound doppler arterial legs bilat  Patent right lower extremity arterial system with no evidence of focal   stenosis. Diffuse plaquing with biphasic waveforms identified throughout   the lower extremity.  Patent left lower extremity arterial system with no evidence of focal   stenosis. Diffuse calcific plaquing and monophasic waveforms identified   throughout the lower extremity.    A separate ankle-brachial index was performed that supports bilateral non   compressible vessels at the ankle.      Sayda Robles MD   07/02/2023

## 2023-07-03 PROBLEM — I73.9 PAD (PERIPHERAL ARTERY DISEASE): Status: ACTIVE | Noted: 2022-09-20

## 2023-07-03 LAB — BACTERIA SPEC CULT: NORMAL

## 2023-07-03 PROCEDURE — 25000003 PHARM REV CODE 250: Performed by: INTERNAL MEDICINE

## 2023-07-03 PROCEDURE — 25000003 PHARM REV CODE 250: Performed by: PODIATRIST

## 2023-07-03 PROCEDURE — 97530 THERAPEUTIC ACTIVITIES: CPT

## 2023-07-03 PROCEDURE — 11000001 HC ACUTE MED/SURG PRIVATE ROOM

## 2023-07-03 PROCEDURE — 63600175 PHARM REV CODE 636 W HCPCS: Performed by: INTERNAL MEDICINE

## 2023-07-03 PROCEDURE — 97110 THERAPEUTIC EXERCISES: CPT

## 2023-07-03 RX ORDER — POLYETHYLENE GLYCOL 3350 17 G/17G
17 POWDER, FOR SOLUTION ORAL DAILY
Qty: 10 EACH | Refills: 0 | Status: ON HOLD | OUTPATIENT
Start: 2023-07-04

## 2023-07-03 RX ORDER — ENOXAPARIN SODIUM 100 MG/ML
40 INJECTION SUBCUTANEOUS EVERY 24 HOURS
Status: DISCONTINUED | OUTPATIENT
Start: 2023-07-03 | End: 2023-07-06 | Stop reason: HOSPADM

## 2023-07-03 RX ORDER — HYDROCODONE BITARTRATE AND ACETAMINOPHEN 10; 325 MG/1; MG/1
1 TABLET ORAL EVERY 8 HOURS PRN
Qty: 21 TABLET | Refills: 0 | Status: SHIPPED | OUTPATIENT
Start: 2023-07-03 | End: 2024-01-22

## 2023-07-03 RX ORDER — MICONAZOLE NITRATE 2 %
POWDER (GRAM) TOPICAL 2 TIMES DAILY
Qty: 71 G | Refills: 0 | Status: SHIPPED | OUTPATIENT
Start: 2023-07-03 | End: 2024-03-15

## 2023-07-03 RX ADMIN — CLOPIDOGREL BISULFATE 75 MG: 75 TABLET ORAL at 09:07

## 2023-07-03 RX ADMIN — CILOSTAZOL 50 MG: 50 TABLET ORAL at 09:07

## 2023-07-03 RX ADMIN — ASPIRIN 81 MG: 81 TABLET, COATED ORAL at 09:07

## 2023-07-03 RX ADMIN — HYDROCODONE BITARTRATE AND ACETAMINOPHEN 1 TABLET: 10; 325 TABLET ORAL at 11:07

## 2023-07-03 RX ADMIN — GABAPENTIN 300 MG: 300 CAPSULE ORAL at 09:07

## 2023-07-03 RX ADMIN — COLLAGENASE SANTYL: 250 OINTMENT TOPICAL at 03:07

## 2023-07-03 RX ADMIN — COLLAGENASE SANTYL: 250 OINTMENT TOPICAL at 12:07

## 2023-07-03 RX ADMIN — HYDROCODONE BITARTRATE AND ACETAMINOPHEN 1 TABLET: 10; 325 TABLET ORAL at 03:07

## 2023-07-03 RX ADMIN — MICONAZOLE NITRATE 2 % TOPICAL POWDER: at 11:07

## 2023-07-03 RX ADMIN — COLLAGENASE SANTYL: 250 OINTMENT TOPICAL at 09:07

## 2023-07-03 RX ADMIN — GABAPENTIN 300 MG: 300 CAPSULE ORAL at 03:07

## 2023-07-03 RX ADMIN — Medication: at 12:07

## 2023-07-03 RX ADMIN — LOSARTAN POTASSIUM 100 MG: 50 TABLET, FILM COATED ORAL at 09:07

## 2023-07-03 RX ADMIN — POLYETHYLENE GLYCOL 3350 17 G: 17 POWDER, FOR SOLUTION ORAL at 09:07

## 2023-07-03 RX ADMIN — HYDROCODONE BITARTRATE AND ACETAMINOPHEN 1 TABLET: 10; 325 TABLET ORAL at 12:07

## 2023-07-03 RX ADMIN — ENOXAPARIN SODIUM 40 MG: 40 INJECTION SUBCUTANEOUS at 05:07

## 2023-07-03 RX ADMIN — Medication: at 09:07

## 2023-07-03 RX ADMIN — HYDROCODONE BITARTRATE AND ACETAMINOPHEN 1 TABLET: 10; 325 TABLET ORAL at 06:07

## 2023-07-03 RX ADMIN — AMLODIPINE BESYLATE 10 MG: 5 TABLET ORAL at 09:07

## 2023-07-03 RX ADMIN — HYDROCODONE BITARTRATE AND ACETAMINOPHEN 1 TABLET: 10; 325 TABLET ORAL at 07:07

## 2023-07-03 RX ADMIN — MICONAZOLE NITRATE 2 % TOPICAL POWDER: at 12:07

## 2023-07-03 RX ADMIN — Medication: at 11:07

## 2023-07-03 RX ADMIN — THERA TABS 1 TABLET: TAB at 09:07

## 2023-07-03 RX ADMIN — MICONAZOLE NITRATE 2 % TOPICAL POWDER: at 09:07

## 2023-07-03 NOTE — PT/OT/SLP PROGRESS
Attempted OT session this AM, pt declined stating he just got up with [facility] staff and does not want to get up again. Provided extensive education on importance of mobility and increased participation. Will f/u as appropriate.

## 2023-07-03 NOTE — PROGRESS NOTES
[facility] Hospital Medicine Progress Note        Chief Complaint: Inpatient Follow-up for left ankle wound     HPI:   68 year old male with a PMH of HTN, HLD, Severe PAD,  COPD, Cervical stenosis, neuropathy, depression who presented to the ED with complaints of left lateral ankle foot wound, getting progressively worse.  He states that the wound has been present for about a year, and he has been going to wound care up until February.  At that time, it was felt that the wound was not improving, and he needed to follow-up with [facility] and possibly get another vascular evaluation.  The patient states that since then, he has been having issues with transportation and was never able to follow-up.  Since then, his wound has gotten progressively worse and more painful.  He denies any fever, chills, chest pain, shortness of breath. He states he still smokes.   Today's ED lab work revealed Na 128, all other indices unremarkable.  Left ankle x-ray showed lucency overlies the lateral malleolus suggesting open injury.  There is a mottled appearance throughout the bones that may be seen with diffuse osteopenia as multiple bones are infected osteomyelitis is not excluded.  ED vitals:  Temp 98.4° F, pulse 90, resp 19, /97, SpO2 97% on RA.  He was started on vancomycin and Zosyn and admitted to hospital medicine for management.  MRI ordered Podiatry Wound Care cardiology consulted.    pt is Status post left ankle subtalar joint arthrotomy and debridement of ankle wound , podiatry Suspected inflammatory changes from an inadverent trauma, not concerning of infection,ID recommended to monitor off of antibiotics, intra operative cx resulted negative.  Arterial flow has three-vessel runoff to the left ankle, [facility] signed off.recommended asa+plavix+pletal.    Interval Hx:   No events reported,resting comfortably on bed, no new complaints.    Objective/physical exam:  General: In no acute distress,  afebrile  Chest: Clear to auscultation bilaterally  Heart:  +S1, S2, no appreciable murmur  Abdomen: Soft, nontender, BS +  MSK: Warm, left ankle Dressings are clean, dry, and intact, wound exam per podiatry  Neurologic: Alert and oriented x4, distal senses intact left foot, able to move toes left foot  VITAL SIGNS: 24 HRS MIN & MAX LAST   Temp  Min: 97.7 °F (36.5 °C)  Max: 98.2 °F (36.8 °C) 98.2 °F (36.8 °C)   BP  Min: 116/63  Max: 132/64 121/66   Pulse  Min: 64  Max: 74  72   Resp  Min: 18  Max: 20 18   SpO2  Min: 92 %  Max: 95 % (!) 93 %     I have reviewed the following labs:    Recent Labs   Lab 06/28/23  0403 07/01/23  0811   WBC 9.25 6.58   RBC 4.35* 4.25*   HGB 13.1* 12.9*   HCT 40.4* 39.5*   MCV 92.9 92.9   MCH 30.1 30.4   MCHC 32.4* 32.7*   RDW 14.9 14.9    290   MPV 8.6 8.8       Recent Labs   Lab 06/28/23  0403 07/01/23  0811   * 135*   K 4.2 4.9   CO2 25 27   BUN 6.6* 12.5   CREATININE 0.78 0.72*   CALCIUM 8.9 8.7*          Microbiology Results (last 7 days)       Procedure Component Value Units Date/Time    Tissue Culture - Aerobic [[id number]] Collected: 06/28/23 1310    Order Status: Completed Specimen: Tissue from Ankle, Left Updated: 07/03/23 0736     CULTURE, TISSUE- AEROBIC ([facility]) No Growth at 5 days    Anaerobic Culture [[id number]] Collected: 06/28/23 1310    Order Status: Completed Specimen: Tissue from Ankle, Left Updated: 07/01/23 0904     Anaerobe Culture No Anaerobes Isolated    Blood culture #1 **CANNOT BE ORDERED STAT** [[id number]]  (Normal) Collected: 06/22/23 1754    Order Status: Completed Specimen: Blood Updated: 06/27/23 2101     CULTURE, BLOOD ([facility]) No Growth at 5 days    Blood culture #2 **CANNOT BE ORDERED STAT** [[id number]]  (Normal) Collected: 06/22/23 1754    Order Status: Completed Specimen: Blood Updated: 06/27/23 2101     CULTURE, BLOOD ([facility]) No Growth at 5 days             See below for Radiology    Scheduled Med:   amLODIPine  10 mg Oral Daily    aspirin  81 mg  Oral Daily    cilostazoL  50 mg Oral BID    clopidogreL  75 mg Oral Daily    collagenase   Topical (Top) BID    gabapentin  300 mg Oral TID    losartan  100 mg Oral Daily    miconazole NITRATE 2 %   Topical (Top) BID    multivitamin  1 tablet Oral Daily    polyethylene glycol  17 g Oral Daily    zinc oxide-cod liver oil   Topical (Top) BID        Continuous Infusions:       PRN Meds:  acetaminophen, aluminum-magnesium hydroxide-simethicone, HYDROcodone-acetaminophen, melatonin, naloxone, ondansetron, prochlorperazine, simethicone       Assessment/Plan:  Chronic left lateral ankle nonhealing ulcer s/p left ankle subtalar joint arthrotomy and debridement of ankle wound  Tibiotalar joint septic arthritis  Flexor digitorum longus and flexor hallucis longus tenosynovitis  Chronic hyponatremia-stable      History of: COPD, hyperlipidemia, essential hypertension, peripheral artery disease, diabetes mellitus type 2, tobacco use    Aerobic no growth at 5 days final  Anaerobic no growth final  Podiatry following, inputs noted  Cont wound care  Cont PT services while in hospital  Cont asa, plavix, pletal  Pain control   Monitor Bms, contBowel regimen   Case discussed with patient's nurse,    case discussed with CM Ms ClementsAndreea, still waiting on insu auth      VTE prophylaxis: lovenox    Patient condition:  Stable    Anticipated discharge and Disposition:    SNF , pt is medically stable , discharge when place availabele      _____________________________________________________________________    Nutrition Status:    Radiology:  I have personally reviewed the following imaging and agree with the radiologist.     Ankle Brachial Indices (NICK)  Bilateral non compressible lower extremity ankle-brachial indices.  CV Ultrasound doppler arterial legs bilat  Patent right lower extremity arterial system with no evidence of focal   stenosis. Diffuse plaquing with biphasic waveforms identified throughout   the lower extremity.  Patent  left lower extremity arterial system with no evidence of focal   stenosis. Diffuse calcific plaquing and monophasic waveforms identified   throughout the lower extremity.    A separate ankle-brachial index was performed that supports bilateral non   compressible vessels at the ankle.      Sayda Robles MD   07/03/2023

## 2023-07-03 NOTE — PROGRESS NOTES
Inpatient Nutrition Assessment    Admit Date: 6/22/2023   Total duration of encounter: 11 days     Nutrition Recommendation/Prescription     Continue current diet as tolerated with double portions  Encouraged adequate PO intake   Monitor appetite/PO intake, weight, and labs    Communication of Recommendations: reviewed with patient    Nutrition Assessment     Malnutrition Assessment/Nutrition-Focused Physical Exam    Malnutrition Context: chronic illness  Malnutrition Level: severe  Energy Intake (Malnutrition): less than or equal to 75% for greater than or equal to 1 month     Subcutaneous Fat (Malnutrition): severe depletion     Upper Arm Region (Subcutaneous Fat Loss): severe depletion  Thoracic and Lumbar Region: severe depletion  Muscle Mass (Malnutrition): severe depletion  Jew Region (Muscle Loss): severe depletion  Clavicle Bone Region (Muscle Loss): severe depletion  Clavicle and Acromion Bone Region (Muscle Loss): severe depletion  Scapular Bone Region (Muscle Loss): severe depletion                       A minimum of two characteristics is recommended for diagnosis of either severe or non-severe malnutrition.    Chart Review    Reason Seen: length of stay and follow-up    Malnutrition Screening Tool Results   Have you recently lost weight without trying?: No  Have you been eating poorly because of a decreased appetite?: No   MST Score: 0     Diagnosis:  Chronic left lateral ankle nonhealing ulcer s/p left ankle subtalar joint arthrotomy and debridement of ankle wound  Tibiotalar joint septic arthritis  Flexor digitorum longus and flexor hallucis longus tenosynovitis  Chronic hyponatremia-stable    Relevant Medical History:   Sever PAD  Hypertension  Hyperlipidemia  COPD  Cervical Stenosis  Neuropathy  Depression    Nutrition-Related Medications: Aspirin, multivitamin, polyethylene glycol  Calorie Containing IV Medications: no significant kcals from medications at this time    Nutrition-Related  "Labs:  6/28/23: Na 129, BUN 6.6, Gluc 95  7/1/23: Na 135, Crea 0.72, Ca 8.7    Diet/PN Order: Diet Adult Regular Double Portions  Oral Supplement Order: none  Tube Feeding Order: none  Appetite/Oral Intake: good/% of meals  Factors Affecting Nutritional Intake: decreased appetite (previous)  Food/Episcopal/Cultural Preferences: none reported  Food Allergies: none reported    Skin Integrity: bruised (ecchymotic)  Wound(s):      Altered Skin Integrity 06/29/23 0700 Groin Partial thickness tissue loss. Shallow open ulcer with a red or pink wound bed, without slough. Intact or Open/Ruptured Serum-filled blister.-Tissue loss description: Partial thickness       Altered Skin Integrity 06/29/23 1100 medial Buttocks Other (comment)-Tissue loss description: Partial thickness       Altered Skin Integrity 06/29/23 2000 Left lower;posterior;medial Arm Skin Tear Partial thickness tissue loss. Shallow open ulcer with a red or pink wound bed, without slough. Intact or Open/Ruptured Serum-filled blister.-Tissue loss description: Partial thickness       Altered Skin Integrity 06/23/23 0930 Left lateral Malleolus Arterial Ulcer-Tissue loss description: Partial thickness noted    Comments    6/28/23: Pt reports poor appetite/PO intake ~ 1 month prior to admit. Pt reports good appetite/PO intake currently. Pt denies N/V/D and chewing/swallowing difficulties. Pt reports constipation. Last BM documented as 6/25/2023. Pt wears dentures but does not have with them. Pt reports weighing 113.6 kg in 2020, reports UBW as 68.18 kg currently. Pt declined ONS. Pt requests double portions. Encouraged adequate PO intake. Will monitor.    7/3/23: Last BM 7/2/23. Patient reports great appetite and oral intake, denies any concerns at this time.     Anthropometrics    Height: 5' 9" (175.3 cm)    Last Weight: 77.1 kg (170 lb) (06/22/23 1137) Weight Method: Bed Scale  BMI (Calculated): 25.1  BMI Classification: overweight (BMI 25-29.9)        Ideal " Body Weight (IBW), Male: 160 lb     % Ideal Body Weight, Male (lb): 106.25 %                 Usual Body Weight (UBW), k.18 kg  % Usual Body Weight: 113.34     Usual Weight Provided By: patient    Wt Readings from Last 5 Encounters:   23 77.1 kg (170 lb)   23 73 kg (161 lb)   23 65.8 kg (145 lb)   22 65.8 kg (145 lb)   22 65.8 kg (145 lb)     Weight Change(s) Since Admission:  Admit Weight: 77.1 kg (170 lb) (23 1137)  2023: 77.1 kg  7/3/23: no new wt    Estimated Needs    Weight Used For Calorie Calculations: 77.1 kg (169 lb 15.6 oz)  Energy Calorie Requirements (kcal): 7548-8441 (25-30 kcal/kg)  Energy Need Method: Kcal/kg  Weight Used For Protein Calculations: 77.1 kg (169 lb 15.6 oz)  Protein Requirements: 77-93 (1.0-1.2 g/kg)  Fluid Requirements (mL): 1928 (1 mL/kcal)  Temp (24hrs), Av.1 °F (36.7 °C), Min:97.7 °F (36.5 °C), Max:98.8 °F (37.1 °C)       Enteral Nutrition    Patient not receiving enteral nutrition at this time.    Parenteral Nutrition    Patient not receiving parenteral nutrition support at this time.    Evaluation of Received Nutrient Intake    Calories: meeting estimated needs  Protein: meeting estimated needs    Patient Education    Not applicable.    Nutrition Diagnosis     PES: Malnutrition related to inability to consume sufficient nutrients as evidenced by decreased PO intake ~ 1 month and severe fat/muscle wasting. (improving)    Interventions/Goals     Intervention(s): general/healthful diet  Goal: Meet greater than 75% of nutritional needs by follow-up. (goal progressing)    Monitoring & Evaluation     Dietitian will monitor food and beverage intake, weight, electrolyte/renal panel, glucose/endocrine profile, and gastrointestinal profile.  Nutrition Risk/Follow-Up: moderate (follow-up in 3-5 days)   Please consult if re-assessment needed sooner.

## 2023-07-03 NOTE — PT/OT/SLP PROGRESS
Physical Therapy Treatment    Patient Name:  Shaheen Christie   MRN:  [id number]    Recommendations:     Discharge Recommendations: nursing facility, skilled  Discharge Equipment Recommendations: to be determined by next level of care  Barriers to discharge:  pending placement    Assessment:     Shaheen Christie is a 68 y.o. male admitted with a medical diagnosis of PAD (peripheral artery disease).  He presents with the following impairments/functional limitations: weakness, impaired endurance, impaired self care skills, gait instability, impaired balance, impaired functional mobility .    Rehab Prognosis: Good; patient would benefit from acute skilled PT services to address these deficits and reach maximum level of function.    Recent Surgery: Procedure(s) (LRB):  ARTHROTOMY, ANKLE (Left) 5 Days Post-Op    Plan:     During this hospitalization, patient to be seen 5 x/week to address the identified rehab impairments via gait training, therapeutic activities, therapeutic exercises, wheelchair management/training and progress toward the following goals:    Plan of Care Expires:  08/03/23    Subjective     Chief Complaint: L ankle pain  Patient/Family Comments/goals:   Pain/Comfort:  Pain Rating 1: 8/10  Location - Side 1: Left  Location 1: ankle  Pain Addressed 1: Pre-medicate for activity  Pain Rating Post-Intervention 1: 8/10      Objective:     Communicated with RN prior to session.  Patient found HOB elevated with telemetry, peripheral IV, pressure relief boots upon PT entry to room.     General Precautions: Standard, fall  Orthopedic Precautions: N/A  Braces: N/A  Respiratory Status: Room air         Functional Mobility:  Bed Mobility:     Scooting: independence  Supine to Sit: independence  Sit to Supine: independence  Transfers:     Sit to Stand:  min A squat pivot transfer to recliner    Therapeutic Activities/Exercises:  Supine LE there-ex 10 reps each LE with tactile cues for full ROM.   Seated LE AROM 15 reps  each LE with hip flexion and LAQ.    Education:  Patient provided with verbal education regarding POC.  Understanding was verbalized, however additional teaching warranted.     Patient left up in chair with all lines intact, call button in reach, and nurse aware of pt position    GOALS:   Multidisciplinary Problems       Physical Therapy Goals          Problem: Physical Therapy    Goal Priority Disciplines Outcome Goal Variances Interventions   Physical Therapy Goal     PT, PT/OT Ongoing, Progressing     Description: Goals to be met by: Discharge     Patient will increase functional independence with mobility by performin. Supine to sit with Modified [place]  2. Sit to supine with Modified [place]  3. Sit to stand transfer with Modified [place]  4. Bed to chair transfer with Modified [place] using LRAD, begin with squat pivot progressing to stand pivot and stand step  5. Gait  to be assessed.                         Time Tracking:     PT Received On: 23  PT Start Time: 1240     PT Stop Time: 1305  PT Total Time (min): 25 min     Billable Minutes: Therapeutic Activity 15 and Therapeutic Exercise 10    Treatment Type: Treatment  PT/PTA: PT     Number of PTA visits since last PT visit: 4     2023

## 2023-07-04 PROCEDURE — 11000001 HC ACUTE MED/SURG PRIVATE ROOM

## 2023-07-04 PROCEDURE — 25000003 PHARM REV CODE 250: Performed by: PODIATRIST

## 2023-07-04 PROCEDURE — 97530 THERAPEUTIC ACTIVITIES: CPT | Mod: CO

## 2023-07-04 PROCEDURE — 97535 SELF CARE MNGMENT TRAINING: CPT | Mod: CO

## 2023-07-04 PROCEDURE — 63600175 PHARM REV CODE 636 W HCPCS: Performed by: INTERNAL MEDICINE

## 2023-07-04 PROCEDURE — 25000003 PHARM REV CODE 250: Performed by: INTERNAL MEDICINE

## 2023-07-04 RX ADMIN — Medication: at 03:07

## 2023-07-04 RX ADMIN — GABAPENTIN 300 MG: 300 CAPSULE ORAL at 09:07

## 2023-07-04 RX ADMIN — MICONAZOLE NITRATE 2 % TOPICAL POWDER: at 03:07

## 2023-07-04 RX ADMIN — ENOXAPARIN SODIUM 40 MG: 40 INJECTION SUBCUTANEOUS at 04:07

## 2023-07-04 RX ADMIN — HYDROCODONE BITARTRATE AND ACETAMINOPHEN 1 TABLET: 10; 325 TABLET ORAL at 09:07

## 2023-07-04 RX ADMIN — ASPIRIN 81 MG: 81 TABLET, COATED ORAL at 09:07

## 2023-07-04 RX ADMIN — MICONAZOLE NITRATE 2 % TOPICAL POWDER: at 09:07

## 2023-07-04 RX ADMIN — HYDROCODONE BITARTRATE AND ACETAMINOPHEN 1 TABLET: 10; 325 TABLET ORAL at 01:07

## 2023-07-04 RX ADMIN — AMLODIPINE BESYLATE 10 MG: 5 TABLET ORAL at 09:07

## 2023-07-04 RX ADMIN — COLLAGENASE SANTYL: 250 OINTMENT TOPICAL at 09:07

## 2023-07-04 RX ADMIN — GABAPENTIN 300 MG: 300 CAPSULE ORAL at 02:07

## 2023-07-04 RX ADMIN — HYDROCODONE BITARTRATE AND ACETAMINOPHEN 1 TABLET: 10; 325 TABLET ORAL at 05:07

## 2023-07-04 RX ADMIN — CLOPIDOGREL BISULFATE 75 MG: 75 TABLET ORAL at 09:07

## 2023-07-04 RX ADMIN — Medication: at 09:07

## 2023-07-04 RX ADMIN — THERA TABS 1 TABLET: TAB at 09:07

## 2023-07-04 RX ADMIN — CILOSTAZOL 50 MG: 50 TABLET ORAL at 09:07

## 2023-07-04 RX ADMIN — LOSARTAN POTASSIUM 100 MG: 50 TABLET, FILM COATED ORAL at 09:07

## 2023-07-04 RX ADMIN — COLLAGENASE SANTYL: 250 OINTMENT TOPICAL at 03:07

## 2023-07-04 RX ADMIN — HYDROCODONE BITARTRATE AND ACETAMINOPHEN 1 TABLET: 10; 325 TABLET ORAL at 04:07

## 2023-07-04 NOTE — PROGRESS NOTES
[facility] Hospital Medicine Progress Note        Chief Complaint: Inpatient Follow-up for left ankle wound     HPI:   68 year old male with a PMH of HTN, HLD, Severe PAD,  COPD, Cervical stenosis, neuropathy, depression who presented to the ED with complaints of left lateral ankle foot wound, getting progressively worse.  He states that the wound has been present for about a year, and he has been going to wound care up until February.  At that time, it was felt that the wound was not improving, and he needed to follow-up with [facility] and possibly get another vascular evaluation.  The patient states that since then, he has been having issues with transportation and was never able to follow-up.  Since then, his wound has gotten progressively worse and more painful.  He denies any fever, chills, chest pain, shortness of breath. He states he still smokes.   Today's ED lab work revealed Na 128, all other indices unremarkable.  Left ankle x-ray showed lucency overlies the lateral malleolus suggesting open injury.  There is a mottled appearance throughout the bones that may be seen with diffuse osteopenia as multiple bones are infected osteomyelitis is not excluded.  ED vitals:  Temp 98.4° F, pulse 90, resp 19, /97, SpO2 97% on RA.  He was started on vancomycin and Zosyn and admitted to hospital medicine for management.  MRI ordered Podiatry Wound Care cardiology consulted.    pt is Status post left ankle subtalar joint arthrotomy and debridement of ankle wound , podiatry Suspected inflammatory changes from an inadverent trauma, not concerning of infection,ID recommended to monitor off of antibiotics, intra operative cx resulted negative.  Arterial flow has three-vessel runoff to the left ankle, [facility] signed off.recommended asa+plavix+pletal.    Interval Hx:   No events reported, reported pain when attempted to do plantar or dorsiflexion  , otherwise no complaints.     Objective/physical  exam:  General: In no acute distress, afebrile  Chest: Clear to auscultation bilaterally  Heart:  +S1, S2, no appreciable murmur  Abdomen: Soft, nontender, BS +  MSK: Warm, left ankle Dressings are clean, dry, and intact, wound exam per podiatry  Neurologic: Alert and oriented x4, distal senses intact left foot, able to move toes left foot  VITAL SIGNS: 24 HRS MIN & MAX LAST   Temp  Min: 97.4 °F (36.3 °C)  Max: 99.2 °F (37.3 °C) 99.2 °F (37.3 °C)   BP  Min: 111/68  Max: 136/65 130/70   Pulse  Min: 68  Max: 74  68   Resp  Min: 18  Max: 21 18   SpO2  Min: 93 %  Max: 95 % (!) 94 %     I have reviewed the following labs:    Recent Labs   Lab 06/28/23  0403 07/01/23  0811   WBC 9.25 6.58   RBC 4.35* 4.25*   HGB 13.1* 12.9*   HCT 40.4* 39.5*   MCV 92.9 92.9   MCH 30.1 30.4   MCHC 32.4* 32.7*   RDW 14.9 14.9    290   MPV 8.6 8.8       Recent Labs   Lab 06/28/23  0403 07/01/23  0811   * 135*   K 4.2 4.9   CO2 25 27   BUN 6.6* 12.5   CREATININE 0.78 0.72*   CALCIUM 8.9 8.7*          Microbiology Results (last 7 days)       Procedure Component Value Units Date/Time    Tissue Culture - Aerobic [[id number]] Collected: 06/28/23 1310    Order Status: Completed Specimen: Tissue from Ankle, Left Updated: 07/03/23 0736     CULTURE, TISSUE- AEROBIC ([facility]) No Growth at 5 days    Anaerobic Culture [[id number]] Collected: 06/28/23 1310    Order Status: Completed Specimen: Tissue from Ankle, Left Updated: 07/01/23 0904     Anaerobe Culture No Anaerobes Isolated    Blood culture #1 **CANNOT BE ORDERED STAT** [[id number]]  (Normal) Collected: 06/22/23 1754    Order Status: Completed Specimen: Blood Updated: 06/27/23 2101     CULTURE, BLOOD ([facility]) No Growth at 5 days    Blood culture #2 **CANNOT BE ORDERED STAT** [[id number]]  (Normal) Collected: 06/22/23 1754    Order Status: Completed Specimen: Blood Updated: 06/27/23 2101     CULTURE, BLOOD ([facility]) No Growth at 5 days             See below for Radiology    Scheduled Med:    amLODIPine  10 mg Oral Daily    aspirin  81 mg Oral Daily    cilostazoL  50 mg Oral BID    clopidogreL  75 mg Oral Daily    collagenase   Topical (Top) BID    enoxparin  40 mg Subcutaneous Q24H (prophylaxis, 1700)    gabapentin  300 mg Oral TID    losartan  100 mg Oral Daily    miconazole NITRATE 2 %   Topical (Top) BID    multivitamin  1 tablet Oral Daily    polyethylene glycol  17 g Oral Daily    zinc oxide-cod liver oil   Topical (Top) BID        Continuous Infusions:       PRN Meds:  acetaminophen, aluminum-magnesium hydroxide-simethicone, HYDROcodone-acetaminophen, melatonin, naloxone, ondansetron, prochlorperazine, simethicone       Assessment/Plan:  Chronic left lateral ankle nonhealing ulcer s/p left ankle subtalar joint arthrotomy and debridement of ankle wound  Tibiotalar joint septic arthritis  Flexor digitorum longus and flexor hallucis longus tenosynovitis  Chronic hyponatremia-stable      History of: COPD, hyperlipidemia, essential hypertension, peripheral artery disease, diabetes mellitus type 2, tobacco use    Cont wound care  Intra operative cx neg, no abx needed  Cont PT services while in hospital, encouraged him to mobilize   Cont asa, plavix, pletal  Pain control   Monitor Bms, cont Bowel regimen   Case discussed with patient's nurse,    case discussed with CM Ms Doran yesterday, still waiting on insu auth      VTE prophylaxis: lovenox    Patient condition:  Stable    Anticipated discharge and Disposition:    SNF , pt is medically stable , discharge when place available      _____________________________________________________________________    Nutrition Status:    Radiology:  I have personally reviewed the following imaging and agree with the radiologist.     Ankle Brachial Indices (NICK)  Bilateral non compressible lower extremity ankle-brachial indices.  CV Ultrasound doppler arterial legs bilat  Patent right lower extremity arterial system with no evidence of focal   stenosis. Diffuse  plaquing with biphasic waveforms identified throughout   the lower extremity.  Patent left lower extremity arterial system with no evidence of focal   stenosis. Diffuse calcific plaquing and monophasic waveforms identified   throughout the lower extremity.    A separate ankle-brachial index was performed that supports bilateral non   compressible vessels at the ankle.      Sayda Robles MD   07/04/2023

## 2023-07-04 NOTE — PT/OT/SLP PROGRESS
Occupational Therapy   Treatment    Name: Shaheen Christie  MRN: [id number]  Admitting Diagnosis:  PAD (peripheral artery disease)  6 Days Post-Op    Recommendations:     Discharge Recommendations: nursing facility, skilled  Discharge Equipment Recommendations:     Barriers to discharge:       Assessment:     Shaheen Christie is a 68 y.o. male with a medical diagnosis of PAD (peripheral artery disease).  Performance deficits affecting function are weakness, gait instability, impaired endurance, impaired balance, impaired functional mobility, impaired self care skills.     Rehab Prognosis:  Fair; patient would benefit from acute skilled OT services to address these deficits and reach maximum level of function.       Plan:     Patient to be seen 5 x/week to address the above listed problems via self-care/home management, therapeutic activities, therapeutic exercises  Plan of Care Expires:    Plan of Care Reviewed with: patient    Subjective     Pain/Comfort:       Objective:     Communicated with: RN prior to session.  Patient found HOB elevated with   upon OT entry to room.    General Precautions: Standard, fall    Orthopedic Precautions:N/A  Braces: N/A     Occupational Performance:     Bed Mobility:    Patient completed Supine to Sit with stand by assistance  Patient completed Sit to Supine with stand by assistance     Functional Mobility/Transfers:  Patient completed Sit <> Stand Transfer with stand by assistance  with  no assistive device   Patient completed Bed <> Chair Transfer using Stand Pivot technique with stand by assistance with no assistive device  Functional Mobility: no LOB    Activities of Daily Living:  Grooming: stand by assistance standing at sink washing hands  Lower Body Dressing: total assistance donning B sock patient stating that he doesn't wear socks at home    Therapeutic Positioning    OT interventions performed during the course of today's session in an effort to prevent and/or reduce acquired  pressure injuries:   Education on Pressure Ulcer Prevention provided  Positioning plan established with care team    Patient with significant break down and irritation on B groin, recommend RN to not use quivi 2/2 irriatation and possibly use condom cath.      Patient Education:  Patient provided with verbal education regarding OT role/goals/POC.  Understanding was verbalized.      Patient left HOB elevated with all lines intact and call button in reach    GOALS:   Multidisciplinary Problems       Occupational Therapy Goals          Problem: Occupational Therapy    Goal Priority Disciplines Outcome Interventions   Occupational Therapy Goal     OT, PT/OT Ongoing, Progressing    Description: Goals to be met by: 7/24/2023     Patient will increase functional independence with ADLs by performing:    LE Dressing with Modified [place].  Grooming while EOB with Modified [place].  Toileting from toilet with Modified [place] for hygiene and clothing management.   Toilet transfer to bedside commode with Modified [place].                         Time Tracking:     OT Date of Treatment: 07/04/23  OT Start Time: 1113  OT Stop Time: 1139  OT Total Time (min): 26 min    Billable Minutes:Self Care/Home Management 1  Therapeutic Activity 1    OT/ETHAN: ETHAN     Number of ETHAN visits since last OT visit: 3    7/4/2023

## 2023-07-05 PROCEDURE — 25000003 PHARM REV CODE 250: Performed by: INTERNAL MEDICINE

## 2023-07-05 PROCEDURE — 63600175 PHARM REV CODE 636 W HCPCS: Performed by: INTERNAL MEDICINE

## 2023-07-05 PROCEDURE — 97530 THERAPEUTIC ACTIVITIES: CPT | Mod: CQ

## 2023-07-05 PROCEDURE — 25000003 PHARM REV CODE 250: Performed by: PODIATRIST

## 2023-07-05 PROCEDURE — 97535 SELF CARE MNGMENT TRAINING: CPT | Mod: CO

## 2023-07-05 PROCEDURE — 11000001 HC ACUTE MED/SURG PRIVATE ROOM

## 2023-07-05 RX ORDER — HYDROCODONE BITARTRATE AND ACETAMINOPHEN 7.5; 325 MG/1; MG/1
1 TABLET ORAL EVERY 4 HOURS PRN
Status: DISCONTINUED | OUTPATIENT
Start: 2023-07-05 | End: 2023-07-06 | Stop reason: HOSPADM

## 2023-07-05 RX ADMIN — LOSARTAN POTASSIUM 100 MG: 50 TABLET, FILM COATED ORAL at 08:07

## 2023-07-05 RX ADMIN — GABAPENTIN 300 MG: 300 CAPSULE ORAL at 08:07

## 2023-07-05 RX ADMIN — GABAPENTIN 300 MG: 300 CAPSULE ORAL at 02:07

## 2023-07-05 RX ADMIN — AMLODIPINE BESYLATE 10 MG: 5 TABLET ORAL at 08:07

## 2023-07-05 RX ADMIN — MICONAZOLE NITRATE 2 % TOPICAL POWDER: at 08:07

## 2023-07-05 RX ADMIN — CILOSTAZOL 50 MG: 50 TABLET ORAL at 08:07

## 2023-07-05 RX ADMIN — ENOXAPARIN SODIUM 40 MG: 40 INJECTION SUBCUTANEOUS at 05:07

## 2023-07-05 RX ADMIN — THERA TABS 1 TABLET: TAB at 08:07

## 2023-07-05 RX ADMIN — ASPIRIN 81 MG: 81 TABLET, COATED ORAL at 08:07

## 2023-07-05 RX ADMIN — COLLAGENASE SANTYL: 250 OINTMENT TOPICAL at 08:07

## 2023-07-05 RX ADMIN — HYDROCODONE BITARTRATE AND ACETAMINOPHEN 1 TABLET: 10; 325 TABLET ORAL at 06:07

## 2023-07-05 RX ADMIN — HYDROCODONE BITARTRATE AND ACETAMINOPHEN 1 TABLET: 7.5; 325 TABLET ORAL at 02:07

## 2023-07-05 RX ADMIN — Medication: at 08:07

## 2023-07-05 RX ADMIN — HYDROCODONE BITARTRATE AND ACETAMINOPHEN 1 TABLET: 7.5; 325 TABLET ORAL at 10:07

## 2023-07-05 RX ADMIN — HYDROCODONE BITARTRATE AND ACETAMINOPHEN 1 TABLET: 7.5; 325 TABLET ORAL at 06:07

## 2023-07-05 RX ADMIN — CLOPIDOGREL BISULFATE 75 MG: 75 TABLET ORAL at 08:07

## 2023-07-05 NOTE — PT/OT/SLP PROGRESS
Physical Therapy Treatment    Patient Name:  Shaheen Christie   MRN:  [id number]    Recommendations:     Discharge Recommendations: nursing facility, skilled  Discharge Equipment Recommendations: walker, rolling  Barriers to discharge: Impaired mobility and pain    Assessment:     Shaheen Christie is a 68 y.o. male admitted with a medical diagnosis of PAD (peripheral artery disease).  He presents with the following impairments/functional limitations: weakness, impaired endurance, pain, impaired cardiopulmonary response to activity, impaired self care skills, impaired functional mobility .    Rehab Prognosis: Good; patient would benefit from acute skilled PT services to address these deficits and reach maximum level of function.    Recent Surgery: Procedure(s) (LRB):  ARTHROTOMY, ANKLE (Left) 7 Days Post-Op    Plan:     During this hospitalization, patient to be seen 5 x/week to address the identified rehab impairments via therapeutic activities and progress toward the following goals:    Plan of Care Expires:  08/03/23    Subjective     Chief Complaint:   Patient/Family Comments/goals:   Pain/Comfort:         Objective:     Communicated with nurse prior to session.  Patient found HOB elevated with PureWick, pulse ox (continuous), telemetry, peripheral IV upon PT entry to room.     General Precautions: Standard, fall  Orthopedic Precautions: N/A  Braces: N/A  Respiratory Status: Room air  Blood Pressure:   Skin Integrity:  Compromised skin      Functional Mobility:  Bed Mobility:     Supine to Sit: contact guard assistance  Sit to Supine: contact guard assistance    Pt limited by groin pain, refused transfers. Was able to performed BLE therex including 15 reps of LAQs, hip abd/add and ankle PF/DF. Pt upset about pain requesting to lay down.     Education:  Patient provided with verbal education regarding safety awareness and importance of functional mobility.  Understanding was verbalized, however additional teaching  warranted.     Patient left supine with all lines intact and call button in reach..    GOALS:   Multidisciplinary Problems       Physical Therapy Goals          Problem: Physical Therapy    Goal Priority Disciplines Outcome Goal Variances Interventions   Physical Therapy Goal     PT, PT/OT Ongoing, Progressing     Description: Goals to be met by: Discharge     Patient will increase functional independence with mobility by performin. Supine to sit with Modified [place]  2. Sit to supine with Modified [place]  3. Sit to stand transfer with Modified [place]  4. Bed to chair transfer with Modified [place] using LRAD, begin with squat pivot progressing to stand pivot and stand step  5. Gait  to be assessed.                         Time Tracking:     PT Received On: 23  PT Start Time: 1430     PT Stop Time: 1450  PT Total Time (min): 20 min     Billable Minutes: Therapeutic Activity 20    Treatment Type: Treatment  PT/PTA: PTA     Number of PTA visits since last PT visit: 2023

## 2023-07-05 NOTE — PT/OT/SLP PROGRESS
"Occupational Therapy   Treatment    Name: Shaheen Christie  MRN: [id number]  Admitting Diagnosis:  PAD (peripheral artery disease)  7 Days Post-Op    Recommendations:     Discharge Recommendations: nursing facility, skilled  Discharge Equipment Recommendations:  walker, rolling  Barriers to discharge:       Assessment:     Shaheen Christie is a 68 y.o. male with a medical diagnosis of PAD (peripheral artery disease).  He presents with increased pain and dizziness. Performance deficits affecting function are weakness, impaired endurance, impaired self care skills, impaired functional mobility, decreased lower extremity function.     Rehab Prognosis:  Good; patient would benefit from acute skilled OT services to address these deficits and reach maximum level of function.       Plan:     Patient to be seen 5 x/week to address the above listed problems via self-care/home management, therapeutic activities, therapeutic exercises  Plan of Care Expires:    Plan of Care Reviewed with: patient    Subjective     Pain/Comfort:   "Pain everywhere"    Objective:     Communicated with: RN prior to session.  Patient found HOB elevated with PureWick upon OT entry to room.    General Precautions: Standard, fall    Orthopedic Precautions:N/A  Braces:  (Ace bandage on L LE)  Respiratory Status: Room air  Vital Signs: Blood Pressure: 134/70  HR: 77     Occupational Performance:     Bed Mobility:    .Patient completed Supine to Sit with stand by assistance, Dizziness noted at EOB, vitals WNL.  Patient completed Sit to Supine with minimum assistance   1 LOB at EOB to R due to dizziness     FM not completed at this time, due to dizziness/fatigue    Activities of Daily Living:  Grooming: modified independence Pt completed hair brushing task seated at EOB    Pt declining to stand and perform mobility OOB at this time      Therapeutic Positioning    OT interventions performed during the course of today's session in an effort to prevent and/or " reduce acquired pressure injuries:   Education on Pressure Ulcer Prevention provided  Therapeutic positioning completed       [facility] 6 Click ADL:      Patient Education:  Patient provided with verbal education regarding OT role/goals/POC.  Understanding was verbalized, however additional teaching warranted.      Patient left HOB elevated with all lines intact and call button in reach    GOALS:   Multidisciplinary Problems       Occupational Therapy Goals          Problem: Occupational Therapy    Goal Priority Disciplines Outcome Interventions   Occupational Therapy Goal     OT, PT/OT Ongoing, Progressing    Description: Goals to be met by: 7/24/2023     Patient will increase functional independence with ADLs by performing:    LE Dressing with Modified [place].  Grooming while EOB with Modified [place].  Toileting from toilet with Modified [place] for hygiene and clothing management.   Toilet transfer to bedside commode with Modified [place].                         Time Tracking:     OT Date of Treatment: 07/05/23  OT Start Time: 0911  OT Stop Time: 0927  OT Total Time (min): 16 min    Billable Minutes:Self Care/Home Management 1    OT/ETHAN: ETHAN     Number of ETHAN visits since last OT visit: 4    7/5/2023

## 2023-07-05 NOTE — PROGRESS NOTES
[facility] Hospital Medicine Progress Note        Chief Complaint: Inpatient Follow-up for left ankle wound     HPI:   68 year old male with a PMH of HTN, HLD, Severe PAD,  COPD, Cervical stenosis, neuropathy, depression who presented to the ED with complaints of left lateral ankle foot wound, getting progressively worse.  He states that the wound has been present for about a year, and he has been going to wound care up until February.  At that time, it was felt that the wound was not improving, and he needed to follow-up with [facility] and possibly get another vascular evaluation.  The patient states that since then, he has been having issues with transportation and was never able to follow-up.  Since then, his wound has gotten progressively worse and more painful.  He denies any fever, chills, chest pain, shortness of breath. He states he still smokes.   Today's ED lab work revealed Na 128, all other indices unremarkable.  Left ankle x-ray showed lucency overlies the lateral malleolus suggesting open injury.  There is a mottled appearance throughout the bones that may be seen with diffuse osteopenia as multiple bones are infected osteomyelitis is not excluded.  ED vitals:  Temp 98.4° F, pulse 90, resp 19, /97, SpO2 97% on RA.  He was started on vancomycin and Zosyn and admitted to hospital medicine for management.  MRI ordered Podiatry Wound Care cardiology consulted.    pt is Status post left ankle subtalar joint arthrotomy and debridement of ankle wound , podiatry Suspected inflammatory changes from an inadverent trauma, not concerning of infection,ID recommended to monitor off of antibiotics, intra operative final cx resulted negative.Arterial flow has three-vessel runoff to the left ankle, [facility] signed off.recommended asa+plavix+pletal.Pt worked with PT recommended SNF .     Interval Hx:   No events reported, lying comfortably on bed , no complaints today, pain better, encouraged  to work with PT.    Objective/physical exam:  General: In no acute distress, afebrile  Chest: Clear to auscultation bilaterally  Heart:  +S1, S2, no appreciable murmur  Abdomen: Soft, nontender, BS +  MSK: Warm, left ankle Dressings are clean, dry, and intact, wound exam per podiatry  Neurologic: Alert and oriented x4, distal senses intact left foot, able to move toes left foot  VITAL SIGNS: 24 HRS MIN & MAX LAST   Temp  Min: 98 °F (36.7 °C)  Max: 99.5 °F (37.5 °C) 99.3 °F (37.4 °C)   BP  Min: 118/56  Max: 122/61 (!) 118/56   Pulse  Min: 73  Max: 80  74   Resp  Min: 18  Max: 19 19   SpO2  Min: 94 %  Max: 95 % (!) 94 %     I have reviewed the following labs:    Recent Labs   Lab 07/01/23  0811   WBC 6.58   RBC 4.25*   HGB 12.9*   HCT 39.5*   MCV 92.9   MCH 30.4   MCHC 32.7*   RDW 14.9      MPV 8.8       Recent Labs   Lab 07/01/23  0811   *   K 4.9   CO2 27   BUN 12.5   CREATININE 0.72*   CALCIUM 8.7*          Microbiology Results (last 7 days)       Procedure Component Value Units Date/Time    Tissue Culture - Aerobic [[id number]] Collected: 06/28/23 1310    Order Status: Completed Specimen: Tissue from Ankle, Left Updated: 07/03/23 0736     CULTURE, TISSUE- AEROBIC ([facility]) No Growth at 5 days    Anaerobic Culture [[id number]] Collected: 06/28/23 1310    Order Status: Completed Specimen: Tissue from Ankle, Left Updated: 07/01/23 0904     Anaerobe Culture No Anaerobes Isolated             See below for Radiology    Scheduled Med:   amLODIPine  10 mg Oral Daily    aspirin  81 mg Oral Daily    cilostazoL  50 mg Oral BID    clopidogreL  75 mg Oral Daily    collagenase   Topical (Top) BID    enoxparin  40 mg Subcutaneous Q24H (prophylaxis, 1700)    gabapentin  300 mg Oral TID    losartan  100 mg Oral Daily    miconazole NITRATE 2 %   Topical (Top) BID    multivitamin  1 tablet Oral Daily    polyethylene glycol  17 g Oral Daily    zinc oxide-cod liver oil   Topical (Top) BID        Continuous Infusions:       PRN  Meds:  acetaminophen, aluminum-magnesium hydroxide-simethicone, HYDROcodone-acetaminophen, melatonin, naloxone, ondansetron, prochlorperazine, simethicone       Assessment/Plan:  Chronic left lateral ankle nonhealing ulcer s/p left ankle subtalar joint arthrotomy and debridement of ankle wound  Tibiotalar joint septic arthritis  Flexor digitorum longus and flexor hallucis longus tenosynovitis  Chronic hyponatremia-stable      History of: COPD, hyperlipidemia, essential hypertension, peripheral artery disease, diabetes mellitus type 2, tobacco use    Cont PT services while in hospital, encouraged him to mobilize   Cont wound care  Intra operative cx neg, no abx needed  Cont asa, plavix, pletal  Pain control   Monitor Bms, cont Bowel regimen   Case discussed with patient's nurse,    case discussed with CM Ms ClementsAndreea today, still waiting on insu auth  Check labs in AM     VTE prophylaxis: lovenox    Patient condition:  Stable    Anticipated discharge and Disposition:    SNF , pt is medically stable , discharge when place available      _____________________________________________________________________    Nutrition Status:    Radiology:  I have personally reviewed the following imaging and agree with the radiologist.     Ankle Brachial Indices (NICK)  Bilateral non compressible lower extremity ankle-brachial indices.  CV Ultrasound doppler arterial legs bilat  Patent right lower extremity arterial system with no evidence of focal   stenosis. Diffuse plaquing with biphasic waveforms identified throughout   the lower extremity.  Patent left lower extremity arterial system with no evidence of focal   stenosis. Diffuse calcific plaquing and monophasic waveforms identified   throughout the lower extremity.    A separate ankle-brachial index was performed that supports bilateral non   compressible vessels at the ankle.      Sayda Robles MD   07/05/2023

## 2023-07-05 NOTE — PLAN OF CARE
Spoke to Karen at [facility]. Ins Auth was submitted on Monday 7/3. Additional updates sent in case they are needed. Awaiting auth decision at this time.

## 2023-07-06 VITALS
WEIGHT: 170 LBS | HEART RATE: 66 BPM | HEIGHT: 69 IN | BODY MASS INDEX: 25.18 KG/M2 | DIASTOLIC BLOOD PRESSURE: 63 MMHG | OXYGEN SATURATION: 94 % | RESPIRATION RATE: 18 BRPM | TEMPERATURE: 99 F | SYSTOLIC BLOOD PRESSURE: 121 MMHG

## 2023-07-06 LAB
ANION GAP SERPL CALC-SCNC: 6 MEQ/L
BASOPHILS # BLD AUTO: 0.12 X10(3)/MCL
BASOPHILS NFR BLD AUTO: 1.6 %
BUN SERPL-MCNC: 13.1 MG/DL (ref 8.4–25.7)
CALCIUM SERPL-MCNC: 8.9 MG/DL (ref 8.8–10)
CHLORIDE SERPL-SCNC: 100 MMOL/L (ref 98–107)
CO2 SERPL-SCNC: 25 MMOL/L (ref 23–31)
CREAT SERPL-MCNC: 0.64 MG/DL (ref 0.73–1.18)
CREAT/UREA NIT SERPL: 20
EOSINOPHIL # BLD AUTO: 0.27 X10(3)/MCL (ref 0–0.9)
EOSINOPHIL NFR BLD AUTO: 3.7 %
ERYTHROCYTE [DISTWIDTH] IN BLOOD BY AUTOMATED COUNT: 14.8 % (ref 11.5–17)
GFR SERPLBLD CREATININE-BSD FMLA CKD-EPI: >60 MLS/MIN/1.73/M2
GLUCOSE SERPL-MCNC: 92 MG/DL (ref 82–115)
HCT VFR BLD AUTO: 38.2 % (ref 42–52)
HGB BLD-MCNC: 12.6 G/DL (ref 14–18)
IMM GRANULOCYTES # BLD AUTO: 0.03 X10(3)/MCL (ref 0–0.04)
IMM GRANULOCYTES NFR BLD AUTO: 0.4 %
LYMPHOCYTES # BLD AUTO: 1.54 X10(3)/MCL (ref 0.6–4.6)
LYMPHOCYTES NFR BLD AUTO: 21.1 %
MCH RBC QN AUTO: 30.1 PG (ref 27–31)
MCHC RBC AUTO-ENTMCNC: 33 G/DL (ref 33–36)
MCV RBC AUTO: 91.2 FL (ref 80–94)
MONOCYTES # BLD AUTO: 0.77 X10(3)/MCL (ref 0.1–1.3)
MONOCYTES NFR BLD AUTO: 10.5 %
NEUTROPHILS # BLD AUTO: 4.58 X10(3)/MCL (ref 2.1–9.2)
NEUTROPHILS NFR BLD AUTO: 62.7 %
NRBC BLD AUTO-RTO: 0 %
PLATELET # BLD AUTO: 346 X10(3)/MCL (ref 130–400)
PMV BLD AUTO: 8.5 FL (ref 7.4–10.4)
POTASSIUM SERPL-SCNC: 4.2 MMOL/L (ref 3.5–5.1)
RBC # BLD AUTO: 4.19 X10(6)/MCL (ref 4.7–6.1)
SODIUM SERPL-SCNC: 131 MMOL/L (ref 136–145)
WBC # SPEC AUTO: 7.31 X10(3)/MCL (ref 4.5–11.5)

## 2023-07-06 PROCEDURE — 97530 THERAPEUTIC ACTIVITIES: CPT | Mod: CO

## 2023-07-06 PROCEDURE — 80048 BASIC METABOLIC PNL TOTAL CA: CPT | Performed by: INTERNAL MEDICINE

## 2023-07-06 PROCEDURE — 25000003 PHARM REV CODE 250: Performed by: PODIATRIST

## 2023-07-06 PROCEDURE — 25000003 PHARM REV CODE 250: Performed by: INTERNAL MEDICINE

## 2023-07-06 PROCEDURE — 97164 PT RE-EVAL EST PLAN CARE: CPT

## 2023-07-06 PROCEDURE — 85025 COMPLETE CBC W/AUTO DIFF WBC: CPT | Performed by: INTERNAL MEDICINE

## 2023-07-06 RX ORDER — GABAPENTIN 300 MG/1
300 CAPSULE ORAL 3 TIMES DAILY
Status: ON HOLD
Start: 2023-07-06

## 2023-07-06 RX ADMIN — MICONAZOLE NITRATE 2 % TOPICAL POWDER: at 09:07

## 2023-07-06 RX ADMIN — GABAPENTIN 300 MG: 300 CAPSULE ORAL at 09:07

## 2023-07-06 RX ADMIN — ASPIRIN 81 MG: 81 TABLET, COATED ORAL at 09:07

## 2023-07-06 RX ADMIN — AMLODIPINE BESYLATE 10 MG: 5 TABLET ORAL at 09:07

## 2023-07-06 RX ADMIN — Medication: at 09:07

## 2023-07-06 RX ADMIN — LOSARTAN POTASSIUM 100 MG: 50 TABLET, FILM COATED ORAL at 09:07

## 2023-07-06 RX ADMIN — HYDROCODONE BITARTRATE AND ACETAMINOPHEN 1 TABLET: 7.5; 325 TABLET ORAL at 05:07

## 2023-07-06 RX ADMIN — COLLAGENASE SANTYL: 250 OINTMENT TOPICAL at 09:07

## 2023-07-06 RX ADMIN — HYDROCODONE BITARTRATE AND ACETAMINOPHEN 1 TABLET: 7.5; 325 TABLET ORAL at 01:07

## 2023-07-06 RX ADMIN — HYDROCODONE BITARTRATE AND ACETAMINOPHEN 1 TABLET: 7.5; 325 TABLET ORAL at 09:07

## 2023-07-06 RX ADMIN — THERA TABS 1 TABLET: TAB at 09:07

## 2023-07-06 RX ADMIN — CILOSTAZOL 50 MG: 50 TABLET ORAL at 09:07

## 2023-07-06 RX ADMIN — CLOPIDOGREL BISULFATE 75 MG: 75 TABLET ORAL at 09:07

## 2023-07-06 NOTE — PLAN OF CARE
Problem: Adult Inpatient Plan of Care  Goal: Plan of Care Review  Outcome: Met  Goal: Patient-Specific Goal (Individualized)  Outcome: Met  Goal: Absence of Hospital-Acquired Illness or Injury  Outcome: Met  Goal: Optimal Comfort and Wellbeing  Outcome: Met  Goal: Readiness for Transition of Care  Outcome: Met     Problem: Skin Injury Risk Increased  Goal: Skin Health and Integrity  Outcome: Met     Problem: Impaired Wound Healing  Goal: Optimal Wound Healing  Outcome: Met     Problem: Physical Therapy  Goal: Physical Therapy Goal  Description: Goals to be met by: Discharge     Patient will increase functional independence with mobility by performin. Supine to sit with Modified [place]  2. Sit to supine with Modified [place]  3. Sit to stand transfer with Modified [place]  4. Bed to chair transfer with Modified [place] using LRAD, begin with squat pivot progressing to stand pivot and stand step  5. Ambulate 50 ft with RW & Modified [place]    Outcome: Met     Problem: Occupational Therapy  Goal: Occupational Therapy Goal  Description: Goals to be met by: 2023     Patient will increase functional independence with ADLs by performing:    LE Dressing with Modified [place].  Grooming while EOB with Modified [place].  Toileting from toilet with Modified [place] for hygiene and clothing management.   Toilet transfer to bedside commode with Modified [place].    Outcome: Met

## 2023-07-06 NOTE — PLAN OF CARE
Problem: Physical Therapy  Goal: Physical Therapy Goal  Description: Goals to be met by: Discharge     Patient will increase functional independence with mobility by performin. Supine to sit with Modified [place]  2. Sit to supine with Modified [place]  3. Sit to stand transfer with Modified [place]  4. Bed to chair transfer with Modified [place] using LRAD, begin with squat pivot progressing to stand pivot and stand step  5. Ambulate 50 ft with RW & Modified [place]    2023 1230 by Cielo Allen PT  Outcome: Revised

## 2023-07-06 NOTE — PLAN OF CARE
07/06/23 1134   Final Note   Assessment Type Discharge Planning Assessment   Anticipated Discharge Disposition SNF   Post-Acute Status   Post-Acute Authorization Placement   Patient choice form signed by patient/caregiver List from [employer]   Discharge Delays None known at this time     Pt accepted to [facility]

## 2023-07-06 NOTE — PT/OT/SLP RE-EVAL
Physical Therapy Re-Evaluation    Patient Name:  Shaheen Christie   MRN:  [id number]    Recommendations:     Discharge Recommendations: nursing facility, skilled   Discharge Equipment Recommendations:  (TBD by next level of care)   Barriers to discharge: None    Assessment:     Shaheen Christie is a 68 y.o. male admitted with a medical diagnosis of PAD (peripheral artery disease).  He presents with the following impairments/functional limitations: weakness, impaired endurance, impaired self care skills, impaired functional mobility, gait instability, impaired balance, decreased lower extremity function, decreased safety awareness, pain. Patient has made fair progress with PT. Continues to be limited by pain. Mostly SBA for all mobility, but refuses to ambulate 2/2 pain. Ready for discharge to SNF.     Rehab Prognosis: Good; patient would benefit from acute skilled PT services to address these deficits and reach maximum level of function.    Recent Surgery: Procedure(s) (LRB):  ARTHROTOMY, ANKLE (Left) 8 Days Post-Op    Plan:     During this hospitalization, patient to be seen 5 x/week to address the identified rehab impairments via gait training, therapeutic activities, therapeutic exercises, neuromuscular re-education and progress toward the following goals:    Plan of Care Expires:  08/03/23    Subjective     Chief Complaint: pain  Patient/Family Comments/goals: none  Pain/Comfort:  Pain Rating 1: 9/10  Location - Side 1: Left  Location 1: foot (foot)  Pain Addressed 1: Reposition, Distraction  Pain Rating Post-Intervention 1: 9/10    Patients cultural, spiritual, Synagogue conflicts given the current situation: no    Objective:     Communicated with nurse prior to session.  Patient found sitting edge of bed with telemetry  upon PT entry to room.    General Precautions: Standard, fall  Orthopedic Precautions:N/A   Braces: N/A  Respiratory Status: Room air    Exams:  Cognitive Exam:  Patient is oriented to Person,  Place, Time, and Situation  Sensation: -       Intact  RLE ROM: WFL  RLE Strength: 4/5 grossly  LLE ROM: WFL  LLE Strength: Limited by pain. Expect generalized weakness  Skin integrity: Wound left ankle/foot. Covered with bandage      Functional Mobility:  Bed Mobility:     Scooting: stand by assistance  Sit to Supine: stand by assistance  Transfers:     Sit to Stand:  stand by assistance with hand-held assist  Bed to Chair: stand by assistance with  no AD  using  Stand Pivot  Balance: fair      AM-PAC 6 CLICK MOBILITY  Total Score:18     Education Provided:  Role and goals of PT, transfer training, bed mobility, gait training, balance training, safety awareness, assistive device, strengthening exercises, deep breathing techniques, and importance of participating in PT to return to PLOF.    Expected compliance:  Moderate      Patient left supine with all lines intact and call button in reach.    GOALS:   Multidisciplinary Problems       Physical Therapy Goals          Problem: Physical Therapy    Goal Priority Disciplines Outcome Goal Variances Interventions   Physical Therapy Goal     PT, PT/OT Ongoing, Progressing     Description: Goals to be met by: Discharge     Patient will increase functional independence with mobility by performin. Supine to sit with Modified [place]  2. Sit to supine with Modified [place]  3. Sit to stand transfer with Modified [place]  4. Bed to chair transfer with Modified [place] using LRAD, begin with squat pivot progressing to stand pivot and stand step  5. Ambulate 50 ft with RW & Modified [place]                         History:     Past Medical History:   Diagnosis Date    COPD (chronic obstructive pulmonary disease)     Depression     Hypertension     Neuropathy        Past Surgical History:   Procedure Laterality Date    angiogram Bilateral     stents placed in left leg    anterior neck surgery      ARTHROTOMY OF ANKLE Left 2023    Procedure:  ARTHROTOMY, ANKLE;  Surgeon: Tom Nichole DPM;  Location: [facility];  Service: Podiatry;  Laterality: Left;    cataracts Left     CHOLECYSTECTOMY      EYE SURGERY Left     HAND SURGERY Left     broken bone    herina repair      KNEE SURGERY Bilateral     posterior neck surgery      SPINE SURGERY      TOTAL REPLACEMENT OF BOTH HIP JOINTS USING COMPUTER-ASSISTED NAVIGATION Bilateral        Time Tracking:     PT Received On: 07/06/23  PT Start Time: 1020     PT Stop Time: 1035  PT Total Time (min): 15 min     Billable Minutes: Re-eval 15 minutes      07/06/2023

## 2023-07-06 NOTE — PLAN OF CARE
Reached out to Karen at [facility] for update on the status of this patient's ins auth. Awaiting response.

## 2023-07-06 NOTE — PT/OT/SLP PROGRESS
Occupational Therapy   Treatment    Name: Shaheen Christie  MRN: [id number]  Admitting Diagnosis:  PAD (peripheral artery disease)  8 Days Post-Op    Recommendations:     Discharge Recommendations: nursing facility, skilled  Discharge Equipment Recommendations:  to be determined by next level of care  Barriers to discharge:  None    Assessment:     Shaheen Christie is a 68 y.o. male with a medical diagnosis of PAD (peripheral artery disease).  Performance deficits affecting function are weakness, impaired endurance, impaired self care skills, impaired functional mobility, decreased lower extremity function.     Rehab Prognosis:  Good; patient would benefit from acute skilled OT services to address these deficits and reach maximum level of function.       Plan:     Patient to be seen 5 x/week to address the above listed problems via self-care/home management, therapeutic activities, therapeutic exercises  Plan of Care Expires:    Plan of Care Reviewed with: patient    Subjective     Pain/Comfort:  Location - Side 1: Left  Location 1: leg  Pain Addressed 1: Reposition, Distraction, Pre-medicate for activity    Objective:     Communicated with: Patient found HOB elevated with pulse ox (continuous), telemetry upon OT entry to room.    General Precautions: Standard, fall    Orthopedic Precautions:N/A  Braces:  (Ace bandage on L LE)  Respiratory Status: Room air  Vital Signs: Respiratory Status: on room air     Occupational Performance:     Bed Mobility:    Patient completed Supine to Sit with stand by assistance with extended time to complete 2/2 pain      Functional Mobility/Transfers:  Functional Mobility: lateral scooting towards HOB with SBA for task completion.         Therapeutic Positioning    OT interventions performed during the course of today's session in an effort to prevent and/or reduce acquired pressure injuries:   Education on Pressure Ulcer Prevention provided    Skin assessment: all bony prominences were  assessed    Findings: no redness or breakdown noted    [facility] 6 Click ADL:      Patient Education:  Patient provided with verbal education regarding OT role/goals/POC and pressure ulcer prevention.  Understanding was verbalized, however additional teaching warranted.      Patient left sitting edge of bed with all lines intact, call button in reach, and PT present    GOALS:   Multidisciplinary Problems       Occupational Therapy Goals       Not on file              Multidisciplinary Problems (Resolved)          Problem: Occupational Therapy    Goal Priority Disciplines Outcome Interventions   Occupational Therapy Goal   (Resolved)     OT, PT/OT Met    Description: Goals to be met by: 7/24/2023     Patient will increase functional independence with ADLs by performing:    LE Dressing with Modified [place].  Grooming while EOB with Modified [place].  Toileting from toilet with Modified Hunter for hygiene and clothing management.   Toilet transfer to bedside commode with Modified Hunter.                         Time Tracking:     OT Date of Treatment: 07/06/23  OT Start Time: 1020  OT Stop Time: 1030  OT Total Time (min): 10 min    Billable Minutes:Therapeutic Activity 10    OT/ETHAN: ETHAN     Number of ETHAN visits since last OT visit: 5    7/6/2023

## 2023-07-27 ENCOUNTER — HOSPITAL ENCOUNTER (INPATIENT)
Facility: HOSPITAL | Age: 68
LOS: 3 days | Discharge: NURSING, REHAB OR LONG-TERM CARE | DRG: 487 | End: 2023-07-31
Attending: STUDENT IN AN ORGANIZED HEALTH CARE EDUCATION/TRAINING PROGRAM | Admitting: INTERNAL MEDICINE
Payer: MEDICARE

## 2023-07-27 DIAGNOSIS — M25.562 PAIN AND SWELLING OF LEFT KNEE: Primary | ICD-10-CM

## 2023-07-27 DIAGNOSIS — M00.862 ARTHRITIS OF LEFT KNEE DUE TO OTHER BACTERIA: ICD-10-CM

## 2023-07-27 DIAGNOSIS — M25.462 PAIN AND SWELLING OF LEFT KNEE: Primary | ICD-10-CM

## 2023-07-27 DIAGNOSIS — M00.862 ARTHRITIS DUE TO OTHER BACTERIA, LEFT KNEE: ICD-10-CM

## 2023-07-27 LAB
ALBUMIN SERPL-MCNC: 3.2 G/DL (ref 3.4–4.8)
ALBUMIN/GLOB SERPL: 0.7 RATIO (ref 1.1–2)
ALP SERPL-CCNC: 101 UNIT/L (ref 40–150)
ALT SERPL-CCNC: 13 UNIT/L (ref 0–55)
AST SERPL-CCNC: 13 UNIT/L (ref 5–34)
BASOPHILS # BLD AUTO: 0.13 X10(3)/MCL
BASOPHILS NFR BLD AUTO: 1.2 %
BILIRUBIN DIRECT+TOT PNL SERPL-MCNC: 0.4 MG/DL
BUN SERPL-MCNC: 19.5 MG/DL (ref 8.4–25.7)
CALCIUM SERPL-MCNC: 9.7 MG/DL (ref 8.8–10)
CHLORIDE SERPL-SCNC: 105 MMOL/L (ref 98–107)
CO2 SERPL-SCNC: 27 MMOL/L (ref 23–31)
CREAT SERPL-MCNC: 0.73 MG/DL (ref 0.73–1.18)
CRP SERPL-MCNC: 63.9 MG/L
EOSINOPHIL # BLD AUTO: 0.42 X10(3)/MCL (ref 0–0.9)
EOSINOPHIL NFR BLD AUTO: 4 %
ERYTHROCYTE [DISTWIDTH] IN BLOOD BY AUTOMATED COUNT: 14.7 % (ref 11.5–17)
ERYTHROCYTE [SEDIMENTATION RATE] IN BLOOD: 71 MM/HR (ref 0–15)
GFR SERPLBLD CREATININE-BSD FMLA CKD-EPI: >60 MLS/MIN/1.73/M2
GLOBULIN SER-MCNC: 4.3 GM/DL (ref 2.4–3.5)
GLUCOSE SERPL-MCNC: 106 MG/DL (ref 82–115)
HCT VFR BLD AUTO: 37.4 % (ref 42–52)
HGB BLD-MCNC: 12.4 G/DL (ref 14–18)
IMM GRANULOCYTES # BLD AUTO: 0.03 X10(3)/MCL (ref 0–0.04)
IMM GRANULOCYTES NFR BLD AUTO: 0.3 %
LYMPHOCYTES # BLD AUTO: 2.09 X10(3)/MCL (ref 0.6–4.6)
LYMPHOCYTES NFR BLD AUTO: 19.9 %
MCH RBC QN AUTO: 30 PG (ref 27–31)
MCHC RBC AUTO-ENTMCNC: 33.2 G/DL (ref 33–36)
MCV RBC AUTO: 90.3 FL (ref 80–94)
MONOCYTES # BLD AUTO: 1.12 X10(3)/MCL (ref 0.1–1.3)
MONOCYTES NFR BLD AUTO: 10.7 %
NEUTROPHILS # BLD AUTO: 6.71 X10(3)/MCL (ref 2.1–9.2)
NEUTROPHILS NFR BLD AUTO: 63.9 %
NRBC BLD AUTO-RTO: 0 %
PLATELET # BLD AUTO: 307 X10(3)/MCL (ref 130–400)
PMV BLD AUTO: 9.1 FL (ref 7.4–10.4)
POTASSIUM SERPL-SCNC: 3.7 MMOL/L (ref 3.5–5.1)
PROT SERPL-MCNC: 7.5 GM/DL (ref 5.8–7.6)
RBC # BLD AUTO: 4.14 X10(6)/MCL (ref 4.7–6.1)
SODIUM SERPL-SCNC: 140 MMOL/L (ref 136–145)
WBC # SPEC AUTO: 10.5 X10(3)/MCL (ref 4.5–11.5)

## 2023-07-27 PROCEDURE — 99285 EMERGENCY DEPT VISIT HI MDM: CPT | Mod: 25

## 2023-07-27 PROCEDURE — 96374 THER/PROPH/DIAG INJ IV PUSH: CPT

## 2023-07-27 PROCEDURE — 20610 DRAIN/INJ JOINT/BURSA W/O US: CPT | Mod: LT

## 2023-07-27 PROCEDURE — 85025 COMPLETE CBC W/AUTO DIFF WBC: CPT | Performed by: NURSE PRACTITIONER

## 2023-07-27 PROCEDURE — 85652 RBC SED RATE AUTOMATED: CPT | Performed by: STUDENT IN AN ORGANIZED HEALTH CARE EDUCATION/TRAINING PROGRAM

## 2023-07-27 PROCEDURE — 80053 COMPREHEN METABOLIC PANEL: CPT | Performed by: NURSE PRACTITIONER

## 2023-07-27 PROCEDURE — 86140 C-REACTIVE PROTEIN: CPT | Performed by: STUDENT IN AN ORGANIZED HEALTH CARE EDUCATION/TRAINING PROGRAM

## 2023-07-27 PROCEDURE — 96375 TX/PRO/DX INJ NEW DRUG ADDON: CPT

## 2023-07-27 RX ORDER — ONDANSETRON 2 MG/ML
4 INJECTION INTRAMUSCULAR; INTRAVENOUS
Status: COMPLETED | OUTPATIENT
Start: 2023-07-28 | End: 2023-07-28

## 2023-07-27 RX ORDER — MORPHINE SULFATE 4 MG/ML
4 INJECTION, SOLUTION INTRAMUSCULAR; INTRAVENOUS
Status: COMPLETED | OUTPATIENT
Start: 2023-07-28 | End: 2023-07-28

## 2023-07-27 NOTE — FIRST PROVIDER EVALUATION
"Medical screening examination initiated.  I have conducted a focused provider triage encounter, findings are as follows:    Brief history of present illness:  69 y/o male with worsening left knee swelling an dpain over last 3 weeks. Has had 2 previous surgeries to the knee. Did have recent surgery to left heel for ulcer which is doing okay. Also adds he has sacral wound and would like a wedge.l     Vitals:    07/27/23 1352   BP: (!) 144/72   Pulse: 96   Resp: 20   Temp: 97.5 °F (36.4 °C)   TempSrc: Temporal   SpO2: 97%   Weight: 72.6 kg (160 lb)   Height: 5' 9" (1.753 m)       Pertinent physical exam:  alert, in wheel chair, left foot in unna appearing boot, left knee is swollen and warm.     Brief workup plan:  labs, xray    Preliminary workup initiated; this workup will be continued and followed by the physician or advanced practice provider that is assigned to the patient when roomed.  "

## 2023-07-28 ENCOUNTER — ANESTHESIA EVENT (OUTPATIENT)
Dept: SURGERY | Facility: HOSPITAL | Age: 68
DRG: 487 | End: 2023-07-28
Payer: MEDICARE

## 2023-07-28 ENCOUNTER — ANESTHESIA (OUTPATIENT)
Dept: SURGERY | Facility: HOSPITAL | Age: 68
DRG: 487 | End: 2023-07-28
Payer: MEDICARE

## 2023-07-28 LAB
CLARITY BODY FLUID (OHS): NORMAL
COLOR BODY FLUID (OHS): YELLOW
CRYSTAL, BODY FLUID (OHS): NORMAL
CRYSTAL, BODY FLUID TYPE (OHS): NORMAL
GRAM STN SPEC: NORMAL
GRAM STN SPEC: NORMAL
NEUTROPHILS MAN BF (OHS): 100 %
SLIDE CRYSTALS (OHS): NORMAL
WBC # FLD AUTO: 7922 /UL

## 2023-07-28 PROCEDURE — D9220A PRA ANESTHESIA: ICD-10-PCS | Mod: CRNA,,, | Performed by: NURSE ANESTHETIST, CERTIFIED REGISTERED

## 2023-07-28 PROCEDURE — D9220A PRA ANESTHESIA: Mod: CRNA,,, | Performed by: NURSE ANESTHETIST, CERTIFIED REGISTERED

## 2023-07-28 PROCEDURE — 27310 PR EXPLOR/DRAIN KNEE,INFECTN: ICD-10-PCS | Mod: AS,LT,, | Performed by: NURSE PRACTITIONER

## 2023-07-28 PROCEDURE — 63600175 PHARM REV CODE 636 W HCPCS: Performed by: ANESTHESIOLOGY

## 2023-07-28 PROCEDURE — 25000003 PHARM REV CODE 250: Performed by: STUDENT IN AN ORGANIZED HEALTH CARE EDUCATION/TRAINING PROGRAM

## 2023-07-28 PROCEDURE — 63600175 PHARM REV CODE 636 W HCPCS: Performed by: STUDENT IN AN ORGANIZED HEALTH CARE EDUCATION/TRAINING PROGRAM

## 2023-07-28 PROCEDURE — 99223 1ST HOSP IP/OBS HIGH 75: CPT | Mod: ,,, | Performed by: GENERAL PRACTICE

## 2023-07-28 PROCEDURE — 27310 EXPLORATION OF KNEE JOINT: CPT | Mod: LT,,, | Performed by: ORTHOPAEDIC SURGERY

## 2023-07-28 PROCEDURE — 63600175 PHARM REV CODE 636 W HCPCS: Performed by: NURSE PRACTITIONER

## 2023-07-28 PROCEDURE — 25000003 PHARM REV CODE 250: Performed by: INTERNAL MEDICINE

## 2023-07-28 PROCEDURE — 37000009 HC ANESTHESIA EA ADD 15 MINS: Performed by: ORTHOPAEDIC SURGERY

## 2023-07-28 PROCEDURE — 71000033 HC RECOVERY, INTIAL HOUR: Performed by: ORTHOPAEDIC SURGERY

## 2023-07-28 PROCEDURE — 99223 PR INITIAL HOSPITAL CARE,LEVL III: ICD-10-PCS | Mod: ,,, | Performed by: GENERAL PRACTICE

## 2023-07-28 PROCEDURE — D9220A PRA ANESTHESIA: ICD-10-PCS | Mod: ANES,,, | Performed by: ANESTHESIOLOGY

## 2023-07-28 PROCEDURE — 99223 PR INITIAL HOSPITAL CARE,LEVL III: ICD-10-PCS | Mod: 57,,, | Performed by: ORTHOPAEDIC SURGERY

## 2023-07-28 PROCEDURE — 11000001 HC ACUTE MED/SURG PRIVATE ROOM

## 2023-07-28 PROCEDURE — 87116 MYCOBACTERIA CULTURE: CPT | Performed by: GENERAL PRACTICE

## 2023-07-28 PROCEDURE — 37000008 HC ANESTHESIA 1ST 15 MINUTES: Performed by: ORTHOPAEDIC SURGERY

## 2023-07-28 PROCEDURE — 63600175 PHARM REV CODE 636 W HCPCS: Performed by: ORTHOPAEDIC SURGERY

## 2023-07-28 PROCEDURE — 20680 PR REMOVAL DEEP IMPLANT: ICD-10-PCS | Mod: AS,59,51, | Performed by: NURSE PRACTITIONER

## 2023-07-28 PROCEDURE — 89060 EXAM SYNOVIAL FLUID CRYSTALS: CPT | Performed by: STUDENT IN AN ORGANIZED HEALTH CARE EDUCATION/TRAINING PROGRAM

## 2023-07-28 PROCEDURE — 63600175 PHARM REV CODE 636 W HCPCS

## 2023-07-28 PROCEDURE — 63600175 PHARM REV CODE 636 W HCPCS: Performed by: NURSE ANESTHETIST, CERTIFIED REGISTERED

## 2023-07-28 PROCEDURE — 20680 REMOVAL OF IMPLANT DEEP: CPT | Mod: 59,51,, | Performed by: ORTHOPAEDIC SURGERY

## 2023-07-28 PROCEDURE — 87206 SMEAR FLUORESCENT/ACID STAI: CPT | Performed by: GENERAL PRACTICE

## 2023-07-28 PROCEDURE — 87205 SMEAR GRAM STAIN: CPT | Performed by: ORTHOPAEDIC SURGERY

## 2023-07-28 PROCEDURE — 25000003 PHARM REV CODE 250: Performed by: NURSE PRACTITIONER

## 2023-07-28 PROCEDURE — 87801 DETECT AGNT MULT DNA AMPLI: CPT | Mod: 90 | Performed by: GENERAL PRACTICE

## 2023-07-28 PROCEDURE — 89051 BODY FLUID CELL COUNT: CPT | Performed by: STUDENT IN AN ORGANIZED HEALTH CARE EDUCATION/TRAINING PROGRAM

## 2023-07-28 PROCEDURE — D9220A PRA ANESTHESIA: Mod: ANES,,, | Performed by: ANESTHESIOLOGY

## 2023-07-28 PROCEDURE — 87075 CULTR BACTERIA EXCEPT BLOOD: CPT | Performed by: ORTHOPAEDIC SURGERY

## 2023-07-28 PROCEDURE — 63600175 PHARM REV CODE 636 W HCPCS: Performed by: INTERNAL MEDICINE

## 2023-07-28 PROCEDURE — 25000003 PHARM REV CODE 250: Performed by: NURSE ANESTHETIST, CERTIFIED REGISTERED

## 2023-07-28 PROCEDURE — 36000705 HC OR TIME LEV I EA ADD 15 MIN: Performed by: ORTHOPAEDIC SURGERY

## 2023-07-28 PROCEDURE — 27310 EXPLORATION OF KNEE JOINT: CPT | Mod: AS,LT,, | Performed by: NURSE PRACTITIONER

## 2023-07-28 PROCEDURE — 87205 SMEAR GRAM STAIN: CPT | Performed by: STUDENT IN AN ORGANIZED HEALTH CARE EDUCATION/TRAINING PROGRAM

## 2023-07-28 PROCEDURE — 20680 PR REMOVAL DEEP IMPLANT: ICD-10-PCS | Mod: 59,51,, | Performed by: ORTHOPAEDIC SURGERY

## 2023-07-28 PROCEDURE — 87070 CULTURE OTHR SPECIMN AEROBIC: CPT | Performed by: ORTHOPAEDIC SURGERY

## 2023-07-28 PROCEDURE — 99223 1ST HOSP IP/OBS HIGH 75: CPT | Mod: 57,,, | Performed by: ORTHOPAEDIC SURGERY

## 2023-07-28 PROCEDURE — 87102 FUNGUS ISOLATION CULTURE: CPT | Performed by: ORTHOPAEDIC SURGERY

## 2023-07-28 PROCEDURE — 36000704 HC OR TIME LEV I 1ST 15 MIN: Performed by: ORTHOPAEDIC SURGERY

## 2023-07-28 PROCEDURE — 27310 PR EXPLOR/DRAIN KNEE,INFECTN: ICD-10-PCS | Mod: LT,,, | Performed by: ORTHOPAEDIC SURGERY

## 2023-07-28 PROCEDURE — 87070 CULTURE OTHR SPECIMN AEROBIC: CPT | Performed by: STUDENT IN AN ORGANIZED HEALTH CARE EDUCATION/TRAINING PROGRAM

## 2023-07-28 PROCEDURE — 20680 REMOVAL OF IMPLANT DEEP: CPT | Mod: AS,59,51, | Performed by: NURSE PRACTITIONER

## 2023-07-28 RX ORDER — LIDOCAINE HYDROCHLORIDE 20 MG/ML
INJECTION, SOLUTION EPIDURAL; INFILTRATION; INTRACAUDAL; PERINEURAL
Status: DISCONTINUED | OUTPATIENT
Start: 2023-07-28 | End: 2023-07-28

## 2023-07-28 RX ORDER — METHOCARBAMOL 100 MG/ML
1000 INJECTION, SOLUTION INTRAMUSCULAR; INTRAVENOUS ONCE
Status: DISCONTINUED | OUTPATIENT
Start: 2023-07-28 | End: 2023-07-28 | Stop reason: HOSPADM

## 2023-07-28 RX ORDER — HYDROMORPHONE HYDROCHLORIDE 2 MG/ML
0.2 INJECTION, SOLUTION INTRAMUSCULAR; INTRAVENOUS; SUBCUTANEOUS EVERY 5 MIN PRN
Status: DISCONTINUED | OUTPATIENT
Start: 2023-07-28 | End: 2023-07-28 | Stop reason: HOSPADM

## 2023-07-28 RX ORDER — MORPHINE SULFATE 10 MG/ML
2 INJECTION INTRAMUSCULAR; INTRAVENOUS; SUBCUTANEOUS EVERY 4 HOURS PRN
Status: DISPENSED | OUTPATIENT
Start: 2023-07-28 | End: 2023-07-29

## 2023-07-28 RX ORDER — DOCUSATE SODIUM 100 MG/1
100 CAPSULE, LIQUID FILLED ORAL 2 TIMES DAILY
Status: ON HOLD | COMMUNITY

## 2023-07-28 RX ORDER — POLYETHYLENE GLYCOL 3350 17 G/17G
17 POWDER, FOR SOLUTION ORAL DAILY
Status: DISCONTINUED | OUTPATIENT
Start: 2023-07-28 | End: 2023-07-31 | Stop reason: HOSPADM

## 2023-07-28 RX ORDER — HYDROCODONE BITARTRATE AND ACETAMINOPHEN 10; 325 MG/1; MG/1
1 TABLET ORAL EVERY 6 HOURS PRN
Status: DISCONTINUED | OUTPATIENT
Start: 2023-07-28 | End: 2023-07-29

## 2023-07-28 RX ORDER — ONDANSETRON 2 MG/ML
INJECTION INTRAMUSCULAR; INTRAVENOUS
Status: DISCONTINUED | OUTPATIENT
Start: 2023-07-28 | End: 2023-07-28

## 2023-07-28 RX ORDER — LOSARTAN POTASSIUM 50 MG/1
100 TABLET ORAL DAILY
Status: DISCONTINUED | OUTPATIENT
Start: 2023-07-28 | End: 2023-07-31 | Stop reason: HOSPADM

## 2023-07-28 RX ORDER — ONDANSETRON 2 MG/ML
4 INJECTION INTRAMUSCULAR; INTRAVENOUS EVERY 8 HOURS PRN
Status: DISCONTINUED | OUTPATIENT
Start: 2023-07-28 | End: 2023-07-31 | Stop reason: HOSPADM

## 2023-07-28 RX ORDER — PROPOFOL 10 MG/ML
INJECTION, EMULSION INTRAVENOUS
Status: DISCONTINUED | OUTPATIENT
Start: 2023-07-28 | End: 2023-07-28

## 2023-07-28 RX ORDER — MEPERIDINE HYDROCHLORIDE 25 MG/ML
12.5 INJECTION INTRAMUSCULAR; INTRAVENOUS; SUBCUTANEOUS ONCE
Status: DISCONTINUED | OUTPATIENT
Start: 2023-07-28 | End: 2023-07-28 | Stop reason: HOSPADM

## 2023-07-28 RX ORDER — METHOCARBAMOL 500 MG/1
500 TABLET, FILM COATED ORAL 3 TIMES DAILY PRN
Status: DISCONTINUED | OUTPATIENT
Start: 2023-07-28 | End: 2023-07-31 | Stop reason: HOSPADM

## 2023-07-28 RX ORDER — HYDROMORPHONE HYDROCHLORIDE 2 MG/ML
INJECTION, SOLUTION INTRAMUSCULAR; INTRAVENOUS; SUBCUTANEOUS
Status: COMPLETED
Start: 2023-07-28 | End: 2023-07-28

## 2023-07-28 RX ORDER — GABAPENTIN 300 MG/1
300 CAPSULE ORAL 3 TIMES DAILY
Status: DISCONTINUED | OUTPATIENT
Start: 2023-07-28 | End: 2023-07-31 | Stop reason: HOSPADM

## 2023-07-28 RX ORDER — ONDANSETRON 2 MG/ML
4 INJECTION INTRAMUSCULAR; INTRAVENOUS ONCE
Status: DISCONTINUED | OUTPATIENT
Start: 2023-07-28 | End: 2023-07-28 | Stop reason: HOSPADM

## 2023-07-28 RX ORDER — HYDROMORPHONE HYDROCHLORIDE 2 MG/ML
0.5 INJECTION, SOLUTION INTRAMUSCULAR; INTRAVENOUS; SUBCUTANEOUS EVERY 5 MIN PRN
Status: DISCONTINUED | OUTPATIENT
Start: 2023-07-28 | End: 2023-07-28 | Stop reason: HOSPADM

## 2023-07-28 RX ORDER — HYDROCODONE BITARTRATE AND ACETAMINOPHEN 10; 325 MG/1; MG/1
1 TABLET ORAL EVERY 8 HOURS PRN
Status: DISCONTINUED | OUTPATIENT
Start: 2023-07-28 | End: 2023-07-28

## 2023-07-28 RX ORDER — ASPIRIN 81 MG/1
81 TABLET ORAL DAILY
Status: DISCONTINUED | OUTPATIENT
Start: 2023-07-28 | End: 2023-07-31 | Stop reason: HOSPADM

## 2023-07-28 RX ORDER — CILOSTAZOL 50 MG/1
50 TABLET ORAL 2 TIMES DAILY
Status: DISCONTINUED | OUTPATIENT
Start: 2023-07-28 | End: 2023-07-31 | Stop reason: HOSPADM

## 2023-07-28 RX ORDER — TALC
6 POWDER (GRAM) TOPICAL NIGHTLY PRN
Status: DISCONTINUED | OUTPATIENT
Start: 2023-07-28 | End: 2023-07-31 | Stop reason: HOSPADM

## 2023-07-28 RX ORDER — PHENYLEPHRINE HCL IN 0.9% NACL 1 MG/10 ML
SYRINGE (ML) INTRAVENOUS
Status: DISCONTINUED | OUTPATIENT
Start: 2023-07-28 | End: 2023-07-28

## 2023-07-28 RX ORDER — CEFAZOLIN SODIUM 2 G/50ML
SOLUTION INTRAVENOUS
Status: DISPENSED
Start: 2023-07-28 | End: 2023-07-28

## 2023-07-28 RX ORDER — ALBUTEROL SULFATE 90 UG/1
2 AEROSOL, METERED RESPIRATORY (INHALATION) EVERY 6 HOURS PRN
Status: DISCONTINUED | OUTPATIENT
Start: 2023-07-28 | End: 2023-07-31 | Stop reason: HOSPADM

## 2023-07-28 RX ORDER — FENTANYL CITRATE 50 UG/ML
INJECTION, SOLUTION INTRAMUSCULAR; INTRAVENOUS
Status: DISCONTINUED | OUTPATIENT
Start: 2023-07-28 | End: 2023-07-28

## 2023-07-28 RX ORDER — ACETAMINOPHEN 325 MG/1
650 TABLET ORAL EVERY 8 HOURS PRN
Status: DISCONTINUED | OUTPATIENT
Start: 2023-07-28 | End: 2023-07-31 | Stop reason: HOSPADM

## 2023-07-28 RX ORDER — ONDANSETRON 2 MG/ML
4 INJECTION INTRAMUSCULAR; INTRAVENOUS ONCE AS NEEDED
Status: DISCONTINUED | OUTPATIENT
Start: 2023-07-28 | End: 2023-07-28 | Stop reason: HOSPADM

## 2023-07-28 RX ORDER — TALC
6 POWDER (GRAM) TOPICAL NIGHTLY PRN
Status: ON HOLD | COMMUNITY

## 2023-07-28 RX ORDER — MICONAZOLE NITRATE 2 %
POWDER (GRAM) TOPICAL 2 TIMES DAILY
Status: DISCONTINUED | OUTPATIENT
Start: 2023-07-28 | End: 2023-07-31 | Stop reason: HOSPADM

## 2023-07-28 RX ORDER — GLYCOPYRROLATE 0.2 MG/ML
INJECTION INTRAMUSCULAR; INTRAVENOUS
Status: DISCONTINUED | OUTPATIENT
Start: 2023-07-28 | End: 2023-07-28

## 2023-07-28 RX ORDER — ACETAMINOPHEN 325 MG/1
650 TABLET ORAL EVERY 6 HOURS PRN
Status: ON HOLD | COMMUNITY

## 2023-07-28 RX ORDER — ASCORBIC ACID 500 MG
500 TABLET ORAL 2 TIMES DAILY
COMMUNITY
End: 2024-03-15

## 2023-07-28 RX ORDER — CLOPIDOGREL BISULFATE 75 MG/1
75 TABLET ORAL DAILY
Status: DISCONTINUED | OUTPATIENT
Start: 2023-07-28 | End: 2023-07-31 | Stop reason: HOSPADM

## 2023-07-28 RX ORDER — ENOXAPARIN SODIUM 100 MG/ML
40 INJECTION SUBCUTANEOUS EVERY 24 HOURS
Status: DISCONTINUED | OUTPATIENT
Start: 2023-07-28 | End: 2023-07-31 | Stop reason: HOSPADM

## 2023-07-28 RX ORDER — SODIUM CHLORIDE 0.9 % (FLUSH) 0.9 %
10 SYRINGE (ML) INJECTION
Status: DISCONTINUED | OUTPATIENT
Start: 2023-07-28 | End: 2023-07-31 | Stop reason: HOSPADM

## 2023-07-28 RX ORDER — MORPHINE SULFATE 4 MG/ML
2 INJECTION, SOLUTION INTRAMUSCULAR; INTRAVENOUS EVERY 4 HOURS PRN
Status: DISCONTINUED | OUTPATIENT
Start: 2023-07-28 | End: 2023-07-28

## 2023-07-28 RX ORDER — AMLODIPINE BESYLATE 5 MG/1
10 TABLET ORAL DAILY
Status: DISCONTINUED | OUTPATIENT
Start: 2023-07-28 | End: 2023-07-31 | Stop reason: HOSPADM

## 2023-07-28 RX ORDER — DIPHENHYDRAMINE HYDROCHLORIDE 50 MG/ML
25 INJECTION INTRAMUSCULAR; INTRAVENOUS EVERY 6 HOURS PRN
Status: DISCONTINUED | OUTPATIENT
Start: 2023-07-28 | End: 2023-07-28 | Stop reason: HOSPADM

## 2023-07-28 RX ORDER — METHOCARBAMOL 100 MG/ML
INJECTION, SOLUTION INTRAMUSCULAR; INTRAVENOUS
Status: COMPLETED
Start: 2023-07-28 | End: 2023-07-28

## 2023-07-28 RX ORDER — DEXAMETHASONE SODIUM PHOSPHATE 4 MG/ML
INJECTION, SOLUTION INTRA-ARTICULAR; INTRALESIONAL; INTRAMUSCULAR; INTRAVENOUS; SOFT TISSUE
Status: DISCONTINUED | OUTPATIENT
Start: 2023-07-28 | End: 2023-07-28

## 2023-07-28 RX ORDER — VANCOMYCIN HYDROCHLORIDE 1 G/20ML
INJECTION, POWDER, LYOPHILIZED, FOR SOLUTION INTRAVENOUS
Status: DISCONTINUED | OUTPATIENT
Start: 2023-07-28 | End: 2023-07-28 | Stop reason: HOSPADM

## 2023-07-28 RX ORDER — VANCOMYCIN HCL IN 5 % DEXTROSE 1G/250ML
1000 PLASTIC BAG, INJECTION (ML) INTRAVENOUS
Status: DISCONTINUED | OUTPATIENT
Start: 2023-07-28 | End: 2023-07-31

## 2023-07-28 RX ORDER — LIDOCAINE HYDROCHLORIDE 10 MG/ML
INJECTION INFILTRATION; PERINEURAL
Status: DISPENSED
Start: 2023-07-28 | End: 2023-07-28

## 2023-07-28 RX ADMIN — FENTANYL CITRATE 25 MCG: 50 INJECTION, SOLUTION INTRAMUSCULAR; INTRAVENOUS at 01:07

## 2023-07-28 RX ADMIN — FENTANYL CITRATE 25 MCG: 50 INJECTION, SOLUTION INTRAMUSCULAR; INTRAVENOUS at 02:07

## 2023-07-28 RX ADMIN — Medication 50 MCG: at 01:07

## 2023-07-28 RX ADMIN — MICONAZOLE NITRATE: 20 POWDER TOPICAL at 09:07

## 2023-07-28 RX ADMIN — ONDANSETRON 4 MG: 2 INJECTION INTRAMUSCULAR; INTRAVENOUS at 02:07

## 2023-07-28 RX ADMIN — VANCOMYCIN HYDROCHLORIDE 1000 MG: 1 INJECTION, POWDER, LYOPHILIZED, FOR SOLUTION INTRAVENOUS at 09:07

## 2023-07-28 RX ADMIN — COLLAGENASE SANTYL: 250 OINTMENT TOPICAL at 09:07

## 2023-07-28 RX ADMIN — DEXAMETHASONE SODIUM PHOSPHATE 4 MG: 4 INJECTION, SOLUTION INTRA-ARTICULAR; INTRALESIONAL; INTRAMUSCULAR; INTRAVENOUS; SOFT TISSUE at 01:07

## 2023-07-28 RX ADMIN — ONDANSETRON 4 MG: 2 INJECTION INTRAMUSCULAR; INTRAVENOUS at 12:07

## 2023-07-28 RX ADMIN — HYDROMORPHONE HYDROCHLORIDE 0.2 MG: 2 INJECTION, SOLUTION INTRAMUSCULAR; INTRAVENOUS; SUBCUTANEOUS at 02:07

## 2023-07-28 RX ADMIN — GLYCOPYRROLATE 0.2 MG: 0.2 INJECTION INTRAMUSCULAR; INTRAVENOUS at 01:07

## 2023-07-28 RX ADMIN — HYDROCODONE BITARTRATE AND ACETAMINOPHEN 1 TABLET: 10; 325 TABLET ORAL at 11:07

## 2023-07-28 RX ADMIN — PROPOFOL 150 MG: 10 INJECTION, EMULSION INTRAVENOUS at 01:07

## 2023-07-28 RX ADMIN — Medication 100 MCG: at 01:07

## 2023-07-28 RX ADMIN — HYDROCODONE BITARTRATE AND ACETAMINOPHEN 1 TABLET: 10; 325 TABLET ORAL at 04:07

## 2023-07-28 RX ADMIN — HYDROMORPHONE HYDROCHLORIDE 0.5 MG: 2 INJECTION, SOLUTION INTRAMUSCULAR; INTRAVENOUS; SUBCUTANEOUS at 02:07

## 2023-07-28 RX ADMIN — ENOXAPARIN SODIUM 40 MG: 40 INJECTION SUBCUTANEOUS at 04:07

## 2023-07-28 RX ADMIN — CEFEPIME 2 G: 2 INJECTION, POWDER, FOR SOLUTION INTRAVENOUS at 11:07

## 2023-07-28 RX ADMIN — GABAPENTIN 300 MG: 300 CAPSULE ORAL at 09:07

## 2023-07-28 RX ADMIN — VANCOMYCIN HYDROCHLORIDE 1750 MG: 10 INJECTION, POWDER, LYOPHILIZED, FOR SOLUTION INTRAVENOUS at 08:07

## 2023-07-28 RX ADMIN — SODIUM CHLORIDE, SODIUM GLUCONATE, SODIUM ACETATE, POTASSIUM CHLORIDE AND MAGNESIUM CHLORIDE: 526; 502; 368; 37; 30 INJECTION, SOLUTION INTRAVENOUS at 01:07

## 2023-07-28 RX ADMIN — MORPHINE SULFATE 4 MG: 4 INJECTION INTRAVENOUS at 12:07

## 2023-07-28 RX ADMIN — GABAPENTIN 300 MG: 300 CAPSULE ORAL at 04:07

## 2023-07-28 RX ADMIN — CEFEPIME 2 G: 2 INJECTION, POWDER, FOR SOLUTION INTRAVENOUS at 04:07

## 2023-07-28 RX ADMIN — METHOCARBAMOL 1000 MG: 100 INJECTION, SOLUTION INTRAMUSCULAR; INTRAVENOUS at 03:07

## 2023-07-28 RX ADMIN — LIDOCAINE HYDROCHLORIDE 80 MG: 20 INJECTION, SOLUTION EPIDURAL; INFILTRATION; INTRACAUDAL; PERINEURAL at 01:07

## 2023-07-28 RX ADMIN — CILOSTAZOL 50 MG: 50 TABLET ORAL at 09:07

## 2023-07-28 RX ADMIN — MORPHINE SULFATE 2 MG: 10 INJECTION, SOLUTION INTRAMUSCULAR; INTRAVENOUS at 09:07

## 2023-07-28 RX ADMIN — CEFTRIAXONE SODIUM 2 G: 2 INJECTION, POWDER, FOR SOLUTION INTRAMUSCULAR; INTRAVENOUS at 07:07

## 2023-07-28 NOTE — H&P
[facility] - Emergency Dept    History & Physical      Patient Name: Shaheen Christie  MRN: [id number]  Admission Date: 7/27/2023  Attending Physician: Enzo Thibodeaux MD  Primary Care Provider: Viktor Russ MD         Patient information was obtained from patient and ER records.     Subjective:     Principal Problem:<principal problem not specified>    Chief Complaint:   Chief Complaint   Patient presents with    Knee Pain     LT knee swelling and pain; progressively getting worse over the last 6 weeks. Pt had an ulcer to his LT foot that he was admitted for approx 6 weeks.         HPI:     LT knee swelling and pain; progressively getting worse over the last 6 weeks. Pt had an ulcer to his LT foot that he was admitted for approx 6 weeks.       A 68 year old male with a history of HTN, COPD, and neuropathy is presenting to the ED with c/o left knee pain. The pt states that he has had worsening left knee pain since the beginning of the month. He notes that he was discharged from the hospital on 7/6 after being admitted for a left ankle wound and osteomyelitis. He notes that he was on several courses of antibiotics but is not currently on any.      Per chart review, the pt was discharged at the beginning of the month after being admitted for a left ankle wound and possible osteomyelitis.     Past Medical History:   Diagnosis Date    COPD (chronic obstructive pulmonary disease)     Depression     Hypertension     Neuropathy        Past Surgical History:   Procedure Laterality Date    angiogram Bilateral     stents placed in left leg    anterior neck surgery      ARTHROTOMY OF ANKLE Left 6/28/2023    Procedure: ARTHROTOMY, ANKLE;  Surgeon: Tom Nichole DPM;  Location: [facility];  Service: Podiatry;  Laterality: Left;    cataracts Left     CHOLECYSTECTOMY      EYE SURGERY Left     HAND SURGERY Left     broken bone    herina repair      KNEE SURGERY Bilateral     posterior neck surgery      SPINE  SURGERY      TOTAL REPLACEMENT OF BOTH HIP JOINTS USING COMPUTER-ASSISTED NAVIGATION Bilateral        Review of patient's allergies indicates:  No Known Allergies    No current facility-administered medications on file prior to encounter.     Current Outpatient Medications on File Prior to Encounter   Medication Sig    albuterol (PROVENTIL/VENTOLIN HFA) 90 mcg/actuation inhaler Inhale into the lungs 4 (four) times daily as needed.    amLODIPine (NORVASC) 10 MG tablet Take 10 mg by mouth once daily.    aspirin (ECOTRIN) 81 MG EC tablet Take 81 mg by mouth once daily.    cilostazoL (PLETAL) 50 MG Tab Take 50 mg by mouth 2 (two) times daily.    clopidogreL (PLAVIX) 75 mg tablet Take 75 mg by mouth once daily.    collagenase (SANTYL) ointment Apply topically 2 (two) times a day.    gabapentin (NEURONTIN) 300 MG capsule Take 1 capsule (300 mg total) by mouth 3 (three) times daily.    HYDROcodone-acetaminophen (NORCO)  mg per tablet Take 1 tablet by mouth every 8 (eight) hours as needed for Pain.    losartan (COZAAR) 100 MG tablet Take 100 mg by mouth once daily.    miconazole NITRATE 2 % (MICOTIN) 2 % top powder Apply topically 2 (two) times daily.    multivitamin (THERAGRAN) per tablet Take 1 tablet by mouth once daily.    polyethylene glycol (GLYCOLAX) 17 gram PwPk Take 17 g by mouth once daily.     Family History       Problem Relation (Age of Onset)    Cancer Father    Heart disease Mother    Lung disease Father          Tobacco Use    Smoking status: Every Day     Packs/day: 0.50     Years: 15.00     Pack years: 7.50     Types: Cigarettes    Smokeless tobacco: Never   Substance and Sexual Activity    Alcohol use: Yes     Alcohol/week: 3.0 - 6.0 standard drinks     Types: 3 - 6 Cans of beer per week    Drug use: Yes     Types: Marijuana    Sexual activity: Not Currently     Review of Systems   Musculoskeletal:         Left knee pain   All other systems reviewed and are negative.  Objective:     Vital Signs  (Most Recent):  Temp: 97.5 °F (36.4 °C) (07/27/23 1352)  Pulse: 77 (07/28/23 0800)  Resp: 13 (07/28/23 0800)  BP: 133/75 (07/28/23 0800)  SpO2: 96 % (07/28/23 0800) Vital Signs (24h Range):  Temp:  [97.5 °F (36.4 °C)] 97.5 °F (36.4 °C)  Pulse:  [] 77  Resp:  [13-21] 13  SpO2:  [87 %-100 %] 96 %  BP: (104-144)/(62-79) 133/75     Weight: 72.6 kg (160 lb)  Body mass index is 23.63 kg/m².    Physical Exam  HENT:      Head: Normocephalic and atraumatic.      Right Ear: External ear normal.      Left Ear: External ear normal.      Nose: Nose normal.      Mouth/Throat:      Mouth: Mucous membranes are dry.   Eyes:      Extraocular Movements: Extraocular movements intact.      Pupils: Pupils are equal, round, and reactive to light.   Cardiovascular:      Rate and Rhythm: Normal rate and regular rhythm.      Pulses: Normal pulses.      Heart sounds: Normal heart sounds.   Pulmonary:      Effort: Pulmonary effort is normal.      Breath sounds: Normal breath sounds.   Abdominal:      General: Bowel sounds are normal.      Palpations: Abdomen is soft.   Musculoskeletal:         General: Swelling present.      Cervical back: Neck supple.      Comments: Left knee feels warm and swollen   Skin:     General: Skin is warm and dry.   Neurological:      General: No focal deficit present.      Mental Status: He is alert and oriented to person, place, and time. Mental status is at baseline.   Psychiatric:         Mood and Affect: Mood normal.         Behavior: Behavior normal.         Thought Content: Thought content normal.         CRANIAL NERVES     CN III, IV, VI   Pupils are equal, round, and reactive to light.    Significant Labs: All pertinent labs within the past 24 hours have been reviewed.  CBC:   Recent Labs   Lab 07/27/23  1512   WBC 10.50   HGB 12.4*   HCT 37.4*        CMP:   Recent Labs   Lab 07/27/23  1512      K 3.7   CO2 27   BUN 19.5   CREATININE 0.73   CALCIUM 9.7   ALBUMIN 3.2*   BILITOT 0.4    ALKPHOS 101   AST 13   ALT 13       Significant Imaging: I have reviewed all pertinent imaging results/findings within the past 24 hours.    Assessment/Plan:     There are no hospital problems to display for this patient.    VTE Risk Mitigation (From admission, onward)           Ordered     IP VTE HIGH RISK PATIENT  Once         07/28/23 0620     Place sequential compression device  Until discontinued         07/28/23 0620                  Orthopedic surgeon has seen the patient.  Do not plan any operation at the moment.  Left knee has been aspirated and fluid was sent for culture.  I would check uric acid level.  Lovenox for DVT prophylaxis.  A Dr. Nichole at operated this patient earlier this month.  Whom foot is still being addressed by Wound Care.  We consult Dr. Nichole for assistance.    Enzo Thibodeaux MD  Department of Hospital Medicine   [facility] - Emergency Dept

## 2023-07-28 NOTE — TRANSFER OF CARE
"Anesthesia Transfer of Care Note    Patient: Shahene Christie    Procedure(s) Performed: Procedure(s) (LRB):  INCISION AND DRAINAGE, LOWER EXTREMITY (Left)    Patient location: GI    Anesthesia Type: MAC    Transport from OR: Transported from OR on room air with adequate spontaneous ventilation    Post pain: adequate analgesia    Post assessment: no apparent anesthetic complications    Post vital signs: stable    Level of consciousness: awake    Nausea/Vomiting: no nausea/vomiting    Complications: none    Transfer of care protocol was followed      Last vitals:   Visit Vitals  BP (!) 141/80 (BP Location: Right arm, Patient Position: Sitting)   Pulse 73   Temp 36.4 °C (97.5 °F) (Temporal)   Resp 16   Ht 5' 9" (1.753 m)   Wt 72.6 kg (160 lb)   SpO2 99%   BMI 23.63 kg/m²     "

## 2023-07-28 NOTE — ANESTHESIA POSTPROCEDURE EVALUATION
Anesthesia Post Evaluation    Patient: Shaheen Christie    Procedure(s) Performed: Procedure(s) (LRB):  INCISION AND DRAINAGE, LOWER EXTREMITY (Left)    Final Anesthesia Type: general      Patient location during evaluation: PACU  Patient participation: Yes- Able to Participate  Level of consciousness: sedated  Post-procedure vital signs: reviewed and stable  Pain management: adequate  Airway patency: patent    PONV status at discharge: No PONV  Anesthetic complications: no      Cardiovascular status: hemodynamically stable  Respiratory status: spontaneous ventilation, unassisted and nasal cannula  Hydration status: euvolemic  Follow-up not needed.  Comments: [facility]          Vitals Value Taken Time   /80 07/28/23 1429   Temp 36.4 07/28/23 1429   Pulse 74 07/28/23 1428   Resp 18 07/28/23 1428   SpO2 100 % 07/28/23 1428   Vitals shown include unvalidated device data.      No case tracking events are documented in the log.      Pain/Khadra Score: Pain Rating Prior to Med Admin: 0 (7/28/2023 12:24 AM)

## 2023-07-28 NOTE — ANESTHESIA PROCEDURE NOTES
Intubation    Date/Time: 7/28/2023 1:30 PM  Performed by: Wojciech Hendricks CRNA  Authorized by: Bryce Moore MD     Intubation:     Induction:  Intravenous    Intubated:  Postinduction    Mask Ventilation:  Easy with oral airway    Attempts:  1    Attempted By:  Student    Difficult Airway Encountered?: No      Airway Device:  Supraglottic airway/LMA    Airway Device Size:  4.0    Style/Cuff Inflation:  Cuffed (inflated to minimal occlusive pressure)    Inflation Amount (mL):  18    Placement Verified By:  Capnometry    Complicating Factors:  None    Findings Post-Intubation:  BS equal bilateral and atraumatic/condition of teeth unchanged

## 2023-07-28 NOTE — BRIEF OP NOTE
[facility] - Periop Services  Brief Operative Note    SUMMARY     Surgery Date: 7/28/2023     Surgeon(s) and Role:     * Avinash Garces MD - Primary     * ANGEL Lancaster - Assisting        Pre-op Diagnosis:  Arthritis of left knee due to other bacteria [M00.862]    Post-op Diagnosis:  Post-Op Diagnosis Codes:     * Arthritis of left knee due to other bacteria [M00.862]  Retained orthopedic hardware    Procedure(s) (LRB):  INCISION AND DRAINAGE, LOWER EXTREMITY (Left) with arthrotomy for infection  Removal of deep implant    Anesthesia: General    Operative Findings: see op report    Estimated Blood Loss: 100mL    Estimated Blood Loss has been documented.         Specimens:   Specimen (24h ago, onward)      None            [id number]  A/P: Tolerated procedure well. Admit to floor. WBAT to LLE. Keep KI in place for 1 week. Monitor drain output and plan to remove once output less than 30mL in 24hrs. Continue BS abx and watch cultures. Begin dressing changes in 48 hours. Lovenox for DVT ppx.       Avinash Garces MD  Orthopedic Trauma  [facility]

## 2023-07-28 NOTE — OP NOTE
[facility]                       [address] [place]                      [place] [address]    PATIENT NAME:      HAWA KUMAR  YOB: 1955  CSN:               [id number]  MRN:               [id number]  ADMIT DATE:        07/27/2023 13:56:00  PHYSICIAN:         Avinash Garces MD                          OPERATIVE REPORT      DATE OF SURGERY:    07/28/2023 00:00:00    SURGEON:  Avinash Garces MD    PREOPERATIVE DIAGNOSES:    1. Left knee septic arthritis.  2. Left knee retained orthopedic hardware.    POSTOPERATIVE DIAGNOSES:    1. Left knee septic arthritis.  2. Left knee retained orthopedic hardware.    PROCEDURES:    1. Irrigation and debridement with arthrotomy of the left knee due to infection.  2. Removal of deep implant, left knee.    ANESTHESIA:  General.    ESTIMATED BLOOD LOSS:  100 mL    ASSISTANT:  Lou Andrade nurse practitioner, necessary for a skilled set of   hands to assist with deep retraction as well as removal of hardware and layered   closure.    COMPLICATIONS:  None all.    COUNTS:  All counts correct x2 at the end of the case.    INDICATIONS FOR PROCEDURE:  The patient is a 68-year-old male, who presented   with pain and swelling in the left knee.  He had an arthrocentesis performed,   appeared to have purulent material in the knee.  This has been going on for   quite a while.  He had an acute exacerbation.  He also has a chronic wound to   the lateral aspect of his ankle, which could likely have seeded his knee joint.    He had a previous surgery back in the 1980s and has a retained screw in the   proximal tibia that appears to be extra-articular.  However, it is close to the   knee joint and prominent.  We had an extensive discussion today.  He was elected   to undergo irrigation and debridement of his knee with arthrotomy with removal   of the prominent screw in his tibia.    PROCEDURE IN DETAIL:  After  informed consent was obtained, the patient was met   in the preoperative holding area and his site was marked.  He was taken to the   operating room.  He was placed supine on the operating table and general   anesthesia was induced.  All bony prominences were well padded.  Preoperative   antibiotics were given.  He is on broad-spectrum antibiotics currently.  His   left lower extremity was prepped and draped in standard sterile fashion.  A   time-out was done indicating correct operative limb and procedure.  Under   fluoroscopic guidance, the screw was localized.  Incision was made.  It was   carried down to the level of the bone across the anterior medial aspect of the   proximal tibia.  A large frag screwdriver was used and a cancellous screw and   washer were removed in their entirety.  An incision was made in suprapatellar   region, carried down to the level of the quadriceps tendon.  This was opened.    The suprapatellar pouch was debrided using a rongeur.  Anterior lateral working   portal was used and a cannula was placed and 3 L of normal saline was irrigated   through the knee joint using cysto tubing purulent.  Fluid was encountered upon   opening the knee joint.  Cultures were obtained prior to beginning irrigation.    Synovectomy was performed from the suprapatellar pouch.  A liter of Xperience   antimicrobial solution was irrigated through the knee joint.  A deep Hemovac   drain was placed.  A layered closure was then performed with a #1 PDS, 2-0   Monocryl, and staples.  Xeroform, 4 x 4's, cast padding, Ace bandage and knee   immobilizer were applied.  The patient was awakened, extubated, and taken to   recovery in stable condition.    POSTOPERATIVE PLAN:  He will be admitted to the floor.  He can weightbear as   tolerated left lower extremity.  We will have wound care for his left ankle   chronic wound.  Begin dressing changes in 48 hours.  Monitor his cultures and   his drain output.  Remove the  drain once it is less than 30 mL over 24 hours.    Staples out in 2 weeks.  We will plan to discontinue the knee immobilizer in 1   week.        ______________________________  MD KAUSHAL Henry/LUIS  DD:  07/28/2023  Time:  02:13PM  DT:  07/28/2023  Time:  02:29PM  Job #:  [id number]      OPERATIVE REPORT

## 2023-07-28 NOTE — ANESTHESIA PREPROCEDURE EVALUATION
07/28/2023  Shaheen Christie is a 68 y.o., male. with a PMH of HTN, HLD, Severe PAD,  COPD, Cervical stenosis, neuropathy, depression who presented to the ED with complaints of left lateral ankle foot wound, getting progressively worse.  He states that the wound has been present for about a year, and he has been going to wound care up until February.  At that time, it was felt that the wound was not improving, and he needed to follow-up with [facility] and possibly get another vascular evaluation.  The patient states that since then, he has been having issues with transportation and was never able to follow-up.  Since then, his wound has gotten progressively worse and more painful.  He denies any fever, chills, chest pain, shortness of breath. He states he still smokes.   Today's ED lab work revealed Na 128, all other indices unremarkable.  Left ankle x-ray showed lucency overlies the lateral malleolus suggesting open injury.  There is a mottled appearance throughout the bones that may be seen with diffuse osteopenia as multiple bones are infected osteomyelitis is not excluded.   Had foot debridement and now with septic arthritis.    No cardiac hx per pt  Smoker,HTN    Pre-op Assessment    I have reviewed the Patient Summary Reports.     I have reviewed the Nursing Notes. I have reviewed the NPO Status.   I have reviewed the Medications.     Review of Systems  Anesthesia Hx:  No problems with previous Anesthesia    Social:  Smoker    Cardiovascular:   Hypertension    Pulmonary:   COPD        Physical Exam  General: Well nourished, Cooperative, Alert and Oriented    Airway:  Mallampati: II   Mouth Opening: Normal  TM Distance: Normal  Tongue: Normal  Neck ROM: Normal ROM    Dental:  Edentulous        Anesthesia Plan  Type of Anesthesia, risks & benefits discussed:    Anesthesia Type: Gen Supraglottic Airway  Intra-op  Monitoring Plan: Standard ASA Monitors  Post Op Pain Control Plan: multimodal analgesia  Induction:  IV  Airway Plan: Direct, Post-Induction  Informed Consent: Informed consent signed with the Patient representative and all parties understand the risks and agree with anesthesia plan.  All questions answered. Patient consented to blood products? Yes  ASA Score: 3  Day of Surgery Review of History & Physical: H&P Update referred to the surgeon/provider.    Ready For Surgery From Anesthesia Perspective.     .

## 2023-07-28 NOTE — CONSULTS
[facility]                       [address] [address]    PATIENT NAME:       HAWA KUMAR  YOB: 1955  CSN:                [id number]   MRN:                [id number]  ADMIT DATE:         07/27/2023 13:56:00  PHYSICIAN:          Avinash Garces MD                            CONSULTATION    DATE OF CONSULT:  07/28/2023 00:00:00    CONSULTATION DIAGNOSIS:  Left knee septic arthritis.    CHIEF COMPLAINT:  Left knee pain.    HISTORY OF PRESENT ILLNESS:  The patient is a 68-year-old male, who states that   he does not ambulate much, but he was having pain in his left knee.  It has been   going on for quite a while.  It got acutely worse with swelling and inability   to perform range of motion.  He presented to the emergency department for   evaluation.  He has a history of foot ulcer that has been managed with wound   care.  He denies any current fevers or chills.  An aspiration was performed of   his knee, which demonstrated purulence. Upon evaluation, he had 100%   neutrophils, however, he only had 8000 white count, which did not quite match up   with his clinical picture.  Cultures were pending.  No history of gout.  He has   had a prior operation on that knee back in the 80s for a ligamentous   reconstruction.  He has a screw in his proximal tibia and is not inside the knee   joint.  Upon my evaluation at the bedside, he is resting comfortably.  He   states that his knee is still causing him a significant amount of pain after the   aspiration, though it is slightly less swollen.    REVIEW OF SYSTEMS:  All reported negative aside from HPI  Constitutional: Negative for chills and fever.   HENT: Negative for congestion and hearing loss.    Eyes: Negative for visual disturbance.   Cardiovascular: Negative for chest pain and syncope.   Respiratory: Negative for cough and shortness of breath.     Hematologic/Lymphatic: Does not bruise/bleed easily.   Skin: Negative for color change and rash.   Gastrointestinal: Negative for abdominal pain, nausea and vomiting.   Genitourinary: Negative for dysuria and hematuria.   Neurological: Negative for numbness, sensory change and weakness.   Psychiatric/Behavioral: Negative for altered mental status.       PAST MEDICAL HISTORY:  Significant for hypertension, COPD, distal peripheral   neuropathy, and vascular disease.    PAST SURGICAL HISTORY:  He has had debridement of the ankle as well as   cholecystectomy.  He has had multiple spine surgeries, bilateral hip   replacements, and prior history of a knee surgery on the left side.    PHYSICAL EXAMINATION:  GENERAL:  He is in no apparent distress.  He is awake, alert, and oriented.   HEAD, EYES, EARS, NOSE, AND THROAT:  Extraocular movements are intact.  He is   normocephalic and atraumatic.  Good range of motion of the cervical spine   without tenderness.  PULMONARY: Unlabored respirations with symmetric chest rise.  GI:  His abdomen soft, nontender, and nondistended.  INTEGUMENTARY SYSTEM:  His skin is warm, dry, and intact.    CARDIOVASCULAR:  Normal rate with a regular rhythm.  He has normal peripheral   perfusion.   EXTREMITIES:  Evaluation of his left knee, it is held in a flexed position.  He   has exquisite pain with any attempted range of motion of the knee.  The hardware   in his anterior tibia is palpable and it is not painful or prominent.  He has   no instability with varus or valgus stress, however, it is very difficult to   determine due to the amount of guarding.  Distally, his lower limb compartments   are soft and compressible.  He has bandages to the ankle.  His heel was floated   with an inflatable boot.    IMAGING:  X-ray evaluations reveal mild tricompartmental osteoarthritis of the   left knee with retained orthopedic hardware.    PLAN:  The risks, benefits, and alternatives to treatment were  discussed at   length with the patient, including, but are not limited to pain, bleeding,   scarring, infection, damage to neurovascular structures, malunion, nonunion,   need for future procedures, and complications leading to amputation and even   death.  He will benefit from irrigation and debridement of the knee.  It is   unclear picture, as to whether or not he has an acute septic arthritis of the   knee.  He has had long-standing pain, however, this is an acute exacerbation.    He did have purulent material aspirated from the knee, does not quite match up   with the expected differential.  However, this will help us to obtain definitive   tissue cultures and will also alleviate his discomfort.  We will also plan to   remove the prominent hardware in the knee due to its proximity to the knee   joint.  He understands and agrees with all that we have discussed and all   questions and concerns were addressed.        ______________________________  MD KAUSHAL Henry/AQS  DD:  07/28/2023  Time:  11:57AM  DT:  07/28/2023  Time:  01:09PM  Job #:  [id number]      CONSULTATION

## 2023-07-28 NOTE — ED PROVIDER NOTES
Encounter Date: 7/27/2023    SCRIBE #1 NOTE: I, Valeria Meng, am scribing for, and in the presence of,  Gael Maria IV, MD. I have scribed the following portions of the note - Other sections scribed: HPI, ROS, PE.     History     Chief Complaint   Patient presents with    Knee Pain     LT knee swelling and pain; progressively getting worse over the last 6 weeks. Pt had an ulcer to his LT foot that he was admitted for approx 6 weeks.      A 68 year old male with a history of HTN, COPD, and neuropathy is presenting to the ED with c/o left knee pain. The pt states that he has had worsening left knee pain since the beginning of the month. He notes that he was discharged from the hospital on 7/6 after being admitted for a left ankle wound and osteomyelitis. He notes that he was on several courses of antibiotics but is not currently on any.     Per chart review, the pt was discharged at the beginning of the month after being admitted for a left ankle wound and possible osteomyelitis.     The history is provided by the patient. No  was used.   Review of patient's allergies indicates:  No Known Allergies  Past Medical History:   Diagnosis Date    COPD (chronic obstructive pulmonary disease)     Depression     Hypertension     Neuropathy      Past Surgical History:   Procedure Laterality Date    angiogram Bilateral     stents placed in left leg    anterior neck surgery      ARTHROTOMY OF ANKLE Left 6/28/2023    Procedure: ARTHROTOMY, ANKLE;  Surgeon: Tom Nichole DPM;  Location: [facility];  Service: Podiatry;  Laterality: Left;    cataracts Left     CHOLECYSTECTOMY      EYE SURGERY Left     HAND SURGERY Left     broken bone    herina repair      KNEE SURGERY Bilateral     posterior neck surgery      SPINE SURGERY      TOTAL REPLACEMENT OF BOTH HIP JOINTS USING COMPUTER-ASSISTED NAVIGATION Bilateral      Family History   Problem Relation Age of Onset    Heart disease Mother     Cancer Father     Lung  disease Father      Social History     Tobacco Use    Smoking status: Every Day     Packs/day: 0.50     Years: 15.00     Pack years: 7.50     Types: Cigarettes    Smokeless tobacco: Never   Substance Use Topics    Alcohol use: Yes     Alcohol/week: 3.0 - 6.0 standard drinks     Types: 3 - 6 Cans of beer per week    Drug use: Yes     Types: Marijuana     Review of Systems   Constitutional:  Negative for chills and fever.   HENT:  Negative for congestion, rhinorrhea and sore throat.    Eyes:  Negative for visual disturbance.   Respiratory:  Negative for cough and shortness of breath.    Cardiovascular:  Negative for chest pain.   Gastrointestinal:  Negative for abdominal pain, nausea and vomiting.   Genitourinary:  Negative for dysuria and hematuria.   Musculoskeletal:  Positive for arthralgias. Negative for joint swelling.   Skin:  Negative for rash.   Neurological:  Negative for weakness.   Psychiatric/Behavioral:  Negative for confusion.    All other systems reviewed and are negative.    Physical Exam     Initial Vitals [07/27/23 1352]   BP Pulse Resp Temp SpO2   (!) 144/72 96 20 97.5 °F (36.4 °C) 97 %      MAP       --         Physical Exam    Nursing note and vitals reviewed.  Constitutional: He is not diaphoretic. No distress.   Cardiovascular:  Normal rate and regular rhythm.           Pulmonary/Chest: No respiratory distress.   Abdominal: Abdomen is soft. He exhibits no distension. There is no abdominal tenderness.   Musculoskeletal:      Comments: Left knee is warm and swollen  Extreme tenderness to palpation to left knee  Significant pain with passive ROM  Dressing to surgical incision to left lateral ankle, c/d/i     Neurological: He is alert.   Psychiatric: He has a normal mood and affect.       ED Course   Arthrocentesis    Date/Time: 7/28/2023 1:02 AM  Location procedure was performed: [facility] EMERGENCY DEPARTMENT  Performed by: Gael Maria IV, MD  Authorized by: Gael Maria IV, MD   Pre-op diagnosis:  "Knee pain  Post-op diagnosis: Knee pain  Consent Done: Yes  Risks and benefits: risks, benefits and alternatives were discussed  Required items: required blood products, implants, devices, and special equipment available  Patient identity confirmed: name,  and MRN  Time out: Immediately prior to procedure a "time out" was called to verify the correct patient, procedure, equipment, support staff and site/side marked as required.  Indications: joint swelling, pain and possible septic joint   Body area: knee  Joint: left knee  Local anesthesia used: yes  Anesthesia: local infiltration    Anesthesia:  Local anesthesia used: yes  Local Anesthetic: lidocaine 1% without epinephrine    Patient sedated: no  Needle size: 18 G  Approach: superior  Aspirate amount: 30 mL  Aspirate: cloudy  Patient tolerance: Patient tolerated the procedure well with no immediate complications  Complications: No  Specimens: No  Implants: No      Labs Reviewed   CBC WITH DIFFERENTIAL - Abnormal; Notable for the following components:       Result Value    RBC 4.14 (*)     Hgb 12.4 (*)     Hct 37.4 (*)     All other components within normal limits   COMPREHENSIVE METABOLIC PANEL - Abnormal; Notable for the following components:    Albumin Level 3.2 (*)     Globulin 4.3 (*)     Albumin/Globulin Ratio 0.7 (*)     All other components within normal limits   SEDIMENTATION RATE - Abnormal; Notable for the following components:    Sed Rate 71 (*)     All other components within normal limits   C-REACTIVE PROTEIN - Abnormal; Notable for the following components:    C-Reactive Protein 63.90 (*)     All other components within normal limits   GRAM STAIN [facility]   BODY FLUID CULTURE [facility]   CELL COUNT, BODY FLUID   BODY FLUID CRYSTAL   DIFFERENTIAL, BODY FLUID          Imaging Results              X-Ray Knee Complete 4 or More Views Left (Final result)  Result time 23 15:01:18      Final result by Daniela Henderson MD (23 15:01:18)              "      Impression:      1. No acute bony abnormality.  2. Small joint effusion.      Electronically signed by: Daniela Henderson  Date:    07/27/2023  Time:    15:01               Narrative:    EXAMINATION:  XR KNEE COMP 4 OR MORE VIEWS LEFT    CLINICAL HISTORY:  Pain in left knee    COMPARISON:  None.    FINDINGS:  The bones are demineralized.  There are postoperative changes of the knee.  There is no definite acute fracture identified.  There is a small knee joint effusion.  Vascular stent is in place.                                       Medications   LIDOcaine HCL 10 mg/ml (1%) 10 mg/mL (1 %) injection (has no administration in time range)   vancomycin - pharmacy to dose (has no administration in time range)   vancomycin (VANCOCIN) 1,750 mg in dextrose 5 % (D5W) 500 mL IVPB (has no administration in time range)   cefTRIAXone (ROCEPHIN) 2 g in dextrose 5 % in water (D5W) 100 mL IVPB (MB+) (has no administration in time range)   sodium chloride 0.9% flush 10 mL (has no administration in time range)   melatonin tablet 6 mg (has no administration in time range)   acetaminophen tablet 650 mg (has no administration in time range)   morphine injection 2 mg (has no administration in time range)   ondansetron injection 4 mg (has no administration in time range)   morphine injection 4 mg (4 mg Intravenous Given 7/28/23 0024)   ondansetron injection 4 mg (4 mg Intravenous Given 7/28/23 0024)              Scribe Attestation:   Scribe #1: I performed the above scribed service and the documentation accurately describes the services I performed. I attest to the accuracy of the note.    Attending Attestation:           Physician Attestation for Scribe:  Physician Attestation Statement for Scribe #1: I, Gael Maria IV, MD, reviewed documentation, as scribed by Valeria Meng in my presence, and it is both accurate and complete.       Medical Decision Making  Problems Addressed:  Pain and swelling of left knee: acute illness or  injury that poses a threat to life or bodily functions    Amount and/or Complexity of Data Reviewed  Labs: ordered.    Risk  OTC drugs.  Prescription drug management.  Decision regarding hospitalization.      ED assessment:    68-year-old presenting for left knee pain and swelling  Exam as above, concern for septic joint vs gout   Arthrocentesis equivocal, discussed with Dr. Garces who recommends IV abx, f/u culture, admission     Differential diagnosis (including but not limited to):   Septic joint, gout, fracture, osteoarthritis     ED management:   Arthrocentesis  IV abx     My independent radiology interpretation:   XR L knee - no fx     Amount and/or Complexity of Data Reviewed  Independent historian: none  External data reviewed: notes from previous admissions  Summary of data reviewed: Per chart review, the pt was discharged at the beginning of the month after being admitted for a left ankle wound and possible osteomyelitis.   Risk and benefits of testing: discussed   Labs: ordered and reviewed  Radiology: ordered and independent interpretation performed (see above or ED course)  Discussion of management or test interpretation with external provider(s): discussed with ortho consultant and PCP Dr. Thibodeaux   Summary of discussion: see ED course     Risk  Drug therapy requiring intense monitoring for toxicity   Decision regarding hospitalization    Critical Care  none    I, Gael Maria MD personally performed the history, PE, MDM, and procedures as documented above and agree with the scribe's documentation.          ED Course as of 07/28/23 0620   Fri Jul 28, 2023   0601 Dr. Garces recommends broad-spectrum antibiotics follow-up of culture no need for surgery at this time admit to hospitalist [AC]   0620 Dr. Thibodeaux will admit for Dr. Arango  [AC]      ED Course User Index  [AC] Gael Maria IV, MD                 Clinical Impression:   Final diagnoses:  [M25.562, M25.462] Pain and swelling of left knee  (Primary)        ED Disposition Condition    Admit                 Gael Maria IV, MD  07/28/23 0687

## 2023-07-28 NOTE — NURSING
Nurses Note -- 4 Eyes      7/28/2023   3:36 PM      Skin assessed during: Admit      [] No Altered Skin Integrity Present    []Prevention Measures Documented      [x] Yes- Altered Skin Integrity Present or Discovered   [x] LDA Added if Not in Epic (Describe Wound)   [x] New Altered Skin Integrity was Present on Admit and Documented in LDA   [x] Wound Image Taken    Wound Care Consulted? Yes    Attending Nurse:  Katie Man RN     Second RN/Staff Member:  Olivia Dale RN

## 2023-07-28 NOTE — NURSING
[facility] documentation present in patient paper chart. RN confirmed with patient accuracy of document/code status of DNR as he is full code in EHR. Patient stated that the document is current but would like full code status to remain in place in EHR until he has further discussion with family and attending physician.

## 2023-07-28 NOTE — PLAN OF CARE
Pt lives at [facility]. Pt is  with 2 children, brother Raymond Christie is POA (Pt couldn't recall #) and pt has living will. Pt uses wheelchair and hospital bed. NH will transport home at [place].    07/28/23 1002   Discharge Assessment   Assessment Type Discharge Planning Assessment   Confirmed/corrected address, phone number and insurance Yes   Confirmed Demographics Correct on Facesheet   Source of Information patient   When was your last doctors appointment?   (PCP at NH)   Communicated SIVA with patient/caregiver Date not available/Unable to determine   [facility] facility resident   Facility Arrived From: [facility]   Do you expect to return to your current living situation? Yes   Do you have help at home or someone to help you manage your care at home? Yes   Who are your caregiver(s) and their phone number(s)? facility staff. Pt also reported sister Andreea, brother/ POA Raymond Christie and daughter a support but couldnt recall #   Prior to hospitilization cognitive status: Unable to Assess   Current cognitive status: Alert/Oriented   Walking or Climbing Stairs ambulation difficulty, requires equipment   Mobility Management wheelchair   Dressing/Bathing bathing difficulty, assistance 1 person;bathing difficulty, requires equipment   Dressing/Bathing Management NH staff assists with ADLs   Home Accessibility wheelchair accessible   Home Layout Able to live on 1st floor   Equipment Currently Used at Home hospital bed;wheelchair   Readmission within 30 days? Yes   Patient currently being followed by outpatient case management? Yes  (NH)   Do you currently have service(s) that help you manage your care at home? No   Do you take prescription medications? Yes   Do you have prescription coverage? Yes   Coverage [employer]   Do you have any problems affording any of your prescribed medications? No   Is the patient taking medications as prescribed? yes   Who is going to help you get home at  discharge? NH   How do you get to doctors appointments? agency   Are you on dialysis? No   Do you take coumadin? No   Discharge Plan A Return to nursing home   DME Needed Upon Discharge  none   Discharge Plan discussed with: Patient   Transition of Care Barriers None   OTHER   Name(s) of [facility] in Home NH resident

## 2023-07-28 NOTE — PROGRESS NOTES
Pharmacokinetic Initial Assessment: IV Vancomycin    Assessment/Plan:    Initiate intravenous vancomycin with loading dose of 1,750 mg once followed by a maintenance dose of vancomycin 1,000mg IV every 12 hours  Desired empiric serum trough concentration is 15 to 20 mcg/mL  Draw vancomycin trough level 60 min prior to fourth dose on 07/29 at approximately 2000.  Pharmacy will continue to follow and monitor vancomycin.      Please contact pharmacy at extension [phone] with any questions regarding this assessment.     Thank you for the consult,   Carolyn López       Patient brief summary:  Shaheen Christie is a 68 y.o. male initiated on antimicrobial therapy with IV Vancomycin for treatment of suspected bone/joint infection    Drug Allergies:   Review of patient's allergies indicates:  No Known Allergies    Actual Body Weight:   72.6kg    Renal Function:   Estimated Creatinine Clearance: 94.2 mL/min (based on SCr of 0.73 mg/dL).,     Dialysis Method (if applicable):  N/A    CBC (last 72 hours):  Recent Labs   Lab Result Units 07/27/23  1512   WBC x10(3)/mcL 10.50   Hgb g/dL 12.4*   Hct % 37.4*   Platelet x10(3)/mcL 307   Mono % % 10.7   Eos % % 4.0   Basophil % % 1.2       Metabolic Panel (last 72 hours):  Recent Labs   Lab Result Units 07/27/23  1512   Sodium Level mmol/L 140   Potassium Level mmol/L 3.7   Chloride mmol/L 105   Carbon Dioxide mmol/L 27   Glucose Level mg/dL 106   Blood Urea Nitrogen mg/dL 19.5   Creatinine mg/dL 0.73   Albumin Level g/dL 3.2*   Bilirubin Total mg/dL 0.4   Alkaline Phosphatase unit/L 101   Aspartate Aminotransferase unit/L 13   Alanine Aminotransferase unit/L 13       Drug levels (last 3 results):  No results for input(s): VANCOMYCINRA, VANCORANDOM, VANCOMYCINPE, VANCOPEAK, VANCOMYCINTR, VANCOTROUGH in the last 72 hours.    Microbiologic Results:  Microbiology Results (last 7 days)       Procedure Component Value Units Date/Time    Gram Stain [[id number]] Collected: 07/28/23 0113    Order  Status: Completed Specimen: Fluid from Knee, Left Updated: 07/28/23 0313     GRAM STAIN 4+ WBC observed      No bacteria seen    Body Fluid Culture [[id number]] Collected: 07/28/23 0109    Order Status: Sent Specimen: Fluid from Knee, Left Updated: 07/28/23 0113    Gram Stain [[id number]]     Order Status: Canceled Specimen: Fluid from Knee, Left

## 2023-07-29 PROBLEM — M00.862: Status: ACTIVE | Noted: 2023-07-29

## 2023-07-29 PROBLEM — L97.329: Status: ACTIVE | Noted: 2022-09-20

## 2023-07-29 LAB
ANION GAP SERPL CALC-SCNC: 7 MEQ/L
BASOPHILS # BLD AUTO: 0.07 X10(3)/MCL
BASOPHILS NFR BLD AUTO: 0.9 %
BUN SERPL-MCNC: 16 MG/DL (ref 8.4–25.7)
CALCIUM SERPL-MCNC: 8.8 MG/DL (ref 8.8–10)
CHLORIDE SERPL-SCNC: 103 MMOL/L (ref 98–107)
CO2 SERPL-SCNC: 26 MMOL/L (ref 23–31)
CREAT SERPL-MCNC: 0.74 MG/DL (ref 0.73–1.18)
CREAT/UREA NIT SERPL: 22
EOSINOPHIL # BLD AUTO: 0.06 X10(3)/MCL (ref 0–0.9)
EOSINOPHIL NFR BLD AUTO: 0.7 %
ERYTHROCYTE [DISTWIDTH] IN BLOOD BY AUTOMATED COUNT: 14.4 % (ref 11.5–17)
GFR SERPLBLD CREATININE-BSD FMLA CKD-EPI: >60 MLS/MIN/1.73/M2
GLUCOSE SERPL-MCNC: 93 MG/DL (ref 82–115)
GRAM STN SPEC: NORMAL
HCT VFR BLD AUTO: 31.9 % (ref 42–52)
HGB BLD-MCNC: 10.4 G/DL (ref 14–18)
IMM GRANULOCYTES # BLD AUTO: 0.02 X10(3)/MCL (ref 0–0.04)
IMM GRANULOCYTES NFR BLD AUTO: 0.2 %
LYMPHOCYTES # BLD AUTO: 1.85 X10(3)/MCL (ref 0.6–4.6)
LYMPHOCYTES NFR BLD AUTO: 22.8 %
MCH RBC QN AUTO: 29.3 PG (ref 27–31)
MCHC RBC AUTO-ENTMCNC: 32.6 G/DL (ref 33–36)
MCV RBC AUTO: 89.9 FL (ref 80–94)
MONOCYTES # BLD AUTO: 1 X10(3)/MCL (ref 0.1–1.3)
MONOCYTES NFR BLD AUTO: 12.3 %
NEUTROPHILS # BLD AUTO: 5.12 X10(3)/MCL (ref 2.1–9.2)
NEUTROPHILS NFR BLD AUTO: 63.1 %
NRBC BLD AUTO-RTO: 0 %
PLATELET # BLD AUTO: 257 X10(3)/MCL (ref 130–400)
PMV BLD AUTO: 8.8 FL (ref 7.4–10.4)
POTASSIUM SERPL-SCNC: 4 MMOL/L (ref 3.5–5.1)
RBC # BLD AUTO: 3.55 X10(6)/MCL (ref 4.7–6.1)
SODIUM SERPL-SCNC: 136 MMOL/L (ref 136–145)
VANCOMYCIN TROUGH SERPL-MCNC: 14 UG/ML (ref 15–20)
WBC # SPEC AUTO: 8.12 X10(3)/MCL (ref 4.5–11.5)

## 2023-07-29 PROCEDURE — 25000003 PHARM REV CODE 250: Performed by: INTERNAL MEDICINE

## 2023-07-29 PROCEDURE — 25000003 PHARM REV CODE 250: Performed by: NURSE PRACTITIONER

## 2023-07-29 PROCEDURE — 97167 OT EVAL HIGH COMPLEX 60 MIN: CPT

## 2023-07-29 PROCEDURE — 11000001 HC ACUTE MED/SURG PRIVATE ROOM

## 2023-07-29 PROCEDURE — 85025 COMPLETE CBC W/AUTO DIFF WBC: CPT | Performed by: NURSE PRACTITIONER

## 2023-07-29 PROCEDURE — 25000003 PHARM REV CODE 250: Performed by: STUDENT IN AN ORGANIZED HEALTH CARE EDUCATION/TRAINING PROGRAM

## 2023-07-29 PROCEDURE — 80048 BASIC METABOLIC PNL TOTAL CA: CPT | Performed by: NURSE PRACTITIONER

## 2023-07-29 PROCEDURE — 97162 PT EVAL MOD COMPLEX 30 MIN: CPT

## 2023-07-29 PROCEDURE — 63600175 PHARM REV CODE 636 W HCPCS: Performed by: NURSE PRACTITIONER

## 2023-07-29 PROCEDURE — 25000003 PHARM REV CODE 250: Performed by: FAMILY MEDICINE

## 2023-07-29 PROCEDURE — 63600175 PHARM REV CODE 636 W HCPCS: Performed by: INTERNAL MEDICINE

## 2023-07-29 PROCEDURE — 63600175 PHARM REV CODE 636 W HCPCS: Performed by: STUDENT IN AN ORGANIZED HEALTH CARE EDUCATION/TRAINING PROGRAM

## 2023-07-29 PROCEDURE — 80202 ASSAY OF VANCOMYCIN: CPT | Performed by: STUDENT IN AN ORGANIZED HEALTH CARE EDUCATION/TRAINING PROGRAM

## 2023-07-29 RX ORDER — HYDROCODONE BITARTRATE AND ACETAMINOPHEN 10; 325 MG/1; MG/1
1 TABLET ORAL EVERY 6 HOURS PRN
Status: DISCONTINUED | OUTPATIENT
Start: 2023-07-29 | End: 2023-07-31 | Stop reason: HOSPADM

## 2023-07-29 RX ORDER — MORPHINE SULFATE 4 MG/ML
2 INJECTION, SOLUTION INTRAMUSCULAR; INTRAVENOUS EVERY 4 HOURS PRN
Status: DISCONTINUED | OUTPATIENT
Start: 2023-07-29 | End: 2023-07-31 | Stop reason: HOSPADM

## 2023-07-29 RX ADMIN — GABAPENTIN 300 MG: 300 CAPSULE ORAL at 09:07

## 2023-07-29 RX ADMIN — ASPIRIN 81 MG: 81 TABLET, COATED ORAL at 08:07

## 2023-07-29 RX ADMIN — MORPHINE SULFATE 2 MG: 10 INJECTION, SOLUTION INTRAMUSCULAR; INTRAVENOUS at 03:07

## 2023-07-29 RX ADMIN — THERA TABS 1 TABLET: TAB at 09:07

## 2023-07-29 RX ADMIN — VANCOMYCIN HYDROCHLORIDE 1000 MG: 1 INJECTION, POWDER, LYOPHILIZED, FOR SOLUTION INTRAVENOUS at 09:07

## 2023-07-29 RX ADMIN — CEFEPIME 2 G: 2 INJECTION, POWDER, FOR SOLUTION INTRAVENOUS at 03:07

## 2023-07-29 RX ADMIN — CEFEPIME 2 G: 2 INJECTION, POWDER, FOR SOLUTION INTRAVENOUS at 06:07

## 2023-07-29 RX ADMIN — METHOCARBAMOL 500 MG: 500 TABLET ORAL at 09:07

## 2023-07-29 RX ADMIN — CILOSTAZOL 50 MG: 50 TABLET ORAL at 08:07

## 2023-07-29 RX ADMIN — AMLODIPINE BESYLATE 10 MG: 5 TABLET ORAL at 08:07

## 2023-07-29 RX ADMIN — VANCOMYCIN HYDROCHLORIDE 1000 MG: 1 INJECTION, POWDER, LYOPHILIZED, FOR SOLUTION INTRAVENOUS at 08:07

## 2023-07-29 RX ADMIN — HYDROCODONE BITARTRATE AND ACETAMINOPHEN 1 TABLET: 10; 325 TABLET ORAL at 06:07

## 2023-07-29 RX ADMIN — GABAPENTIN 300 MG: 300 CAPSULE ORAL at 03:07

## 2023-07-29 RX ADMIN — CILOSTAZOL 50 MG: 50 TABLET ORAL at 09:07

## 2023-07-29 RX ADMIN — MORPHINE SULFATE 2 MG: 10 INJECTION, SOLUTION INTRAMUSCULAR; INTRAVENOUS at 08:07

## 2023-07-29 RX ADMIN — CLOPIDOGREL BISULFATE 75 MG: 75 TABLET ORAL at 08:07

## 2023-07-29 RX ADMIN — HYDROCODONE BITARTRATE AND ACETAMINOPHEN 1 TABLET: 10; 325 TABLET ORAL at 11:07

## 2023-07-29 RX ADMIN — GABAPENTIN 300 MG: 300 CAPSULE ORAL at 08:07

## 2023-07-29 RX ADMIN — METHOCARBAMOL 500 MG: 500 TABLET ORAL at 11:07

## 2023-07-29 RX ADMIN — LOSARTAN POTASSIUM 100 MG: 50 TABLET, FILM COATED ORAL at 08:07

## 2023-07-29 RX ADMIN — ENOXAPARIN SODIUM 40 MG: 40 INJECTION SUBCUTANEOUS at 03:07

## 2023-07-29 RX ADMIN — HYDROCODONE BITARTRATE AND ACETAMINOPHEN 1 TABLET: 10; 325 TABLET ORAL at 05:07

## 2023-07-29 NOTE — PLAN OF CARE
Problem: Adult Inpatient Plan of Care  Goal: Plan of Care Review  7/28/2023 2308 by Atul Love LPN  Outcome: Ongoing, Progressing  7/28/2023 2308 by Atul Love LPN  Outcome: Ongoing, Progressing  Goal: Patient-Specific Goal (Individualized)  7/28/2023 2308 by Atul Love LPN  Outcome: Ongoing, Progressing  7/28/2023 2308 by Atul Love LPN  Outcome: Ongoing, Progressing  Goal: Absence of Hospital-Acquired Illness or Injury  7/28/2023 2308 by Atul Love LPN  Outcome: Ongoing, Progressing  7/28/2023 2308 by Autl Love LPN  Outcome: Ongoing, Progressing  Goal: Optimal Comfort and Wellbeing  7/28/2023 2308 by Atul Love LPN  Outcome: Ongoing, Progressing  7/28/2023 2308 by Atul oLve LPN  Outcome: Ongoing, Progressing  Goal: Readiness for Transition of Care  7/28/2023 2308 by Atul Love LPN  Outcome: Ongoing, Progressing  7/28/2023 2308 by Atul Love LPN  Outcome: Ongoing, Progressing     Problem: Impaired Wound Healing  Goal: Optimal Wound Healing  7/28/2023 2308 by Atul Love LPN  Outcome: Ongoing, Progressing  7/28/2023 2308 by Atul Love LPN  Outcome: Ongoing, Progressing     Problem: Skin Injury Risk Increased  Goal: Skin Health and Integrity  7/28/2023 2308 by Atul Love LPN  Outcome: Ongoing, Progressing  7/28/2023 2308 by Atul Love LPN  Outcome: Ongoing, Progressing

## 2023-07-29 NOTE — CONSULTS
Infectious Disease  Consult Note    Patient Name: Shaheen Christie   MRN: [id number]   Admission Date: 7/27/2023   Hospital Length of Stay: 0 days  Attending Physician: Enzo Thibodeaux MD   Primary Care Provider: Viktor Russ MD     Isolation Status: No active isolations       Assessment/Plan:     68-year-old male with a past medical history of hypertension, severe CAD, COPD, regular tobacco use, and neuropathy, known to our service from recent hospitalization for left ankle wound with MRI concerning for inflammatory changes in the ankle s/p washout on 06/28/2023 with findings only suggestive of hemarthrosis his, no septic presentation, was discharged off antibiotics, followed up by Podiatry as an outpatient with reported significant improvement in the wound and no further concerns, presents however with significant worsening of left knee pain with swelling, erythema, warmth, had arthrocentesis in the ER with 7000 WBCs noted, no crystals, taken to the OR for washout and concern for septic arthritis on 7/28, ID consulted for assistance in management.    Concern for left knee septic arthritis   History of left ankle heme arthrosis   Severe PAD  COPD   Active tobacco use       Plan:  -given negative cultures on evaluation of the left ankle, concern about atypical organisms versus AFB organisms, will add AFB cultures to knee samples  -reasonable to continue with vancomycin cefepime for now   -follow-up on cultures intraoperatively   -discussed with the patient, discussed with podiatrist      Thank you for your consult. ID will continue following. Please contact us with any questions or concerns.    Subjective:     Principal Problem: <principal problem not specified>     HPI:   68-year-old male with a past medical history of hypertension, severe CAD, COPD, regular tobacco use, and neuropathy, known to our service from recent hospitalization for left ankle wound with MRI concerning for inflammatory changes in the ankle  s/p washout on 06/28/2023 with findings only suggestive of hemarthrosis his, no septic presentation, was discharged off antibiotics, followed up by Podiatry as an outpatient with reported significant improvement in the wound and no further concerns, presents however with significant worsening of left knee pain with swelling, erythema, warmth, had arthrocentesis in the ER with 7000 WBCs noted, no crystals, taken to the OR for washout and concern for septic arthritis on 7/28, ID consulted for assistance in management.    Patient seen post washout of the left knee.  Reports somewhat improved however only postoperative pain.  No fevers, no chills.    Past Medical History:   Diagnosis Date    COPD (chronic obstructive pulmonary disease)     Depression     Hypertension     Neuropathy         Past Surgical History:   Procedure Laterality Date    angiogram Bilateral     stents placed in left leg    anterior neck surgery      ARTHROTOMY OF ANKLE Left 6/28/2023    Procedure: ARTHROTOMY, ANKLE;  Surgeon: Tom Nichole DPM;  Location: [facility];  Service: Podiatry;  Laterality: Left;    cataracts Left     CHOLECYSTECTOMY      EYE SURGERY Left     HAND SURGERY Left     broken bone    herina repair      KNEE SURGERY Bilateral     posterior neck surgery      SPINE SURGERY      TOTAL REPLACEMENT OF BOTH HIP JOINTS USING COMPUTER-ASSISTED NAVIGATION Bilateral        Review of patient's allergies indicates:  No Known Allergies     Family History       Problem Relation (Age of Onset)    Cancer Father    Heart disease Mother    Lung disease Father             Social History     Socioeconomic History    Marital status:    Occupational History    Occupation: retired   Tobacco Use    Smoking status: Every Day     Packs/day: 0.50     Years: 15.00     Pack years: 7.50     Types: Cigarettes    Smokeless tobacco: Never   Substance and Sexual Activity    Alcohol use: Yes     Alcohol/week: 3.0 - 6.0 standard drinks     Types: 3 - 6  Cans of beer per week    Drug use: Yes     Types: Marijuana    Sexual activity: Not Currently        Lines/Drains/Airways       Peripheral Intravenous Line  Duration                  Peripheral IV - Single Lumen 07/27/23 2251 18 G Posterior;Right Forearm <1 day                     Medication:  Medications Prior to Admission   Medication Sig    acetaminophen (TYLENOL) 325 MG tablet Take 650 mg by mouth every 6 (six) hours as needed for Pain.    amLODIPine (NORVASC) 10 MG tablet Take 10 mg by mouth once daily.    ascorbic acid, vitamin C, (VITAMIN C) 500 MG tablet Take 500 mg by mouth once daily.    aspirin 81 MG Chew Take 81 mg by mouth once daily.    cilostazoL (PLETAL) 50 MG Tab Take 50 mg by mouth 2 (two) times daily.    clopidogreL (PLAVIX) 75 mg tablet Take 75 mg by mouth once daily.    docusate sodium (COLACE) 100 MG capsule Take 100 mg by mouth 2 (two) times daily.    gabapentin (NEURONTIN) 300 MG capsule Take 1 capsule (300 mg total) by mouth 3 (three) times daily.    HYDROcodone-acetaminophen (NORCO)  mg per tablet Take 1 tablet by mouth every 8 (eight) hours as needed for Pain. (Patient taking differently: Take 1 tablet by mouth every 6 (six) hours as needed for Pain.)    losartan (COZAAR) 100 MG tablet Take 100 mg by mouth once daily.    melatonin (MELATIN) 3 mg tablet Take 6 mg by mouth nightly as needed for Insomnia.    multivitamin (THERAGRAN) per tablet Take 1 tablet by mouth once daily.    polyethylene glycol (GLYCOLAX) 17 gram PwPk Take 17 g by mouth once daily.    albuterol (PROVENTIL/VENTOLIN HFA) 90 mcg/actuation inhaler Inhale into the lungs 4 (four) times daily as needed.    collagenase (SANTYL) ointment Apply topically 2 (two) times a day.    miconazole NITRATE 2 % (MICOTIN) 2 % top powder Apply topically 2 (two) times daily.          Antimicrobials:  Antibiotics (From admission, onward)      Start     Stop Route Frequency Ordered    07/28/23 2100  vancomycin in dextrose 5 % 1 gram/250 mL  IVPB 1,000 mg         -- IV Every 12 hours (non-standard times) 07/28/23 0915    07/28/23 1530  ceFEPIme (MAXIPIME) 2 g in dextrose 5 % in water (D5W) 100 mL IVPB (MB+)         -- IV Every 8 hours (non-standard times) 07/28/23 1425    07/28/23 1005  ceFAZolin 2 g/50mL Dextrose IVPB (ANCEF) 2 gram/50 mL IVPB PgBk        Note to Pharmacy: Created by cabinet override    07/28 2214 07/28/23 1005    07/28/23 0659  vancomycin - pharmacy to dose  (vancomycin IVPB (PEDS and ADULTS))        See Hyperspace for full Linked Orders Report.    -- IV pharmacy to manage frequency 07/28/23 0601             Antifungals (From admission, onward)      Start     Stop Route Frequency Ordered    07/28/23 1015  miconazole NITRATE 2 % top powder         -- Top 2 times daily 07/28/23 0903            Antivirals (From admission, onward)      None               Review of Systems   Review of Systems   All other systems reviewed and are negative.        Objective:     Vital Signs (Most Recent):  Temp: 97.9 °F (36.6 °C) (07/28/23 1936)  Pulse: 73 (07/28/23 1936)  Resp: 18 (07/28/23 2150)  BP: 134/68 (07/28/23 1936)  SpO2: 96 % (07/28/23 1936)  Vital Signs (24h Range):  Temp:  [97.2 °F (36.2 °C)-97.9 °F (36.6 °C)] 97.9 °F (36.6 °C)  Pulse:  [68-99] 73  Resp:  [12-21] 18  SpO2:  [87 %-100 %] 96 %  BP: (106-169)/(64-87) 134/68      Weight:   Wt Readings from Last 1 Encounters:   07/28/23 72.6 kg (160 lb)      Body mass index is Body mass index is 23.63 kg/m².     Estimated Creatinine Clearance: Estimated Creatinine Clearance: 94.2 mL/min (based on SCr of 0.73 mg/dL).     Physical Exam  Physical Exam  Constitutional:       Appearance: Normal appearance.   HENT:      Head: Normocephalic and atraumatic.      Mouth/Throat:      Pharynx: No oropharyngeal exudate or posterior oropharyngeal erythema.   Eyes:      Extraocular Movements: Extraocular movements intact.      Pupils: Pupils are equal, round, and reactive to light.   Cardiovascular:      Rate and  Rhythm: Normal rate and regular rhythm.      Heart sounds: No murmur heard.  Pulmonary:      Effort: No respiratory distress.      Breath sounds: No wheezing, rhonchi or rales.   Abdominal:      General: Bowel sounds are normal. There is no distension.      Palpations: Abdomen is soft.      Tenderness: There is no abdominal tenderness. There is no right CVA tenderness or left CVA tenderness.   Musculoskeletal:         General: No swelling or tenderness.      Cervical back: Neck supple. No rigidity or tenderness.      Comments: Left knee in postoperative dressing   Lymphadenopathy:      Cervical: No cervical adenopathy.   Skin:     Findings: No lesion or rash.   Neurological:      General: No focal deficit present.      Mental Status: He is alert and oriented to person, place, and time. Mental status is at baseline.      Cranial Nerves: No cranial nerve deficit.      Motor: No weakness.   Psychiatric:         Mood and Affect: Mood normal.         Behavior: Behavior normal.           Significant Labs: CBC:   Recent Labs   Lab 07/27/23  1512 07/29/23  0450   WBC 10.50 8.12   HGB 12.4* 10.4*   HCT 37.4* 31.9*    257     CMP:   Recent Labs   Lab 07/27/23  1512 07/29/23  0450    136   K 3.7 4.0   CO2 27 26   BUN 19.5 16.0   CREATININE 0.73 0.74   CALCIUM 9.7 8.8   ALBUMIN 3.2*  --    BILITOT 0.4  --    ALKPHOS 101  --    AST 13  --    ALT 13  --          Microbiology Results (last 7 days)       Procedure Component Value Units Date/Time    Mycobacteria and Nocardia Culture [[id number]] Collected: 07/28/23 1401    Order Status: Sent Specimen: Tissue from Knee, Left Updated: 07/28/23 1603    Gram Stain [[id number]] Collected: 07/28/23 1401    Order Status: Sent Specimen: Abscess from Knee, Left Updated: 07/28/23 1434    Anaerobic Culture [[id number]] Collected: 07/28/23 1401    Order Status: Sent Specimen: Abscess from Knee, Left Updated: 07/28/23 1434    Fungal Culture [[id number]] Collected: 07/28/23 1401    Order  Status: Sent Specimen: Abscess from Knee, Left Updated: 07/28/23 1434    Wound Culture [[id number]] Collected: 07/28/23 1401    Order Status: Sent Specimen: Abscess from Knee, Left Updated: 07/28/23 1434    AFB Smear [[id number]] Collected: 07/28/23 1352    Order Status: Canceled Specimen: Abscess from Joint Fluid, Right Knee     Anaerobic Culture [[id number]] Collected: 07/28/23 1352    Order Status: Canceled Specimen: Abscess from Joint Fluid, Right Knee     Fungal Culture [[id number]] Collected: 07/28/23 1352    Order Status: Canceled Specimen: Abscess from Joint Fluid, Right Knee     Gram Stain [[id number]] Collected: 07/28/23 1352    Order Status: Canceled Specimen: Abscess from Joint Fluid, Right Knee     AFB Smear [[id number]] Collected: 07/28/23 1350    Order Status: Canceled Specimen: Body Fluid from Joint Fluid, Right Knee     Body Fluid Culture [[id number]] Collected: 07/28/23 1350    Order Status: Canceled Specimen: Body Fluid from Joint Fluid, Right Knee     Fungal Culture [[id number]] Collected: 07/28/23 1350    Order Status: Canceled Specimen: Body Fluid from Joint Fluid, Right Knee     Gram Stain [[id number]] Collected: 07/28/23 1350    Order Status: Canceled Specimen: Body Fluid from Joint Fluid, Right Knee     Mycobacteria and Nocardia Culture [[id number]] Collected: 07/28/23 1350    Order Status: Canceled Specimen: Body Fluid from Joint Fluid, Right Knee     Gram Stain [[id number]] Collected: 07/28/23 0113    Order Status: Completed Specimen: Fluid from Knee, Left Updated: 07/28/23 0313     GRAM STAIN 4+ WBC observed      No bacteria seen    Body Fluid Culture [[id number]] Collected: 07/28/23 0109    Order Status: Sent Specimen: Fluid from Knee, Left Updated: 07/28/23 0113    Gram Stain [[id number]]     Order Status: Canceled Specimen: Fluid from Knee, Left              Significant Imaging: I have reviewed all pertinent imaging results/findings within the past 24 hours.      Mookie Jones MD  Infectious  Disease  [facility]

## 2023-07-29 NOTE — PT/OT/SLP EVAL
Physical Therapy Evaluation    Patient Name:  Shaheen Christie   MRN:  [id number]    Recommendations:     Discharge Recommendations: nursing facility, skilled   Discharge Equipment Recommendations: to be determined by next level of care   Barriers to discharge: Impaired mobility and Ongoing medical needs    Assessment:     Shaheen Christie is a 68 y.o. male admitted with a medical diagnosis of 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, left knee s/p I&D and R tibial hardware removal. Pt with L foot and sacral wound as well.  He presents with the following impairments/functional limitations: weakness, impaired endurance, impaired self care skills, impaired functional mobility, gait instability, impaired balance, decreased lower extremity function, pain. Pt admitted from SNF where he was receiving therapy. Prior to SNF, pt was living home alone and ambulatory with rollator. Currently, the pt refuses to place any weight through LLE and this is unchanged since his SNF stay. Pt requried Mod x 2 assist for all mobility, and performed one sit<>stand transfer. Pt very hesitant abt mobility and is fearful of pain.     Rehab Prognosis: Good; patient would benefit from acute skilled PT services to address these deficits and reach maximum level of function.    Recent Surgery: Procedure(s) (LRB):  INCISION AND DRAINAGE, LOWER EXTREMITY (Left) 1 Day Post-Op    Plan:     During this hospitalization, patient to be seen 5 x/week to address the identified rehab impairments via gait training, therapeutic activities, therapeutic exercises, wheelchair management/training and progress toward the following goals:    Plan of Care Expires:  08/29/23    Subjective     Chief Complaint: to get better  Patient/Family Comments/goals: to be out of pain  Pain/Comfort:  Pain Rating 1: 10/10  Location - Side 1: Left  Location 1: ankle  Pain Addressed 1: Reposition    Patients cultural, spiritual, Sabianist conflicts given the current situation: no    Living  Environment:  From SNF  Prior to admission, patients level of function was transferring with assistance to and from w/c..  Equipment used at home: shower chair, rollator.  Upon discharge, patient will have assistance from SNF.    Objective:     Communicated with nurse prior to session.  Patient found supine with hemovac, knee immobilizer, peripheral IV  upon PT entry to room.    General Precautions: Standard, fall  Orthopedic Precautions:LLE weight bearing as tolerated (with knee in extension)   Braces: Knee immobilizer  Respiratory Status: Room air    Exams:  Cognitive Exam:  Patient is oriented to Person, Place, Time, and Situation  Sensation:    -       Intact  RLE ROM: WNL  RLE Strength: Deficits: 4/5 grossly  LLE ROM: Deficits: did not test ankle or knee due to immobilizer and pain  LLE Strength: Deficits: 2/5 hip flexion, 1/5 ankle DF  Skin integrity: Wound sacrum and L foot      Functional Mobility:  Bed Mobility:     Scooting: minimum assistance  Supine to Sit: moderate assistance and of 2 persons  Sit to Supine: moderate assistance and of 2 persons  Transfers:     Sit to Stand:  moderate assistance and of 2 persons with rolling walker  Balance: SBA for sitting balance. Min A for standing balance      AM-PAC 6 CLICK MOBILITY  Total Score:10       Treatment & Education:    Patient provided with verbal education regarding importance of mobility and turning Q2.  Understanding was verbalized.     Patient left right sidelying with all lines intact and call button in reach.    GOALS:   Multidisciplinary Problems       Physical Therapy Goals          Problem: Physical Therapy    Goal Priority Disciplines Outcome Goal Variances Interventions   Physical Therapy Goal     PT, PT/OT Ongoing, Progressing     Description: Goals to be met by: 2023     Patient will increase functional independence with mobility by performin. Supine to sit with Stand-by Assistance  2. Sit to stand transfer with Contact Guard  Assistance  3. Bed to chair transfer with Contact Guard Assistance using Rolling Walker  4. Gait  x 10 feet with Contact Guard Assistance using Rolling Walker.   5. Wheelchair propulsion x100 feet with [place] using bilateral uppper extremities                         History:     Past Medical History:   Diagnosis Date    COPD (chronic obstructive pulmonary disease)     Depression     Hypertension     Neuropathy        Past Surgical History:   Procedure Laterality Date    angiogram Bilateral     stents placed in left leg    anterior neck surgery      ARTHROTOMY OF ANKLE Left 6/28/2023    Procedure: ARTHROTOMY, ANKLE;  Surgeon: Tom Nichole DPM;  Location: [facility];  Service: Podiatry;  Laterality: Left;    cataracts Left     CHOLECYSTECTOMY      EYE SURGERY Left     HAND SURGERY Left     broken bone    herina repair      KNEE SURGERY Bilateral     posterior neck surgery      SPINE SURGERY      TOTAL REPLACEMENT OF BOTH HIP JOINTS USING COMPUTER-ASSISTED NAVIGATION Bilateral        Time Tracking:     PT Received On: 07/29/23  PT Start Time: 0922     PT Stop Time: 0951  PT Total Time (min): 29 min     Billable Minutes: Evaluation 29 07/29/2023

## 2023-07-29 NOTE — PROGRESS NOTES
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   HENT:  Negative for congestion.    Respiratory:  Negative for shortness of breath.    Cardiovascular:  Negative for chest pain.   Gastrointestinal:  Negative for abdominal pain.   Genitourinary:  Negative for difficulty urinating.   Musculoskeletal:         Pain in left knee pain and left ankle   Skin:  Wound: Left ankle.   Neurological:  Negative for headaches.   Psychiatric/Behavioral:  Negative for agitation, behavioral problems and confusion.    All other systems reviewed and are negative.    Objective:     Vital Signs (Most Recent):  Temp: 98.1 °F (36.7 °C) (07/29/23 0418)  Pulse: 65 (07/29/23 0418)  Resp: 18 (07/29/23 0418)  BP: (!) 110/56 (07/29/23 0418)  SpO2: 97 % (07/29/23 0418) Vital Signs (24h Range):  Temp:  [97.2 °F (36.2 °C)-98.1 °F (36.7 °C)] 98.1 °F (36.7 °C)  Pulse:  [65-89] 65  Resp:  [12-20] 18  SpO2:  [92 %-99 %] 97 %  BP: (110-169)/(56-87) 110/56     Weight: 72.6 kg (160 lb)  Body mass index is 23.63 kg/m².    Intake/Output Summary (Last 24 hours) at 7/29/2023 0538  Last data filed at 7/28/2023 2300  Gross per 24 hour   Intake 960 ml   Output 840 ml   Net 120 ml      Physical Exam  Vitals and nursing note reviewed.   Constitutional:       General: He is not in acute distress.     Appearance: Normal appearance.   HENT:      Head: Normocephalic and atraumatic.   Neck:       Comments: Neck appears grossly normal  Cardiovascular:      Rate and Rhythm: Normal rate and regular rhythm.      Heart sounds: No murmur heard.     No friction rub. No gallop.   Pulmonary:      Effort: Pulmonary effort is normal.      Breath sounds: Normal breath sounds. No wheezing or rhonchi.   Abdominal:      Palpations: Abdomen is soft.      Tenderness: There is no abdominal tenderness. There is no guarding.   Musculoskeletal:      Comments: Left leg is wrapped in Ace bandages   Skin:     General: Skin is warm and dry.      Coloration: Skin is not jaundiced or pale.   Neurological:      General: No focal deficit present.      Mental Status: He is alert and oriented to person, place, and time.   Psychiatric:         Mood and Affect: Mood normal.         Behavior: Behavior normal.         Thought Content: Thought content normal.         Judgment: Judgment normal.           Overview/Hospital Course:  Orthopedic consult appreciated waiting Dr. Nichole advice on the ankle as he had osteomyelitis in this in the past and he was taking care of for this.    Significant Labs: All pertinent labs within the past 24 hours have been reviewed.  Recent Lab Results         07/29/23  0450        Anion Gap 7.0       Baso # 0.07       Basophil % 0.9       BUN 16.0       BUN/CREAT RATIO 22       Calcium 8.8       Chloride 103       CO2 26       Creatinine 0.74       eGFR >60       Eos # 0.06       Eosinophil % 0.7       Glucose 93       Hematocrit 31.9       Hemoglobin 10.4       Immature Grans (Abs) 0.02       Immature Granulocytes 0.2       Lymph # 1.85       LYMPH % 22.8       MCH 29.3       MCHC 32.6       MCV 89.9       Mono # 1.00       Mono % 12.3       MPV 8.8       Neut # 5.12       Neut % 63.1       nRBC 0.0       Platelets 257       Potassium 4.0       RBC 3.55       RDW 14.4       Sodium 136       WBC 8.12               Significant Imaging: I have reviewed all pertinent imaging results/findings within the past 24  hours.    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

## 2023-07-29 NOTE — PLAN OF CARE
Problem: Occupational Therapy  Goal: Occupational Therapy Goal  Description: Goals to be met by: 8/12/23     Patient will increase functional independence with ADLs by performing:    LE Dressing with Modified [place].  Grooming while standing at sink with Modified [place].  Toileting from bedside commode with Modified [place] for hygiene and clothing management.   Stand pivot transfers with Modified [place].  Toilet transfer to bedside commode with Modified [place].    Outcome: Ongoing, Progressing

## 2023-07-29 NOTE — PT/OT/SLP EVAL
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 male with a medical diagnosis of L knee septic arthritis s/p I&D with arthrotomy and removal of hardware. Pt with recent hospital stay 2/2 L knee pain & osteomyelitis to L ankle/foot wound s/p debridement. Pt was d/c'd to SNF at the time for con't skilled therapy services however with worsening L knee pain stating "the therapy made it worse" & pt was not ambulating 2/2 pain and reports attempting to hop with RW would "send shooting pain to LLE" 2/2 decreased Wbing tolerance to LLE. Performance deficits affecting function: weakness, impaired functional mobility, decreased safety awareness, impaired endurance, pain, impaired balance, impaired self care skills, decreased lower extremity function, decreased ROM, orthopedic precautions.  Pt presents with sacral wound which per pt was present prior to hospitalizations 2/2 sedentary lifestyle. Pt's activity tolerance, functional mobil, and ADL skills significantly limited by L LE pain in groin, knee, and foot. Pt demo poor Wbing tolerance to LLE refusing to attempt placing weight through LE in standing with RW this visit 2/2 pain. Pt required Mod A for STS and Min A for static standing balance with BUE support on RW. Pt also with decreased flexibility to reach downward for LBD task 2/2 LLE pain, LLE in knee immobilizer, and fear of falling to reach downward. Recommend pt return to SNF upon d/c to con't skilled OT services to improve functional mobil and occupational performance.    Rehab " Prognosis: Good; patient would benefit from acute skilled OT services to address these deficits and reach maximum level of function.       Plan:     Patient to be seen 5 x/week to address the above listed problems via self-care/home management, therapeutic activities, therapeutic exercises  Plan of Care Expires: 08/12/23  Plan of Care Reviewed with: patient    Subjective     Chief Complaint: LLE pain with movement  Patient/Family Comments/goals: Return to PLOF    Occupational Profile:  Living Environment: Lives with brother in [facility] with 2 steps to enter and no HR. PT has a tub/shower combo with shower chair and no grab bars. Pt reports getting in/out of shower was difficult prior to admit.  Previous level of function: Pt was mod I-Ind with ADLs using rollator.   Equipment Used at Home: rollator, shower chair  Assistance upon Discharge: Return to SNF    Pain/Comfort:  Pain Rating 1: 10/10  Location - Side 1: Left  Location 1: knee  Pain Addressed 1: Cessation of Activity, Reposition    Patients cultural, spiritual, Episcopal conflicts given the current situation: no    Objective:     Communicated with:   Patient found supine with hemovac, knee immobilizer, peripheral IV upon OT entry to room.    General Precautions: Standard, fall  Orthopedic Precautions: LLE weight bearing as tolerated (in ext with knee immolbilizer x1 wk, then d/c KI)  Braces: Knee immobilizer      Occupational Performance:    Bed Mobility:    Patient completed Supine to Sit with moderate assistance to lift and maneuver LLE   Patient completed Sit to Supine with moderate assistance for to lift and maneuver LLE     Functional Mobility/Transfers:  Patient completed Sit <> Stand Transfer with moderate assistance  with  rolling walker   Functional Mobility: NT - pt declining to attempted steps at this time    Activities of Daily Living:  Upper Body Dressing: independence    Lower Body Dressing: dependence - could benefit from AE use  Toileting:  dependence      Cognitive/Visual Perceptual:  WNL    Physical Exam:  BUE AROM = WNL grossly  BUE MMT = 4+/5 grossly    Static standing balance with RW and BUE support = min A    Therapeutic Positioning  Risk for acquired pressure injuries is increased due to relative decrease in mobility d/t hospitalization  and already present sacral wound .    OT interventions performed during the course of today's session in an effort to prevent and/or reduce acquired pressure injuries:   Education on Pressure Ulcer Prevention provided    Skin assessment: full body skin assessment was performed  and all bony prominences were assessed    Findings: known area of altered skin integrity at sacral wound, L foot wound ace wrapped    OT recommendations for therapeutic positioning throughout hospitalization:   Follow [facility] Pressure Injury Prevention Protocol        Patient Education:  Patient provided with verbal education regarding OT role/goals/POC, post op precautions, and pressure ulcer prevention.  Understanding was verbalized.     Patient left right sidelying with all lines intact and call button in reach    GOALS:   Multidisciplinary Problems       Occupational Therapy Goals          Problem: Occupational Therapy    Goal Priority Disciplines Outcome Interventions   Occupational Therapy Goal     OT, PT/OT Ongoing, Progressing    Description: Goals to be met by: 8/12/23     Patient will increase functional independence with ADLs by performing:    LE Dressing with Modified [place].  Grooming while standing at sink with Modified [place].  Toileting from bedside commode with Modified [place] for hygiene and clothing management.   Stand pivot transfers with Modified [place].  Toilet transfer to bedside commode with Modified [place].                         History:     Past Medical History:   Diagnosis Date    COPD (chronic obstructive pulmonary disease)     Depression     Hypertension     Neuropathy           Past Surgical History:   Procedure Laterality Date    angiogram Bilateral     stents placed in left leg    anterior neck surgery      ARTHROTOMY OF ANKLE Left 6/28/2023    Procedure: ARTHROTOMY, ANKLE;  Surgeon: Tom Nichole DPM;  Location: [facility];  Service: Podiatry;  Laterality: Left;    cataracts Left     CHOLECYSTECTOMY      EYE SURGERY Left     HAND SURGERY Left     broken bone    herina repair      KNEE SURGERY Bilateral     posterior neck surgery      SPINE SURGERY      TOTAL REPLACEMENT OF BOTH HIP JOINTS USING COMPUTER-ASSISTED NAVIGATION Bilateral        Time Tracking:     OT Date of Treatment:    OT Start Time: 0925  OT Stop Time: 0950  OT Total Time (min): 25 min    Billable Minutes:Evaluation High Complexity    7/29/2023

## 2023-07-29 NOTE — PLAN OF CARE
Problem: Physical Therapy  Goal: Physical Therapy Goal  Description: Goals to be met by: 2023     Patient will increase functional independence with mobility by performin. Supine to sit with Stand-by Assistance  2. Sit to stand transfer with Contact Guard Assistance  3. Bed to chair transfer with Contact Guard Assistance using Rolling Walker  4. Gait  x 10 feet with Contact Guard Assistance using Rolling Walker.   5. Wheelchair propulsion x100 feet with [place] using bilateral uppper extremities    Outcome: Ongoing, Progressing

## 2023-07-29 NOTE — PROGRESS NOTES
"No acute events overnight.  Pain controlled.  Resting in bed. Feeling much better this AM.    Vital Signs  Temp: 98.1 °F (36.7 °C)  Temp Source: Oral  Pulse: 65  Heart Rate Source: Monitor  Resp: 20  SpO2: 97 %  Flow (L/min): 1  Oxygen Concentration (%): 95  Device (Oxygen Therapy): room air  BP: (!) 110/56  BP Location: Right arm  BP Method: Automatic  Patient Position: Sitting  Height and Weight  Height: 5' 9" (175.3 cm)  Weight: 72.6 kg (160 lb)  Weight Method: Standard Scale  BSA (Calculated - sq m): 1.88 sq meters  BMI (Calculated): 23.6  Weight in (lb) to have BMI = 25: 168.9  Patient Observation  Observations: (S) Patient reports increased pain 10/10 to left knee and foot]    +FHL/EHL  BCR distally  Dressing c/d/i  SILT distally    Recent Lab Results         07/29/23  0450   07/28/23  1401        Anion Gap 7.0         Baso # 0.07         Basophil % 0.9         BUN 16.0         BUN/CREAT RATIO 22         Calcium 8.8         Chloride 103         CO2 26         Creatinine 0.74         Wound Culture   No Growth At 24 Hours  [P]       eGFR >60         Eos # 0.06         Eosinophil % 0.7         Glucose 93         Hematocrit 31.9         Hemoglobin 10.4         Immature Grans (Abs) 0.02         Immature Granulocytes 0.2         Lymph # 1.85         LYMPH % 22.8         MCH 29.3         MCHC 32.6         MCV 89.9         Mono # 1.00         Mono % 12.3         MPV 8.8         Neut # 5.12         Neut % 63.1         nRBC 0.0         Platelets 257         Potassium 4.0         RBC 3.55         RDW 14.4         Sodium 136         WBC 8.12                  [P] - Preliminary Result               A/P:  Status post I&D L knee  Pain controlled  Overall patient doing well.  Therapy for mobility and ambulation.  DVT Ppx  F/u cultures  "

## 2023-07-30 LAB
ANION GAP SERPL CALC-SCNC: 6 MEQ/L
BASOPHILS # BLD AUTO: 0.1 X10(3)/MCL
BASOPHILS NFR BLD AUTO: 1.4 %
BUN SERPL-MCNC: 13.8 MG/DL (ref 8.4–25.7)
CALCIUM SERPL-MCNC: 8.9 MG/DL (ref 8.8–10)
CHLORIDE SERPL-SCNC: 107 MMOL/L (ref 98–107)
CO2 SERPL-SCNC: 26 MMOL/L (ref 23–31)
CREAT SERPL-MCNC: 0.72 MG/DL (ref 0.73–1.18)
CREAT/UREA NIT SERPL: 19
EOSINOPHIL # BLD AUTO: 0.27 X10(3)/MCL (ref 0–0.9)
EOSINOPHIL NFR BLD AUTO: 3.7 %
ERYTHROCYTE [DISTWIDTH] IN BLOOD BY AUTOMATED COUNT: 14.5 % (ref 11.5–17)
GFR SERPLBLD CREATININE-BSD FMLA CKD-EPI: >60 MLS/MIN/1.73/M2
GLUCOSE SERPL-MCNC: 96 MG/DL (ref 82–115)
HCT VFR BLD AUTO: 31.7 % (ref 42–52)
HGB BLD-MCNC: 10.6 G/DL (ref 14–18)
IMM GRANULOCYTES # BLD AUTO: 0.04 X10(3)/MCL (ref 0–0.04)
IMM GRANULOCYTES NFR BLD AUTO: 0.5 %
LYMPHOCYTES # BLD AUTO: 1.62 X10(3)/MCL (ref 0.6–4.6)
LYMPHOCYTES NFR BLD AUTO: 22 %
MCH RBC QN AUTO: 29.7 PG (ref 27–31)
MCHC RBC AUTO-ENTMCNC: 33.4 G/DL (ref 33–36)
MCV RBC AUTO: 88.8 FL (ref 80–94)
MONOCYTES # BLD AUTO: 0.76 X10(3)/MCL (ref 0.1–1.3)
MONOCYTES NFR BLD AUTO: 10.3 %
NEUTROPHILS # BLD AUTO: 4.57 X10(3)/MCL (ref 2.1–9.2)
NEUTROPHILS NFR BLD AUTO: 62.1 %
NRBC BLD AUTO-RTO: 0 %
PLATELET # BLD AUTO: 265 X10(3)/MCL (ref 130–400)
PMV BLD AUTO: 9 FL (ref 7.4–10.4)
POTASSIUM SERPL-SCNC: 4.4 MMOL/L (ref 3.5–5.1)
RBC # BLD AUTO: 3.57 X10(6)/MCL (ref 4.7–6.1)
SODIUM SERPL-SCNC: 139 MMOL/L (ref 136–145)
WBC # SPEC AUTO: 7.36 X10(3)/MCL (ref 4.5–11.5)

## 2023-07-30 PROCEDURE — 11000001 HC ACUTE MED/SURG PRIVATE ROOM

## 2023-07-30 PROCEDURE — 85025 COMPLETE CBC W/AUTO DIFF WBC: CPT | Performed by: NURSE PRACTITIONER

## 2023-07-30 PROCEDURE — 25000003 PHARM REV CODE 250: Performed by: NURSE PRACTITIONER

## 2023-07-30 PROCEDURE — 25000003 PHARM REV CODE 250: Performed by: INTERNAL MEDICINE

## 2023-07-30 PROCEDURE — 80048 BASIC METABOLIC PNL TOTAL CA: CPT | Performed by: NURSE PRACTITIONER

## 2023-07-30 PROCEDURE — 63600175 PHARM REV CODE 636 W HCPCS: Performed by: STUDENT IN AN ORGANIZED HEALTH CARE EDUCATION/TRAINING PROGRAM

## 2023-07-30 PROCEDURE — 63600175 PHARM REV CODE 636 W HCPCS: Performed by: INTERNAL MEDICINE

## 2023-07-30 PROCEDURE — 25000003 PHARM REV CODE 250: Performed by: STUDENT IN AN ORGANIZED HEALTH CARE EDUCATION/TRAINING PROGRAM

## 2023-07-30 PROCEDURE — 63600175 PHARM REV CODE 636 W HCPCS: Performed by: NURSE PRACTITIONER

## 2023-07-30 PROCEDURE — 25000003 PHARM REV CODE 250: Performed by: FAMILY MEDICINE

## 2023-07-30 RX ADMIN — ASPIRIN 81 MG: 81 TABLET, COATED ORAL at 08:07

## 2023-07-30 RX ADMIN — CILOSTAZOL 50 MG: 50 TABLET ORAL at 09:07

## 2023-07-30 RX ADMIN — METHOCARBAMOL 500 MG: 500 TABLET ORAL at 05:07

## 2023-07-30 RX ADMIN — MICONAZOLE NITRATE: 20 POWDER TOPICAL at 09:07

## 2023-07-30 RX ADMIN — GABAPENTIN 300 MG: 300 CAPSULE ORAL at 08:07

## 2023-07-30 RX ADMIN — AMLODIPINE BESYLATE 10 MG: 5 TABLET ORAL at 08:07

## 2023-07-30 RX ADMIN — ENOXAPARIN SODIUM 40 MG: 40 INJECTION SUBCUTANEOUS at 03:07

## 2023-07-30 RX ADMIN — HYDROCODONE BITARTRATE AND ACETAMINOPHEN 1 TABLET: 10; 325 TABLET ORAL at 03:07

## 2023-07-30 RX ADMIN — CEFEPIME 2 G: 2 INJECTION, POWDER, FOR SOLUTION INTRAVENOUS at 08:07

## 2023-07-30 RX ADMIN — GABAPENTIN 300 MG: 300 CAPSULE ORAL at 03:07

## 2023-07-30 RX ADMIN — CEFEPIME 2 G: 2 INJECTION, POWDER, FOR SOLUTION INTRAVENOUS at 01:07

## 2023-07-30 RX ADMIN — HYDROCODONE BITARTRATE AND ACETAMINOPHEN 1 TABLET: 10; 325 TABLET ORAL at 01:07

## 2023-07-30 RX ADMIN — CEFEPIME 2 G: 2 INJECTION, POWDER, FOR SOLUTION INTRAVENOUS at 03:07

## 2023-07-30 RX ADMIN — CLOPIDOGREL BISULFATE 75 MG: 75 TABLET ORAL at 08:07

## 2023-07-30 RX ADMIN — MICONAZOLE NITRATE: 20 POWDER TOPICAL at 10:07

## 2023-07-30 RX ADMIN — METHOCARBAMOL 500 MG: 500 TABLET ORAL at 09:07

## 2023-07-30 RX ADMIN — HYDROCODONE BITARTRATE AND ACETAMINOPHEN 1 TABLET: 10; 325 TABLET ORAL at 08:07

## 2023-07-30 RX ADMIN — GABAPENTIN 300 MG: 300 CAPSULE ORAL at 09:07

## 2023-07-30 RX ADMIN — CILOSTAZOL 50 MG: 50 TABLET ORAL at 08:07

## 2023-07-30 RX ADMIN — LOSARTAN POTASSIUM 100 MG: 50 TABLET, FILM COATED ORAL at 08:07

## 2023-07-30 RX ADMIN — CEFEPIME 2 G: 2 INJECTION, POWDER, FOR SOLUTION INTRAVENOUS at 11:07

## 2023-07-30 RX ADMIN — METHOCARBAMOL 500 MG: 500 TABLET ORAL at 12:07

## 2023-07-30 RX ADMIN — VANCOMYCIN HYDROCHLORIDE 1000 MG: 1 INJECTION, POWDER, LYOPHILIZED, FOR SOLUTION INTRAVENOUS at 09:07

## 2023-07-30 RX ADMIN — THERA TABS 1 TABLET: TAB at 08:07

## 2023-07-30 RX ADMIN — VANCOMYCIN HYDROCHLORIDE 1000 MG: 1 INJECTION, POWDER, LYOPHILIZED, FOR SOLUTION INTRAVENOUS at 10:07

## 2023-07-30 NOTE — PROGRESS NOTES
Pharmacokinetic Assessment Follow Up: IV Vancomycin    Vancomycin serum concentration assessment(s):    The trough level was drawn correctly and can be used to guide therapy at this time. The measurement is within the desired definitive target range of 15 to 20 mcg/mL.    Vancomycin Regimen Plan:    Continue regimen to Vancomycin 1000 mg IV every 12 hours with next serum trough concentration measured at 0800 prior to 4th dose on 07/31    Drug levels (last 3 results):  Recent Labs   Lab Result Units 07/29/23  2031   Vancomycin Trough ug/ml 14.0*       Pharmacy will continue to follow and monitor vancomycin.    Please contact pharmacy at extension [phone] for questions regarding this assessment.    Thank you for the consult,   Elan Boston       Patient brief summary:  Shaheen Christie is a 68 y.o. male initiated on antimicrobial therapy with IV Vancomycin for treatment of bone/joint infection    The patient's current regimen is 1000mg q12h.    Drug Allergies:   Review of patient's allergies indicates:  No Known Allergies    Actual Body Weight:   72.6kg    Renal Function:   Estimated Creatinine Clearance: 92.9 mL/min (based on SCr of 0.74 mg/dL).,     Dialysis Method (if applicable):  N/A    CBC (last 72 hours):  Recent Labs   Lab Result Units 07/27/23  1512 07/29/23  0450   WBC x10(3)/mcL 10.50 8.12   Hgb g/dL 12.4* 10.4*   Hct % 37.4* 31.9*   Platelet x10(3)/mcL 307 257   Mono % % 10.7 12.3   Eos % % 4.0 0.7   Basophil % % 1.2 0.9       Metabolic Panel (last 72 hours):  Recent Labs   Lab Result Units 07/27/23  1512 07/29/23  0450   Sodium Level mmol/L 140 136   Potassium Level mmol/L 3.7 4.0   Chloride mmol/L 105 103   Carbon Dioxide mmol/L 27 26   Glucose Level mg/dL 106 93   Blood Urea Nitrogen mg/dL 19.5 16.0   Creatinine mg/dL 0.73 0.74   Albumin Level g/dL 3.2*  --    Bilirubin Total mg/dL 0.4  --    Alkaline Phosphatase unit/L 101  --    Aspartate Aminotransferase unit/L 13  --    Alanine Aminotransferase unit/L 13   --        Vancomycin Administrations:  vancomycin given in the last 96 hours                     vancomycin in dextrose 5 % 1 gram/250 mL IVPB 1,000 mg (mg) 1,000 mg New Bag 07/29/23 0852     1,000 mg New Bag 07/28/23 2123    vancomycin injection (g) 2 g Given 07/28/23 1357    vancomycin (VANCOCIN) 1,750 mg in dextrose 5 % (D5W) 500 mL IVPB (mg) 1,750 mg New Bag 07/28/23 0803                    Microbiologic Results:  Microbiology Results (last 7 days)       Procedure Component Value Units Date/Time    Gram Stain [[id number]] Collected: 07/28/23 1401    Order Status: Completed Specimen: Abscess from Knee, Left Updated: 07/29/23 0920     GRAM STAIN No WBCs, No bacteria seen    Body Fluid Culture [[id number]] Collected: 07/28/23 0109    Order Status: Completed Specimen: Fluid from Knee, Left Updated: 07/29/23 0650     Body Fluid Culture No Growth At 24 Hours    Wound Culture [[id number]] Collected: 07/28/23 1401    Order Status: Completed Specimen: Abscess from Knee, Left Updated: 07/29/23 0643     Wound Culture No Growth At 24 Hours    Mycobacteria and Nocardia Culture [[id number]] Collected: 07/28/23 1401    Order Status: Sent Specimen: Tissue from Knee, Left Updated: 07/28/23 1603    Anaerobic Culture [[id number]] Collected: 07/28/23 1401    Order Status: Sent Specimen: Abscess from Knee, Left Updated: 07/28/23 1434    Fungal Culture [[id number]] Collected: 07/28/23 1401    Order Status: Sent Specimen: Abscess from Knee, Left Updated: 07/28/23 1434    AFB Smear [[id number]] Collected: 07/28/23 1352    Order Status: Canceled Specimen: Abscess from Joint Fluid, Right Knee     Anaerobic Culture [[id number]] Collected: 07/28/23 1352    Order Status: Canceled Specimen: Abscess from Joint Fluid, Right Knee     Fungal Culture [[id number]] Collected: 07/28/23 1352    Order Status: Canceled Specimen: Abscess from Joint Fluid, Right Knee     Gram Stain [[id number]] Collected: 07/28/23 1352    Order Status: Canceled Specimen: Abscess  from Joint Fluid, Right Knee     AFB Smear [[id number]] Collected: 07/28/23 1350    Order Status: Canceled Specimen: Body Fluid from Joint Fluid, Right Knee     Body Fluid Culture [[id number]] Collected: 07/28/23 1350    Order Status: Canceled Specimen: Body Fluid from Joint Fluid, Right Knee     Fungal Culture [[id number]] Collected: 07/28/23 1350    Order Status: Canceled Specimen: Body Fluid from Joint Fluid, Right Knee     Gram Stain [[id number]] Collected: 07/28/23 1350    Order Status: Canceled Specimen: Body Fluid from Joint Fluid, Right Knee     Mycobacteria and Nocardia Culture [[id number]] Collected: 07/28/23 1350    Order Status: Canceled Specimen: Body Fluid from Joint Fluid, Right Knee     Gram Stain [[id number]] Collected: 07/28/23 0113    Order Status: Completed Specimen: Fluid from Knee, Left Updated: 07/28/23 0313     GRAM STAIN 4+ WBC observed      No bacteria seen    Gram Stain [[id number]]     Order Status: Canceled Specimen: Fluid from Knee, Left

## 2023-07-30 NOTE — PROGRESS NOTES
"No acute events overnight.  Pain controlled.  Resting in bed. Complaints of L ankle/foot in air boot this AM.    Vital Signs  Temp: 98.3 °F (36.8 °C)  Temp Source: Oral  Pulse: 72  Heart Rate Source: Monitor  Resp: 16  SpO2: 95 %  Flow (L/min): 1  Oxygen Concentration (%): 95  Device (Oxygen Therapy): room air  BP: 104/60  BP Location: Right arm  BP Method: Automatic  Patient Position: Sitting  Height and Weight  Height: 5' 9" (175.3 cm)  Weight: 72.6 kg (160 lb)  Weight Method: Standard Scale  BSA (Calculated - sq m): 1.88 sq meters  BMI (Calculated): 23.6  Weight in (lb) to have BMI = 25: 168.9  Patient Observation  Observations: (S) Patient reports increased pain 10/10 to left knee and foot]    +FHL/EHL  BCR distally  Dressing c/d/i  SILT distally  Full plantar and dorsiflexion to the L ankle with slight discomfort    Recent Lab Results         07/30/23  0521   07/29/23 2031        Anion Gap 6.0         Baso # 0.10         Basophil % 1.4         BUN 13.8         BUN/CREAT RATIO 19         Calcium 8.9         Chloride 107         CO2 26         Creatinine 0.72         eGFR >60         Eos # 0.27         Eosinophil % 3.7         Glucose 96         Hematocrit 31.7         Hemoglobin 10.6         Immature Grans (Abs) 0.04         Immature Granulocytes 0.5         Lymph # 1.62         LYMPH % 22.0         MCH 29.7         MCHC 33.4         MCV 88.8         Mono # 0.76         Mono % 10.3         MPV 9.0         Neut # 4.57         Neut % 62.1         nRBC 0.0         Platelets 265         Potassium 4.4         RBC 3.57         RDW 14.5         Sodium 139         Vancomycin-Trough   14.0       WBC 7.36                 A/P:  Status post I&D L knee  Pain controlled  Overall patient doing well.  Therapy for mobility and ambulation.  Cx: NGTD  Continue PT and NSAIDs for ankle/foot complaints  Able to d/c from orthopedic standpoint and f/u in the office with Dr Garces x2 weeks for evaluation of the L knee s/p I&D.    The " above findings, diagnostics, and treatment plan were discussed with Dr. Jorge Drew who is in agreement with the plan of care.

## 2023-07-30 NOTE — PROGRESS NOTES
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   HENT:  Negative for congestion.    Respiratory:  Negative for shortness of breath.    Cardiovascular:  Negative for chest pain.   Gastrointestinal:  Negative for abdominal pain, constipation, diarrhea, nausea and vomiting.   Genitourinary:         No change in urine color or odor   Musculoskeletal:         Knee continues to hurt but pain decreasing   Skin:  Negative for color change.   Neurological:  Negative for headaches.   Psychiatric/Behavioral:  Negative for agitation, behavioral problems and confusion.    All other systems reviewed and are negative.    Objective:     Vital Signs (Most Recent):  Temp: 98.3 °F (36.8 °C) (07/30/23 0346)  Pulse: 72 (07/30/23 0346)  Resp: 16 (07/30/23 0346)  BP: 104/60 (07/30/23 0346)  SpO2: 95 % (07/30/23 0346) Vital Signs (24h Range):  Temp:  [97.4 °F (36.3 °C)-98.8 °F (37.1 °C)] 98.3 °F (36.8 °C)  Pulse:  [68-87] 72  Resp:  [16-20] 16  SpO2:  [93 %-96 %] 95 %  BP: (104-115)/(49-63) 104/60     Weight: 72.6 kg (160 lb)  Body mass index is 23.63 kg/m².    Intake/Output Summary (Last 24 hours) at 7/30/2023 0518  Last data filed at 7/30/2023 0352  Gross per 24 hour   Intake 540 ml   Output 1260 ml   Net -720 ml      Physical Exam  Vitals and nursing note reviewed.   Constitutional:       General: He is not in acute distress.     Appearance: Normal appearance.   HENT:      Head: Normocephalic  and atraumatic.   Neck:      Comments: General appearance of the neck is normal  Cardiovascular:      Rate and Rhythm: Normal rate and regular rhythm.      Heart sounds: Normal heart sounds. No murmur heard.  Pulmonary:      Effort: Pulmonary effort is normal.      Breath sounds: Normal breath sounds. No wheezing or rhonchi.   Abdominal:      Palpations: Abdomen is soft.      Tenderness: There is no abdominal tenderness. There is no guarding.   Musculoskeletal:      Comments: Left lower extremity is wrapped in Ace bandages and split applied   Skin:     General: Skin is warm and dry.   Neurological:      General: No focal deficit present.      Mental Status: He is alert.      Cranial Nerves: No cranial nerve deficit.   Psychiatric:         Mood and Affect: Mood normal.         Behavior: Behavior normal.         Thought Content: Thought content normal.           Overview/Hospital Course:  Cultures are negative so far knee on his knee seems to be doing well    Significant Labs: All pertinent labs within the past 24 hours have been reviewed.  Recent Lab Results         07/29/23 2031        Vancomycin-Trough 14.0           Cultures on the negative    Significant Imaging: I have reviewed all pertinent imaging results/findings within the past 24 hours.    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

## 2023-07-31 VITALS
WEIGHT: 160 LBS | HEART RATE: 70 BPM | HEIGHT: 69 IN | SYSTOLIC BLOOD PRESSURE: 129 MMHG | DIASTOLIC BLOOD PRESSURE: 65 MMHG | RESPIRATION RATE: 18 BRPM | BODY MASS INDEX: 23.7 KG/M2 | OXYGEN SATURATION: 96 % | TEMPERATURE: 98 F

## 2023-07-31 LAB
ANION GAP SERPL CALC-SCNC: 7 MEQ/L
BACTERIA SPEC ANAEROBE CULT: NORMAL
BACTERIA SPEC CULT: NO GROWTH
BASOPHILS # BLD AUTO: 0.08 X10(3)/MCL
BASOPHILS NFR BLD AUTO: 1.2 %
BUN SERPL-MCNC: 9.1 MG/DL (ref 8.4–25.7)
CALCIUM SERPL-MCNC: 8.6 MG/DL (ref 8.8–10)
CHLORIDE SERPL-SCNC: 106 MMOL/L (ref 98–107)
CO2 SERPL-SCNC: 25 MMOL/L (ref 23–31)
CREAT SERPL-MCNC: 0.61 MG/DL (ref 0.73–1.18)
CREAT/UREA NIT SERPL: 15
EOSINOPHIL # BLD AUTO: 0.33 X10(3)/MCL (ref 0–0.9)
EOSINOPHIL NFR BLD AUTO: 5.1 %
ERYTHROCYTE [DISTWIDTH] IN BLOOD BY AUTOMATED COUNT: 14.5 % (ref 11.5–17)
GFR SERPLBLD CREATININE-BSD FMLA CKD-EPI: >60 MLS/MIN/1.73/M2
GLUCOSE SERPL-MCNC: 96 MG/DL (ref 82–115)
HCT VFR BLD AUTO: 30.5 % (ref 42–52)
HGB BLD-MCNC: 10.5 G/DL (ref 14–18)
IMM GRANULOCYTES # BLD AUTO: 0.02 X10(3)/MCL (ref 0–0.04)
IMM GRANULOCYTES NFR BLD AUTO: 0.3 %
LYMPHOCYTES # BLD AUTO: 1.44 X10(3)/MCL (ref 0.6–4.6)
LYMPHOCYTES NFR BLD AUTO: 22.2 %
MCH RBC QN AUTO: 30.2 PG (ref 27–31)
MCHC RBC AUTO-ENTMCNC: 34.4 G/DL (ref 33–36)
MCV RBC AUTO: 87.6 FL (ref 80–94)
MONOCYTES # BLD AUTO: 0.67 X10(3)/MCL (ref 0.1–1.3)
MONOCYTES NFR BLD AUTO: 10.3 %
NEUTROPHILS # BLD AUTO: 3.96 X10(3)/MCL (ref 2.1–9.2)
NEUTROPHILS NFR BLD AUTO: 60.9 %
NRBC BLD AUTO-RTO: 0 %
PLATELET # BLD AUTO: 257 X10(3)/MCL (ref 130–400)
PMV BLD AUTO: 8.9 FL (ref 7.4–10.4)
POTASSIUM SERPL-SCNC: 3.7 MMOL/L (ref 3.5–5.1)
RBC # BLD AUTO: 3.48 X10(6)/MCL (ref 4.7–6.1)
SODIUM SERPL-SCNC: 138 MMOL/L (ref 136–145)
VANCOMYCIN TROUGH SERPL-MCNC: 14.3 UG/ML (ref 15–20)
WBC # SPEC AUTO: 6.5 X10(3)/MCL (ref 4.5–11.5)

## 2023-07-31 PROCEDURE — 25000003 PHARM REV CODE 250: Performed by: FAMILY MEDICINE

## 2023-07-31 PROCEDURE — 63600175 PHARM REV CODE 636 W HCPCS: Performed by: INTERNAL MEDICINE

## 2023-07-31 PROCEDURE — 25000003 PHARM REV CODE 250: Performed by: INTERNAL MEDICINE

## 2023-07-31 PROCEDURE — 85025 COMPLETE CBC W/AUTO DIFF WBC: CPT | Performed by: NURSE PRACTITIONER

## 2023-07-31 PROCEDURE — 63600175 PHARM REV CODE 636 W HCPCS: Performed by: NURSE PRACTITIONER

## 2023-07-31 PROCEDURE — 63600175 PHARM REV CODE 636 W HCPCS: Performed by: STUDENT IN AN ORGANIZED HEALTH CARE EDUCATION/TRAINING PROGRAM

## 2023-07-31 PROCEDURE — 25000003 PHARM REV CODE 250: Performed by: STUDENT IN AN ORGANIZED HEALTH CARE EDUCATION/TRAINING PROGRAM

## 2023-07-31 PROCEDURE — 80048 BASIC METABOLIC PNL TOTAL CA: CPT | Performed by: NURSE PRACTITIONER

## 2023-07-31 PROCEDURE — 80202 ASSAY OF VANCOMYCIN: CPT | Performed by: INTERNAL MEDICINE

## 2023-07-31 PROCEDURE — 25000003 PHARM REV CODE 250: Performed by: NURSE PRACTITIONER

## 2023-07-31 RX ADMIN — HYDROCODONE BITARTRATE AND ACETAMINOPHEN 1 TABLET: 10; 325 TABLET ORAL at 02:07

## 2023-07-31 RX ADMIN — LOSARTAN POTASSIUM 100 MG: 50 TABLET, FILM COATED ORAL at 08:07

## 2023-07-31 RX ADMIN — CLOPIDOGREL BISULFATE 75 MG: 75 TABLET ORAL at 08:07

## 2023-07-31 RX ADMIN — METHOCARBAMOL 500 MG: 500 TABLET ORAL at 06:07

## 2023-07-31 RX ADMIN — ENOXAPARIN SODIUM 40 MG: 40 INJECTION SUBCUTANEOUS at 04:07

## 2023-07-31 RX ADMIN — HYDROCODONE BITARTRATE AND ACETAMINOPHEN 1 TABLET: 10; 325 TABLET ORAL at 04:07

## 2023-07-31 RX ADMIN — METHOCARBAMOL 500 MG: 500 TABLET ORAL at 08:07

## 2023-07-31 RX ADMIN — GABAPENTIN 300 MG: 300 CAPSULE ORAL at 08:07

## 2023-07-31 RX ADMIN — COLLAGENASE SANTYL: 250 OINTMENT TOPICAL at 08:07

## 2023-07-31 RX ADMIN — POLYETHYLENE GLYCOL 3350 17 G: 17 POWDER, FOR SOLUTION ORAL at 08:07

## 2023-07-31 RX ADMIN — CEFEPIME 2 G: 2 INJECTION, POWDER, FOR SOLUTION INTRAVENOUS at 02:07

## 2023-07-31 RX ADMIN — VANCOMYCIN HYDROCHLORIDE 1000 MG: 1 INJECTION, POWDER, LYOPHILIZED, FOR SOLUTION INTRAVENOUS at 10:07

## 2023-07-31 RX ADMIN — CILOSTAZOL 50 MG: 50 TABLET ORAL at 08:07

## 2023-07-31 RX ADMIN — THERA TABS 1 TABLET: TAB at 08:07

## 2023-07-31 RX ADMIN — CEFEPIME 2 G: 2 INJECTION, POWDER, FOR SOLUTION INTRAVENOUS at 08:07

## 2023-07-31 RX ADMIN — ASPIRIN 81 MG: 81 TABLET, COATED ORAL at 08:07

## 2023-07-31 RX ADMIN — AMLODIPINE BESYLATE 10 MG: 5 TABLET ORAL at 08:07

## 2023-07-31 RX ADMIN — GABAPENTIN 300 MG: 300 CAPSULE ORAL at 02:07

## 2023-07-31 NOTE — DISCHARGE SUMMARY
[facility] - 8th Floor [facility] Surg  [facility] Medicine  Discharge Summary      Patient Name: Shaheen Christie  MRN: [id number]  Admission Date: 7/27/2023  Hospital Length of Stay: 3 days  Discharge Date and Time:  07/31/2023 6:08 AM  Attending Physician: Enzo Thibodeaux MD   Discharging Provider: Enzo Thibodeaux MD  Discharge Provider Team: Networked reference to record PCT   Primary Care Provider: Viktor Russ MD        HPI:    A 68 year old male with a history of HTN, COPD, and neuropathy is presenting to the ED with c/o left knee pain. The pt states that he has had worsening left knee pain since the beginning of the month. He notes that he was discharged from the hospital on 7/6 after being admitted for a left ankle wound and osteomyelitis. He notes that he was on several courses of antibiotics but is not currently on any.     Procedure(s) (LRB):  INCISION AND DRAINAGE, LOWER EXTREMITY (Left)      Hospital Course:  The patient's knee was aspirated in the ER and fluid was sent to the laboratory for culture and sensitivity.  All cultures both from the aspiration of the knee and from the blood have been negative.  White blood cell count has been normal.  I had requested a uric acid level and I do not see the results.  Orthopedic surgery aspirate the knee under anesthesia.  That culture has also been negative.  They do not recommend any more antibiotics just to give NSAIDs for pain and inflammation.  Patient will be discharged today.    Consults:   Consults (From admission, onward)          Status Ordering Provider     Inpatient consult to Infectious Diseases  Once        Provider:  Mookie Jones MD    Completed HOA DE     IP consult case management/social work  Once        Provider:  (Not yet assigned)    Acknowledged HOA DE     Inpatient consult to Orthopedic Surgery  Once        Provider:  Avinash Garces MD    Acknowledged CURET IV, BRITTANY B     Pharmacy to dose Vancomycin  consult  Once        Provider:  (Not yet assigned)   See Hyperspace for full Linked Orders Report.    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 Date: 09/25/24     Medications:  Reconciled Home Medications:      Medication List        ASK your doctor about these medications      albuterol 90 mcg/actuation inhaler  Commonly known as: PROVENTIL/VENTOLIN HFA  Inhale into the lungs 4 (four) times daily as needed.     amLODIPine 10 MG tablet  Commonly known as: NORVASC  Take 10 mg by mouth once daily.     aspirin 81 MG Chew  Take 81 mg by mouth once daily.     cilostazoL 50 MG Tab  Commonly known as: PLETAL  Take 50 mg by mouth 2 (two) times daily.     clopidogreL 75 mg tablet  Commonly known as: PLAVIX  Take 75 mg by mouth once daily.     collagenase ointment  Commonly known as: SANTYL  Apply topically 2 (two) times a day.     docusate sodium 100 MG capsule  Commonly known as: COLACE  Take 100 mg by mouth 2 (two) times daily.     gabapentin 300 MG capsule  Commonly known as: NEURONTIN  Take 1 capsule (300 mg total) by mouth 3 (three) times daily.     HYDROcodone-acetaminophen  mg per tablet  Commonly known as: NORCO  Take 1 tablet by mouth every 8 (eight) hours as needed for Pain.     losartan 100 MG tablet  Commonly known as: COZAAR  Take 100 mg by mouth once daily.     melatonin 3 mg tablet  Commonly known as: MELATIN  Take 6 mg by mouth nightly as needed for Insomnia.     miconazole NITRATE 2 % 2 % top powder  Commonly known as: MICOTIN  Apply topically 2 (two) times daily.     multivitamin per tablet  Commonly  known as: THERAGRAN  Take 1 tablet by mouth once daily.     polyethylene glycol 17 gram Pwpk  Commonly known as: GLYCOLAX  Take 17 g by mouth once daily.     TylenoL 325 MG tablet  Generic drug: acetaminophen  Take 650 mg by mouth every 6 (six) hours as needed for Pain.     VITAMIN C 500 MG tablet  Generic drug: ascorbic acid (vitamin C)  Take 500 mg by mouth once daily.              Significant Diagnostic Studies: Labs: BMP:   Recent Labs   Lab 07/30/23 0521      K 4.4   CO2 26   BUN 13.8   CREATININE 0.72*   CALCIUM 8.9    and CBC   Recent Labs   Lab 07/30/23 0521   WBC 7.36   HGB 10.6*   HCT 31.7*          Pending Diagnostic Studies:       Procedure Component Value Units Date/Time    Basic Metabolic Panel [[id number]]     Order Status: Sent Lab Status: No result     Specimen: Blood     CBC Auto Differential [[id number]]     Order Status: Sent Lab Status: No result     Specimen: Blood     Narrative:      The following orders were created for panel order CBC Auto Differential.  Procedure                               Abnormality         Status                     ---------                               -----------         ------                     CBC with Differential[[id number]]                                                         Please view results for these tests on the individual orders.    CBC with Differential [[id number]]     Order Status: Sent Lab Status: No result     Specimen: Blood           Indwelling Lines/Drains at time of discharge:   Lines/Drains/Airways       Drain  Duration                  Closed/Suction Drain 07/28/23 2000 Left;Anterior Knee Other (Comment) 2 days                    Time spent on the discharge of patient: 20 minutes         Enzo Thibodeaux MD  Department of Hospital Medicine  [facility] - 8th Floor Med Surg

## 2023-07-31 NOTE — PLAN OF CARE
DC orders, clinicals and MAR sent to [facility] via "[employer]". I called and spoke to Karen at [facility] and she stated she is having to submit to [employer] for auth. And will call when she receives the auth.

## 2023-07-31 NOTE — PLAN OF CARE
Karen with [facility] called and stated she received insurance auth. DC packet given to pt's nurse Raquel. I called pt's sister Andreea [phone] as requested by pt to inform of dc. Per pt and Raquel pt not able to bend lt knee long and pt set up in [place]  Call with [facility]. I spoke to Karen about the ambulance and she stated [facility]  will approve the ambulance ride.

## 2023-07-31 NOTE — PROGRESS NOTES
Pharmacokinetic Assessment Follow Up: IV Vancomycin    Vancomycin serum concentration assessment(s):    The trough level was drawn correctly and can be used to guide therapy at this time. The measurement is below the desired definitive target range of 15 to 20 mcg/mL.    Vancomycin Regimen Plan:    Change regimen to Vancomycin 1250 mg IV every 12 hours with next serum trough concentration measured at 0800 prior to 4th dose on 08/02    Drug levels (last 3 results):  Recent Labs   Lab Result Units 07/29/23 2031 07/31/23  0746   Vancomycin Trough ug/ml 14.0* 14.3*       Pharmacy will continue to follow and monitor vancomycin.    Please contact pharmacy at extension [phone] for questions regarding this assessment.    Thank you for the consult,   Guevara Box       Patient brief summary:  Shaheen Christie is a 68 y.o. male initiated on antimicrobial therapy with IV Vancomycin for treatment of bone/joint infection      Drug Allergies:   Review of patient's allergies indicates:  No Known Allergies    Actual Body Weight:   72.6 kg    Renal Function:   Estimated Creatinine Clearance: 112.7 mL/min (A) (based on SCr of 0.61 mg/dL (L)).,     Dialysis Method (if applicable):  N/A    CBC (last 72 hours):  Recent Labs   Lab Result Units 07/29/23  0450 07/30/23  0521 07/31/23  0630   WBC x10(3)/mcL 8.12 7.36 6.50   Hgb g/dL 10.4* 10.6* 10.5*   Hct % 31.9* 31.7* 30.5*   Platelet x10(3)/mcL 257 265 257   Mono % % 12.3 10.3 10.3   Eos % % 0.7 3.7 5.1   Basophil % % 0.9 1.4 1.2       Metabolic Panel (last 72 hours):  Recent Labs   Lab Result Units 07/29/23  0450 07/30/23  0521 07/31/23  0630   Sodium Level mmol/L 136 139 138   Potassium Level mmol/L 4.0 4.4 3.7   Chloride mmol/L 103 107 106   Carbon Dioxide mmol/L 26 26 25   Glucose Level mg/dL 93 96 96   Blood Urea Nitrogen mg/dL 16.0 13.8 9.1   Creatinine mg/dL 0.74 0.72* 0.61*       Vancomycin Administrations:  vancomycin given in the last 96 hours                     vancomycin in  dextrose 5 % 1 gram/250 mL IVPB 1,000 mg (mg) 1,000 mg New Bag 07/31/23 1028     1,000 mg New Bag 07/30/23 2140     1,000 mg New Bag  1005     1,000 mg New Bag 07/29/23 2127     1,000 mg New Bag  0852     1,000 mg New Bag 07/28/23 2123    vancomycin injection (g) 2 g Given 07/28/23 1357    vancomycin (VANCOCIN) 1,750 mg in dextrose 5 % (D5W) 500 mL IVPB (mg) 1,750 mg New Bag 07/28/23 0803                    Microbiologic Results:  Microbiology Results (last 7 days)       Procedure Component Value Units Date/Time    Anaerobic Culture [[id number]] Collected: 07/28/23 1401    Order Status: Completed Specimen: Abscess from Knee, Left Updated: 07/31/23 1049     Anaerobe Culture No Anaerobes Isolated    Body Fluid Culture [[id number]] Collected: 07/28/23 0109    Order Status: Completed Specimen: Fluid from Knee, Left Updated: 07/31/23 0706     Body Fluid Culture No Growth At 72 Hours    Wound Culture [[id number]] Collected: 07/28/23 1401    Order Status: Completed Specimen: Abscess from Knee, Left Updated: 07/31/23 0659     Wound Culture No Growth    Gram Stain [[id number]] Collected: 07/28/23 1401    Order Status: Completed Specimen: Abscess from Knee, Left Updated: 07/29/23 0920     GRAM STAIN No WBCs, No bacteria seen    Mycobacteria and Nocardia Culture [[id number]] Collected: 07/28/23 1401    Order Status: Sent Specimen: Tissue from Knee, Left Updated: 07/28/23 1603    Fungal Culture [[id number]] Collected: 07/28/23 1401    Order Status: Sent Specimen: Abscess from Knee, Left Updated: 07/28/23 1434    AFB Smear [[id number]] Collected: 07/28/23 1352    Order Status: Canceled Specimen: Abscess from Joint Fluid, Right Knee     Anaerobic Culture [[id number]] Collected: 07/28/23 1352    Order Status: Canceled Specimen: Abscess from Joint Fluid, Right Knee     Fungal Culture [[id number]] Collected: 07/28/23 1352    Order Status: Canceled Specimen: Abscess from Joint Fluid, Right Knee     Gram Stain [[id number]] Collected: 07/28/23  1352    Order Status: Canceled Specimen: Abscess from Joint Fluid, Right Knee     AFB Smear [[id number]] Collected: 07/28/23 1350    Order Status: Canceled Specimen: Body Fluid from Joint Fluid, Right Knee     Body Fluid Culture [[id number]] Collected: 07/28/23 1350    Order Status: Canceled Specimen: Body Fluid from Joint Fluid, Right Knee     Fungal Culture [[id number]] Collected: 07/28/23 1350    Order Status: Canceled Specimen: Body Fluid from Joint Fluid, Right Knee     Gram Stain [[id number]] Collected: 07/28/23 1350    Order Status: Canceled Specimen: Body Fluid from Joint Fluid, Right Knee     Mycobacteria and Nocardia Culture [[id number]] Collected: 07/28/23 1350    Order Status: Canceled Specimen: Body Fluid from Joint Fluid, Right Knee     Gram Stain [[id number]] Collected: 07/28/23 0113    Order Status: Completed Specimen: Fluid from Knee, Left Updated: 07/28/23 0313     GRAM STAIN 4+ WBC observed      No bacteria seen    Gram Stain [[id number]]     Order Status: Canceled Specimen: Fluid from Knee, Left

## 2023-07-31 NOTE — NURSING
Discussed status and plan of care with assigned nurse Katie COUCH, who reports pateint preparing to discharge back to nursing home of residence as per Dr. Thibodeaux.

## 2023-08-01 LAB — RHODAMINE-AURAMINE STN SPEC: NORMAL

## 2023-08-02 ENCOUNTER — LAB REQUISITION (OUTPATIENT)
Dept: LAB | Facility: HOSPITAL | Age: 68
End: 2023-08-02
Payer: MEDICARE

## 2023-08-02 DIAGNOSIS — I10 ESSENTIAL (PRIMARY) HYPERTENSION: ICD-10-CM

## 2023-08-02 LAB — URATE SERPL-MCNC: 2.5 MG/DL (ref 3.5–7.2)

## 2023-08-02 PROCEDURE — 84550 ASSAY OF BLOOD/URIC ACID: CPT | Performed by: FAMILY MEDICINE

## 2023-08-04 ENCOUNTER — LAB REQUISITION (OUTPATIENT)
Dept: LAB | Facility: HOSPITAL | Age: 68
End: 2023-08-04
Payer: MEDICARE

## 2023-08-04 DIAGNOSIS — R79.89 OTHER SPECIFIED ABNORMAL FINDINGS OF BLOOD CHEMISTRY: ICD-10-CM

## 2023-08-04 DIAGNOSIS — Z13.1 ENCOUNTER FOR SCREENING FOR DIABETES MELLITUS: ICD-10-CM

## 2023-08-04 LAB
ALBUMIN SERPL-MCNC: 2.7 G/DL (ref 3.4–4.8)
ALBUMIN/GLOB SERPL: 0.8 RATIO (ref 1.1–2)
ALP SERPL-CCNC: 90 UNIT/L (ref 40–150)
ALT SERPL-CCNC: 11 UNIT/L (ref 0–55)
AST SERPL-CCNC: 11 UNIT/L (ref 5–34)
BASOPHILS # BLD AUTO: 0.09 X10(3)/MCL
BASOPHILS NFR BLD AUTO: 1 %
BILIRUBIN DIRECT+TOT PNL SERPL-MCNC: 0.3 MG/DL
BUN SERPL-MCNC: 16.2 MG/DL (ref 8.4–25.7)
CALCIUM SERPL-MCNC: 9 MG/DL (ref 8.8–10)
CHLORIDE SERPL-SCNC: 107 MMOL/L (ref 98–107)
CHOLEST SERPL-MCNC: 105 MG/DL
CHOLEST/HDLC SERPL: 3 {RATIO} (ref 0–5)
CO2 SERPL-SCNC: 25 MMOL/L (ref 23–31)
CREAT SERPL-MCNC: 0.63 MG/DL (ref 0.73–1.18)
EOSINOPHIL # BLD AUTO: 0.44 X10(3)/MCL (ref 0–0.9)
EOSINOPHIL NFR BLD AUTO: 4.7 %
ERYTHROCYTE [DISTWIDTH] IN BLOOD BY AUTOMATED COUNT: 14.8 % (ref 11.5–17)
EST. AVERAGE GLUCOSE BLD GHB EST-MCNC: 105.4 MG/DL
GFR SERPLBLD CREATININE-BSD FMLA CKD-EPI: >60 MLS/MIN/1.73/M2
GLOBULIN SER-MCNC: 3.2 GM/DL (ref 2.4–3.5)
GLUCOSE SERPL-MCNC: 97 MG/DL (ref 82–115)
HBA1C MFR BLD: 5.3 %
HCT VFR BLD AUTO: 31.1 % (ref 42–52)
HDLC SERPL-MCNC: 31 MG/DL (ref 35–60)
HGB BLD-MCNC: 10 G/DL (ref 14–18)
IMM GRANULOCYTES # BLD AUTO: 0.02 X10(3)/MCL (ref 0–0.04)
IMM GRANULOCYTES NFR BLD AUTO: 0.2 %
LDLC SERPL CALC-MCNC: 63 MG/DL (ref 50–140)
LYMPHOCYTES # BLD AUTO: 2.11 X10(3)/MCL (ref 0.6–4.6)
LYMPHOCYTES NFR BLD AUTO: 22.3 %
MCH RBC QN AUTO: 29.6 PG (ref 27–31)
MCHC RBC AUTO-ENTMCNC: 32.2 G/DL (ref 33–36)
MCV RBC AUTO: 92 FL (ref 80–94)
MONOCYTES # BLD AUTO: 0.78 X10(3)/MCL (ref 0.1–1.3)
MONOCYTES NFR BLD AUTO: 8.3 %
NEUTROPHILS # BLD AUTO: 6.01 X10(3)/MCL (ref 2.1–9.2)
NEUTROPHILS NFR BLD AUTO: 63.5 %
NRBC BLD AUTO-RTO: 0 %
PLATELET # BLD AUTO: 313 X10(3)/MCL (ref 130–400)
PMV BLD AUTO: 9 FL (ref 7.4–10.4)
POTASSIUM SERPL-SCNC: 4.2 MMOL/L (ref 3.5–5.1)
PREALB SERPL-MCNC: 12.9 MG/DL (ref 16–42)
PROT SERPL-MCNC: 5.9 GM/DL (ref 5.8–7.6)
PSA SERPL-MCNC: 0.25 NG/ML
RBC # BLD AUTO: 3.38 X10(6)/MCL (ref 4.7–6.1)
SODIUM SERPL-SCNC: 142 MMOL/L (ref 136–145)
TRIGL SERPL-MCNC: 57 MG/DL (ref 34–140)
TSH SERPL-ACNC: 3.25 UIU/ML (ref 0.35–4.94)
VLDLC SERPL CALC-MCNC: 11 MG/DL
WBC # SPEC AUTO: 9.45 X10(3)/MCL (ref 4.5–11.5)

## 2023-08-04 PROCEDURE — 80053 COMPREHEN METABOLIC PANEL: CPT | Performed by: FAMILY MEDICINE

## 2023-08-04 PROCEDURE — 83036 HEMOGLOBIN GLYCOSYLATED A1C: CPT | Performed by: FAMILY MEDICINE

## 2023-08-04 PROCEDURE — 85025 COMPLETE CBC W/AUTO DIFF WBC: CPT | Performed by: FAMILY MEDICINE

## 2023-08-04 PROCEDURE — 84134 ASSAY OF PREALBUMIN: CPT | Performed by: FAMILY MEDICINE

## 2023-08-04 PROCEDURE — 84443 ASSAY THYROID STIM HORMONE: CPT | Performed by: FAMILY MEDICINE

## 2023-08-04 PROCEDURE — 80061 LIPID PANEL: CPT | Performed by: FAMILY MEDICINE

## 2023-08-04 PROCEDURE — 84153 ASSAY OF PSA TOTAL: CPT | Performed by: FAMILY MEDICINE

## 2023-08-10 LAB — BACTERIA FLD CULT: NO GROWTH

## 2023-08-14 ENCOUNTER — TELEPHONE (OUTPATIENT)
Dept: ORTHOPEDICS | Facility: CLINIC | Age: 68
End: 2023-08-14
Payer: MEDICARE

## 2023-08-17 ENCOUNTER — TELEPHONE (OUTPATIENT)
Dept: ORTHOPEDICS | Facility: CLINIC | Age: 68
End: 2023-08-17
Payer: MEDICARE

## 2023-08-17 NOTE — TELEPHONE ENCOUNTER
Spoke to Janie at facility, she is asking when staples can be removed.  Per Dr. Garces, jose can be d/c'd now if there is no redness, or draining from incision.  Patient will need to see joint specialist if any further problems.

## 2023-08-18 LAB — BEAKER SEE SCANNED REPORT: NORMAL

## 2023-08-28 LAB — FUNGUS SPEC CULT: NORMAL

## 2023-09-01 ENCOUNTER — LAB REQUISITION (OUTPATIENT)
Dept: LAB | Facility: HOSPITAL | Age: 68
End: 2023-09-01
Payer: MEDICARE

## 2023-09-01 DIAGNOSIS — L89.616 PRESSURE-INDUCED DEEP TISSUE DAMAGE OF RIGHT HEEL: ICD-10-CM

## 2023-09-01 LAB — PREALB SERPL-MCNC: 14.1 MG/DL (ref 16–42)

## 2023-09-01 PROCEDURE — 84134 ASSAY OF PREALBUMIN: CPT | Performed by: FAMILY MEDICINE

## 2023-09-13 ENCOUNTER — LAB REQUISITION (OUTPATIENT)
Dept: LAB | Facility: HOSPITAL | Age: 68
End: 2023-09-13
Payer: MEDICARE

## 2023-09-13 DIAGNOSIS — R52 PAIN, UNSPECIFIED: ICD-10-CM

## 2023-09-13 LAB
ALBUMIN SERPL-MCNC: 3 G/DL (ref 3.4–4.8)
ALBUMIN/GLOB SERPL: 0.9 RATIO (ref 1.1–2)
ALP SERPL-CCNC: 77 UNIT/L (ref 40–150)
ALT SERPL-CCNC: 7 UNIT/L (ref 0–55)
AST SERPL-CCNC: 8 UNIT/L (ref 5–34)
BILIRUB SERPL-MCNC: 0.3 MG/DL
BUN SERPL-MCNC: 17.1 MG/DL (ref 8.4–25.7)
CALCIUM SERPL-MCNC: 9.4 MG/DL (ref 8.8–10)
CHLORIDE SERPL-SCNC: 107 MMOL/L (ref 98–107)
CO2 SERPL-SCNC: 27 MMOL/L (ref 23–31)
CREAT SERPL-MCNC: 0.78 MG/DL (ref 0.73–1.18)
ERYTHROCYTE [DISTWIDTH] IN BLOOD BY AUTOMATED COUNT: 15.7 % (ref 11.5–17)
GFR SERPLBLD CREATININE-BSD FMLA CKD-EPI: >60 MLS/MIN/1.73/M2
GLOBULIN SER-MCNC: 3.3 GM/DL (ref 2.4–3.5)
GLUCOSE SERPL-MCNC: 103 MG/DL (ref 82–115)
HCT VFR BLD AUTO: 31.5 % (ref 42–52)
HGB BLD-MCNC: 10.1 G/DL (ref 14–18)
MCH RBC QN AUTO: 29.7 PG (ref 27–31)
MCHC RBC AUTO-ENTMCNC: 32.1 G/DL (ref 33–36)
MCV RBC AUTO: 92.6 FL (ref 80–94)
MYCOBACTERIUM SPEC QL CULT: NORMAL
NRBC BLD AUTO-RTO: 0 %
PLATELET # BLD AUTO: 280 X10(3)/MCL (ref 130–400)
PMV BLD AUTO: 9.3 FL (ref 7.4–10.4)
POTASSIUM SERPL-SCNC: 4.9 MMOL/L (ref 3.5–5.1)
PROT SERPL-MCNC: 6.3 GM/DL (ref 5.8–7.6)
RBC # BLD AUTO: 3.4 X10(6)/MCL (ref 4.7–6.1)
SODIUM SERPL-SCNC: 141 MMOL/L (ref 136–145)
TSH SERPL-ACNC: 4.31 UIU/ML (ref 0.35–4.94)
URATE SERPL-MCNC: 3 MG/DL (ref 3.5–7.2)
WBC # SPEC AUTO: 7.62 X10(3)/MCL (ref 4.5–11.5)

## 2023-09-13 PROCEDURE — 84550 ASSAY OF BLOOD/URIC ACID: CPT | Performed by: FAMILY MEDICINE

## 2023-09-13 PROCEDURE — 80053 COMPREHEN METABOLIC PANEL: CPT | Performed by: FAMILY MEDICINE

## 2023-09-13 PROCEDURE — 84443 ASSAY THYROID STIM HORMONE: CPT | Performed by: FAMILY MEDICINE

## 2023-09-13 PROCEDURE — 85027 COMPLETE CBC AUTOMATED: CPT | Performed by: FAMILY MEDICINE

## 2023-09-14 PROCEDURE — 87070 CULTURE OTHR SPECIMN AEROBIC: CPT

## 2023-09-15 ENCOUNTER — LAB REQUISITION (OUTPATIENT)
Dept: LAB | Facility: HOSPITAL | Age: 68
End: 2023-09-15
Payer: MEDICARE

## 2023-09-15 DIAGNOSIS — L89.159 PRESSURE ULCER OF SACRAL REGION, UNSPECIFIED STAGE: ICD-10-CM

## 2023-09-18 LAB
BACTERIA WND CULT: ABNORMAL
BACTERIA WND CULT: ABNORMAL

## 2023-09-29 ENCOUNTER — LAB REQUISITION (OUTPATIENT)
Dept: LAB | Facility: HOSPITAL | Age: 68
End: 2023-09-29
Payer: MEDICARE

## 2023-09-29 DIAGNOSIS — R77.0 ABNORMALITY OF ALBUMIN: ICD-10-CM

## 2023-09-29 LAB — PREALB SERPL-MCNC: 13.5 MG/DL (ref 16–42)

## 2023-09-29 PROCEDURE — 84134 ASSAY OF PREALBUMIN: CPT | Performed by: FAMILY MEDICINE

## 2023-10-05 ENCOUNTER — HOSPITAL ENCOUNTER (OUTPATIENT)
Dept: RADIOLOGY | Facility: CLINIC | Age: 68
Discharge: HOME | End: 2023-10-05
Attending: ORTHOPAEDIC SURGERY
Payer: MEDICARE

## 2023-10-05 ENCOUNTER — OFFICE VISIT (OUTPATIENT)
Dept: ORTHOPEDICS | Facility: CLINIC | Age: 68
End: 2023-10-05
Payer: MEDICARE

## 2023-10-05 DIAGNOSIS — M00.9 INFECTION OF LEFT KNEE: ICD-10-CM

## 2023-10-05 DIAGNOSIS — M25.562 LEFT KNEE PAIN, UNSPECIFIED CHRONICITY: ICD-10-CM

## 2023-10-05 DIAGNOSIS — M25.562 LEFT KNEE PAIN, UNSPECIFIED CHRONICITY: Primary | ICD-10-CM

## 2023-10-05 PROCEDURE — 1159F PR MEDICATION LIST DOCUMENTED IN MEDICAL RECORD: ICD-10-PCS | Mod: CPTII,,, | Performed by: ORTHOPAEDIC SURGERY

## 2023-10-05 PROCEDURE — 1125F AMNT PAIN NOTED PAIN PRSNT: CPT | Mod: CPTII,,, | Performed by: ORTHOPAEDIC SURGERY

## 2023-10-05 PROCEDURE — 3044F PR MOST RECENT HEMOGLOBIN A1C LEVEL <7.0%: ICD-10-PCS | Mod: CPTII,,, | Performed by: ORTHOPAEDIC SURGERY

## 2023-10-05 PROCEDURE — 73560 XR KNEE 1 OR 2 VIEW LEFT: ICD-10-PCS | Mod: LT,,, | Performed by: ORTHOPAEDIC SURGERY

## 2023-10-05 PROCEDURE — 99214 PR OFFICE/OUTPT VISIT, EST, LEVL IV, 30-39 MIN: ICD-10-PCS | Mod: 25,24,, | Performed by: ORTHOPAEDIC SURGERY

## 2023-10-05 PROCEDURE — 20610 DRAIN/INJ JOINT/BURSA W/O US: CPT | Mod: 79,LT,, | Performed by: ORTHOPAEDIC SURGERY

## 2023-10-05 PROCEDURE — 4010F PR ACE/ARB THEARPY RXD/TAKEN: ICD-10-PCS | Mod: CPTII,,, | Performed by: ORTHOPAEDIC SURGERY

## 2023-10-05 PROCEDURE — 1101F PR PT FALLS ASSESS DOC 0-1 FALLS W/OUT INJ PAST YR: ICD-10-PCS | Mod: CPTII,,, | Performed by: ORTHOPAEDIC SURGERY

## 2023-10-05 PROCEDURE — 4010F ACE/ARB THERAPY RXD/TAKEN: CPT | Mod: CPTII,,, | Performed by: ORTHOPAEDIC SURGERY

## 2023-10-05 PROCEDURE — 20610 LARGE JOINT ASPIRATION/INJECTION: L KNEE: ICD-10-PCS | Mod: 79,LT,, | Performed by: ORTHOPAEDIC SURGERY

## 2023-10-05 PROCEDURE — 99214 OFFICE O/P EST MOD 30 MIN: CPT | Mod: 25,24,, | Performed by: ORTHOPAEDIC SURGERY

## 2023-10-05 PROCEDURE — 3288F FALL RISK ASSESSMENT DOCD: CPT | Mod: CPTII,,, | Performed by: ORTHOPAEDIC SURGERY

## 2023-10-05 PROCEDURE — 1159F MED LIST DOCD IN RCRD: CPT | Mod: CPTII,,, | Performed by: ORTHOPAEDIC SURGERY

## 2023-10-05 PROCEDURE — 73560 X-RAY EXAM OF KNEE 1 OR 2: CPT | Mod: LT,,, | Performed by: ORTHOPAEDIC SURGERY

## 2023-10-05 PROCEDURE — 1101F PT FALLS ASSESS-DOCD LE1/YR: CPT | Mod: CPTII,,, | Performed by: ORTHOPAEDIC SURGERY

## 2023-10-05 PROCEDURE — 3288F PR FALLS RISK ASSESSMENT DOCUMENTED: ICD-10-PCS | Mod: CPTII,,, | Performed by: ORTHOPAEDIC SURGERY

## 2023-10-05 PROCEDURE — 3044F HG A1C LEVEL LT 7.0%: CPT | Mod: CPTII,,, | Performed by: ORTHOPAEDIC SURGERY

## 2023-10-05 PROCEDURE — 1125F PR PAIN SEVERITY QUANTIFIED, PAIN PRESENT: ICD-10-PCS | Mod: CPTII,,, | Performed by: ORTHOPAEDIC SURGERY

## 2023-10-05 NOTE — PROCEDURES
Large Joint Aspiration/Injection: L knee    Date/Time: 10/5/2023 3:30 PM    Performed by: Jorge Drew MD  Authorized by: Jorge Drew MD    Consent Done?:  Yes (Verbal)  Indications:  Diagnostic evaluation  Timeout: prior to procedure the correct patient, procedure, and site was verified    Prep: patient was prepped and draped in usual sterile fashion      Local anesthesia used?: Yes    Anesthesia:  Local infiltration  Local anesthetic:  Lidocaine 2% without epinephrine    Details:  Needle Size:  18 G  Ultrasonic Guidance for needle placement?: No    Approach:  Lateral  Location:  Knee  Site:  L knee  Aspirate amount (mL):  10  Aspirate:  Bloody  Patient tolerance:  Patient tolerated the procedure well with no immediate complications

## 2023-10-05 NOTE — PROGRESS NOTES
Chief Complaint:   Chief Complaint   Patient presents with    Knee Pain     LT knee pain. Last seen by Dr. Garces regarding sx in July 2023. Ambulates in walker. Wheel chair bound. Complaints of pain and slight swelling. Tried tylenol and ibuprofen. Also takes pain medicine for this issue.        History of present illness:  68-year-old male presents today for follow-up evaluation of left leg pain.  Patient has recent history irrigation debridement Dr. Garces.  At a chronic infection.  It was treated with irrigation debridement.  He continues to have significant complaints of left knee pain.  Worse with activity.  Improved by rest    Past Medical History:   Diagnosis Date    COPD (chronic obstructive pulmonary disease)     Depression     Hypertension     Neuropathy        Past Surgical History:   Procedure Laterality Date    angiogram Bilateral     stents placed in left leg    anterior neck surgery      ARTHROTOMY OF ANKLE Left 6/28/2023    Procedure: ARTHROTOMY, ANKLE;  Surgeon: Tom Nichole DPM;  Location: [facility];  Service: Podiatry;  Laterality: Left;    cataracts Left     CHOLECYSTECTOMY      EYE SURGERY Left     HAND SURGERY Left     broken bone    herina repair      INCISION AND DRAINAGE, LOWER EXTREMITY Left 7/28/2023    Procedure: INCISION AND DRAINAGE, LOWER EXTREMITY;  Surgeon: Avinash Garces MD;  Location: [facility];  Service: Orthopedics;  Laterality: Left;  Supine, vascular bed, bone foam  last case  cysto tubing, laparoscopic trocar, experience, vanc, hemovac drain    KNEE SURGERY Bilateral     posterior neck surgery      SPINE SURGERY      TOTAL REPLACEMENT OF BOTH HIP JOINTS USING COMPUTER-ASSISTED NAVIGATION Bilateral        Current Outpatient Medications   Medication Sig    acetaminophen (TYLENOL) 325 MG tablet Take 650 mg by mouth every 6 (six) hours as needed for Pain.    albuterol (PROVENTIL/VENTOLIN HFA) 90 mcg/actuation inhaler Inhale into the lungs 4 (four) times daily as needed.     amLODIPine (NORVASC) 10 MG tablet Take 10 mg by mouth once daily.    ascorbic acid, vitamin C, (VITAMIN C) 500 MG tablet Take 500 mg by mouth once daily.    aspirin 81 MG Chew Take 81 mg by mouth once daily.    cilostazoL (PLETAL) 50 MG Tab Take 50 mg by mouth 2 (two) times daily.    clopidogreL (PLAVIX) 75 mg tablet Take 75 mg by mouth once daily.    collagenase (SANTYL) ointment Apply topically 2 (two) times a day.    docusate sodium (COLACE) 100 MG capsule Take 100 mg by mouth 2 (two) times daily.    gabapentin (NEURONTIN) 300 MG capsule Take 1 capsule (300 mg total) by mouth 3 (three) times daily.    HYDROcodone-acetaminophen (NORCO)  mg per tablet Take 1 tablet by mouth every 8 (eight) hours as needed for Pain.    losartan (COZAAR) 100 MG tablet Take 100 mg by mouth once daily.    melatonin (MELATIN) 3 mg tablet Take 6 mg by mouth nightly as needed for Insomnia.    miconazole NITRATE 2 % (MICOTIN) 2 % top powder Apply topically 2 (two) times daily.    multivitamin (THERAGRAN) per tablet Take 1 tablet by mouth once daily.    polyethylene glycol (GLYCOLAX) 17 gram PwPk Take 17 g by mouth once daily.     No current facility-administered medications for this visit.       Review of patient's allergies indicates:  No Known Allergies    Family History   Problem Relation Age of Onset    Heart disease Mother     Cancer Father     Lung disease Father        Social History     Socioeconomic History    Marital status:    Occupational History    Occupation: retired   Tobacco Use    Smoking status: Every Day     Current packs/day: 0.50     Average packs/day: 0.5 packs/day for 15.0 years (7.5 ttl pk-yrs)     Types: Cigarettes    Smokeless tobacco: Never   Substance and Sexual Activity    Alcohol use: Yes     Alcohol/week: 3.0 - 6.0 standard drinks of alcohol     Types: 3 - 6 Cans of beer per week    Drug use: Yes     Types: Marijuana    Sexual activity: Not Currently           Review of Systems:    Constitution:  Negative for chills, fever, and sweats.  Negative for unexplained weight loss.    HENT:  Negative for headaches and blurry vision.    Cardiovascular:Negative for chest pain or irregular heart beat. Negative for hypertension.    Respiratory:  Negative for cough and shortness of breath.    Gastrointestinal: Negative for abdominal pain, heartburn, melena, nausea, and vomitting.    Genitourinary:  Negative bladder incontinence and dysuria.    Musculoskeletal:  See HPI    Neurological: Negative for numbness.    Psychiatric/Behavioral: Negative for depression.  The patient is not nervous/anxious.      Endocrine: Negative for polyuria    Hematologic/Lymphatic: Negative for bleeding problem.  Does not bruise/bleed easily.    Skin: Negative for poor would healing and rash      Physical Examination:    Vital Signs:  There were no vitals filed for this visit.    There is no height or weight on file to calculate BMI.    General: No acute distress, alert and oriented, healthy appearing    HEENT: Head is atraumatic, mucous membranes are moist    Neck: Supples, no JVD    Cardiovascular: Palpable dorsalis pedis and posterior tibial pulses, regular rate and rhythm to those pulses    Lungs: Breathing non-labored    Skin: no rashes appreciated    Neurologic: Can flex and extend knees, ankles, and toes. Sensation is grossly intact    Left knee:  Full range of motion not checked given underlying significant pain.  Patient has a 2 to 3+ effusion of the left knee.  He gets extension of 20.  Flexion 90    X-rays:  Two views of the left knee reviewed.  Patient with arthritic change of the left knee.     Assessment::  Painful left knee    Plan:  Plan for aspiration given history of septic arthritis of the left knee to rule out underlying infectious etiology.  See him back in 2-3 weeks to go over the results of his aspiration.    This note was created using Serviceful voice recognition software that occasionally misinterpreted phrases or  words.    Consult note is delivered via Epic messaging service.

## 2023-11-07 ENCOUNTER — OFFICE VISIT (OUTPATIENT)
Dept: ORTHOPEDICS | Facility: CLINIC | Age: 68
End: 2023-11-07
Payer: MEDICARE

## 2023-11-07 DIAGNOSIS — M00.9 INFECTION OF LEFT KNEE: Primary | ICD-10-CM

## 2023-11-07 DIAGNOSIS — M25.562 LEFT KNEE PAIN, UNSPECIFIED CHRONICITY: ICD-10-CM

## 2023-11-07 LAB
CLARITY BODY FLUID (OHS): NORMAL
COLOR BODY FLUID (OHS): NORMAL
LYMPHOCYTE MANUAL BF (OHS): 2 %
MONOCYTE MANUAL BF (OHS): 1 %
NEUTROPHILS MAN BF (OHS): 97 %
WBC # FLD AUTO: 2075 /UL

## 2023-11-07 PROCEDURE — 20610 DRAIN/INJ JOINT/BURSA W/O US: CPT | Mod: LT,,, | Performed by: ORTHOPAEDIC SURGERY

## 2023-11-07 PROCEDURE — 4010F ACE/ARB THERAPY RXD/TAKEN: CPT | Mod: CPTII,,, | Performed by: ORTHOPAEDIC SURGERY

## 2023-11-07 PROCEDURE — 4010F PR ACE/ARB THEARPY RXD/TAKEN: ICD-10-PCS | Mod: CPTII,,, | Performed by: ORTHOPAEDIC SURGERY

## 2023-11-07 PROCEDURE — 1159F MED LIST DOCD IN RCRD: CPT | Mod: CPTII,,, | Performed by: ORTHOPAEDIC SURGERY

## 2023-11-07 PROCEDURE — 3044F PR MOST RECENT HEMOGLOBIN A1C LEVEL <7.0%: ICD-10-PCS | Mod: CPTII,,, | Performed by: ORTHOPAEDIC SURGERY

## 2023-11-07 PROCEDURE — 1159F PR MEDICATION LIST DOCUMENTED IN MEDICAL RECORD: ICD-10-PCS | Mod: CPTII,,, | Performed by: ORTHOPAEDIC SURGERY

## 2023-11-07 PROCEDURE — 3044F HG A1C LEVEL LT 7.0%: CPT | Mod: CPTII,,, | Performed by: ORTHOPAEDIC SURGERY

## 2023-11-07 PROCEDURE — 99213 PR OFFICE/OUTPT VISIT, EST, LEVL III, 20-29 MIN: ICD-10-PCS | Mod: 25,,, | Performed by: ORTHOPAEDIC SURGERY

## 2023-11-07 PROCEDURE — 20610 LARGE JOINT ASPIRATION/INJECTION: L KNEE: ICD-10-PCS | Mod: LT,,, | Performed by: ORTHOPAEDIC SURGERY

## 2023-11-07 PROCEDURE — 99213 OFFICE O/P EST LOW 20 MIN: CPT | Mod: 25,,, | Performed by: ORTHOPAEDIC SURGERY

## 2023-11-07 NOTE — PROCEDURES
Large Joint Aspiration/Injection: L knee    Date/Time: 11/7/2023 2:30 PM    Performed by: Jorge Drew MD  Authorized by: Jorge Drew MD    Consent Done?:  Yes (Verbal)  Indications:  Diagnostic evaluation  Timeout: prior to procedure the correct patient, procedure, and site was verified    Prep: patient was prepped and draped in usual sterile fashion      Local anesthesia used?: Yes    Anesthesia:  Local infiltration  Local anesthetic:  Lidocaine 2% without epinephrine    Details:  Needle Size:  18 G  Ultrasonic Guidance for needle placement?: No    Approach:  Lateral  Location:  Knee  Site:  L knee  Aspirate amount (mL):  7  Aspirate:  Bloody  Patient tolerance:  Patient tolerated the procedure well with no immediate complications

## 2023-11-07 NOTE — PROGRESS NOTES
Chief Complaint:   Chief Complaint   Patient presents with    Results     Culture results Left knee, has visibly more swelling today today, states the weather changed he started to hurt more       History of present illness:  68-year-old female presents today follow-up after aspiration of his left knee for a history of infection.  Unfortunately patient's specimen was either not send her lost.  We have no results.  Continues to have significant pain in the left    Past Medical History:   Diagnosis Date    COPD (chronic obstructive pulmonary disease)     Depression     Hypertension     Neuropathy        Past Surgical History:   Procedure Laterality Date    angiogram Bilateral     stents placed in left leg    anterior neck surgery      ARTHROTOMY OF ANKLE Left 6/28/2023    Procedure: ARTHROTOMY, ANKLE;  Surgeon: Tom Nichole DPM;  Location: [facility] OR;  Service: Podiatry;  Laterality: Left;    cataracts Left     CHOLECYSTECTOMY      EYE SURGERY Left     HAND SURGERY Left     broken bone    herina repair      INCISION AND DRAINAGE, LOWER EXTREMITY Left 7/28/2023    Procedure: INCISION AND DRAINAGE, LOWER EXTREMITY;  Surgeon: Avinash Garces MD;  Location: [facility] OR;  Service: Orthopedics;  Laterality: Left;  Supine, vascular bed, bone foam  last case  cysto tubing, laparoscopic trocar, experience, vanc, hemovac drain    KNEE SURGERY Bilateral     posterior neck surgery      SPINE SURGERY      TOTAL REPLACEMENT OF BOTH HIP JOINTS USING COMPUTER-ASSISTED NAVIGATION Bilateral        Current Outpatient Medications   Medication Sig    acetaminophen (TYLENOL) 325 MG tablet Take 650 mg by mouth every 6 (six) hours as needed for Pain.    albuterol (PROVENTIL/VENTOLIN HFA) 90 mcg/actuation inhaler Inhale into the lungs 4 (four) times daily as needed.    amLODIPine (NORVASC) 10 MG tablet Take 10 mg by mouth once daily.    ascorbic acid, vitamin C, (VITAMIN C) 500 MG tablet Take 500 mg by mouth once daily.    aspirin 81 MG Chew  Take 81 mg by mouth once daily.    cilostazoL (PLETAL) 50 MG Tab Take 50 mg by mouth 2 (two) times daily.    clopidogreL (PLAVIX) 75 mg tablet Take 75 mg by mouth once daily.    collagenase (SANTYL) ointment Apply topically 2 (two) times a day.    docusate sodium (COLACE) 100 MG capsule Take 100 mg by mouth 2 (two) times daily.    gabapentin (NEURONTIN) 300 MG capsule Take 1 capsule (300 mg total) by mouth 3 (three) times daily.    HYDROcodone-acetaminophen (NORCO)  mg per tablet Take 1 tablet by mouth every 8 (eight) hours as needed for Pain.    losartan (COZAAR) 100 MG tablet Take 100 mg by mouth once daily.    melatonin (MELATIN) 3 mg tablet Take 6 mg by mouth nightly as needed for Insomnia.    miconazole NITRATE 2 % (MICOTIN) 2 % top powder Apply topically 2 (two) times daily.    multivitamin (THERAGRAN) per tablet Take 1 tablet by mouth once daily.    polyethylene glycol (GLYCOLAX) 17 gram PwPk Take 17 g by mouth once daily.     No current facility-administered medications for this visit.       Review of patient's allergies indicates:  No Known Allergies    Family History   Problem Relation Age of Onset    Heart disease Mother     Cancer Father     Lung disease Father        Social History     Socioeconomic History    Marital status:    Occupational History    Occupation: retired   Tobacco Use    Smoking status: Every Day     Current packs/day: 0.50     Average packs/day: 0.5 packs/day for 15.0 years (7.5 ttl pk-yrs)     Types: Cigarettes    Smokeless tobacco: Never   Substance and Sexual Activity    Alcohol use: Yes     Alcohol/week: 3.0 - 6.0 standard drinks of alcohol     Types: 3 - 6 Cans of beer per week    Drug use: Yes     Types: Marijuana    Sexual activity: Not Currently           Review of Systems:    Constitution: Negative for chills, fever, and sweats.  Negative for unexplained weight loss.    HENT:  Negative for headaches and blurry vision.    Cardiovascular:Negative for chest pain or  irregular heart beat. Negative for hypertension.    Respiratory:  Negative for cough and shortness of breath.    Gastrointestinal: Negative for abdominal pain, heartburn, melena, nausea, and vomitting.    Genitourinary:  Negative bladder incontinence and dysuria.    Musculoskeletal:  See HPI    Neurological: Negative for numbness.    Psychiatric/Behavioral: Negative for depression.  The patient is not nervous/anxious.      Endocrine: Negative for polyuria    Hematologic/Lymphatic: Negative for bleeding problem.  Does not bruise/bleed easily.    Skin: Negative for poor would healing and rash      Physical Examination:    Vital Signs:  There were no vitals filed for this visit.    There is no height or weight on file to calculate BMI.    General: No acute distress, alert and oriented, healthy appearing    HEENT: Head is atraumatic, mucous membranes are moist    Neck: Supples, no JVD    Cardiovascular: Palpable dorsalis pedis and posterior tibial pulses, regular rate and rhythm to those pulses    Lungs: Breathing non-labored    Skin: no rashes appreciated    Neurologic: Can flex and extend knees, ankles, and toes. Sensation is grossly intact    Left knee:  2 to 3+ effusion.  Tenderness throughout the left knee.    X-rays:      Assessment::  Posttraumatic arthritis left knee    Plan:  Discussed all treatment with the patient.  Patient's history of infection.  We will plan plan repeat aspiration to rule out for underlying infectious etiology.  We will see him back in a week to go over the results.    This note was created using [employer] voice recognition software that occasionally misinterpreted phrases or words.    Consult note is delivered via Epic messaging service.

## 2023-11-14 ENCOUNTER — OFFICE VISIT (OUTPATIENT)
Dept: ORTHOPEDICS | Facility: CLINIC | Age: 68
End: 2023-11-14
Payer: MEDICARE

## 2023-11-14 DIAGNOSIS — M17.12 PRIMARY OSTEOARTHRITIS OF LEFT KNEE: Primary | ICD-10-CM

## 2023-11-14 PROCEDURE — 4010F ACE/ARB THERAPY RXD/TAKEN: CPT | Mod: CPTII,,, | Performed by: NURSE PRACTITIONER

## 2023-11-14 PROCEDURE — 99213 PR OFFICE/OUTPT VISIT, EST, LEVL III, 20-29 MIN: ICD-10-PCS | Mod: 25,,, | Performed by: NURSE PRACTITIONER

## 2023-11-14 PROCEDURE — 1159F MED LIST DOCD IN RCRD: CPT | Mod: CPTII,,, | Performed by: NURSE PRACTITIONER

## 2023-11-14 PROCEDURE — 20610 DRAIN/INJ JOINT/BURSA W/O US: CPT | Mod: LT,,, | Performed by: NURSE PRACTITIONER

## 2023-11-14 PROCEDURE — 3044F PR MOST RECENT HEMOGLOBIN A1C LEVEL <7.0%: ICD-10-PCS | Mod: CPTII,,, | Performed by: NURSE PRACTITIONER

## 2023-11-14 PROCEDURE — 1159F PR MEDICATION LIST DOCUMENTED IN MEDICAL RECORD: ICD-10-PCS | Mod: CPTII,,, | Performed by: NURSE PRACTITIONER

## 2023-11-14 PROCEDURE — 3044F HG A1C LEVEL LT 7.0%: CPT | Mod: CPTII,,, | Performed by: NURSE PRACTITIONER

## 2023-11-14 PROCEDURE — 20610 LARGE JOINT ASPIRATION/INJECTION: L KNEE: ICD-10-PCS | Mod: LT,,, | Performed by: NURSE PRACTITIONER

## 2023-11-14 PROCEDURE — 4010F PR ACE/ARB THEARPY RXD/TAKEN: ICD-10-PCS | Mod: CPTII,,, | Performed by: NURSE PRACTITIONER

## 2023-11-14 PROCEDURE — 99213 OFFICE O/P EST LOW 20 MIN: CPT | Mod: 25,,, | Performed by: NURSE PRACTITIONER

## 2023-11-14 RX ORDER — BETAMETHASONE SODIUM PHOSPHATE AND BETAMETHASONE ACETATE 3; 3 MG/ML; MG/ML
12 INJECTION, SUSPENSION INTRA-ARTICULAR; INTRALESIONAL; INTRAMUSCULAR; SOFT TISSUE
Status: DISCONTINUED | OUTPATIENT
Start: 2023-11-14 | End: 2023-11-14 | Stop reason: HOSPADM

## 2023-11-14 RX ORDER — LIDOCAINE HYDROCHLORIDE 20 MG/ML
5 INJECTION, SOLUTION EPIDURAL; INFILTRATION; INTRACAUDAL; PERINEURAL
Status: DISCONTINUED | OUTPATIENT
Start: 2023-11-14 | End: 2023-11-14 | Stop reason: HOSPADM

## 2023-11-14 RX ADMIN — LIDOCAINE HYDROCHLORIDE 5 ML: 20 INJECTION, SOLUTION EPIDURAL; INFILTRATION; INTRACAUDAL; PERINEURAL at 10:11

## 2023-11-14 RX ADMIN — BETAMETHASONE SODIUM PHOSPHATE AND BETAMETHASONE ACETATE 12 MG: 3; 3 INJECTION, SUSPENSION INTRA-ARTICULAR; INTRALESIONAL; INTRAMUSCULAR; SOFT TISSUE at 10:11

## 2023-11-14 NOTE — PROCEDURES
Large Joint Aspiration/Injection: L knee    Date/Time: 11/14/2023 10:15 AM    Performed by: Mis Polk FNP  Authorized by: Jorge Drew MD    Consent Done?:  Yes (Verbal)  Indications:  Arthritis and pain  Timeout: prior to procedure the correct patient, procedure, and site was verified    Prep: patient was prepped and draped in usual sterile fashion    Local anesthesia used?: No      Details:  Needle Size:  22 G  Ultrasonic Guidance for needle placement?: No    Approach:  Anterolateral  Location:  Knee  Site:  L knee  Medications:  5 mL LIDOcaine (PF) 20 mg/mL (2%) 20 mg/mL (2 %); 12 mg betamethasone acetate-betamethasone sodium phosphate 6 mg/mL  Patient tolerance:  Patient tolerated the procedure well with no immediate complications

## 2023-11-14 NOTE — PROGRESS NOTES
Chief Complaint:   Chief Complaint   Patient presents with    Follow-up     Culture results Left knee, feels a little worse thinks it might be the weather change       History of present illness:  68-year-old male presents today follow-up after aspiration of his left knee for a history of infection.  Patient had an aspiration upon her last visit in which results show no signs of infection.  He continues to have significant left knee pain.  Nonambulatory in a wheelchair.  Has a long history of vascular issues to bilateral lower extremities.    Past Medical History:   Diagnosis Date    COPD (chronic obstructive pulmonary disease)     Depression     Hypertension     Neuropathy        Past Surgical History:   Procedure Laterality Date    angiogram Bilateral     stents placed in left leg    anterior neck surgery      ARTHROTOMY OF ANKLE Left 6/28/2023    Procedure: ARTHROTOMY, ANKLE;  Surgeon: Tom Nichole DPM;  Location: [facility];  Service: Podiatry;  Laterality: Left;    cataracts Left     CHOLECYSTECTOMY      EYE SURGERY Left     HAND SURGERY Left     broken bone    herina repair      INCISION AND DRAINAGE, LOWER EXTREMITY Left 7/28/2023    Procedure: INCISION AND DRAINAGE, LOWER EXTREMITY;  Surgeon: Avinash Garces MD;  Location: [facility];  Service: Orthopedics;  Laterality: Left;  Supine, vascular bed, bone foam  last case  cysto tubing, laparoscopic trocar, experience, vanc, hemovac drain    KNEE SURGERY Bilateral     posterior neck surgery      SPINE SURGERY      TOTAL REPLACEMENT OF BOTH HIP JOINTS USING COMPUTER-ASSISTED NAVIGATION Bilateral        Current Outpatient Medications   Medication Sig    acetaminophen (TYLENOL) 325 MG tablet Take 650 mg by mouth every 6 (six) hours as needed for Pain.    albuterol (PROVENTIL/VENTOLIN HFA) 90 mcg/actuation inhaler Inhale into the lungs 4 (four) times daily as needed.    amLODIPine (NORVASC) 10 MG tablet Take 10 mg by mouth once daily.    ascorbic acid, vitamin C,  (VITAMIN C) 500 MG tablet Take 500 mg by mouth once daily.    aspirin 81 MG Chew Take 81 mg by mouth once daily.    cilostazoL (PLETAL) 50 MG Tab Take 50 mg by mouth 2 (two) times daily.    clopidogreL (PLAVIX) 75 mg tablet Take 75 mg by mouth once daily.    collagenase (SANTYL) ointment Apply topically 2 (two) times a day.    docusate sodium (COLACE) 100 MG capsule Take 100 mg by mouth 2 (two) times daily.    gabapentin (NEURONTIN) 300 MG capsule Take 1 capsule (300 mg total) by mouth 3 (three) times daily.    HYDROcodone-acetaminophen (NORCO)  mg per tablet Take 1 tablet by mouth every 8 (eight) hours as needed for Pain.    losartan (COZAAR) 100 MG tablet Take 100 mg by mouth once daily.    melatonin (MELATIN) 3 mg tablet Take 6 mg by mouth nightly as needed for Insomnia.    miconazole NITRATE 2 % (MICOTIN) 2 % top powder Apply topically 2 (two) times daily.    multivitamin (THERAGRAN) per tablet Take 1 tablet by mouth once daily.    polyethylene glycol (GLYCOLAX) 17 gram PwPk Take 17 g by mouth once daily.     No current facility-administered medications for this visit.       Review of patient's allergies indicates:  No Known Allergies    Family History   Problem Relation Age of Onset    Heart disease Mother     Cancer Father     Lung disease Father        Social History     Socioeconomic History    Marital status:    Occupational History    Occupation: retired   Tobacco Use    Smoking status: Every Day     Current packs/day: 0.50     Average packs/day: 0.5 packs/day for 15.0 years (7.5 ttl pk-yrs)     Types: Cigarettes    Smokeless tobacco: Never   Substance and Sexual Activity    Alcohol use: Yes     Alcohol/week: 3.0 - 6.0 standard drinks of alcohol     Types: 3 - 6 Cans of beer per week    Drug use: Yes     Types: Marijuana    Sexual activity: Not Currently           Review of Systems:    Constitution: Negative for chills, fever, and sweats.  Negative for unexplained weight loss.    HENT:   Negative for headaches and blurry vision.    Cardiovascular:Negative for chest pain or irregular heart beat. Negative for hypertension.    Respiratory:  Negative for cough and shortness of breath.    Gastrointestinal: Negative for abdominal pain, heartburn, melena, nausea, and vomitting.    Genitourinary:  Negative bladder incontinence and dysuria.    Musculoskeletal:  See HPI    Neurological: Negative for numbness.    Psychiatric/Behavioral: Negative for depression.  The patient is not nervous/anxious.      Endocrine: Negative for polyuria    Hematologic/Lymphatic: Negative for bleeding problem.  Does not bruise/bleed easily.    Skin: Negative for poor would healing and rash      Physical Examination:    Vital Signs:  There were no vitals filed for this visit.    There is no height or weight on file to calculate BMI.    General: No acute distress, alert and oriented, healthy appearing    HEENT: Head is atraumatic, mucous membranes are moist    Neck: Supples, no JVD    Cardiovascular: Palpable dorsalis pedis and posterior tibial pulses, regular rate and rhythm to those pulses    Lungs: Breathing non-labored    Skin: no rashes appreciated    Neurologic: Can flex and extend knees, ankles, and toes. Sensation is grossly intact    Left knee:  2 to 3+ effusion.  Tenderness throughout the left knee.    X-rays:      Assessment::  Posttraumatic arthritis left knee    Plan:  Patient presents to clinic today for results of aspiration.  No growth today showing no signs infection.  We did speak about his options going forward.  For pain relief today we will give him a cortisone injection to help calm his pain down.  In the near future he is looking at a possible left total knee arthroplasty.  Have him follow up in clinic in about 8 weeks to reassess his knee pain with this cortisone injection patient states understanding and agrees with plan of care.      This note was created using [employer] voice recognition software that  occasionally misinterpreted phrases or words.    Consult note is delivered via Epic messaging service.

## 2023-11-21 ENCOUNTER — LAB REQUISITION (OUTPATIENT)
Dept: LAB | Facility: HOSPITAL | Age: 68
End: 2023-11-21
Payer: MEDICARE

## 2023-11-21 DIAGNOSIS — L89.153 PRESSURE ULCER OF SACRAL REGION, STAGE 3: ICD-10-CM

## 2023-11-21 LAB — PREALB SERPL-MCNC: 18.7 MG/DL (ref 16–42)

## 2023-11-21 PROCEDURE — 84134 ASSAY OF PREALBUMIN: CPT | Performed by: FAMILY MEDICINE

## 2023-11-28 ENCOUNTER — TELEPHONE (OUTPATIENT)
Dept: ORTHOPEDICS | Facility: CLINIC | Age: 68
End: 2023-11-28
Payer: MEDICARE

## 2023-11-28 NOTE — TELEPHONE ENCOUNTER
Spoke to Dorota voss [facility] about the patient having knee pain after the cortisone injection on 11/14/23.  She stated that he had about 1 day relief.  Informed Dr. Drew and he instructed to elevate and ice and follow up at scheduled appointment.

## 2023-11-30 ENCOUNTER — LAB REQUISITION (OUTPATIENT)
Dept: LAB | Facility: HOSPITAL | Age: 68
End: 2023-11-30
Payer: MEDICARE

## 2023-11-30 DIAGNOSIS — L89.150 PRESSURE ULCER OF SACRAL REGION, UNSTAGEABLE: ICD-10-CM

## 2023-11-30 PROCEDURE — 87077 CULTURE AEROBIC IDENTIFY: CPT | Mod: 91 | Performed by: THORACIC SURGERY (CARDIOTHORACIC VASCULAR SURGERY)

## 2023-12-04 LAB
BACTERIA WND CULT: ABNORMAL

## 2023-12-19 ENCOUNTER — LAB REQUISITION (OUTPATIENT)
Dept: LAB | Facility: HOSPITAL | Age: 68
End: 2023-12-19
Payer: MEDICARE

## 2023-12-19 DIAGNOSIS — R77.0 ABNORMALITY OF ALBUMIN: ICD-10-CM

## 2023-12-19 LAB — PREALB SERPL-MCNC: 17.7 MG/DL (ref 16–42)

## 2023-12-19 PROCEDURE — 84134 ASSAY OF PREALBUMIN: CPT | Performed by: FAMILY MEDICINE

## 2024-01-01 ENCOUNTER — HOSPITAL ENCOUNTER (INPATIENT)
Facility: HOSPITAL | Age: 69
LOS: 1 days | DRG: 951 | End: 2024-10-22
Attending: EMERGENCY MEDICINE | Admitting: INTERNAL MEDICINE
Payer: MEDICARE

## 2024-01-01 ENCOUNTER — LAB REQUISITION (OUTPATIENT)
Dept: LAB | Facility: HOSPITAL | Age: 69
End: 2024-01-01
Payer: MEDICARE

## 2024-01-01 VITALS
HEART RATE: 98 BPM | OXYGEN SATURATION: 80 % | RESPIRATION RATE: 30 BRPM | SYSTOLIC BLOOD PRESSURE: 53 MMHG | DIASTOLIC BLOOD PRESSURE: 35 MMHG

## 2024-01-01 DIAGNOSIS — J96.01 ACUTE HYPOXEMIC RESPIRATORY FAILURE: ICD-10-CM

## 2024-01-01 DIAGNOSIS — E83.51 HYPOCALCEMIA: ICD-10-CM

## 2024-01-01 DIAGNOSIS — E87.6 HYPOKALEMIA: ICD-10-CM

## 2024-01-01 DIAGNOSIS — D64.9 SYMPTOMATIC ANEMIA: ICD-10-CM

## 2024-01-01 DIAGNOSIS — Z51.5 END OF LIFE CARE: ICD-10-CM

## 2024-01-01 DIAGNOSIS — Z51.5 COMFORT MEASURES ONLY STATUS: Primary | ICD-10-CM

## 2024-01-01 DIAGNOSIS — R33.9 RETENTION OF URINE, UNSPECIFIED: ICD-10-CM

## 2024-01-01 DIAGNOSIS — I95.9 HYPOTENSION: ICD-10-CM

## 2024-01-01 DIAGNOSIS — R40.1 OBTUNDATION: ICD-10-CM

## 2024-01-01 DIAGNOSIS — R57.9 SHOCK: ICD-10-CM

## 2024-01-01 DIAGNOSIS — L89.93 PRESSURE ULCER OF UNSPECIFIED SITE, STAGE 3: ICD-10-CM

## 2024-01-01 LAB
ABS NEUT (OLG): 12.36 X10(3)/MCL (ref 2.1–9.2)
ALBUMIN SERPL-MCNC: 1.2 G/DL (ref 3.4–4.8)
ALBUMIN/GLOB SERPL: 0.6 RATIO (ref 1.1–2)
ALP SERPL-CCNC: 46 UNIT/L (ref 40–150)
ALT SERPL-CCNC: <5 UNIT/L (ref 0–55)
ANION GAP SERPL CALC-SCNC: 9 MEQ/L
AST SERPL-CCNC: 6 UNIT/L (ref 5–34)
BACTERIA UR CULT: NORMAL
BACTERIA WND CULT: ABNORMAL
BILIRUB SERPL-MCNC: 0.1 MG/DL
BILIRUB UR QL STRIP.AUTO: NEGATIVE
BUN SERPL-MCNC: 29.8 MG/DL (ref 8.4–25.7)
BURR CELLS (OLG): ABNORMAL
CALCIUM SERPL-MCNC: 4.6 MG/DL (ref 8.8–10)
CHLORIDE SERPL-SCNC: 127 MMOL/L (ref 98–107)
CLARITY UR: CLEAR
CO2 SERPL-SCNC: 11 MMOL/L (ref 23–31)
COLOR UR AUTO: YELLOW
CREAT SERPL-MCNC: 0.82 MG/DL (ref 0.72–1.25)
CREAT/UREA NIT SERPL: 36
ERYTHROCYTE [DISTWIDTH] IN BLOOD BY AUTOMATED COUNT: 21.6 % (ref 11.5–17)
GFR SERPLBLD CREATININE-BSD FMLA CKD-EPI: >60 ML/MIN/1.73/M2
GLOBULIN SER-MCNC: 2 GM/DL (ref 2.4–3.5)
GLUCOSE SERPL-MCNC: 165 MG/DL (ref 82–115)
GLUCOSE SERPL-MCNC: 302 MG/DL (ref 70–110)
GLUCOSE UR QL STRIP: NEGATIVE
HCT VFR BLD AUTO: 15.8 % (ref 42–52)
HGB BLD-MCNC: 4.7 G/DL (ref 14–18)
HGB UR QL STRIP: NEGATIVE
INSTRUMENT WBC (OLG): 13.73 X10(3)/MCL
KETONES UR QL STRIP: ABNORMAL
LACTATE SERPL-SCNC: 3.7 MMOL/L (ref 0.5–2.2)
LEUKOCYTE ESTERASE UR QL STRIP: NEGATIVE
LYMPHOCYTES NFR BLD MANUAL: 0.55 X10(3)/MCL
LYMPHOCYTES NFR BLD MANUAL: 4 %
MCH RBC QN AUTO: 24.4 PG (ref 27–31)
MCHC RBC AUTO-ENTMCNC: 29.7 G/DL (ref 33–36)
MCV RBC AUTO: 81.9 FL (ref 80–94)
MICROCYTES BLD QL SMEAR: ABNORMAL
MONOCYTES NFR BLD MANUAL: 0.69 X10(3)/MCL (ref 0.1–1.3)
MONOCYTES NFR BLD MANUAL: 5 %
NEUTROPHILS NFR BLD MANUAL: 90 %
NITRITE UR QL STRIP: NEGATIVE
NRBC BLD AUTO-RTO: 0 %
OHS QRS DURATION: 162 MS
OHS QTC CALCULATION: 457 MS
PH UR STRIP: 6 [PH]
PLATELET # BLD AUTO: 390 X10(3)/MCL (ref 130–400)
PLATELET # BLD EST: NORMAL 10*3/UL
PMV BLD AUTO: 8.4 FL (ref 7.4–10.4)
POCT GLUCOSE: 302 MG/DL (ref 70–110)
POIKILOCYTOSIS BLD QL SMEAR: ABNORMAL
POTASSIUM SERPL-SCNC: 2 MMOL/L (ref 3.5–5.1)
PROT SERPL-MCNC: 3.2 GM/DL (ref 5.8–7.6)
PROT UR QL STRIP: NEGATIVE
RBC # BLD AUTO: 1.93 X10(6)/MCL (ref 4.7–6.1)
RBC MORPH BLD: ABNORMAL
SODIUM SERPL-SCNC: 147 MMOL/L (ref 136–145)
SP GR UR STRIP.AUTO: 1.02 (ref 1–1.03)
TROPONIN I SERPL-MCNC: 0.03 NG/ML (ref 0–0.04)
UROBILINOGEN UR STRIP-ACNC: 0.2
WBC # BLD AUTO: 13.73 X10(3)/MCL (ref 4.5–11.5)

## 2024-01-01 PROCEDURE — 11000001 HC ACUTE MED/SURG PRIVATE ROOM

## 2024-01-01 PROCEDURE — 84484 ASSAY OF TROPONIN QUANT: CPT | Performed by: EMERGENCY MEDICINE

## 2024-01-01 PROCEDURE — 83605 ASSAY OF LACTIC ACID: CPT | Performed by: EMERGENCY MEDICINE

## 2024-01-01 PROCEDURE — 87186 SC STD MICRODIL/AGAR DIL: CPT | Performed by: FAMILY MEDICINE

## 2024-01-01 PROCEDURE — 93005 ELECTROCARDIOGRAM TRACING: CPT

## 2024-01-01 PROCEDURE — 82962 GLUCOSE BLOOD TEST: CPT

## 2024-01-01 PROCEDURE — 63600175 PHARM REV CODE 636 W HCPCS: Performed by: EMERGENCY MEDICINE

## 2024-01-01 PROCEDURE — 87086 URINE CULTURE/COLONY COUNT: CPT | Performed by: FAMILY MEDICINE

## 2024-01-01 PROCEDURE — 80053 COMPREHEN METABOLIC PANEL: CPT | Performed by: EMERGENCY MEDICINE

## 2024-01-01 PROCEDURE — 85007 BL SMEAR W/DIFF WBC COUNT: CPT | Performed by: EMERGENCY MEDICINE

## 2024-01-01 PROCEDURE — 85027 COMPLETE CBC AUTOMATED: CPT | Performed by: EMERGENCY MEDICINE

## 2024-01-01 PROCEDURE — 99291 CRITICAL CARE FIRST HOUR: CPT

## 2024-01-01 PROCEDURE — 93010 ELECTROCARDIOGRAM REPORT: CPT | Mod: ,,, | Performed by: INTERNAL MEDICINE

## 2024-01-01 PROCEDURE — 81003 URINALYSIS AUTO W/O SCOPE: CPT | Performed by: FAMILY MEDICINE

## 2024-01-01 RX ORDER — MORPHINE SULFATE 4 MG/ML
2 INJECTION, SOLUTION INTRAMUSCULAR; INTRAVENOUS EVERY 4 HOURS PRN
Status: DISCONTINUED | OUTPATIENT
Start: 2024-01-01 | End: 2024-10-23 | Stop reason: HOSPADM

## 2024-01-01 RX ORDER — LORAZEPAM 2 MG/ML
1 INJECTION INTRAMUSCULAR EVERY 4 HOURS PRN
Status: DISCONTINUED | OUTPATIENT
Start: 2024-01-01 | End: 2024-10-23 | Stop reason: HOSPADM

## 2024-01-01 RX ORDER — GLYCOPYRROLATE 0.2 MG/ML
0.1 INJECTION INTRAMUSCULAR; INTRAVENOUS 3 TIMES DAILY PRN
Status: DISCONTINUED | OUTPATIENT
Start: 2024-01-01 | End: 2024-10-23 | Stop reason: HOSPADM

## 2024-01-01 RX ADMIN — LORAZEPAM 1 MG: 2 INJECTION INTRAMUSCULAR; INTRAVENOUS at 04:10

## 2024-01-01 RX ADMIN — MORPHINE SULFATE 2 MG: 4 INJECTION, SOLUTION INTRAMUSCULAR; INTRAVENOUS at 03:10

## 2024-01-17 ENCOUNTER — LAB REQUISITION (OUTPATIENT)
Dept: LAB | Facility: HOSPITAL | Age: 69
End: 2024-01-17
Payer: MEDICARE

## 2024-01-17 DIAGNOSIS — L89.153 PRESSURE ULCER OF SACRAL REGION, STAGE 3: ICD-10-CM

## 2024-01-17 LAB
ALBUMIN SERPL-MCNC: 2.6 G/DL (ref 3.4–4.8)
ALBUMIN/GLOB SERPL: 0.8 RATIO (ref 1.1–2)
ALP SERPL-CCNC: 92 UNIT/L (ref 40–150)
ALT SERPL-CCNC: 10 UNIT/L (ref 0–55)
AST SERPL-CCNC: 13 UNIT/L (ref 5–34)
BASOPHILS # BLD AUTO: 0.11 X10(3)/MCL
BASOPHILS NFR BLD AUTO: 1.2 %
BILIRUB SERPL-MCNC: 0.2 MG/DL
BNP BLD-MCNC: 12.4 PG/ML
BUN SERPL-MCNC: 24.2 MG/DL (ref 8.4–25.7)
CALCIUM SERPL-MCNC: 9 MG/DL (ref 8.8–10)
CHLORIDE SERPL-SCNC: 106 MMOL/L (ref 98–107)
CO2 SERPL-SCNC: 24 MMOL/L (ref 23–31)
CREAT SERPL-MCNC: 0.81 MG/DL (ref 0.73–1.18)
CRP SERPL HS-MCNC: 72.37 MG/L
EOSINOPHIL # BLD AUTO: 0.54 X10(3)/MCL (ref 0–0.9)
EOSINOPHIL NFR BLD AUTO: 5.8 %
ERYTHROCYTE [DISTWIDTH] IN BLOOD BY AUTOMATED COUNT: 15.4 % (ref 11.5–17)
ERYTHROCYTE [SEDIMENTATION RATE] IN BLOOD: 20 MM/HR (ref 0–15)
GFR SERPLBLD CREATININE-BSD FMLA CKD-EPI: >60 MLS/MIN/1.73/M2
GLOBULIN SER-MCNC: 3.1 GM/DL (ref 2.4–3.5)
GLUCOSE SERPL-MCNC: 93 MG/DL (ref 82–115)
HCT VFR BLD AUTO: 28.2 % (ref 42–52)
HGB BLD-MCNC: 8.7 G/DL (ref 14–18)
IMM GRANULOCYTES # BLD AUTO: 0.04 X10(3)/MCL (ref 0–0.04)
IMM GRANULOCYTES NFR BLD AUTO: 0.4 %
LYMPHOCYTES # BLD AUTO: 1.85 X10(3)/MCL (ref 0.6–4.6)
LYMPHOCYTES NFR BLD AUTO: 19.7 %
MCH RBC QN AUTO: 28.7 PG (ref 27–31)
MCHC RBC AUTO-ENTMCNC: 30.9 G/DL (ref 33–36)
MCV RBC AUTO: 93.1 FL (ref 80–94)
MONOCYTES # BLD AUTO: 1.04 X10(3)/MCL (ref 0.1–1.3)
MONOCYTES NFR BLD AUTO: 11.1 %
NEUTROPHILS # BLD AUTO: 5.81 X10(3)/MCL (ref 2.1–9.2)
NEUTROPHILS NFR BLD AUTO: 61.8 %
NRBC BLD AUTO-RTO: 0 %
PLATELET # BLD AUTO: 439 X10(3)/MCL (ref 130–400)
PMV BLD AUTO: 8.6 FL (ref 7.4–10.4)
POTASSIUM SERPL-SCNC: 4.8 MMOL/L (ref 3.5–5.1)
PREALB SERPL-MCNC: 12.7 MG/DL (ref 16–42)
PROT SERPL-MCNC: 5.7 GM/DL (ref 5.8–7.6)
RBC # BLD AUTO: 3.03 X10(6)/MCL (ref 4.7–6.1)
SODIUM SERPL-SCNC: 138 MMOL/L (ref 136–145)
WBC # SPEC AUTO: 9.39 X10(3)/MCL (ref 4.5–11.5)

## 2024-01-17 PROCEDURE — 85025 COMPLETE CBC W/AUTO DIFF WBC: CPT | Performed by: FAMILY MEDICINE

## 2024-01-17 PROCEDURE — 86141 C-REACTIVE PROTEIN HS: CPT | Performed by: FAMILY MEDICINE

## 2024-01-17 PROCEDURE — 83880 ASSAY OF NATRIURETIC PEPTIDE: CPT | Performed by: FAMILY MEDICINE

## 2024-01-17 PROCEDURE — 80053 COMPREHEN METABOLIC PANEL: CPT | Performed by: FAMILY MEDICINE

## 2024-01-17 PROCEDURE — 85652 RBC SED RATE AUTOMATED: CPT | Performed by: FAMILY MEDICINE

## 2024-01-17 PROCEDURE — 84134 ASSAY OF PREALBUMIN: CPT | Performed by: FAMILY MEDICINE

## 2024-01-22 ENCOUNTER — HOSPITAL ENCOUNTER (EMERGENCY)
Facility: HOSPITAL | Age: 69
Discharge: OTHER INSTITUTION | End: 2024-01-22
Attending: EMERGENCY MEDICINE
Payer: MEDICARE

## 2024-01-22 VITALS
HEIGHT: 69 IN | TEMPERATURE: 98 F | WEIGHT: 140 LBS | RESPIRATION RATE: 20 BRPM | HEART RATE: 94 BPM | BODY MASS INDEX: 20.73 KG/M2 | DIASTOLIC BLOOD PRESSURE: 63 MMHG | OXYGEN SATURATION: 95 % | SYSTOLIC BLOOD PRESSURE: 112 MMHG

## 2024-01-22 DIAGNOSIS — R53.81 PHYSICAL DEBILITY: ICD-10-CM

## 2024-01-22 DIAGNOSIS — M25.562 CHRONIC PAIN OF LEFT KNEE: ICD-10-CM

## 2024-01-22 DIAGNOSIS — S91.009A MULTIPLE OPEN WOUNDS OF ANKLE: ICD-10-CM

## 2024-01-22 DIAGNOSIS — L89.159 PRESSURE INJURY OF SKIN OF SACRAL REGION, UNSPECIFIED INJURY STAGE: Primary | ICD-10-CM

## 2024-01-22 DIAGNOSIS — G89.29 CHRONIC PAIN OF LEFT KNEE: ICD-10-CM

## 2024-01-22 LAB
ALBUMIN SERPL-MCNC: 2.7 G/DL (ref 3.4–4.8)
ALBUMIN/GLOB SERPL: 0.7 RATIO (ref 1.1–2)
ALP SERPL-CCNC: 105 UNIT/L (ref 40–150)
ALT SERPL-CCNC: 9 UNIT/L (ref 0–55)
AST SERPL-CCNC: 10 UNIT/L (ref 5–34)
BASOPHILS # BLD AUTO: 0.09 X10(3)/MCL
BASOPHILS NFR BLD AUTO: 1.1 %
BILIRUB SERPL-MCNC: 0.3 MG/DL
BUN SERPL-MCNC: 20 MG/DL (ref 8.4–25.7)
CALCIUM SERPL-MCNC: 9.3 MG/DL (ref 8.8–10)
CHLORIDE SERPL-SCNC: 104 MMOL/L (ref 98–107)
CO2 SERPL-SCNC: 26 MMOL/L (ref 23–31)
CREAT SERPL-MCNC: 0.67 MG/DL (ref 0.73–1.18)
CRP SERPL HS-MCNC: 51.55 MG/L
EOSINOPHIL # BLD AUTO: 0.39 X10(3)/MCL (ref 0–0.9)
EOSINOPHIL NFR BLD AUTO: 4.6 %
ERYTHROCYTE [DISTWIDTH] IN BLOOD BY AUTOMATED COUNT: 14.9 % (ref 11.5–17)
GFR SERPLBLD CREATININE-BSD FMLA CKD-EPI: >60 MLS/MIN/1.73/M2
GLOBULIN SER-MCNC: 3.8 GM/DL (ref 2.4–3.5)
GLUCOSE SERPL-MCNC: 99 MG/DL (ref 82–115)
HCT VFR BLD AUTO: 29.4 % (ref 42–52)
HGB BLD-MCNC: 9.2 G/DL (ref 14–18)
IMM GRANULOCYTES # BLD AUTO: 0.02 X10(3)/MCL (ref 0–0.04)
IMM GRANULOCYTES NFR BLD AUTO: 0.2 %
LYMPHOCYTES # BLD AUTO: 1.9 X10(3)/MCL (ref 0.6–4.6)
LYMPHOCYTES NFR BLD AUTO: 22.5 %
MCH RBC QN AUTO: 28.5 PG (ref 27–31)
MCHC RBC AUTO-ENTMCNC: 31.3 G/DL (ref 33–36)
MCV RBC AUTO: 91 FL (ref 80–94)
MONOCYTES # BLD AUTO: 0.76 X10(3)/MCL (ref 0.1–1.3)
MONOCYTES NFR BLD AUTO: 9 %
NEUTROPHILS # BLD AUTO: 5.28 X10(3)/MCL (ref 2.1–9.2)
NEUTROPHILS NFR BLD AUTO: 62.6 %
NRBC BLD AUTO-RTO: 0 %
PLATELET # BLD AUTO: 376 X10(3)/MCL (ref 130–400)
PMV BLD AUTO: 8 FL (ref 7.4–10.4)
POTASSIUM SERPL-SCNC: 4.2 MMOL/L (ref 3.5–5.1)
PROT SERPL-MCNC: 6.5 GM/DL (ref 5.8–7.6)
RBC # BLD AUTO: 3.23 X10(6)/MCL (ref 4.7–6.1)
SODIUM SERPL-SCNC: 137 MMOL/L (ref 136–145)
WBC # SPEC AUTO: 8.44 X10(3)/MCL (ref 4.5–11.5)

## 2024-01-22 PROCEDURE — 63600175 PHARM REV CODE 636 W HCPCS: Performed by: EMERGENCY MEDICINE

## 2024-01-22 PROCEDURE — 85025 COMPLETE CBC W/AUTO DIFF WBC: CPT | Performed by: EMERGENCY MEDICINE

## 2024-01-22 PROCEDURE — 80053 COMPREHEN METABOLIC PANEL: CPT | Performed by: EMERGENCY MEDICINE

## 2024-01-22 PROCEDURE — 96376 TX/PRO/DX INJ SAME DRUG ADON: CPT

## 2024-01-22 PROCEDURE — 99284 EMERGENCY DEPT VISIT MOD MDM: CPT | Mod: 25

## 2024-01-22 PROCEDURE — 86141 C-REACTIVE PROTEIN HS: CPT | Performed by: EMERGENCY MEDICINE

## 2024-01-22 PROCEDURE — 96374 THER/PROPH/DIAG INJ IV PUSH: CPT

## 2024-01-22 RX ORDER — LIDOCAINE 50 MG/G
1 PATCH TOPICAL
COMMUNITY

## 2024-01-22 RX ORDER — FENTANYL CITRATE 50 UG/ML
100 INJECTION, SOLUTION INTRAMUSCULAR; INTRAVENOUS
Status: COMPLETED | OUTPATIENT
Start: 2024-01-22 | End: 2024-01-22

## 2024-01-22 RX ORDER — IBUPROFEN 800 MG/1
800 TABLET ORAL 2 TIMES DAILY PRN
COMMUNITY
End: 2024-03-15

## 2024-01-22 RX ORDER — OXYCODONE HYDROCHLORIDE 10 MG/1
10 TABLET ORAL EVERY 4 HOURS PRN
Qty: 42 TABLET | Refills: 0 | Status: SHIPPED | OUTPATIENT
Start: 2024-01-22 | End: 2024-01-29

## 2024-01-22 RX ORDER — GABAPENTIN 600 MG/1
600 TABLET ORAL NIGHTLY
COMMUNITY

## 2024-01-22 RX ORDER — ALUMINUM HYDROXIDE, MAGNESIUM HYDROXIDE, AND SIMETHICONE 2400; 240; 2400 MG/30ML; MG/30ML; MG/30ML
SUSPENSION ORAL EVERY 6 HOURS PRN
COMMUNITY

## 2024-01-22 RX ORDER — SIMETHICONE 80 MG
80 TABLET,CHEWABLE ORAL EVERY 6 HOURS PRN
COMMUNITY

## 2024-01-22 RX ORDER — DULOXETIN HYDROCHLORIDE 30 MG/1
30 CAPSULE, DELAYED RELEASE ORAL DAILY
COMMUNITY
Start: 2024-01-05

## 2024-01-22 RX ORDER — DOXYCYCLINE 100 MG/1
100 CAPSULE ORAL 2 TIMES DAILY
COMMUNITY
Start: 2024-01-16 | End: 2024-03-15

## 2024-01-22 RX ADMIN — FENTANYL CITRATE 100 MCG: 0.05 INJECTION, SOLUTION INTRAMUSCULAR; INTRAVENOUS at 01:01

## 2024-01-22 RX ADMIN — FENTANYL CITRATE 100 MCG: 0.05 INJECTION, SOLUTION INTRAMUSCULAR; INTRAVENOUS at 02:01

## 2024-01-22 NOTE — ED PROVIDER NOTES
Encounter Date: 1/22/2024       History     Chief Complaint   Patient presents with    Tailbone Pain     The history is provided by the patient.   Illness   The current episode started several weeks ago. The problem occurs continuously. The problem has been gradually worsening. Nothing relieves the symptoms. Nothing aggravates the symptoms. Pertinent negatives include no fever, no nausea, no sore throat, no shortness of breath and no rash.   Patient with chronic ankle, left knee, and sacral pain due to chronic wounds and DJD of left knee.  Taking Norco 10 q 4 hours without significant relief.  Has undergone multiple aspirations and corticosteroid injections in the left knee which he states gave very brief periods of relief before pain returned.  He is bed bound and is DNR status    Review of patient's allergies indicates:  No Known Allergies  Past Medical History:   Diagnosis Date    COPD (chronic obstructive pulmonary disease)     Depression     Hypertension     Neuropathy      Past Surgical History:   Procedure Laterality Date    angiogram Bilateral     stents placed in left leg    anterior neck surgery      ARTHROTOMY OF ANKLE Left 6/28/2023    Procedure: ARTHROTOMY, ANKLE;  Surgeon: Tom Nichole DPM;  Location: [facility];  Service: Podiatry;  Laterality: Left;    cataracts Left     CHOLECYSTECTOMY      EYE SURGERY Left     HAND SURGERY Left     broken bone    herina repair      INCISION AND DRAINAGE, LOWER EXTREMITY Left 7/28/2023    Procedure: INCISION AND DRAINAGE, LOWER EXTREMITY;  Surgeon: Avinash Garces MD;  Location: [facility];  Service: Orthopedics;  Laterality: Left;  Supine, vascular bed, bone foam  last case  cysto tubing, laparoscopic trocar, experience, vanc, hemovac drain    KNEE SURGERY Bilateral     posterior neck surgery      SPINE SURGERY      TOTAL REPLACEMENT OF BOTH HIP JOINTS USING COMPUTER-ASSISTED NAVIGATION Bilateral      Family History   Problem Relation Age of Onset    Heart disease  Mother     Cancer Father     Lung disease Father      Social History     Tobacco Use    Smoking status: Every Day     Current packs/day: 0.50     Average packs/day: 0.5 packs/day for 15.0 years (7.5 ttl pk-yrs)     Types: Cigarettes    Smokeless tobacco: Never   Substance Use Topics    Alcohol use: Yes     Alcohol/week: 3.0 - 6.0 standard drinks of alcohol     Types: 3 - 6 Cans of beer per week    Drug use: Yes     Types: Marijuana     Review of Systems   Constitutional:  Negative for fever.   HENT:  Negative for sore throat.    Respiratory:  Negative for shortness of breath.    Cardiovascular:  Negative for chest pain.   Gastrointestinal:  Negative for nausea.   Genitourinary:  Negative for dysuria.   Musculoskeletal:  Negative for back pain.   Skin:  Negative for rash.   Neurological:  Negative for weakness.   Hematological:  Does not bruise/bleed easily.       Physical Exam     Initial Vitals [01/22/24 1326]   BP Pulse Resp Temp SpO2   112/63 94 16 98.1 °F (36.7 °C) 95 %      MAP       --         Physical Exam    Nursing note and vitals reviewed.  Constitutional: He appears well-developed and well-nourished.   HENT:   Head: Normocephalic and atraumatic.   Right Ear: External ear normal.   Left Ear: External ear normal.   Nose: Nose normal.   Eyes: Conjunctivae and EOM are normal. Pupils are equal, round, and reactive to light.   Neck: Neck supple.   Normal range of motion.  Cardiovascular:  Normal rate, regular rhythm, normal heart sounds and intact distal pulses.           Pulmonary/Chest: Breath sounds normal.   Abdominal: Abdomen is soft. Bowel sounds are normal.   Musculoskeletal:         General: Normal range of motion.      Cervical back: Normal range of motion and neck supple.        Back:         Legs:       Comments: All wounds stained purple secondary to Gentian violet use; bilateral legs and feet wrapped in Kerlix and protective boots     Neurological: He is alert and oriented to person, place, and  time. He has normal strength. GCS score is 15. GCS eye subscore is 4. GCS verbal subscore is 5. GCS motor subscore is 6.   Skin: Skin is warm and dry. Capillary refill takes less than 2 seconds.   Psychiatric: He has a normal mood and affect. His behavior is normal. Judgment and thought content normal.         ED Course   Procedures  Labs Reviewed   COMPREHENSIVE METABOLIC PANEL - Abnormal; Notable for the following components:       Result Value    Creatinine 0.67 (*)     Albumin Level 2.7 (*)     Globulin 3.8 (*)     Albumin/Globulin Ratio 0.7 (*)     All other components within normal limits   HIGH SENSITIVITY CRP - Abnormal; Notable for the following components:    C-Reactive Protein High Sensitivity 51.55 (*)     All other components within normal limits   CBC WITH DIFFERENTIAL - Abnormal; Notable for the following components:    RBC 3.23 (*)     Hgb 9.2 (*)     Hct 29.4 (*)     MCHC 31.3 (*)     All other components within normal limits   CBC W/ AUTO DIFFERENTIAL    Narrative:     The following orders were created for panel order CBC auto differential.  Procedure                               Abnormality         Status                     ---------                               -----------         ------                     CBC with Differential[[id number]]        Abnormal            Final result                 Please view results for these tests on the individual orders.          Imaging Results    None          Medications   fentaNYL injection 100 mcg (has no administration in time range)   fentaNYL injection 100 mcg (100 mcg Intravenous Given 1/22/24 1346)     Medical Decision Making  Amount and/or Complexity of Data Reviewed  External Data Reviewed: notes.     Details: NH notes reviewed for current meds and doses  Labs: ordered. Decision-making details documented in ED Course.    Risk  Prescription drug management.  Parenteral controlled substances.    Differential includes:  chronic pain, wound infection,  gout, depression.  Will obtain CBC, CMP, CRP and give analgesic.  Review of [place] reveals that he is DNR and desires comfort care.  Consider admission for IV antibiotics if he has significant leukocytosis or CRP elevation.  May just need more aggressive pain control and ongoing wound care.           ED Course as of 01/22/24 1435   Mon Jan 22, 2024   1428 Mild pain relief with IV medication.  Will re-dose.  Discussed labs - does not appear to be any significant infection.  Will change NH regimen to try and improve pain control. [CL]      ED Course User Index  [CL] Kiel Irving MD                           Clinical Impression:  Final diagnoses:  [L89.159] Pressure injury of skin of sacral region, unspecified injury stage (Primary)  [M25.562, G89.29] Chronic pain of left knee  [S91.009A] Multiple open wounds of ankle  [R53.81] Physical debility          ED Disposition Condition    Discharge Stable          ED Prescriptions       Medication Sig Dispense Start Date End Date Auth. Provider    oxyCODONE (ROXICODONE) 10 mg Tab immediate release tablet Take 1 tablet (10 mg total) by mouth every 4 (four) hours as needed for Pain. Final diagnoses: [L89.159] Pressure injury of skin of sacral region, unspecified injury stage (Primary) [M25.562, G89.29] Chronic pain of left knee [S91.009A] Multiple open wounds of ankle [R53.81] Physical debility 42 tablet 1/22/2024 1/29/2024 Kiel Irving MD          Follow-up Information       Follow up With Specialties Details Why Contact Info    Follow up with your primary MD in 3-5 days if not improved.  Return to ED for worsening symptoms.                 Kiel Irving MD  01/22/24 1439

## 2024-01-22 NOTE — ED TRIAGE NOTES
Here via aasi from [facility] for non-traumatic low back/sacral and lt knee pain-? Onset.

## 2024-01-23 DIAGNOSIS — S33.101A: Primary | ICD-10-CM

## 2024-01-23 DIAGNOSIS — S31.000A: Primary | ICD-10-CM

## 2024-02-02 ENCOUNTER — LAB REQUISITION (OUTPATIENT)
Dept: LAB | Facility: HOSPITAL | Age: 69
End: 2024-02-02
Payer: MEDICARE

## 2024-02-02 DIAGNOSIS — Z13.228 ENCOUNTER FOR SCREENING FOR OTHER METABOLIC DISORDERS: ICD-10-CM

## 2024-02-02 DIAGNOSIS — R79.9 ABNORMAL FINDING OF BLOOD CHEMISTRY, UNSPECIFIED: ICD-10-CM

## 2024-02-02 LAB
ALBUMIN SERPL-MCNC: 2.3 G/DL (ref 3.4–4.8)
ALBUMIN/GLOB SERPL: 0.7 RATIO (ref 1.1–2)
ALP SERPL-CCNC: 138 UNIT/L (ref 40–150)
ALT SERPL-CCNC: 18 UNIT/L (ref 0–55)
AST SERPL-CCNC: 15 UNIT/L (ref 5–34)
BASOPHILS # BLD AUTO: 0.06 X10(3)/MCL
BASOPHILS NFR BLD AUTO: 0.6 %
BILIRUB SERPL-MCNC: 0.1 MG/DL
BUN SERPL-MCNC: 17.2 MG/DL (ref 8.4–25.7)
CALCIUM SERPL-MCNC: 8.8 MG/DL (ref 8.8–10)
CHLORIDE SERPL-SCNC: 105 MMOL/L (ref 98–107)
CHOLEST SERPL-MCNC: 101 MG/DL
CHOLEST/HDLC SERPL: 3 {RATIO} (ref 0–5)
CO2 SERPL-SCNC: 28 MMOL/L (ref 23–31)
CREAT SERPL-MCNC: 0.67 MG/DL (ref 0.73–1.18)
EOSINOPHIL # BLD AUTO: 0.39 X10(3)/MCL (ref 0–0.9)
EOSINOPHIL NFR BLD AUTO: 4.2 %
ERYTHROCYTE [DISTWIDTH] IN BLOOD BY AUTOMATED COUNT: 15 % (ref 11.5–17)
EST. AVERAGE GLUCOSE BLD GHB EST-MCNC: 96.8 MG/DL
GFR SERPLBLD CREATININE-BSD FMLA CKD-EPI: >60 MLS/MIN/1.73/M2
GLOBULIN SER-MCNC: 3.2 GM/DL (ref 2.4–3.5)
GLUCOSE SERPL-MCNC: 116 MG/DL (ref 82–115)
HBA1C MFR BLD: 5 %
HCT VFR BLD AUTO: 29.8 % (ref 42–52)
HDLC SERPL-MCNC: 38 MG/DL (ref 35–60)
HGB BLD-MCNC: 9.3 G/DL (ref 14–18)
IMM GRANULOCYTES # BLD AUTO: 0.05 X10(3)/MCL (ref 0–0.04)
IMM GRANULOCYTES NFR BLD AUTO: 0.5 %
LDLC SERPL CALC-MCNC: 52 MG/DL (ref 50–140)
LYMPHOCYTES # BLD AUTO: 1.41 X10(3)/MCL (ref 0.6–4.6)
LYMPHOCYTES NFR BLD AUTO: 15 %
MCH RBC QN AUTO: 28.3 PG (ref 27–31)
MCHC RBC AUTO-ENTMCNC: 31.2 G/DL (ref 33–36)
MCV RBC AUTO: 90.6 FL (ref 80–94)
MONOCYTES # BLD AUTO: 1.01 X10(3)/MCL (ref 0.1–1.3)
MONOCYTES NFR BLD AUTO: 10.8 %
NEUTROPHILS # BLD AUTO: 6.47 X10(3)/MCL (ref 2.1–9.2)
NEUTROPHILS NFR BLD AUTO: 68.9 %
NRBC BLD AUTO-RTO: 0 %
PLATELET # BLD AUTO: 491 X10(3)/MCL (ref 130–400)
PMV BLD AUTO: 8.6 FL (ref 7.4–10.4)
POTASSIUM SERPL-SCNC: 4.7 MMOL/L (ref 3.5–5.1)
PROT SERPL-MCNC: 5.5 GM/DL (ref 5.8–7.6)
RBC # BLD AUTO: 3.29 X10(6)/MCL (ref 4.7–6.1)
SODIUM SERPL-SCNC: 140 MMOL/L (ref 136–145)
TRIGL SERPL-MCNC: 53 MG/DL (ref 34–140)
VLDLC SERPL CALC-MCNC: 11 MG/DL
WBC # SPEC AUTO: 9.39 X10(3)/MCL (ref 4.5–11.5)

## 2024-02-02 PROCEDURE — 85025 COMPLETE CBC W/AUTO DIFF WBC: CPT | Performed by: FAMILY MEDICINE

## 2024-02-02 PROCEDURE — 83036 HEMOGLOBIN GLYCOSYLATED A1C: CPT | Performed by: FAMILY MEDICINE

## 2024-02-02 PROCEDURE — 80053 COMPREHEN METABOLIC PANEL: CPT | Performed by: FAMILY MEDICINE

## 2024-02-02 PROCEDURE — 80061 LIPID PANEL: CPT | Performed by: FAMILY MEDICINE

## 2024-02-08 ENCOUNTER — LAB REQUISITION (OUTPATIENT)
Dept: LAB | Facility: HOSPITAL | Age: 69
End: 2024-02-08
Payer: MEDICARE

## 2024-02-08 DIAGNOSIS — L98.499 NON-PRESSURE CHRONIC ULCER OF SKIN OF OTHER SITES WITH UNSPECIFIED SEVERITY: ICD-10-CM

## 2024-02-08 PROCEDURE — 87070 CULTURE OTHR SPECIMN AEROBIC: CPT | Performed by: FAMILY MEDICINE

## 2024-02-12 LAB
BACTERIA WND CULT: ABNORMAL

## 2024-02-14 ENCOUNTER — LAB REQUISITION (OUTPATIENT)
Dept: LAB | Facility: HOSPITAL | Age: 69
End: 2024-02-14
Payer: MEDICARE

## 2024-02-14 DIAGNOSIS — L89.890 PRESSURE ULCER OF OTHER SITE, UNSTAGEABLE: ICD-10-CM

## 2024-02-14 LAB — PREALB SERPL-MCNC: 7.1 MG/DL (ref 16–42)

## 2024-02-14 PROCEDURE — 84134 ASSAY OF PREALBUMIN: CPT | Performed by: FAMILY MEDICINE

## 2024-02-20 ENCOUNTER — LAB REQUISITION (OUTPATIENT)
Dept: LAB | Facility: HOSPITAL | Age: 69
End: 2024-02-20
Payer: MEDICARE

## 2024-02-20 DIAGNOSIS — R79.9 ABNORMAL FINDING OF BLOOD CHEMISTRY, UNSPECIFIED: ICD-10-CM

## 2024-02-20 LAB
ALBUMIN SERPL-MCNC: 2.3 G/DL (ref 3.4–4.8)
ALBUMIN/GLOB SERPL: 0.7 RATIO (ref 1.1–2)
ALP SERPL-CCNC: 116 UNIT/L (ref 40–150)
ALT SERPL-CCNC: 9 UNIT/L (ref 0–55)
AST SERPL-CCNC: 9 UNIT/L (ref 5–34)
BILIRUB SERPL-MCNC: 0.2 MG/DL
BUN SERPL-MCNC: 12.8 MG/DL (ref 8.4–25.7)
CALCIUM SERPL-MCNC: 8.6 MG/DL (ref 8.8–10)
CHLORIDE SERPL-SCNC: 108 MMOL/L (ref 98–107)
CO2 SERPL-SCNC: 25 MMOL/L (ref 23–31)
CREAT SERPL-MCNC: 0.62 MG/DL (ref 0.73–1.18)
CRP SERPL HS-MCNC: 55.58 MG/L
ERYTHROCYTE [DISTWIDTH] IN BLOOD BY AUTOMATED COUNT: 15.4 % (ref 11.5–17)
ERYTHROCYTE [SEDIMENTATION RATE] IN BLOOD: 43 MM/HR (ref 0–15)
GFR SERPLBLD CREATININE-BSD FMLA CKD-EPI: >60 MLS/MIN/1.73/M2
GLOBULIN SER-MCNC: 3.2 GM/DL (ref 2.4–3.5)
GLUCOSE SERPL-MCNC: 99 MG/DL (ref 82–115)
HCT VFR BLD AUTO: 26.7 % (ref 42–52)
HGB BLD-MCNC: 8.1 G/DL (ref 14–18)
MCH RBC QN AUTO: 27.3 PG (ref 27–31)
MCHC RBC AUTO-ENTMCNC: 30.3 G/DL (ref 33–36)
MCV RBC AUTO: 89.9 FL (ref 80–94)
NRBC BLD AUTO-RTO: 0 %
PLATELET # BLD AUTO: 519 X10(3)/MCL (ref 130–400)
PMV BLD AUTO: 8.4 FL (ref 7.4–10.4)
POTASSIUM SERPL-SCNC: 4 MMOL/L (ref 3.5–5.1)
PROT SERPL-MCNC: 5.5 GM/DL (ref 5.8–7.6)
RBC # BLD AUTO: 2.97 X10(6)/MCL (ref 4.7–6.1)
SODIUM SERPL-SCNC: 139 MMOL/L (ref 136–145)
WBC # SPEC AUTO: 9.3 X10(3)/MCL (ref 4.5–11.5)

## 2024-02-20 PROCEDURE — 86141 C-REACTIVE PROTEIN HS: CPT | Performed by: FAMILY MEDICINE

## 2024-02-20 PROCEDURE — 80053 COMPREHEN METABOLIC PANEL: CPT | Performed by: FAMILY MEDICINE

## 2024-02-20 PROCEDURE — 85652 RBC SED RATE AUTOMATED: CPT | Performed by: FAMILY MEDICINE

## 2024-02-20 PROCEDURE — 85027 COMPLETE CBC AUTOMATED: CPT | Performed by: FAMILY MEDICINE

## 2024-02-21 ENCOUNTER — LAB REQUISITION (OUTPATIENT)
Dept: LAB | Facility: HOSPITAL | Age: 69
End: 2024-02-21
Payer: MEDICARE

## 2024-02-21 DIAGNOSIS — R77.0 ABNORMALITY OF ALBUMIN: ICD-10-CM

## 2024-02-21 LAB
FERRITIN SERPL-MCNC: 524.88 NG/ML (ref 21.81–274.66)
FOLATE SERPL-MCNC: 8.8 NG/ML (ref 7–31.4)
IRON SATN MFR SERPL: 10 % (ref 20–50)
IRON SERPL-MCNC: 18 UG/DL (ref 65–175)
PREALB SERPL-MCNC: 9.5 MG/DL (ref 16–42)
TIBC SERPL-MCNC: 154 UG/DL (ref 69–240)
TIBC SERPL-MCNC: 172 UG/DL (ref 250–450)
TRANSFERRIN SERPL-MCNC: 163 MG/DL (ref 163–344)
VIT B12 SERPL-MCNC: 276 PG/ML (ref 213–816)

## 2024-02-21 PROCEDURE — 83540 ASSAY OF IRON: CPT | Performed by: FAMILY MEDICINE

## 2024-02-21 PROCEDURE — 82728 ASSAY OF FERRITIN: CPT | Performed by: FAMILY MEDICINE

## 2024-02-21 PROCEDURE — 82746 ASSAY OF FOLIC ACID SERUM: CPT | Performed by: FAMILY MEDICINE

## 2024-02-21 PROCEDURE — 84134 ASSAY OF PREALBUMIN: CPT | Performed by: FAMILY MEDICINE

## 2024-02-21 PROCEDURE — 82607 VITAMIN B-12: CPT | Performed by: FAMILY MEDICINE

## 2024-02-26 ENCOUNTER — LAB REQUISITION (OUTPATIENT)
Dept: LAB | Facility: HOSPITAL | Age: 69
End: 2024-02-26
Payer: MEDICARE

## 2024-02-26 DIAGNOSIS — Z13.220 ENCOUNTER FOR SCREENING FOR LIPOID DISORDERS: ICD-10-CM

## 2024-02-26 DIAGNOSIS — R79.9 ABNORMAL FINDING OF BLOOD CHEMISTRY, UNSPECIFIED: ICD-10-CM

## 2024-02-26 LAB
ALBUMIN SERPL-MCNC: 2.3 G/DL (ref 3.4–4.8)
ALBUMIN/GLOB SERPL: 0.7 RATIO (ref 1.1–2)
ALP SERPL-CCNC: 125 UNIT/L (ref 40–150)
ALT SERPL-CCNC: 14 UNIT/L (ref 0–55)
AST SERPL-CCNC: 13 UNIT/L (ref 5–34)
BILIRUB SERPL-MCNC: 0.2 MG/DL
BUN SERPL-MCNC: 9.8 MG/DL (ref 8.4–25.7)
CALCIUM SERPL-MCNC: 8.4 MG/DL (ref 8.8–10)
CHLORIDE SERPL-SCNC: 107 MMOL/L (ref 98–107)
CO2 SERPL-SCNC: 26 MMOL/L (ref 23–31)
CREAT SERPL-MCNC: 0.6 MG/DL (ref 0.73–1.18)
ERYTHROCYTE [DISTWIDTH] IN BLOOD BY AUTOMATED COUNT: 15.4 % (ref 11.5–17)
GFR SERPLBLD CREATININE-BSD FMLA CKD-EPI: >60 MLS/MIN/1.73/M2
GLOBULIN SER-MCNC: 3.4 GM/DL (ref 2.4–3.5)
GLUCOSE SERPL-MCNC: 106 MG/DL (ref 82–115)
HCT VFR BLD AUTO: 27 % (ref 42–52)
HGB BLD-MCNC: 8.3 G/DL (ref 14–18)
MCH RBC QN AUTO: 27.5 PG (ref 27–31)
MCHC RBC AUTO-ENTMCNC: 30.7 G/DL (ref 33–36)
MCV RBC AUTO: 89.4 FL (ref 80–94)
NRBC BLD AUTO-RTO: 0 %
PLATELET # BLD AUTO: 452 X10(3)/MCL (ref 130–400)
PMV BLD AUTO: 8.5 FL (ref 7.4–10.4)
POTASSIUM SERPL-SCNC: 4 MMOL/L (ref 3.5–5.1)
PROT SERPL-MCNC: 5.7 GM/DL (ref 5.8–7.6)
RBC # BLD AUTO: 3.02 X10(6)/MCL (ref 4.7–6.1)
SODIUM SERPL-SCNC: 139 MMOL/L (ref 136–145)
WBC # SPEC AUTO: 9.88 X10(3)/MCL (ref 4.5–11.5)

## 2024-02-26 PROCEDURE — 80053 COMPREHEN METABOLIC PANEL: CPT | Performed by: FAMILY MEDICINE

## 2024-02-26 PROCEDURE — 85027 COMPLETE CBC AUTOMATED: CPT | Performed by: FAMILY MEDICINE

## 2024-02-27 ENCOUNTER — APPOINTMENT (OUTPATIENT)
Dept: RADIOLOGY | Facility: HOSPITAL | Age: 69
End: 2024-02-27
Attending: THORACIC SURGERY (CARDIOTHORACIC VASCULAR SURGERY)
Payer: MEDICARE

## 2024-02-27 DIAGNOSIS — S31.000A: ICD-10-CM

## 2024-02-27 DIAGNOSIS — S33.101A: ICD-10-CM

## 2024-02-27 PROCEDURE — 25500020 PHARM REV CODE 255

## 2024-02-27 PROCEDURE — 72197 MRI PELVIS W/O & W/DYE: CPT | Mod: TC

## 2024-02-27 PROCEDURE — A9577 INJ MULTIHANCE: HCPCS

## 2024-02-27 RX ADMIN — GADOBENATE DIMEGLUMINE 15 ML: 529 INJECTION, SOLUTION INTRAVENOUS at 08:02

## 2024-02-28 ENCOUNTER — LAB REQUISITION (OUTPATIENT)
Dept: LAB | Facility: HOSPITAL | Age: 69
End: 2024-02-28
Payer: MEDICARE

## 2024-02-28 DIAGNOSIS — I70.25 ATHEROSCLEROSIS OF NATIVE ARTERIES OF OTHER EXTREMITIES WITH ULCERATION: ICD-10-CM

## 2024-02-28 LAB — PREALB SERPL-MCNC: 8.4 MG/DL (ref 16–42)

## 2024-02-28 PROCEDURE — 84134 ASSAY OF PREALBUMIN: CPT | Performed by: FAMILY MEDICINE

## 2024-03-06 ENCOUNTER — LAB REQUISITION (OUTPATIENT)
Dept: LAB | Facility: HOSPITAL | Age: 69
End: 2024-03-06
Payer: MEDICARE

## 2024-03-06 DIAGNOSIS — I70.238 ATHEROSCLEROSIS OF NATIVE ARTERIES OF RIGHT LEG WITH ULCERATION OF OTHER PART OF LOWER LEG: ICD-10-CM

## 2024-03-06 LAB — PREALB SERPL-MCNC: 8.4 MG/DL (ref 16–42)

## 2024-03-06 PROCEDURE — 84134 ASSAY OF PREALBUMIN: CPT | Performed by: FAMILY MEDICINE

## 2024-03-07 ENCOUNTER — ANESTHESIA EVENT (OUTPATIENT)
Dept: CARDIOLOGY | Facility: HOSPITAL | Age: 69
DRG: 616 | End: 2024-03-07
Payer: MEDICARE

## 2024-03-13 ENCOUNTER — ANESTHESIA (OUTPATIENT)
Dept: CARDIOLOGY | Facility: HOSPITAL | Age: 69
DRG: 616 | End: 2024-03-13
Payer: MEDICARE

## 2024-03-13 PROCEDURE — D9220A PRA ANESTHESIA: Mod: ANES,,, | Performed by: ANESTHESIOLOGY

## 2024-03-13 PROCEDURE — 63600175 PHARM REV CODE 636 W HCPCS: Performed by: STUDENT IN AN ORGANIZED HEALTH CARE EDUCATION/TRAINING PROGRAM

## 2024-03-13 PROCEDURE — D9220A PRA ANESTHESIA: Mod: CRNA,,, | Performed by: STUDENT IN AN ORGANIZED HEALTH CARE EDUCATION/TRAINING PROGRAM

## 2024-03-13 PROCEDURE — 25000003 PHARM REV CODE 250: Performed by: STUDENT IN AN ORGANIZED HEALTH CARE EDUCATION/TRAINING PROGRAM

## 2024-03-13 RX ORDER — PHENYLEPHRINE HCL IN 0.9% NACL 1 MG/10 ML
SYRINGE (ML) INTRAVENOUS
Status: DISCONTINUED | OUTPATIENT
Start: 2024-03-13 | End: 2024-03-13

## 2024-03-13 RX ORDER — GLYCOPYRROLATE 0.2 MG/ML
INJECTION INTRAMUSCULAR; INTRAVENOUS
Status: DISCONTINUED | OUTPATIENT
Start: 2024-03-13 | End: 2024-03-13

## 2024-03-13 RX ORDER — CALCIUM CHLORIDE INJECTION 100 MG/ML
INJECTION, SOLUTION INTRAVENOUS
Status: DISCONTINUED | OUTPATIENT
Start: 2024-03-13 | End: 2024-03-13

## 2024-03-13 RX ORDER — MIDAZOLAM HYDROCHLORIDE 1 MG/ML
INJECTION INTRAMUSCULAR; INTRAVENOUS
Status: DISCONTINUED | OUTPATIENT
Start: 2024-03-13 | End: 2024-03-13

## 2024-03-13 RX ORDER — ONDANSETRON HYDROCHLORIDE 2 MG/ML
INJECTION, SOLUTION INTRAVENOUS
Status: DISCONTINUED | OUTPATIENT
Start: 2024-03-13 | End: 2024-03-13

## 2024-03-13 RX ORDER — FENTANYL CITRATE 50 UG/ML
INJECTION, SOLUTION INTRAMUSCULAR; INTRAVENOUS
Status: DISCONTINUED | OUTPATIENT
Start: 2024-03-13 | End: 2024-03-13

## 2024-03-13 RX ORDER — LIDOCAINE HYDROCHLORIDE 20 MG/ML
INJECTION INTRAVENOUS
Status: DISCONTINUED | OUTPATIENT
Start: 2024-03-13 | End: 2024-03-13

## 2024-03-13 RX ORDER — EPHEDRINE SULFATE 50 MG/ML
INJECTION, SOLUTION INTRAVENOUS
Status: DISCONTINUED | OUTPATIENT
Start: 2024-03-13 | End: 2024-03-13

## 2024-03-13 RX ORDER — DEXAMETHASONE SODIUM PHOSPHATE 4 MG/ML
INJECTION, SOLUTION INTRA-ARTICULAR; INTRALESIONAL; INTRAMUSCULAR; INTRAVENOUS; SOFT TISSUE
Status: DISCONTINUED | OUTPATIENT
Start: 2024-03-13 | End: 2024-03-13

## 2024-03-13 RX ORDER — PROPOFOL 10 MG/ML
VIAL (ML) INTRAVENOUS
Status: DISCONTINUED | OUTPATIENT
Start: 2024-03-13 | End: 2024-03-13

## 2024-03-13 RX ADMIN — EPHEDRINE SULFATE 10 MG: 50 INJECTION INTRAVENOUS at 07:03

## 2024-03-13 RX ADMIN — ONDANSETRON 4 MG: 2 INJECTION INTRAMUSCULAR; INTRAVENOUS at 07:03

## 2024-03-13 RX ADMIN — Medication 100 MCG: at 07:03

## 2024-03-13 RX ADMIN — CALCIUM CHLORIDE INJECTION 250 MG: 100 INJECTION, SOLUTION INTRAVENOUS at 08:03

## 2024-03-13 RX ADMIN — SODIUM CHLORIDE, SODIUM GLUCONATE, SODIUM ACETATE, POTASSIUM CHLORIDE AND MAGNESIUM CHLORIDE: 526; 502; 368; 37; 30 INJECTION, SOLUTION INTRAVENOUS at 07:03

## 2024-03-13 RX ADMIN — EPHEDRINE SULFATE 5 MG: 50 INJECTION INTRAVENOUS at 07:03

## 2024-03-13 RX ADMIN — PROPOFOL 150 MG: 10 INJECTION, EMULSION INTRAVENOUS at 07:03

## 2024-03-13 RX ADMIN — FENTANYL CITRATE 50 MCG: 50 INJECTION, SOLUTION INTRAMUSCULAR; INTRAVENOUS at 07:03

## 2024-03-13 RX ADMIN — DEXAMETHASONE SODIUM PHOSPHATE 8 MG: 4 INJECTION, SOLUTION INTRA-ARTICULAR; INTRALESIONAL; INTRAMUSCULAR; INTRAVENOUS; SOFT TISSUE at 07:03

## 2024-03-13 RX ADMIN — MIDAZOLAM 2 MG: 1 INJECTION INTRAMUSCULAR; INTRAVENOUS at 07:03

## 2024-03-13 RX ADMIN — LIDOCAINE HYDROCHLORIDE 80 MG: 20 INJECTION INTRAVENOUS at 07:03

## 2024-03-13 RX ADMIN — GLYCOPYRROLATE 0.2 MG: 0.2 INJECTION INTRAMUSCULAR; INTRAVENOUS at 07:03

## 2024-03-13 NOTE — ANESTHESIA PROCEDURE NOTES
LMA    Date/Time: 3/13/2024 7:18 AM    Performed by: Mita Mclaughlin CRNA  Authorized by: Tom Corea MD    Intubation:     Induction:  Intravenous    Intubated:  Postinduction    Mask Ventilation:  Easy mask    Attempts:  1    Attempted By:  CRNA    Difficult Airway Encountered?: No      Complications:  None    Airway Device:  Supraglottic airway/LMA    Airway Device Size:  4.0    Style/Cuff Inflation:  Cuffed (inflated to minimal occlusive pressure)    Secured at:  The lips    Placement Verified By:  Capnometry    Complicating Factors:  None    Findings Post-Intubation:  BS equal bilateral and atraumatic/condition of teeth unchanged

## 2024-03-13 NOTE — TRANSFER OF CARE
"Anesthesia Transfer of Care Note    Patient: Shaheen Christie    Procedure(s) Performed: Procedure(s) (LRB):  Peripheral angiography (N/A)    Patient location: PACU    Anesthesia Type: general    Transport from OR: Transported from OR on room air with adequate spontaneous ventilation    Post assessment: no apparent anesthetic complications    Post vital signs: stable    Level of consciousness: responds to stimulation    Nausea/Vomiting: no nausea/vomiting    Complications: none    Transfer of care protocol was followed      Last vitals: Visit Vitals  /61   Pulse 81   Temp 36.9 °C (98.5 °F) (Oral)   Ht 5' 9" (1.753 m)   Wt 68.5 kg (151 lb)   SpO2 (!) 92%   BMI 22.30 kg/m²     "

## 2024-03-13 NOTE — ANESTHESIA PREPROCEDURE EVALUATION
03/13/2024  Shaheen Christie is a 69 y.o., male with a PMH of HTN, HLD, severe PAD,  COPD, cervical stenosis, neuropathy, depression, ch.pain on ch.opioid therapy, anemia and other medical problems listed in the EMR    GA/LMA 7/28/23...NAAC except for mild ponv  Hct 27    Pre-op Assessment    I have reviewed the Patient Summary Reports.     I have reviewed the Nursing Notes. I have reviewed the NPO Status.   I have reviewed the Medications.     Review of Systems  Cardiovascular:  Exercise tolerance: poor   Hypertension                                  Hypertension         Pulmonary:   COPD         Chronic Obstructive Pulmonary Disease (COPD):                      Psych:  Psychiatric History                  Physical Exam  General: Well nourished and Cooperative    Airway:  Mallampati: II   Mouth Opening: Normal  TM Distance: Normal  Tongue: Normal  Neck ROM: Normal ROM    Chest/Lungs:  Clear to auscultation    Heart:  Rhythm: Regular Rhythm        Anesthesia Plan  Type of Anesthesia, risks & benefits discussed:    Anesthesia Type: Gen Supraglottic Airway  Intra-op Monitoring Plan: Standard ASA Monitors  Post Op Pain Control Plan: multimodal analgesia  Induction:  IV  Informed Consent: Informed consent signed with the Patient and all parties understand the risks and agree with anesthesia plan.  All questions answered.   ASA Score: 3  Day of Surgery Review of History & Physical: H&P Update referred to the surgeon/provider.    Ready For Surgery From Anesthesia Perspective.     .  I explained anesthesia plan to patient/responsbile party if available.  Anesthesia consent done going over the material facts, risks, complications & alternatives, obtained which includes the possibility of altering the anesthesia plan.  I reviewed problem list, prior to admission medication list, appropriate labs, any workup, Xray, EKG  Wife notified of lab results, patient has an appt scheduled for 7/30/2019. Will do labs then, order pending      "etc noted below.  Patients condition is satisfactory to proceed with anesthesia plan unless otherwise noted (see anesthesia chart for details of the anesthesia plan carried out).      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, left knee       Review of patient's allergies indicates:  No Known Allergies    Current Outpatient Medications   Medication Instructions    acetaminophen (TYLENOL) 650 mg, Oral, Every 6 hours PRN    albuterol (PROVENTIL/VENTOLIN HFA) 90 mcg/actuation inhaler Inhalation, 4 times daily PRN    aluminum & magnesium hydroxide-simethicone (MYLANTA MAX STRENGTH) 400-400-40 mg/5 mL suspension Oral, Every 6 hours PRN    amLODIPine (NORVASC) 10 mg, Oral, Daily    ascorbic acid (vitamin C) (VITAMIN C) 500 mg, Oral, 2 times daily    aspirin 81 mg, Oral, Daily    cilostazoL (PLETAL) 50 mg, Oral, 2 times daily    clopidogreL (PLAVIX) 75 mg, Oral, Daily    collagenase (SANTYL) ointment Topical (Top), 2 times daily    docusate sodium (COLACE) 100 mg, Oral, 2 times daily    doxycycline (VIBRAMYCIN) 100 mg, Oral, 2 times daily    DULoxetine (CYMBALTA) 30 mg, Oral, Daily    ferrous sulfate 325 mg, Oral, Daily    gabapentin (NEURONTIN) 300 mg, Oral, 3 times daily    gabapentin (NEURONTIN) 600 mg, Oral, Nightly    ibuprofen (ADVIL,MOTRIN) 800 mg, Oral, 2 times daily PRN    LIDOcaine (LIDODERM) 5 % 1 patch, Transdermal, Every 24 hours (non-standard times), Remove & Discard patch within 12 hours or as directed by MD    losartan (COZAAR) 100 mg, Oral, Daily    melatonin " (MELATIN) 6 mg, Oral, Nightly PRN    miconazole NITRATE 2 % (MICOTIN) 2 % top powder Topical (Top), 2 times daily    multivitamin (THERAGRAN) per tablet 1 tablet, Oral, Daily    oxyCODONE (OXY-IR) 10 mg, Oral, Every 4 hours PRN    polyethylene glycol (GLYCOLAX) 17 g, Oral, Daily    simethicone (MYLICON) 80 mg, Oral, Every 6 hours PRN    zinc sulfate (ZINCATE) 220 mg, Oral, Daily       Past Surgical History:   Procedure Laterality Date    angiogram Bilateral     stents placed in left leg    anterior neck surgery      ARTHROTOMY OF ANKLE Left 6/28/2023    Procedure: ARTHROTOMY, ANKLE;  Surgeon: Tom Nichole DPM;  Location: [facility];  Service: Podiatry;  Laterality: Left;    cataracts Left     CHOLECYSTECTOMY      EYE SURGERY Left     HAND SURGERY Left     broken bone    herina repair      INCISION AND DRAINAGE, LOWER EXTREMITY Left 7/28/2023    Procedure: INCISION AND DRAINAGE, LOWER EXTREMITY;  Surgeon: Avinash Garces MD;  Location: [facility];  Service: Orthopedics;  Laterality: Left;  Supine, vascular bed, bone foam  last case  cysto tubing, laparoscopic trocar, experience, vanc, hemovac drain    KNEE SURGERY Bilateral     posterior neck surgery      SPINE SURGERY      TOTAL REPLACEMENT OF BOTH HIP JOINTS USING COMPUTER-ASSISTED NAVIGATION Bilateral        Social History     Socioeconomic History    Marital status:    Occupational History    Occupation: retired   Tobacco Use    Smoking status: Every Day     Current packs/day: 0.50     Average packs/day: 0.5 packs/day for 15.0 years (7.5 ttl pk-yrs)     Types: Cigarettes    Smokeless tobacco: Never   Substance and Sexual Activity    Alcohol use: Yes     Alcohol/week: 3.0 - 6.0 standard drinks of alcohol     Types: 3 - 6 Cans of beer per week    Drug use: Yes     Types: Marijuana    Sexual activity: Not Currently       Lab Results   Component Value Date    WBC 8.09 02/27/2024    HGB 8.1 (L) 02/27/2024    HCT 26.8 (L) 02/27/2024    MCV 88.2 02/27/2024    PLT  "501 (H) 02/27/2024          [facility]  Lab Results   Component Value Date    HCT 26.8 (L) 02/27/2024     02/26/2024    K 4.0 02/26/2024    BUN 9.8 02/26/2024    CREATININE 0.60 (L) 02/26/2024    CALCIUM 8.4 (L) 02/26/2024        INR  No results for input(s): "PT", "INR", "PROTIME", "APTT" in the last 72 hours.        Diagnostic Studies:      EKG:  No results found for this or any previous visit.         "

## 2024-03-14 NOTE — ANESTHESIA POSTPROCEDURE EVALUATION
Anesthesia Post Evaluation    Patient: Shaheen Christie    Procedure(s) Performed: Procedure(s) (LRB):  Peripheral angiography (N/A)    Final Anesthesia Type: general (/Regional//MAC)      Patient location during evaluation: PACU  Post-procedure mental status: @ basline.  Pain management: adequate    PONV status: See postop meds for drugs used to control n/v if any.  Anesthetic complications: no      Cardiovascular status: blood pressure returned to baseline  Respiratory status: @ baseline.  Hydration status: euvolemic                Vitals Value Taken Time   /65 03/13/24 1231   Temp 36.2 °C (97.2 °F) 03/13/24 0820   Pulse 99 03/13/24 1237   Resp 20 03/13/24 1236   SpO2 95 % 03/13/24 1237   Vitals shown include unvalidated device data.      Event Time   Out of Recovery 09:16:00         Pain/Khadra Score: Khadra Score: 10 (3/13/2024  9:16 AM)

## 2024-03-15 ENCOUNTER — HOSPITAL ENCOUNTER (INPATIENT)
Facility: HOSPITAL | Age: 69
LOS: 46 days | Discharge: HOME | DRG: 616 | End: 2024-04-30
Attending: STUDENT IN AN ORGANIZED HEALTH CARE EDUCATION/TRAINING PROGRAM | Admitting: INTERNAL MEDICINE
Payer: MEDICARE

## 2024-03-15 DIAGNOSIS — M00.862 ARTHRITIS DUE TO OTHER BACTERIA, LEFT KNEE: ICD-10-CM

## 2024-03-15 DIAGNOSIS — Z48.89 ENCOUNTER FOR POST SURGICAL WOUND CHECK: ICD-10-CM

## 2024-03-15 DIAGNOSIS — L89.159 SACRAL DECUBITUS ULCER: Primary | ICD-10-CM

## 2024-03-15 DIAGNOSIS — I82.409 DEEP VEIN THROMBOSIS: ICD-10-CM

## 2024-03-15 DIAGNOSIS — Z51.89 VISIT FOR WOUND CHECK: ICD-10-CM

## 2024-03-15 DIAGNOSIS — M86.9 OSTEOMYELITIS: ICD-10-CM

## 2024-03-15 DIAGNOSIS — L97.923 SKIN ULCER OF LEFT LOWER LEG WITH NECROSIS OF MUSCLE: ICD-10-CM

## 2024-03-15 DIAGNOSIS — M86.9 OSTEOMYELITIS, UNSPECIFIED SITE, UNSPECIFIED TYPE: ICD-10-CM

## 2024-03-15 DIAGNOSIS — L89.610 PRESSURE ULCER, HEEL, RIGHT, UNSTAGEABLE: ICD-10-CM

## 2024-03-15 DIAGNOSIS — L89.610 UNSTAGEABLE PRESSURE ULCER OF RIGHT HEEL: ICD-10-CM

## 2024-03-15 DIAGNOSIS — I44.1 2ND DEGREE ATRIOVENTRICULAR BLOCK: ICD-10-CM

## 2024-03-15 DIAGNOSIS — L89.620 PRESSURE ULCER, HEEL, LEFT, UNSTAGEABLE: ICD-10-CM

## 2024-03-15 DIAGNOSIS — L89.620 UNSTAGEABLE PRESSURE ULCER OF LEFT HEEL: ICD-10-CM

## 2024-03-15 DIAGNOSIS — M25.462 KNEE EFFUSION, LEFT: ICD-10-CM

## 2024-03-15 DIAGNOSIS — I73.9 PAD (PERIPHERAL ARTERY DISEASE): ICD-10-CM

## 2024-03-15 LAB
ALBUMIN SERPL-MCNC: 2.5 G/DL (ref 3.4–4.8)
ALBUMIN/GLOB SERPL: 0.8 RATIO (ref 1.1–2)
ALP SERPL-CCNC: 103 UNIT/L (ref 40–150)
ALT SERPL-CCNC: 11 UNIT/L (ref 0–55)
APTT PPP: 31.5 SECONDS (ref 23.2–33.7)
AST SERPL-CCNC: 14 UNIT/L (ref 5–34)
BASOPHILS # BLD AUTO: 0.12 X10(3)/MCL
BASOPHILS NFR BLD AUTO: 1.6 %
BILIRUB SERPL-MCNC: 0.2 MG/DL
BUN SERPL-MCNC: 13.1 MG/DL (ref 8.4–25.7)
CALCIUM SERPL-MCNC: 8.4 MG/DL (ref 8.8–10)
CHLORIDE SERPL-SCNC: 109 MMOL/L (ref 98–107)
CO2 SERPL-SCNC: 24 MMOL/L (ref 23–31)
CREAT SERPL-MCNC: 0.65 MG/DL (ref 0.73–1.18)
EOSINOPHIL # BLD AUTO: 0.63 X10(3)/MCL (ref 0–0.9)
EOSINOPHIL NFR BLD AUTO: 8.2 %
ERYTHROCYTE [DISTWIDTH] IN BLOOD BY AUTOMATED COUNT: 15.9 % (ref 11.5–17)
GFR SERPLBLD CREATININE-BSD FMLA CKD-EPI: >60 MLS/MIN/1.73/M2
GLOBULIN SER-MCNC: 3.3 GM/DL (ref 2.4–3.5)
GLUCOSE SERPL-MCNC: 97 MG/DL (ref 82–115)
HCT VFR BLD AUTO: 28.7 % (ref 42–52)
HGB BLD-MCNC: 8.5 G/DL (ref 14–18)
IMM GRANULOCYTES # BLD AUTO: 0.02 X10(3)/MCL (ref 0–0.04)
IMM GRANULOCYTES NFR BLD AUTO: 0.3 %
INR PPP: 1
LACTATE SERPL-SCNC: 1.3 MMOL/L (ref 0.5–2.2)
LYMPHOCYTES # BLD AUTO: 2.67 X10(3)/MCL (ref 0.6–4.6)
LYMPHOCYTES NFR BLD AUTO: 34.6 %
MCH RBC QN AUTO: 26.2 PG (ref 27–31)
MCHC RBC AUTO-ENTMCNC: 29.6 G/DL (ref 33–36)
MCV RBC AUTO: 88.3 FL (ref 80–94)
MONOCYTES # BLD AUTO: 0.65 X10(3)/MCL (ref 0.1–1.3)
MONOCYTES NFR BLD AUTO: 8.4 %
NEUTROPHILS # BLD AUTO: 3.63 X10(3)/MCL (ref 2.1–9.2)
NEUTROPHILS NFR BLD AUTO: 46.9 %
NRBC BLD AUTO-RTO: 0 %
PLATELET # BLD AUTO: 449 X10(3)/MCL (ref 130–400)
PMV BLD AUTO: 8.2 FL (ref 7.4–10.4)
POTASSIUM SERPL-SCNC: 3.9 MMOL/L (ref 3.5–5.1)
PROT SERPL-MCNC: 5.8 GM/DL (ref 5.8–7.6)
PROTHROMBIN TIME: 13.5 SECONDS (ref 12.5–14.5)
RBC # BLD AUTO: 3.25 X10(6)/MCL (ref 4.7–6.1)
SODIUM SERPL-SCNC: 140 MMOL/L (ref 136–145)
WBC # SPEC AUTO: 7.72 X10(3)/MCL (ref 4.5–11.5)

## 2024-03-15 PROCEDURE — 99285 EMERGENCY DEPT VISIT HI MDM: CPT | Mod: 25

## 2024-03-15 PROCEDURE — 87040 BLOOD CULTURE FOR BACTERIA: CPT | Performed by: STUDENT IN AN ORGANIZED HEALTH CARE EDUCATION/TRAINING PROGRAM

## 2024-03-15 PROCEDURE — 63600175 PHARM REV CODE 636 W HCPCS: Performed by: INTERNAL MEDICINE

## 2024-03-15 PROCEDURE — 85610 PROTHROMBIN TIME: CPT | Performed by: STUDENT IN AN ORGANIZED HEALTH CARE EDUCATION/TRAINING PROGRAM

## 2024-03-15 PROCEDURE — 96361 HYDRATE IV INFUSION ADD-ON: CPT

## 2024-03-15 PROCEDURE — 93005 ELECTROCARDIOGRAM TRACING: CPT

## 2024-03-15 PROCEDURE — 85730 THROMBOPLASTIN TIME PARTIAL: CPT | Performed by: STUDENT IN AN ORGANIZED HEALTH CARE EDUCATION/TRAINING PROGRAM

## 2024-03-15 PROCEDURE — 25000003 PHARM REV CODE 250: Performed by: STUDENT IN AN ORGANIZED HEALTH CARE EDUCATION/TRAINING PROGRAM

## 2024-03-15 PROCEDURE — 83605 ASSAY OF LACTIC ACID: CPT | Performed by: STUDENT IN AN ORGANIZED HEALTH CARE EDUCATION/TRAINING PROGRAM

## 2024-03-15 PROCEDURE — 63600175 PHARM REV CODE 636 W HCPCS: Mod: JZ,JG | Performed by: STUDENT IN AN ORGANIZED HEALTH CARE EDUCATION/TRAINING PROGRAM

## 2024-03-15 PROCEDURE — 94760 N-INVAS EAR/PLS OXIMETRY 1: CPT

## 2024-03-15 PROCEDURE — 27000221 HC OXYGEN, UP TO 24 HOURS

## 2024-03-15 PROCEDURE — 21400001 HC TELEMETRY ROOM

## 2024-03-15 PROCEDURE — 96374 THER/PROPH/DIAG INJ IV PUSH: CPT

## 2024-03-15 PROCEDURE — 25000003 PHARM REV CODE 250: Performed by: INTERNAL MEDICINE

## 2024-03-15 PROCEDURE — 80053 COMPREHEN METABOLIC PANEL: CPT | Performed by: STUDENT IN AN ORGANIZED HEALTH CARE EDUCATION/TRAINING PROGRAM

## 2024-03-15 PROCEDURE — 85025 COMPLETE CBC W/AUTO DIFF WBC: CPT | Performed by: STUDENT IN AN ORGANIZED HEALTH CARE EDUCATION/TRAINING PROGRAM

## 2024-03-15 PROCEDURE — 11000001 HC ACUTE MED/SURG PRIVATE ROOM

## 2024-03-15 PROCEDURE — 96375 TX/PRO/DX INJ NEW DRUG ADDON: CPT

## 2024-03-15 PROCEDURE — 93010 ELECTROCARDIOGRAM REPORT: CPT | Mod: ,,, | Performed by: INTERNAL MEDICINE

## 2024-03-15 PROCEDURE — 94761 N-INVAS EAR/PLS OXIMETRY MLT: CPT

## 2024-03-15 RX ORDER — LIDOCAINE 50 MG/G
1 PATCH TOPICAL
Status: DISCONTINUED | OUTPATIENT
Start: 2024-03-15 | End: 2024-04-30 | Stop reason: HOSPADM

## 2024-03-15 RX ORDER — ALUMINUM HYDROXIDE, MAGNESIUM HYDROXIDE, AND SIMETHICONE 1200; 120; 1200 MG/30ML; MG/30ML; MG/30ML
15 SUSPENSION ORAL EVERY 6 HOURS PRN
Status: DISCONTINUED | OUTPATIENT
Start: 2024-03-15 | End: 2024-04-30 | Stop reason: HOSPADM

## 2024-03-15 RX ORDER — GABAPENTIN 300 MG/1
300 CAPSULE ORAL 3 TIMES DAILY
Status: DISCONTINUED | OUTPATIENT
Start: 2024-03-15 | End: 2024-04-30 | Stop reason: HOSPADM

## 2024-03-15 RX ORDER — LANOLIN ALCOHOL/MO/W.PET/CERES
1 CREAM (GRAM) TOPICAL DAILY
Status: DISCONTINUED | OUTPATIENT
Start: 2024-03-15 | End: 2024-04-30 | Stop reason: HOSPADM

## 2024-03-15 RX ORDER — DULOXETIN HYDROCHLORIDE 30 MG/1
30 CAPSULE, DELAYED RELEASE ORAL DAILY
Status: DISCONTINUED | OUTPATIENT
Start: 2024-03-15 | End: 2024-04-30 | Stop reason: HOSPADM

## 2024-03-15 RX ORDER — MORPHINE SULFATE 4 MG/ML
4 INJECTION, SOLUTION INTRAMUSCULAR; INTRAVENOUS
Status: COMPLETED | OUTPATIENT
Start: 2024-03-15 | End: 2024-03-15

## 2024-03-15 RX ORDER — ENOXAPARIN SODIUM 100 MG/ML
40 INJECTION SUBCUTANEOUS EVERY 24 HOURS
Status: DISCONTINUED | OUTPATIENT
Start: 2024-03-15 | End: 2024-04-21

## 2024-03-15 RX ORDER — HYDROCODONE BITARTRATE AND ACETAMINOPHEN 5; 325 MG/1; MG/1
1 TABLET ORAL EVERY 6 HOURS PRN
Status: DISCONTINUED | OUTPATIENT
Start: 2024-03-15 | End: 2024-03-26

## 2024-03-15 RX ORDER — POLYETHYLENE GLYCOL 3350 17 G/17G
17 POWDER, FOR SOLUTION ORAL DAILY
Status: DISCONTINUED | OUTPATIENT
Start: 2024-03-15 | End: 2024-04-30 | Stop reason: HOSPADM

## 2024-03-15 RX ORDER — LOSARTAN POTASSIUM 50 MG/1
100 TABLET ORAL DAILY
Status: DISCONTINUED | OUTPATIENT
Start: 2024-03-15 | End: 2024-04-30 | Stop reason: HOSPADM

## 2024-03-15 RX ORDER — ONDANSETRON HYDROCHLORIDE 2 MG/ML
4 INJECTION, SOLUTION INTRAVENOUS
Status: COMPLETED | OUTPATIENT
Start: 2024-03-15 | End: 2024-03-15

## 2024-03-15 RX ORDER — TALC
6 POWDER (GRAM) TOPICAL NIGHTLY PRN
Status: DISCONTINUED | OUTPATIENT
Start: 2024-03-15 | End: 2024-04-30 | Stop reason: HOSPADM

## 2024-03-15 RX ORDER — CILOSTAZOL 50 MG/1
50 TABLET ORAL 2 TIMES DAILY
Status: DISCONTINUED | OUTPATIENT
Start: 2024-03-15 | End: 2024-04-30 | Stop reason: HOSPADM

## 2024-03-15 RX ORDER — OXYCODONE HYDROCHLORIDE 10 MG/1
10 TABLET ORAL EVERY 4 HOURS
Status: DISCONTINUED | OUTPATIENT
Start: 2024-03-15 | End: 2024-03-17

## 2024-03-15 RX ORDER — GABAPENTIN 300 MG/1
600 CAPSULE ORAL NIGHTLY
Status: DISCONTINUED | OUTPATIENT
Start: 2024-03-15 | End: 2024-04-30 | Stop reason: HOSPADM

## 2024-03-15 RX ORDER — CLOPIDOGREL BISULFATE 75 MG/1
75 TABLET ORAL DAILY
Status: DISCONTINUED | OUTPATIENT
Start: 2024-03-15 | End: 2024-04-21

## 2024-03-15 RX ORDER — ACETAMINOPHEN 325 MG/1
650 TABLET ORAL EVERY 6 HOURS PRN
Status: DISCONTINUED | OUTPATIENT
Start: 2024-03-15 | End: 2024-04-30 | Stop reason: HOSPADM

## 2024-03-15 RX ORDER — AMLODIPINE BESYLATE 5 MG/1
10 TABLET ORAL DAILY
Status: DISCONTINUED | OUTPATIENT
Start: 2024-03-15 | End: 2024-04-30 | Stop reason: HOSPADM

## 2024-03-15 RX ORDER — DOCUSATE SODIUM 100 MG/1
100 CAPSULE, LIQUID FILLED ORAL 2 TIMES DAILY
Status: DISCONTINUED | OUTPATIENT
Start: 2024-03-15 | End: 2024-04-30 | Stop reason: HOSPADM

## 2024-03-15 RX ORDER — ALBUTEROL SULFATE 90 UG/1
2 AEROSOL, METERED RESPIRATORY (INHALATION) 4 TIMES DAILY PRN
Status: DISCONTINUED | OUTPATIENT
Start: 2024-03-15 | End: 2024-04-30 | Stop reason: HOSPADM

## 2024-03-15 RX ORDER — SIMETHICONE 80 MG
80 TABLET,CHEWABLE ORAL EVERY 6 HOURS PRN
Status: DISCONTINUED | OUTPATIENT
Start: 2024-03-15 | End: 2024-04-30 | Stop reason: HOSPADM

## 2024-03-15 RX ORDER — NAPROXEN SODIUM 220 MG/1
81 TABLET, FILM COATED ORAL DAILY
Status: DISCONTINUED | OUTPATIENT
Start: 2024-03-15 | End: 2024-04-21

## 2024-03-15 RX ADMIN — SODIUM CHLORIDE, POTASSIUM CHLORIDE, SODIUM LACTATE AND CALCIUM CHLORIDE 1000 ML: 600; 310; 30; 20 INJECTION, SOLUTION INTRAVENOUS at 04:03

## 2024-03-15 RX ADMIN — ONDANSETRON 4 MG: 2 INJECTION INTRAMUSCULAR; INTRAVENOUS at 04:03

## 2024-03-15 RX ADMIN — LIDOCAINE 5% 1 PATCH: 700 PATCH TOPICAL at 09:03

## 2024-03-15 RX ADMIN — GABAPENTIN 300 MG: 300 CAPSULE ORAL at 04:03

## 2024-03-15 RX ADMIN — HYDROCODONE BITARTRATE AND ACETAMINOPHEN 1 TABLET: 5; 325 TABLET ORAL at 04:03

## 2024-03-15 RX ADMIN — MORPHINE SULFATE 4 MG: 4 INJECTION, SOLUTION INTRAMUSCULAR; INTRAVENOUS at 04:03

## 2024-03-15 RX ADMIN — VANCOMYCIN HYDROCHLORIDE 2000 MG: 500 INJECTION, POWDER, LYOPHILIZED, FOR SOLUTION INTRAVENOUS at 05:03

## 2024-03-15 RX ADMIN — PIPERACILLIN AND TAZOBACTAM 4.5 G: 4; .5 INJECTION, POWDER, LYOPHILIZED, FOR SOLUTION INTRAVENOUS; PARENTERAL at 04:03

## 2024-03-15 RX ADMIN — CLOPIDOGREL BISULFATE 75 MG: 75 TABLET ORAL at 09:03

## 2024-03-15 RX ADMIN — POLYETHYLENE GLYCOL 3350 17 G: 17 POWDER, FOR SOLUTION ORAL at 09:03

## 2024-03-15 RX ADMIN — GABAPENTIN 300 MG: 300 CAPSULE ORAL at 09:03

## 2024-03-15 RX ADMIN — OXYCODONE HYDROCHLORIDE 10 MG: 10 TABLET ORAL at 09:03

## 2024-03-15 RX ADMIN — DOCUSATE SODIUM 100 MG: 100 CAPSULE, LIQUID FILLED ORAL at 09:03

## 2024-03-15 RX ADMIN — ENOXAPARIN SODIUM 40 MG: 40 INJECTION SUBCUTANEOUS at 04:03

## 2024-03-15 RX ADMIN — OXYCODONE HYDROCHLORIDE 10 MG: 10 TABLET ORAL at 02:03

## 2024-03-15 RX ADMIN — ASPIRIN 81 MG CHEWABLE TABLET 81 MG: 81 TABLET CHEWABLE at 09:03

## 2024-03-15 RX ADMIN — CILOSTAZOL 50 MG: 50 TABLET ORAL at 09:03

## 2024-03-15 RX ADMIN — LOSARTAN POTASSIUM 100 MG: 50 TABLET, FILM COATED ORAL at 09:03

## 2024-03-15 RX ADMIN — AMLODIPINE BESYLATE 10 MG: 5 TABLET ORAL at 09:03

## 2024-03-15 RX ADMIN — FERROUS SULFATE TAB 325 MG (65 MG ELEMENTAL FE) 1 EACH: 325 (65 FE) TAB at 09:03

## 2024-03-15 RX ADMIN — GABAPENTIN 600 MG: 300 CAPSULE ORAL at 09:03

## 2024-03-15 RX ADMIN — THERA TABS 1 TABLET: TAB at 09:03

## 2024-03-15 RX ADMIN — DULOXETINE HYDROCHLORIDE 30 MG: 30 CAPSULE, DELAYED RELEASE ORAL at 09:03

## 2024-03-15 NOTE — ED PROVIDER NOTES
Encounter Date: 3/15/2024    SCRIBE #1 NOTE: I, Xavier Sandy, am scribing for, and in the presence of,  Freddie Chapman MD. I have scribed the entire note.       History     Chief Complaint   Patient presents with    Wound Check     [facility] from [facility] for sacral wound evaluation.      Patient is a 69 year old male with a hx of COPD, DM, HTN, and neuropathy presents to the ED from [facility] via EMS for sacral wound evaluation. Pt was diagnosed with sacral decubitus with osteomyelitis on 2/27. Pt was trying to get into [facility] for wound care, but was denied. Pt  was then sent here for wound evaluation, antibiotics. Pt also has chronic bilateral LE wounds that he is being treated for. Pt denies any fever.     The history is provided by the patient. No  was used.                 Review of patient's allergies indicates:  No Known Allergies  Past Medical History:   Diagnosis Date    COPD (chronic obstructive pulmonary disease)     Depression     Hypertension     Neuropathy      Past Surgical History:   Procedure Laterality Date    angiogram Bilateral     stents placed in left leg    anterior neck surgery      ARTHROTOMY OF ANKLE Left 6/28/2023    Procedure: ARTHROTOMY, ANKLE;  Surgeon: Tom Nichole DPM;  Location: [facility];  Service: Podiatry;  Laterality: Left;    cataracts Left     CHOLECYSTECTOMY      DEBRIDEMENT OF FOOT Bilateral 3/26/2024    Procedure: DEBRIDEMENT, FOOT;  Surgeon: Tom Nichole DPM;  Location: [facility] OR;  Service: Podiatry;  Laterality: Bilateral;    EYE SURGERY Left     HAND SURGERY Left     broken bone    herina repair      INCISION AND DRAINAGE, LOWER EXTREMITY Left 7/28/2023    Procedure: INCISION AND DRAINAGE, LOWER EXTREMITY;  Surgeon: Avinash Garces MD;  Location: [facility] OR;  Service: Orthopedics;  Laterality: Left;  Supine, vascular bed, bone foam  last case  cysto tubing, laparoscopic trocar, experience, vanc, hemovac drain    KNEE SURGERY Bilateral      PERIPHERAL ANGIOGRAPHY N/A 3/13/2024    Procedure: Peripheral angiography;  Surgeon: Deven Queen MD;  Location: [facility] CATH LAB;  Service: Cardiology;  Laterality: N/A;  MICHELLE ANGIO W/ ANEST.    posterior neck surgery      SPINE SURGERY      TOTAL REPLACEMENT OF BOTH HIP JOINTS USING COMPUTER-ASSISTED NAVIGATION Bilateral      Family History   Problem Relation Name Age of Onset    Heart disease Mother      Cancer Father      Lung disease Father       Social History     Tobacco Use    Smoking status: Former     Average packs/day: 0.5 packs/day for 15.0 years (7.5 ttl pk-yrs)     Types: Cigarettes     Start date: 2008    Smokeless tobacco: Never   Substance Use Topics    Alcohol use: Yes     Alcohol/week: 3.0 - 6.0 standard drinks of alcohol     Types: 3 - 6 Cans of beer per week    Drug use: Yes     Types: Marijuana     Review of Systems   Constitutional:  Negative for fever.   HENT:  Negative for sore throat.    Eyes:  Negative for visual disturbance.   Respiratory:  Negative for shortness of breath.    Cardiovascular:  Negative for chest pain.   Gastrointestinal:  Negative for abdominal pain.   Genitourinary:  Negative for dysuria.   Musculoskeletal:  Negative for joint swelling.        Sacral decubitus    Skin:  Negative for rash.   Neurological:  Negative for weakness.   Psychiatric/Behavioral:  Negative for confusion.    All other systems reviewed and are negative.      Physical Exam     Initial Vitals [03/15/24 0307]   BP Pulse Resp Temp SpO2   (!) 121/57 81 15 98.2 °F (36.8 °C) 96 %      MAP       --         Physical Exam    Nursing note and vitals reviewed.  Constitutional: He appears well-developed and well-nourished. He is not diaphoretic. No distress.   HENT:   Head: Normocephalic and atraumatic.   Eyes: Conjunctivae and EOM are normal. Pupils are equal, round, and reactive to light.   Neck:   Normal range of motion.  Cardiovascular:  Normal rate, regular rhythm, normal heart sounds and intact distal  pulses.           No murmur heard.  Pulmonary/Chest: Breath sounds normal. No respiratory distress. He has no wheezes. He has no rales.   Abdominal: Abdomen is soft. He exhibits no distension. There is no abdominal tenderness.   Musculoskeletal:         General: No tenderness or edema.      Cervical back: Normal range of motion.      Comments: Wounds to the lateral and posterior aspect of the right LE. Wounds to the lateral and posterior aspect of the left LE.   Sacral decubitus ulcer noted, see image.     Neurological: He is alert and oriented to person, place, and time. No cranial nerve deficit.   Skin: Skin is warm and dry. Capillary refill takes less than 2 seconds. No rash noted. No erythema.   Psychiatric: He has a normal mood and affect.         ED Course   Procedures  Labs Reviewed   COMPREHENSIVE METABOLIC PANEL - Abnormal; Notable for the following components:       Result Value    Chloride 109 (*)     Creatinine 0.65 (*)     Calcium Level Total 8.4 (*)     Albumin Level 2.5 (*)     Albumin/Globulin Ratio 0.8 (*)     All other components within normal limits   CBC WITH DIFFERENTIAL - Abnormal; Notable for the following components:    RBC 3.25 (*)     Hgb 8.5 (*)     Hct 28.7 (*)     MCH 26.2 (*)     MCHC 29.6 (*)     Platelet 449 (*)     All other components within normal limits   LACTIC ACID, PLASMA - Normal   PROTIME-INR - Normal   APTT - Normal   CBC W/ AUTO DIFFERENTIAL    Narrative:     The following orders were created for panel order CBC auto differential.  Procedure                               Abnormality         Status                     ---------                               -----------         ------                     CBC with Differential[[id number]]       Abnormal            Final result                 Please view results for these tests on the individual orders.        ECG Results    None       Imaging Results    None          Medications   gabapentin capsule 300 mg (300 mg Oral Given  4/14/24 2041)   polyethylene glycol packet 17 g (17 g Oral Not Given 4/14/24 0900)   acetaminophen tablet 650 mg (has no administration in time range)   albuterol inhaler 2 puff (has no administration in time range)   aluminum-magnesium hydroxide-simethicone 200-200-20 mg/5 mL suspension 15 mL (has no administration in time range)   amLODIPine tablet 10 mg (10 mg Oral Given 4/14/24 0858)   aspirin chewable tablet 81 mg (81 mg Oral Given 4/14/24 0858)   cilostazoL tablet 50 mg (50 mg Oral Given 4/14/24 2041)   clopidogreL tablet 75 mg (75 mg Oral Given 4/14/24 0858)   docusate sodium capsule 100 mg (100 mg Oral Given 4/14/24 2041)   DULoxetine DR capsule 30 mg (30 mg Oral Given 4/14/24 0858)   ferrous sulfate tablet 1 each (1 each Oral Given 4/14/24 0858)   gabapentin capsule 600 mg (600 mg Oral Given 4/14/24 2041)   LIDOcaine 5 % patch 1 patch (1 patch Transdermal Not Given 4/14/24 1030)   losartan tablet 100 mg (100 mg Oral Given 4/14/24 0858)   melatonin tablet 6 mg (6 mg Oral Not Given 4/9/24 2002)   multivitamin tablet (1 tablet Oral Given 4/14/24 0858)   simethicone chewable tablet 80 mg (has no administration in time range)   enoxaparin injection 40 mg (40 mg Subcutaneous Given 4/14/24 1527)   mupirocin 2 % ointment ( Nasal Not Given 3/30/24 2100)   HYDROcodone-acetaminophen 5-325 mg per tablet 1 tablet (1 tablet Oral Given 4/3/24 1614)   sodium chloride 0.9% flush 10 mL (10 mLs Intravenous Given 4/15/24 0519)     And   sodium chloride 0.9% flush 10 mL (has no administration in time range)   oxyCODONE immediate release tablet Tab 10 mg (10 mg Oral Given 4/15/24 0723)   piperacillin-tazobactam (ZOSYN) 4.5 g in dextrose 5 % in water (D5W) 100 mL IVPB (MB+) (4.5 g Intravenous New Bag 4/15/24 0520)   vancomycin in dextrose 5 % 1 gram/250 mL IVPB 1,000 mg (0 mg Intravenous Stopped 4/15/24 0055)   vancomycin - pharmacy to dose (has no administration in time range)   morphine injection 4 mg (4 mg Intravenous Given  3/15/24 0427)   ondansetron injection 4 mg (4 mg Intravenous Given 3/15/24 0427)   lactated ringers bolus 1,000 mL (0 mLs Intravenous Stopped 3/15/24 0523)   vancomycin 2 g in dextrose 5 % 500 mL IVPB (0 mg Intravenous Stopped 3/15/24 0728)   piperacillin-tazobactam (ZOSYN) 4.5 g in dextrose 5 % in water (D5W) 100 mL IVPB (MB+) (0 g Intravenous Stopped 3/15/24 0523)   sodium chloride 0.9% bolus 500 mL 500 mL (0 mLs Intravenous Stopped 3/17/24 0955)   vancomycin 1.75 g in 5 % dextrose 500 mL IVPB (0 mg Intravenous Stopped 3/17/24 1216)   potassium chloride SA CR tablet 20 mEq (20 mEq Oral Not Given 3/29/24 0915)   vancomycin 1.75 g in 5 % dextrose 500 mL IVPB (0 mg Intravenous Stopped 4/11/24 1429)     Medical Decision Making  Problems Addressed:  Encounter for post surgical wound check: acute illness or injury that poses a threat to life or bodily functions  Osteomyelitis, unspecified site, unspecified type: acute illness or injury that poses a threat to life or bodily functions    Amount and/or Complexity of Data Reviewed  Labs: ordered.    Risk  Prescription drug management.  Parenteral controlled substances.  Decision regarding hospitalization.            Scribe Attestation:   Scribe #1: I performed the above scribed service and the documentation accurately describes the services I performed. I attest to the accuracy of the note.    Attending Attestation:           Physician Attestation for Scribe:  Physician Attestation Statement for Scribe #1: I, Freddie Chapman MD, reviewed documentation, as scribed by Xavier Sandy in my presence, and it is both accurate and complete.             ED Course as of 04/15/24 0747   Fri Mar 15, 2024   0501 Discussed with Dr. Thibodeaux.  Recommends general surgery consult for debridement. [RP]      ED Course User Index  [RP] Freddie Chapman MD               Medical Decision Making:   History:   I obtained history from: someone other than patient.       <> Summary of History: Collateral  from paramedics.  Old Medical Records: I decided to obtain old medical records.  Old Records Summarized: records from clinic visits, records from previous admission(s) and records from another hospital.       <> Summary of Records: Reviewed old records reviewed outside records, see HPI see MRI results  Initial Assessment:   Wound evaluation  Differential Diagnosis:   Judging by the patient's chief complaint and pertinent history, the patient has the following possible differential diagnoses, including but not limited to the following.  Some of these are deemed to be lower likelihood and some more likely based on my physical exam and history combined with possible lab work and/or imaging studies.   Please see the pertinent studies, and refer to the HPI.  Some of these diagnoses will take further evaluation to fully rule out, perhaps as an outpatient and the patient was encouraged to follow up when discharged for more comprehensive evaluation.      Osteo, abscess, septic arthritis, sepsis  Clinical Tests:   Lab Tests: Ordered and Reviewed  Radiological Study: Reviewed  ED Management:  Patient is a 69-year-old presents to the ED for concern for wound infection.  Outpatient MRI sore showed concern for osteomyelitis.  Started on broad-spectrum antibiotics.  All results discussed.  Discussed need for admission.  Patient verbalized understanding agreed to plan.  Discussed with medicine for admission.             Clinical Impression:  Final diagnoses:  [L89.159] Sacral decubitus ulcer (Primary)  [Z51.89] Visit for wound check  [Z48.89] Encounter for post surgical wound check  [M86.9] Osteomyelitis, unspecified site, unspecified type  [L89.620] Pressure ulcer, heel, left, unstageable  [L89.610] Pressure ulcer, heel, right, unstageable          ED Disposition Condition    Admit Stable                Freddie Chapman MD  04/15/24 0786

## 2024-03-15 NOTE — H&P
[facility] - Emergency Dept    History & Physical      Patient Name: Shaheen Christie  MRN: [id number]  Admission Date: 3/15/2024  Attending Physician: Enzo Thibodeaux MD  Primary Care Provider: Viktor Russ MD         Patient information was obtained from ER records.     Subjective:     Principal Problem:<principal problem not specified>    Chief Complaint:   Chief Complaint   Patient presents with    Wound Check     AASI from [facility] for sacral wound evaluation.         HPI:       AASI from [facility] for sacral wound evaluation.       Patient is a 69 year old male with a hx of COPD, DM, HTN, and neuropathy presents to the ED from [facility] via EMS for sacral wound evaluation. Pt was diagnosed with sacral decubitus with osteomyelitis on 2/27. Pt was trying to get into LTAC for wound care, but was denied. Pt  was then sent here for wound evaluation, antibiotics. Pt also has chronic bilateral LE wounds that he is being treated for. Pt denies any fever.     Past Medical History:   Diagnosis Date    COPD (chronic obstructive pulmonary disease)     Depression     Hypertension     Neuropathy        Past Surgical History:   Procedure Laterality Date    angiogram Bilateral     stents placed in left leg    anterior neck surgery      ARTHROTOMY OF ANKLE Left 6/28/2023    Procedure: ARTHROTOMY, ANKLE;  Surgeon: Tom Nichole DPM;  Location: [facility];  Service: Podiatry;  Laterality: Left;    cataracts Left     CHOLECYSTECTOMY      EYE SURGERY Left     HAND SURGERY Left     broken bone    herina repair      INCISION AND DRAINAGE, LOWER EXTREMITY Left 7/28/2023    Procedure: INCISION AND DRAINAGE, LOWER EXTREMITY;  Surgeon: Avinash Garces MD;  Location: [facility];  Service: Orthopedics;  Laterality: Left;  Supine, vascular bed, bone foam  last case  cysto tubing, laparoscopic trocar, experience, vanc, hemovac drain    KNEE SURGERY Bilateral     PERIPHERAL ANGIOGRAPHY N/A  3/13/2024    Procedure: Peripheral angiography;  Surgeon: Deven Queen MD;  Location: [facility] CATH LAB;  Service: Cardiology;  Laterality: N/A;  MICHELLE ANGIO W/ ANEST.    posterior neck surgery      SPINE SURGERY      TOTAL REPLACEMENT OF BOTH HIP JOINTS USING COMPUTER-ASSISTED NAVIGATION Bilateral        Review of patient's allergies indicates:  No Known Allergies    Current Facility-Administered Medications on File Prior to Encounter   Medication    diazePAM tablet 10 mg    diphenhydrAMINE capsule 50 mg     Current Outpatient Medications on File Prior to Encounter   Medication Sig    amLODIPine (NORVASC) 10 MG tablet Take 10 mg by mouth once daily.    ascorbic acid, vitamin C, (VITAMIN C) 500 MG tablet Take 500 mg by mouth 2 (two) times daily.    aspirin 81 MG Chew Take 81 mg by mouth once daily.    cilostazoL (PLETAL) 50 MG Tab Take 50 mg by mouth 2 (two) times daily.    clopidogreL (PLAVIX) 75 mg tablet Take 75 mg by mouth once daily.    docusate sodium (COLACE) 100 MG capsule Take 100 mg by mouth 2 (two) times daily.    DULoxetine (CYMBALTA) 30 MG capsule Take 30 mg by mouth once daily.    ferrous sulfate 325 (65 FE) MG EC tablet Take 325 mg by mouth once daily.    gabapentin (NEURONTIN) 300 MG capsule Take 1 capsule (300 mg total) by mouth 3 (three) times daily.    ibuprofen (ADVIL,MOTRIN) 800 MG tablet Take 800 mg by mouth 2 (two) times daily as needed for Pain.    LIDOcaine (LIDODERM) 5 % Place 1 patch onto the skin every 24 hours. Remove & Discard patch within 12 hours or as directed by MD    losartan (COZAAR) 100 MG tablet Take 100 mg by mouth once daily.    melatonin (MELATIN) 3 mg tablet Take 6 mg by mouth nightly as needed for Insomnia.    multivitamin (THERAGRAN) per tablet Take 1 tablet by mouth once daily.    oxyCODONE (ROXICODONE) 10 mg Tab immediate release tablet Take 10 mg by mouth every 4 (four) hours.    polyethylene glycol (GLYCOLAX) 17 gram PwPk Take 17 g by mouth once daily.    ZINC SULFATE ORAL  Take 50 mg by mouth once daily.    acetaminophen (TYLENOL) 325 MG tablet Take 650 mg by mouth every 6 (six) hours as needed for Pain.    albuterol (PROVENTIL/VENTOLIN HFA) 90 mcg/actuation inhaler Inhale into the lungs 4 (four) times daily as needed.    aluminum & magnesium hydroxide-simethicone (MYLANTA MAX STRENGTH) 400-400-40 mg/5 mL suspension Take by mouth every 6 (six) hours as needed for Indigestion.    collagenase (SANTYL) ointment Apply topically 2 (two) times a day.    doxycycline (VIBRAMYCIN) 100 MG Cap Take 100 mg by mouth 2 (two) times daily.    gabapentin (NEURONTIN) 600 MG tablet Take 600 mg by mouth every evening.    miconazole NITRATE 2 % (MICOTIN) 2 % top powder Apply topically 2 (two) times daily.    simethicone (MYLICON) 80 MG chewable tablet Take 80 mg by mouth every 6 (six) hours as needed for Flatulence.     Family History       Problem Relation (Age of Onset)    Cancer Father    Heart disease Mother    Lung disease Father          Tobacco Use    Smoking status: Former     Average packs/day: 0.5 packs/day for 16.2 years (7.5 ttl pk-yrs)     Types: Cigarettes     Start date: 2008    Smokeless tobacco: Never   Substance and Sexual Activity    Alcohol use: Yes     Alcohol/week: 3.0 - 6.0 standard drinks of alcohol     Types: 3 - 6 Cans of beer per week    Drug use: Yes     Types: Marijuana    Sexual activity: Not Currently     Review of Systems   All other systems reviewed and are negative.    Objective:     Vital Signs (Most Recent):  Temp: 98.5 °F (36.9 °C) (03/15/24 0702)  Pulse: 76 (03/15/24 0702)  Resp: 18 (03/15/24 0702)  BP: 133/62 (03/15/24 0702)  SpO2: 98 % (03/15/24 0702) Vital Signs (24h Range):  Temp:  [98.2 °F (36.8 °C)-98.5 °F (36.9 °C)] 98.5 °F (36.9 °C)  Pulse:  [68-86] 76  Resp:  [12-18] 18  SpO2:  [94 %-98 %] 98 %  BP: (121-150)/(57-74) 133/62     Weight: 68 kg (150 lb)  Body mass index is 22.15 kg/m².    Physical Exam  HENT:      Head: Normocephalic and atraumatic.      Right  Ear: External ear normal.      Left Ear: External ear normal.      Nose: Nose normal.      Mouth/Throat:      Mouth: Mucous membranes are dry.   Eyes:      Extraocular Movements: Extraocular movements intact.   Cardiovascular:      Rate and Rhythm: Normal rate and regular rhythm.      Pulses: Normal pulses.      Heart sounds: Normal heart sounds.   Pulmonary:      Effort: Pulmonary effort is normal.      Breath sounds: Normal breath sounds.   Abdominal:      General: Bowel sounds are normal.      Palpations: Abdomen is soft.   Musculoskeletal:      Cervical back: Neck supple.   Skin:     General: Skin is warm.   Neurological:      General: No focal deficit present.      Mental Status: He is alert. Mental status is at baseline.            Significant Labs: All pertinent labs within the past 24 hours have been reviewed.  CBC:   Recent Labs   Lab 03/15/24  0334   WBC 7.72   HGB 8.5*   HCT 28.7*   *     CMP:   Recent Labs   Lab 03/15/24  0334      K 3.9   CO2 24   BUN 13.1   CREATININE 0.65*   CALCIUM 8.4*   ALBUMIN 2.5*   BILITOT 0.2   ALKPHOS 103   AST 14   ALT 11       Significant Imaging: I have reviewed all pertinent imaging results/findings within the past 24 hours.   Repeat MRI shows worsening inflammation to the bones very worrisome for osteomyelitis.  Assessment/Plan:     There are no hospital problems to display for this patient.    VTE Risk Mitigation (From admission, onward)      None          Patient has been started on empiric antibiotics.  Surgery has been consulted for debridement and biopsies specifically of the bone.  I have consulted Infectious Disease for guidance in antibiotic stewardship.  I have consulted case management because this patient is going to need aggressive wound care and IV antibiotics for 6-8 weeks.  I will start him on Lovenox after the surgery for DVT prophylaxis.  Prognosis is guarded to poor.  This patient already failed aggressive treatment at the nursing home for  his decubitus ulcer.    Enzo Thibodeaux MD  Department of Hospital Medicine   [facility] - Emergency Dept

## 2024-03-15 NOTE — CONSULTS
Acute Care Surgery   History and Physical    Patient Name: Shaheen Christie  YOB: 1955  Date: 03/15/2024 8:20 AM  Date of Admission: 3/15/2024  HD#0  POD#* No surgery found *    PRESENTING HISTORY   Chief Complaint/Reason for Admission: sacral decubitus ulcer     History of Present Illness: 69M w/PMH COPD, HTN, Neuropathy, CAD, PAD w/chronic foot wounds, s/p L distal SFA to popliteal artery stent placement on ASA and Plavix. Pt presented from H with osteomyelitis of sacral decubitus wound seen on MRI 2/27. Patient has been receiving wound care for sacral wound, however reports difficulty with tolerating turning for dressing changes. Patient also reports chronic bilateral lower extremity wounds. Denies f/c/n/v/sob/abd pain.       Review of Systems:  12 point ROS negative except as stated in HPI    PAST HISTORY:   Past medical history:  Past Medical History:   Diagnosis Date    COPD (chronic obstructive pulmonary disease)     Depression     Hypertension     Neuropathy        Past surgical history:  Past Surgical History:   Procedure Laterality Date    angiogram Bilateral     stents placed in left leg    anterior neck surgery      ARTHROTOMY OF ANKLE Left 6/28/2023    Procedure: ARTHROTOMY, ANKLE;  Surgeon: Tom Nichole DPM;  Location: [facility] OR;  Service: Podiatry;  Laterality: Left;    cataracts Left     CHOLECYSTECTOMY      EYE SURGERY Left     HAND SURGERY Left     broken bone    herina repair      INCISION AND DRAINAGE, LOWER EXTREMITY Left 7/28/2023    Procedure: INCISION AND DRAINAGE, LOWER EXTREMITY;  Surgeon: Avinash Garces MD;  Location: [facility] OR;  Service: Orthopedics;  Laterality: Left;  Supine, vascular bed, bone foam  last case  cysto tubing, laparoscopic trocar, experience, vanc, hemovac drain    KNEE SURGERY Bilateral     PERIPHERAL ANGIOGRAPHY N/A 3/13/2024    Procedure: Peripheral angiography;  Surgeon: Deven Queen MD;  Location: [facility] CATH LAB;  Service: Cardiology;   Laterality: N/A;  MICHELLE ANGIO W/ ANEST.    posterior neck surgery      SPINE SURGERY      TOTAL REPLACEMENT OF BOTH HIP JOINTS USING COMPUTER-ASSISTED NAVIGATION Bilateral        Family history:  Family History   Problem Relation Age of Onset    Heart disease Mother     Cancer Father     Lung disease Father        Social history:  Social History     Socioeconomic History    Marital status:    Occupational History    Occupation: retired   Tobacco Use    Smoking status: Former     Average packs/day: 0.5 packs/day for 16.2 years (7.5 ttl pk-yrs)     Types: Cigarettes     Start date: 2008    Smokeless tobacco: Never   Substance and Sexual Activity    Alcohol use: Yes     Alcohol/week: 3.0 - 6.0 standard drinks of alcohol     Types: 3 - 6 Cans of beer per week    Drug use: Yes     Types: Marijuana    Sexual activity: Not Currently     Social History     Tobacco Use   Smoking Status Former    Average packs/day: 0.5 packs/day for 16.2 years (7.5 ttl pk-yrs)    Types: Cigarettes    Start date: 2008   Smokeless Tobacco Never      Social History     Substance and Sexual Activity   Alcohol Use Yes    Alcohol/week: 3.0 - 6.0 standard drinks of alcohol    Types: 3 - 6 Cans of beer per week        MEDICATIONS & ALLERGIES:     Current Facility-Administered Medications on File Prior to Encounter   Medication    diazePAM tablet 10 mg    diphenhydrAMINE capsule 50 mg     Current Outpatient Medications on File Prior to Encounter   Medication Sig    amLODIPine (NORVASC) 10 MG tablet Take 10 mg by mouth once daily.    ascorbic acid, vitamin C, (VITAMIN C) 500 MG tablet Take 500 mg by mouth 2 (two) times daily.    aspirin 81 MG Chew Take 81 mg by mouth once daily.    cilostazoL (PLETAL) 50 MG Tab Take 50 mg by mouth 2 (two) times daily.    clopidogreL (PLAVIX) 75 mg tablet Take 75 mg by mouth once daily.    docusate sodium (COLACE) 100 MG capsule Take 100 mg by mouth 2 (two) times daily.    DULoxetine (CYMBALTA) 30 MG capsule Take  30 mg by mouth once daily.    ferrous sulfate 325 (65 FE) MG EC tablet Take 325 mg by mouth once daily.    gabapentin (NEURONTIN) 300 MG capsule Take 1 capsule (300 mg total) by mouth 3 (three) times daily.    ibuprofen (ADVIL,MOTRIN) 800 MG tablet Take 800 mg by mouth 2 (two) times daily as needed for Pain.    LIDOcaine (LIDODERM) 5 % Place 1 patch onto the skin every 24 hours. Remove & Discard patch within 12 hours or as directed by MD    losartan (COZAAR) 100 MG tablet Take 100 mg by mouth once daily.    melatonin (MELATIN) 3 mg tablet Take 6 mg by mouth nightly as needed for Insomnia.    multivitamin (THERAGRAN) per tablet Take 1 tablet by mouth once daily.    oxyCODONE (ROXICODONE) 10 mg Tab immediate release tablet Take 10 mg by mouth every 4 (four) hours.    polyethylene glycol (GLYCOLAX) 17 gram PwPk Take 17 g by mouth once daily.    ZINC SULFATE ORAL Take 50 mg by mouth once daily.    acetaminophen (TYLENOL) 325 MG tablet Take 650 mg by mouth every 6 (six) hours as needed for Pain.    albuterol (PROVENTIL/VENTOLIN HFA) 90 mcg/actuation inhaler Inhale into the lungs 4 (four) times daily as needed.    aluminum & magnesium hydroxide-simethicone (MYLANTA MAX STRENGTH) 400-400-40 mg/5 mL suspension Take by mouth every 6 (six) hours as needed for Indigestion.    collagenase (SANTYL) ointment Apply topically 2 (two) times a day.    doxycycline (VIBRAMYCIN) 100 MG Cap Take 100 mg by mouth 2 (two) times daily.    gabapentin (NEURONTIN) 600 MG tablet Take 600 mg by mouth every evening.    miconazole NITRATE 2 % (MICOTIN) 2 % top powder Apply topically 2 (two) times daily.    simethicone (MYLICON) 80 MG chewable tablet Take 80 mg by mouth every 6 (six) hours as needed for Flatulence.       Allergies: Review of patient's allergies indicates:  No Known Allergies    Scheduled Meds:    Continuous Infusions:    PRN Meds:    OBJECTIVE:   Vital Signs:  VITAL SIGNS: 24 HR MIN & MAX LAST   Temp  Min: 98.2 °F (36.8 °C)  Max:  "98.5 °F (36.9 °C)  98.5 °F (36.9 °C)   BP  Min: 121/57  Max: 150/74  133/62    Pulse  Min: 68  Max: 86  76    Resp  Min: 12  Max: 18  18    SpO2  Min: 94 %  Max: 98 %  98 %      HT: 5' 9" (175.3 cm)  WT: 68 kg (150 lb)  BMI: 22.1     I/O last 3 completed shifts:  In: -   Out: 300 [Urine:300]  I/O this shift:  In: 463.7 [IV Piggyback:463.7]  Out: -     Physical Exam:  General: Well developed, well nourished, no acute distress  HEENT: Normocephalic, atraumatic, PERRL  CV: RR  Resp: NWOB  GI:  Abdomen soft, non-tender, non-distended, no guarding, no rebound, normoactive bowel sounds, no masses  :  Deferred  MSK: No muscle atrophy, cyanosis, ble wounds  Skin/wounds:  Sacral decubitus wound with exposed muscle and presacral fascia w/o signs of necrotic tissue  Neuro:  CNII-XII grossly intact, alert and oriented to person, place, and time    Labs:  Troponin:  No results for input(s): "TROPONINI" in the last 72 hours.  CBC:  Recent Labs     03/15/24  0334   WBC 7.72   RBC 3.25*   HGB 8.5*   HCT 28.7*   *   MCV 88.3   MCH 26.2*   MCHC 29.6*     CMP:  Recent Labs     03/15/24  0334   CALCIUM 8.4*   ALBUMIN 2.5*      K 3.9   CO2 24   BUN 13.1   CREATININE 0.65*   ALKPHOS 103   ALT 11   AST 14   BILITOT 0.2     Lactic Acid:  No results for input(s): "LACTATE" in the last 72 hours.  ETOH:  No results for input(s): "ETHANOL" in the last 72 hours.   Urine Drug Screen:  No results for input(s): "COCAINE", "OPIATE", "BARBITURATE", "AMPHETAMINE", "FENTANYL", "CANNABINOIDS", "MDMA" in the last 72 hours.    Invalid input(s): "BENZODIAZEPINE", "PHENCYCLIDINE"   ABG:  No results for input(s): "PH", "PCO2", "PO2", "HCO3", "BE", "POCSATURATED" in the last 72 hours.    Diagnostic Results:  No results found in the last 24 hours.  No orders to display       ASSESSMENT & PLAN:      Plan: 69M w/PMH COPD, HTN, Neuropathy, CAD, PAD w/chronic foot wounds, s/p L distal SFA to popliteal artery stent placement on ASA and Plavix. " Surgery has been consulted for evaluation of sacral decubitus wound c/b imaging findings c/w osteomyelitis.    - no acute surgical intervention recommended at this time   - Agree w/primary team continuation of garfield hinojosa   - recommend wound care consult  - rest of care per primary  - please call surgery team with any further questions or concerns    Estefanía Hahn MD  [facility] General Surgery, PGY-1      The above findings, diagnostics, and treatment plan were discussed with the physician who will follow with further assessments and recommendations.

## 2024-03-15 NOTE — Clinical Note
Diagnosis: Sacral decubitus ulcer [[id number]]   Future Attending Provider: PAULETTE YOUSIF [[id number]]   Admit to which facility:: [facility] [[id number]]   Reason for IP Medical Treatment  (Clinical interventions that can only be accomplished in the IP setting? ) :: Sacral decubitus ulcer   I certify that Inpatient services for greater than or equal to 2 midnights are medically necessary:: Yes   Plans for Post-Acute care--if anticipated (pick the single best option):: A. No post acute care anticipated at this time

## 2024-03-15 NOTE — NURSING
Noted. Pt updated via e-advice   Nurses Note -- 4 Eyes      3/15/2024   4:10 PM      Skin assessed during: Admit      [] No Altered Skin Integrity Present    []Prevention Measures Documented      [] Yes- Altered Skin Integrity Present or Discovered   [] LDA Added if Not in Epic (Describe Wound)   [] New Altered Skin Integrity was Present on Admit and Documented in LDA   [] Wound Image Taken    Wound Care Consulted? Yes    Attending Nurse:  Zaira Ramires RN/Staff Member:   Adry Apodaca

## 2024-03-16 LAB
OHS QRS DURATION: 118 MS
OHS QTC CALCULATION: 418 MS

## 2024-03-16 PROCEDURE — 21400001 HC TELEMETRY ROOM

## 2024-03-16 PROCEDURE — 63600175 PHARM REV CODE 636 W HCPCS: Performed by: INTERNAL MEDICINE

## 2024-03-16 PROCEDURE — 25000003 PHARM REV CODE 250: Performed by: INTERNAL MEDICINE

## 2024-03-16 PROCEDURE — 11000001 HC ACUTE MED/SURG PRIVATE ROOM

## 2024-03-16 RX ADMIN — OXYCODONE HYDROCHLORIDE 10 MG: 10 TABLET ORAL at 10:03

## 2024-03-16 RX ADMIN — GABAPENTIN 300 MG: 300 CAPSULE ORAL at 02:03

## 2024-03-16 RX ADMIN — HYDROCODONE BITARTRATE AND ACETAMINOPHEN 1 TABLET: 5; 325 TABLET ORAL at 12:03

## 2024-03-16 RX ADMIN — FERROUS SULFATE TAB 325 MG (65 MG ELEMENTAL FE) 1 EACH: 325 (65 FE) TAB at 10:03

## 2024-03-16 RX ADMIN — CILOSTAZOL 50 MG: 50 TABLET ORAL at 09:03

## 2024-03-16 RX ADMIN — GABAPENTIN 300 MG: 300 CAPSULE ORAL at 09:03

## 2024-03-16 RX ADMIN — OXYCODONE HYDROCHLORIDE 10 MG: 10 TABLET ORAL at 06:03

## 2024-03-16 RX ADMIN — DOCUSATE SODIUM 100 MG: 100 CAPSULE, LIQUID FILLED ORAL at 09:03

## 2024-03-16 RX ADMIN — GABAPENTIN 600 MG: 300 CAPSULE ORAL at 09:03

## 2024-03-16 RX ADMIN — DULOXETINE HYDROCHLORIDE 30 MG: 30 CAPSULE, DELAYED RELEASE ORAL at 10:03

## 2024-03-16 RX ADMIN — DOCUSATE SODIUM 100 MG: 100 CAPSULE, LIQUID FILLED ORAL at 10:03

## 2024-03-16 RX ADMIN — OXYCODONE HYDROCHLORIDE 10 MG: 10 TABLET ORAL at 01:03

## 2024-03-16 RX ADMIN — LIDOCAINE 5% 1 PATCH: 700 PATCH TOPICAL at 11:03

## 2024-03-16 RX ADMIN — OXYCODONE HYDROCHLORIDE 10 MG: 10 TABLET ORAL at 02:03

## 2024-03-16 RX ADMIN — GABAPENTIN 300 MG: 300 CAPSULE ORAL at 10:03

## 2024-03-16 RX ADMIN — CILOSTAZOL 50 MG: 50 TABLET ORAL at 10:03

## 2024-03-16 RX ADMIN — AMLODIPINE BESYLATE 10 MG: 5 TABLET ORAL at 10:03

## 2024-03-16 RX ADMIN — CLOPIDOGREL BISULFATE 75 MG: 75 TABLET ORAL at 10:03

## 2024-03-16 RX ADMIN — ENOXAPARIN SODIUM 40 MG: 40 INJECTION SUBCUTANEOUS at 04:03

## 2024-03-16 RX ADMIN — LOSARTAN POTASSIUM 100 MG: 50 TABLET, FILM COATED ORAL at 10:03

## 2024-03-16 RX ADMIN — OXYCODONE HYDROCHLORIDE 10 MG: 10 TABLET ORAL at 09:03

## 2024-03-16 RX ADMIN — ASPIRIN 81 MG CHEWABLE TABLET 81 MG: 81 TABLET CHEWABLE at 10:03

## 2024-03-16 RX ADMIN — POLYETHYLENE GLYCOL 3350 17 G: 17 POWDER, FOR SOLUTION ORAL at 10:03

## 2024-03-16 RX ADMIN — THERA TABS 1 TABLET: TAB at 10:03

## 2024-03-16 NOTE — PROGRESS NOTES
[facility] - 8th Floor [facility]  Progress Note    Patient Name: Shaheen Christie  MRN: [id number]  Patient Class: IP- Inpatient   Admission Date: 3/15/2024  Length of Stay: 1 days  Attending Physician: Enzo Thibodeaux MD  Primary Care Provider: Viktor Russ MD        Subjective:     Principal Problem:Osteomyelitis    Interval History: 69 year old male usually followed at [facility] with hx of COPD, DM, HTN and neuropathy recently diagnosed with sacral wound osteomyelitis.  Unsuccessful in getting admitted to LTAC so sent to ER and admitted for eval and abx.  Surgery was consulted and no intervention required at present.  ID not yet evaluated, so antibiotics not yet started.  Patient is comfortable at present.    Review of Systems   Constitutional: Negative.    HENT: Negative.     Eyes: Negative.    Respiratory: Negative.     Cardiovascular: Negative.    Gastrointestinal: Negative.    Endocrine: Negative.    Genitourinary: Negative.    Musculoskeletal:         Leg weakness and pain in knees.  Has had multiple knee surgeries, and unable to walk at present.   Allergic/Immunologic: Negative.    Neurological: Negative.    Hematological: Negative.    Psychiatric/Behavioral: Negative.       Objective:     Vital Signs (Most Recent):  Temp: 98.8 °F (37.1 °C) (03/16/24 0724)  Pulse: 69 (03/16/24 0724)  Resp: 18 (03/16/24 0724)  BP: 121/60 (03/16/24 0724)  SpO2: 97 % (03/16/24 0724) Vital Signs (24h Range):  Temp:  [97.6 °F (36.4 °C)-98.8 °F (37.1 °C)] 98.8 °F (37.1 °C)  Pulse:  [60-84] 69  Resp:  [16-18] 18  SpO2:  [95 %-99 %] 97 %  BP: ()/(48-65) 121/60     Weight: 68 kg (150 lb)  Body mass index is 22.15 kg/m².    Intake/Output Summary (Last 24 hours) at 3/16/2024 0852  Last data filed at 3/16/2024 0455  Gross per 24 hour   Intake 1320 ml   Output 800 ml   Net 520 ml      Physical Exam  Vitals reviewed.   Constitutional:       Appearance: Normal  appearance.   HENT:      Head: Normocephalic and atraumatic.      Right Ear: Tympanic membrane normal.      Left Ear: Tympanic membrane normal.      Nose: Nose normal.      Mouth/Throat:      Pharynx: Oropharynx is clear.   Eyes:      Extraocular Movements: Extraocular movements intact.      Pupils: Pupils are equal, round, and reactive to light.   Cardiovascular:      Rate and Rhythm: Normal rate and regular rhythm.      Heart sounds: Normal heart sounds.   Pulmonary:      Effort: Pulmonary effort is normal.   Abdominal:      General: Abdomen is flat. Bowel sounds are normal.      Palpations: Abdomen is soft.   Musculoskeletal:         General: Normal range of motion.      Cervical back: Normal range of motion and neck supple.   Skin:     Comments: Sacral decubitus ulcer dressed at present.   Neurological:      General: No focal deficit present.      Mental Status: He is alert and oriented to person, place, and time.   Psychiatric:         Mood and Affect: Mood normal.           Overview/Hospital Course: sacral wound with osteomyelitis, awaiting ID to recommend Abx therapyl     Significant Labs: All pertinent labs within the past 24 hours have been reviewed.  CBC:   Recent Labs   Lab 03/15/24  0334   WBC 7.72   HGB 8.5*   HCT 28.7*   *     CMP:   Recent Labs   Lab 03/15/24  0334      K 3.9   CO2 24   BUN 13.1   CREATININE 0.65*   CALCIUM 8.4*   ALBUMIN 2.5*   BILITOT 0.2   ALKPHOS 103   AST 14   ALT 11       Significant Imaging: reviewed.    Assessment/Plan:      Active Diagnoses:    Diagnosis Date Noted POA    PRINCIPAL PROBLEM:  Osteomyelitis [M86.9] 03/15/2024 Unknown      Problems Resolved During this Admission:     VTE Risk Mitigation (From admission, onward)           Ordered     enoxaparin injection 40 mg  Daily         03/15/24 0934                       CHELY Arango MD  Department of Hospital Medicine   [facility] - 8th Floor Med Surg

## 2024-03-17 LAB
ALBUMIN SERPL-MCNC: 2.3 G/DL (ref 3.4–4.8)
ALBUMIN/GLOB SERPL: 0.8 RATIO (ref 1.1–2)
ALP SERPL-CCNC: 101 UNIT/L (ref 40–150)
ALT SERPL-CCNC: 10 UNIT/L (ref 0–55)
AST SERPL-CCNC: 9 UNIT/L (ref 5–34)
BASOPHILS # BLD AUTO: 0.11 X10(3)/MCL
BASOPHILS NFR BLD AUTO: 1.4 %
BILIRUB SERPL-MCNC: 0.2 MG/DL
BUN SERPL-MCNC: 8.1 MG/DL (ref 8.4–25.7)
CALCIUM SERPL-MCNC: 8.4 MG/DL (ref 8.8–10)
CHLORIDE SERPL-SCNC: 106 MMOL/L (ref 98–107)
CO2 SERPL-SCNC: 28 MMOL/L (ref 23–31)
CREAT SERPL-MCNC: 0.63 MG/DL (ref 0.73–1.18)
EOSINOPHIL # BLD AUTO: 0.68 X10(3)/MCL (ref 0–0.9)
EOSINOPHIL NFR BLD AUTO: 8.8 %
ERYTHROCYTE [DISTWIDTH] IN BLOOD BY AUTOMATED COUNT: 15.9 % (ref 11.5–17)
GFR SERPLBLD CREATININE-BSD FMLA CKD-EPI: >60 MLS/MIN/1.73/M2
GLOBULIN SER-MCNC: 3 GM/DL (ref 2.4–3.5)
GLUCOSE SERPL-MCNC: 107 MG/DL (ref 82–115)
HCT VFR BLD AUTO: 27.4 % (ref 42–52)
HGB BLD-MCNC: 8 G/DL (ref 14–18)
IMM GRANULOCYTES # BLD AUTO: 0.04 X10(3)/MCL (ref 0–0.04)
IMM GRANULOCYTES NFR BLD AUTO: 0.5 %
LYMPHOCYTES # BLD AUTO: 1.76 X10(3)/MCL (ref 0.6–4.6)
LYMPHOCYTES NFR BLD AUTO: 22.9 %
MCH RBC QN AUTO: 25.6 PG (ref 27–31)
MCHC RBC AUTO-ENTMCNC: 29.2 G/DL (ref 33–36)
MCV RBC AUTO: 87.5 FL (ref 80–94)
MONOCYTES # BLD AUTO: 0.61 X10(3)/MCL (ref 0.1–1.3)
MONOCYTES NFR BLD AUTO: 7.9 %
NEUTROPHILS # BLD AUTO: 4.49 X10(3)/MCL (ref 2.1–9.2)
NEUTROPHILS NFR BLD AUTO: 58.5 %
NRBC BLD AUTO-RTO: 0 %
PLATELET # BLD AUTO: 387 X10(3)/MCL (ref 130–400)
PMV BLD AUTO: 8.2 FL (ref 7.4–10.4)
POTASSIUM SERPL-SCNC: 4.4 MMOL/L (ref 3.5–5.1)
PROT SERPL-MCNC: 5.3 GM/DL (ref 5.8–7.6)
RBC # BLD AUTO: 3.13 X10(6)/MCL (ref 4.7–6.1)
SODIUM SERPL-SCNC: 138 MMOL/L (ref 136–145)
WBC # SPEC AUTO: 7.69 X10(3)/MCL (ref 4.5–11.5)

## 2024-03-17 PROCEDURE — 85025 COMPLETE CBC W/AUTO DIFF WBC: CPT | Performed by: INTERNAL MEDICINE

## 2024-03-17 PROCEDURE — 80053 COMPREHEN METABOLIC PANEL: CPT | Performed by: FAMILY MEDICINE

## 2024-03-17 PROCEDURE — 63600175 PHARM REV CODE 636 W HCPCS: Performed by: INTERNAL MEDICINE

## 2024-03-17 PROCEDURE — 21400001 HC TELEMETRY ROOM

## 2024-03-17 PROCEDURE — 11000001 HC ACUTE MED/SURG PRIVATE ROOM

## 2024-03-17 PROCEDURE — 25000003 PHARM REV CODE 250: Performed by: FAMILY MEDICINE

## 2024-03-17 PROCEDURE — 63600175 PHARM REV CODE 636 W HCPCS: Performed by: FAMILY MEDICINE

## 2024-03-17 PROCEDURE — 25000003 PHARM REV CODE 250: Performed by: INTERNAL MEDICINE

## 2024-03-17 RX ORDER — VANCOMYCIN HCL IN 5 % DEXTROSE 1G/250ML
1000 PLASTIC BAG, INJECTION (ML) INTRAVENOUS
Status: DISCONTINUED | OUTPATIENT
Start: 2024-03-17 | End: 2024-03-20

## 2024-03-17 RX ORDER — OXYCODONE HYDROCHLORIDE 5 MG/1
5 TABLET ORAL EVERY 4 HOURS
Status: DISCONTINUED | OUTPATIENT
Start: 2024-03-17 | End: 2024-04-02

## 2024-03-17 RX ADMIN — GABAPENTIN 300 MG: 300 CAPSULE ORAL at 09:03

## 2024-03-17 RX ADMIN — ASPIRIN 81 MG CHEWABLE TABLET 81 MG: 81 TABLET CHEWABLE at 09:03

## 2024-03-17 RX ADMIN — DOCUSATE SODIUM 100 MG: 100 CAPSULE, LIQUID FILLED ORAL at 09:03

## 2024-03-17 RX ADMIN — GABAPENTIN 600 MG: 300 CAPSULE ORAL at 09:03

## 2024-03-17 RX ADMIN — SODIUM CHLORIDE 500 ML: 9 INJECTION, SOLUTION INTRAVENOUS at 08:03

## 2024-03-17 RX ADMIN — ENOXAPARIN SODIUM 40 MG: 40 INJECTION SUBCUTANEOUS at 05:03

## 2024-03-17 RX ADMIN — OXYCODONE HYDROCHLORIDE 10 MG: 10 TABLET ORAL at 06:03

## 2024-03-17 RX ADMIN — CLOPIDOGREL BISULFATE 75 MG: 75 TABLET ORAL at 09:03

## 2024-03-17 RX ADMIN — OXYCODONE HYDROCHLORIDE 5 MG: 5 TABLET ORAL at 01:03

## 2024-03-17 RX ADMIN — LIDOCAINE 5% 1 PATCH: 700 PATCH TOPICAL at 10:03

## 2024-03-17 RX ADMIN — FERROUS SULFATE TAB 325 MG (65 MG ELEMENTAL FE) 1 EACH: 325 (65 FE) TAB at 09:03

## 2024-03-17 RX ADMIN — PIPERACILLIN SODIUM AND TAZOBACTAM SODIUM 4.5 G: 4; .5 INJECTION, POWDER, LYOPHILIZED, FOR SOLUTION INTRAVENOUS at 05:03

## 2024-03-17 RX ADMIN — VANCOMYCIN HYDROCHLORIDE 1000 MG: 1 INJECTION, POWDER, LYOPHILIZED, FOR SOLUTION INTRAVENOUS at 09:03

## 2024-03-17 RX ADMIN — OXYCODONE HYDROCHLORIDE 10 MG: 10 TABLET ORAL at 02:03

## 2024-03-17 RX ADMIN — CILOSTAZOL 50 MG: 50 TABLET ORAL at 09:03

## 2024-03-17 RX ADMIN — VANCOMYCIN HYDROCHLORIDE 1750 MG: 500 INJECTION, POWDER, LYOPHILIZED, FOR SOLUTION INTRAVENOUS at 10:03

## 2024-03-17 RX ADMIN — OXYCODONE HYDROCHLORIDE 5 MG: 5 TABLET ORAL at 05:03

## 2024-03-17 RX ADMIN — THERA TABS 1 TABLET: TAB at 09:03

## 2024-03-17 RX ADMIN — PIPERACILLIN SODIUM AND TAZOBACTAM SODIUM 4.5 G: 4; .5 INJECTION, POWDER, LYOPHILIZED, FOR SOLUTION INTRAVENOUS at 09:03

## 2024-03-17 RX ADMIN — GABAPENTIN 300 MG: 300 CAPSULE ORAL at 03:03

## 2024-03-17 RX ADMIN — DULOXETINE HYDROCHLORIDE 30 MG: 30 CAPSULE, DELAYED RELEASE ORAL at 09:03

## 2024-03-17 NOTE — PROGRESS NOTES
[facility] - 8th Floor [facility] Surg  [facility] Medicine  Progress Note    Patient Name: Shaheen Christie  MRN: [id number]  Patient Class: IP- Inpatient   Admission Date: 3/15/2024  Length of Stay: 2 days  Attending Physician: Enzo Thibodeaux MD  Primary Care Provider: Viktor Russ MD        Subjective:     Principal Problem:Osteomyelitis    Interval History: 69 year old male with hx of COPD,   Dm, HTN and neuropathy  and usually followed at [facility] the [facility] admitted through the ER with a sacral decubitus and recently diagnosed with osteomyelitis in this ulcer.  Patient will need long term IV antibiotics and wound care for wound.  Surgery was consulted and no intervention was recommended.  ID was consulted, but not yet responded, so patient will be placed on Zosyn and Vancomycin and this can be adjusted at their discretion.  Patient is alert and oriented without complaint today and is eating and drinking well.  His blood pressure is a little low this AM, so his BP meds will be held this AM, and he will be closely monitored.                                                  Review of Systems   Constitutional: Negative.    HENT: Negative.     Eyes: Negative.    Respiratory: Negative.     Cardiovascular: Negative.    Gastrointestinal: Negative.    Endocrine: Negative.    Genitourinary: Negative.    Musculoskeletal:         Patient says he cannot walk, and that this started with OA of both knees.  He has has surgeries on both knees, and being bed bound is what caused decubitus in the first place.   Allergic/Immunologic: Negative.    Neurological:         Has neuropathy.   Hematological: Negative.    Psychiatric/Behavioral: Negative.       Objective:     Vital Signs (Most Recent):  Temp: 98.7 °F (37.1 °C) (03/17/24 0755)  Pulse: 79 (03/17/24 0756)  Resp: 18 (03/17/24 0755)  BP: (!) 102/47 (03/17/24 0756)  SpO2: 96 % (03/17/24 0756) Vital Signs (24h Range):  Temp:  [97.8 °F (36.6 °C)-98.7 °F  "(37.1 °C)] 98.7 °F (37.1 °C)  Pulse:  [69-79] 79  Resp:  [16-20] 18  SpO2:  [94 %-98 %] 96 %  BP: ()/(45-70) 102/47     Weight: 68 kg (150 lb)  Body mass index is 22.15 kg/m².    Intake/Output Summary (Last 24 hours) at 3/17/2024 0812  Last data filed at 3/16/2024 2244  Gross per 24 hour   Intake 840 ml   Output 1300 ml   Net -460 ml      Physical Exam  Vitals reviewed.   Constitutional:       Appearance: Normal appearance.   HENT:      Head: Normocephalic and atraumatic.      Right Ear: Tympanic membrane normal.      Left Ear: Tympanic membrane normal.      Nose: Nose normal.      Mouth/Throat:      Pharynx: Oropharynx is clear.   Eyes:      Extraocular Movements: Extraocular movements intact.      Pupils: Pupils are equal, round, and reactive to light.   Cardiovascular:      Rate and Rhythm: Normal rate and regular rhythm.      Heart sounds: Normal heart sounds.   Pulmonary:      Effort: Pulmonary effort is normal.      Breath sounds: Normal breath sounds.   Abdominal:      General: Abdomen is flat. Bowel sounds are normal.      Palpations: Abdomen is soft.   Musculoskeletal:         General: Normal range of motion.      Cervical back: Normal range of motion and neck supple.   Skin:     Comments: Large sacral decubitus ulcer, dressed at present.   Neurological:      Mental Status: He is alert. Mental status is at baseline.   Psychiatric:         Mood and Affect: Mood normal.         Behavior: Behavior normal.           Overview/Hospital Course: Sacral wound with osteomyelitis, antibiotics started, wound care eval pendings.    Significant Labs: All pertinent labs within the past 24 hours have been reviewed.  CBC: No results for input(s): "WBC", "HGB", "HCT", "PLT" in the last 48 hours.  CMP: No results for input(s): "NA", "K", "CL", "CO2", "GLU", "BUN", "CREATININE", "CALCIUM", "PROT", "ALBUMIN", "BILITOT", "ALKPHOS", "AST", "ALT", "ANIONGAP", "EGFRNONAA" in the last 48 hours.    Invalid input(s): " ""ESTGFAFR[place]"    Significant Imaging: Reviewed.    Assessment/Plan:      Active Diagnoses:    Diagnosis Date Noted POA    PRINCIPAL PROBLEM:  Osteomyelitis [M86.9] 03/15/2024 Unknown      Problems Resolved During this Admission:     VTE Risk Mitigation (From admission, onward)           Ordered     enoxaparin injection 40 mg  Daily         03/15/24 0934                       CHELY Arango MD  Department of Hospital Medicine   [facility] - 8th Floor Med Surg    "

## 2024-03-17 NOTE — PROGRESS NOTES
"Pharmacokinetic Initial Assessment: IV Vancomycin    Assessment/Plan:    Initiate intravenous vancomycin with loading dose of 1750 mg once followed by a maintenance dose of vancomycin 1000mg IV every 12 hours  Desired empiric serum trough concentration is 15 to 20 mcg/mL  Draw vancomycin trough level 60 min prior to 3rs dose on 03/18 at approximately 0900.  Pharmacy will continue to follow and monitor vancomycin.      Please contact pharmacy at extension [phone] with any questions regarding this assessment.     Thank you for the consult,   Elan Boston       Patient brief summary:  Shaheen Christie is a 69 y.o. male initiated on antimicrobial therapy with IV Vancomycin for treatment of suspected sepsis    Drug Allergies:   Review of patient's allergies indicates:  No Known Allergies    Actual Body Weight:   68kg    Renal Function:   Estimated Creatinine Clearance: 103.4 mL/min (A) (based on SCr of 0.63 mg/dL (L)).,     Dialysis Method (if applicable):  N/A    CBC (last 72 hours):  Recent Labs   Lab Result Units 03/15/24  0334 03/17/24  0835   WBC x10(3)/mcL 7.72 7.69   Hgb g/dL 8.5* 8.0*   Hct % 28.7* 27.4*   Platelet x10(3)/mcL 449* 387   Mono % % 8.4 7.9   Eos % % 8.2 8.8   Basophil % % 1.6 1.4       Metabolic Panel (last 72 hours):  Recent Labs   Lab Result Units 03/15/24  0334 03/17/24  0835   Sodium Level mmol/L 140 138   Potassium Level mmol/L 3.9 4.4   Chloride mmol/L 109* 106   Carbon Dioxide mmol/L 24 28   Glucose Level mg/dL 97 107   Blood Urea Nitrogen mg/dL 13.1 8.1*   Creatinine mg/dL 0.65* 0.63*   Albumin Level g/dL 2.5* 2.3*   Bilirubin Total mg/dL 0.2 0.2   Alkaline Phosphatase unit/L 103 101   Aspartate Aminotransferase unit/L 14 9   Alanine Aminotransferase unit/L 11 10       Drug levels (last 3 results):  No results for input(s): "VANCOMYCINRA", "VANCORANDOM", "VANCOMYCINPE", "VANCOPEAK", "VANCOMYCINTR", "VANCOTROUGH" in the last 72 hours.    Microbiologic Results:  Microbiology Results (last 7 days)  "      Procedure Component Value Units Date/Time    Blood culture #1 **CANNOT BE ORDERED STAT** [[id number]]  (Normal) Collected: 03/15/24 0353    Order Status: Completed Specimen: Blood from Arm, Right Updated: 03/17/24 0500     CULTURE, BLOOD ([facility]) No Growth At 48 Hours    Blood culture #2 **CANNOT BE ORDERED STAT** [[id number]]  (Normal) Collected: 03/15/24 0353    Order Status: Completed Specimen: Blood from Arm, Left Updated: 03/17/24 0500     CULTURE, BLOOD ([facility]) No Growth At 48 Hours

## 2024-03-18 LAB — VANCOMYCIN TROUGH SERPL-MCNC: 17.1 UG/ML (ref 15–20)

## 2024-03-18 PROCEDURE — 63600175 PHARM REV CODE 636 W HCPCS: Performed by: FAMILY MEDICINE

## 2024-03-18 PROCEDURE — 21400001 HC TELEMETRY ROOM

## 2024-03-18 PROCEDURE — 94760 N-INVAS EAR/PLS OXIMETRY 1: CPT

## 2024-03-18 PROCEDURE — 25000003 PHARM REV CODE 250: Performed by: FAMILY MEDICINE

## 2024-03-18 PROCEDURE — 80202 ASSAY OF VANCOMYCIN: CPT | Performed by: INTERNAL MEDICINE

## 2024-03-18 PROCEDURE — 11000001 HC ACUTE MED/SURG PRIVATE ROOM

## 2024-03-18 PROCEDURE — 86140 C-REACTIVE PROTEIN: CPT | Performed by: INTERNAL MEDICINE

## 2024-03-18 PROCEDURE — 63600175 PHARM REV CODE 636 W HCPCS: Performed by: INTERNAL MEDICINE

## 2024-03-18 PROCEDURE — 25000003 PHARM REV CODE 250: Performed by: INTERNAL MEDICINE

## 2024-03-18 RX ADMIN — CILOSTAZOL 50 MG: 50 TABLET ORAL at 10:03

## 2024-03-18 RX ADMIN — OXYCODONE HYDROCHLORIDE 5 MG: 5 TABLET ORAL at 06:03

## 2024-03-18 RX ADMIN — THERA TABS 1 TABLET: TAB at 10:03

## 2024-03-18 RX ADMIN — PIPERACILLIN SODIUM AND TAZOBACTAM SODIUM 4.5 G: 4; .5 INJECTION, POWDER, LYOPHILIZED, FOR SOLUTION INTRAVENOUS at 10:03

## 2024-03-18 RX ADMIN — PIPERACILLIN SODIUM AND TAZOBACTAM SODIUM 4.5 G: 4; .5 INJECTION, POWDER, LYOPHILIZED, FOR SOLUTION INTRAVENOUS at 01:03

## 2024-03-18 RX ADMIN — GABAPENTIN 300 MG: 300 CAPSULE ORAL at 10:03

## 2024-03-18 RX ADMIN — ASPIRIN 81 MG CHEWABLE TABLET 81 MG: 81 TABLET CHEWABLE at 10:03

## 2024-03-18 RX ADMIN — CLOPIDOGREL BISULFATE 75 MG: 75 TABLET ORAL at 10:03

## 2024-03-18 RX ADMIN — FERROUS SULFATE TAB 325 MG (65 MG ELEMENTAL FE) 1 EACH: 325 (65 FE) TAB at 10:03

## 2024-03-18 RX ADMIN — POLYETHYLENE GLYCOL 3350 17 G: 17 POWDER, FOR SOLUTION ORAL at 10:03

## 2024-03-18 RX ADMIN — ENOXAPARIN SODIUM 40 MG: 40 INJECTION SUBCUTANEOUS at 06:03

## 2024-03-18 RX ADMIN — VANCOMYCIN HYDROCHLORIDE 1000 MG: 1 INJECTION, POWDER, LYOPHILIZED, FOR SOLUTION INTRAVENOUS at 10:03

## 2024-03-18 RX ADMIN — GABAPENTIN 300 MG: 300 CAPSULE ORAL at 02:03

## 2024-03-18 RX ADMIN — LIDOCAINE 5% 1 PATCH: 700 PATCH TOPICAL at 11:03

## 2024-03-18 RX ADMIN — OXYCODONE HYDROCHLORIDE 5 MG: 5 TABLET ORAL at 10:03

## 2024-03-18 RX ADMIN — AMLODIPINE BESYLATE 10 MG: 5 TABLET ORAL at 10:03

## 2024-03-18 RX ADMIN — DOCUSATE SODIUM 100 MG: 100 CAPSULE, LIQUID FILLED ORAL at 10:03

## 2024-03-18 RX ADMIN — OXYCODONE HYDROCHLORIDE 5 MG: 5 TABLET ORAL at 02:03

## 2024-03-18 RX ADMIN — DULOXETINE HYDROCHLORIDE 30 MG: 30 CAPSULE, DELAYED RELEASE ORAL at 10:03

## 2024-03-18 RX ADMIN — PIPERACILLIN SODIUM AND TAZOBACTAM SODIUM 4.5 G: 4; .5 INJECTION, POWDER, LYOPHILIZED, FOR SOLUTION INTRAVENOUS at 06:03

## 2024-03-18 RX ADMIN — LOSARTAN POTASSIUM 100 MG: 50 TABLET, FILM COATED ORAL at 10:03

## 2024-03-18 NOTE — PROGRESS NOTES
[facility] - 8th Floor [facility] Medicine  Progress Note    Patient Name: Shaheen Christie  MRN: [id number]  Patient Class: IP- Inpatient   Admission Date: 3/15/2024  Length of Stay: 3 days  Attending Physician: Enzo Thibodeaux MD  Primary Care Provider: Viktor Russ MD        Subjective:     Principal Problem:Osteomyelitis    Interval History:  Patient had not been seen by surgery.  They declined and asked that wound care sees the patient.  Wound care nurse came and requested the wound care physician be consulted I acid patient's decision be consulted.  Continues on empiric antibiotics.    Review of Systems   All other systems reviewed and are negative.    Objective:     Vital Signs (Most Recent):  Temp: 97.8 °F (36.6 °C) (03/18/24 1121)  Pulse: 64 (03/18/24 1121)  Resp: 18 (03/18/24 1412)  BP: (!) 128/54 (03/18/24 1121)  SpO2: (!) 93 % (03/18/24 1200) Vital Signs (24h Range):  Temp:  [97.5 °F (36.4 °C)-99.4 °F (37.4 °C)] 97.8 °F (36.6 °C)  Pulse:  [58-76] 64  Resp:  [17-18] 18  SpO2:  [92 %-96 %] 93 %  BP: (110-128)/(49-67) 128/54     Weight: 68 kg (150 lb)  Body mass index is 22.15 kg/m².    Intake/Output Summary (Last 24 hours) at 3/18/2024 1453  Last data filed at 3/18/2024 0522  Gross per 24 hour   Intake 740 ml   Output 2150 ml   Net -1410 ml      Physical Exam  HENT:      Head: Normocephalic and atraumatic.      Right Ear: External ear normal.      Left Ear: External ear normal.      Mouth/Throat:      Mouth: Mucous membranes are dry.   Cardiovascular:      Rate and Rhythm: Normal rate and regular rhythm.      Pulses: Normal pulses.      Heart sounds: Normal heart sounds.   Pulmonary:      Effort: Pulmonary effort is normal.      Breath sounds: Normal breath sounds.   Abdominal:      General: Abdomen is flat.      Palpations: Abdomen is soft.   Musculoskeletal:      Cervical back: Neck supple.   Skin:     General: Skin is warm.   Neurological:      Mental Status: Mental  status is at baseline.           Overview/Hospital Course:  Laboratory exams are essentially normal.  I will see what wound care physician does for the patient will need some debridement.  Eventually he will need 6-8 weeks of IV antibiotics will treatment at this infection.    Significant Labs: All pertinent labs within the past 24 hours have been reviewed.  CBC:   Recent Labs   Lab 03/17/24  0835   WBC 7.69   HGB 8.0*   HCT 27.4*        CMP:   Recent Labs   Lab 03/17/24  0835      K 4.4   CO2 28   BUN 8.1*   CREATININE 0.63*   CALCIUM 8.4*   ALBUMIN 2.3*   BILITOT 0.2   ALKPHOS 101   AST 9   ALT 10       Significant Imaging: I have reviewed all pertinent imaging results/findings within the past 24 hours.    Assessment/Plan:      Active Diagnoses:    Diagnosis Date Noted POA    PRINCIPAL PROBLEM:  Osteomyelitis [M86.9] 03/15/2024 Unknown      Problems Resolved During this Admission:     VTE Risk Mitigation (From admission, onward)           Ordered     enoxaparin injection 40 mg  Daily         03/15/24 0934                       Enzo Thibodeaux MD  Department of Hospital Medicine   [facility] - 8th Floor Med Surg

## 2024-03-18 NOTE — PROGRESS NOTES
Pharmacokinetic Assessment Follow Up: IV Vancomycin    Vancomycin serum concentration assessment(s):    The trough level was drawn correctly and can be used to guide therapy at this time. The measurement is within the desired definitive target range of 15 to 20 mcg/mL.    Vancomycin Regimen Plan:    Continue regimen to Vancomycin 1000 mg IV every 12 hours with next serum trough concentration measured at 0900 on 03/20    Drug levels (last 3 results):  Recent Labs   Lab Result Units 03/18/24  0922   Vancomycin Trough ug/ml 17.1       Pharmacy will continue to follow and monitor vancomycin.    Please contact pharmacy at extension [phone] for questions regarding this assessment.    Thank you for the consult,   Shira Clay       Patient brief summary:  Shaheen Christie is a 69 y.o. male initiated on antimicrobial therapy with IV Vancomycin for treatment of sepsis      Drug Allergies:   Review of patient's allergies indicates:  No Known Allergies    Actual Body Weight:   68 kg    Renal Function:   Estimated Creatinine Clearance: 103.4 mL/min (A) (based on SCr of 0.63 mg/dL (L)).,     Dialysis Method (if applicable):  N/A    CBC (last 72 hours):  Recent Labs   Lab Result Units 03/17/24  0835   WBC x10(3)/mcL 7.69   Hgb g/dL 8.0*   Hct % 27.4*   Platelet x10(3)/mcL 387   Mono % % 7.9   Eos % % 8.8   Basophil % % 1.4       Metabolic Panel (last 72 hours):  Recent Labs   Lab Result Units 03/17/24  0835   Sodium Level mmol/L 138   Potassium Level mmol/L 4.4   Chloride mmol/L 106   Carbon Dioxide mmol/L 28   Glucose Level mg/dL 107   Blood Urea Nitrogen mg/dL 8.1*   Creatinine mg/dL 0.63*   Albumin Level g/dL 2.3*   Bilirubin Total mg/dL 0.2   Alkaline Phosphatase unit/L 101   Aspartate Aminotransferase unit/L 9   Alanine Aminotransferase unit/L 10       Vancomycin Administrations:  vancomycin given in the last 96 hours                     vancomycin in dextrose 5 % 1 gram/250 mL IVPB 1,000 mg (mg) 1,000 mg New Bag 03/17/24 1684     vancomycin 1.75 g in 5 % dextrose 500 mL IVPB (mg) 1,750 mg New Bag 03/17/24 1016    vancomycin 2 g in dextrose 5 % 500 mL IVPB (mg) 2,000 mg New Bag 03/15/24 0528                    Microbiologic Results:  Microbiology Results (last 7 days)       Procedure Component Value Units Date/Time    Blood culture #1 **CANNOT BE ORDERED STAT** [[id number]]  (Normal) Collected: 03/15/24 0353    Order Status: Completed Specimen: Blood from Arm, Right Updated: 03/18/24 0500     CULTURE, BLOOD ([facility]) No Growth At 72 Hours    Blood culture #2 **CANNOT BE ORDERED STAT** [[id number]]  (Normal) Collected: 03/15/24 0353    Order Status: Completed Specimen: Blood from Arm, Left Updated: 03/18/24 0500     CULTURE, BLOOD ([facility]) No Growth At 72 Hours

## 2024-03-18 NOTE — CONSULTS
Infectious Diseases Consultation       Inpatient consult to Infectious Diseases  Consult performed by: Alexis Ibrahim MD  Consult ordered by: Enzo Thibodeaux MD        HPI:   69-year-old male, nursing home resident, with past medical history of HTN, COPD, DM type 2, with neuropathy, is admitted to [facility] on 03/15/2024 sent via EMS for sacral wound evaluation, apparently diagnosed with osteomyelitis involving the sacrum on 02/27/2024 with plan to be admitted to LTAC which had not been successful as an outpatient.  On presentation he was noted to have no fevers and no leukocytosis, anemic with low albumin.  Blood cultures have been negative.  Review of his records show a 02/08/2024 left leg wound culture with Pseudomonas, Providencia, ESBL Proteus and 11/30/2023 sacral wound culture with MRSA, Providencia, Enterococcus and Pseudomonas.  2/27 MRI of the pelvis shows osteomyelitis involving the sacrococcygeal bone as well as right trochanteric bursitis which may be septic.  He was seen by surgery team with no plan for surgical intervention.  He is on antibiotic coverage with vancomycin and Zosyn.    Past Medical and Surgical History  Allergies :   Patient has no known allergies.    Medical :   He has a past medical history of COPD (chronic obstructive pulmonary disease), Depression, Hypertension, and Neuropathy.    Surgical :   He has a past surgical history that includes cataracts (Left); Knee surgery (Bilateral); Total replacement of both hip joints using computer-assisted navigation (Bilateral); Cholecystectomy; herina repair; Eye surgery (Left); anterior neck surgery; Spine surgery; posterior neck surgery; Hand surgery (Left); angiogram (Bilateral); Arthrotomy of ankle (Left, 6/28/2023); incision and drainage, lower extremity (Left, 7/28/2023); and Peripheral angiography (N/A, 3/13/2024).     Family History  His family history includes Cancer in his father; Heart disease in  his mother; Lung disease in his father.    Social History  He reports that he has quit smoking. His smoking use included cigarettes. He started smoking about 16 years ago. He has a 7.5 pack-year smoking history. He has never used smokeless tobacco. He reports current alcohol use of about 3.0 - 6.0 standard drinks of alcohol per week. He reports current drug use. Drug: Marijuana.     Review of Systems   Constitutional:  Positive for malaise/fatigue.   HENT: Negative.     Respiratory: Negative.     Gastrointestinal: Negative.    Genitourinary: Negative.    Musculoskeletal: Negative.    Skin:         Multiple pressure wounds   Neurological:  Positive for weakness.   Endo/Heme/Allergies: Negative.    Psychiatric/Behavioral: Negative.     All other Systems review done and negative.    Objective   Physical Exam  Vitals reviewed.   Constitutional:       General: He is not in acute distress.  HENT:      Head: Normocephalic and atraumatic.   Eyes:      Pupils: Pupils are equal, round, and reactive to light.   Cardiovascular:      Rate and Rhythm: Normal rate and regular rhythm.      Heart sounds: Normal heart sounds.   Pulmonary:      Effort: Pulmonary effort is normal. No respiratory distress.      Breath sounds: Normal breath sounds.   Abdominal:      General: Bowel sounds are normal. There is no distension.      Palpations: Abdomen is soft.      Tenderness: There is no abdominal tenderness.   Genitourinary:     Comments: No suprapubic tenderness  Musculoskeletal:         General: No deformity.      Cervical back: Neck supple.   Skin:     Findings: No erythema or rash.      Comments: Sacral wound with no necrotic tissue and no bone exposure.  Bilateral lower extremity heel wounds with no purulence   Neurological:      Mental Status: He is alert and oriented to person, place, and time.   Psychiatric:      Comments: Calm and cooperative       VITAL SIGNS: 24 HR MIN & MAX LAST    Temp  Min: 97.5 °F (36.4 °C)  Max: 99.4 °F  "(37.4 °C)  98.8 °F (37.1 °C)        BP  Min: 110/48  Max: 128/54  (!) 110/48 (informed frank)     Pulse  Min: 58  Max: 76  65     Resp  Min: 17  Max: 18  18    SpO2  Min: 92 %  Max: 96 %  (!) 92 % (informed frank)      HT: 5' 9" (175.3 cm)  WT: 68 kg (150 lb)  BMI: 22.1     Recent Results (from the past 24 hour(s))   VANCOMYCIN, TROUGH    Collection Time: 03/18/24  9:22 AM   Result Value Ref Range    Vancomycin Trough 17.1 15.0 - 20.0 ug/ml       Imaging    02/27/2024 MRI PELVIS W WO CONTRAST     CLINICAL HISTORY:  s31.000;  Dislocation of unspecified lumbar vertebra, initial encounter     TECHNIQUE:  Enhanced and unenhanced, multiplanar, multisequence MR imaging of the pelvis was obtained.     COMPARISON:  None     FINDINGS:  Bilateral total hip arthroplasties obscure bone detail and the bone component interfaces.  No osteitis pubis.  No sacroiliitis.  The right sacroiliac joint is solidly fused.  No evidence for small particle disease or osteomyelitis seen to the native acetabula or to the included portions of the proximal femora.  The areas are are EKG is a trade a enhance in a a a a     Grade 1 anterolisthesis is partially visualized at the L4 level along with a broad-based disc protrusion.  A broad-based disc protrusion is partially visualized at L5-S1 and is associated with mild spinal stenosis and moderate to severe bilateral neural foraminal stenosis.  No presacral edema.  A large fecal bolus is present in the rectum and measures 6.5 cm in diameter.  The perirectal fat planes are clear.  Atrophy and edema are present to the included muscles with trace fluid seen tracking along the intermuscular septa.     Right-sided trochanteric bursitis may or may not be septic.  Diffuse subcutaneous edema is present about the pelvis but is most pronounced over the right hip.  This is consistent with cellulitis and or anasarca.  An ulcer is present over the distal sacrum but is suboptimally demonstrated on today's " study.  Abnormal enhancement is present in the ulcer base without discrete abscess.     Impression:     A sacral decubitus ulcer is present with osteomyelitis involving the distal sacrum from approximately the S4 level through the tip of the coccyx.  No evidence for epidural abscess seen on today's study.  Presacral edema is present without discrete pelvic abscess.     Marked edema and abnormal enhancement are present to the included muscles of the pelvis but particularly to the abductor muscles and the muscles about the right hip.  This is consistent with myositis and denervation.  No discrete intramuscular abscess identified.     Right-sided trochanteric bursitis which may be septic.     Subcutaneous edema is present about the pelvis but particularly over the right hip.  This may represent cellulitis, anasarca, or both.     Grade 1 anterolisthesis at L4 which is not entirely included in the field of view.     Other findings as above        Impression  1. Chronic sacral pressure wound with osteomyelitis  2. History of polymicrobial sacral wound culture-MRSA, Providencia, Pseudomonas, Enterococcus  3.  Chronic left lower extremity wound with history of polymicrobial wound culture-Pseudomonas, ESBL Proteus, Providencia  4. Multiple pressure wounds  5.  Diabetes type 2  6.  Anemia  7.  Protein calorie malnutrition     Recommendations  I agree with the management of this patient.  History of multiple pressure wounds, recently diagnosed with sacral osteomyelitis without much of systemic signs of infection by way of fevers or leukocytosis and wound not grossly infected.  We will evaluate further with inflammatory markers-ESR and CRP, continue current antibiotic coverage with vancomycin and Zosyn with plan for a 6 week course following inflammatory markers.  He has been seen by the surgery team with inputs noted including no plan for surgical intervention at this time.  We will continue aggressive wound care per the wound  care team.  Case management will assist with options of continuing IV antibiotics post discharge.  Case is discussed at length with patient, questions solicited and answered.  I have also discussed the case with nursing staff.  I would like to thank the team very much for the opportunity to assist in the care of this patient.

## 2024-03-19 PROBLEM — L97.919: Status: ACTIVE | Noted: 2024-03-19

## 2024-03-19 PROBLEM — L89.154 PRESSURE INJURY OF SACRAL REGION, STAGE 4: Status: ACTIVE | Noted: 2024-03-19

## 2024-03-19 PROBLEM — L89.620 UNSTAGEABLE PRESSURE ULCER OF LEFT HEEL: Status: ACTIVE | Noted: 2024-03-19

## 2024-03-19 PROBLEM — L97.929 SKIN ULCER OF LEFT LOWER LEG: Status: ACTIVE | Noted: 2024-03-19

## 2024-03-19 PROBLEM — L89.610 UNSTAGEABLE PRESSURE ULCER OF RIGHT HEEL: Status: ACTIVE | Noted: 2024-03-19

## 2024-03-19 LAB
CRP SERPL-MCNC: 23.7 MG/L
ERYTHROCYTE [SEDIMENTATION RATE] IN BLOOD: 12 MM/HR (ref 0–15)
PREALB SERPL-MCNC: 13.8 MG/DL (ref 16–42)

## 2024-03-19 PROCEDURE — 63600175 PHARM REV CODE 636 W HCPCS: Performed by: INTERNAL MEDICINE

## 2024-03-19 PROCEDURE — 63600175 PHARM REV CODE 636 W HCPCS: Performed by: FAMILY MEDICINE

## 2024-03-19 PROCEDURE — 97597 DBRDMT OPN WND 1ST 20 CM/<: CPT | Mod: ,,, | Performed by: EMERGENCY MEDICINE

## 2024-03-19 PROCEDURE — 25000003 PHARM REV CODE 250: Performed by: FAMILY MEDICINE

## 2024-03-19 PROCEDURE — 25000003 PHARM REV CODE 250: Performed by: INTERNAL MEDICINE

## 2024-03-19 PROCEDURE — 21400001 HC TELEMETRY ROOM

## 2024-03-19 PROCEDURE — 0HBLXZZ EXCISION OF LEFT LOWER LEG SKIN, EXTERNAL APPROACH: ICD-10-PCS | Performed by: EMERGENCY MEDICINE

## 2024-03-19 PROCEDURE — 97598 DBRDMT OPN WND ADDL 20CM/<: CPT | Mod: ,,, | Performed by: EMERGENCY MEDICINE

## 2024-03-19 PROCEDURE — 99223 1ST HOSP IP/OBS HIGH 75: CPT | Mod: 25,,, | Performed by: EMERGENCY MEDICINE

## 2024-03-19 PROCEDURE — 84134 ASSAY OF PREALBUMIN: CPT | Performed by: EMERGENCY MEDICINE

## 2024-03-19 PROCEDURE — 85652 RBC SED RATE AUTOMATED: CPT | Performed by: INTERNAL MEDICINE

## 2024-03-19 PROCEDURE — 11000001 HC ACUTE MED/SURG PRIVATE ROOM

## 2024-03-19 RX ADMIN — VANCOMYCIN HYDROCHLORIDE 1000 MG: 1 INJECTION, POWDER, LYOPHILIZED, FOR SOLUTION INTRAVENOUS at 09:03

## 2024-03-19 RX ADMIN — CILOSTAZOL 50 MG: 50 TABLET ORAL at 10:03

## 2024-03-19 RX ADMIN — DOCUSATE SODIUM 100 MG: 100 CAPSULE, LIQUID FILLED ORAL at 09:03

## 2024-03-19 RX ADMIN — LOSARTAN POTASSIUM 100 MG: 50 TABLET, FILM COATED ORAL at 10:03

## 2024-03-19 RX ADMIN — VANCOMYCIN HYDROCHLORIDE 1000 MG: 1 INJECTION, POWDER, LYOPHILIZED, FOR SOLUTION INTRAVENOUS at 10:03

## 2024-03-19 RX ADMIN — OXYCODONE HYDROCHLORIDE 5 MG: 5 TABLET ORAL at 10:03

## 2024-03-19 RX ADMIN — THERA TABS 1 TABLET: TAB at 10:03

## 2024-03-19 RX ADMIN — LIDOCAINE 5% 1 PATCH: 700 PATCH TOPICAL at 10:03

## 2024-03-19 RX ADMIN — OXYCODONE HYDROCHLORIDE 5 MG: 5 TABLET ORAL at 06:03

## 2024-03-19 RX ADMIN — GABAPENTIN 600 MG: 300 CAPSULE ORAL at 08:03

## 2024-03-19 RX ADMIN — GABAPENTIN 300 MG: 300 CAPSULE ORAL at 02:03

## 2024-03-19 RX ADMIN — PIPERACILLIN SODIUM AND TAZOBACTAM SODIUM 4.5 G: 4; .5 INJECTION, POWDER, LYOPHILIZED, FOR SOLUTION INTRAVENOUS at 01:03

## 2024-03-19 RX ADMIN — PIPERACILLIN SODIUM AND TAZOBACTAM SODIUM 4.5 G: 4; .5 INJECTION, POWDER, LYOPHILIZED, FOR SOLUTION INTRAVENOUS at 09:03

## 2024-03-19 RX ADMIN — OXYCODONE HYDROCHLORIDE 5 MG: 5 TABLET ORAL at 05:03

## 2024-03-19 RX ADMIN — OXYCODONE HYDROCHLORIDE 5 MG: 5 TABLET ORAL at 01:03

## 2024-03-19 RX ADMIN — HYDROCODONE BITARTRATE AND ACETAMINOPHEN 1 TABLET: 5; 325 TABLET ORAL at 03:03

## 2024-03-19 RX ADMIN — CILOSTAZOL 50 MG: 50 TABLET ORAL at 08:03

## 2024-03-19 RX ADMIN — OXYCODONE HYDROCHLORIDE 5 MG: 5 TABLET ORAL at 02:03

## 2024-03-19 RX ADMIN — CLOPIDOGREL BISULFATE 75 MG: 75 TABLET ORAL at 10:03

## 2024-03-19 RX ADMIN — DOCUSATE SODIUM 100 MG: 100 CAPSULE, LIQUID FILLED ORAL at 08:03

## 2024-03-19 RX ADMIN — ASPIRIN 81 MG CHEWABLE TABLET 81 MG: 81 TABLET CHEWABLE at 10:03

## 2024-03-19 RX ADMIN — PIPERACILLIN SODIUM AND TAZOBACTAM SODIUM 4.5 G: 4; .5 INJECTION, POWDER, LYOPHILIZED, FOR SOLUTION INTRAVENOUS at 04:03

## 2024-03-19 RX ADMIN — GABAPENTIN 300 MG: 300 CAPSULE ORAL at 08:03

## 2024-03-19 RX ADMIN — AMLODIPINE BESYLATE 10 MG: 5 TABLET ORAL at 10:03

## 2024-03-19 RX ADMIN — DULOXETINE HYDROCHLORIDE 30 MG: 30 CAPSULE, DELAYED RELEASE ORAL at 10:03

## 2024-03-19 RX ADMIN — ENOXAPARIN SODIUM 40 MG: 40 INJECTION SUBCUTANEOUS at 04:03

## 2024-03-19 RX ADMIN — FERROUS SULFATE TAB 325 MG (65 MG ELEMENTAL FE) 1 EACH: 325 (65 FE) TAB at 10:03

## 2024-03-19 RX ADMIN — GABAPENTIN 300 MG: 300 CAPSULE ORAL at 10:03

## 2024-03-19 NOTE — CONSULTS
[facility] - 8th Floor Med Surg  Wound Care    Patient Name:  Shaheen Christie   MRN:  [id number]  Date: 03/18/2024  Diagnosis: Osteomyelitis    History:     Past Medical History:   Diagnosis Date    COPD (chronic obstructive pulmonary disease)     Depression     Hypertension     Neuropathy        Social History     Socioeconomic History    Marital status:    Occupational History    Occupation: retired   Tobacco Use    Smoking status: Former     Average packs/day: 0.5 packs/day for 16.2 years (7.5 ttl pk-yrs)     Types: Cigarettes     Start date: 2008    Smokeless tobacco: Never   Substance and Sexual Activity    Alcohol use: Yes     Alcohol/week: 3.0 - 6.0 standard drinks of alcohol     Types: 3 - 6 Cans of beer per week    Drug use: Yes     Types: Marijuana    Sexual activity: Not Currently       Precautions:     Allergies as of 03/15/2024    (No Known Allergies)       [facility] Assessment Details/Treatment     Wound care physician has been consulted defer to notes and treatment recommendations. Nursing to continue with preventative measures including turning every two hours, wedge, and floating heels. BRADNE mattress ordered. Will follow up.  03/18/2024

## 2024-03-19 NOTE — PROGRESS NOTES
[facility] - 8th Floor [facility] Medicine  Progress Note    Patient Name: Shaheen Christie  MRN: [id number]  Patient Class: IP- Inpatient   Admission Date: 3/15/2024  Length of Stay: 4 days  Attending Physician: Enzo Thibodeaux MD  Primary Care Provider: Viktor Russ MD        Subjective:     Principal Problem:Osteomyelitis    Interval History:  Patient continues to be on empiric antibiotics.  He has no complaints.  Was seen by wound care physician yesterday.    Review of Systems   All other systems reviewed and are negative.    Objective:     Vital Signs (Most Recent):  Temp: 98.3 °F (36.8 °C) (03/19/24 0753)  Pulse: 66 (03/19/24 0753)  Resp: 18 (03/19/24 0753)  BP: (!) 117/45 (03/19/24 0753)  SpO2: (!) 92 % (03/19/24 0753) Vital Signs (24h Range):  Temp:  [97.4 °F (36.3 °C)-98.8 °F (37.1 °C)] 98.3 °F (36.8 °C)  Pulse:  [62-69] 66  Resp:  [18-20] 18  SpO2:  [92 %-95 %] 92 %  BP: (106-128)/(45-67) 117/45     Weight: 68 kg (150 lb)  Body mass index is 22.15 kg/m².    Intake/Output Summary (Last 24 hours) at 3/19/2024 0757  Last data filed at 3/19/2024 0457  Gross per 24 hour   Intake 3190.08 ml   Output 2675 ml   Net 515.08 ml      Physical Exam  HENT:      Head: Normocephalic and atraumatic.      Right Ear: External ear normal.      Left Ear: External ear normal.      Mouth/Throat:      Mouth: Mucous membranes are dry.      Pharynx: Oropharynx is clear.   Eyes:      Extraocular Movements: Extraocular movements intact.   Cardiovascular:      Rate and Rhythm: Normal rate and regular rhythm.      Pulses: Normal pulses.      Heart sounds: Normal heart sounds.   Pulmonary:      Effort: Pulmonary effort is normal.      Breath sounds: Normal breath sounds.   Abdominal:      General: Abdomen is flat. Bowel sounds are normal.      Palpations: Abdomen is soft.   Musculoskeletal:      Cervical back: Neck supple.   Skin:     General: Skin is warm and dry.   Neurological:      General: No focal  deficit present.      Mental Status: He is alert and oriented to person, place, and time. Mental status is at baseline.   Psychiatric:         Mood and Affect: Mood normal.         Behavior: Behavior normal.           Overview/Hospital Course:  Overall doing well.  Awaiting LTAC placement for continued wound care treatment, wound debridement, and IV antibiotics for 6-8 weeks.  Patient aware of plan.    Significant Labs: All pertinent labs within the past 24 hours have been reviewed.  BMP:   Recent Labs   Lab 03/17/24  0835      K 4.4   CO2 28   BUN 8.1*   CREATININE 0.63*   CALCIUM 8.4*     CBC:   Recent Labs   Lab 03/17/24  0835   WBC 7.69   HGB 8.0*   HCT 27.4*        CMP:   Recent Labs   Lab 03/17/24  0835      K 4.4   CO2 28   BUN 8.1*   CREATININE 0.63*   CALCIUM 8.4*   ALBUMIN 2.3*   BILITOT 0.2   ALKPHOS 101   AST 9   ALT 10       Significant Imaging: I have reviewed all pertinent imaging results/findings within the past 24 hours.    Assessment/Plan:      Active Diagnoses:    Diagnosis Date Noted POA    PRINCIPAL PROBLEM:  Osteomyelitis [M86.9] 03/15/2024 Unknown      Problems Resolved During this Admission:     VTE Risk Mitigation (From admission, onward)           Ordered     enoxaparin injection 40 mg  Daily         03/15/24 0912                       Enzo Thibodeaux MD  Department of Hospital Medicine   [facility] - 8th Floor Med Surg

## 2024-03-19 NOTE — HPI
Wound medicine Re-check - chronic sacral pressure ulcer with osteomyelitis       Wound recheck done today, 4/25/24. Met patient in his room, 804, awake, alert and agreeable to wound assessment and treatment. Accompanied by WOCN for wound vac change. Lying on low air loss mattress,  without heel lift boots. Left AKA, dressing clean and intact. RLE dressings intact with moderate exudate on bandage, not yet changed today.  Leg wounds not visualized RLE followed by podiatry, formalization of left AKA on 4/24/24. Complains of phantom pain to left lower extremity. No acute distress.

## 2024-03-19 NOTE — SUBJECTIVE & OBJECTIVE
Subjective:     HPI:  The patient is a 69 year old WM, with past medical history of HTN, COPD, DM2 with neuropathy, PAD, chronic venous hypertension. He was sent  to [facility] on 3/15/24 from [facility] for evaluation of sacral wound due to recent diagnosis of osteomyelitis involving the sacrum on 2/27/24. Previous plans for LTAC placement were not successful; he will require assistance with placement for continuing IV antibiotics post discharge.   Review of records reveals + culture of left leg wound with Pseudomonas, Providencia, ESBL Proteus on 2/8/24 and + sacral wound culture with MRSA, Providencia, Enterococcus and Pseudomonas. 2/27/24 MRI of sacrum shows osteomyelitis involving the sacrococcygeal bone.   He presents without systemic signs of infection. He was evaluated by surgery (3/15/24), no plan for surgical intervention at this time.   ID consulted; and guiding antibiotic stewardship.   He was seen in the outpatient wound care clinic here at [facility] form 9/2022 - 2/2023 for ulcer of left ankle.   He is currently with multiple chronic appearing wounds to BLE and sacrum. He has been seen by Dr. Queen with work up and intervention for occlusion of LLE in the past.     Wound medicine has been consulted in addition to WOCN for evaluation of these severe wounds.   First evaluation compelted on 3/19/24. Met patient in his room, 804, lying supine in bed with wedge under left side and bilateral heel protectors in place. Gained verbal consent for assessment and treatment of wounds. Verbalizes chronic pain to left knee aggravated by the slightest movemen and extensive surgical history of bilateral knees and left ankle. He says he is now non-ambulatory which is why he has developed the sacral wound as well as wounds to his BLE.         Hospital Course:   No notes on file          Scheduled Meds:   amLODIPine  10 mg Oral Daily    aspirin  81 mg Oral Daily    cilostazoL  50 mg Oral BID    clopidogreL   75 mg Oral Daily    docusate sodium  100 mg Oral BID    DULoxetine  30 mg Oral Daily    enoxaparin  40 mg Subcutaneous Daily    ferrous sulfate  1 tablet Oral Daily    gabapentin  300 mg Oral TID    gabapentin  600 mg Oral QHS    LIDOcaine  1 patch Transdermal Q24H    losartan  100 mg Oral Daily    multivitamin  1 tablet Oral Daily    oxyCODONE  5 mg Oral Q4H    piperacillin-tazobactam (Zosyn) IV (PEDS and ADULTS) (extended infusion is not appropriate)  4.5 g Intravenous Q8H    polyethylene glycol  17 g Oral Daily    vancomycin (VANCOCIN) IV (PEDS and ADULTS)  1,000 mg Intravenous Q12H     Continuous Infusions:  PRN Meds:acetaminophen, albuterol, aluminum-magnesium hydroxide-simethicone, HYDROcodone-acetaminophen, melatonin, simethicone, vancomycin - pharmacy to dose    Review of Systems   Constitutional:         Decreased activity level and decline from prior level of function due to chronic pain to BLE.    HENT:          Hard of hearing   Respiratory: Negative.     Musculoskeletal:  Positive for arthralgias.   Skin:  Positive for wound.     Objective:     Vital Signs (Most Recent):  Temp: 97.6 °F (36.4 °C) (03/19/24 1147)  Pulse: 76 (03/19/24 1147)  Resp: 18 (03/19/24 1408)  BP: (!) 155/54 (03/19/24 1147)  SpO2: 97 % (03/19/24 1147) Vital Signs (24h Range):  Temp:  [97.4 °F (36.3 °C)-98.8 °F (37.1 °C)] 97.6 °F (36.4 °C)  Pulse:  [62-76] 76  Resp:  [18-20] 18  SpO2:  [92 %-97 %] 97 %  BP: (106-155)/(45-66) 155/54     Weight: 68 kg (150 lb)  Body mass index is 22.15 kg/m².     Physical Exam  Constitutional:       Appearance: He is normal weight. He is ill-appearing (chronic).   HENT:      Head: Normocephalic and atraumatic.   Cardiovascular:      Rate and Rhythm: Normal rate and regular rhythm.      Pulses:           Dorsalis pedis pulses are 1+ on the right side and 2+ on the left side.   Pulmonary:      Effort: Pulmonary effort is normal. No respiratory distress.   Skin:     General: Skin is warm and dry.       "Capillary Refill: Capillary refill takes less than 2 seconds.      Findings: Wound present.             Comments: Large sacral wound that extends laterally to the left. Wound bed with 75% healthy appearing pink/red tissue and aprox 25% slough/eschar. Without signs of infection. Skin around wound stained purple with what appears to be gentian violet. No bone exposure.   LLE lateral leg: large wound mainly covered with black eschar and minimal yellow slough.   Left lateral foot: dry flaky skin detaching with wound covered with slough and somewhat fluctuant  Left posterior heel: covered with adherent black eschar; proximal edge soft and moist with odor and yellow slough; bleeds when dressing removed.   Right heel: covered with adherent black eschar  Right lateral leg: detaching black eschar noted to center of wound; adherent black eschar to proximal wound, yellow slough also within wound bed; +odor.   Neurological:      General: No focal deficit present.      Mental Status: He is alert and oriented to person, place, and time.   Psychiatric:         Mood and Affect: Mood normal.         Behavior: Behavior normal.       Sacrum: 8.5 x 19.5 x 1.4 cm with undermining from 12 o'clock to 2 o'clock 1.4 cm       Left lateral lower extremity: 15 x 3 cm       Left lateral lower leg - post debridement       Left posterior heel: 5.5 x 4.3 cm       Left lateral foot: 0.5 x 0.6 cm       Right lateral lower leg: 13 x 2 x 0.9 cm       Right heel: 5 x 9 cm            Laboratory:  A1C:   Recent Labs   Lab 02/02/24  0445   HGBA1C 5.0     Blood Cultures: No results for input(s): "LABBLOO" in the last 48 hours.  BMP:   Recent Labs   Lab 03/17/24  0835      K 4.4   CO2 28   BUN 8.1*   CREATININE 0.63*   CALCIUM 8.4*     Cardiac Markers: No results for input(s): "CKMB", "TROPONINT", "MYOGLOBIN" in the last 168 hours.  CBC:   Recent Labs   Lab 03/17/24  0835   WBC 7.69   RBC 3.13*   HGB 8.0*   HCT 27.4*      MCV 87.5   MCH 25.6* " "  MCHC 29.2*     CMP:   Recent Labs   Lab 03/17/24  0835   CALCIUM 8.4*   ALBUMIN 2.3*      K 4.4   CO2 28   BUN 8.1*   CREATININE 0.63*   ALKPHOS 101   ALT 10   AST 9   BILITOT 0.2     Coagulation:   Recent Labs   Lab 03/15/24  0334   INR 1.0     CRP:   Recent Labs   Lab 03/18/24  0922   CRP 23.70*     ESR:   Recent Labs   Lab 03/19/24  0337   SEDRATE 12     LFTs:   Recent Labs   Lab 03/17/24  0835   ALT 10   AST 9   ALKPHOS 101   BILITOT 0.2   ALBUMIN 2.3*     Prealbumin: No results for input(s): "PREALBUMIN" in the last 48 hours.  Wound Cultures: No results for input(s): "LABAERO" in the last 4320 hours.  Microbiology Results (last 7 days)       Procedure Component Value Units Date/Time    Blood culture #2 **CANNOT BE ORDERED STAT** [[id number]]  (Normal) Collected: 03/15/24 0353    Order Status: Completed Specimen: Blood from Arm, Left Updated: 03/19/24 0500     CULTURE, BLOOD ([facility]) No Growth At 96 Hours    Blood culture #1 **CANNOT BE ORDERED STAT** [[id number]]  (Normal) Collected: 03/15/24 0353    Order Status: Completed Specimen: Blood from Arm, Right Updated: 03/19/24 0410     CULTURE, BLOOD ([facility]) No Growth At 96 Hours          Specimen (24h ago, onward)      None          No results for input(s): "COLORU", "CLARITYU", "SPECGRAV", "PHUR", "PROTEINUA", "GLUCOSEU", "BILIRUBINCON", "BLOODU", "WBCU", "RBCU", "BACTERIA", "MUCUS", "NITRITE", "LEUKOCYTESUR", "UROBILINOGEN", "HYALINECASTS" in the last 168 hours.    Diagnostic Results:  I have reviewed all pertinent imaging results/findings within the past 24 hours.      Accession #: [id number]  Cardiac catheterization    Height: 5' 9" (1.753 m)   Weight: 68.5 kg (151 lb)   Blood Pressure: 110/61   Heart Rate: 81    Date of Study: 3/13/24   Ordering Provider: Deven Queen MD   Clinical Indications: Peripheral vascular disease, unspecified [I73.9 (ICD-10-CM)]       Performing Physician  Performing Staff   Primary: Deven Queen MD    Monitoring RN: Von Campos, " RN   Documenter: Jazz Phillips RN   Scrub Person: Kiel Fields   Cardiovascular Tech: Shanika Fernandez            Anesthesiology Staff    Anesthesiologist: Tom Corea MD   CRNA: Mita Mclaughlin CRNA         Patient Information    Name MRN Description   Shaheen Christie [id number] 69 y.o. male     Physicians    Panel Physicians Referring Physician Case Authorizing Physician   Deven Queen MD (Primary)       Indications    Peripheral vascular disease, unspecified [I73.9 (ICD-10-CM)]     Summary         Peripheral angiography performed for bilateral lower extremity with anesthesia     The procedure log was documented by Documenter: Jazz Phillips RN and verified by Deven Queen MD.     Procedure indication  Nonhealing wound bilateral lower extremity     Procedure performed   Bilateral femoral angiography with runoff      Description     Patient brought to the cath lab.  Catheter placed in left femoral artery to perform left femoral artery angiography with runoff     Catheters were taken out      Findings  Left SFA patent   Left popliteal artery noncritical stenosis   Left leg has two-vessel runoff anterior tibial artery and peroneal artery  Right SFA patent   Right popliteal artery has a trifurcating lesion in the distal popliteal artery     Summary   Patient found to have noncritical stenosis in the left SFA   Right SFA has stenosis however has got excellent three-vessel runoff      Summary   Patient will be managed medically for his wounds.  Medical therapy to be continued  Date: 3/13/2024  Time: 4:02 PM     MRI Pelvis W WO Contrast  Status: Final result     MyChart Results Release    Logia Grouphart Status: Active  Results Release     PACS Images for [employer] Viewer     Show images for MRI Pelvis W WO Contrast  MRI Pelvis W WO Contrast  Order: [id number]  Status: Final result       Visible to patient: Yes (not seen)       Next appt: None       Dx: Open dislocation, lumbar vertebra    0 Result  Notes  Details    Reading Physician Reading Date Result Priority   Sudarshan Pardo MD  [phone] 2/27/2024 Routine     Narrative & Impression  EXAMINATION:  MRI PELVIS W WO CONTRAST     CLINICAL HISTORY:  s31.000;  Dislocation of unspecified lumbar vertebra, initial encounter     TECHNIQUE:  Enhanced and unenhanced, multiplanar, multisequence MR imaging of the pelvis was obtained.     COMPARISON:  None     FINDINGS:  Bilateral total hip arthroplasties obscure bone detail and the bone component interfaces.  No osteitis pubis.  No sacroiliitis.  The right sacroiliac joint is solidly fused.  No evidence for small particle disease or osteomyelitis seen to the native acetabula or to the included portions of the proximal femora.  The areas are are EKG is a trade a enhance in a a a a     Grade 1 anterolisthesis is partially visualized at the L4 level along with a broad-based disc protrusion.  A broad-based disc protrusion is partially visualized at L5-S1 and is associated with mild spinal stenosis and moderate to severe bilateral neural foraminal stenosis.  No presacral edema.  A large fecal bolus is present in the rectum and measures 6.5 cm in diameter.  The perirectal fat planes are clear.  Atrophy and edema are present to the included muscles with trace fluid seen tracking along the intermuscular septa.     Right-sided trochanteric bursitis may or may not be septic.  Diffuse subcutaneous edema is present about the pelvis but is most pronounced over the right hip.  This is consistent with cellulitis and or anasarca.  An ulcer is present over the distal sacrum but is suboptimally demonstrated on today's study.  Abnormal enhancement is present in the ulcer base without discrete abscess.     Impression:     A sacral decubitus ulcer is present with osteomyelitis involving the distal sacrum from approximately the S4 level through the tip of the coccyx.  No evidence for epidural abscess seen on today's study.  Presacral  edema is present without discrete pelvic abscess.     Marked edema and abnormal enhancement are present to the included muscles of the pelvis but particularly to the abductor muscles and the muscles about the right hip.  This is consistent with myositis and denervation.  No discrete intramuscular abscess identified.     Right-sided trochanteric bursitis which may be septic.     Subcutaneous edema is present about the pelvis but particularly over the right hip.  This may represent cellulitis, anasarca, or both.     Grade 1 anterolisthesis at L4 which is not entirely included in the field of view.     Other findings as above        Electronically signed by: Sudarshan Pardo  Date:                                            02/27/2024  Time:                                           11:11

## 2024-03-19 NOTE — PROCEDURES
"Shaheen Christie is a 69 y.o. male patient.    Temp: 97.6 °F (36.4 °C) (03/19/24 1147)  Pulse: 76 (03/19/24 1147)  Resp: 18 (03/19/24 1408)  BP: (!) 155/54 (03/19/24 1147)  SpO2: 97 % (03/19/24 1147)  Weight: 68 kg (150 lb) (03/15/24 1621)  Height: 5' 9" (175.3 cm) (03/15/24 1621)       Debridement    Date/Time: 3/19/2024 4:03 PM    Performed by: Lizabeth Mckeon MD  Authorized by: Lizabeth Mckeon MD    Consent Done?:  Yes (Verbal)  Local anesthesia used?: Yes    Local anesthetic:  Topical anesthetic    Wound Details:    Location:  Left leg    Type of Debridement:  Excisional       Length (cm):  15       Area (sq cm):  45       Width (cm):  3       Percent Debrided (%):  100       Depth (cm):  0.8       Total Area Debrided (sq cm):  45    Depth of debridement:  Epidermis/Dermis    Tissue debrided:  Epidermis and Dermis    Devitalized tissue debrided:  Necrotic/Eschar    Instruments:  Scissors and Forceps  Bleeding:  None  Patient tolerance:  Patient tolerated the procedure well with no immediate complications      3/19/2024    "

## 2024-03-19 NOTE — CONSULTS
[facility] - 8th Floor Med Surg  Wound Care  Consult Note    Patient Name: Shaheen Christie  MRN: [id number]  Admission Date: 3/15/2024  Hospital Length of Stay: 4 days  Attending Physician: Enzo Thibodeaux MD  Primary Care Provider: Viktor Russ MD     Inpatient consult to Wound Care Physician  Consult performed by: Lory Mckeon FNP  Consult ordered by: Enzo Thibodeaux MD        Subjective:     History of Present Illness:  The patient is a 69 year old WM, with past medical history of HTN, COPD, DM2 with neuropathy, PAD, chronic venous hypertension. He was sent  to [facility] on 3/15/24 from [facility] for evaluation of sacral wound due to recent diagnosis of osteomyelitis involving the sacrum on 2/27/24. Previous plans for LTAC placement were not successful; he will require assistance with placement for continuing IV antibiotics post discharge.   Review of records reveals + culture of left leg wound with Pseudomonas, Providencia, ESBL Proteus on 2/8/24 and + sacral wound culture with MRSA, Providencia, Enterococcus and Pseudomonas. 2/27/24 MRI of sacrum shows osteomyelitis involving the sacrococcygeal bone.   He presents without systemic signs of infection. He was evaluated by surgery (3/15/24), no plan for surgical intervention at this time.   ID consulted; and guiding antibiotic stewardship.   He was seen in the outpatient wound care clinic here at [facility] form 9/2022 - 2/2023 for ulcer of left ankle.   He is currently with multiple chronic appearing wounds to BLE and sacrum. He has been seen by Dr. Queen with work up and intervention for occlusion of LLE in the past. Most recent peripheral angiography performed on [facility] (3/13/24) per Dr. Queen. Found to have non critical stenosis in the left SFA. Right SFA with stenosis however with excellent three-vessel runoff. (Wounds to be medically managed)    Wound medicine has been consulted in addition to WOCN for evaluation of  these severe wounds.   First evaluation compelted on 3/19/24. Met patient in his room, 804, lying supine in bed with wedge under left side and bilateral heel protectors in place. Gained verbal consent for assessment and treatment of wounds. Verbalizes chronic pain to left knee aggravated by the slightest movemen and extensive surgical history of bilateral knees and left ankle. He says he is now non-ambulatory which is why he has developed the sacral wound as well as wounds to his BLE. He now resides in a long term care facility.           Subjective:     HPI:  The patient is a 69 year old WM, with past medical history of HTN, COPD, DM2 with neuropathy, PAD, chronic venous hypertension. He was sent  to [facility] on 3/15/24 from [facility] for evaluation of sacral wound due to recent diagnosis of osteomyelitis involving the sacrum on 2/27/24. Previous plans for LTAC placement were not successful; he will require assistance with placement for continuing IV antibiotics post discharge.   Review of records reveals + culture of left leg wound with Pseudomonas, Providencia, ESBL Proteus on 2/8/24 and + sacral wound culture with MRSA, Providencia, Enterococcus and Pseudomonas. 2/27/24 MRI of sacrum shows osteomyelitis involving the sacrococcygeal bone.   He presents without systemic signs of infection. He was evaluated by surgery (3/15/24), no plan for surgical intervention at this time.   ID consulted; and guiding antibiotic stewardship.   He was seen in the outpatient wound care clinic here at [facility] form 9/2022 - 2/2023 for ulcer of left ankle.   He is currently with multiple chronic appearing wounds to BLE and sacrum. He has been seen by Dr. Queen with work up and intervention for occlusion of LLE in the past.     Wound medicine has been consulted in addition to WOCN for evaluation of these severe wounds.   First evaluation compelted on 3/19/24. Met patient in his room, 804, lying supine in bed with wedge  under left side and bilateral heel protectors in place. Gained verbal consent for assessment and treatment of wounds. Verbalizes chronic pain to left knee aggravated by the slightest movemen and extensive surgical history of bilateral knees and left ankle. He says he is now non-ambulatory which is why he has developed the sacral wound as well as wounds to his BLE.         Hospital Course:   No notes on file          Scheduled Meds:   amLODIPine  10 mg Oral Daily    aspirin  81 mg Oral Daily    cilostazoL  50 mg Oral BID    clopidogreL  75 mg Oral Daily    docusate sodium  100 mg Oral BID    DULoxetine  30 mg Oral Daily    enoxaparin  40 mg Subcutaneous Daily    ferrous sulfate  1 tablet Oral Daily    gabapentin  300 mg Oral TID    gabapentin  600 mg Oral QHS    LIDOcaine  1 patch Transdermal Q24H    losartan  100 mg Oral Daily    multivitamin  1 tablet Oral Daily    oxyCODONE  5 mg Oral Q4H    piperacillin-tazobactam (Zosyn) IV (PEDS and ADULTS) (extended infusion is not appropriate)  4.5 g Intravenous Q8H    polyethylene glycol  17 g Oral Daily    vancomycin (VANCOCIN) IV (PEDS and ADULTS)  1,000 mg Intravenous Q12H     Continuous Infusions:  PRN Meds:acetaminophen, albuterol, aluminum-magnesium hydroxide-simethicone, HYDROcodone-acetaminophen, melatonin, simethicone, vancomycin - pharmacy to dose    Review of Systems   Constitutional:         Decreased activity level and decline from prior level of function due to chronic pain to BLE.    HENT:          Hard of hearing   Respiratory: Negative.     Musculoskeletal:  Positive for arthralgias.   Skin:  Positive for wound.     Objective:     Vital Signs (Most Recent):  Temp: 97.6 °F (36.4 °C) (03/19/24 1147)  Pulse: 76 (03/19/24 1147)  Resp: 18 (03/19/24 1408)  BP: (!) 155/54 (03/19/24 1147)  SpO2: 97 % (03/19/24 1147) Vital Signs (24h Range):  Temp:  [97.4 °F (36.3 °C)-98.8 °F (37.1 °C)] 97.6 °F (36.4 °C)  Pulse:  [62-76] 76  Resp:  [18-20] 18  SpO2:  [92 %-97 %] 97  %  BP: (106-155)/(45-66) 155/54     Weight: 68 kg (150 lb)  Body mass index is 22.15 kg/m².     Physical Exam  Constitutional:       Appearance: He is normal weight. He is ill-appearing (chronic).   HENT:      Head: Normocephalic and atraumatic.   Cardiovascular:      Rate and Rhythm: Normal rate and regular rhythm.      Pulses:           Dorsalis pedis pulses are 1+ on the right side and 2+ on the left side.   Pulmonary:      Effort: Pulmonary effort is normal. No respiratory distress.   Skin:     General: Skin is warm and dry.      Capillary Refill: Capillary refill takes less than 2 seconds.      Findings: Wound present.             Comments: Large sacral wound that extends laterally to the left. Wound bed with 75% healthy appearing pink/red tissue and aprox 25% slough/eschar. Without signs of infection. Skin around wound stained purple with what appears to be gentian violet. No bone exposure.   LLE lateral leg: large wound mainly covered with black eschar and minimal yellow slough.   Left lateral foot: dry flaky skin detaching with wound covered with slough and somewhat fluctuant  Left posterior heel: covered with adherent black eschar; proximal edge soft and moist with odor and yellow slough; bleeds when dressing removed.   Right heel: covered with adherent black eschar  Right lateral leg: detaching black eschar noted to center of wound; adherent black eschar to proximal wound, yellow slough also within wound bed; +odor.   Neurological:      General: No focal deficit present.      Mental Status: He is alert and oriented to person, place, and time.   Psychiatric:         Mood and Affect: Mood normal.         Behavior: Behavior normal.       Sacrum: 8.5 x 19.5 x 1.4 cm with undermining from 12 o'clock to 2 o'clock 1.4 cm       Left lateral lower extremity: 15 x 3 cm       Left lateral lower leg - post debridement       Left posterior heel: 5.5 x 4.3 cm       Left lateral foot: 0.5 x 0.6 cm       Right lateral  "lower leg: 13 x 2 x 0.9 cm       Right heel: 5 x 9 cm            Laboratory:  A1C:   Recent Labs   Lab 02/02/24  0445   HGBA1C 5.0     Blood Cultures: No results for input(s): "LABBLOO" in the last 48 hours.  BMP:   Recent Labs   Lab 03/17/24  0835      K 4.4   CO2 28   BUN 8.1*   CREATININE 0.63*   CALCIUM 8.4*     Cardiac Markers: No results for input(s): "CKMB", "TROPONINT", "MYOGLOBIN" in the last 168 hours.  CBC:   Recent Labs   Lab 03/17/24  0835   WBC 7.69   RBC 3.13*   HGB 8.0*   HCT 27.4*      MCV 87.5   MCH 25.6*   MCHC 29.2*     CMP:   Recent Labs   Lab 03/17/24  0835   CALCIUM 8.4*   ALBUMIN 2.3*      K 4.4   CO2 28   BUN 8.1*   CREATININE 0.63*   ALKPHOS 101   ALT 10   AST 9   BILITOT 0.2     Coagulation:   Recent Labs   Lab 03/15/24  0334   INR 1.0     CRP:   Recent Labs   Lab 03/18/24  0922   CRP 23.70*     ESR:   Recent Labs   Lab 03/19/24  0337   SEDRATE 12     LFTs:   Recent Labs   Lab 03/17/24  0835   ALT 10   AST 9   ALKPHOS 101   BILITOT 0.2   ALBUMIN 2.3*     Prealbumin: No results for input(s): "PREALBUMIN" in the last 48 hours.  Wound Cultures: No results for input(s): "LABAERO" in the last 4320 hours.  Microbiology Results (last 7 days)       Procedure Component Value Units Date/Time    Blood culture #2 **CANNOT BE ORDERED STAT** [[id number]]  (Normal) Collected: 03/15/24 0353    Order Status: Completed Specimen: Blood from Arm, Left Updated: 03/19/24 0500     CULTURE, BLOOD ([facility]) No Growth At 96 Hours    Blood culture #1 **CANNOT BE ORDERED STAT** [[id number]]  (Normal) Collected: 03/15/24 0353    Order Status: Completed Specimen: Blood from Arm, Right Updated: 03/19/24 0410     CULTURE, BLOOD ([facility]) No Growth At 96 Hours          Specimen (24h ago, onward)      None          No results for input(s): "COLORU", "CLARITYU", "SPECGRAV", "PHUR", "PROTEINUA", "GLUCOSEU", "BILIRUBINCON", "BLOODU", "WBCU", "RBCU", "BACTERIA", "MUCUS", "NITRITE", "LEUKOCYTESUR", "UROBILINOGEN", " ""HYALINECASTS" in the last 168 hours.    Diagnostic Results:  I have reviewed all pertinent imaging results/findings within the past 24 hours.      Accession #: [id number]  Cardiac catheterization    Height: 5' 9" (1.753 m)   Weight: 68.5 kg (151 lb)   Blood Pressure: 110/61   Heart Rate: 81    Date of Study: 3/13/24   Ordering Provider: Deven Queen MD   Clinical Indications: Peripheral vascular disease, unspecified [I73.9 (ICD-10-CM)]       Performing Physician  Performing Staff   Primary: Deven Queen MD    Monitoring RN: Von Campos RN   Documenter: Jazz Phillips RN   Scrub Person: Kiel Fields   Cardiovascular Tech: Shanika Fernandez            Anesthesiology Staff    Anesthesiologist: Tom Corea MD   CRNA: Mita Mclaughlin CRNA         Patient Information    Name MRN Description   Shaheen Christie [id number] 69 y.o. male     Physicians    Panel Physicians Referring Physician Case Authorizing Physician   Deven Queen MD (Primary)       Indications    Peripheral vascular disease, unspecified [I73.9 (ICD-10-CM)]     Summary         Peripheral angiography performed for bilateral lower extremity with anesthesia     The procedure log was documented by Documenter: Jazz Phillips RN and verified by Deven Queen MD.     Procedure indication  Nonhealing wound bilateral lower extremity     Procedure performed   Bilateral femoral angiography with runoff      Description     Patient brought to the cath lab.  Catheter placed in left femoral artery to perform left femoral artery angiography with runoff     Catheters were taken out      Findings  Left SFA patent   Left popliteal artery noncritical stenosis   Left leg has two-vessel runoff anterior tibial artery and peroneal artery  Right SFA patent   Right popliteal artery has a trifurcating lesion in the distal popliteal artery     Summary   Patient found to have noncritical stenosis in the left SFA   Right SFA has stenosis however has got excellent " three-vessel runoff      Summary   Patient will be managed medically for his wounds.  Medical therapy to be continued  Date: 3/13/2024  Time: 4:02 PM     MRI Pelvis W WO Contrast  Status: Final result     MyChart Results Release    MyChart Status: Active  Results Release     PACS Images for [facility] Viewer     Show images for MRI Pelvis W WO Contrast  MRI Pelvis W WO Contrast  Order: [id number]  Status: Final result       Visible to patient: Yes (not seen)       Next appt: None       Dx: Open dislocation, lumbar vertebra    0 Result Notes  Details    Reading Physician Reading Date Result Priority   Sudarshan Pardo MD  [phone] 2/27/2024 Routine     Narrative & Impression  EXAMINATION:  MRI PELVIS W WO CONTRAST     CLINICAL HISTORY:  s31.000;  Dislocation of unspecified lumbar vertebra, initial encounter     TECHNIQUE:  Enhanced and unenhanced, multiplanar, multisequence MR imaging of the pelvis was obtained.     COMPARISON:  None     FINDINGS:  Bilateral total hip arthroplasties obscure bone detail and the bone component interfaces.  No osteitis pubis.  No sacroiliitis.  The right sacroiliac joint is solidly fused.  No evidence for small particle disease or osteomyelitis seen to the native acetabula or to the included portions of the proximal femora.  The areas are are EKG is a trade a enhance in a a a a     Grade 1 anterolisthesis is partially visualized at the L4 level along with a broad-based disc protrusion.  A broad-based disc protrusion is partially visualized at L5-S1 and is associated with mild spinal stenosis and moderate to severe bilateral neural foraminal stenosis.  No presacral edema.  A large fecal bolus is present in the rectum and measures 6.5 cm in diameter.  The perirectal fat planes are clear.  Atrophy and edema are present to the included muscles with trace fluid seen tracking along the intermuscular septa.     Right-sided trochanteric bursitis may or may not be septic.  Diffuse  subcutaneous edema is present about the pelvis but is most pronounced over the right hip.  This is consistent with cellulitis and or anasarca.  An ulcer is present over the distal sacrum but is suboptimally demonstrated on today's study.  Abnormal enhancement is present in the ulcer base without discrete abscess.     Impression:     A sacral decubitus ulcer is present with osteomyelitis involving the distal sacrum from approximately the S4 level through the tip of the coccyx.  No evidence for epidural abscess seen on today's study.  Presacral edema is present without discrete pelvic abscess.     Marked edema and abnormal enhancement are present to the included muscles of the pelvis but particularly to the abductor muscles and the muscles about the right hip.  This is consistent with myositis and denervation.  No discrete intramuscular abscess identified.     Right-sided trochanteric bursitis which may be septic.     Subcutaneous edema is present about the pelvis but particularly over the right hip.  This may represent cellulitis, anasarca, or both.     Grade 1 anterolisthesis at L4 which is not entirely included in the field of view.     Other findings as above        Electronically signed by: Sudarshan Pardo  Date:                                            02/27/2024  Time:                                           11:11       Physical Exam  Assessment/Plan:     Stage 4 pressure ulcer to sacrum  Unstageable pressure ulcers to bilateral heels  Open wound/ulcers to right lateral lower leg and left lateral lower leg  Peripheral arterial disease - with history of vascular intervention with Dr. Queen (most recent angio 3/13/24)  PVD  History of polymicrobial sacral wound culture  History of polymicrobial wound culture to leg wound  DM2  Anemia  History of smoking  Low prealbumin     PLAN:    Chart reviewed, patient examined and wounds assessed.  Opinion: The patient has a large stage 4 pressure ulcer to his sacrum that  does not appear to be infected. Multiple other chronic appearing wounds to BLE that are covered in eschar and have an odor. X-rays of BLE ordered. Recommend podiatry consult for evaluation of bilateral heel wounds.  Bedside debridement of loose eschar to bilateral lateral lower extremities done this visit.   Wound care orders: Sacrum: Cleanse wound with Vashe; apply Vashe moistened gauze to wound bed; paint left lateral eschar covered edge with betadine and cover with ABD/tape BID. Multiple wounds to BLE and heels: paint dry eschar with betadine, any pink/red tissue with yellow slough cleanse with Vashe and apply Vashe moistened gauze to wound bed and cover with ABD/Kerlix/tape BID and PRN.  Monitor for signs & symptoms of deterioration  Offloading of sacrum/buttocks/heels at all times: BRADEN mattress, turning q 2 hrs; use of wedges and heel offloading devices to be used at all times while in bed; ( SALONI Arellano). This needs to be reinforced by every staff nurse caring for patient on every shift of every day as well as attendings rounding on the patient every day.    Incontinence: control moisture/wound contamination: No briefs  Nutrition: Recommend aggressive nutritional support, protein supplementation along with vitamin and mineral supplements  and petr to support wound healing; follow prealbumin, dietitian consults  Diabetes: A1C good  Smoking cessation  Will try to follow weekly while admitted, but every nurse assigned to patient on every shift of every day needs to address daily wound care dressing changes and offloading modalities including using heel offloading devices, wedges, BRADEN mattress etc  Discussed with patient as well as nurse caring for patient today  Disposition:           Thank you for your consult. I will follow-up with patient. Please contact us if you have any additional questions.    The time spent including preparing to see the patient, obtaining patient history and assessment, evaluation of  the plan of care, patient/caregiver counseling and education, orders, documentation, coordination of care, and other professional medical management activities for today's encounter was 75 minutes     ANGEL Chavira  Wound Care  [facility] - 8th Floor Med Surg

## 2024-03-20 LAB
BACTERIA BLD CULT: NORMAL
BACTERIA BLD CULT: NORMAL
VANCOMYCIN TROUGH SERPL-MCNC: 20.8 UG/ML (ref 15–20)

## 2024-03-20 PROCEDURE — 94760 N-INVAS EAR/PLS OXIMETRY 1: CPT

## 2024-03-20 PROCEDURE — 63600175 PHARM REV CODE 636 W HCPCS: Performed by: INTERNAL MEDICINE

## 2024-03-20 PROCEDURE — 80202 ASSAY OF VANCOMYCIN: CPT | Performed by: INTERNAL MEDICINE

## 2024-03-20 PROCEDURE — 25000003 PHARM REV CODE 250: Performed by: INTERNAL MEDICINE

## 2024-03-20 PROCEDURE — 85025 COMPLETE CBC W/AUTO DIFF WBC: CPT | Performed by: INTERNAL MEDICINE

## 2024-03-20 PROCEDURE — 25000003 PHARM REV CODE 250: Performed by: FAMILY MEDICINE

## 2024-03-20 PROCEDURE — 63600175 PHARM REV CODE 636 W HCPCS: Performed by: FAMILY MEDICINE

## 2024-03-20 PROCEDURE — 80053 COMPREHEN METABOLIC PANEL: CPT | Performed by: INTERNAL MEDICINE

## 2024-03-20 PROCEDURE — 21400001 HC TELEMETRY ROOM

## 2024-03-20 PROCEDURE — 11000001 HC ACUTE MED/SURG PRIVATE ROOM

## 2024-03-20 RX ADMIN — FERROUS SULFATE TAB 325 MG (65 MG ELEMENTAL FE) 1 EACH: 325 (65 FE) TAB at 09:03

## 2024-03-20 RX ADMIN — GABAPENTIN 300 MG: 300 CAPSULE ORAL at 09:03

## 2024-03-20 RX ADMIN — CLOPIDOGREL BISULFATE 75 MG: 75 TABLET ORAL at 09:03

## 2024-03-20 RX ADMIN — DOCUSATE SODIUM 100 MG: 100 CAPSULE, LIQUID FILLED ORAL at 09:03

## 2024-03-20 RX ADMIN — THERA TABS 1 TABLET: TAB at 09:03

## 2024-03-20 RX ADMIN — GABAPENTIN 600 MG: 300 CAPSULE ORAL at 09:03

## 2024-03-20 RX ADMIN — OXYCODONE HYDROCHLORIDE 5 MG: 5 TABLET ORAL at 06:03

## 2024-03-20 RX ADMIN — CILOSTAZOL 50 MG: 50 TABLET ORAL at 09:03

## 2024-03-20 RX ADMIN — Medication 6 MG: at 09:03

## 2024-03-20 RX ADMIN — PIPERACILLIN SODIUM AND TAZOBACTAM SODIUM 4.5 G: 4; .5 INJECTION, POWDER, LYOPHILIZED, FOR SOLUTION INTRAVENOUS at 09:03

## 2024-03-20 RX ADMIN — OXYCODONE HYDROCHLORIDE 5 MG: 5 TABLET ORAL at 09:03

## 2024-03-20 RX ADMIN — LIDOCAINE 5% 1 PATCH: 700 PATCH TOPICAL at 09:03

## 2024-03-20 RX ADMIN — HYDROCODONE BITARTRATE AND ACETAMINOPHEN 1 TABLET: 5; 325 TABLET ORAL at 08:03

## 2024-03-20 RX ADMIN — GABAPENTIN 300 MG: 300 CAPSULE ORAL at 01:03

## 2024-03-20 RX ADMIN — ENOXAPARIN SODIUM 40 MG: 40 INJECTION SUBCUTANEOUS at 05:03

## 2024-03-20 RX ADMIN — PIPERACILLIN SODIUM AND TAZOBACTAM SODIUM 4.5 G: 4; .5 INJECTION, POWDER, LYOPHILIZED, FOR SOLUTION INTRAVENOUS at 01:03

## 2024-03-20 RX ADMIN — OXYCODONE HYDROCHLORIDE 5 MG: 5 TABLET ORAL at 05:03

## 2024-03-20 RX ADMIN — ASPIRIN 81 MG CHEWABLE TABLET 81 MG: 81 TABLET CHEWABLE at 09:03

## 2024-03-20 RX ADMIN — OXYCODONE HYDROCHLORIDE 5 MG: 5 TABLET ORAL at 01:03

## 2024-03-20 RX ADMIN — VANCOMYCIN HYDROCHLORIDE 750 MG: 750 INJECTION, POWDER, LYOPHILIZED, FOR SOLUTION INTRAVENOUS at 02:03

## 2024-03-20 RX ADMIN — DULOXETINE HYDROCHLORIDE 30 MG: 30 CAPSULE, DELAYED RELEASE ORAL at 09:03

## 2024-03-20 RX ADMIN — OXYCODONE HYDROCHLORIDE 5 MG: 5 TABLET ORAL at 02:03

## 2024-03-20 RX ADMIN — PIPERACILLIN SODIUM AND TAZOBACTAM SODIUM 4.5 G: 4; .5 INJECTION, POWDER, LYOPHILIZED, FOR SOLUTION INTRAVENOUS at 05:03

## 2024-03-20 NOTE — PROGRESS NOTES
Inpatient Nutrition Assessment    Admit Date: 3/15/2024   Total duration of encounter: 5 days   Patient Age: 69 y.o.    Nutrition Recommendation/Prescription     -Continue Regular Diet as tolerated.   -Pierre BID for wound healing.   -Boost Plus TID for additional nourishment; provides an additional 360 kcal and 14 gm protein per container.   -Consider a MVI with minerals as medically feasible.   -Monitor wt, labs, and intake.     Communication of Recommendations: reviewed with patient    Nutrition Assessment     Malnutrition Assessment/Nutrition-Focused Physical Exam    Malnutrition Context: chronic illness (03/20/24 1317)  Malnutrition Level: severe (03/20/24 1317)  Energy Intake (Malnutrition): less than 75% for greater than or equal to 3 months (03/20/24 1317)  Weight Loss (Malnutrition): greater than 7.5% in 3 months (03/20/24 1317)  Subcutaneous Fat (Malnutrition): severe depletion (03/20/24 1317)  Orbital Region (Subcutaneous Fat Loss): severe depletion  Upper Arm Region (Subcutaneous Fat Loss): severe depletion     Muscle Mass (Malnutrition): severe depletion (03/20/24 1317)  [place] Region (Muscle Loss): severe depletion                       Fluid Accumulation (Malnutrition): other (see comments) (does not meet criteria) (03/20/24 1317)  Hand  Strength, Left (Malnutrition): unable to evaluate (03/20/24 1317)  Hand  Strength, Right (Malnutrition): unable to evaluate (03/20/24 1317)  A minimum of two characteristics is recommended for diagnosis of either severe or non-severe malnutrition.    Chart Review    Reason Seen: continuous nutrition monitoring    Malnutrition Screening Tool Results   Have you recently lost weight without trying?: No  Have you been eating poorly because of a decreased appetite?: No   MST Score: 0   Diagnosis:  Stage 4 pressure ulcer to sacrum  Unstageable pressure ulcers to bilateral heels  Open wound/ulcers to right lateral lower leg and left lateral lower leg  Peripheral  arterial disease - with history of vascular intervention with Dr. Queen (most recent angio 3/13/24)  PVD  History of polymicrobial sacral wound culture  History of polymicrobial wound culture to leg wound  DM2  Anemia  History of smoking  Low prealbumin     Relevant Medical History: HTN, COPD, DM2 with neuropathy, PAD, chronic venous hypertension     Scheduled Medications:  amLODIPine, 10 mg, Daily  aspirin, 81 mg, Daily  cilostazoL, 50 mg, BID  clopidogreL, 75 mg, Daily  docusate sodium, 100 mg, BID  DULoxetine, 30 mg, Daily  enoxaparin, 40 mg, Daily  ferrous sulfate, 1 tablet, Daily  gabapentin, 300 mg, TID  gabapentin, 600 mg, QHS  LIDOcaine, 1 patch, Q24H  losartan, 100 mg, Daily  multivitamin, 1 tablet, Daily  oxyCODONE, 5 mg, Q4H  piperacillin-tazobactam (Zosyn) IV (PEDS and ADULTS) (extended infusion is not appropriate), 4.5 g, Q8H  polyethylene glycol, 17 g, Daily  vancomycin (VANCOCIN) IV (PEDS and ADULTS), 750 mg, Q12H    Continuous Infusions:   PRN Medications: acetaminophen, albuterol, aluminum-magnesium hydroxide-simethicone, HYDROcodone-acetaminophen, melatonin, simethicone, vancomycin - pharmacy to dose    Calorie Containing IV Medications: no significant kcals from medications at this time    Recent Labs   Lab 03/14/24  0423 03/15/24  0334 03/17/24  0835 03/18/24  0922 03/19/24  1558   NA  --  140 138  --   --    K  --  3.9 4.4  --   --    CALCIUM  --  8.4* 8.4*  --   --    CHLORIDE  --  109* 106  --   --    CO2  --  24 28  --   --    BUN  --  13.1 8.1*  --   --    CREATININE  --  0.65* 0.63*  --   --    EGFRNORACEVR  --  >60 >60  --   --    GLUCOSE  --  97 107  --   --    BILITOT  --  0.2 0.2  --   --    ALKPHOS  --  103 101  --   --    ALT  --  11 10  --   --    AST  --  14 9  --   --    ALBUMIN  --  2.5* 2.3*  --   --    PREALB 10.7*  --   --   --  13.8*   CRP  --   --   --  23.70*  --    WBC  --  7.72 7.69  --   --    HGB  --  8.5* 8.0*  --   --    HCT  --  28.7* 27.4*  --   --      Nutrition  "Orders:  Diet Adult Regular      Appetite/Oral Intake: good/% of meals  Factors Affecting Nutritional Intake: none identified  Food/Mormon/Cultural Preferences: none reported  Food Allergies: no known food allergies  Last Bowel Movement: 24  Wound(s):  heel ulcers, sacrum pressure ulcers    Comments    3/20: Pt reports current good intake/appetite but intake the past few months has been poor 2/2 disliking food from NH. Pt reports usual wt of ~170 lbs. Pt reports he would like oral supplements.     Anthropometrics    Height: 5' 9.02" (175.3 cm), Height Method: Stated  Last Weight: 68 kg (150 lb) (24 1315), Weight Method: Standard Scale  BMI (Calculated): 22.1  BMI Classification: normal (BMI 18.5-24.9)        Ideal Body Weight (IBW), Male: 160.12 lb     % Ideal Body Weight, Male (lb): 93.68 %                 Usual Body Weight (UBW), k.27 kg  % Usual Body Weight: 88.24  % Weight Change From Usual Weight: -11.95 %  Usual Weight Provided By: patient    Wt Readings from Last 5 Encounters:   24 68 kg (150 lb)   24 68.5 kg (151 lb)   24 63.5 kg (140 lb)   23 72.6 kg (160 lb)   23 77.1 kg (170 lb)     Weight Change(s) Since Admission:   Wt Readings from Last 1 Encounters:   24 1315 68 kg (150 lb)   03/15/24 1621 68 kg (150 lb)   03/15/24 0307 68 kg (150 lb)   Admit Weight: 68 kg (150 lb) (03/15/24 0307), Weight Method: Stated    Estimated Needs    Weight Used For Calorie Calculations: 68 kg (149 lb 14.6 oz)  Energy Calorie Requirements (kcal): 2506-7763 kcal (30-35 kcal/kg)  Energy Need Method: Kcal/kg  Weight Used For Protein Calculations: 68 kg (149 lb 14.6 oz)  Protein Requirements: 102 gm (1.5g/kg)  Fluid Requirements (mL): 2040 mL    Enteral Nutrition     Patient not receiving enteral nutrition at this time.    Parenteral Nutrition     Patient not receiving parenteral nutrition support at this time.    Evaluation of Received Nutrient Intake    Calories: " meeting estimated needs  Protein: meeting estimated needs    Patient Education     Not applicable.    Nutrition Diagnosis     PES: Increased nutrient needs (protein) related to increased protein energy demand for wound healing as evidenced by stage 4 pressure ulcer. (new)     PES: Severe chronic disease or condition related malnutrition related to suboptimal protein/energy intake as evidenced by less than 75% needs met for greater than or equal to 3 months, severe fat depletion, severe muscle depletion, and greater than 7.5% weight loss in 3 months. (new)    Nutrition Interventions     Intervention(s): general/healthful diet, commercial beverage, commercial food, multivitamin/mineral supplement therapy, and collaboration with other providers    Goal: Meet greater than 80% of nutritional needs by follow-up. (new)  Goal: Maintain weight throughout hospitalization. (new)    Nutrition Goals & Monitoring     Dietitian will monitor: energy intake and weight    Nutrition Risk/Follow-Up: high (follow-up in 1-4 days)   Please consult if re-assessment needed sooner.

## 2024-03-20 NOTE — PHYSICIAN QUERY
"PT Name: Shaheen Christie  MR #: [id number]    DOCUMENTATION CLARIFICATION     CDS/: Jaki Marina RN         Contact information: [email or web address]    This form is a permanent document in the medical record.     Query Date: 2024    By submitting this query, we are merely seeking further clarification of documentation.  Please utilize your independent clinical judgment when addressing the question(s) below.    The medical record contains the following:   Indicators  Supporting Clinical Findings Location in Medical Record   x Registered Dietician Diagnosis  Malnutrition Context: chronic illness (24)  Malnutrition Level: severe (24)  3/20 RD PN   x Energy Intake  Energy Intake (Malnutrition): less than 75% for greater than or equal to 3 months (24)    3/20 RD PN   x Weight Loss  Weight Loss (Malnutrition): greater than 7.5% in 3 months (24)    3/20 RD PN   x Fat Loss  Subcutaneous Fat (Malnutrition): severe depletion (24)  Orbital Region (Subcutaneous Fat Loss): severe depletion  Upper Arm Region (Subcutaneous Fat Loss): severe depletion  3/20 RD PN   x Muscle Loss   Muscle Mass (Malnutrition): severe depletion (24)  [place] Region (Muscle Loss): severe depletion  3/20 RD PN    Edema/Fluid Accumulation      Reduced  Strength (by dynamometer)     x Weight, BMI, Usual Body Weight  Height: 5' 9.02" (175.3 cm), Height Method: Stated  Last Weight: 68 kg (150 lb) (24), Weight Method: Standard Scale  BMI (Calculated): 22.1  BMI Classification: normal (BMI 18.5-24.9)  Ideal Body Weight (IBW), Male: 160.12 lb  % Ideal Body Weight, Male (lb): 93.68 %  Usual Body Weight (UBW), k.27 kg  % Usual Body Weight: 88.24  % Weight Change From Usual Weight: -11.95 %  Usual Weight Provided By: patient   3/20 RD PN    Delayed Wound Healing     x Acute or Chronic Illness  69-year-old male, nursing home resident, with past medical " history of HTN, COPD, DM type 2, with neuropathy, is admitted to [facility] on 03/15/2024 sent via EMS for sacral wound evaluation, apparently diagnosed with osteomyelitis involving the sacrum on 02/27/2024 with plan to be admitted to LTAC which had not been successful as an outpatient.  On presentation he was noted to have no fevers and no leukocytosis, anemic with low albumin.  Blood cultures have been negative.  Review of his records show a 02/08/2024 left leg wound culture with Pseudomonas, Providencia, ESBL Proteus and 11/30/2023 sacral wound culture with MRSA, Providencia, Enterococcus and Pseudomonas.  2/27 MRI of the pelvis shows osteomyelitis involving the sacrococcygeal bone as well as right trochanteric bursitis which may be septic.  He was seen by surgery team with no plan for surgical intervention.  He is on antibiotic coverage with vancomycin and Zosyn.     3/19 ID PN    Social or Environmental Circumstances     x Treatment Dietician Reccs  Continue Regular Diet as tolerated.   -Pierre BID for wound healing.   -Boost Plus TID for additional nourishment; provides an additional 360 kcal and 14 gm protein per container.   -Consider a MVI with minerals as medically feasible.   -Monitor wt, labs, and intake.    3/20 RD PN    Other       Academy of Nutrition and Dietetics (Academy) and the American Society for Parenteral and Enteral Nutrition (A.S.P.E.N.) Clinical Characteristics to support Malnutrition      Criteria for mild malnutrition is defined as 1 characteristic outlined above within the established moderate or severe parameters.  A minimum of 2 out of the 6 characteristics noted above are recommended for a diagnosis of moderate or severe malnutrition.  Chronic illness/injury is a disease/condition lasting 3 months or longer.    The noted clinical guidelines are only system guidelines and do not replace the providers clinical judgment.    Provider, please specify the  diagnosis or diagnoses associated with above clinical findings.  Check all that apply.    [  xx] Severe Malnutrition - a minimum of 2 of the 6 severe malnutrition characteristics noted above    [  ] Other Nutritional Diagnosis (please specify): _______     Please document in your progress notes daily for the duration of treatment until resolved and  include in your discharge summary.      Reference:    TRAV Barnes, PhD, RD, TIMMY, EDDA Steele, PhD, RN, FELECIA Mayes MD, PhD, Pooja ARRIETA A., MS, RD, [facility], ABDOULAYE Chapa, MS, RD, The Academy Malnutrition Work Group, The A.S.P.E.N. Board of Directors. (2012). Consensus Statement: Academy of Nutrition and Dietetics and American Society for Parenteral and Enteral Nutrition: Characteristics Recommended for the Identification and Documentation of Adult Malnutrition (Undernutrition). Journal of Parenteral and Enteral Nutrition, 36(3), 275-283. doi:10.1177/4082012890987444     Form No. 68279

## 2024-03-20 NOTE — PROGRESS NOTES
Infectious Diseases Progress Note  69-year-old male, nursing home resident, with past medical history of HTN, COPD, DM type 2, with neuropathy, is admitted to [facility] on 03/15/2024 sent via EMS for sacral wound evaluation, apparently diagnosed with osteomyelitis involving the sacrum on 02/27/2024 with plan to be admitted to LTAC which had not been successful as an outpatient.  On presentation he was noted to have no fevers and no leukocytosis, anemic with low albumin.  Blood cultures have been negative.  Review of his records show a 02/08/2024 left leg wound culture with Pseudomonas, Providencia, ESBL Proteus and 11/30/2023 sacral wound culture with MRSA, Providencia, Enterococcus and Pseudomonas.  2/27 MRI of the pelvis shows osteomyelitis involving the sacrococcygeal bone as well as right trochanteric bursitis which may be septic.  He was seen by surgery team with no plan for surgical intervention.  He is on antibiotic coverage with vancomycin and Zosyn.       Subjective:  No new complaints, no fevers, doing about the same.  Lying in bed in no acute distress      Past Medical History:   Diagnosis Date    COPD (chronic obstructive pulmonary disease)     Depression     Hypertension     Neuropathy      Past Surgical History:   Procedure Laterality Date    angiogram Bilateral     stents placed in left leg    anterior neck surgery      ARTHROTOMY OF ANKLE Left 6/28/2023    Procedure: ARTHROTOMY, ANKLE;  Surgeon: Tom Nichole DPM;  Location: [facility];  Service: Podiatry;  Laterality: Left;    cataracts Left     CHOLECYSTECTOMY      EYE SURGERY Left     HAND SURGERY Left     broken bone    herina repair      INCISION AND DRAINAGE, LOWER EXTREMITY Left 7/28/2023    Procedure: INCISION AND DRAINAGE, LOWER EXTREMITY;  Surgeon: Avinash Garces MD;  Location: [facility];  Service: Orthopedics;  Laterality: Left;  Supine, vascular bed, bone foam  last case  cysto tubing, laparoscopic trocar,  experience, vanc, hemovac drain    KNEE SURGERY Bilateral     PERIPHERAL ANGIOGRAPHY N/A 3/13/2024    Procedure: Peripheral angiography;  Surgeon: Deven Queen MD;  Location: [facility] CATH LAB;  Service: Cardiology;  Laterality: N/A;  MICHELLE ANGIO W/ ANEST.    posterior neck surgery      SPINE SURGERY      TOTAL REPLACEMENT OF BOTH HIP JOINTS USING COMPUTER-ASSISTED NAVIGATION Bilateral      Social History     Socioeconomic History    Marital status:    Occupational History    Occupation: retired   Tobacco Use    Smoking status: Former     Average packs/day: 0.5 packs/day for 16.2 years (7.5 ttl pk-yrs)     Types: Cigarettes     Start date: 2008    Smokeless tobacco: Never   Substance and Sexual Activity    Alcohol use: Yes     Alcohol/week: 3.0 - 6.0 standard drinks of alcohol     Types: 3 - 6 Cans of beer per week    Drug use: Yes     Types: Marijuana    Sexual activity: Not Currently       ROS  Constitutional:  Positive for malaise/fatigue.   HENT: Negative.     Respiratory: Negative.     Gastrointestinal: Negative.    Genitourinary: Negative.    Musculoskeletal: Negative.    Skin:         Multiple pressure wounds   Neurological:  Positive for weakness.   Endo/Heme/Allergies: Negative.    Psychiatric/Behavioral: Negative.     All other Systems review done and negative.    Review of patient's allergies indicates:  No Known Allergies      Scheduled Meds:   amLODIPine  10 mg Oral Daily    aspirin  81 mg Oral Daily    cilostazoL  50 mg Oral BID    clopidogreL  75 mg Oral Daily    docusate sodium  100 mg Oral BID    DULoxetine  30 mg Oral Daily    enoxaparin  40 mg Subcutaneous Daily    ferrous sulfate  1 tablet Oral Daily    gabapentin  300 mg Oral TID    gabapentin  600 mg Oral QHS    LIDOcaine  1 patch Transdermal Q24H    losartan  100 mg Oral Daily    multivitamin  1 tablet Oral Daily    oxyCODONE  5 mg Oral Q4H    piperacillin-tazobactam (Zosyn) IV (PEDS and ADULTS) (extended infusion is not appropriate)  4.5 g  "Intravenous Q8H    polyethylene glycol  17 g Oral Daily    vancomycin (VANCOCIN) IV (PEDS and ADULTS)  1,000 mg Intravenous Q12H     Continuous Infusions:  PRN Meds:acetaminophen, albuterol, aluminum-magnesium hydroxide-simethicone, HYDROcodone-acetaminophen, melatonin, simethicone, vancomycin - pharmacy to dose    Objective:  /65   Pulse 84   Temp 99.1 °F (37.3 °C) (Oral)   Resp 18   Ht 5' 9" (1.753 m)   Wt 68 kg (150 lb)   SpO2 (!) 92%   BMI 22.15 kg/m²     Physical Exam:   Physical Exam  Vitals reviewed.   Constitutional:       General: He is not in acute distress.  HENT:      Head: Normocephalic and atraumatic.   Cardiovascular:      Rate and Rhythm: Normal rate and regular rhythm.      Heart sounds: Normal heart sounds.   Pulmonary:      Effort: Pulmonary effort is normal. No respiratory distress.      Breath sounds: Normal breath sounds.   Abdominal:      General: Bowel sounds are normal. There is no distension.      Palpations: Abdomen is soft.      Tenderness: There is no abdominal tenderness.   Musculoskeletal:         General: No deformity.      Cervical back: Neck supple.   Skin:     Findings: No erythema or rash.      Comments: Wounds dressed   Neurological:      Mental Status: He is alert and oriented to person, place, and time.   Psychiatric:      Comments: Calm and cooperative     Imaging  Imaging Results    None          Lab Review   Recent Results (from the past 24 hour(s))   Sedimentation rate    Collection Time: 03/19/24  3:37 AM   Result Value Ref Range    Sed Rate 12 0 - 15 mm/hr   Prealbumin    Collection Time: 03/19/24  3:58 PM   Result Value Ref Range    Prealbumin 13.8 (L) 16.0 - 42.0 mg/dL       Assessment/Plan:  1. Chronic sacral pressure wound with osteomyelitis  2. History of polymicrobial sacral wound culture-MRSA, Providencia, Pseudomonas, Enterococcus  3.  Chronic left lower extremity wound with history of polymicrobial wound culture-Pseudomonas, ESBL Proteus, " Providencia  4. Multiple pressure wounds  5.  Diabetes type 2  6.  Anemia  7.  Protein calorie malnutrition      -Continue vancomycin and Zosyn, plan a 6 week course, following inflammatory markers  -No fevers and no leukocytosis  -ESR 12 and CRP 23.7, follow   -3/15 blood cultures negative  -2/8 left leg wound cultures with many Pseudomonas, Providencia, Proteus  -11/30/2023 sacral wound with many Providencia, MRSA, Enterococcus, Pseudomonas  -Seen by the surgery team with inputs noted including no plan for surgical intervention   -We will continue aggressive wound care per the wound care team  - Discussed with patient and nursing staff.  Case management working on discharge planning.  Disposition per primary team

## 2024-03-20 NOTE — PROGRESS NOTES
[facility] 8th Floor [facility] Medicine  Progress Note    Patient Name: Shaheen Christie  MRN: [id number]  Patient Class: IP- Inpatient   Admission Date: 3/15/2024  Length of Stay: 5 days  Attending Physician: Enzo Thibodeaux MD  Primary Care Provider: Viktor Russ MD        Subjective:     Principal Problem:Osteomyelitis    Interval History:  Patient resting comfortably.  Was seen yesterday by Infectious Disease.  Plan continues to be for 6-8 weeks total treatment with IV antibiotics and very aggressive wound care.  Update has been given to his nursing home.  Soon as we have an accepting facility I will be transferring this patient.    Review of Systems   All other systems reviewed and are negative.    Objective:     Vital Signs (Most Recent):  Temp: 98.2 °F (36.8 °C) (03/20/24 0751)  Pulse: 70 (03/20/24 0751)  Resp: 18 (03/20/24 0920)  BP: (!) 108/54 (03/20/24 0751)  SpO2: (!) 92 % (03/20/24 0751) Vital Signs (24h Range):  Temp:  [97.6 °F (36.4 °C)-99.1 °F (37.3 °C)] 98.2 °F (36.8 °C)  Pulse:  [62-84] 70  Resp:  [14-20] 18  SpO2:  [92 %-97 %] 92 %  BP: (108-155)/(54-72) 108/54     Weight: 68 kg (150 lb)  Body mass index is 22.15 kg/m².    Intake/Output Summary (Last 24 hours) at 3/20/2024 0951  Last data filed at 3/20/2024 0531  Gross per 24 hour   Intake 1908.51 ml   Output 1775 ml   Net 133.51 ml      Physical Exam  HENT:      Head: Normocephalic and atraumatic.      Right Ear: External ear normal.      Left Ear: External ear normal.      Mouth/Throat:      Mouth: Mucous membranes are dry.   Cardiovascular:      Rate and Rhythm: Normal rate and regular rhythm.      Pulses: Normal pulses.      Heart sounds: Normal heart sounds.   Pulmonary:      Effort: Pulmonary effort is normal.      Breath sounds: Normal breath sounds.   Abdominal:      General: Bowel sounds are normal.      Palpations: Abdomen is soft.   Musculoskeletal:      Cervical back: Neck supple.   Neurological:       "General: No focal deficit present.      Mental Status: He is alert and oriented to person, place, and time. Mental status is at baseline.           Overview/Hospital Course:  Continues to do well.  I will repeat CBC BMP for tomorrow since patient is on antibiotics.  Awaiting accepting facility so we can transferred patient for continued to have care with wound care and IV antibiotics.    Significant Labs: All pertinent labs within the past 24 hours have been reviewed.  BMP: No results for input(s): "GLU", "NA", "K", "CL", "CO2", "BUN", "CREATININE", "CALCIUM", "MG" in the last 48 hours.  CBC: No results for input(s): "WBC", "HGB", "HCT", "PLT" in the last 48 hours.  CMP: No results for input(s): "NA", "K", "CL", "CO2", "GLU", "BUN", "CREATININE", "CALCIUM", "PROT", "ALBUMIN", "BILITOT", "ALKPHOS", "AST", "ALT", "ANIONGAP", "EGFRNONAA" in the last 48 hours.    Invalid input(s): "ESTGFAFRICA"    Significant Imaging: I have reviewed all pertinent imaging results/findings within the past 24 hours.    Assessment/Plan:      Active Diagnoses:    Diagnosis Date Noted POA    PRINCIPAL PROBLEM:  Osteomyelitis [M86.9] 03/15/2024 Yes    Pressure injury of sacral region, stage 4 [L89.154] 03/19/2024 Yes    Skin ulcer of right lower leg [L97.919] 03/19/2024 Yes    Skin ulcer of left lower leg [L97.929] 03/19/2024 Yes    Unstageable pressure ulcer of right heel [L89.610] 03/19/2024 Yes    Unstageable pressure ulcer of left heel [L89.620] 03/19/2024 Yes      Problems Resolved During this Admission:     VTE Risk Mitigation (From admission, onward)           Ordered     enoxaparin injection 40 mg  Daily         03/15/24 7964                       Enzo Thibodeaux MD  Department of Hospital Medicine   [facility] - 8th Floor Med Surg    " Yes - the patient is able to be screened

## 2024-03-20 NOTE — NURSING
Pt refused to turn because he said he was finally in a position to rest. Explain to the patient that he needed to turn because of his wounds and he still refused. Explain to the patient that his wounds needed to be changed, because there was an order to change them twice a day. The patient said he did not want the dressings changed until he spoke to the doctor. Explain to the patient that I would have to make a note that he refused and the patient verbalized understanding.

## 2024-03-20 NOTE — CONSULTS
[facility]                       [address] [place]                      [place] [address]    PATIENT NAME:       HAWA CHRISTIE  YOB: 1955  CSN:                [id number]   MRN:                [id number]  ADMIT DATE:         03/15/2024 03:09:00  PHYSICIAN:          Tom Nichole DPM                            CONSULTATION    DATE OF CONSULT:  03/19/2024 00:00:00    HISTORY OF PRESENT ILLNESS:  Mr. Christie is a 69-year-old gentleman, who I had seen   roughly a year ago for a left ankle wound and possible septic arthritis.  He   had been undergoing local wound care and also had some type of intervention by   [facility].  He required debridement of the wound along with I and D of the joint.    Upon my chart review, the cultures were negative of the joint.  He was   subsequently discharged.  ID had been on the case and their recommendations were   noted.  I had not seen the gentleman since that time, which was back in early   July of last year.  He now has been readmitted to the hospital with wounds on   both extremities, lower legs, heels and sacrum.  General Surgery saw the patient   upon admission and no surgical intervention was required.  He was seen by Wound   Care today.  Their notes were reviewed and interventions were performed in   which they did do some debridement of the leg, nothing of the heel areas.  I   have now been asked to see the patient concerning further intervention.    PAST MEDICAL HISTORY:  Again is notable for peripheral artery disease, debility,   COPD, depression, hypertension, and neuropathy.    PAST SURGICAL HISTORY:  He has had previous debridement of the left ankle and   arthrotomy, neck surgery, angiograms with angioplasty in the left lower   extremity as of late, cholecystectomy, eye surgery, hand surgery, hernia repair,   had I and D of lower extremity abscess, knee surgery, total hip replacements.    MEDICATIONS:   As per the chart.    ALLERGIES:  NO KNOWN DRUG OR FOOD ALLERGIES.     SOCIAL HISTORY:  No reported tobacco, alcohol, or IV drug abuse at the current   time.  He is a reformed smoker and alcohol user.  He currently lives in a   nursing home.    FAMILY HISTORY:  Noncontributory.    PHYSICAL EXAMINATION:  GENERAL:  Reveals elderly debilitated looking gentleman currently lying in bed,   does not appear to be in any distress.  He is alert and conversive.  HEART:  Deferred.  LUNGS:  Deferred.  EXTREMITIES/VASCULAR:  The feet and legs are warm.  Unable to palpate any pedal   pulses.  He has good capillary refill to the toes.  NEUROLOGICAL:  He definitely has some loss of sensorium.  MUSCULOSKELETAL:  Notable contractures with atrophy to the extremities.  Knees   and hips are plantigrade.  DERM:  He has somewhat linear extensile wounds over the lateral aspects of both   lower legs, left greater the right with some scattered fibronecrotic tissue   still remaining after debridement from this morning.  There is a fairly well   adhered eschars to the heels with some lifting at the superior margins on that   one side.  A small superficial lesion to the medial 1st metatarsal head.  The   previous ankle wound and arthrotomy site are well healed.    ASSESSMENT:    1. Bilateral lower extremity decubitus.  2. Peripheral artery disease.  3. Sacral decubital with suspected osteomyelitis.    PLAN:  The patient instructed on the findings of physical exam.  Had some recent   interventions on the left side with Dr. Queen.  The patient was unsure as to   whether or not anything was planned for the right.  I will try to reach out to   them to see if anything was planned, but eventually will need some formal   debridements of these areas.  Unfortunately, these heel wounds will be somewhat   of a problem just because of the proximity of the underlying bones with the   findings on the right side, suggesting some periosteal activity on that    calcaneus.  The tib-fib films are relatively unremarkable on both extremities   other than the soft tissue defects.    We will continue with the current care for now.  We will reassess tomorrow.        ______________________________  XIMENA Huang/LUIS  DD:  03/19/2024  Time:  07:44PM  DT:  03/19/2024  Time:  11:55PM  Job #:  [id number]      CONSULTATION

## 2024-03-20 NOTE — PROGRESS NOTES
"Pharmacokinetic Assessment Follow Up: IV Vancomycin    Vancomycin serum concentration assessment(s):    The trough level was drawn correctly and can be used to guide therapy at this time. The measurement is above the desired definitive target range of 15 to 20 mcg/mL.    Vancomycin Regimen Plan:  Patient was on Vancomycin 1000 mg IV every 12 hours  Change regimen to Vancomycin 750 mg IV every 12 hours with next serum trough concentration measured at 1200 prior to 4th dose on 03/22        Drug levels (last 3 results):  Recent Labs   Lab Result Units 03/18/24  0922 03/20/24  0853   Vancomycin Trough ug/ml 17.1 20.8*       Vancomycin Administrations:  vancomycin given in the last 96 hours                     vancomycin in dextrose 5 % 1 gram/250 mL IVPB 1,000 mg (mg) 1,000 mg New Bag 03/19/24 2228     1,000 mg New Bag  0958     1,000 mg New Bag 03/18/24 2208     1,000 mg New Bag  1009     1,000 mg New Bag 03/17/24 2145    vancomycin 1.75 g in 5 % dextrose 500 mL IVPB (mg) 1,750 mg New Bag 03/17/24 1016                    Pharmacy will continue to follow and monitor vancomycin.    Please contact pharmacy at extension [phone] for questions regarding this assessment.    Thank you for the consult,   Sudarshan Fuller       Patient brief summary:  Shaheen Christie is a 69 y.o. male initiated on antimicrobial therapy with IV Vancomycin for treatment of sepsis    The patient's current regimen is 750 mg IV every 12 hours    Drug Allergies:   Review of patient's allergies indicates:  No Known Allergies    Actual Body Weight:  Wt Readings from Last 1 Encounters:   03/15/24 68 kg (150 lb)       Renal Function:   Estimated Creatinine Clearance: 103.4 mL/min (A) (based on SCr of 0.63 mg/dL (L)).,     Dialysis Method (if applicable):  N/A    CBC (last 72 hours):  No results for input(s): "WHITE BLOOD CELL COUNT", "HEMOGLOBIN", "HEMATOCRIT", "PLATELETS", "GRAN%", "LYMPH%", "MONO%", "EOSINOPHIL%", "BASOPHIL%", "DIFFERENTIAL METHOD" in the last " "72 hours.    Metabolic Panel (last 72 hours):  No results for input(s): "SODIUM", "POTASSIUM", "CHLORIDE", "CO2", "GLUCOSE", "BUN BLD", "CREATININE", "ALBUMIN", "BILIRUBIN TOTAL", "ALK PHOS", "AST", "ALT", "MAGNESIUM", "PHOSPHORUS" in the last 72 hours.    Microbiologic Results:  Microbiology Results (last 7 days)       Procedure Component Value Units Date/Time    Blood culture #2 **CANNOT BE ORDERED STAT** [[id number]]  (Normal) Collected: 03/15/24 0353    Order Status: Completed Specimen: Blood from Arm, Left Updated: 03/20/24 0500     CULTURE, BLOOD ([facility]) No Growth at 5 days    Blood culture #1 **CANNOT BE ORDERED STAT** [[id number]]  (Normal) Collected: 03/15/24 0353    Order Status: Completed Specimen: Blood from Arm, Right Updated: 03/20/24 0425     CULTURE, BLOOD ([facility]) No Growth at 5 days            "

## 2024-03-21 LAB
ALBUMIN SERPL-MCNC: 2.3 G/DL (ref 3.4–4.8)
ALBUMIN/GLOB SERPL: 0.7 RATIO (ref 1.1–2)
ALP SERPL-CCNC: 80 UNIT/L (ref 40–150)
ALT SERPL-CCNC: 7 UNIT/L (ref 0–55)
AST SERPL-CCNC: 10 UNIT/L (ref 5–34)
BASOPHILS # BLD AUTO: 0.12 X10(3)/MCL
BASOPHILS NFR BLD AUTO: 1.5 %
BILIRUB SERPL-MCNC: 0.2 MG/DL
BUN SERPL-MCNC: 7.6 MG/DL (ref 8.4–25.7)
CALCIUM SERPL-MCNC: 8.3 MG/DL (ref 8.8–10)
CHLORIDE SERPL-SCNC: 106 MMOL/L (ref 98–107)
CO2 SERPL-SCNC: 25 MMOL/L (ref 23–31)
CREAT SERPL-MCNC: 0.65 MG/DL (ref 0.73–1.18)
EOSINOPHIL # BLD AUTO: 0.66 X10(3)/MCL (ref 0–0.9)
EOSINOPHIL NFR BLD AUTO: 8.5 %
ERYTHROCYTE [DISTWIDTH] IN BLOOD BY AUTOMATED COUNT: 16.6 % (ref 11.5–17)
GFR SERPLBLD CREATININE-BSD FMLA CKD-EPI: >60 MLS/MIN/1.73/M2
GLOBULIN SER-MCNC: 3.3 GM/DL (ref 2.4–3.5)
GLUCOSE SERPL-MCNC: 113 MG/DL (ref 82–115)
HCT VFR BLD AUTO: 25.4 % (ref 42–52)
HGB BLD-MCNC: 7.6 G/DL (ref 14–18)
IMM GRANULOCYTES # BLD AUTO: 0.02 X10(3)/MCL (ref 0–0.04)
IMM GRANULOCYTES NFR BLD AUTO: 0.3 %
LYMPHOCYTES # BLD AUTO: 2.4 X10(3)/MCL (ref 0.6–4.6)
LYMPHOCYTES NFR BLD AUTO: 30.9 %
MCH RBC QN AUTO: 26.2 PG (ref 27–31)
MCHC RBC AUTO-ENTMCNC: 29.9 G/DL (ref 33–36)
MCV RBC AUTO: 87.6 FL (ref 80–94)
MONOCYTES # BLD AUTO: 0.87 X10(3)/MCL (ref 0.1–1.3)
MONOCYTES NFR BLD AUTO: 11.2 %
NEUTROPHILS # BLD AUTO: 3.7 X10(3)/MCL (ref 2.1–9.2)
NEUTROPHILS NFR BLD AUTO: 47.6 %
NRBC BLD AUTO-RTO: 0 %
PLATELET # BLD AUTO: 326 X10(3)/MCL (ref 130–400)
PMV BLD AUTO: 8.5 FL (ref 7.4–10.4)
POTASSIUM SERPL-SCNC: 3.8 MMOL/L (ref 3.5–5.1)
PROT SERPL-MCNC: 5.6 GM/DL (ref 5.8–7.6)
RBC # BLD AUTO: 2.9 X10(6)/MCL (ref 4.7–6.1)
SODIUM SERPL-SCNC: 138 MMOL/L (ref 136–145)
WBC # SPEC AUTO: 7.77 X10(3)/MCL (ref 4.5–11.5)

## 2024-03-21 PROCEDURE — 25000003 PHARM REV CODE 250: Performed by: FAMILY MEDICINE

## 2024-03-21 PROCEDURE — 63600175 PHARM REV CODE 636 W HCPCS: Performed by: FAMILY MEDICINE

## 2024-03-21 PROCEDURE — 25000003 PHARM REV CODE 250: Performed by: INTERNAL MEDICINE

## 2024-03-21 PROCEDURE — 11000001 HC ACUTE MED/SURG PRIVATE ROOM

## 2024-03-21 PROCEDURE — 63600175 PHARM REV CODE 636 W HCPCS: Performed by: INTERNAL MEDICINE

## 2024-03-21 PROCEDURE — 21400001 HC TELEMETRY ROOM

## 2024-03-21 RX ADMIN — Medication 6 MG: at 09:03

## 2024-03-21 RX ADMIN — HYDROCODONE BITARTRATE AND ACETAMINOPHEN 1 TABLET: 5; 325 TABLET ORAL at 04:03

## 2024-03-21 RX ADMIN — OXYCODONE HYDROCHLORIDE 5 MG: 5 TABLET ORAL at 02:03

## 2024-03-21 RX ADMIN — PIPERACILLIN SODIUM AND TAZOBACTAM SODIUM 4.5 G: 4; .5 INJECTION, POWDER, LYOPHILIZED, FOR SOLUTION INTRAVENOUS at 10:03

## 2024-03-21 RX ADMIN — VANCOMYCIN HYDROCHLORIDE 750 MG: 750 INJECTION, POWDER, LYOPHILIZED, FOR SOLUTION INTRAVENOUS at 02:03

## 2024-03-21 RX ADMIN — VANCOMYCIN HYDROCHLORIDE 750 MG: 750 INJECTION, POWDER, LYOPHILIZED, FOR SOLUTION INTRAVENOUS at 12:03

## 2024-03-21 RX ADMIN — ASPIRIN 81 MG CHEWABLE TABLET 81 MG: 81 TABLET CHEWABLE at 09:03

## 2024-03-21 RX ADMIN — DULOXETINE HYDROCHLORIDE 30 MG: 30 CAPSULE, DELAYED RELEASE ORAL at 09:03

## 2024-03-21 RX ADMIN — OXYCODONE HYDROCHLORIDE 5 MG: 5 TABLET ORAL at 09:03

## 2024-03-21 RX ADMIN — PIPERACILLIN SODIUM AND TAZOBACTAM SODIUM 4.5 G: 4; .5 INJECTION, POWDER, LYOPHILIZED, FOR SOLUTION INTRAVENOUS at 04:03

## 2024-03-21 RX ADMIN — POLYETHYLENE GLYCOL 3350 17 G: 17 POWDER, FOR SOLUTION ORAL at 09:03

## 2024-03-21 RX ADMIN — HYDROCODONE BITARTRATE AND ACETAMINOPHEN 1 TABLET: 5; 325 TABLET ORAL at 11:03

## 2024-03-21 RX ADMIN — CILOSTAZOL 50 MG: 50 TABLET ORAL at 09:03

## 2024-03-21 RX ADMIN — LIDOCAINE 5% 1 PATCH: 700 PATCH TOPICAL at 10:03

## 2024-03-21 RX ADMIN — FERROUS SULFATE TAB 325 MG (65 MG ELEMENTAL FE) 1 EACH: 325 (65 FE) TAB at 09:03

## 2024-03-21 RX ADMIN — DOCUSATE SODIUM 100 MG: 100 CAPSULE, LIQUID FILLED ORAL at 09:03

## 2024-03-21 RX ADMIN — ENOXAPARIN SODIUM 40 MG: 40 INJECTION SUBCUTANEOUS at 04:03

## 2024-03-21 RX ADMIN — THERA TABS 1 TABLET: TAB at 09:03

## 2024-03-21 RX ADMIN — PIPERACILLIN SODIUM AND TAZOBACTAM SODIUM 4.5 G: 4; .5 INJECTION, POWDER, LYOPHILIZED, FOR SOLUTION INTRAVENOUS at 01:03

## 2024-03-21 RX ADMIN — GABAPENTIN 600 MG: 300 CAPSULE ORAL at 09:03

## 2024-03-21 RX ADMIN — CLOPIDOGREL BISULFATE 75 MG: 75 TABLET ORAL at 09:03

## 2024-03-21 RX ADMIN — GABAPENTIN 300 MG: 300 CAPSULE ORAL at 02:03

## 2024-03-21 RX ADMIN — OXYCODONE HYDROCHLORIDE 5 MG: 5 TABLET ORAL at 06:03

## 2024-03-21 RX ADMIN — AMLODIPINE BESYLATE 10 MG: 5 TABLET ORAL at 09:03

## 2024-03-21 RX ADMIN — GABAPENTIN 300 MG: 300 CAPSULE ORAL at 09:03

## 2024-03-21 RX ADMIN — OXYCODONE HYDROCHLORIDE 5 MG: 5 TABLET ORAL at 05:03

## 2024-03-21 RX ADMIN — LOSARTAN POTASSIUM 100 MG: 50 TABLET, FILM COATED ORAL at 09:03

## 2024-03-21 NOTE — CONSULTS
Inpatient consult to Cardiology  Consult performed by: Katelyn Leslie FNP  Consult ordered by: Tom Nichole DPM  Reason for consult: PAD        [facility]    Cardiology  Consult Note    Patient Name: Shaheen Christie  MRN: [id number]  Admission Date: 3/15/2024  Hospital Length of Stay: 6 days  Code Status: Prior   Attending Provider: Enzo Thibodeaux MD   Consulting Provider: ANGEL Anglin  Primary Care Physician: Viktor Russ MD  Principal Problem:Osteomyelitis    Patient information was obtained from patient, past medical records, ER records, and primary team.     Subjective:   Chief Complaint/Reason for Consult: PAD    HPI:   Mr. Christie is a 69 year old male, known to Dr. Queen, who presented to the hospital from local nursing home ([facility]) due to sacral wound evaluation. He was diagnosed with sacral decubitus with osteomyelitis on 2.27.24. Patient was sent to the hospital for antibiotic management. Also noted to have history of PAD with chronic bilateral LE Wounds. Patient with history of Chronic left lower extremity wound with history of polymicrobial wound culture-Pseudomonas, ESBL Proteus, Providencia. Also with history of polymicrobial sacral wound culture-MRSA, Providencia, Pseudomonas, Enterococcus. ID is involved in his care. Podiatry is following and is seeing the patient due to his lower extremity wounds. [facility] is consulted for evaluation to see if further revascularization of lower extremities is warranted.  Of note, patient underwent peripheral angiogram on 3.13.24 where he was found to have excellent 3 vessel runoff with noted stenosis of right SFA, also noted to have non-critical stenosis in the left SFA.     PMH: PAD, Nicotine Dependence, Hypertension, Neuropathy, Sacral Ulceration, Chronic BLE Wounds, COPD   PSH: Peripheral Angiogram, Neck Surgery, Laminectomy, Hip Surgery, Knee Surgery, Cholecystectomy, Eye Surgery  Family History: Mother-  Family History of CAD  Social History: Tobacco- Former Use (Quit in 2023), Alcohol- Former Beer Consumption (No Active Use), Substance Abuse- None     Previous Cardiac Diagnostics:   Peripheral Angiogram (3.13.24):  Findings  Left SFA patent   Left popliteal artery noncritical stenosis   Left leg has two-vessel runoff anterior tibial artery and peroneal artery  Right SFA patent   Right popliteal artery has a trifurcating lesion in the distal popliteal artery  Summary   Patient found to have noncritical stenosis in the left SFA   Right SFA has stenosis however has got excellent three-vessel runoff     Peripheral Angiogram with Intervention (4.3.23):  Underwent Successful Laser Atherectomy of the Right Common Femoral Artery followed by Balloon Angioplasty. Patient also underwent left posterior tibial artery balloon angioplasty and left peroneal artery balloon angioplasty.    Abdominal US (9.27.22):  The study quality is good.   An aneurysm is noted in the distal abdominal aorta. The aneurysm measures 6.9 cm in the longitudinal dimension, 3.9 cm in the transverse dimension and 3.8 cm in the anterior/posterior dimension.  Mild hypoechoic plaque is noted  in the distal abdominal aorta.   All other segments appear within normal size limits.    Peripheral Angiogram with Intervention (8.29.22):  Underwent Successful Laser Atherectomy balloon angioplasty and stenting of the Left SFA, Popliteal, and laser atherectomy balloon angioplasty of the left posterior tibial artery.    Coronary Angiogram (2.17.14):  LM: Patent, LAD: Patent, D1: 20-30% Ostial Stenosis, LCX: Patent, RCA: Patent with 20% Stenosis in Proximal Vessel.  EF 55%    Review of patient's allergies indicates:  No Known Allergies  Current Facility-Administered Medications on File Prior to Encounter   Medication    diazePAM tablet 10 mg    diphenhydrAMINE capsule 50 mg    iopamidoL (ISOVUE-370) injection     Current Outpatient Medications on File Prior to Encounter    Medication Sig    amLODIPine (NORVASC) 10 MG tablet Take 10 mg by mouth once daily.    aspirin 81 MG Chew Take 81 mg by mouth once daily.    cilostazoL (PLETAL) 50 MG Tab Take 50 mg by mouth 2 (two) times daily.    clopidogreL (PLAVIX) 75 mg tablet Take 75 mg by mouth once daily.    docusate sodium (COLACE) 100 MG capsule Take 100 mg by mouth 2 (two) times daily.    DULoxetine (CYMBALTA) 30 MG capsule Take 30 mg by mouth once daily.    ferrous sulfate 325 (65 FE) MG EC tablet Take 325 mg by mouth once daily.    gabapentin (NEURONTIN) 300 MG capsule Take 1 capsule (300 mg total) by mouth 3 (three) times daily.    LIDOcaine (LIDODERM) 5 % Place 1 patch onto the skin every 24 hours. Remove & Discard patch within 12 hours or as directed by MD    losartan (COZAAR) 100 MG tablet Take 100 mg by mouth once daily.    melatonin (MELATIN) 3 mg tablet Take 6 mg by mouth nightly as needed for Insomnia.    multivitamin (THERAGRAN) per tablet Take 1 tablet by mouth once daily.    oxyCODONE (ROXICODONE) 10 mg Tab immediate release tablet Take 10 mg by mouth every 4 (four) hours.    polyethylene glycol (GLYCOLAX) 17 gram PwPk Take 17 g by mouth once daily.    acetaminophen (TYLENOL) 325 MG tablet Take 650 mg by mouth every 6 (six) hours as needed for Pain.    albuterol (PROVENTIL/VENTOLIN HFA) 90 mcg/actuation inhaler Inhale into the lungs 4 (four) times daily as needed.    aluminum & magnesium hydroxide-simethicone (MYLANTA MAX STRENGTH) 400-400-40 mg/5 mL suspension Take by mouth every 6 (six) hours as needed for Indigestion.    gabapentin (NEURONTIN) 600 MG tablet Take 600 mg by mouth every evening.    simethicone (MYLICON) 80 MG chewable tablet Take 80 mg by mouth every 6 (six) hours as needed for Flatulence.     Review of Systems   Respiratory:  Negative for chest tightness and shortness of breath.    Cardiovascular:  Negative for chest pain.   Musculoskeletal:         Denies Leg Pain. Chronic Right Hip Pain which  significantly limited mobility   All other systems reviewed and are negative.    Objective:     Vital Signs (Most Recent):  Temp: 97.9 °F (36.6 °C) (03/21/24 0740)  Pulse: 68 (03/21/24 0740)  Resp: 18 (03/21/24 0740)  BP: 131/65 (03/21/24 0740)  SpO2: (!) 89 % (03/21/24 0740) Vital Signs (24h Range):  Temp:  [97.9 °F (36.6 °C)-99.8 °F (37.7 °C)] 97.9 °F (36.6 °C)  Pulse:  [60-79] 68  Resp:  [16-18] 18  SpO2:  [89 %-94 %] 89 %  BP: (100-143)/(52-76) 131/65   Weight: 68 kg (150 lb)  Body mass index is 22.14 kg/m².  SpO2: (!) 89 %       Intake/Output Summary (Last 24 hours) at 3/21/2024 0758  Last data filed at 3/21/2024 0610  Gross per 24 hour   Intake 360 ml   Output 1400 ml   Net -1040 ml     Lines/Drains/Airways       Peripheral Intravenous Line  Duration                  Peripheral IV - Single Lumen 03/15/24 0327 18 G Right Forearm 6 days                  Significant Labs:  Recent Results (from the past 72 hour(s))   VANCOMYCIN, TROUGH    Collection Time: 03/18/24  9:22 AM   Result Value Ref Range    Vancomycin Trough 17.1 15.0 - 20.0 ug/ml   C-Reactive Protein    Collection Time: 03/18/24  9:22 AM   Result Value Ref Range    C-Reactive Protein 23.70 (H) <5.00 mg/L   Sedimentation rate    Collection Time: 03/19/24  3:37 AM   Result Value Ref Range    Sed Rate 12 0 - 15 mm/hr   Prealbumin    Collection Time: 03/19/24  3:58 PM   Result Value Ref Range    Prealbumin 13.8 (L) 16.0 - 42.0 mg/dL   VANCOMYCIN, TROUGH    Collection Time: 03/20/24  8:53 AM   Result Value Ref Range    Vancomycin Trough 20.8 (H) 15.0 - 20.0 ug/ml   Comprehensive Metabolic Panel    Collection Time: 03/20/24 11:56 PM   Result Value Ref Range    Sodium Level 138 136 - 145 mmol/L    Potassium Level 3.8 3.5 - 5.1 mmol/L    Chloride 106 98 - 107 mmol/L    Carbon Dioxide 25 23 - 31 mmol/L    Glucose Level 113 82 - 115 mg/dL    Blood Urea Nitrogen 7.6 (L) 8.4 - 25.7 mg/dL    Creatinine 0.65 (L) 0.73 - 1.18 mg/dL    Calcium Level Total 8.3 (L) 8.8 -  10.0 mg/dL    Protein Total 5.6 (L) 5.8 - 7.6 gm/dL    Albumin Level 2.3 (L) 3.4 - 4.8 g/dL    Globulin 3.3 2.4 - 3.5 gm/dL    Albumin/Globulin Ratio 0.7 (L) 1.1 - 2.0 ratio    Bilirubin Total 0.2 <=1.5 mg/dL    Alkaline Phosphatase 80 40 - 150 unit/L    Alanine Aminotransferase 7 0 - 55 unit/L    Aspartate Aminotransferase 10 5 - 34 unit/L    eGFR >60 mls/min/1.73/m2   CBC with Differential    Collection Time: 03/20/24 11:56 PM   Result Value Ref Range    WBC 7.77 4.50 - 11.50 x10(3)/mcL    RBC 2.90 (L) 4.70 - 6.10 x10(6)/mcL    Hgb 7.6 (L) 14.0 - 18.0 g/dL    Hct 25.4 (L) 42.0 - 52.0 %    MCV 87.6 80.0 - 94.0 fL    MCH 26.2 (L) 27.0 - 31.0 pg    MCHC 29.9 (L) 33.0 - 36.0 g/dL    RDW 16.6 11.5 - 17.0 %    Platelet 326 130 - 400 x10(3)/mcL    MPV 8.5 7.4 - 10.4 fL    Neut % 47.6 %    Lymph % 30.9 %    Mono % 11.2 %    Eos % 8.5 %    Basophil % 1.5 %    Lymph # 2.40 0.6 - 4.6 x10(3)/mcL    Neut # 3.70 2.1 - 9.2 x10(3)/mcL    Mono # 0.87 0.1 - 1.3 x10(3)/mcL    Eos # 0.66 0 - 0.9 x10(3)/mcL    Baso # 0.12 <=0.2 x10(3)/mcL    IG# 0.02 0 - 0.04 x10(3)/mcL    IG% 0.3 %    NRBC% 0.0 %     Significant Imaging:  Imaging Results    None       EKG:      Telemetry:  SR    Physical Exam  Vitals and nursing note reviewed.   Constitutional:       General: He is not in acute distress.     Appearance: He is ill-appearing.   HENT:      Head: Normocephalic.      Mouth/Throat:      Mouth: Mucous membranes are moist.      Pharynx: Oropharynx is clear.   Cardiovascular:      Rate and Rhythm: Normal rate and regular rhythm.   Pulmonary:      Effort: Pulmonary effort is normal. No respiratory distress.      Breath sounds: No rales.   Abdominal:      Palpations: Abdomen is soft.   Musculoskeletal:      Cervical back: Neck supple.      Right lower leg: No edema.      Left lower leg: No edema.      Comments: Bilateral Lower Extremities with Leg Wrappings in place. BLE Warm to Touch.   Skin:     General: Skin is warm and dry.   Neurological:       Mental Status: He is alert. Mental status is at baseline.   Psychiatric:         Mood and Affect: Mood normal.         Behavior: Behavior normal.       Home Medications:   Current Facility-Administered Medications on File Prior to Encounter   Medication Dose Route Frequency Provider Last Rate Last Admin    diazePAM tablet 10 mg  10 mg Oral On Call Procedure Deven Queen MD   10 mg at 03/13/24 0618    diphenhydrAMINE capsule 50 mg  50 mg Oral On Call Procedure Deven Queen MD   50 mg at 03/13/24 0619    iopamidoL (ISOVUE-370) injection    PRN Deven Queen MD   40 mL at 03/13/24 0809     Current Outpatient Medications on File Prior to Encounter   Medication Sig Dispense Refill    amLODIPine (NORVASC) 10 MG tablet Take 10 mg by mouth once daily.      aspirin 81 MG Chew Take 81 mg by mouth once daily.      cilostazoL (PLETAL) 50 MG Tab Take 50 mg by mouth 2 (two) times daily.      clopidogreL (PLAVIX) 75 mg tablet Take 75 mg by mouth once daily.      docusate sodium (COLACE) 100 MG capsule Take 100 mg by mouth 2 (two) times daily.      DULoxetine (CYMBALTA) 30 MG capsule Take 30 mg by mouth once daily.      ferrous sulfate 325 (65 FE) MG EC tablet Take 325 mg by mouth once daily.      gabapentin (NEURONTIN) 300 MG capsule Take 1 capsule (300 mg total) by mouth 3 (three) times daily.      LIDOcaine (LIDODERM) 5 % Place 1 patch onto the skin every 24 hours. Remove & Discard patch within 12 hours or as directed by MD      losartan (COZAAR) 100 MG tablet Take 100 mg by mouth once daily.      melatonin (MELATIN) 3 mg tablet Take 6 mg by mouth nightly as needed for Insomnia.      multivitamin (THERAGRAN) per tablet Take 1 tablet by mouth once daily.      oxyCODONE (ROXICODONE) 10 mg Tab immediate release tablet Take 10 mg by mouth every 4 (four) hours.      polyethylene glycol (GLYCOLAX) 17 gram PwPk Take 17 g by mouth once daily. 10 each 0    acetaminophen (TYLENOL) 325 MG tablet Take 650 mg by mouth every 6 (six)  hours as needed for Pain.      albuterol (PROVENTIL/VENTOLIN HFA) 90 mcg/actuation inhaler Inhale into the lungs 4 (four) times daily as needed.      aluminum & magnesium hydroxide-simethicone (MYLANTA MAX STRENGTH) 400-400-40 mg/5 mL suspension Take by mouth every 6 (six) hours as needed for Indigestion.      gabapentin (NEURONTIN) 600 MG tablet Take 600 mg by mouth every evening.      simethicone (MYLICON) 80 MG chewable tablet Take 80 mg by mouth every 6 (six) hours as needed for Flatulence.       Current Inpatient Medications:    Current Facility-Administered Medications:     acetaminophen tablet 650 mg, 650 mg, Oral, Q6H PRN, Enzo Thibodeaux MD    albuterol inhaler 2 puff, 2 puff, Inhalation, QID PRN, Enzo Thibodeaux MD    aluminum-magnesium hydroxide-simethicone 200-200-20 mg/5 mL suspension 15 mL, 15 mL, Oral, Q6H PRN, Enzo Thibodeaux MD    amLODIPine tablet 10 mg, 10 mg, Oral, Daily, Enzo Thibodeaux MD, 10 mg at 03/19/24 1000    aspirin chewable tablet 81 mg, 81 mg, Oral, Daily, Enzo Thibodeaux MD, 81 mg at 03/20/24 0920    cilostazoL tablet 50 mg, 50 mg, Oral, BID, Enzo Thibodeaux MD, 50 mg at 03/20/24 2152    clopidogreL tablet 75 mg, 75 mg, Oral, Daily, Enzo Thibodeaux MD, 75 mg at 03/20/24 0921    docusate sodium capsule 100 mg, 100 mg, Oral, BID, Enzo Thibodeaux MD, 100 mg at 03/20/24 2152    DULoxetine DR capsule 30 mg, 30 mg, Oral, Daily, Enzo Thibodeaux MD, 30 mg at 03/20/24 0920    enoxaparin injection 40 mg, 40 mg, Subcutaneous, Daily, Enzo Thibodeaux MD, 40 mg at 03/20/24 1718    ferrous sulfate tablet 1 each, 1 tablet, Oral, Daily, Enzo Thibodeaux MD, 1 each at 03/20/24 0920    gabapentin capsule 300 mg, 300 mg, Oral, TID, Enzo Thibodeaux MD, 300 mg at 03/20/24 2153    gabapentin capsule 600 mg, 600 mg, Oral, QHS, Enzo Thibodeaux MD, 600 mg at 03/20/24 2152    HYDROcodone-acetaminophen 5-325 mg per tablet 1 tablet, 1 tablet, Oral, Q6H PRN, Enzo Thibodeaux,  MD, 1 tablet at 03/20/24 2022    LIDOcaine 5 % patch 1 patch, 1 patch, Transdermal, Q24H, Enzo Thibodeaux MD, 1 patch at 03/20/24 0921    losartan tablet 100 mg, 100 mg, Oral, Daily, Enzo Thibodeaux MD, 100 mg at 03/19/24 1000    melatonin tablet 6 mg, 6 mg, Oral, Nightly PRN, Enzo Thibodeaux MD, 6 mg at 03/20/24 2152    multivitamin tablet, 1 tablet, Oral, Daily, Enzo Thibodeaux MD, 1 tablet at 03/20/24 0920    oxyCODONE immediate release tablet 5 mg, 5 mg, Oral, Q4H, TRAV Arango MD, 5 mg at 03/21/24 0623    piperacillin-tazobactam (ZOSYN) 4.5 g in dextrose 5 % in water (D5W) 100 mL IVPB (MB+), 4.5 g, Intravenous, Q8H, TRAV Arango MD, Stopped at 03/21/24 0527    polyethylene glycol packet 17 g, 17 g, Oral, Daily, Enzo Thibodeaux MD, 17 g at 03/18/24 1001    simethicone chewable tablet 80 mg, 80 mg, Oral, Q6H PRN, Enzo Thibodeaux MD    vancomycin - pharmacy to dose, , Intravenous, pharmacy to manage frequency, Enzo Thibodeaux MD    vancomycin 750 mg in dextrose 5 % 250 mL IVPB (ready to mix), 750 mg, Intravenous, Q12H, Enzo Thibodeaux MD, Stopped at 03/21/24 0127    Facility-Administered Medications Ordered in Other Encounters:     diazePAM tablet 10 mg, 10 mg, Oral, On Call Procedure, Deven Queen MD, 10 mg at 03/13/24 0618    diphenhydrAMINE capsule 50 mg, 50 mg, Oral, On Call Procedure, Deven Queen MD, 50 mg at 03/13/24 0619    iopamidoL (ISOVUE-370) injection, , , PRN, Deven Queen MD, 40 mL at 03/13/24 0809  VTE Risk Mitigation (From admission, onward)           Ordered     enoxaparin injection 40 mg  Daily         03/15/24 0934                  Assessment:   PAD    - Patient found to have noncritical stenosis in the left SFA/Right SFA has stenosis however has got excellent three-vessel runoff (3.13.24)    - Laser Atherectomy/Balloon Angioplasty RCFA & Left PTA Balloon Angioplasty & Left Peroneal Artery Balloon Angioplasty (4.3.23)  Chronic Bilateral Lower Extremity  Wounds  Chronic Sacral Pressure Wound with Osteomyelitis  CAD (Mild Nonobstructive in 2014)  Hypertension  Hyperlipidemia  Right Bundle Branch Block/First Degree AV Block  Former Smoker  DM II  Anemia    - Fe Deficiency  Protein Calorie Malnutrition  Peripheral Neuropathy    Plan:   Continue Current Cardiac Medications including DAPT  Peripheral Angiogram on 3.13.24 revealed non-critical stenosis in the left SFA & Right SFA has stenosis however with excellent three-vessel runoff .    Thank you for your consult.     Katelyn Leslie, ANGEL  Cardiology  [facility] - 8th Floor Med Surg  03/21/2024     I personally reviewed the NP history and physical above.  See below MDM component performed by me (Paul Friedman MD):    PAD  Chronic sacral wound and BLE wounds  HTN  DM  HLP    Recs:  Cont DAPT and other meds  Bernice did LHC last week and felt that there was no indication for revasc, as he has good 3 vessel runoff  He can f/u in clinic as planned

## 2024-03-21 NOTE — PROGRESS NOTES
[facility] 8th Floor [facility] Medicine  Progress Note    Patient Name: Shaheen Christie  MRN: [id number]  Patient Class: IP- Inpatient   Admission Date: 3/15/2024  Length of Stay: 6 days  Attending Physician: Enzo Thibodeaux MD  Primary Care Provider: Viktor Russ MD        Subjective:     Principal Problem:Osteomyelitis    Interval History:  Very quiet night.  No changes.  Infectious Disease saw patient yesterday.  Plan is to continue IV antibiotics for 6 weeks total.  This includes very aggressive wound care and nutritional support.    Review of Systems   All other systems reviewed and are negative.    Objective:     Vital Signs (Most Recent):  Temp: 97.9 °F (36.6 °C) (03/21/24 0740)  Pulse: 68 (03/21/24 0740)  Resp: 18 (03/21/24 0740)  BP: 131/65 (03/21/24 0740)  SpO2: (!) 89 % (03/21/24 0740) Vital Signs (24h Range):  Temp:  [97.9 °F (36.6 °C)-99.8 °F (37.7 °C)] 97.9 °F (36.6 °C)  Pulse:  [60-79] 68  Resp:  [16-18] 18  SpO2:  [89 %-94 %] 89 %  BP: (100-143)/(52-76) 131/65     Weight: 68 kg (150 lb)  Body mass index is 22.14 kg/m².    Intake/Output Summary (Last 24 hours) at 3/21/2024 0746  Last data filed at 3/21/2024 0610  Gross per 24 hour   Intake 360 ml   Output 1400 ml   Net -1040 ml      Physical Exam  HENT:      Head: Normocephalic and atraumatic.      Right Ear: External ear normal.      Left Ear: External ear normal.      Mouth/Throat:      Mouth: Mucous membranes are dry.   Eyes:      Extraocular Movements: Extraocular movements intact.   Cardiovascular:      Rate and Rhythm: Normal rate and regular rhythm.      Pulses: Normal pulses.      Heart sounds: Normal heart sounds.   Pulmonary:      Effort: Pulmonary effort is normal.      Breath sounds: Normal breath sounds.   Abdominal:      General: Bowel sounds are normal.      Palpations: Abdomen is soft.   Musculoskeletal:      Cervical back: Neck supple.   Skin:     General: Skin is warm.   Neurological:       General: No focal deficit present.      Mental Status: He is alert. Mental status is at baseline.   Psychiatric:         Mood and Affect: Mood normal.           Overview/Hospital Course:  We will repeat blood work for tomorrow patient getting anemic.  Secondary to infection and to wound losses.  Continue IV antibiotics awaiting placement.    Significant Labs: All pertinent labs within the past 24 hours have been reviewed.  BMP:   Recent Labs   Lab 03/20/24  2356      K 3.8   CO2 25   BUN 7.6*   CREATININE 0.65*   CALCIUM 8.3*     CBC:   Recent Labs   Lab 03/20/24  2356   WBC 7.77   HGB 7.6*   HCT 25.4*        CMP:   Recent Labs   Lab 03/20/24  2356      K 3.8   CO2 25   BUN 7.6*   CREATININE 0.65*   CALCIUM 8.3*   ALBUMIN 2.3*   BILITOT 0.2   ALKPHOS 80   AST 10   ALT 7       Significant Imaging: I have reviewed all pertinent imaging results/findings within the past 24 hours.    Assessment/Plan:      Active Diagnoses:    Diagnosis Date Noted POA    PRINCIPAL PROBLEM:  Osteomyelitis [M86.9] 03/15/2024 Yes    Pressure injury of sacral region, stage 4 [L89.154] 03/19/2024 Yes    Skin ulcer of right lower leg [L97.919] 03/19/2024 Yes    Skin ulcer of left lower leg [L97.929] 03/19/2024 Yes    Unstageable pressure ulcer of right heel [L89.610] 03/19/2024 Yes    Unstageable pressure ulcer of left heel [L89.620] 03/19/2024 Yes      Problems Resolved During this Admission:     VTE Risk Mitigation (From admission, onward)           Ordered     enoxaparin injection 40 mg  Daily         03/15/24 0934                       Enzo Thibodeaux MD  Department of Hospital Medicine   [facility] - 8th Floor Med Surg

## 2024-03-21 NOTE — NURSING
Patient dressings changed on B/L LE and B/L Heels. Patient sacral wound dressing was changed. Patient was turned and B/L heel boots placed.

## 2024-03-21 NOTE — PROGRESS NOTES
Infectious Diseases Progress Note  69-year-old male, nursing home resident, with past medical history of HTN, COPD, DM type 2, with neuropathy, is admitted to [facility] on 03/15/2024 sent via EMS for sacral wound evaluation, apparently diagnosed with osteomyelitis involving the sacrum on 02/27/2024 with plan to be admitted to LTAC which had not been successful as an outpatient.  On presentation he was noted to have no fevers and no leukocytosis, anemic with low albumin.  Blood cultures have been negative.  Review of his records show a 02/08/2024 left leg wound culture with Pseudomonas, Providencia, ESBL Proteus and 11/30/2023 sacral wound culture with MRSA, Providencia, Enterococcus and Pseudomonas.  2/27 MRI of the pelvis shows osteomyelitis involving the sacrococcygeal bone as well as right trochanteric bursitis which may be septic.  He was seen by surgery team with no plan for surgical intervention.  He is on antibiotic coverage with vancomycin and Zosyn.     Subjective:  No new complaints, low-grade temp noted, doing about the same.  Lying in bed in no acute distress      Past Medical History:   Diagnosis Date    COPD (chronic obstructive pulmonary disease)     Depression     Hypertension     Neuropathy      Past Surgical History:   Procedure Laterality Date    angiogram Bilateral     stents placed in left leg    anterior neck surgery      ARTHROTOMY OF ANKLE Left 6/28/2023    Procedure: ARTHROTOMY, ANKLE;  Surgeon: Tom Nichole DPM;  Location: [facility];  Service: Podiatry;  Laterality: Left;    cataracts Left     CHOLECYSTECTOMY      EYE SURGERY Left     HAND SURGERY Left     broken bone    herina repair      INCISION AND DRAINAGE, LOWER EXTREMITY Left 7/28/2023    Procedure: INCISION AND DRAINAGE, LOWER EXTREMITY;  Surgeon: Avinash Garces MD;  Location: [facility];  Service: Orthopedics;  Laterality: Left;  Supine, vascular bed, bone foam  last case  cysto tubing, laparoscopic  trocar, experience, vanc, hemovac drain    KNEE SURGERY Bilateral     PERIPHERAL ANGIOGRAPHY N/A 3/13/2024    Procedure: Peripheral angiography;  Surgeon: Deven Queen MD;  Location: [facility] CATH LAB;  Service: Cardiology;  Laterality: N/A;  MICHELLE ANGIO W/ ANEST.    posterior neck surgery      SPINE SURGERY      TOTAL REPLACEMENT OF BOTH HIP JOINTS USING COMPUTER-ASSISTED NAVIGATION Bilateral      Social History     Socioeconomic History    Marital status:    Occupational History    Occupation: retired   Tobacco Use    Smoking status: Former     Average packs/day: 0.5 packs/day for 16.2 years (7.5 ttl pk-yrs)     Types: Cigarettes     Start date: 2008    Smokeless tobacco: Never   Substance and Sexual Activity    Alcohol use: Yes     Alcohol/week: 3.0 - 6.0 standard drinks of alcohol     Types: 3 - 6 Cans of beer per week    Drug use: Yes     Types: Marijuana    Sexual activity: Not Currently       ROS  Constitutional:  Positive for malaise/fatigue.   HENT: Negative.     Respiratory: Negative.     Gastrointestinal: Negative.    Genitourinary: Negative.    Musculoskeletal: Negative.    Skin:         Multiple pressure wounds   Neurological:  Positive for weakness.   Endo/Heme/Allergies: Negative.    Psychiatric/Behavioral: Negative.     All other Systems review done and negative.    Review of patient's allergies indicates:  No Known Allergies      Scheduled Meds:   amLODIPine  10 mg Oral Daily    aspirin  81 mg Oral Daily    cilostazoL  50 mg Oral BID    clopidogreL  75 mg Oral Daily    docusate sodium  100 mg Oral BID    DULoxetine  30 mg Oral Daily    enoxaparin  40 mg Subcutaneous Daily    ferrous sulfate  1 tablet Oral Daily    gabapentin  300 mg Oral TID    gabapentin  600 mg Oral QHS    LIDOcaine  1 patch Transdermal Q24H    losartan  100 mg Oral Daily    multivitamin  1 tablet Oral Daily    oxyCODONE  5 mg Oral Q4H    piperacillin-tazobactam (Zosyn) IV (PEDS and ADULTS) (extended infusion is not  "appropriate)  4.5 g Intravenous Q8H    polyethylene glycol  17 g Oral Daily    vancomycin (VANCOCIN) IV (PEDS and ADULTS)  750 mg Intravenous Q12H     Continuous Infusions:  PRN Meds:acetaminophen, albuterol, aluminum-magnesium hydroxide-simethicone, HYDROcodone-acetaminophen, melatonin, simethicone, vancomycin - pharmacy to dose    Objective:  /62   Pulse 72   Temp 99 °F (37.2 °C) (Oral)   Resp 18   Ht 5' 9.02" (1.753 m)   Wt 68 kg (150 lb)   SpO2 97%   BMI 22.14 kg/m²     Physical Exam:   Physical Exam  Vitals reviewed.   Constitutional:       General: He is not in acute distress.  HENT:      Head: Normocephalic and atraumatic.   Cardiovascular:      Rate and Rhythm: Normal rate and regular rhythm.      Heart sounds: Normal heart sounds.   Pulmonary:      Effort: Pulmonary effort is normal. No respiratory distress.      Breath sounds: Normal breath sounds.   Abdominal:      General: Bowel sounds are normal. There is no distension.      Palpations: Abdomen is soft.      Tenderness: There is no abdominal tenderness.   Musculoskeletal:         General: No deformity.      Cervical back: Neck supple.   Skin:     Findings: No erythema or rash.      Comments: Wounds dressed   Neurological:      Mental Status: He is alert and oriented to person, place, and time.   Psychiatric:      Comments: Calm and cooperative     Imaging  Imaging Results    None          Lab Review   Recent Results (from the past 24 hour(s))   Comprehensive Metabolic Panel    Collection Time: 03/20/24 11:56 PM   Result Value Ref Range    Sodium Level 138 136 - 145 mmol/L    Potassium Level 3.8 3.5 - 5.1 mmol/L    Chloride 106 98 - 107 mmol/L    Carbon Dioxide 25 23 - 31 mmol/L    Glucose Level 113 82 - 115 mg/dL    Blood Urea Nitrogen 7.6 (L) 8.4 - 25.7 mg/dL    Creatinine 0.65 (L) 0.73 - 1.18 mg/dL    Calcium Level Total 8.3 (L) 8.8 - 10.0 mg/dL    Protein Total 5.6 (L) 5.8 - 7.6 gm/dL    Albumin Level 2.3 (L) 3.4 - 4.8 g/dL    Globulin " 3.3 2.4 - 3.5 gm/dL    Albumin/Globulin Ratio 0.7 (L) 1.1 - 2.0 ratio    Bilirubin Total 0.2 <=1.5 mg/dL    Alkaline Phosphatase 80 40 - 150 unit/L    Alanine Aminotransferase 7 0 - 55 unit/L    Aspartate Aminotransferase 10 5 - 34 unit/L    eGFR >60 mls/min/1.73/m2   CBC with Differential    Collection Time: 03/20/24 11:56 PM   Result Value Ref Range    WBC 7.77 4.50 - 11.50 x10(3)/mcL    RBC 2.90 (L) 4.70 - 6.10 x10(6)/mcL    Hgb 7.6 (L) 14.0 - 18.0 g/dL    Hct 25.4 (L) 42.0 - 52.0 %    MCV 87.6 80.0 - 94.0 fL    MCH 26.2 (L) 27.0 - 31.0 pg    MCHC 29.9 (L) 33.0 - 36.0 g/dL    RDW 16.6 11.5 - 17.0 %    Platelet 326 130 - 400 x10(3)/mcL    MPV 8.5 7.4 - 10.4 fL    Neut % 47.6 %    Lymph % 30.9 %    Mono % 11.2 %    Eos % 8.5 %    Basophil % 1.5 %    Lymph # 2.40 0.6 - 4.6 x10(3)/mcL    Neut # 3.70 2.1 - 9.2 x10(3)/mcL    Mono # 0.87 0.1 - 1.3 x10(3)/mcL    Eos # 0.66 0 - 0.9 x10(3)/mcL    Baso # 0.12 <=0.2 x10(3)/mcL    IG# 0.02 0 - 0.04 x10(3)/mcL    IG% 0.3 %    NRBC% 0.0 %       Assessment/Plan:  1. Chronic sacral pressure wound with osteomyelitis  2. History of polymicrobial sacral wound culture-MRSA, Providencia, Pseudomonas, Enterococcus  3.  Chronic left lower extremity wound with history of polymicrobial wound culture-Pseudomonas, ESBL Proteus, Providencia  4. Multiple pressure wounds  5.  Diabetes type 2  6.  Anemia  7.  Protein calorie malnutrition      -Continue vancomycin and Zosyn, plan end date of 04/26, following inflammatory markers  -No fevers noted and no leukocytosis  -3/19 ESR 12 and CRP 23.7, follow   -3/15 blood cultures negative  -2/8 left leg wound cultures with many Pseudomonas, Providencia, Proteus  -11/30/2023 sacral wound with many Providencia, MRSA, Enterococcus, Pseudomonas  -Seen by the surgery team with inputs noted including no plan for surgical intervention   -We will continue aggressive wound care per the wound care team  - Discussed with patient and nursing staff.  Case  management working on discharge to nursing facility.  Disposition per primary team

## 2024-03-21 NOTE — PROGRESS NOTES
Infectious Diseases Progress Note  69-year-old male, nursing home resident, with past medical history of HTN, COPD, DM type 2, with neuropathy, is admitted to [facility] on 03/15/2024 sent via EMS for sacral wound evaluation, apparently diagnosed with osteomyelitis involving the sacrum on 02/27/2024 with plan to be admitted to LTAC which had not been successful as an outpatient.  On presentation he was noted to have no fevers and no leukocytosis, anemic with low albumin.  Blood cultures have been negative.  Review of his records show a 02/08/2024 left leg wound culture with Pseudomonas, Providencia, ESBL Proteus and 11/30/2023 sacral wound culture with MRSA, Providencia, Enterococcus and Pseudomonas.  2/27 MRI of the pelvis shows osteomyelitis involving the sacrococcygeal bone as well as right trochanteric bursitis which may be septic.  He was seen by surgery team with no plan for surgical intervention.  He is on antibiotic coverage with vancomycin and Zosyn.       Subjective:  No new complaints, low-grade temp noted, doing about the same.  Lying in bed in no acute distress      Past Medical History:   Diagnosis Date    COPD (chronic obstructive pulmonary disease)     Depression     Hypertension     Neuropathy      Past Surgical History:   Procedure Laterality Date    angiogram Bilateral     stents placed in left leg    anterior neck surgery      ARTHROTOMY OF ANKLE Left 6/28/2023    Procedure: ARTHROTOMY, ANKLE;  Surgeon: Tom Nichole DPM;  Location: [facility];  Service: Podiatry;  Laterality: Left;    cataracts Left     CHOLECYSTECTOMY      EYE SURGERY Left     HAND SURGERY Left     broken bone    herina repair      INCISION AND DRAINAGE, LOWER EXTREMITY Left 7/28/2023    Procedure: INCISION AND DRAINAGE, LOWER EXTREMITY;  Surgeon: Avinash Garces MD;  Location: [facility];  Service: Orthopedics;  Laterality: Left;  Supine, vascular bed, bone foam  last case  cysto tubing,  laparoscopic trocar, experience, vanc, hemovac drain    KNEE SURGERY Bilateral     PERIPHERAL ANGIOGRAPHY N/A 3/13/2024    Procedure: Peripheral angiography;  Surgeon: Deven Queen MD;  Location: [facility] CATH LAB;  Service: Cardiology;  Laterality: N/A;  MICHELLE ANGIO W/ ANEST.    posterior neck surgery      SPINE SURGERY      TOTAL REPLACEMENT OF BOTH HIP JOINTS USING COMPUTER-ASSISTED NAVIGATION Bilateral      Social History     Socioeconomic History    Marital status:    Occupational History    Occupation: retired   Tobacco Use    Smoking status: Former     Average packs/day: 0.5 packs/day for 16.2 years (7.5 ttl pk-yrs)     Types: Cigarettes     Start date: 2008    Smokeless tobacco: Never   Substance and Sexual Activity    Alcohol use: Yes     Alcohol/week: 3.0 - 6.0 standard drinks of alcohol     Types: 3 - 6 Cans of beer per week    Drug use: Yes     Types: Marijuana    Sexual activity: Not Currently       ROS  Constitutional:  Positive for malaise/fatigue.   HENT: Negative.     Respiratory: Negative.     Gastrointestinal: Negative.    Genitourinary: Negative.    Musculoskeletal: Negative.    Skin:         Multiple pressure wounds   Neurological:  Positive for weakness.   Endo/Heme/Allergies: Negative.    Psychiatric/Behavioral: Negative.     All other Systems review done and negative.    Review of patient's allergies indicates:  No Known Allergies      Scheduled Meds:   amLODIPine  10 mg Oral Daily    aspirin  81 mg Oral Daily    cilostazoL  50 mg Oral BID    clopidogreL  75 mg Oral Daily    docusate sodium  100 mg Oral BID    DULoxetine  30 mg Oral Daily    enoxaparin  40 mg Subcutaneous Daily    ferrous sulfate  1 tablet Oral Daily    gabapentin  300 mg Oral TID    gabapentin  600 mg Oral QHS    LIDOcaine  1 patch Transdermal Q24H    losartan  100 mg Oral Daily    multivitamin  1 tablet Oral Daily    oxyCODONE  5 mg Oral Q4H    piperacillin-tazobactam (Zosyn) IV (PEDS and ADULTS) (extended infusion is  "not appropriate)  4.5 g Intravenous Q8H    polyethylene glycol  17 g Oral Daily    vancomycin (VANCOCIN) IV (PEDS and ADULTS)  750 mg Intravenous Q12H     Continuous Infusions:  PRN Meds:acetaminophen, albuterol, aluminum-magnesium hydroxide-simethicone, HYDROcodone-acetaminophen, melatonin, simethicone, vancomycin - pharmacy to dose    Objective:  BP (!) 125/57   Pulse 79   Temp 99.2 °F (37.3 °C) (Oral)   Resp 18   Ht 5' 9.02" (1.753 m)   Wt 68 kg (150 lb)   SpO2 (!) 93%   BMI 22.14 kg/m²     Physical Exam:   Physical Exam  Vitals reviewed.   Constitutional:       General: He is not in acute distress.  HENT:      Head: Normocephalic and atraumatic.   Cardiovascular:      Rate and Rhythm: Normal rate and regular rhythm.      Heart sounds: Normal heart sounds.   Pulmonary:      Effort: Pulmonary effort is normal. No respiratory distress.      Breath sounds: Normal breath sounds.   Abdominal:      General: Bowel sounds are normal. There is no distension.      Palpations: Abdomen is soft.      Tenderness: There is no abdominal tenderness.   Musculoskeletal:         General: No deformity.      Cervical back: Neck supple.   Skin:     Findings: No erythema or rash.      Comments: Wounds dressed   Neurological:      Mental Status: He is alert and oriented to person, place, and time.   Psychiatric:      Comments: Calm and cooperative     Imaging  Imaging Results    None          Lab Review   Recent Results (from the past 24 hour(s))   VANCOMYCIN, TROUGH    Collection Time: 03/20/24  8:53 AM   Result Value Ref Range    Vancomycin Trough 20.8 (H) 15.0 - 20.0 ug/ml       Assessment/Plan:  1. Chronic sacral pressure wound with osteomyelitis  2. History of polymicrobial sacral wound culture-MRSA, Providencia, Pseudomonas, Enterococcus  3.  Chronic left lower extremity wound with history of polymicrobial wound culture-Pseudomonas, ESBL Proteus, Providencia  4. Multiple pressure wounds  5.  Diabetes type 2  6.  Anemia  7.  " Protein calorie malnutrition      -Continue vancomycin and Zosyn, plan a 6 week course, following inflammatory markers  -Low-grade temp noted and no leukocytosis  -ESR 12 and CRP 23.7, follow   -3/15 blood cultures negative  -2/8 left leg wound cultures with many Pseudomonas, Providencia, Proteus  -11/30/2023 sacral wound with many Providencia, MRSA, Enterococcus, Pseudomonas  -Seen by the surgery team with inputs noted including no plan for surgical intervention   -We will continue aggressive wound care per the wound care team  - Discussed with patient and nursing staff.  Case management working on discharge to nursing facility.  Disposition per primary team

## 2024-03-21 NOTE — PROGRESS NOTES
[facility]                       [address] [address]    PATIENT NAME:       HAWA KUMAR  YOB: 1955  CSN:                [id number]   MRN:                [id number]  ADMIT DATE:         03/15/2024 03:09:00  PHYSICIAN:          Tom Nichole DPM                            PROGRESS NOTE    DATE:  03/20/2024 07:52:33    SUBJECTIVE:  The patient is seen today.  He is doing well.  No major complaints.    I could not locate any information as to any sort of recent studies done on   the lower extremities.  He did have a CTA done last year, in which there was   apparently 2-vessel runoff to the ankle on the right side and 3 vessels on the   left, but some diffuse SFA atherosclerosis and a stent in the left distal SFA   through popliteal artery.  Nothing was posted on the right.  His current x-rays   revealing some potential destructive changes to the right calcaneus.    PHYSICAL EXAMINATION:  Vital signs stable and afebrile.    Dressings are intact.  No strikethrough.  No wound inspection today.    ASSESSMENT:  Chronic lower extremity wounds with some extensive necrotic tissue   and possible evolving osteomyelitis of the right calcaneus.    PLAN:  I am going to reconsult [facility] for further evaluation to see if there are   any further needs that are required before proceeding with any sort of   debridement on these areas.  The patient is in agreement with that plan.    Consult was placed today.        ______________________________  Tom Nichole DPM    GAS/AQS  DD:  03/20/2024  Time:  06:01PM  DT:  03/20/2024  Time:  09:29PM  Job #:  [id number]      PROGRESS NOTE

## 2024-03-22 LAB
BASOPHILS # BLD AUTO: 0.13 X10(3)/MCL
BASOPHILS NFR BLD AUTO: 1.6 %
CREAT SERPL-MCNC: 0.69 MG/DL (ref 0.73–1.18)
EOSINOPHIL # BLD AUTO: 0.7 X10(3)/MCL (ref 0–0.9)
EOSINOPHIL NFR BLD AUTO: 8.4 %
ERYTHROCYTE [DISTWIDTH] IN BLOOD BY AUTOMATED COUNT: 16.9 % (ref 11.5–17)
GFR SERPLBLD CREATININE-BSD FMLA CKD-EPI: >60 MLS/MIN/1.73/M2
HCT VFR BLD AUTO: 24.8 % (ref 42–52)
HGB BLD-MCNC: 7.6 G/DL (ref 14–18)
IMM GRANULOCYTES # BLD AUTO: 0.01 X10(3)/MCL (ref 0–0.04)
IMM GRANULOCYTES NFR BLD AUTO: 0.1 %
LYMPHOCYTES # BLD AUTO: 2.27 X10(3)/MCL (ref 0.6–4.6)
LYMPHOCYTES NFR BLD AUTO: 27.3 %
MCH RBC QN AUTO: 26.2 PG (ref 27–31)
MCHC RBC AUTO-ENTMCNC: 30.6 G/DL (ref 33–36)
MCV RBC AUTO: 85.5 FL (ref 80–94)
MONOCYTES # BLD AUTO: 0.96 X10(3)/MCL (ref 0.1–1.3)
MONOCYTES NFR BLD AUTO: 11.5 %
NEUTROPHILS # BLD AUTO: 4.26 X10(3)/MCL (ref 2.1–9.2)
NEUTROPHILS NFR BLD AUTO: 51.1 %
NRBC BLD AUTO-RTO: 0 %
PLATELET # BLD AUTO: 320 X10(3)/MCL (ref 130–400)
PMV BLD AUTO: 8.5 FL (ref 7.4–10.4)
RBC # BLD AUTO: 2.9 X10(6)/MCL (ref 4.7–6.1)
VANCOMYCIN TROUGH SERPL-MCNC: 16 UG/ML (ref 15–20)
WBC # SPEC AUTO: 8.33 X10(3)/MCL (ref 4.5–11.5)

## 2024-03-22 PROCEDURE — 21400001 HC TELEMETRY ROOM

## 2024-03-22 PROCEDURE — 63600175 PHARM REV CODE 636 W HCPCS: Performed by: INTERNAL MEDICINE

## 2024-03-22 PROCEDURE — 25000003 PHARM REV CODE 250: Performed by: FAMILY MEDICINE

## 2024-03-22 PROCEDURE — 25000003 PHARM REV CODE 250: Performed by: INTERNAL MEDICINE

## 2024-03-22 PROCEDURE — 85025 COMPLETE CBC W/AUTO DIFF WBC: CPT | Performed by: INTERNAL MEDICINE

## 2024-03-22 PROCEDURE — 11000001 HC ACUTE MED/SURG PRIVATE ROOM

## 2024-03-22 PROCEDURE — 82565 ASSAY OF CREATININE: CPT | Performed by: INTERNAL MEDICINE

## 2024-03-22 PROCEDURE — 63600175 PHARM REV CODE 636 W HCPCS: Performed by: FAMILY MEDICINE

## 2024-03-22 PROCEDURE — 80202 ASSAY OF VANCOMYCIN: CPT | Performed by: INTERNAL MEDICINE

## 2024-03-22 RX ADMIN — FERROUS SULFATE TAB 325 MG (65 MG ELEMENTAL FE) 1 EACH: 325 (65 FE) TAB at 09:03

## 2024-03-22 RX ADMIN — DOCUSATE SODIUM 100 MG: 100 CAPSULE, LIQUID FILLED ORAL at 09:03

## 2024-03-22 RX ADMIN — CILOSTAZOL 50 MG: 50 TABLET ORAL at 09:03

## 2024-03-22 RX ADMIN — ENOXAPARIN SODIUM 40 MG: 40 INJECTION SUBCUTANEOUS at 05:03

## 2024-03-22 RX ADMIN — AMLODIPINE BESYLATE 10 MG: 5 TABLET ORAL at 09:03

## 2024-03-22 RX ADMIN — THERA TABS 1 TABLET: TAB at 09:03

## 2024-03-22 RX ADMIN — ASPIRIN 81 MG CHEWABLE TABLET 81 MG: 81 TABLET CHEWABLE at 09:03

## 2024-03-22 RX ADMIN — PIPERACILLIN SODIUM AND TAZOBACTAM SODIUM 4.5 G: 4; .5 INJECTION, POWDER, LYOPHILIZED, FOR SOLUTION INTRAVENOUS at 07:03

## 2024-03-22 RX ADMIN — OXYCODONE HYDROCHLORIDE 5 MG: 5 TABLET ORAL at 05:03

## 2024-03-22 RX ADMIN — OXYCODONE HYDROCHLORIDE 5 MG: 5 TABLET ORAL at 09:03

## 2024-03-22 RX ADMIN — GABAPENTIN 300 MG: 300 CAPSULE ORAL at 09:03

## 2024-03-22 RX ADMIN — VANCOMYCIN HYDROCHLORIDE 750 MG: 750 INJECTION, POWDER, LYOPHILIZED, FOR SOLUTION INTRAVENOUS at 01:03

## 2024-03-22 RX ADMIN — LIDOCAINE 5% 1 PATCH: 700 PATCH TOPICAL at 09:03

## 2024-03-22 RX ADMIN — GABAPENTIN 300 MG: 300 CAPSULE ORAL at 01:03

## 2024-03-22 RX ADMIN — LOSARTAN POTASSIUM 100 MG: 50 TABLET, FILM COATED ORAL at 09:03

## 2024-03-22 RX ADMIN — PIPERACILLIN SODIUM AND TAZOBACTAM SODIUM 4.5 G: 4; .5 INJECTION, POWDER, LYOPHILIZED, FOR SOLUTION INTRAVENOUS at 11:03

## 2024-03-22 RX ADMIN — OXYCODONE HYDROCHLORIDE 5 MG: 5 TABLET ORAL at 01:03

## 2024-03-22 RX ADMIN — PIPERACILLIN SODIUM AND TAZOBACTAM SODIUM 4.5 G: 4; .5 INJECTION, POWDER, LYOPHILIZED, FOR SOLUTION INTRAVENOUS at 03:03

## 2024-03-22 RX ADMIN — CLOPIDOGREL BISULFATE 75 MG: 75 TABLET ORAL at 09:03

## 2024-03-22 RX ADMIN — VANCOMYCIN HYDROCHLORIDE 750 MG: 750 INJECTION, POWDER, LYOPHILIZED, FOR SOLUTION INTRAVENOUS at 12:03

## 2024-03-22 RX ADMIN — GABAPENTIN 600 MG: 300 CAPSULE ORAL at 09:03

## 2024-03-22 RX ADMIN — OXYCODONE HYDROCHLORIDE 5 MG: 5 TABLET ORAL at 02:03

## 2024-03-22 RX ADMIN — DULOXETINE HYDROCHLORIDE 30 MG: 30 CAPSULE, DELAYED RELEASE ORAL at 09:03

## 2024-03-22 NOTE — PROGRESS NOTES
[facility]                       [address] [address]    PATIENT NAME:       HAWA KUMAR  YOB: 1955  CSN:                [id number]   MRN:                [id number]  ADMIT DATE:         03/15/2024 03:09:00  PHYSICIAN:          Tom Nichole DPM                            PROGRESS NOTE    DATE:  03/21/2024 00:00:00    SUBJECTIVE:  The patient is seen today for followup.  Was seen by [facility] and from   the recent angiogram.  There is a noncritical stenosis in the left SFA along   with the stenosis of the right SFA, but has 3-vessel runoff on both extremities.    There were no plans on any sort of intervention.    PHYSICAL EXAMINATION:  VITAL SIGNS:  Stable and he is afebrile.    No labs reported.    Dressings are intact.  Boots in place.  He is floating.    ASSESSMENT:    1. Chronic lower extremity necrotic wounds.  2. Peripheral artery disease with evidence of 3-vessel runoff based on recent   angiogram.  No plans for intervention.    PLAN:  Long discussion with the patient concerning options.  He has extensive   necrosis of the heels along with the wounds on both lower legs.  Options at this   point would be to simply provide conservative treatment, understanding that the   heel wounds are not going to resolve in the current condition.  Leg wounds may   get better over time with local wound care.  Other option would be surgical   intervention, understanding he may lose portions of both heels depending on the   depth of compromise to these tissues.  He is inclined to proceed with surgery,   but would like to discuss this with his family.  We will reassess him tomorrow   and come up with some timing.  It looks like it is probably going to be sometime   early next week if he does choose to proceed with intervention.      ______________________________  Tom Nichole DPM    GAS/AQS  DD:  03/21/2024   Time:  05:43PM  DT:  03/21/2024  Time:  07:05PM  Job #:  [id number]      PROGRESS NOTE

## 2024-03-22 NOTE — PROGRESS NOTES
Inpatient Nutrition Assessment    Admit Date: 3/15/2024   Total duration of encounter: 7 days   Patient Age: 69 y.o.    Nutrition Recommendation/Prescription     -Continue Regular Diet as tolerated.   -Encourage Pierre BID for wound healing.   -Continue Boost Plus TID for additional nourishment; provides an additional 360 kcal and 14 gm protein per container.   -Consider a MVI with minerals as medically feasible.   -Monitor wt, labs, and intake.     Communication of Recommendations: reviewed with patient    Nutrition Assessment     Malnutrition Assessment/Nutrition-Focused Physical Exam    Malnutrition Context: chronic illness (03/20/24 1317)  Malnutrition Level: severe (03/20/24 1317)  Energy Intake (Malnutrition): less than 75% for greater than or equal to 3 months (03/20/24 1317)  Weight Loss (Malnutrition): greater than 7.5% in 3 months (03/20/24 1317)  Subcutaneous Fat (Malnutrition): severe depletion (03/20/24 1317)  Orbital Region (Subcutaneous Fat Loss): severe depletion  Upper Arm Region (Subcutaneous Fat Loss): severe depletion     Muscle Mass (Malnutrition): severe depletion (03/20/24 1317)  Oriental orthodox Region (Muscle Loss): severe depletion                       Fluid Accumulation (Malnutrition): other (see comments) (does not meet criteria) (03/20/24 1317)  Hand  Strength, Left (Malnutrition): unable to evaluate (03/20/24 1317)  Hand  Strength, Right (Malnutrition): unable to evaluate (03/20/24 1317)  A minimum of two characteristics is recommended for diagnosis of either severe or non-severe malnutrition.    Chart Review    Reason Seen: continuous nutrition monitoring    Malnutrition Screening Tool Results   Have you recently lost weight without trying?: No  Have you been eating poorly because of a decreased appetite?: No   MST Score: 0   Diagnosis:  Stage 4 pressure ulcer to sacrum  Unstageable pressure ulcers to bilateral heels  Open wound/ulcers to right lateral lower leg and left lateral lower  leg  Peripheral arterial disease - with history of vascular intervention with Dr. Queen (most recent angio 3/13/24)  PVD  History of polymicrobial sacral wound culture  History of polymicrobial wound culture to leg wound  DM2  Anemia  History of smoking  Low prealbumin     Relevant Medical History: HTN, COPD, DM2 with neuropathy, PAD, chronic venous hypertension     Scheduled Medications:  amLODIPine, 10 mg, Daily  aspirin, 81 mg, Daily  cilostazoL, 50 mg, BID  clopidogreL, 75 mg, Daily  docusate sodium, 100 mg, BID  DULoxetine, 30 mg, Daily  enoxaparin, 40 mg, Daily  ferrous sulfate, 1 tablet, Daily  gabapentin, 300 mg, TID  gabapentin, 600 mg, QHS  LIDOcaine, 1 patch, Q24H  losartan, 100 mg, Daily  multivitamin, 1 tablet, Daily  oxyCODONE, 5 mg, Q4H  piperacillin-tazobactam (Zosyn) IV (PEDS and ADULTS) (extended infusion is not appropriate), 4.5 g, Q8H  polyethylene glycol, 17 g, Daily  vancomycin (VANCOCIN) IV (PEDS and ADULTS), 750 mg, Q12H    Continuous Infusions:   PRN Medications: acetaminophen, albuterol, aluminum-magnesium hydroxide-simethicone, HYDROcodone-acetaminophen, melatonin, simethicone, vancomycin - pharmacy to dose    Calorie Containing IV Medications: no significant kcals from medications at this time    Recent Labs   Lab 03/17/24  0835 03/18/24  0922 03/19/24  1558 03/20/24  2356 03/22/24  0004     --   --  138  --    K 4.4  --   --  3.8  --    CALCIUM 8.4*  --   --  8.3*  --    CHLORIDE 106  --   --  106  --    CO2 28  --   --  25  --    BUN 8.1*  --   --  7.6*  --    CREATININE 0.63*  --   --  0.65*  --    EGFRNORACEVR >60  --   --  >60  --    GLUCOSE 107  --   --  113  --    BILITOT 0.2  --   --  0.2  --    ALKPHOS 101  --   --  80  --    ALT 10  --   --  7  --    AST 9  --   --  10  --    ALBUMIN 2.3*  --   --  2.3*  --    PREALB  --   --  13.8*  --   --    CRP  --  23.70*  --   --   --    WBC 7.69  --   --  7.77 8.33   HGB 8.0*  --   --  7.6* 7.6*   HCT 27.4*  --   --  25.4* 24.8*  "      Nutrition Orders:  Diet Adult Regular      Appetite/Oral Intake: good/% of meals  Factors Affecting Nutritional Intake: none identified  Food/Hinduism/Cultural Preferences: none reported  Food Allergies: no known food allergies  Last Bowel Movement: 24  Wound(s):     Altered Skin Integrity 24 1351 Sacral spine #4-Tissue loss description: Full thickness       Altered Skin Integrity 24 0800 Left anterior;lower Leg Arterial Ulcer-Tissue loss description: Full thickness heel ulcers, sacrum pressure ulcers    Comments    3/20: Pt reports current good intake/appetite but intake the past few months has been poor 2/2 disliking food from NH. Pt reports usual wt of ~170 lbs. Pt reports he would like oral supplements.     3/22: Pt reports good appetite/intake of meals, denies n/v; drinking some of the oral supplement but not taking the Pierre.     Anthropometrics    Height: 5' 9.02" (175.3 cm), Height Method: Stated  Last Weight: 68 kg (150 lb) (24 1315), Weight Method: Standard Scale  BMI (Calculated): 22.1  BMI Classification: normal (BMI 18.5-24.9)        Ideal Body Weight (IBW), Male: 160.12 lb     % Ideal Body Weight, Male (lb): 93.68 %                 Usual Body Weight (UBW), k.27 kg  % Usual Body Weight: 88.24  % Weight Change From Usual Weight: -11.95 %  Usual Weight Provided By: patient    Wt Readings from Last 5 Encounters:   24 68 kg (150 lb)   24 68.5 kg (151 lb)   24 63.5 kg (140 lb)   23 72.6 kg (160 lb)   23 77.1 kg (170 lb)     Weight Change(s) Since Admission:   Wt Readings from Last 1 Encounters:   24 1315 68 kg (150 lb)   03/15/24 1621 68 kg (150 lb)   03/15/24 0307 68 kg (150 lb)   Admit Weight: 68 kg (150 lb) (03/15/24 0307), Weight Method: Stated    Estimated Needs    Weight Used For Calorie Calculations: 68 kg (149 lb 14.6 oz)  Energy Calorie Requirements (kcal): 1855-3639 kcal (30-35 kcal/kg)  Energy Need Method: " Kcal/kg  Weight Used For Protein Calculations: 68 kg (149 lb 14.6 oz)  Protein Requirements: 102 gm (1.5g/kg)  Fluid Requirements (mL): 2040 mL    Enteral Nutrition     Patient not receiving enteral nutrition at this time.    Parenteral Nutrition     Patient not receiving parenteral nutrition support at this time.    Evaluation of Received Nutrient Intake    Calories: meeting estimated needs  Protein: meeting estimated needs    Patient Education     Not applicable.    Nutrition Diagnosis     PES: Increased nutrient needs (protein) related to increased protein energy demand for wound healing as evidenced by stage 4 pressure ulcer. (active)     PES: Severe chronic disease or condition related malnutrition related to suboptimal protein/energy intake as evidenced by less than 75% needs met for greater than or equal to 3 months, severe fat depletion, severe muscle depletion, and greater than 7.5% weight loss in 3 months. (active)    Nutrition Interventions     Intervention(s): general/healthful diet, commercial beverage, commercial food, multivitamin/mineral supplement therapy, and collaboration with other providers    Goal: Meet greater than 80% of nutritional needs by follow-up. (goal progressing)  Goal: Maintain weight throughout hospitalization. (goal progressing)    Nutrition Goals & Monitoring     Dietitian will monitor: energy intake and weight    Nutrition Risk/Follow-Up: high (follow-up in 1-4 days)   Please consult if re-assessment needed sooner.

## 2024-03-22 NOTE — PROGRESS NOTES
Pharmacokinetic Assessment Follow Up: IV Vancomycin    Vancomycin serum concentration assessment(s):    The trough level was drawn correctly and can be used to guide therapy at this time. The measurement is within the desired definitive target range of 15 to 20 mcg/mL.    Vancomycin Regimen Plan:    Continue regimen to Vancomycin 750 mg IV every 12 hours with next serum trough concentration measured at 0000 prior to 0100 dose on 3/24    Scheduled Administration Times        Drug levels (last 3 results):  Recent Labs   Lab Result Units 03/20/24  0853 03/22/24  1237   Vancomycin Trough ug/ml 20.8* 16.0       Vancomycin Administrations:  vancomycin given in the last 96 hours                     vancomycin 750 mg in dextrose 5 % 250 mL IVPB (ready to mix) (mg) 750 mg New Bag 03/22/24 1319     750 mg New Bag  0017     750 mg New Bag 03/21/24 1414     750 mg New Bag  0027     750 mg New Bag 03/20/24 1405    vancomycin in dextrose 5 % 1 gram/250 mL IVPB 1,000 mg (mg) 1,000 mg New Bag 03/19/24 2228     1,000 mg New Bag  0958     1,000 mg New Bag 03/18/24 2208                    Pharmacy will continue to follow and monitor vancomycin.    Please contact pharmacy at extension [phone] for questions regarding this assessment.    Thank you for the consult,   Jolanta Love       Patient brief summary:  Shaheen Christie is a 69 y.o. male initiated on antimicrobial therapy with IV Vancomycin for treatment of sepsis    The patient's current regimen is 750 mg q12h    Drug Allergies:   Review of patient's allergies indicates:  No Known Allergies    Actual Body Weight:  Wt Readings from Last 1 Encounters:   03/20/24 68 kg (150 lb)       Renal Function:   Estimated Creatinine Clearance: 94.4 mL/min (A) (based on SCr of 0.69 mg/dL (L)).,     Dialysis Method (if applicable):  N/A    CBC (last 72 hours):  Recent Labs   Lab Result Units 03/20/24  2356 03/22/24  0004   WBC x10(3)/mcL 7.77 8.33   Hgb g/dL 7.6* 7.6*   Hct % 25.4* 24.8*   Platelet  x10(3)/mcL 326 320   Mono % % 11.2 11.5   Eos % % 8.5 8.4   Basophil % % 1.5 1.6       Metabolic Panel (last 72 hours):  Recent Labs   Lab Result Units 03/20/24  2356 03/22/24  1237   Sodium Level mmol/L 138  --    Potassium Level mmol/L 3.8  --    Chloride mmol/L 106  --    Carbon Dioxide mmol/L 25  --    Glucose Level mg/dL 113  --    Blood Urea Nitrogen mg/dL 7.6*  --    Creatinine mg/dL 0.65* 0.69*   Albumin Level g/dL 2.3*  --    Bilirubin Total mg/dL 0.2  --    Alkaline Phosphatase unit/L 80  --    Aspartate Aminotransferase unit/L 10  --    Alanine Aminotransferase unit/L 7  --        Microbiologic Results:  Microbiology Results (last 7 days)       Procedure Component Value Units Date/Time    Blood culture #2 **CANNOT BE ORDERED STAT** [[id number]]  (Normal) Collected: 03/15/24 0353    Order Status: Completed Specimen: Blood from Arm, Left Updated: 03/20/24 0500     CULTURE, BLOOD ([facility]) No Growth at 5 days    Blood culture #1 **CANNOT BE ORDERED STAT** [[id number]]  (Normal) Collected: 03/15/24 0353    Order Status: Completed Specimen: Blood from Arm, Right Updated: 03/20/24 0425     CULTURE, BLOOD ([facility]) No Growth at 5 days

## 2024-03-22 NOTE — PROGRESS NOTES
Infectious Diseases Progress Note  69-year-old male, nursing home resident, with past medical history of HTN, COPD, DM type 2, with neuropathy, is admitted to [facility] on 03/15/2024 sent via EMS for sacral wound evaluation, apparently diagnosed with osteomyelitis involving the sacrum on 02/27/2024 with plan to be admitted to LTAC which had not been successful as an outpatient.  On presentation he was noted to have no fevers and no leukocytosis, anemic with low albumin.  Blood cultures have been negative.  Review of his records show a 02/08/2024 left leg wound culture with Pseudomonas, Providencia, ESBL Proteus and 11/30/2023 sacral wound culture with MRSA, Providencia, Enterococcus and Pseudomonas.  2/27 MRI of the pelvis shows osteomyelitis involving the sacrococcygeal bone as well as right trochanteric bursitis which may be septic.  He was seen by surgery team with no plan for surgical intervention.  He is on antibiotic coverage with vancomycin and Zosyn.     Subjective:  No new complaints, no fevers noted, doing about the same.  Lying in bed in no acute distress      Past Medical History:   Diagnosis Date    COPD (chronic obstructive pulmonary disease)     Depression     Hypertension     Neuropathy      Past Surgical History:   Procedure Laterality Date    angiogram Bilateral     stents placed in left leg    anterior neck surgery      ARTHROTOMY OF ANKLE Left 6/28/2023    Procedure: ARTHROTOMY, ANKLE;  Surgeon: Tom Nichole DPM;  Location: [facility];  Service: Podiatry;  Laterality: Left;    cataracts Left     CHOLECYSTECTOMY      EYE SURGERY Left     HAND SURGERY Left     broken bone    herina repair      INCISION AND DRAINAGE, LOWER EXTREMITY Left 7/28/2023    Procedure: INCISION AND DRAINAGE, LOWER EXTREMITY;  Surgeon: Avinash Garces MD;  Location: [facility];  Service: Orthopedics;  Laterality: Left;  Supine, vascular bed, bone foam  last case  cysto tubing, laparoscopic  trocar, experience, vanc, hemovac drain    KNEE SURGERY Bilateral     PERIPHERAL ANGIOGRAPHY N/A 3/13/2024    Procedure: Peripheral angiography;  Surgeon: Deven Queen MD;  Location: [facility] CATH LAB;  Service: Cardiology;  Laterality: N/A;  MICHELLE ANGIO W/ ANEST.    posterior neck surgery      SPINE SURGERY      TOTAL REPLACEMENT OF BOTH HIP JOINTS USING COMPUTER-ASSISTED NAVIGATION Bilateral      Social History     Socioeconomic History    Marital status:    Occupational History    Occupation: retired   Tobacco Use    Smoking status: Former     Average packs/day: 0.5 packs/day for 16.2 years (7.5 ttl pk-yrs)     Types: Cigarettes     Start date: 2008    Smokeless tobacco: Never   Substance and Sexual Activity    Alcohol use: Yes     Alcohol/week: 3.0 - 6.0 standard drinks of alcohol     Types: 3 - 6 Cans of beer per week    Drug use: Yes     Types: Marijuana    Sexual activity: Not Currently       ROS  Constitutional:  Positive for malaise/fatigue.   HENT: Negative.     Respiratory: Negative.     Gastrointestinal: Negative.    Genitourinary: Negative.    Musculoskeletal: Negative.    Skin:         Multiple pressure wounds   Neurological:  Positive for weakness.   Endo/Heme/Allergies: Negative.    Psychiatric/Behavioral: Negative.     All other Systems review done and negative.    Review of patient's allergies indicates:  No Known Allergies      Scheduled Meds:   amLODIPine  10 mg Oral Daily    aspirin  81 mg Oral Daily    cilostazoL  50 mg Oral BID    clopidogreL  75 mg Oral Daily    docusate sodium  100 mg Oral BID    DULoxetine  30 mg Oral Daily    enoxaparin  40 mg Subcutaneous Daily    ferrous sulfate  1 tablet Oral Daily    gabapentin  300 mg Oral TID    gabapentin  600 mg Oral QHS    LIDOcaine  1 patch Transdermal Q24H    losartan  100 mg Oral Daily    multivitamin  1 tablet Oral Daily    oxyCODONE  5 mg Oral Q4H    piperacillin-tazobactam (Zosyn) IV (PEDS and ADULTS) (extended infusion is not  "appropriate)  4.5 g Intravenous Q8H    polyethylene glycol  17 g Oral Daily    vancomycin (VANCOCIN) IV (PEDS and ADULTS)  750 mg Intravenous Q12H     Continuous Infusions:  PRN Meds:acetaminophen, albuterol, aluminum-magnesium hydroxide-simethicone, HYDROcodone-acetaminophen, melatonin, simethicone, vancomycin - pharmacy to dose    Objective:  BP (!) 118/58   Pulse 75   Temp 98.4 °F (36.9 °C) (Oral)   Resp 18   Ht 5' 9.02" (1.753 m)   Wt 68 kg (150 lb)   SpO2 95%   BMI 22.14 kg/m²     Physical Exam:   Physical Exam  Vitals reviewed.   Constitutional:       General: He is not in acute distress.  HENT:      Head: Normocephalic and atraumatic.   Cardiovascular:      Rate and Rhythm: Normal rate and regular rhythm.      Heart sounds: Normal heart sounds.   Pulmonary:      Effort: Pulmonary effort is normal. No respiratory distress.      Breath sounds: Normal breath sounds.   Abdominal:      General: Bowel sounds are normal. There is no distension.      Palpations: Abdomen is soft.      Tenderness: There is no abdominal tenderness.   Musculoskeletal:         General: No deformity.      Cervical back: Neck supple.   Skin:     Findings: No erythema or rash.      Comments: Wounds dressed   Neurological:      Mental Status: He is alert and oriented to person, place, and time.   Psychiatric:      Comments: Calm and cooperative     Imaging  Imaging Results    None          Lab Review   Recent Results (from the past 24 hour(s))   CBC with Differential    Collection Time: 03/22/24 12:04 AM   Result Value Ref Range    WBC 8.33 4.50 - 11.50 x10(3)/mcL    RBC 2.90 (L) 4.70 - 6.10 x10(6)/mcL    Hgb 7.6 (L) 14.0 - 18.0 g/dL    Hct 24.8 (L) 42.0 - 52.0 %    MCV 85.5 80.0 - 94.0 fL    MCH 26.2 (L) 27.0 - 31.0 pg    MCHC 30.6 (L) 33.0 - 36.0 g/dL    RDW 16.9 11.5 - 17.0 %    Platelet 320 130 - 400 x10(3)/mcL    MPV 8.5 7.4 - 10.4 fL    Neut % 51.1 %    Lymph % 27.3 %    Mono % 11.5 %    Eos % 8.4 %    Basophil % 1.6 %    Lymph # " 2.27 0.6 - 4.6 x10(3)/mcL    Neut # 4.26 2.1 - 9.2 x10(3)/mcL    Mono # 0.96 0.1 - 1.3 x10(3)/mcL    Eos # 0.70 0 - 0.9 x10(3)/mcL    Baso # 0.13 <=0.2 x10(3)/mcL    IG# 0.01 0 - 0.04 x10(3)/mcL    IG% 0.1 %    NRBC% 0.0 %   VANCOMYCIN, TROUGH    Collection Time: 03/22/24 12:37 PM   Result Value Ref Range    Vancomycin Trough 16.0 15.0 - 20.0 ug/ml   Creatinine, Serum    Collection Time: 03/22/24 12:37 PM   Result Value Ref Range    Creatinine 0.69 (L) 0.73 - 1.18 mg/dL    eGFR >60 mls/min/1.73/m2       Assessment/Plan:  1. Chronic sacral pressure wound with osteomyelitis  2. History of polymicrobial sacral wound culture-MRSA, Providencia, Pseudomonas, Enterococcus  3.  Chronic left lower extremity wound with history of polymicrobial wound culture-Pseudomonas, ESBL Proteus, Providencia  4. Multiple pressure wounds  5.  Diabetes type 2  6.  Anemia  7.  Protein calorie malnutrition      -Continue vancomycin and Zosyn, plan end date of 04/26, following inflammatory markers  -No fevers noted and no leukocytosis  -3/19 ESR 12 and CRP 23.7, follow   -3/15 blood cultures negative  -2/8 left leg wound cultures with many Pseudomonas, Providencia, Proteus  -11/30/2023 sacral wound with many Providencia, MRSA, Enterococcus, Pseudomonas  -Seen by the surgery team with inputs noted including no plan for surgical intervention   -We will continue aggressive wound care per the wound care team  - Discussed with patient and nursing staff.  Case management working on discharge to nursing facility.  Disposition per primary team

## 2024-03-22 NOTE — NURSING
"Patient refused wound care again today, notified Dr. Nichole. Pt states "I promise I will do it tomorrow". Education reinforced on why it is important to change wound dressings.  "

## 2024-03-22 NOTE — PROGRESS NOTES
[facility] - 8th Floor [facility] Medicine  Progress Note    Patient Name: Shaheen Christie  MRN: [id number]  Patient Class: IP- Inpatient   Admission Date: 3/15/2024  Length of Stay: 7 days  Attending Physician: Enzo Thibodeaux MD  Primary Care Provider: Viktor Russ MD        Subjective:     Principal Problem:Osteomyelitis    Interval History: has been followed by podiatrist infectious disease. Patient will discuss with family about possible surgical agreement.    Review of Systems   All other systems reviewed and are negative.    Objective:     Vital Signs (Most Recent):  Temp: 98.1 °F (36.7 °C) (03/22/24 0326)  Pulse: (!) 58 (03/22/24 0326)  Resp: 18 (03/22/24 0511)  BP: (!) 127/46 (03/22/24 0326)  SpO2: (!) 93 % (03/22/24 0326) Vital Signs (24h Range):  Temp:  [98.1 °F (36.7 °C)-99.3 °F (37.4 °C)] 98.1 °F (36.7 °C)  Pulse:  [58-83] 58  Resp:  [16-20] 18  SpO2:  [93 %-97 %] 93 %  BP: (126-146)/(46-66) 127/46     Weight: 68 kg (150 lb)  Body mass index is 22.14 kg/m².    Intake/Output Summary (Last 24 hours) at 3/22/2024 0822  Last data filed at 3/22/2024 0534  Gross per 24 hour   Intake 840 ml   Output 2100 ml   Net -1260 ml      Physical Exam  HENT:      Head: Normocephalic and atraumatic.      Right Ear: External ear normal.      Left Ear: External ear normal.      Mouth/Throat:      Mouth: Mucous membranes are dry.   Eyes:      Extraocular Movements: Extraocular movements intact.   Cardiovascular:      Rate and Rhythm: Normal rate and regular rhythm.      Pulses: Normal pulses.      Heart sounds: Normal heart sounds.   Pulmonary:      Effort: Pulmonary effort is normal.      Breath sounds: Normal breath sounds.   Abdominal:      General: Bowel sounds are normal.      Palpations: Abdomen is soft.   Musculoskeletal:      Cervical back: Neck supple.   Skin:     General: Skin is warm and dry.   Neurological:      General: No focal deficit present.      Mental Status: He is alert.  Mental status is at baseline.   Psychiatric:         Mood and Affect: Mood normal.           Overview/Hospital Course: anemia Sabal. No changes. Should have a game plan today about circled agreement. Feel multivitamins antibiotics wound  care.    Significant Labs: All pertinent labs within the past 24 hours have been reviewed.  BMP:   Recent Labs   Lab 03/20/24  2356      K 3.8   CO2 25   BUN 7.6*   CREATININE 0.65*   CALCIUM 8.3*     CBC:   Recent Labs   Lab 03/20/24  2356 03/22/24  0004   WBC 7.77 8.33   HGB 7.6* 7.6*   HCT 25.4* 24.8*    320       Significant Imaging: I have reviewed all pertinent imaging results/findings within the past 24 hours.    Assessment/Plan:      Active Diagnoses:    Diagnosis Date Noted POA    PRINCIPAL PROBLEM:  Osteomyelitis [M86.9] 03/15/2024 Yes    Pressure injury of sacral region, stage 4 [L89.154] 03/19/2024 Yes    Skin ulcer of right lower leg [L97.919] 03/19/2024 Yes    Skin ulcer of left lower leg [L97.929] 03/19/2024 Yes    Unstageable pressure ulcer of right heel [L89.610] 03/19/2024 Yes    Unstageable pressure ulcer of left heel [L89.620] 03/19/2024 Yes      Problems Resolved During this Admission:     VTE Risk Mitigation (From admission, onward)           Ordered     enoxaparin injection 40 mg  Daily         03/15/24 0934                       Enzo Thibodeaux MD  Department of Hospital Medicine   [facility] - 8th Floor Med Surg

## 2024-03-23 PROCEDURE — 63600175 PHARM REV CODE 636 W HCPCS: Performed by: FAMILY MEDICINE

## 2024-03-23 PROCEDURE — 21400001 HC TELEMETRY ROOM

## 2024-03-23 PROCEDURE — 11000001 HC ACUTE MED/SURG PRIVATE ROOM

## 2024-03-23 PROCEDURE — 63600175 PHARM REV CODE 636 W HCPCS: Performed by: INTERNAL MEDICINE

## 2024-03-23 PROCEDURE — 25000003 PHARM REV CODE 250: Performed by: INTERNAL MEDICINE

## 2024-03-23 PROCEDURE — 25000003 PHARM REV CODE 250: Performed by: FAMILY MEDICINE

## 2024-03-23 RX ADMIN — LOSARTAN POTASSIUM 100 MG: 50 TABLET, FILM COATED ORAL at 08:03

## 2024-03-23 RX ADMIN — PIPERACILLIN SODIUM AND TAZOBACTAM SODIUM 4.5 G: 4; .5 INJECTION, POWDER, LYOPHILIZED, FOR SOLUTION INTRAVENOUS at 03:03

## 2024-03-23 RX ADMIN — ASPIRIN 81 MG CHEWABLE TABLET 81 MG: 81 TABLET CHEWABLE at 08:03

## 2024-03-23 RX ADMIN — OXYCODONE HYDROCHLORIDE 5 MG: 5 TABLET ORAL at 03:03

## 2024-03-23 RX ADMIN — CILOSTAZOL 50 MG: 50 TABLET ORAL at 08:03

## 2024-03-23 RX ADMIN — DOCUSATE SODIUM 100 MG: 100 CAPSULE, LIQUID FILLED ORAL at 08:03

## 2024-03-23 RX ADMIN — AMLODIPINE BESYLATE 10 MG: 5 TABLET ORAL at 08:03

## 2024-03-23 RX ADMIN — OXYCODONE HYDROCHLORIDE 5 MG: 5 TABLET ORAL at 05:03

## 2024-03-23 RX ADMIN — GABAPENTIN 300 MG: 300 CAPSULE ORAL at 08:03

## 2024-03-23 RX ADMIN — OXYCODONE HYDROCHLORIDE 5 MG: 5 TABLET ORAL at 10:03

## 2024-03-23 RX ADMIN — VANCOMYCIN HYDROCHLORIDE 750 MG: 750 INJECTION, POWDER, LYOPHILIZED, FOR SOLUTION INTRAVENOUS at 01:03

## 2024-03-23 RX ADMIN — THERA TABS 1 TABLET: TAB at 08:03

## 2024-03-23 RX ADMIN — VANCOMYCIN HYDROCHLORIDE 750 MG: 750 INJECTION, POWDER, LYOPHILIZED, FOR SOLUTION INTRAVENOUS at 02:03

## 2024-03-23 RX ADMIN — GABAPENTIN 600 MG: 300 CAPSULE ORAL at 09:03

## 2024-03-23 RX ADMIN — DULOXETINE HYDROCHLORIDE 30 MG: 30 CAPSULE, DELAYED RELEASE ORAL at 08:03

## 2024-03-23 RX ADMIN — GABAPENTIN 300 MG: 300 CAPSULE ORAL at 03:03

## 2024-03-23 RX ADMIN — OXYCODONE HYDROCHLORIDE 5 MG: 5 TABLET ORAL at 01:03

## 2024-03-23 RX ADMIN — POLYETHYLENE GLYCOL 3350 17 G: 17 POWDER, FOR SOLUTION ORAL at 08:03

## 2024-03-23 RX ADMIN — LIDOCAINE 5% 1 PATCH: 700 PATCH TOPICAL at 10:03

## 2024-03-23 RX ADMIN — ENOXAPARIN SODIUM 40 MG: 40 INJECTION SUBCUTANEOUS at 05:03

## 2024-03-23 RX ADMIN — FERROUS SULFATE TAB 325 MG (65 MG ELEMENTAL FE) 1 EACH: 325 (65 FE) TAB at 08:03

## 2024-03-23 RX ADMIN — PIPERACILLIN SODIUM AND TAZOBACTAM SODIUM 4.5 G: 4; .5 INJECTION, POWDER, LYOPHILIZED, FOR SOLUTION INTRAVENOUS at 10:03

## 2024-03-23 RX ADMIN — HYDROCODONE BITARTRATE AND ACETAMINOPHEN 1 TABLET: 5; 325 TABLET ORAL at 07:03

## 2024-03-23 RX ADMIN — PIPERACILLIN SODIUM AND TAZOBACTAM SODIUM 4.5 G: 4; .5 INJECTION, POWDER, LYOPHILIZED, FOR SOLUTION INTRAVENOUS at 07:03

## 2024-03-23 RX ADMIN — CLOPIDOGREL BISULFATE 75 MG: 75 TABLET ORAL at 08:03

## 2024-03-23 NOTE — PROGRESS NOTES
[facility]                       [address] [address]    PATIENT NAME:       HAWA KUMAR  YOB: 1955  CSN:                [id number]   MRN:                [id number]  ADMIT DATE:         03/15/2024 03:09:00  PHYSICIAN:          Tom Nichole DPM                            PROGRESS NOTE    DATE:  03/22/2024 00:00:00    SUBJECTIVE:  The patient is seen today.  He is not voicing any complaints.    Apparently, he has been refusing wound care.  No other issues reported.  Remains   on antibiotics as per Infectious Disease.    PHYSICAL EXAMINATION:  VITAL SIGNS:  Stable and afebrile.  EXTREMITIES:  Dressings are intact.  Heelift boots are in place.  There is no   significant bleeding or strike through from the areas.  The feet are warm.  He   does have some tenderness on that left side versus the right.  Sacral area is   not inspected.    ASSESSMENT:  Chronic decubitus with necrosis and infection.    PLAN:  Again, I had a conversation with the patient concerning the need for   allowing Wound Care to do their job.  This is 1 of the reasons as to probably   why he is in his predicament.  We will continue with the antibiotics.  He is   still contemplating surgery on the extremities to debride the leg wounds and the   heels.  I again informed him that debridement of these areas, especially heel   wounds may extend all the way down to the calcanei, which may and will be quite   difficult to heal in his current predicament.  We will continue to discuss it   throughout the weekend.        ______________________________  Tom Nichole DPM    GAS/AQS  DD:  03/22/2024  Time:  06:43PM  DT:  03/22/2024  Time:  07:23PM  Job #:  [id number]      PROGRESS NOTE

## 2024-03-23 NOTE — PROGRESS NOTES
[facility]                       [address] [address]    PATIENT NAME:       HAWA KUMAR  YOB: 1955  CSN:                [id number]   MRN:                [id number]  ADMIT DATE:         03/15/2024 03:09:00  PHYSICIAN:          Tom Nichole DPM                            PROGRESS NOTE    DATE:  03/23/2024 00:00:00    SUBJECTIVE:  The patient seen today.  No adverse events noted.  The patient has   noncompliance.  He is wishing to proceed with debridement of his lower extremity   wounds.    PHYSICAL EXAMINATION:  VITAL SIGNS:  Stable and afebrile.  EXTREMITIES:  Dressings are intact.  Clean and dry.  There is no significant   strike through.  Heelift boots are in place.    ASSESSMENT:  Bilateral lower extremity wounds with extensive necrosis.    PLAN:  We will continue with the current care.  Tentative plans for attempted   debridement on Tuesday.  We will continue with all other care until then.        ______________________________  Tom Nichole DPM    GAS/AQS  DD:  03/23/2024  Time:  12:52PM  DT:  03/23/2024  Time:  01:03PM  Job #:  [id number]      PROGRESS NOTE

## 2024-03-23 NOTE — PROGRESS NOTES
[facility] - 8th Floor [facility] Medicine  Progress Note    Patient Name: Shaheen Christie  MRN: [id number]  Patient Class: IP- Inpatient   Admission Date: 3/15/2024  Length of Stay: 8 days  Attending Physician: Enzo Thibodeaux MD  Primary Care Provider: Viktor Russ MD        Subjective:     Principal Problem:Osteomyelitis    Interval History:  Patient followed also with Infectious Disease.  On though antibiotics vancomycin and Zosyn.  Patient was evaluated by surgery but no further treatment is recommended.  Podiatrist has been consulted.    Review of Systems   All other systems reviewed and are negative.    Objective:     Vital Signs (Most Recent):  Temp: 98.7 °F (37.1 °C) (03/23/24 0747)  Pulse: 82 (03/23/24 0747)  Resp: 18 (03/23/24 0745)  BP: 126/72 (03/23/24 0747)  SpO2: (!) 94 % (03/23/24 0747) Vital Signs (24h Range):  Temp:  [97.7 °F (36.5 °C)-98.7 °F (37.1 °C)] 98.7 °F (37.1 °C)  Pulse:  [65-82] 82  Resp:  [16-19] 18  SpO2:  [92 %-96 %] 94 %  BP: (101-142)/(58-72) 126/72     Weight: 68 kg (150 lb)  Body mass index is 22.14 kg/m².    Intake/Output Summary (Last 24 hours) at 3/23/2024 0826  Last data filed at 3/23/2024 0604  Gross per 24 hour   Intake 1600 ml   Output 2725 ml   Net -1125 ml      Physical Exam  HENT:      Head: Normocephalic and atraumatic.      Right Ear: External ear normal.      Left Ear: External ear normal.      Mouth/Throat:      Mouth: Mucous membranes are dry.   Cardiovascular:      Rate and Rhythm: Normal rate and regular rhythm.      Pulses: Normal pulses.      Heart sounds: Normal heart sounds.   Pulmonary:      Breath sounds: Normal breath sounds.   Abdominal:      General: Bowel sounds are normal.      Palpations: Abdomen is soft.   Musculoskeletal:      Cervical back: Neck supple.   Skin:     General: Skin is dry.   Neurological:      Mental Status: He is alert. Mental status is at baseline.           Overview/Hospital Course:  The patient  anemic very secondary to blood loss and to hemolysis from infection.  Patient will be on a multivitamin.  Continue with IV antibiotic.    Significant Labs: All pertinent labs within the past 24 hours have been reviewed.  BMP:   Recent Labs   Lab 03/22/24  1237   CREATININE 0.69*     CBC:   Recent Labs   Lab 03/22/24  0004   WBC 8.33   HGB 7.6*   HCT 24.8*        CMP:   Recent Labs   Lab 03/22/24  1237   CREATININE 0.69*       Significant Imaging: I have reviewed all pertinent imaging results/findings within the past 24 hours.    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

## 2024-03-24 LAB
ALBUMIN SERPL-MCNC: 2.4 G/DL (ref 3.4–4.8)
ALBUMIN/GLOB SERPL: 0.8 RATIO (ref 1.1–2)
ALP SERPL-CCNC: 78 UNIT/L (ref 40–150)
ALT SERPL-CCNC: 7 UNIT/L (ref 0–55)
AST SERPL-CCNC: 9 UNIT/L (ref 5–34)
BILIRUB SERPL-MCNC: 0.2 MG/DL
BUN SERPL-MCNC: 7.2 MG/DL (ref 8.4–25.7)
CALCIUM SERPL-MCNC: 8.6 MG/DL (ref 8.8–10)
CHLORIDE SERPL-SCNC: 109 MMOL/L (ref 98–107)
CO2 SERPL-SCNC: 22 MMOL/L (ref 23–31)
CREAT SERPL-MCNC: 0.6 MG/DL (ref 0.73–1.18)
GFR SERPLBLD CREATININE-BSD FMLA CKD-EPI: >60 MLS/MIN/1.73/M2
GLOBULIN SER-MCNC: 2.9 GM/DL (ref 2.4–3.5)
GLUCOSE SERPL-MCNC: 124 MG/DL (ref 82–115)
POTASSIUM SERPL-SCNC: 3.5 MMOL/L (ref 3.5–5.1)
PROT SERPL-MCNC: 5.3 GM/DL (ref 5.8–7.6)
SODIUM SERPL-SCNC: 139 MMOL/L (ref 136–145)
VANCOMYCIN TROUGH SERPL-MCNC: 15.3 UG/ML (ref 15–20)

## 2024-03-24 PROCEDURE — 80053 COMPREHEN METABOLIC PANEL: CPT | Performed by: INTERNAL MEDICINE

## 2024-03-24 PROCEDURE — 63600175 PHARM REV CODE 636 W HCPCS: Performed by: FAMILY MEDICINE

## 2024-03-24 PROCEDURE — 11000001 HC ACUTE MED/SURG PRIVATE ROOM

## 2024-03-24 PROCEDURE — 80202 ASSAY OF VANCOMYCIN: CPT | Performed by: INTERNAL MEDICINE

## 2024-03-24 PROCEDURE — 25000003 PHARM REV CODE 250: Performed by: FAMILY MEDICINE

## 2024-03-24 PROCEDURE — 63600175 PHARM REV CODE 636 W HCPCS: Performed by: INTERNAL MEDICINE

## 2024-03-24 PROCEDURE — 25000003 PHARM REV CODE 250: Performed by: INTERNAL MEDICINE

## 2024-03-24 RX ADMIN — OXYCODONE HYDROCHLORIDE 5 MG: 5 TABLET ORAL at 09:03

## 2024-03-24 RX ADMIN — DOCUSATE SODIUM 100 MG: 100 CAPSULE, LIQUID FILLED ORAL at 09:03

## 2024-03-24 RX ADMIN — CILOSTAZOL 50 MG: 50 TABLET ORAL at 09:03

## 2024-03-24 RX ADMIN — AMLODIPINE BESYLATE 10 MG: 5 TABLET ORAL at 09:03

## 2024-03-24 RX ADMIN — OXYCODONE HYDROCHLORIDE 5 MG: 5 TABLET ORAL at 10:03

## 2024-03-24 RX ADMIN — FERROUS SULFATE TAB 325 MG (65 MG ELEMENTAL FE) 1 EACH: 325 (65 FE) TAB at 09:03

## 2024-03-24 RX ADMIN — OXYCODONE HYDROCHLORIDE 5 MG: 5 TABLET ORAL at 02:03

## 2024-03-24 RX ADMIN — GABAPENTIN 300 MG: 300 CAPSULE ORAL at 03:03

## 2024-03-24 RX ADMIN — GABAPENTIN 600 MG: 300 CAPSULE ORAL at 09:03

## 2024-03-24 RX ADMIN — LOSARTAN POTASSIUM 100 MG: 50 TABLET, FILM COATED ORAL at 09:03

## 2024-03-24 RX ADMIN — VANCOMYCIN HYDROCHLORIDE 750 MG: 750 INJECTION, POWDER, LYOPHILIZED, FOR SOLUTION INTRAVENOUS at 01:03

## 2024-03-24 RX ADMIN — PIPERACILLIN SODIUM AND TAZOBACTAM SODIUM 4.5 G: 4; .5 INJECTION, POWDER, LYOPHILIZED, FOR SOLUTION INTRAVENOUS at 01:03

## 2024-03-24 RX ADMIN — OXYCODONE HYDROCHLORIDE 5 MG: 5 TABLET ORAL at 01:03

## 2024-03-24 RX ADMIN — PIPERACILLIN SODIUM AND TAZOBACTAM SODIUM 4.5 G: 4; .5 INJECTION, POWDER, LYOPHILIZED, FOR SOLUTION INTRAVENOUS at 02:03

## 2024-03-24 RX ADMIN — VANCOMYCIN HYDROCHLORIDE 750 MG: 750 INJECTION, POWDER, LYOPHILIZED, FOR SOLUTION INTRAVENOUS at 03:03

## 2024-03-24 RX ADMIN — OXYCODONE HYDROCHLORIDE 5 MG: 5 TABLET ORAL at 04:03

## 2024-03-24 RX ADMIN — GABAPENTIN 300 MG: 300 CAPSULE ORAL at 09:03

## 2024-03-24 RX ADMIN — PIPERACILLIN SODIUM AND TAZOBACTAM SODIUM 4.5 G: 4; .5 INJECTION, POWDER, LYOPHILIZED, FOR SOLUTION INTRAVENOUS at 09:03

## 2024-03-24 RX ADMIN — OXYCODONE HYDROCHLORIDE 5 MG: 5 TABLET ORAL at 06:03

## 2024-03-24 RX ADMIN — POLYETHYLENE GLYCOL 3350 17 G: 17 POWDER, FOR SOLUTION ORAL at 09:03

## 2024-03-24 RX ADMIN — THERA TABS 1 TABLET: TAB at 09:03

## 2024-03-24 RX ADMIN — CLOPIDOGREL BISULFATE 75 MG: 75 TABLET ORAL at 09:03

## 2024-03-24 RX ADMIN — DULOXETINE HYDROCHLORIDE 30 MG: 30 CAPSULE, DELAYED RELEASE ORAL at 09:03

## 2024-03-24 RX ADMIN — ENOXAPARIN SODIUM 40 MG: 40 INJECTION SUBCUTANEOUS at 04:03

## 2024-03-24 RX ADMIN — ASPIRIN 81 MG CHEWABLE TABLET 81 MG: 81 TABLET CHEWABLE at 09:03

## 2024-03-24 RX ADMIN — LIDOCAINE 5% 1 PATCH: 700 PATCH TOPICAL at 09:03

## 2024-03-24 NOTE — PROGRESS NOTES
Pharmacokinetic Assessment Follow Up: IV Vancomycin    Vancomycin serum concentration assessment(s):    The trough level was drawn correctly and can be used to guide therapy at this time. The measurement is within the desired definitive target range of 15 to 20 mcg/mL.    Vancomycin Regimen Plan:  Vanc trough borderline low for therapeutic range   Continue Vanc 750 mg Q12H for now and repeat trough in 48 hr 3/26 @ 1200   Of note, when on 1g Q12H the trough was becoming supratherapeutic    Drug levels (last 3 results):  Recent Labs   Lab Result Units 03/22/24  1237 03/24/24  0012   Vancomycin Trough ug/ml 16.0 15.3       Pharmacy will continue to follow and monitor vancomycin.    Thank you for the consult,   Renee Moore       Patient brief summary:  Shaheen Christie is a 69 y.o. male initiated on antimicrobial therapy with IV Vancomycin for treatment of bone/joint infection    Drug Allergies:   Review of patient's allergies indicates:  No Known Allergies    Actual Body Weight:   68 kg    Renal Function:   Estimated Creatinine Clearance: 108.6 mL/min (A) (based on SCr of 0.6 mg/dL (L)).,     Dialysis Method (if applicable):  N/A    CBC (last 72 hours):  Recent Labs   Lab Result Units 03/22/24  0004   WBC x10(3)/mcL 8.33   Hgb g/dL 7.6*   Hct % 24.8*   Platelet x10(3)/mcL 320   Mono % % 11.5   Eos % % 8.4   Basophil % % 1.6       Metabolic Panel (last 72 hours):  Recent Labs   Lab Result Units 03/22/24  1237 03/24/24  0351   Sodium Level mmol/L  --  139   Potassium Level mmol/L  --  3.5   Chloride mmol/L  --  109*   Carbon Dioxide mmol/L  --  22*   Glucose Level mg/dL  --  124*   Blood Urea Nitrogen mg/dL  --  7.2*   Creatinine mg/dL 0.69* 0.60*   Albumin Level g/dL  --  2.4*   Bilirubin Total mg/dL  --  0.2   Alkaline Phosphatase unit/L  --  78   Aspartate Aminotransferase unit/L  --  9   Alanine Aminotransferase unit/L  --  7       Vancomycin Administrations:  vancomycin given in the last 96 hours                      vancomycin 750 mg in dextrose 5 % 250 mL IVPB (ready to mix) (mg) 750 mg New Bag 03/24/24 0120     750 mg New Bag 03/23/24 1459     750 mg New Bag  0104     750 mg New Bag 03/22/24 1319     750 mg New Bag  0017     750 mg New Bag 03/21/24 1414     750 mg New Bag  0027     750 mg New Bag 03/20/24 1405                    Microbiologic Results:  Microbiology Results (last 7 days)       Procedure Component Value Units Date/Time    Blood culture #2 **CANNOT BE ORDERED STAT** [[id number]]  (Normal) Collected: 03/15/24 0353    Order Status: Completed Specimen: Blood from Arm, Left Updated: 03/20/24 0500     CULTURE, BLOOD ([facility]) No Growth at 5 days    Blood culture #1 **CANNOT BE ORDERED STAT** [[id number]]  (Normal) Collected: 03/15/24 0353    Order Status: Completed Specimen: Blood from Arm, Right Updated: 03/20/24 0425     CULTURE, BLOOD ([facility]) No Growth at 5 days

## 2024-03-24 NOTE — PROGRESS NOTES
[facility]                       [address] [address]    PATIENT NAME:       HAWA KUMAR  YOB: 1955  CSN:                [id number]   MRN:                [id number]  ADMIT DATE:         03/15/2024 03:09:00  PHYSICIAN:          Tom Nichole DPM                            PROGRESS NOTE    DATE:  03/24/2024 00:00:00    SUBJECTIVE:  The patient re-evaluated earlier this morning.  No major issues.    Tentative plans for surgery on Tuesday for debridement of the lower leg and heel   wounds.  No reported fevers or chills.  Remains on antibiotics secondary to   suspected osteomyelitis of the sacrum.  There were no plans for any formal   debridements on this site.    PHYSICAL EXAMINATION:  VITAL SIGNS:  Stable and he is afebrile.    LABORATORY STUDIES:  BUN and creatinine 7.2 and 0.69.    ASSESSMENT:  Chronic lower extremity wounds, unstageable with possible bony   involvement to that right calcaneus.    PLAN:  The patient instructed about the findings of physical exam.  The plan is   for debridement of the lower extremity wounds on Tuesday.  Highly probable, he   is going to require some removable bone in these areas.  We will continue with   current care for the time being.  Continue offloading.        ______________________________  Tom Nichole DPM    GAS/AQS  DD:  03/24/2024  Time:  09:44AM  DT:  03/24/2024  Time:  11:43AM  Job #:  [id number]      PROGRESS NOTE

## 2024-03-24 NOTE — PROGRESS NOTES
[facility] - 8th Floor [facility] Medicine  Progress Note    Patient Name: Shaheen Christie  MRN: [id number]  Patient Class: IP- Inpatient   Admission Date: 3/15/2024  Length of Stay: 9 days  Attending Physician: Enzo Thibodeaux MD  Primary Care Provider: Viktor Russ MD        Subjective:     Principal Problem:Osteomyelitis    Interval History:  Complaints.  Is doing okay.  He repeating to me this open heart valve chance the 3rd time seem started.  He is being followed by infectious disease and podiatry.    Review of Systems   All other systems reviewed and are negative.    Objective:     Vital Signs (Most Recent):  Temp: 97.9 °F (36.6 °C) (03/24/24 0757)  Pulse: 65 (03/24/24 0757)  Resp: 18 (03/24/24 0955)  BP: (!) 145/68 (03/24/24 0956)  SpO2: 95 % (03/24/24 0757) Vital Signs (24h Range):  Temp:  [97.4 °F (36.3 °C)-99 °F (37.2 °C)] 97.9 °F (36.6 °C)  Pulse:  [65-74] 65  Resp:  [16-20] 18  SpO2:  [93 %-95 %] 95 %  BP: (113-145)/(47-68) 145/68     Weight: 68 kg (150 lb)  Body mass index is 22.14 kg/m².    Intake/Output Summary (Last 24 hours) at 3/24/2024 1004  Last data filed at 3/24/2024 0600  Gross per 24 hour   Intake 960 ml   Output 1700 ml   Net -740 ml      Physical Exam  Constitutional:       Appearance: Normal appearance.   HENT:      Head: Normocephalic and atraumatic.      Right Ear: External ear normal.      Left Ear: External ear normal.      Mouth/Throat:      Mouth: Mucous membranes are dry.   Eyes:      Extraocular Movements: Extraocular movements intact.   Cardiovascular:      Rate and Rhythm: Normal rate and regular rhythm.      Pulses: Normal pulses.      Heart sounds: Normal heart sounds.   Abdominal:      General: Bowel sounds are normal.      Palpations: Abdomen is soft.   Musculoskeletal:      Cervical back: Neck supple.   Skin:     General: Skin is warm and dry.   Neurological:      General: No focal deficit present.      Mental Status: He is alert. Mental  "status is at baseline.           Overview/Hospital Course:  Repeat CBC and BNP for tomorrow.  Continue hair.  See what the plan at least Infectious Disease 6-8 weeks of IV antibiotics plus care.    Significant Labs: All pertinent labs within the past 24 hours have been reviewed.  BMP:   Recent Labs   Lab 03/24/24  0351      K 3.5   CO2 22*   BUN 7.2*   CREATININE 0.60*   CALCIUM 8.6*     CBC: No results for input(s): "WBC", "HGB", "HCT", "PLT" in the last 48 hours.  CMP:   Recent Labs   Lab 03/22/24  1237 03/24/24  0351   NA  --  139   K  --  3.5   CO2  --  22*   BUN  --  7.2*   CREATININE 0.69* 0.60*   CALCIUM  --  8.6*   ALBUMIN  --  2.4*   BILITOT  --  0.2   ALKPHOS  --  78   AST  --  9   ALT  --  7       Significant Imaging: I have reviewed all pertinent imaging results/findings within the past 24 hours.    Assessment/Plan:      Active Diagnoses:    Diagnosis Date Noted POA    PRINCIPAL PROBLEM:  Osteomyelitis [M86.9] 03/15/2024 Yes    Pressure injury of sacral region, stage 4 [L89.154] 03/19/2024 Yes    Skin ulcer of right lower leg [L97.919] 03/19/2024 Yes    Skin ulcer of left lower leg [L97.929] 03/19/2024 Yes    Unstageable pressure ulcer of right heel [L89.610] 03/19/2024 Yes    Unstageable pressure ulcer of left heel [L89.620] 03/19/2024 Yes      Problems Resolved During this Admission:     VTE Risk Mitigation (From admission, onward)           Ordered     enoxaparin injection 40 mg  Daily         03/15/24 3075                       Enzo Thibodeaux MD  Department of Hospital Medicine   [facility] - 8th Floor Med Surg    "

## 2024-03-24 NOTE — PROGRESS NOTES
Infectious Diseases Progress Note  69-year-old male, nursing home resident, with past medical history of HTN, COPD, DM type 2, with neuropathy, is admitted to [facility] on 03/15/2024 sent via EMS for sacral wound evaluation, apparently diagnosed with osteomyelitis involving the sacrum on 02/27/2024 with plan to be admitted to LTAC which had not been successful as an outpatient.  On presentation he was noted to have no fevers and no leukocytosis, anemic with low albumin.  Blood cultures have been negative.  Review of his records show a 02/08/2024 left leg wound culture with Pseudomonas, Providencia, ESBL Proteus and 11/30/2023 sacral wound culture with MRSA, Providencia, Enterococcus and Pseudomonas.  2/27 MRI of the pelvis shows osteomyelitis involving the sacrococcygeal bone as well as right trochanteric bursitis which may be septic.  He was seen by surgery team with no plan for surgical intervention.  He is on antibiotic coverage with vancomycin and Zosyn.     Subjective:  Lying in bed in no acute distress. No new complaints voiced. Afebrile.       Past Medical History:   Diagnosis Date    COPD (chronic obstructive pulmonary disease)     Depression     Hypertension     Neuropathy      Past Surgical History:   Procedure Laterality Date    angiogram Bilateral     stents placed in left leg    anterior neck surgery      ARTHROTOMY OF ANKLE Left 6/28/2023    Procedure: ARTHROTOMY, ANKLE;  Surgeon: Tom Nichole DPM;  Location: [facility];  Service: Podiatry;  Laterality: Left;    cataracts Left     CHOLECYSTECTOMY      EYE SURGERY Left     HAND SURGERY Left     broken bone    herina repair      INCISION AND DRAINAGE, LOWER EXTREMITY Left 7/28/2023    Procedure: INCISION AND DRAINAGE, LOWER EXTREMITY;  Surgeon: Avinash Garces MD;  Location: [facility];  Service: Orthopedics;  Laterality: Left;  Supine, vascular bed, bone foam  last case  cysto tubing, laparoscopic trocar, experience, vanc,  hemovac drain    KNEE SURGERY Bilateral     PERIPHERAL ANGIOGRAPHY N/A 3/13/2024    Procedure: Peripheral angiography;  Surgeon: Deven Queen MD;  Location: [facility] CATH LAB;  Service: Cardiology;  Laterality: N/A;  MICHELLE ANGIO W/ ANEST.    posterior neck surgery      SPINE SURGERY      TOTAL REPLACEMENT OF BOTH HIP JOINTS USING COMPUTER-ASSISTED NAVIGATION Bilateral      Social History     Socioeconomic History    Marital status:    Occupational History    Occupation: retired   Tobacco Use    Smoking status: Former     Average packs/day: 0.5 packs/day for 16.2 years (7.5 ttl pk-yrs)     Types: Cigarettes     Start date: 2008    Smokeless tobacco: Never   Substance and Sexual Activity    Alcohol use: Yes     Alcohol/week: 3.0 - 6.0 standard drinks of alcohol     Types: 3 - 6 Cans of beer per week    Drug use: Yes     Types: Marijuana    Sexual activity: Not Currently       ROS  Constitutional:  Positive for malaise/fatigue.   HENT: Negative.     Respiratory: Negative.     Gastrointestinal: Negative.    Genitourinary: Negative.    Musculoskeletal: Negative.    Skin:         Multiple pressure wounds   Neurological:  Positive for weakness.   Endo/Heme/Allergies: Negative.    Psychiatric/Behavioral: Negative.     All other Systems review done and negative.    Review of patient's allergies indicates:  No Known Allergies      Scheduled Meds:   amLODIPine  10 mg Oral Daily    aspirin  81 mg Oral Daily    cilostazoL  50 mg Oral BID    clopidogreL  75 mg Oral Daily    docusate sodium  100 mg Oral BID    DULoxetine  30 mg Oral Daily    enoxaparin  40 mg Subcutaneous Daily    ferrous sulfate  1 tablet Oral Daily    gabapentin  300 mg Oral TID    gabapentin  600 mg Oral QHS    LIDOcaine  1 patch Transdermal Q24H    losartan  100 mg Oral Daily    multivitamin  1 tablet Oral Daily    oxyCODONE  5 mg Oral Q4H    piperacillin-tazobactam (Zosyn) IV (PEDS and ADULTS) (extended infusion is not appropriate)  4.5 g Intravenous Q8H  "   polyethylene glycol  17 g Oral Daily    vancomycin (VANCOCIN) IV (PEDS and ADULTS)  750 mg Intravenous Q12H     Continuous Infusions:  PRN Meds:acetaminophen, albuterol, aluminum-magnesium hydroxide-simethicone, HYDROcodone-acetaminophen, melatonin, simethicone, vancomycin - pharmacy to dose    Objective:  /64 (BP Location: Left arm)   Pulse 72   Temp 99 °F (37.2 °C) (Oral)   Resp 20   Ht 5' 9.02" (1.753 m)   Wt 68 kg (150 lb)   SpO2 (!) 93%   BMI 22.14 kg/m²     Physical Exam:   Physical Exam  Vitals reviewed.   Constitutional:       General: He is not in acute distress.  HENT:      Head: Normocephalic and atraumatic.   Cardiovascular:      Rate and Rhythm: Normal rate and regular rhythm.      Heart sounds: Normal heart sounds.   Pulmonary:      Effort: Pulmonary effort is normal. No respiratory distress.      Breath sounds: Normal breath sounds.   Abdominal:      General: Bowel sounds are normal. There is no distension.      Palpations: Abdomen is soft.      Tenderness: There is no abdominal tenderness.   Musculoskeletal:         General: No deformity.      Cervical back: Neck supple.   Skin:     Findings: No erythema or rash.      Comments: Wounds dressed   Neurological:      Mental Status: He is alert and oriented to person, place, and time.   Psychiatric:      Comments: Calm and cooperative     Imaging  Imaging Results    None          Lab Review   No results found for this or any previous visit (from the past 24 hour(s)).      Assessment/Plan:  1. Chronic sacral pressure wound with osteomyelitis  2. History of polymicrobial sacral wound culture-MRSA, Providencia, Pseudomonas, Enterococcus  3.  Chronic left lower extremity wound with history of polymicrobial wound culture-Pseudomonas, ESBL Proteus, Providencia  4. Multiple pressure wounds  5.  Diabetes type 2  6.  Anemia  7.  Protein calorie malnutrition      -Continue vancomycin and Zosyn, plan end date of 04/26, following inflammatory " markers  -No fevers noted and no leukocytosis  -3/19 ESR 12 and CRP 23.7, follow   -3/15 blood cultures negative  -2/8 left leg wound cultures with many Pseudomonas, Providencia, Proteus  -11/30/2023 sacral wound with many Providencia, MRSA, Enterococcus, Pseudomonas  -Seen by the surgery team with inputs noted including no plan for surgical intervention   -We will continue aggressive wound care per the wound care team  - Discussed with patient and nursing staff. Case management working on discharge to nursing facility.  Disposition per primary team

## 2024-03-25 LAB
ALBUMIN SERPL-MCNC: 2.5 G/DL (ref 3.4–4.8)
ALBUMIN/GLOB SERPL: 0.8 RATIO (ref 1.1–2)
ALP SERPL-CCNC: 76 UNIT/L (ref 40–150)
ALT SERPL-CCNC: 8 UNIT/L (ref 0–55)
AST SERPL-CCNC: 9 UNIT/L (ref 5–34)
BASOPHILS # BLD AUTO: 0.11 X10(3)/MCL
BASOPHILS NFR BLD AUTO: 1.6 %
BILIRUB SERPL-MCNC: 0.2 MG/DL
BUN SERPL-MCNC: 7.2 MG/DL (ref 8.4–25.7)
CALCIUM SERPL-MCNC: 8.9 MG/DL (ref 8.8–10)
CHLORIDE SERPL-SCNC: 110 MMOL/L (ref 98–107)
CO2 SERPL-SCNC: 25 MMOL/L (ref 23–31)
CREAT SERPL-MCNC: 0.65 MG/DL (ref 0.73–1.18)
CRP SERPL-MCNC: 29.4 MG/L
EOSINOPHIL # BLD AUTO: 0.66 X10(3)/MCL (ref 0–0.9)
EOSINOPHIL NFR BLD AUTO: 9.5 %
ERYTHROCYTE [DISTWIDTH] IN BLOOD BY AUTOMATED COUNT: 18 % (ref 11.5–17)
ERYTHROCYTE [SEDIMENTATION RATE] IN BLOOD: 30 MM/HR (ref 0–15)
GFR SERPLBLD CREATININE-BSD FMLA CKD-EPI: >60 MLS/MIN/1.73/M2
GLOBULIN SER-MCNC: 3.1 GM/DL (ref 2.4–3.5)
GLUCOSE SERPL-MCNC: 115 MG/DL (ref 82–115)
HCT VFR BLD AUTO: 26.9 % (ref 42–52)
HGB BLD-MCNC: 7.9 G/DL (ref 14–18)
IMM GRANULOCYTES # BLD AUTO: 0.02 X10(3)/MCL (ref 0–0.04)
IMM GRANULOCYTES NFR BLD AUTO: 0.3 %
LYMPHOCYTES # BLD AUTO: 1.73 X10(3)/MCL (ref 0.6–4.6)
LYMPHOCYTES NFR BLD AUTO: 24.9 %
MCH RBC QN AUTO: 26.1 PG (ref 27–31)
MCHC RBC AUTO-ENTMCNC: 29.4 G/DL (ref 33–36)
MCV RBC AUTO: 88.8 FL (ref 80–94)
MONOCYTES # BLD AUTO: 0.73 X10(3)/MCL (ref 0.1–1.3)
MONOCYTES NFR BLD AUTO: 10.5 %
NEUTROPHILS # BLD AUTO: 3.71 X10(3)/MCL (ref 2.1–9.2)
NEUTROPHILS NFR BLD AUTO: 53.2 %
NRBC BLD AUTO-RTO: 0 %
PLATELET # BLD AUTO: 327 X10(3)/MCL (ref 130–400)
PMV BLD AUTO: 8.5 FL (ref 7.4–10.4)
POTASSIUM SERPL-SCNC: 3.9 MMOL/L (ref 3.5–5.1)
PROT SERPL-MCNC: 5.6 GM/DL (ref 5.8–7.6)
RBC # BLD AUTO: 3.03 X10(6)/MCL (ref 4.7–6.1)
SODIUM SERPL-SCNC: 141 MMOL/L (ref 136–145)
WBC # SPEC AUTO: 6.96 X10(3)/MCL (ref 4.5–11.5)

## 2024-03-25 PROCEDURE — 80053 COMPREHEN METABOLIC PANEL: CPT | Performed by: NURSE PRACTITIONER

## 2024-03-25 PROCEDURE — 85652 RBC SED RATE AUTOMATED: CPT | Performed by: NURSE PRACTITIONER

## 2024-03-25 PROCEDURE — 11042 DBRDMT SUBQ TIS 1ST 20SQCM/<: CPT | Mod: ,,,

## 2024-03-25 PROCEDURE — 11045 DBRDMT SUBQ TISS EACH ADDL: CPT | Mod: ,,,

## 2024-03-25 PROCEDURE — 25000003 PHARM REV CODE 250: Performed by: FAMILY MEDICINE

## 2024-03-25 PROCEDURE — 63600175 PHARM REV CODE 636 W HCPCS: Performed by: FAMILY MEDICINE

## 2024-03-25 PROCEDURE — 63600175 PHARM REV CODE 636 W HCPCS: Performed by: INTERNAL MEDICINE

## 2024-03-25 PROCEDURE — 25000003 PHARM REV CODE 250: Performed by: INTERNAL MEDICINE

## 2024-03-25 PROCEDURE — 11000001 HC ACUTE MED/SURG PRIVATE ROOM

## 2024-03-25 PROCEDURE — 99233 SBSQ HOSP IP/OBS HIGH 50: CPT | Mod: 25,,,

## 2024-03-25 PROCEDURE — 0HBLXZZ EXCISION OF LEFT LOWER LEG SKIN, EXTERNAL APPROACH: ICD-10-PCS | Performed by: INTERNAL MEDICINE

## 2024-03-25 PROCEDURE — 86140 C-REACTIVE PROTEIN: CPT | Performed by: NURSE PRACTITIONER

## 2024-03-25 PROCEDURE — 85025 COMPLETE CBC W/AUTO DIFF WBC: CPT | Performed by: NURSE PRACTITIONER

## 2024-03-25 RX ADMIN — OXYCODONE HYDROCHLORIDE 5 MG: 5 TABLET ORAL at 05:03

## 2024-03-25 RX ADMIN — DOCUSATE SODIUM 100 MG: 100 CAPSULE, LIQUID FILLED ORAL at 09:03

## 2024-03-25 RX ADMIN — LIDOCAINE 5% 1 PATCH: 700 PATCH TOPICAL at 10:03

## 2024-03-25 RX ADMIN — FERROUS SULFATE TAB 325 MG (65 MG ELEMENTAL FE) 1 EACH: 325 (65 FE) TAB at 09:03

## 2024-03-25 RX ADMIN — DOCUSATE SODIUM 100 MG: 100 CAPSULE, LIQUID FILLED ORAL at 10:03

## 2024-03-25 RX ADMIN — ENOXAPARIN SODIUM 40 MG: 40 INJECTION SUBCUTANEOUS at 05:03

## 2024-03-25 RX ADMIN — CILOSTAZOL 50 MG: 50 TABLET ORAL at 09:03

## 2024-03-25 RX ADMIN — POLYETHYLENE GLYCOL 3350 17 G: 17 POWDER, FOR SOLUTION ORAL at 09:03

## 2024-03-25 RX ADMIN — VANCOMYCIN HYDROCHLORIDE 750 MG: 750 INJECTION, POWDER, LYOPHILIZED, FOR SOLUTION INTRAVENOUS at 02:03

## 2024-03-25 RX ADMIN — PIPERACILLIN SODIUM AND TAZOBACTAM SODIUM 4.5 G: 4; .5 INJECTION, POWDER, LYOPHILIZED, FOR SOLUTION INTRAVENOUS at 10:03

## 2024-03-25 RX ADMIN — OXYCODONE HYDROCHLORIDE 5 MG: 5 TABLET ORAL at 10:03

## 2024-03-25 RX ADMIN — GABAPENTIN 600 MG: 300 CAPSULE ORAL at 10:03

## 2024-03-25 RX ADMIN — CILOSTAZOL 50 MG: 50 TABLET ORAL at 10:03

## 2024-03-25 RX ADMIN — AMLODIPINE BESYLATE 10 MG: 5 TABLET ORAL at 09:03

## 2024-03-25 RX ADMIN — OXYCODONE HYDROCHLORIDE 5 MG: 5 TABLET ORAL at 07:03

## 2024-03-25 RX ADMIN — PIPERACILLIN SODIUM AND TAZOBACTAM SODIUM 4.5 G: 4; .5 INJECTION, POWDER, LYOPHILIZED, FOR SOLUTION INTRAVENOUS at 02:03

## 2024-03-25 RX ADMIN — GABAPENTIN 300 MG: 300 CAPSULE ORAL at 02:03

## 2024-03-25 RX ADMIN — CLOPIDOGREL BISULFATE 75 MG: 75 TABLET ORAL at 09:03

## 2024-03-25 RX ADMIN — GABAPENTIN 300 MG: 300 CAPSULE ORAL at 10:03

## 2024-03-25 RX ADMIN — OXYCODONE HYDROCHLORIDE 5 MG: 5 TABLET ORAL at 09:03

## 2024-03-25 RX ADMIN — ASPIRIN 81 MG CHEWABLE TABLET 81 MG: 81 TABLET CHEWABLE at 09:03

## 2024-03-25 RX ADMIN — THERA TABS 1 TABLET: TAB at 09:03

## 2024-03-25 RX ADMIN — GABAPENTIN 300 MG: 300 CAPSULE ORAL at 09:03

## 2024-03-25 RX ADMIN — PIPERACILLIN SODIUM AND TAZOBACTAM SODIUM 4.5 G: 4; .5 INJECTION, POWDER, LYOPHILIZED, FOR SOLUTION INTRAVENOUS at 04:03

## 2024-03-25 RX ADMIN — OXYCODONE HYDROCHLORIDE 5 MG: 5 TABLET ORAL at 02:03

## 2024-03-25 RX ADMIN — LOSARTAN POTASSIUM 100 MG: 50 TABLET, FILM COATED ORAL at 09:03

## 2024-03-25 RX ADMIN — OXYCODONE HYDROCHLORIDE 5 MG: 5 TABLET ORAL at 01:03

## 2024-03-25 RX ADMIN — DULOXETINE HYDROCHLORIDE 30 MG: 30 CAPSULE, DELAYED RELEASE ORAL at 09:03

## 2024-03-25 NOTE — PROCEDURES
"Shaheen Christie is a 69 y.o. male patient.    Temp: 97.7 °F (36.5 °C) (03/25/24 0800)  Pulse: 77 (03/25/24 0800)  Resp: 20 (03/25/24 0911)  BP: (!) 141/71 (03/25/24 0911)  SpO2: (!) 94 % (03/25/24 0800)  Weight: 68 kg (150 lb) (03/20/24 1315)  Height: 5' 9.02" (175.3 cm) (03/20/24 1315)       Debridement    Date/Time: 3/25/2024 11:44 AM    Performed by: Lory Mckeon FNP  Authorized by: Lory Mckeon FNP  Associated wounds:        Altered Skin Integrity 01/22/24 1351 Sacral spine #4  Time out: Immediately prior to procedure a "time out" was called to verify the correct patient, procedure, equipment, support staff and site/side marked as required.    Consent Done?:  Yes (Verbal)  Local anesthesia used?: No      Wound Details:    Location:  Sacrum    Type of Debridement:  Excisional       Length (cm):  9.5       Area (sq cm):  161.5       Width (cm):  17       Percent Debrided (%):  25       Depth (cm):  2       Total Area Debrided (sq cm):  40.38    Depth of debridement:  Subcutaneous tissue    Tissue debrided:  Dermis and Epidermis    Devitalized tissue debrided:  Slough and Necrotic/Eschar    Instruments:  Scissors  Bleeding:  None  Patient tolerance:  Patient tolerated the procedure well with no immediate complications      3/25/2024    "

## 2024-03-25 NOTE — SUBJECTIVE & OBJECTIVE
Subjective:     HPI:  The patient is a 69 year old WM, with past medical history of HTN, COPD, DM2 with neuropathy, PAD, chronic venous hypertension. He was sent  to [facility] on 3/15/24 from [facility] for evaluation of sacral wound due to recent diagnosis of osteomyelitis involving the sacrum on 2/27/24. Previous plans for LTAC placement were not successful; he will require assistance with placement for continuing IV antibiotics post discharge.   Review of records reveals + culture of left leg wound with Pseudomonas, Providencia, ESBL Proteus on 2/8/24 and + sacral wound culture with MRSA, Providencia, Enterococcus and Pseudomonas. 2/27/24 MRI of sacrum shows osteomyelitis involving the sacrococcygeal bone.   He presents without systemic signs of infection. He was evaluated by surgery (3/15/24), no plan for surgical intervention at this time.   ID consulted; and guiding antibiotic stewardship.   He was seen in the outpatient wound care clinic here at [facility] form 9/2022 - 2/2023 for ulcer of left ankle.   He is currently with multiple chronic appearing wounds to BLE and sacrum. He has been seen by Dr. Queen with work up and intervention for occlusion of LLE in the past. Most recent peripheral angiography performed on BLE (3/13/24) per Dr. Queen. Found to have non critical stenosis in the left SFA. Right SFA with stenosis however with excellent three-vessel runoff. (Wounds to be medically managed)    Podiatry consulted - plans for surgical debridement of BLE on 3/26/24.     Wound medicine has been consulted in addition to WOCN for evaluation of these severe wounds.   First evaluation compelted on 3/19/24. Found to have severe BLE unstageable pressure injuries to both lateral legs and both heels and chronic sacral pressure ulcer.     Wound recheck done today, 3/25/24. Met patient in his room, 804, awake, alert and agreeable to wound assessment and treatment. Currently positioned on left side, lying  on low air loss mattress with heel lift boot only on right foot, says he took off the left one because it was causing more pain. No complaints at current stays he will be going for surgery with Dr. Nichole tomorrow for debridement of lower extremity wounds.         Hospital Course:   No notes on file      Follow-up For: Procedure(s) (LRB):  DEBRIDEMENT, FOOT (Bilateral)    Post-Operative Day:      Scheduled Meds:   amLODIPine  10 mg Oral Daily    aspirin  81 mg Oral Daily    cilostazoL  50 mg Oral BID    clopidogreL  75 mg Oral Daily    docusate sodium  100 mg Oral BID    DULoxetine  30 mg Oral Daily    enoxaparin  40 mg Subcutaneous Daily    ferrous sulfate  1 tablet Oral Daily    gabapentin  300 mg Oral TID    gabapentin  600 mg Oral QHS    LIDOcaine  1 patch Transdermal Q24H    losartan  100 mg Oral Daily    multivitamin  1 tablet Oral Daily    oxyCODONE  5 mg Oral Q4H    piperacillin-tazobactam (Zosyn) IV (PEDS and ADULTS) (extended infusion is not appropriate)  4.5 g Intravenous Q8H    polyethylene glycol  17 g Oral Daily    vancomycin (VANCOCIN) IV (PEDS and ADULTS)  750 mg Intravenous Q12H     Continuous Infusions:  PRN Meds:acetaminophen, albuterol, aluminum-magnesium hydroxide-simethicone, HYDROcodone-acetaminophen, melatonin, simethicone, vancomycin - pharmacy to dose    Review of Systems   Musculoskeletal:  Positive for arthralgias.   Skin:  Positive for wound.     Objective:     Vital Signs (Most Recent):  Temp: 97.7 °F (36.5 °C) (03/25/24 0800)  Pulse: 77 (03/25/24 0800)  Resp: 20 (03/25/24 0911)  BP: (!) 141/71 (03/25/24 0911)  SpO2: (!) 94 % (03/25/24 0800) Vital Signs (24h Range):  Temp:  [97.6 °F (36.4 °C)-98.5 °F (36.9 °C)] 97.7 °F (36.5 °C)  Pulse:  [66-77] 77  Resp:  [18-20] 20  SpO2:  [93 %-98 %] 94 %  BP: (113-156)/(48-72) 141/71     Weight: 68 kg (150 lb)  Body mass index is 22.14 kg/m².     Physical Exam  Vitals reviewed.   Constitutional:       General: He is awake.      Appearance: He is  ill-appearing (chronic).      Comments: Diffuse muscle wasting BLE      HENT:      Head: Normocephalic and atraumatic.      Ears:      Comments: Hard of hearing    Cardiovascular:      Rate and Rhythm: Normal rate and regular rhythm.      Pulses:           Dorsalis pedis pulses are 1+ on the right side and 2+ on the left side.   Pulmonary:      Effort: Pulmonary effort is normal. No respiratory distress.   Musculoskeletal:      Comments: Reports pain with manipulation of BLE    Skin:     General: Skin is warm and dry.      Capillary Refill: Capillary refill takes less than 2 seconds.      Findings: Wound present.             Comments: Large sacral wound that extends laterally to the left. Healthy appearing wound bed with mostly healthy appearing red tissue and about 25% devitalized tissue. No purulent drainage or odor. Cecilia-wound margins without evidence of ongoing injury.   LLE lateral leg: large wound with center covered in dry black eschar; wound margins bleed easily with dressing removal. No purulent drainage or odor.   Left heel: covered with adherent black eschar; proximal edge detaching with some purulence noted under eschar.   Right lateral leg: large, deep wound with some healthy appearing tissue near wound margins; center covered with devitalized tissue (slough and black dry eschar). No purulent drainage or odor.   Right heel: covered with adherent black eschar.        Neurological:      Mental Status: He is alert and oriented to person, place, and time.   Psychiatric:         Mood and Affect: Mood normal.         Behavior: Behavior normal. Behavior is cooperative.       Sacrum: 9.5 x 17 x 2 cm      Left lateral lower leg: 14.8 x 3.5 cm (Mislabeled as right leg in images)       Left Heel: 4.5 x 3.5 cm       Right lateral lower leg: 9.5 x 2.4 cm       Right heel: 6.5 x 5 cm            Laboratory:  A1C:   Recent Labs   Lab 02/02/24  0445   HGBA1C 5.0     CBC:   Recent Labs   Lab 03/25/24  0422   WBC 6.96  "  RBC 3.03*   HGB 7.9*   HCT 26.9*      MCV 88.8   MCH 26.1*   MCHC 29.4*     CMP:   Recent Labs   Lab 03/25/24 0422   CALCIUM 8.9   ALBUMIN 2.5*      K 3.9   CO2 25   BUN 7.2*   CREATININE 0.65*   ALKPHOS 76   ALT 8   AST 9   BILITOT 0.2     Coagulation: No results for input(s): "PT", "INR", "APTT" in the last 168 hours.  CRP:   Recent Labs   Lab 03/25/24 0422   CRP 29.40*     ESR:   Recent Labs   Lab 03/25/24 0422   SEDRATE 30*     LFTs:   Recent Labs   Lab 03/25/24 0422   ALT 8   AST 9   ALKPHOS 76   BILITOT 0.2   ALBUMIN 2.5*     Prealbumin: No results for input(s): "PREALBUMIN" in the last 48 hours.  Wound Cultures: No results for input(s): "LABAERO" in the last 4320 hours.  Microbiology Results (last 7 days)       Procedure Component Value Units Date/Time    Blood culture #2 **CANNOT BE ORDERED STAT** [[id number]]  (Normal) Collected: 03/15/24 0353    Order Status: Completed Specimen: Blood from Arm, Left Updated: 03/20/24 0500     CULTURE, BLOOD ([facility]) No Growth at 5 days    Blood culture #1 **CANNOT BE ORDERED STAT** [[id number]]  (Normal) Collected: 03/15/24 0353    Order Status: Completed Specimen: Blood from Arm, Right Updated: 03/20/24 0425     CULTURE, BLOOD ([facility]) No Growth at 5 days          Specimen (24h ago, onward)      None          No results for input(s): "COLORU", "CLARITYU", "SPECGRAV", "PHUR", "PROTEINUA", "GLUCOSEU", "BILIRUBINCON", "BLOODU", "WBCU", "RBCU", "BACTERIA", "MUCUS", "NITRITE", "LEUKOCYTESUR", "UROBILINOGEN", "HYALINECASTS" in the last 168 hours.    Diagnostic Results:  I have reviewed all pertinent imaging results/findings within the past 24 hours.    X-Ray Tibia Fibula 2 View Right  Status: Final result     MyChart Results Release    MyChart Status: Active  Results Release     PACS Images for [employer] Viewer     Show images for X-Ray Tibia Fibula 2 View Right  X-Ray Tibia Fibula 2 View Right  Order: [id number]  Status: Final result       Visible to " patient: Yes (not seen)       Next appt: None    0 Result Notes  Details    Reading Physician Reading Date Result Priority   Ramon Song MD  [phone] 3/19/2024 Routine     Narrative & Impression  EXAMINATION  XR TIBIA FIBULA 2 VIEW RIGHT     CLINICAL HISTORY  pressure ulcer;     TECHNIQUE  A total of 4 images submitted of the right leg.     COMPARISON  None available at the time of initial interpretation.     FINDINGS  Regional arthritic changes and widespread appearance of decreased bone mineralization noted.  There is incidentally visualized right knee arthroplasty hardware without evidence of gross osseous loosening or component malposition.  No convincing acutely displaced fracture or dislocation is identified.  No aggressive osseous lesion or periosteal reaction is appreciated.     No definite focal soft tissue abnormality or tracking subcutaneous gas is appreciated.  There is no radiopaque foreign body.  Scattered vascular calcifications are present.  Nonspecific lucent contour along the mid through distal lateral leg may represent soft tissue wound, otherwise limited assessment by radiograph exam.     IMPRESSION  1. Regional degenerative changes and bone demineralization, without convincing acute osseous displacement.  2. Mid/distal lateral leg lucency may represent soft tissue wound.  3. Additional chronic secondary details discussed above.        Electronically signed by: Ramon Song  Date:                                            03/19/2024  Time:                                           17:11                     X-Ray Tibia Fibula 2 View Left  Status: Final result     [employer] Results Release    [employer] Status: Active  Results Release     PACS Images for [employer] Viewer     Show images for X-Ray Tibia Fibula 2 View Left  X-Ray Tibia Fibula 2 View Left  Order: [id number]  Status: Final result       Visible to patient: Yes (not seen)       Next appt: None    0 Result  Notes  Details    Reading Physician Reading Date Result Priority   Ramon Song MD  [phone] 3/19/2024 Routine     Narrative & Impression  EXAMINATION  XR TIBIA FIBULA 2 VIEW LEFT     CLINICAL HISTORY  lateral leg pressure ulcer;     TECHNIQUE  A total of 4 images submitted of the left leg.     COMPARISON  None available at the time of initial interpretation.     FINDINGS  Extensive tricompartmental degenerative changes are appreciated at the knee, as well as degenerative alterations throughout the visualized hindfoot.  There is widespread appearance of bone demineralization.  No convincing acutely displaced fracture or dislocation is identified.  No aggressive osseous lesion or periosteal reaction is appreciated.     Regional soft tissue edema is present.  There is focal contour irregularity and lucency at the lateral proximal aspect of the leg, may represent ulceration.  No tracking subcutaneous gas is identified.  There are widespread vascular calcifications.  Additionally, punctate metallic fragments are incidentally seen at the lateral aspect of the distal thigh.  Stenting of the SFA and popliteal arteries also noted.     IMPRESSION  1. Nonspecific widespread soft tissue edema.  2. Regional arthritic changes in appearance of diffuse bone demineralization.  3. Cannot exclude soft tissue ulceration at the proximal lateral leg.        Electronically signed by: Ramon Song  Date:                                            03/19/2024  Time:                                           17:40       X-Ray Calcaneus 2 View Right  Status: Final result     [employer] Results Release    [employer] Status: Active  Results Release     PACS Images for [employer] Viewer     Show images for X-Ray Calcaneus 2 View Right  X-Ray Calcaneus 2 View Right  Order: [id number]  Status: Final result       Visible to patient: Yes (not seen)       Next appt: None    0 Result Notes  Details    Reading Physician Reading Date Result  Priority   Jack Merchant MD  [phone] 3/19/2024 Routine     Narrative & Impression  EXAMINATION:  XR CALCANEUS 2 VIEW RIGHT     CLINICAL HISTORY:  pressure ulcer;     TECHNIQUE:  Tangential and lateral views of the right calcaneus were performed.     COMPARISON:  None     FINDINGS:  There is a mottled appearance to all of the tarsal bones.  The bones are osteopenic.  Some degenerative changes and compression is seen along the talar dome.  The calcaneus has some mild periosteal elevation posteriorly and inferiorly.  There is a ulceration seen in the heel.  No fracture seen.  No dislocation is seen.     Impression:     Diffuse mottled appearance to all of the tarsal bones as well as the calcaneus with mild periosteal elevation seen along the posteroinferior aspect of the calcaneus.  Osteomyelitis should be excluded.        Electronically signed by: Mak Merchant  Date:                                            03/19/2024  Time:                                           17:42     X-Ray Calcaneus 2 View Left  Status: Final result     [employer] Results Release    [employer] Status: Active  Results Release     PACS Images for [employer] Viewer     Show images for X-Ray Calcaneus 2 View Left  X-Ray Calcaneus 2 View Left  Order: [id number]  Status: Final result       Visible to patient: Yes (not seen)       Next appt: None    0 Result Notes  Details    Reading Physician Reading Date Result Priority   Ramon Song MD  [phone] 3/19/2024 Routine     Narrative & Impression  EXAMINATION  XR CALCANEUS 2 VIEW LEFT     CLINICAL HISTORY  pressure ulcer;     TECHNIQUE  A total of 2 views submitted of the calcaneus.     COMPARISON  None available at the time of initial interpretation.     FINDINGS  Regional arthritic changes noted, as well as widespread appearance of decreased bone mineralization. No convincing acutely displaced fracture or dislocation is identified. No aggressive osseous lesion or periosteal  reaction is appreciated.     Skin contour irregularity with subjacent lucency visualized at the posterior aspect of the heel, without tracking subcutaneous gas or evidence of radiopaque foreign body.  Scattered vascular calcifications are also noted.     IMPRESSION  1. Posterior heel soft tissue ulceration without tracking gas or radiopaque foreign body.  2. No convincing acute osseous abnormality.  3. Chronic secondary details discussed above.  ===========     Please note early changes of osteomyelitis typically are radiographically occult; follow-up with MRI is recommended if there is elevated clinical concern (or nuclear medicine three-phase bone scan if MR assessment is contraindicated).        Electronically signed by: Ramon Song  Date:                                            03/19/2024  Time:                                           17:41     MRI Pelvis W WO Contrast  Status: Final result     [employer] Results Release    [employer] Status: Active  Results Release     PACS Images for [employer] Viewer     Show images for MRI Pelvis W WO Contrast  MRI Pelvis W WO Contrast  Order: [id number]  Status: Final result       Visible to patient: Yes (not seen)       Next appt: None       Dx: Open dislocation, lumbar vertebra    0 Result Notes  Details    Reading Physician Reading Date Result Priority   Sudarshan Pardo MD  [phone] 2/27/2024 Routine     Narrative & Impression  EXAMINATION:  MRI PELVIS W WO CONTRAST     CLINICAL HISTORY:  s31.000;  Dislocation of unspecified lumbar vertebra, initial encounter     TECHNIQUE:  Enhanced and unenhanced, multiplanar, multisequence MR imaging of the pelvis was obtained.     COMPARISON:  None     FINDINGS:  Bilateral total hip arthroplasties obscure bone detail and the bone component interfaces.  No osteitis pubis.  No sacroiliitis.  The right sacroiliac joint is solidly fused.  No evidence for small particle disease or osteomyelitis seen to the native acetabula or  to the included portions of the proximal femora.  The areas are are EKG is a trade a enhance in a a a a     Grade 1 anterolisthesis is partially visualized at the L4 level along with a broad-based disc protrusion.  A broad-based disc protrusion is partially visualized at L5-S1 and is associated with mild spinal stenosis and moderate to severe bilateral neural foraminal stenosis.  No presacral edema.  A large fecal bolus is present in the rectum and measures 6.5 cm in diameter.  The perirectal fat planes are clear.  Atrophy and edema are present to the included muscles with trace fluid seen tracking along the intermuscular septa.     Right-sided trochanteric bursitis may or may not be septic.  Diffuse subcutaneous edema is present about the pelvis but is most pronounced over the right hip.  This is consistent with cellulitis and or anasarca.  An ulcer is present over the distal sacrum but is suboptimally demonstrated on today's study.  Abnormal enhancement is present in the ulcer base without discrete abscess.     Impression:     A sacral decubitus ulcer is present with osteomyelitis involving the distal sacrum from approximately the S4 level through the tip of the coccyx.  No evidence for epidural abscess seen on today's study.  Presacral edema is present without discrete pelvic abscess.     Marked edema and abnormal enhancement are present to the included muscles of the pelvis but particularly to the abductor muscles and the muscles about the right hip.  This is consistent with myositis and denervation.  No discrete intramuscular abscess identified.     Right-sided trochanteric bursitis which may be septic.     Subcutaneous edema is present about the pelvis but particularly over the right hip.  This may represent cellulitis, anasarca, or both.     Grade 1 anterolisthesis at L4 which is not entirely included in the field of view.     Other findings as above        Electronically signed by: Sudarshan Pardo  Date:        "                                     02/27/2024  Time:                                           11:11           Exam Ended: 02/27/24 08:16 CST Last Resulted: 02/27/24 11:11 CST                 Accession #: [id number]  Cardiac catheterization    Height: 5' 9" (1.753 m)   Weight: 68.5 kg (151 lb)   Blood Pressure: 110/61   Heart Rate: 81    Date of Study: 3/13/24   Ordering Provider: Deven Queen MD   Clinical Indications: Peripheral vascular disease, unspecified [I73.9 (ICD-10-CM)]       Performing Physician  Performing Staff   Primary: Deven Queen MD    Monitoring RN: Von Campos RN   Documenter: Jazz Phillips RN   Scrub Person: Kiel Fields   Cardiovascular Tech: Shanika Fernandez            Anesthesiology Staff    Anesthesiologist: Tom Corea MD   CRNA: Mita Mclaughlin CRNA         Patient Information    Name MRN Description   Shaheen Christie [id number] 69 y.o. male     Physicians    Panel Physicians Referring Physician Case Authorizing Physician   Deven Queen MD (Primary)       Indications    Peripheral vascular disease, unspecified [I73.9 (ICD-10-CM)]     Summary         Peripheral angiography performed for bilateral lower extremity with anesthesia     The procedure log was documented by Documenter: Jazz Phillips RN and verified by Deven Queen MD.     Procedure indication  Nonhealing wound bilateral lower extremity     Procedure performed   Bilateral femoral angiography with runoff      Description     Patient brought to the cath lab.  Catheter placed in left femoral artery to perform left femoral artery angiography with runoff     Catheters were taken out      Findings  Left SFA patent   Left popliteal artery noncritical stenosis   Left leg has two-vessel runoff anterior tibial artery and peroneal artery  Right SFA patent   Right popliteal artery has a trifurcating lesion in the distal popliteal artery     Summary   Patient found to have noncritical stenosis in the left SFA   Right SFA " has stenosis however has got excellent three-vessel runoff      Summary   Patient will be managed medically for his wounds.  Medical therapy to be continued  Date: 3/13/2024  Time: 4:02 PM     Procedures

## 2024-03-25 NOTE — PROGRESS NOTES
[facility] - 8th Floor [facility] Medicine  Progress Note    Patient Name: Shaheen Christie  MRN: [id number]  Patient Class: IP- Inpatient   Admission Date: 3/15/2024  Length of Stay: 10 days  Attending Physician: Enzo Thibodeaux MD  Primary Care Provider: Viktor Russ MD        Subjective:     Principal Problem:Osteomyelitis    Interval History:  No new events from the night.  The patient told me that podiatrist plans to take him to the OR Tuesday to clean out his foot.    Review of Systems   All other systems reviewed and are negative.    Objective:     Vital Signs (Most Recent):  Temp: 97.7 °F (36.5 °C) (03/25/24 0800)  Pulse: 77 (03/25/24 0800)  Resp: 18 (03/25/24 0705)  BP: (!) 141/71 (03/25/24 0800)  SpO2: (!) 94 % (03/25/24 0800) Vital Signs (24h Range):  Temp:  [97.6 °F (36.4 °C)-98.5 °F (36.9 °C)] 97.7 °F (36.5 °C)  Pulse:  [66-77] 77  Resp:  [18] 18  SpO2:  [93 %-98 %] 94 %  BP: (113-156)/(48-72) 141/71     Weight: 68 kg (150 lb)  Body mass index is 22.14 kg/m².    Intake/Output Summary (Last 24 hours) at 3/25/2024 0807  Last data filed at 3/25/2024 0740  Gross per 24 hour   Intake 720 ml   Output 2101 ml   Net -1381 ml      Physical Exam  HENT:      Head: Normocephalic and atraumatic.      Right Ear: External ear normal.      Left Ear: External ear normal.      Mouth/Throat:      Mouth: Mucous membranes are dry.   Eyes:      Extraocular Movements: Extraocular movements intact.   Cardiovascular:      Rate and Rhythm: Normal rate and regular rhythm.      Heart sounds: Normal heart sounds.   Pulmonary:      Effort: Pulmonary effort is normal.      Breath sounds: Normal breath sounds.   Abdominal:      General: Bowel sounds are normal.      Palpations: Abdomen is soft.   Musculoskeletal:      Cervical back: Neck supple.   Skin:     General: Skin is warm and dry.   Neurological:      General: No focal deficit present.      Mental Status: He is alert. Mental status is at  baseline.   Psychiatric:         Mood and Affect: Mood normal.         Behavior: Behavior normal.           Overview/Hospital Course:  Blood work seems stable.  Still a little bit anemic.  But otherwise okay.  Has good appetite.    Significant Labs: All pertinent labs within the past 24 hours have been reviewed.  BMP:   Recent Labs   Lab 03/25/24  0422      K 3.9   CO2 25   BUN 7.2*   CREATININE 0.65*   CALCIUM 8.9     CBC:   Recent Labs   Lab 03/25/24 0422   WBC 6.96   HGB 7.9*   HCT 26.9*          Significant Imaging: I have reviewed all pertinent imaging results/findings within the past 24 hours.    Assessment/Plan:      Active Diagnoses:    Diagnosis Date Noted POA    PRINCIPAL PROBLEM:  Osteomyelitis [M86.9] 03/15/2024 Yes    Pressure injury of sacral region, stage 4 [L89.154] 03/19/2024 Yes    Skin ulcer of right lower leg [L97.919] 03/19/2024 Yes    Skin ulcer of left lower leg [L97.929] 03/19/2024 Yes    Unstageable pressure ulcer of right heel [L89.610] 03/19/2024 Yes    Unstageable pressure ulcer of left heel [L89.620] 03/19/2024 Yes      Problems Resolved During this Admission:     VTE Risk Mitigation (From admission, onward)           Ordered     enoxaparin injection 40 mg  Daily         03/15/24 0934                       Enzo Thibodeaux MD  Department of Hospital Medicine   [facility] - 8th Floor Med Surg

## 2024-03-25 NOTE — PROGRESS NOTES
[facility] - 8th Floor Med Surg  Wound Care  Progress Note    Patient Name: Shaheen Christie  MRN: [id number]  Admission Date: 3/15/2024  Hospital Length of Stay: 10 days  Attending Physician: Enzo Thibodeaux MD  Primary Care Provider: Viktro Russ MD     Subjective:     HPI:  The patient is a 69 year old WM, with past medical history of HTN, COPD, DM2 with neuropathy, PAD, chronic venous hypertension. He was sent  to [facility] on 3/15/24 from [facility] for evaluation of sacral wound due to recent diagnosis of osteomyelitis involving the sacrum on 2/27/24. Previous plans for LTAC placement were not successful; he will require assistance with placement for continuing IV antibiotics post discharge.   Review of records reveals + culture of left leg wound with Pseudomonas, Providencia, ESBL Proteus on 2/8/24 and + sacral wound culture with MRSA, Providencia, Enterococcus and Pseudomonas. 2/27/24 MRI of sacrum shows osteomyelitis involving the sacrococcygeal bone.   He presents without systemic signs of infection. He was evaluated by surgery (3/15/24), no plan for surgical intervention at this time.   ID consulted; and guiding antibiotic stewardship.   He was seen in the outpatient wound care clinic here at [facility] form 9/2022 - 2/2023 for ulcer of left ankle.   He is currently with multiple chronic appearing wounds to BLE and sacrum. He has been seen by Dr. Queen with work up and intervention for occlusion of LLE in the past. Most recent peripheral angiography performed on BLE (3/13/24) per Dr. Queen. Found to have non critical stenosis in the left SFA. Right SFA with stenosis however with excellent three-vessel runoff. (Wounds to be medically managed)    Podiatry consulted - plans for surgical debridement of BLE on 3/26/24.     Wound medicine has been consulted in addition to WOCN for evaluation of these severe wounds.   First evaluation compelted on 3/19/24. Found to have severe BLE  unstageable pressure injuries to both lateral legs and both heels and chronic sacral pressure ulcer.     Wound recheck done today, 3/25/24. Met patient in his room, 804, awake, alert and agreeable to wound assessment and treatment. Currently positioned on left side, lying on low air loss mattress with heel lift boot only on right foot, says he took off the left one because it was causing more pain. No complaints at current stays he will be going for surgery with Dr. Nichole tomorrow for debridement of lower extremity wounds.         Hospital Course:   No notes on file      Follow-up For: Procedure(s) (LRB):  DEBRIDEMENT, FOOT (Bilateral)    Post-Operative Day:      Scheduled Meds:   amLODIPine  10 mg Oral Daily    aspirin  81 mg Oral Daily    cilostazoL  50 mg Oral BID    clopidogreL  75 mg Oral Daily    docusate sodium  100 mg Oral BID    DULoxetine  30 mg Oral Daily    enoxaparin  40 mg Subcutaneous Daily    ferrous sulfate  1 tablet Oral Daily    gabapentin  300 mg Oral TID    gabapentin  600 mg Oral QHS    LIDOcaine  1 patch Transdermal Q24H    losartan  100 mg Oral Daily    multivitamin  1 tablet Oral Daily    oxyCODONE  5 mg Oral Q4H    piperacillin-tazobactam (Zosyn) IV (PEDS and ADULTS) (extended infusion is not appropriate)  4.5 g Intravenous Q8H    polyethylene glycol  17 g Oral Daily    vancomycin (VANCOCIN) IV (PEDS and ADULTS)  750 mg Intravenous Q12H     Continuous Infusions:  PRN Meds:acetaminophen, albuterol, aluminum-magnesium hydroxide-simethicone, HYDROcodone-acetaminophen, melatonin, simethicone, vancomycin - pharmacy to dose    Review of Systems   Musculoskeletal:  Positive for arthralgias.   Skin:  Positive for wound.     Objective:     Vital Signs (Most Recent):  Temp: 97.7 °F (36.5 °C) (03/25/24 0800)  Pulse: 77 (03/25/24 0800)  Resp: 20 (03/25/24 0911)  BP: (!) 141/71 (03/25/24 0911)  SpO2: (!) 94 % (03/25/24 0800) Vital Signs (24h Range):  Temp:  [97.6 °F (36.4 °C)-98.5 °F (36.9 °C)] 97.7  °F (36.5 °C)  Pulse:  [66-77] 77  Resp:  [18-20] 20  SpO2:  [93 %-98 %] 94 %  BP: (113-156)/(48-72) 141/71     Weight: 68 kg (150 lb)  Body mass index is 22.14 kg/m².     Physical Exam  Vitals reviewed.   Constitutional:       General: He is awake.      Appearance: He is ill-appearing (chronic).      Comments: Diffuse muscle wasting BLE      HENT:      Head: Normocephalic and atraumatic.      Ears:      Comments: Hard of hearing    Cardiovascular:      Rate and Rhythm: Normal rate and regular rhythm.      Pulses:           Dorsalis pedis pulses are 1+ on the right side and 2+ on the left side.   Pulmonary:      Effort: Pulmonary effort is normal. No respiratory distress.   Musculoskeletal:      Comments: Reports pain with manipulation of BLE    Skin:     General: Skin is warm and dry.      Capillary Refill: Capillary refill takes less than 2 seconds.      Findings: Wound present.             Comments: Large sacral wound that extends laterally to the left. Healthy appearing wound bed with mostly healthy appearing red tissue and about 25% devitalized tissue. No purulent drainage or odor. Cecilia-wound margins without evidence of ongoing injury.   LLE lateral leg: large wound with center covered in dry black eschar; wound margins bleed easily with dressing removal. No purulent drainage or odor.   Left heel: covered with adherent black eschar; proximal edge detaching with some purulence noted under eschar.   Right lateral leg: large, deep wound with some healthy appearing tissue near wound margins; center covered with devitalized tissue (slough and black dry eschar). No purulent drainage or odor.   Right heel: covered with adherent black eschar.        Neurological:      Mental Status: He is alert and oriented to person, place, and time.   Psychiatric:         Mood and Affect: Mood normal.         Behavior: Behavior normal. Behavior is cooperative.       Sacrum: 9.5 x 17 x 2 cm      Left lateral lower leg: 14.8 x 3.5 cm  "(Mislabeled as right leg in images)       Left Heel: 4.5 x 3.5 cm       Right lateral lower leg: 9.5 x 2.4 cm       Right heel: 6.5 x 5 cm            Laboratory:  A1C:   Recent Labs   Lab 02/02/24 0445   HGBA1C 5.0     CBC:   Recent Labs   Lab 03/25/24 0422   WBC 6.96   RBC 3.03*   HGB 7.9*   HCT 26.9*      MCV 88.8   MCH 26.1*   MCHC 29.4*     CMP:   Recent Labs   Lab 03/25/24 0422   CALCIUM 8.9   ALBUMIN 2.5*      K 3.9   CO2 25   BUN 7.2*   CREATININE 0.65*   ALKPHOS 76   ALT 8   AST 9   BILITOT 0.2     Coagulation: No results for input(s): "PT", "INR", "APTT" in the last 168 hours.  CRP:   Recent Labs   Lab 03/25/24 0422   CRP 29.40*     ESR:   Recent Labs   Lab 03/25/24 0422   SEDRATE 30*     LFTs:   Recent Labs   Lab 03/25/24 0422   ALT 8   AST 9   ALKPHOS 76   BILITOT 0.2   ALBUMIN 2.5*     Prealbumin: No results for input(s): "PREALBUMIN" in the last 48 hours.  Wound Cultures: No results for input(s): "LABAERO" in the last 4320 hours.  Microbiology Results (last 7 days)       Procedure Component Value Units Date/Time    Blood culture #2 **CANNOT BE ORDERED STAT** [[id number]]  (Normal) Collected: 03/15/24 0353    Order Status: Completed Specimen: Blood from Arm, Left Updated: 03/20/24 0500     CULTURE, BLOOD ([facility]) No Growth at 5 days    Blood culture #1 **CANNOT BE ORDERED STAT** [[id number]]  (Normal) Collected: 03/15/24 0353    Order Status: Completed Specimen: Blood from Arm, Right Updated: 03/20/24 0425     CULTURE, BLOOD ([facility]) No Growth at 5 days          Specimen (24h ago, onward)      None          No results for input(s): "COLORU", "CLARITYU", "SPECGRAV", "PHUR", "PROTEINUA", "GLUCOSEU", "BILIRUBINCON", "BLOODU", "WBCU", "RBCU", "BACTERIA", "MUCUS", "NITRITE", "LEUKOCYTESUR", "UROBILINOGEN", "HYALINECASTS" in the last 168 hours.    Diagnostic Results:  I have reviewed all pertinent imaging results/findings within the past 24 hours.    X-Ray Tibia Fibula 2 View Right  Status: Final " result     [employer] Results Release    [employer] Status: Active  Results Release     PACS Images for [employer] [place] Viewer     Show images for X-Ray Tibia Fibula 2 View Right  X-Ray Tibia Fibula 2 View Right  Order: [id number]  Status: Final result       Visible to patient: Yes (not seen)       Next appt: None    0 Result Notes  Details    Reading Physician Reading Date Result Priority   Ramon Song MD  [phone] 3/19/2024 Routine     Narrative & Impression  EXAMINATION  XR TIBIA FIBULA 2 VIEW RIGHT     CLINICAL HISTORY  pressure ulcer;     TECHNIQUE  A total of 4 images submitted of the right leg.     COMPARISON  None available at the time of initial interpretation.     FINDINGS  Regional arthritic changes and widespread appearance of decreased bone mineralization noted.  There is incidentally visualized right knee arthroplasty hardware without evidence of gross osseous loosening or component malposition.  No convincing acutely displaced fracture or dislocation is identified.  No aggressive osseous lesion or periosteal reaction is appreciated.     No definite focal soft tissue abnormality or tracking subcutaneous gas is appreciated.  There is no radiopaque foreign body.  Scattered vascular calcifications are present.  Nonspecific lucent contour along the mid through distal lateral leg may represent soft tissue wound, otherwise limited assessment by radiograph exam.     IMPRESSION  1. Regional degenerative changes and bone demineralization, without convincing acute osseous displacement.  2. Mid/distal lateral leg lucency may represent soft tissue wound.  3. Additional chronic secondary details discussed above.        Electronically signed by: Ramon Song  Date:                                            03/19/2024  Time:                                           17:11                     X-Ray Tibia Fibula 2 View Left  Status: Final result     [employer] Results Release    [employer] Status: Active  Results  Release     PACS Images for [employer] Viewer     Show images for X-Ray Tibia Fibula 2 View Left  X-Ray Tibia Fibula 2 View Left  Order: [id number]  Status: Final result       Visible to patient: Yes (not seen)       Next appt: None    0 Result Notes  Details    Reading Physician Reading Date Result Priority   Ramon Song MD  [phone] 3/19/2024 Routine     Narrative & Impression  EXAMINATION  XR TIBIA FIBULA 2 VIEW LEFT     CLINICAL HISTORY  lateral leg pressure ulcer;     TECHNIQUE  A total of 4 images submitted of the left leg.     COMPARISON  None available at the time of initial interpretation.     FINDINGS  Extensive tricompartmental degenerative changes are appreciated at the knee, as well as degenerative alterations throughout the visualized hindfoot.  There is widespread appearance of bone demineralization.  No convincing acutely displaced fracture or dislocation is identified.  No aggressive osseous lesion or periosteal reaction is appreciated.     Regional soft tissue edema is present.  There is focal contour irregularity and lucency at the lateral proximal aspect of the leg, may represent ulceration.  No tracking subcutaneous gas is identified.  There are widespread vascular calcifications.  Additionally, punctate metallic fragments are incidentally seen at the lateral aspect of the distal thigh.  Stenting of the SFA and popliteal arteries also noted.     IMPRESSION  1. Nonspecific widespread soft tissue edema.  2. Regional arthritic changes in appearance of diffuse bone demineralization.  3. Cannot exclude soft tissue ulceration at the proximal lateral leg.        Electronically signed by: Ramon Song  Date:                                            03/19/2024  Time:                                           17:40       X-Ray Calcaneus 2 View Right  Status: Final result     MyChart Results Release    [employer] Status: Active  Results Release     PACS Images for ViTAL [place] Viewer      Show images for X-Ray Calcaneus 2 View Right  X-Ray Calcaneus 2 View Right  Order: [id number]  Status: Final result       Visible to patient: Yes (not seen)       Next appt: None    0 Result Notes  Details    Reading Physician Reading Date Result Priority   Jack Merchant MD  [phone] 3/19/2024 Routine     Narrative & Impression  EXAMINATION:  XR CALCANEUS 2 VIEW RIGHT     CLINICAL HISTORY:  pressure ulcer;     TECHNIQUE:  Tangential and lateral views of the right calcaneus were performed.     COMPARISON:  None     FINDINGS:  There is a mottled appearance to all of the tarsal bones.  The bones are osteopenic.  Some degenerative changes and compression is seen along the talar dome.  The calcaneus has some mild periosteal elevation posteriorly and inferiorly.  There is a ulceration seen in the heel.  No fracture seen.  No dislocation is seen.     Impression:     Diffuse mottled appearance to all of the tarsal bones as well as the calcaneus with mild periosteal elevation seen along the posteroinferior aspect of the calcaneus.  Osteomyelitis should be excluded.        Electronically signed by: Mak Merchant  Date:                                            03/19/2024  Time:                                           17:42     X-Ray Calcaneus 2 View Left  Status: Final result     [employer] Results Release    [employer] Status: Active  Results Release     PACS Images for [place] Viewer     Show images for X-Ray Calcaneus 2 View Left  X-Ray Calcaneus 2 View Left  Order: [id number]  Status: Final result       Visible to patient: Yes (not seen)       Next appt: None    0 Result Notes  Details    Reading Physician Reading Date Result Priority   Ramon Song MD  [phone] 3/19/2024 Routine     Narrative & Impression  EXAMINATION  XR CALCANEUS 2 VIEW LEFT     CLINICAL HISTORY  pressure ulcer;     TECHNIQUE  A total of 2 views submitted of the calcaneus.     COMPARISON  None available at the time of  initial interpretation.     FINDINGS  Regional arthritic changes noted, as well as widespread appearance of decreased bone mineralization. No convincing acutely displaced fracture or dislocation is identified. No aggressive osseous lesion or periosteal reaction is appreciated.     Skin contour irregularity with subjacent lucency visualized at the posterior aspect of the heel, without tracking subcutaneous gas or evidence of radiopaque foreign body.  Scattered vascular calcifications are also noted.     IMPRESSION  1. Posterior heel soft tissue ulceration without tracking gas or radiopaque foreign body.  2. No convincing acute osseous abnormality.  3. Chronic secondary details discussed above.  ===========     Please note early changes of osteomyelitis typically are radiographically occult; follow-up with MRI is recommended if there is elevated clinical concern (or nuclear medicine three-phase bone scan if MR assessment is contraindicated).        Electronically signed by: Ramon Song  Date:                                            03/19/2024  Time:                                           17:41     MRI Pelvis W WO Contrast  Status: Final result     MyChart Results Release    [employer] Status: Active  Results Release     PACS Images for [employer] Viewer     Show images for MRI Pelvis W WO Contrast  MRI Pelvis W WO Contrast  Order: [id number]  Status: Final result       Visible to patient: Yes (not seen)       Next appt: None       Dx: Open dislocation, lumbar vertebra    0 Result Notes  Details    Reading Physician Reading Date Result Priority   Sudarshan Pardo MD  [phone] 2/27/2024 Routine     Narrative & Impression  EXAMINATION:  MRI PELVIS W WO CONTRAST     CLINICAL HISTORY:  s31.000;  Dislocation of unspecified lumbar vertebra, initial encounter     TECHNIQUE:  Enhanced and unenhanced, multiplanar, multisequence MR imaging of the pelvis was obtained.     COMPARISON:  None     FINDINGS:  Bilateral  total hip arthroplasties obscure bone detail and the bone component interfaces.  No osteitis pubis.  No sacroiliitis.  The right sacroiliac joint is solidly fused.  No evidence for small particle disease or osteomyelitis seen to the native acetabula or to the included portions of the proximal femora.  The areas are are EKG is a trade a enhance in a a a a     Grade 1 anterolisthesis is partially visualized at the L4 level along with a broad-based disc protrusion.  A broad-based disc protrusion is partially visualized at L5-S1 and is associated with mild spinal stenosis and moderate to severe bilateral neural foraminal stenosis.  No presacral edema.  A large fecal bolus is present in the rectum and measures 6.5 cm in diameter.  The perirectal fat planes are clear.  Atrophy and edema are present to the included muscles with trace fluid seen tracking along the intermuscular septa.     Right-sided trochanteric bursitis may or may not be septic.  Diffuse subcutaneous edema is present about the pelvis but is most pronounced over the right hip.  This is consistent with cellulitis and or anasarca.  An ulcer is present over the distal sacrum but is suboptimally demonstrated on today's study.  Abnormal enhancement is present in the ulcer base without discrete abscess.     Impression:     A sacral decubitus ulcer is present with osteomyelitis involving the distal sacrum from approximately the S4 level through the tip of the coccyx.  No evidence for epidural abscess seen on today's study.  Presacral edema is present without discrete pelvic abscess.     Marked edema and abnormal enhancement are present to the included muscles of the pelvis but particularly to the abductor muscles and the muscles about the right hip.  This is consistent with myositis and denervation.  No discrete intramuscular abscess identified.     Right-sided trochanteric bursitis which may be septic.     Subcutaneous edema is present about the pelvis but  "particularly over the right hip.  This may represent cellulitis, anasarca, or both.     Grade 1 anterolisthesis at L4 which is not entirely included in the field of view.     Other findings as above        Electronically signed by: Sudarshan Yelucie  Date:                                            02/27/2024  Time:                                           11:11           Exam Ended: 02/27/24 08:16 CST Last Resulted: 02/27/24 11:11 CST                 Accession #: [id number]  Cardiac catheterization    Height: 5' 9" (1.753 m)   Weight: 68.5 kg (151 lb)   Blood Pressure: 110/61   Heart Rate: 81    Date of Study: 3/13/24   Ordering Provider: Deven Queen MD   Clinical Indications: Peripheral vascular disease, unspecified [I73.9 (ICD-10-CM)]       Performing Physician  Performing Staff   Primary: Deven Queen MD    Monitoring RN: Von Campos RN   Documenter: Jazz Phillips RN   Scrub Person: Kiel Fileds   Cardiovascular Tech: Shanika Fernandez            Anesthesiology Staff    Anesthesiologist: Tom Corea MD   CRNA: Mita Mclaughlin CRNA         Patient Information    Name MRN Description   Shaheen Christie [id number] 69 y.o. male     Physicians    Panel Physicians Referring Physician Case Authorizing Physician   Deven Queen MD (Primary)       Indications    Peripheral vascular disease, unspecified [I73.9 (ICD-10-CM)]     Summary         Peripheral angiography performed for bilateral lower extremity with anesthesia     The procedure log was documented by Documenter: Jazz Phillips RN and verified by Deven Queen MD.     Procedure indication  Nonhealing wound bilateral lower extremity     Procedure performed   Bilateral femoral angiography with runoff      Description     Patient brought to the cath lab.  Catheter placed in left femoral artery to perform left femoral artery angiography with runoff     Catheters were taken out      Findings  Left SFA patent   Left popliteal artery noncritical stenosis "   Left leg has two-vessel runoff anterior tibial artery and peroneal artery  Right SFA patent   Right popliteal artery has a trifurcating lesion in the distal popliteal artery     Summary   Patient found to have noncritical stenosis in the left SFA   Right SFA has stenosis however has got excellent three-vessel runoff      Summary   Patient will be managed medically for his wounds.  Medical therapy to be continued  Date: 3/13/2024  Time: 4:02 PM     Procedures      Assessment/Plan:     Stage 4 pressure ulcer to sacrum - with evidence of osteomyelitis admitted for initiation of IV antibiotics.   Unstageable pressure ulcers to bilateral heels - Podiatry consulted - planning for surgical debridement on 3/26/24  Open wound/ulcers to right lateral lower leg and left lateral lower leg  Peripheral arterial disease - with history of vascular intervention with Dr. Queen (most recent angio 3/13/24; no planned interventions)   PVD  History of polymicrobial sacral wound culture  History of polymicrobial wound culture to leg wound  DM2  Anemia  History of smoking  Low prealbumin      PLAN:     Chart reviewed, patient examined and wounds assessed.  Opinion: The patient has a large stage 4 pressure ulcer to his sacrum that does not appear to be infected; bedside debridement done today, removing part of eschar to left lateral wound.  Multiple other chronic appearing wounds to BLE that are covered in eschar without notable odor on today's visit; planning for surgical debridement tomorrow. Continue current wound care orders.   Patients bed mobility is severely limited due to pain and discomfort associated with movement. This is affecting his ability to properly offload; which affects ability to heal his wounds.   Wound care orders: Sacrum: Cleanse wound with Vashe; apply Vashe moistened gauze to wound bed; paint left lateral eschar covered edge with betadine and cover with ABD/tape BID. Multiple wounds to BLE and heels: paint dry  eschar with betadine, any pink/red tissue with yellow slough cleanse with Vashe and apply Vashe moistened gauze to wound bed and cover with ABD/Kerlix/tape BID and PRN.  Monitor for signs & symptoms of deterioration  Offloading of sacrum/buttocks/heels at all times: BRADEN mattress, turning q 2 hrs; use of wedges and heel offloading devices to be used at all times while in bed; ( SALONI Arellano). This needs to be reinforced by every staff nurse caring for patient on every shift of every day as well as attendings rounding on the patient every day.    Incontinence: control moisture/wound contamination: No briefs  Nutrition: Recommend aggressive nutritional support, protein supplementation along with vitamin and mineral supplements  and petr to support wound healing; Low prealbumin - 13.8 on 3/19/24, follow dietitian recommendation  Diabetes: A1C good  Will try to follow weekly while admitted, but every nurse assigned to patient on every shift of every day needs to address daily wound care dressing changes and offloading modalities including using heel offloading devices, wedges, BRADEN mattress etc  Discussed with patient as well as nurse caring for patient today               The time spent including preparing to see the patient, obtaining patient history and assessment, evaluation of the plan of care, patient/caregiver counseling and education, orders, documentation, coordination of care, and other professional medical management activities for today's encounter was 50 minutes.    ANGEL Chavira  Wound Care  [facility] - 8th Floor Med Surg

## 2024-03-25 NOTE — PLAN OF CARE
03/25/24 1257   Discharge Reassessment   Assessment Type Discharge Planning Reassessment   Did the patient's condition or plan change since previous assessment? Yes   Discharge Plan discussed with: Patient   Discharge Plan A Long-term acute care facility (LTAC)   Discharge Plan B Return to Nursing Home   DME Needed Upon Discharge  none   Transition of Care Barriers None   Why the patient remains in the hospital Requires continued medical care   Post-Acute Status   Post-Acute Authorization Placement   Post-Acute Placement Status Referrals Sent   Patient choice form signed by patient/caregiver List with quality metrics by geographic area provided   Discharge Delays None known at this time     Ltac consult ordered, patient choice list presented and explained to patient. Patient verbalizes understanding. FOC obtained and placed in chart. Informed patient and his nurse he would have to be placed on a telemetry monitor to meet criteria. [facility] Second choice is [facility].

## 2024-03-26 ENCOUNTER — ANESTHESIA (OUTPATIENT)
Dept: SURGERY | Facility: HOSPITAL | Age: 69
DRG: 616 | End: 2024-03-26
Payer: MEDICARE

## 2024-03-26 ENCOUNTER — ANESTHESIA EVENT (OUTPATIENT)
Dept: SURGERY | Facility: HOSPITAL | Age: 69
DRG: 616 | End: 2024-03-26
Payer: MEDICARE

## 2024-03-26 LAB
GROUP & RH: NORMAL
INDIRECT COOMBS: NORMAL
SPECIMEN OUTDATE: NORMAL
VANCOMYCIN TROUGH SERPL-MCNC: 14.5 UG/ML (ref 15–20)

## 2024-03-26 PROCEDURE — 86901 BLOOD TYPING SEROLOGIC RH(D): CPT | Performed by: PODIATRIST

## 2024-03-26 PROCEDURE — 0JBQ0ZZ EXCISION OF RIGHT FOOT SUBCUTANEOUS TISSUE AND FASCIA, OPEN APPROACH: ICD-10-PCS | Performed by: PODIATRIST

## 2024-03-26 PROCEDURE — 80202 ASSAY OF VANCOMYCIN: CPT | Performed by: INTERNAL MEDICINE

## 2024-03-26 PROCEDURE — 63600175 PHARM REV CODE 636 W HCPCS: Performed by: PODIATRIST

## 2024-03-26 PROCEDURE — 25000003 PHARM REV CODE 250: Performed by: PODIATRIST

## 2024-03-26 PROCEDURE — 37000008 HC ANESTHESIA 1ST 15 MINUTES: Performed by: PODIATRIST

## 2024-03-26 PROCEDURE — 0JBP0ZZ EXCISION OF LEFT LOWER LEG SUBCUTANEOUS TISSUE AND FASCIA, OPEN APPROACH: ICD-10-PCS | Performed by: PODIATRIST

## 2024-03-26 PROCEDURE — 63600175 PHARM REV CODE 636 W HCPCS: Performed by: NURSE ANESTHETIST, CERTIFIED REGISTERED

## 2024-03-26 PROCEDURE — D9220A PRA ANESTHESIA: Mod: CRNA,,, | Performed by: NURSE ANESTHETIST, CERTIFIED REGISTERED

## 2024-03-26 PROCEDURE — 37000009 HC ANESTHESIA EA ADD 15 MINS: Performed by: PODIATRIST

## 2024-03-26 PROCEDURE — 63600175 PHARM REV CODE 636 W HCPCS: Performed by: FAMILY MEDICINE

## 2024-03-26 PROCEDURE — 25000003 PHARM REV CODE 250: Performed by: INTERNAL MEDICINE

## 2024-03-26 PROCEDURE — 63600175 PHARM REV CODE 636 W HCPCS: Performed by: INTERNAL MEDICINE

## 2024-03-26 PROCEDURE — 25000003 PHARM REV CODE 250: Performed by: FAMILY MEDICINE

## 2024-03-26 PROCEDURE — 25000003 PHARM REV CODE 250: Performed by: NURSE ANESTHETIST, CERTIFIED REGISTERED

## 2024-03-26 PROCEDURE — 0JBR0ZZ EXCISION OF LEFT FOOT SUBCUTANEOUS TISSUE AND FASCIA, OPEN APPROACH: ICD-10-PCS | Performed by: PODIATRIST

## 2024-03-26 PROCEDURE — 36000707: Performed by: PODIATRIST

## 2024-03-26 PROCEDURE — 87070 CULTURE OTHR SPECIMN AEROBIC: CPT | Performed by: PODIATRIST

## 2024-03-26 PROCEDURE — 11000001 HC ACUTE MED/SURG PRIVATE ROOM

## 2024-03-26 PROCEDURE — 87075 CULTR BACTERIA EXCEPT BLOOD: CPT | Performed by: PODIATRIST

## 2024-03-26 PROCEDURE — D9220A PRA ANESTHESIA: Mod: ANES,,, | Performed by: ANESTHESIOLOGY

## 2024-03-26 PROCEDURE — 21400001 HC TELEMETRY ROOM

## 2024-03-26 PROCEDURE — 36000706: Performed by: PODIATRIST

## 2024-03-26 RX ORDER — VANCOMYCIN HCL IN 5 % DEXTROSE 1G/250ML
1000 PLASTIC BAG, INJECTION (ML) INTRAVENOUS
Status: DISCONTINUED | OUTPATIENT
Start: 2024-03-26 | End: 2024-04-06

## 2024-03-26 RX ORDER — MUPIROCIN 20 MG/G
OINTMENT TOPICAL 2 TIMES DAILY
Status: DISPENSED | OUTPATIENT
Start: 2024-03-26 | End: 2024-03-31

## 2024-03-26 RX ORDER — GLYCOPYRROLATE 0.2 MG/ML
INJECTION INTRAMUSCULAR; INTRAVENOUS
Status: DISCONTINUED | OUTPATIENT
Start: 2024-03-26 | End: 2024-03-26

## 2024-03-26 RX ORDER — HYDROCODONE BITARTRATE AND ACETAMINOPHEN 5; 325 MG/1; MG/1
1 TABLET ORAL EVERY 4 HOURS PRN
Status: DISCONTINUED | OUTPATIENT
Start: 2024-03-26 | End: 2024-04-30 | Stop reason: HOSPADM

## 2024-03-26 RX ORDER — PROPOFOL 10 MG/ML
INJECTION, EMULSION INTRAVENOUS
Status: DISCONTINUED | OUTPATIENT
Start: 2024-03-26 | End: 2024-03-26

## 2024-03-26 RX ORDER — LIDOCAINE HYDROCHLORIDE 20 MG/ML
INJECTION INTRAVENOUS
Status: DISCONTINUED | OUTPATIENT
Start: 2024-03-26 | End: 2024-03-26

## 2024-03-26 RX ORDER — LIDOCAINE HYDROCHLORIDE 10 MG/ML
INJECTION INFILTRATION; PERINEURAL
Status: DISCONTINUED | OUTPATIENT
Start: 2024-03-26 | End: 2024-03-26 | Stop reason: HOSPADM

## 2024-03-26 RX ORDER — PROPOFOL 10 MG/ML
VIAL (ML) INTRAVENOUS
Status: DISCONTINUED | OUTPATIENT
Start: 2024-03-26 | End: 2024-03-26

## 2024-03-26 RX ADMIN — OXYCODONE HYDROCHLORIDE 5 MG: 5 TABLET ORAL at 07:03

## 2024-03-26 RX ADMIN — GLYCOPYRROLATE 0.1 MG: 0.2 INJECTION INTRAMUSCULAR; INTRAVENOUS at 06:03

## 2024-03-26 RX ADMIN — PIPERACILLIN SODIUM AND TAZOBACTAM SODIUM 4.5 G: 4; .5 INJECTION, POWDER, LYOPHILIZED, FOR SOLUTION INTRAVENOUS at 02:03

## 2024-03-26 RX ADMIN — ENOXAPARIN SODIUM 40 MG: 40 INJECTION SUBCUTANEOUS at 09:03

## 2024-03-26 RX ADMIN — LIDOCAINE HYDROCHLORIDE 80 MG: 20 INJECTION INTRAVENOUS at 06:03

## 2024-03-26 RX ADMIN — OXYCODONE HYDROCHLORIDE 5 MG: 5 TABLET ORAL at 09:03

## 2024-03-26 RX ADMIN — VANCOMYCIN HYDROCHLORIDE 1000 MG: 1 INJECTION, POWDER, LYOPHILIZED, FOR SOLUTION INTRAVENOUS at 02:03

## 2024-03-26 RX ADMIN — MUPIROCIN: 20 OINTMENT TOPICAL at 09:03

## 2024-03-26 RX ADMIN — OXYCODONE HYDROCHLORIDE 5 MG: 5 TABLET ORAL at 02:03

## 2024-03-26 RX ADMIN — CILOSTAZOL 50 MG: 50 TABLET ORAL at 09:03

## 2024-03-26 RX ADMIN — PROPOFOL 20 MG: 10 INJECTION, EMULSION INTRAVENOUS at 06:03

## 2024-03-26 RX ADMIN — PROPOFOL 50 MCG/KG/MIN: 10 INJECTION, EMULSION INTRAVENOUS at 06:03

## 2024-03-26 RX ADMIN — PIPERACILLIN SODIUM AND TAZOBACTAM SODIUM 4.5 G: 4; .5 INJECTION, POWDER, LYOPHILIZED, FOR SOLUTION INTRAVENOUS at 09:03

## 2024-03-26 RX ADMIN — PROPOFOL 50 MG: 10 INJECTION, EMULSION INTRAVENOUS at 06:03

## 2024-03-26 RX ADMIN — GABAPENTIN 600 MG: 300 CAPSULE ORAL at 09:03

## 2024-03-26 RX ADMIN — OXYCODONE HYDROCHLORIDE 5 MG: 5 TABLET ORAL at 06:03

## 2024-03-26 RX ADMIN — PROPOFOL 30 MG: 10 INJECTION, EMULSION INTRAVENOUS at 06:03

## 2024-03-26 RX ADMIN — PIPERACILLIN SODIUM AND TAZOBACTAM SODIUM 4.5 G: 4; .5 INJECTION, POWDER, LYOPHILIZED, FOR SOLUTION INTRAVENOUS at 04:03

## 2024-03-26 RX ADMIN — DOCUSATE SODIUM 100 MG: 100 CAPSULE, LIQUID FILLED ORAL at 09:03

## 2024-03-26 RX ADMIN — VANCOMYCIN HYDROCHLORIDE 750 MG: 750 INJECTION, POWDER, LYOPHILIZED, FOR SOLUTION INTRAVENOUS at 01:03

## 2024-03-26 RX ADMIN — SODIUM CHLORIDE, SODIUM GLUCONATE, SODIUM ACETATE, POTASSIUM CHLORIDE AND MAGNESIUM CHLORIDE: 526; 502; 368; 37; 30 INJECTION, SOLUTION INTRAVENOUS at 06:03

## 2024-03-26 RX ADMIN — OXYCODONE HYDROCHLORIDE 5 MG: 5 TABLET ORAL at 10:03

## 2024-03-26 RX ADMIN — Medication 6 MG: at 09:03

## 2024-03-26 NOTE — PROGRESS NOTES
Pharmacokinetic Assessment Follow Up: IV Vancomycin    Vancomycin serum concentration assessment(s):    The trough level was drawn correctly and can be used to guide therapy at this time. The measurement is below the desired definitive target range of 15 to 20 mcg/mL.    Vancomycin Regimen Plan:    Change regimen to Vancomycin 1000 mg IV every 12 hours with next serum trough concentration measured at 1200 prior to 4th dose on 03/28    Scheduled Administration Times    01  13      Drug levels (last 3 results):  Recent Labs   Lab Result Units 03/24/24  0012 03/26/24  1156   Vancomycin Trough ug/ml 15.3 14.5*       Vancomycin Administrations:  vancomycin given in the last 96 hours                     vancomycin 750 mg in dextrose 5 % 250 mL IVPB (ready to mix) (mg) 750 mg New Bag 03/26/24 0142     750 mg New Bag 03/25/24 1452     750 mg New Bag  0200     750 mg New Bag 03/24/24 1504     750 mg New Bag  0120     750 mg New Bag 03/23/24 1459     750 mg New Bag  0104     750 mg New Bag 03/22/24 1319                    Pharmacy will continue to follow and monitor vancomycin.    Please contact pharmacy at extension [phone] for questions regarding this assessment.    Thank you for the consult,   Cortes Tran       Patient brief summary:  Shaheen Christie is a 69 y.o. male initiated on antimicrobial therapy with IV Vancomycin for treatment of bone/joint infection    The patient's current regimen is [id number]    Drug Allergies:   Review of patient's allergies indicates:  No Known Allergies    Actual Body Weight:  Wt Readings from Last 1 Encounters:   03/20/24 68 kg (150 lb)       Renal Function:   Estimated Creatinine Clearance: 100.3 mL/min (A) (based on SCr of 0.65 mg/dL (L)).,     Dialysis Method (if applicable):  N/A    CBC (last 72 hours):  Recent Labs   Lab Result Units 03/25/24  0422   WBC x10(3)/mcL 6.96   Hgb g/dL 7.9*   Hct % 26.9*   Platelet x10(3)/mcL 327   Mono % % 10.5   Eos % % 9.5   Basophil % % 1.6        Metabolic Panel (last 72 hours):  Recent Labs   Lab Result Units 03/24/24  0351 03/25/24  0422   Sodium Level mmol/L 139 141   Potassium Level mmol/L 3.5 3.9   Chloride mmol/L 109* 110*   Carbon Dioxide mmol/L 22* 25   Glucose Level mg/dL 124* 115   Blood Urea Nitrogen mg/dL 7.2* 7.2*   Creatinine mg/dL 0.60* 0.65*   Albumin Level g/dL 2.4* 2.5*   Bilirubin Total mg/dL 0.2 0.2   Alkaline Phosphatase unit/L 78 76   Aspartate Aminotransferase unit/L 9 9   Alanine Aminotransferase unit/L 7 8       Microbiologic Results:  Microbiology Results (last 7 days)       Procedure Component Value Units Date/Time    Blood culture #2 **CANNOT BE ORDERED STAT** [[id number]]  (Normal) Collected: 03/15/24 0353    Order Status: Completed Specimen: Blood from Arm, Left Updated: 03/20/24 0500     CULTURE, BLOOD ([facility]) No Growth at 5 days    Blood culture #1 **CANNOT BE ORDERED STAT** [[id number]]  (Normal) Collected: 03/15/24 0353    Order Status: Completed Specimen: Blood from Arm, Right Updated: 03/20/24 0425     CULTURE, BLOOD ([facility]) No Growth at 5 days

## 2024-03-26 NOTE — ANESTHESIA PREPROCEDURE EVALUATION
03/26/2024  Shaheen Christie is a 69 y.o., male.    69-year-old male, nursing home resident, with past medical history of HTN, COPD, DM type 2, with neuropathy, is admitted to [facility] on 03/15/2024 sent via EMS for sacral wound evaluation, apparently diagnosed with osteomyelitis involving the sacrum on 02/27/2024 with plan to be admitted to LTAC which had not been successful as an outpatient.  On presentation he was noted to have no fevers and no leukocytosis, anemic with low albumin.  Blood cultures have been negative.  Review of his records show a 02/08/2024 left leg wound culture with Pseudomonas, Providencia, ESBL Proteus and 11/30/2023 sacral wound culture with MRSA, Providencia, Enterococcus and Pseudomonas.  2/27 MRI of the pelvis shows osteomyelitis involving the sacrococcygeal bone as well as right trochanteric bursitis which may be septic.  He was seen by surgery team with no plan for surgical intervention.  He is on antibiotic coverage with vancomycin and Zosyn.     Pre-op Assessment    I have reviewed the Patient Summary Reports.     I have reviewed the Nursing Notes. I have reviewed the NPO Status.   I have reviewed the Medications.     Review of Systems  Social:  Former Smoker       Hematology/Oncology:       -- Anemia (Hgb 7.9):                                  Cardiovascular:     Hypertension          PVD hyperlipidemia   ECG has been reviewed. 3/15/24 EKG:  Sinus rhythm with 1st degree A-V block with Premature atrial complexes   Right bundle branch block   Inferior infarct ,age undetermined   Abnormal ECG   No previous ECGs available                            Pulmonary:   COPD                     Musculoskeletal:  Arthritis   S/P bilat ESTHER    Osteomyelitis       Spine Disorders: cervical            Neurological:        Peripheral Neuropathy                           Psych:    depression              Physical Exam  General: Well nourished, Cooperative, Alert and Oriented    Airway:  Mallampati: II   Mouth Opening: Normal  TM Distance: Normal  Tongue: Normal  Neck ROM: Normal ROM    Dental:  Edentulous    Chest/Lungs:  Clear to auscultation    Heart:  Rate: Normal    Anesthesia Plan  Type of Anesthesia, risks & benefits discussed:    Anesthesia Type: MAC  Intra-op Monitoring Plan: Standard ASA Monitors  Post Op Pain Control Plan: multimodal analgesia  Induction:  IV  Airway Plan: Direct  Informed Consent: Informed consent signed with the Patient and all parties understand the risks and agree with anesthesia plan.  All questions answered.   ASA Score: 3  Day of Surgery Review of History & Physical: H&P Update referred to the surgeon/provider.I have interviewed and examined the patient. I have reviewed the patient's H&P dated:     Ready For Surgery From Anesthesia Perspective.   .

## 2024-03-26 NOTE — PROGRESS NOTES
[facility]                       [address] [id number]    PATIENT NAME:       HAAW KUMAR  YOB: 1955  CSN:                [id number]   MRN:                [id number]  ADMIT DATE:         03/15/2024 03:09:00  PHYSICIAN:          Tom Nichole DPM                            PROGRESS NOTE    DATE:  03/25/2024 05:59:11    SUBJECTIVE:  The patient was seen today.  He is doing well, not voicing any   complaints.  He is scheduled for surgery tomorrow for debridement of his lower   extremity wounds.  Not voicing any complaints.  Remains on antibiotics.    PHYSICAL EXAMINATION:  Vital signs are stable and afebrile.    Dressings are intact.  Clean and dry.  Heelift boots are in place.  No changes   in the wounds.  The wounds remain stable.    ASSESSMENT:  Bilateral lower extremity wounds with unstageable necrotic heels.    PLAN:  The patient instructed on the findings of physical exam.  We will plan on   debridement of the areas tomorrow to remove remaining nonviable tissue, not   only from the lower legs, but also the heel areas.  The patient is in agreement   with that plan.  I suspect, he is going to have some bone compromise to both   heels with some tendinous issues on the legs.  The patient is in agreement with   the plan.  I reviewed the procedure including risks.  He understands the   long-term prognosis.  All questions answered fully.  No guarantees given or   implied.  He is on the schedule for tomorrow.  Hold him n.p.o. past midnight.        ______________________________  Tom Nichole DPM    GAS/AQS  DD:  03/25/2024  Time:  05:29PM  DT:  03/25/2024  Time:  07:35PM  Job #:  [id number]      PROGRESS NOTE

## 2024-03-26 NOTE — TRANSFER OF CARE
"Anesthesia Transfer of Care Note    Patient: Shaheen Christie    Procedure(s) Performed: Procedure(s) (LRB):  DEBRIDEMENT, FOOT (Bilateral)    Patient location: [facility] Surgical Floor    Anesthesia Type: MAC    Transport from OR: Transported from OR on room air with adequate spontaneous ventilation    Post pain: adequate analgesia    Post assessment: no apparent anesthetic complications    Post vital signs: stable    Level of consciousness: awake and responds to stimulation    Nausea/Vomiting: no nausea/vomiting    Complications: none    Transfer of care protocol was followed    Last vitals: Visit Vitals  BP (!) 150/78 (BP Location: Left arm, Patient Position: Lying)   Pulse 68   Temp 36.7 °C (98 °F) (Oral)   Resp 16   Ht 5' 9.02" (1.753 m)   Wt 68 kg (150 lb)   SpO2 (!) 94%   BMI 22.14 kg/m²     "

## 2024-03-26 NOTE — PROGRESS NOTES
Inpatient Nutrition Assessment    Admit Date: 3/15/2024   Total duration of encounter: 11 days   Patient Age: 69 y.o.    Nutrition Recommendation/Prescription     -Resume Regular Diet once medically feasible.   -Encourage Pierre BID for wound healing.   -Continue Boost Plus TID for additional nourishment; provides an additional 360 kcal and 14 gm protein per container.   -Continue MVI with minerals as medically feasible.   -Monitor wt, labs, and intake.     Communication of Recommendations:  EMR    Nutrition Assessment     Malnutrition Assessment/Nutrition-Focused Physical Exam    Malnutrition Context: chronic illness (03/20/24 1317)  Malnutrition Level: severe (03/20/24 1317)  Energy Intake (Malnutrition): less than 75% for greater than or equal to 3 months (03/20/24 1317)  Weight Loss (Malnutrition): greater than 7.5% in 3 months (03/20/24 1317)  Subcutaneous Fat (Malnutrition): severe depletion (03/20/24 1317)  Orbital Region (Subcutaneous Fat Loss): severe depletion  Upper Arm Region (Subcutaneous Fat Loss): severe depletion     Muscle Mass (Malnutrition): severe depletion (03/20/24 1317)  [place] Region (Muscle Loss): severe depletion                       Fluid Accumulation (Malnutrition): other (see comments) (does not meet criteria) (03/20/24 1317)  Hand  Strength, Left (Malnutrition): unable to evaluate (03/20/24 1317)  Hand  Strength, Right (Malnutrition): unable to evaluate (03/20/24 1317)  A minimum of two characteristics is recommended for diagnosis of either severe or non-severe malnutrition.    Chart Review    Reason Seen: continuous nutrition monitoring and follow-up    Malnutrition Screening Tool Results   Have you recently lost weight without trying?: No  Have you been eating poorly because of a decreased appetite?: No   MST Score: 0   Diagnosis:  Stage 4 pressure ulcer to sacrum  Unstageable pressure ulcers to bilateral heels  Open wound/ulcers to right lateral lower leg and left lateral  lower leg  Peripheral arterial disease - with history of vascular intervention with Dr. Queen (most recent angio 3/13/24)  PVD  History of polymicrobial sacral wound culture  History of polymicrobial wound culture to leg wound  DM2  Anemia  History of smoking  Low prealbumin     Relevant Medical History: HTN, COPD, DM2 with neuropathy, PAD, chronic venous hypertension     Scheduled Medications:  amLODIPine, 10 mg, Daily  aspirin, 81 mg, Daily  cilostazoL, 50 mg, BID  clopidogreL, 75 mg, Daily  docusate sodium, 100 mg, BID  DULoxetine, 30 mg, Daily  enoxaparin, 40 mg, Daily  ferrous sulfate, 1 tablet, Daily  gabapentin, 300 mg, TID  gabapentin, 600 mg, QHS  LIDOcaine, 1 patch, Q24H  losartan, 100 mg, Daily  multivitamin, 1 tablet, Daily  oxyCODONE, 5 mg, Q4H  piperacillin-tazobactam (Zosyn) IV (PEDS and ADULTS) (extended infusion is not appropriate), 4.5 g, Q8H  polyethylene glycol, 17 g, Daily  vancomycin (VANCOCIN) IV (PEDS and ADULTS), 750 mg, Q12H    Continuous Infusions:   PRN Medications: acetaminophen, albuterol, aluminum-magnesium hydroxide-simethicone, HYDROcodone-acetaminophen, melatonin, simethicone, vancomycin - pharmacy to dose    Calorie Containing IV Medications: no significant kcals from medications at this time    Recent Labs   Lab 03/19/24  1558 03/20/24  2356 03/22/24  0004 03/22/24  1237 03/24/24  0351 03/25/24  0422   NA  --  138  --   --  139 141   K  --  3.8  --   --  3.5 3.9   CALCIUM  --  8.3*  --   --  8.6* 8.9   CHLORIDE  --  106  --   --  109* 110*   CO2  --  25  --   --  22* 25   BUN  --  7.6*  --   --  7.2* 7.2*   CREATININE  --  0.65*  --  0.69* 0.60* 0.65*   EGFRNORACEVR  --  >60  --  >60 >60 >60   GLUCOSE  --  113  --   --  124* 115   BILITOT  --  0.2  --   --  0.2 0.2   ALKPHOS  --  80  --   --  78 76   ALT  --  7  --   --  7 8   AST  --  10  --   --  9 9   ALBUMIN  --  2.3*  --   --  2.4* 2.5*   PREALB 13.8*  --   --   --   --   --    CRP  --   --   --   --   --  29.40*   WBC  --   "7.77 8.33  --   --  6.96   HGB  --  7.6* 7.6*  --   --  7.9*   HCT  --  25.4* 24.8*  --   --  26.9*       Nutrition Orders:  Diet NPO      Appetite/Oral Intake: NPO/NPO  Factors Affecting Nutritional Intake: NPO  Food/Jainism/Cultural Preferences: none reported  Food Allergies: no known food allergies  Last Bowel Movement: 24  Wound(s):     Altered Skin Integrity 24 1351 Sacral spine #4-Tissue loss description: Full thickness       Altered Skin Integrity 24 0800 Left anterior;lower Leg Arterial Ulcer-Tissue loss description: Full thickness heel ulcers, sacrum pressure ulcers    Comments    3/20: Pt reports current good intake/appetite but intake the past few months has been poor 2/2 disliking food from NH. Pt reports usual wt of ~170 lbs. Pt reports he would like oral supplements.     3/22: Pt reports good appetite/intake of meals, denies n/v; drinking some of the oral supplement but not taking the Pierre.     3/26: Pt NPO;  plans to go to OR today.     Anthropometrics    Height: 5' 9.02" (175.3 cm), Height Method: Stated  Last Weight: 68 kg (150 lb) (24 1315), Weight Method: Standard Scale  BMI (Calculated): 22.1  BMI Classification: normal (BMI 18.5-24.9)        Ideal Body Weight (IBW), Male: 160.12 lb     % Ideal Body Weight, Male (lb): 93.68 %                 Usual Body Weight (UBW), k.27 kg  % Usual Body Weight: 88.24  % Weight Change From Usual Weight: -11.95 %  Usual Weight Provided By: patient    Wt Readings from Last 5 Encounters:   24 68 kg (150 lb)   24 68.5 kg (151 lb)   24 63.5 kg (140 lb)   23 72.6 kg (160 lb)   23 77.1 kg (170 lb)     Weight Change(s) Since Admission:   Wt Readings from Last 1 Encounters:   24 1315 68 kg (150 lb)   03/15/24 1621 68 kg (150 lb)   03/15/24 0307 68 kg (150 lb)   Admit Weight: 68 kg (150 lb) (03/15/24 0307), Weight Method: Stated    Estimated Needs    Weight Used For Calorie Calculations: 68 kg (149 lb 14.6 " oz)  Energy Calorie Requirements (kcal): 9311-1420 kcal (30-35 kcal/kg)  Energy Need Method: Kcal/kg  Weight Used For Protein Calculations: 68 kg (149 lb 14.6 oz)  Protein Requirements: 102 gm (1.5g/kg)  Fluid Requirements (mL): 2040 mL    Enteral Nutrition     Patient not receiving enteral nutrition at this time.    Parenteral Nutrition     Patient not receiving parenteral nutrition support at this time.    Evaluation of Received Nutrient Intake    Calories: not meeting estimated needs  Protein: not meeting estimated needs    Patient Education     Not applicable.    Nutrition Diagnosis     PES: Increased nutrient needs (protein) related to increased protein energy demand for wound healing as evidenced by stage 4 pressure ulcer. (active)     PES: Severe chronic disease or condition related malnutrition related to suboptimal protein/energy intake as evidenced by less than 75% needs met for greater than or equal to 3 months, severe fat depletion, severe muscle depletion, and greater than 7.5% weight loss in 3 months. (active)    Nutrition Interventions     Intervention(s): general/healthful diet, commercial beverage, commercial food, multivitamin/mineral supplement therapy, and collaboration with other providers    Goal: Meet greater than 80% of nutritional needs by follow-up. (goal not met)  Goal: Maintain weight throughout hospitalization. (goal progressing)    Nutrition Goals & Monitoring     Dietitian will monitor: energy intake and weight    Nutrition Risk/Follow-Up: high (follow-up in 1-4 days)   Please consult if re-assessment needed sooner.

## 2024-03-26 NOTE — PROGRESS NOTES
Infectious Diseases Progress Note  69-year-old male, nursing home resident, with past medical history of HTN, COPD, DM type 2, with neuropathy, is admitted to [facility] on 03/15/2024 sent via EMS for sacral wound evaluation, apparently diagnosed with osteomyelitis involving the sacrum on 02/27/2024 with plan to be admitted to LTAC which had not been successful as an outpatient.  On presentation he was noted to have no fevers and no leukocytosis, anemic with low albumin.  Blood cultures have been negative.  Review of his records show a 02/08/2024 left leg wound culture with Pseudomonas, Providencia, ESBL Proteus and 11/30/2023 sacral wound culture with MRSA, Providencia, Enterococcus and Pseudomonas.  2/27 MRI of the pelvis shows osteomyelitis involving the sacrococcygeal bone as well as right trochanteric bursitis which may be septic.  He was seen by surgery team with no plan for surgical intervention.  He is on antibiotic coverage with vancomycin and Zosyn.     Subjective:  Lying in bed in no acute distress. No new complaints voiced, doing about the same. Afebrile.       Past Medical History:   Diagnosis Date    COPD (chronic obstructive pulmonary disease)     Depression     Hypertension     Neuropathy      Past Surgical History:   Procedure Laterality Date    angiogram Bilateral     stents placed in left leg    anterior neck surgery      ARTHROTOMY OF ANKLE Left 6/28/2023    Procedure: ARTHROTOMY, ANKLE;  Surgeon: Tom Nichole DPM;  Location: [facility];  Service: Podiatry;  Laterality: Left;    cataracts Left     CHOLECYSTECTOMY      EYE SURGERY Left     HAND SURGERY Left     broken bone    herina repair      INCISION AND DRAINAGE, LOWER EXTREMITY Left 7/28/2023    Procedure: INCISION AND DRAINAGE, LOWER EXTREMITY;  Surgeon: Avinash Garces MD;  Location: [facility];  Service: Orthopedics;  Laterality: Left;  Supine, vascular bed, bone foam  last case  cysto tubing, laparoscopic  trocar, experience, vanc, hemovac drain    KNEE SURGERY Bilateral     PERIPHERAL ANGIOGRAPHY N/A 3/13/2024    Procedure: Peripheral angiography;  Surgeon: Deven Queen MD;  Location: [facility] CATH LAB;  Service: Cardiology;  Laterality: N/A;  MICHELLE ANGIO W/ ANEST.    posterior neck surgery      SPINE SURGERY      TOTAL REPLACEMENT OF BOTH HIP JOINTS USING COMPUTER-ASSISTED NAVIGATION Bilateral      Social History     Socioeconomic History    Marital status:    Occupational History    Occupation: retired   Tobacco Use    Smoking status: Former     Average packs/day: 0.5 packs/day for 16.2 years (7.5 ttl pk-yrs)     Types: Cigarettes     Start date: 2008    Smokeless tobacco: Never   Substance and Sexual Activity    Alcohol use: Yes     Alcohol/week: 3.0 - 6.0 standard drinks of alcohol     Types: 3 - 6 Cans of beer per week    Drug use: Yes     Types: Marijuana    Sexual activity: Not Currently       ROS  Constitutional:  Positive for malaise/fatigue.   HENT: Negative.     Respiratory: Negative.     Gastrointestinal: Negative.    Genitourinary: Negative.    Musculoskeletal: Negative.    Skin:         Multiple pressure wounds   Neurological:  Positive for weakness.   Endo/Heme/Allergies: Negative.    Psychiatric/Behavioral: Negative.     All other Systems review done and negative.    Review of patient's allergies indicates:  No Known Allergies      Scheduled Meds:   amLODIPine  10 mg Oral Daily    aspirin  81 mg Oral Daily    cilostazoL  50 mg Oral BID    clopidogreL  75 mg Oral Daily    docusate sodium  100 mg Oral BID    DULoxetine  30 mg Oral Daily    enoxaparin  40 mg Subcutaneous Daily    ferrous sulfate  1 tablet Oral Daily    gabapentin  300 mg Oral TID    gabapentin  600 mg Oral QHS    LIDOcaine  1 patch Transdermal Q24H    losartan  100 mg Oral Daily    multivitamin  1 tablet Oral Daily    mupirocin   Nasal BID    oxyCODONE  5 mg Oral Q4H    piperacillin-tazobactam (Zosyn) IV (PEDS and ADULTS) (extended  "infusion is not appropriate)  4.5 g Intravenous Q8H    polyethylene glycol  17 g Oral Daily    vancomycin (VANCOCIN) IV (PEDS and ADULTS)  1,000 mg Intravenous Q12H     Continuous Infusions:  PRN Meds:acetaminophen, albuterol, aluminum-magnesium hydroxide-simethicone, HYDROcodone-acetaminophen, LIDOcaine HCL 10 mg/ml (1%), melatonin, simethicone, vancomycin - pharmacy to dose    Objective:  BP (!) 150/78 (BP Location: Left arm, Patient Position: Lying)   Pulse 68   Temp 98 °F (36.7 °C) (Oral)   Resp 16   Ht 5' 9.02" (1.753 m)   Wt 68 kg (150 lb)   SpO2 (!) 94%   BMI 22.14 kg/m²     Physical Exam:   Physical Exam  Vitals reviewed.   Constitutional:       General: He is not in acute distress.  HENT:      Head: Normocephalic and atraumatic.   Cardiovascular:      Rate and Rhythm: Normal rate and regular rhythm.      Heart sounds: Normal heart sounds.   Pulmonary:      Effort: Pulmonary effort is normal. No respiratory distress.      Breath sounds: Normal breath sounds.   Abdominal:      General: Bowel sounds are normal. There is no distension.      Palpations: Abdomen is soft.      Tenderness: There is no abdominal tenderness.   Musculoskeletal:         General: No deformity.      Cervical back: Neck supple.   Skin:     Findings: No erythema or rash.      Comments: Wounds dressed   Neurological:      Mental Status: He is alert and oriented to person, place, and time.   Psychiatric:      Comments: Calm and cooperative     Imaging  Imaging Results    None          Lab Review   Recent Results (from the past 24 hour(s))   VANCOMYCIN, TROUGH    Collection Time: 03/26/24 11:56 AM   Result Value Ref Range    Vancomycin Trough 14.5 (L) 15.0 - 20.0 ug/ml   Type & Screen    Collection Time: 03/26/24  5:54 PM   Result Value Ref Range    Group & Rh A POS     Specimen Outdate 03/29/2024 23:59          Assessment/Plan:  1. Chronic sacral pressure wound with osteomyelitis  2. History of polymicrobial sacral wound culture-MRSA, " Providencia, Pseudomonas, Enterococcus  3.  Chronic left lower extremity wound with history of polymicrobial wound culture-Pseudomonas, ESBL Proteus, Providencia  4. Multiple pressure wounds  5.  Diabetes type 2  6.  Anemia  7.  Protein calorie malnutrition      -Continue vancomycin and Zosyn, plan end date of 04/26, following inflammatory markers  -No fevers noted and no leukocytosis  -3/19 ESR 12 and CRP 23.7, follow   -3/15 blood cultures negative  -2/8 left leg wound cultures with many Pseudomonas, Providencia, Proteus  -11/30/2023 sacral wound with many Providencia, MRSA, Enterococcus, Pseudomonas  -Seen by the surgery team with inputs noted including no plan for surgical intervention   -Podiatry on board and plans debridement today 3/26, follow  -We will continue aggressive wound care per the wound care team  - Discussed with patient and nursing staff. Case management working on discharge to nursing facility.  Disposition per primary team

## 2024-03-26 NOTE — PROGRESS NOTES
Infectious Diseases Progress Note  69-year-old male, nursing home resident, with past medical history of HTN, COPD, DM type 2, with neuropathy, is admitted to [facility] on 03/15/2024 sent via EMS for sacral wound evaluation, apparently diagnosed with osteomyelitis involving the sacrum on 02/27/2024 with plan to be admitted to LTAC which had not been successful as an outpatient.  On presentation he was noted to have no fevers and no leukocytosis, anemic with low albumin.  Blood cultures have been negative.  Review of his records show a 02/08/2024 left leg wound culture with Pseudomonas, Providencia, ESBL Proteus and 11/30/2023 sacral wound culture with MRSA, Providencia, Enterococcus and Pseudomonas.  2/27 MRI of the pelvis shows osteomyelitis involving the sacrococcygeal bone as well as right trochanteric bursitis which may be septic.  He was seen by surgery team with no plan for surgical intervention.  He is on antibiotic coverage with vancomycin and Zosyn.     Subjective:  Lying in bed in no acute distress. No new complaints voiced. Afebrile.       Past Medical History:   Diagnosis Date    COPD (chronic obstructive pulmonary disease)     Depression     Hypertension     Neuropathy      Past Surgical History:   Procedure Laterality Date    angiogram Bilateral     stents placed in left leg    anterior neck surgery      ARTHROTOMY OF ANKLE Left 6/28/2023    Procedure: ARTHROTOMY, ANKLE;  Surgeon: Tom Nichole DPM;  Location: [facility];  Service: Podiatry;  Laterality: Left;    cataracts Left     CHOLECYSTECTOMY      EYE SURGERY Left     HAND SURGERY Left     broken bone    herina repair      INCISION AND DRAINAGE, LOWER EXTREMITY Left 7/28/2023    Procedure: INCISION AND DRAINAGE, LOWER EXTREMITY;  Surgeon: Avinash Garces MD;  Location: [facility];  Service: Orthopedics;  Laterality: Left;  Supine, vascular bed, bone foam  last case  cysto tubing, laparoscopic trocar, experience, vanc,  hemovac drain    KNEE SURGERY Bilateral     PERIPHERAL ANGIOGRAPHY N/A 3/13/2024    Procedure: Peripheral angiography;  Surgeon: Deven Queen MD;  Location: [facility] CATH LAB;  Service: Cardiology;  Laterality: N/A;  MICHELLE ANGIO W/ ANEST.    posterior neck surgery      SPINE SURGERY      TOTAL REPLACEMENT OF BOTH HIP JOINTS USING COMPUTER-ASSISTED NAVIGATION Bilateral      Social History     Socioeconomic History    Marital status:    Occupational History    Occupation: retired   Tobacco Use    Smoking status: Former     Average packs/day: 0.5 packs/day for 16.2 years (7.5 ttl pk-yrs)     Types: Cigarettes     Start date: 2008    Smokeless tobacco: Never   Substance and Sexual Activity    Alcohol use: Yes     Alcohol/week: 3.0 - 6.0 standard drinks of alcohol     Types: 3 - 6 Cans of beer per week    Drug use: Yes     Types: Marijuana    Sexual activity: Not Currently       ROS  Constitutional:  Positive for malaise/fatigue.   HENT: Negative.     Respiratory: Negative.     Gastrointestinal: Negative.    Genitourinary: Negative.    Musculoskeletal: Negative.    Skin:         Multiple pressure wounds   Neurological:  Positive for weakness.   Endo/Heme/Allergies: Negative.    Psychiatric/Behavioral: Negative.     All other Systems review done and negative.    Review of patient's allergies indicates:  No Known Allergies      Scheduled Meds:   amLODIPine  10 mg Oral Daily    aspirin  81 mg Oral Daily    cilostazoL  50 mg Oral BID    clopidogreL  75 mg Oral Daily    docusate sodium  100 mg Oral BID    DULoxetine  30 mg Oral Daily    enoxaparin  40 mg Subcutaneous Daily    ferrous sulfate  1 tablet Oral Daily    gabapentin  300 mg Oral TID    gabapentin  600 mg Oral QHS    LIDOcaine  1 patch Transdermal Q24H    losartan  100 mg Oral Daily    multivitamin  1 tablet Oral Daily    oxyCODONE  5 mg Oral Q4H    piperacillin-tazobactam (Zosyn) IV (PEDS and ADULTS) (extended infusion is not appropriate)  4.5 g Intravenous Q8H  "   polyethylene glycol  17 g Oral Daily    vancomycin (VANCOCIN) IV (PEDS and ADULTS)  750 mg Intravenous Q12H     Continuous Infusions:  PRN Meds:acetaminophen, albuterol, aluminum-magnesium hydroxide-simethicone, HYDROcodone-acetaminophen, melatonin, simethicone, vancomycin - pharmacy to dose    Objective:  /66   Pulse 76   Temp 98.4 °F (36.9 °C) (Oral)   Resp 16   Ht 5' 9.02" (1.753 m)   Wt 68 kg (150 lb)   SpO2 (!) 94%   BMI 22.14 kg/m²     Physical Exam:   Physical Exam  Vitals reviewed.   Constitutional:       General: He is not in acute distress.  HENT:      Head: Normocephalic and atraumatic.   Cardiovascular:      Rate and Rhythm: Normal rate and regular rhythm.      Heart sounds: Normal heart sounds.   Pulmonary:      Effort: Pulmonary effort is normal. No respiratory distress.      Breath sounds: Normal breath sounds.   Abdominal:      General: Bowel sounds are normal. There is no distension.      Palpations: Abdomen is soft.      Tenderness: There is no abdominal tenderness.   Musculoskeletal:         General: No deformity.      Cervical back: Neck supple.   Skin:     Findings: No erythema or rash.      Comments: Wounds dressed   Neurological:      Mental Status: He is alert and oriented to person, place, and time.   Psychiatric:      Comments: Calm and cooperative     Imaging  Imaging Results    None          Lab Review   Recent Results (from the past 24 hour(s))   Comprehensive Metabolic Panel    Collection Time: 03/25/24  4:22 AM   Result Value Ref Range    Sodium Level 141 136 - 145 mmol/L    Potassium Level 3.9 3.5 - 5.1 mmol/L    Chloride 110 (H) 98 - 107 mmol/L    Carbon Dioxide 25 23 - 31 mmol/L    Glucose Level 115 82 - 115 mg/dL    Blood Urea Nitrogen 7.2 (L) 8.4 - 25.7 mg/dL    Creatinine 0.65 (L) 0.73 - 1.18 mg/dL    Calcium Level Total 8.9 8.8 - 10.0 mg/dL    Protein Total 5.6 (L) 5.8 - 7.6 gm/dL    Albumin Level 2.5 (L) 3.4 - 4.8 g/dL    Globulin 3.1 2.4 - 3.5 gm/dL    " Albumin/Globulin Ratio 0.8 (L) 1.1 - 2.0 ratio    Bilirubin Total 0.2 <=1.5 mg/dL    Alkaline Phosphatase 76 40 - 150 unit/L    Alanine Aminotransferase 8 0 - 55 unit/L    Aspartate Aminotransferase 9 5 - 34 unit/L    eGFR >60 mls/min/1.73/m2   Sedimentation rate    Collection Time: 03/25/24  4:22 AM   Result Value Ref Range    Sed Rate 30 (H) 0 - 15 mm/hr   C-Reactive Protein    Collection Time: 03/25/24  4:22 AM   Result Value Ref Range    C-Reactive Protein 29.40 (H) <5.00 mg/L   CBC with Differential    Collection Time: 03/25/24  4:22 AM   Result Value Ref Range    WBC 6.96 4.50 - 11.50 x10(3)/mcL    RBC 3.03 (L) 4.70 - 6.10 x10(6)/mcL    Hgb 7.9 (L) 14.0 - 18.0 g/dL    Hct 26.9 (L) 42.0 - 52.0 %    MCV 88.8 80.0 - 94.0 fL    MCH 26.1 (L) 27.0 - 31.0 pg    MCHC 29.4 (L) 33.0 - 36.0 g/dL    RDW 18.0 (H) 11.5 - 17.0 %    Platelet 327 130 - 400 x10(3)/mcL    MPV 8.5 7.4 - 10.4 fL    Neut % 53.2 %    Lymph % 24.9 %    Mono % 10.5 %    Eos % 9.5 %    Basophil % 1.6 %    Lymph # 1.73 0.6 - 4.6 x10(3)/mcL    Neut # 3.71 2.1 - 9.2 x10(3)/mcL    Mono # 0.73 0.1 - 1.3 x10(3)/mcL    Eos # 0.66 0 - 0.9 x10(3)/mcL    Baso # 0.11 <=0.2 x10(3)/mcL    IG# 0.02 0 - 0.04 x10(3)/mcL    IG% 0.3 %    NRBC% 0.0 %         Assessment/Plan:  1. Chronic sacral pressure wound with osteomyelitis  2. History of polymicrobial sacral wound culture-MRSA, Providencia, Pseudomonas, Enterococcus  3.  Chronic left lower extremity wound with history of polymicrobial wound culture-Pseudomonas, ESBL Proteus, Providencia  4. Multiple pressure wounds  5.  Diabetes type 2  6.  Anemia  7.  Protein calorie malnutrition      -Continue vancomycin and Zosyn, plan end date of 04/26, following inflammatory markers  -No fevers noted and no leukocytosis  -3/19 ESR 12 and CRP 23.7, follow   -3/15 blood cultures negative  -2/8 left leg wound cultures with many Pseudomonas, Providencia, Proteus  -11/30/2023 sacral wound with many Providencia, MRSA, Enterococcus,  Pseudomonas  -Seen by the surgery team with inputs noted including no plan for surgical intervention   -We will continue aggressive wound care per the wound care team  - Discussed with patient and nursing staff. Case management working on discharge to nursing facility.  Disposition per primary team

## 2024-03-26 NOTE — PROGRESS NOTES
[facility] - 8th Floor [facility] Medicine  Progress Note    Patient Name: Shaheen Christie  MRN: [id number]  Patient Class: IP- Inpatient   Admission Date: 3/15/2024  Length of Stay: 11 days  Attending Physician: Enzo Thibodeaux MD  Primary Care Provider: Viktor Russ MD        Subjective:     Principal Problem:Osteomyelitis    Interval History:   Continues on IV antibiotics.  Followed by Infectious Disease Wound Care and Podiatry.    Review of Systems   All other systems reviewed and are negative.    Objective:     Vital Signs (Most Recent):  Temp: 98 °F (36.7 °C) (03/26/24 0435)  Pulse: 75 (03/26/24 0435)  Resp: 16 (03/26/24 0624)  BP: 129/85 (03/26/24 0435)  SpO2: (!) 94 % (03/26/24 0435) Vital Signs (24h Range):  Temp:  [97.7 °F (36.5 °C)-98.7 °F (37.1 °C)] 98 °F (36.7 °C)  Pulse:  [65-77] 75  Resp:  [16-20] 16  SpO2:  [92 %-95 %] 94 %  BP: (124-141)/(64-85) 129/85     Weight: 68 kg (150 lb)  Body mass index is 22.14 kg/m².    Intake/Output Summary (Last 24 hours) at 3/26/2024 0643  Last data filed at 3/26/2024 0400  Gross per 24 hour   Intake 1964 ml   Output 2850 ml   Net -886 ml      Physical Exam  HENT:      Head: Normocephalic and atraumatic.      Right Ear: External ear normal.      Left Ear: External ear normal.      Nose: Nose normal.      Mouth/Throat:      Mouth: Mucous membranes are dry.   Eyes:      Extraocular Movements: Extraocular movements intact.   Cardiovascular:      Rate and Rhythm: Normal rate and regular rhythm.      Pulses: Normal pulses.      Heart sounds: Normal heart sounds.   Pulmonary:      Effort: Pulmonary effort is normal.      Breath sounds: Normal breath sounds.   Abdominal:      General: Abdomen is flat. Bowel sounds are normal.      Palpations: Abdomen is soft.   Musculoskeletal:      Cervical back: Neck supple.   Skin:     General: Skin is warm and dry.   Neurological:      General: No focal deficit present.      Mental Status: He is alert and  oriented to person, place, and time. Mental status is at baseline.           Overview/Hospital Course:   Podiatry plans to bring him into OR today.  Repeat blood work for tomorrow.  He will need long-term treatment for this wound.  Nurses informed me that this patient has a DNR in his nursing home papers so I will continue with those orders.    Significant Labs: All pertinent labs within the past 24 hours have been reviewed.  BMP:   Recent Labs   Lab 03/25/24 0422      K 3.9   CO2 25   BUN 7.2*   CREATININE 0.65*   CALCIUM 8.9     CBC:   Recent Labs   Lab 03/25/24 0422   WBC 6.96   HGB 7.9*   HCT 26.9*        CMP:   Recent Labs   Lab 03/25/24 0422      K 3.9   CO2 25   BUN 7.2*   CREATININE 0.65*   CALCIUM 8.9   ALBUMIN 2.5*   BILITOT 0.2   ALKPHOS 76   AST 9   ALT 8       Significant Imaging: I have reviewed all pertinent imaging results/findings within the past 24 hours.    Assessment/Plan:      Active Diagnoses:    Diagnosis Date Noted POA    PRINCIPAL PROBLEM:  Osteomyelitis [M86.9] 03/15/2024 Yes    Pressure injury of sacral region, stage 4 [L89.154] 03/19/2024 Yes    Skin ulcer of right lower leg [L97.919] 03/19/2024 Yes    Skin ulcer of left lower leg [L97.929] 03/19/2024 Yes    Unstageable pressure ulcer of right heel [L89.610] 03/19/2024 Yes    Unstageable pressure ulcer of left heel [L89.620] 03/19/2024 Yes      Problems Resolved During this Admission:     VTE Risk Mitigation (From admission, onward)           Ordered     enoxaparin injection 40 mg  Daily         03/15/24 0934                       Enzo Thibodeaux MD  Department of Hospital Medicine   [facility] - 8th Floor Med Surg

## 2024-03-27 LAB
ALBUMIN SERPL-MCNC: 2.7 G/DL (ref 3.4–4.8)
ALBUMIN/GLOB SERPL: 0.9 RATIO (ref 1.1–2)
ALP SERPL-CCNC: 83 UNIT/L (ref 40–150)
ALT SERPL-CCNC: 7 UNIT/L (ref 0–55)
AST SERPL-CCNC: 9 UNIT/L (ref 5–34)
BASOPHILS # BLD AUTO: 0.1 X10(3)/MCL
BASOPHILS NFR BLD AUTO: 1.5 %
BILIRUB SERPL-MCNC: 0.3 MG/DL
BUN SERPL-MCNC: 5.8 MG/DL (ref 8.4–25.7)
CALCIUM SERPL-MCNC: 9.1 MG/DL (ref 8.8–10)
CHLORIDE SERPL-SCNC: 106 MMOL/L (ref 98–107)
CO2 SERPL-SCNC: 26 MMOL/L (ref 23–31)
CREAT SERPL-MCNC: 0.68 MG/DL (ref 0.73–1.18)
EOSINOPHIL # BLD AUTO: 0.56 X10(3)/MCL (ref 0–0.9)
EOSINOPHIL NFR BLD AUTO: 8.3 %
ERYTHROCYTE [DISTWIDTH] IN BLOOD BY AUTOMATED COUNT: 18.1 % (ref 11.5–17)
GFR SERPLBLD CREATININE-BSD FMLA CKD-EPI: >60 MLS/MIN/1.73/M2
GLOBULIN SER-MCNC: 3 GM/DL (ref 2.4–3.5)
GLUCOSE SERPL-MCNC: 108 MG/DL (ref 82–115)
HCT VFR BLD AUTO: 26.1 % (ref 42–52)
HGB BLD-MCNC: 7.9 G/DL (ref 14–18)
IMM GRANULOCYTES # BLD AUTO: 0.01 X10(3)/MCL (ref 0–0.04)
IMM GRANULOCYTES NFR BLD AUTO: 0.1 %
LYMPHOCYTES # BLD AUTO: 1.19 X10(3)/MCL (ref 0.6–4.6)
LYMPHOCYTES NFR BLD AUTO: 17.7 %
MCH RBC QN AUTO: 26.2 PG (ref 27–31)
MCHC RBC AUTO-ENTMCNC: 30.3 G/DL (ref 33–36)
MCV RBC AUTO: 86.4 FL (ref 80–94)
MONOCYTES # BLD AUTO: 0.7 X10(3)/MCL (ref 0.1–1.3)
MONOCYTES NFR BLD AUTO: 10.4 %
NEUTROPHILS # BLD AUTO: 4.17 X10(3)/MCL (ref 2.1–9.2)
NEUTROPHILS NFR BLD AUTO: 62 %
NRBC BLD AUTO-RTO: 0 %
PLATELET # BLD AUTO: 297 X10(3)/MCL (ref 130–400)
PMV BLD AUTO: 8.5 FL (ref 7.4–10.4)
POTASSIUM SERPL-SCNC: 3.8 MMOL/L (ref 3.5–5.1)
PROT SERPL-MCNC: 5.7 GM/DL (ref 5.8–7.6)
RBC # BLD AUTO: 3.02 X10(6)/MCL (ref 4.7–6.1)
SODIUM SERPL-SCNC: 139 MMOL/L (ref 136–145)
WBC # SPEC AUTO: 6.73 X10(3)/MCL (ref 4.5–11.5)

## 2024-03-27 PROCEDURE — 11000001 HC ACUTE MED/SURG PRIVATE ROOM

## 2024-03-27 PROCEDURE — 63600175 PHARM REV CODE 636 W HCPCS: Performed by: PODIATRIST

## 2024-03-27 PROCEDURE — 0JB70ZZ EXCISION OF BACK SUBCUTANEOUS TISSUE AND FASCIA, OPEN APPROACH: ICD-10-PCS | Performed by: INTERNAL MEDICINE

## 2024-03-27 PROCEDURE — 21400001 HC TELEMETRY ROOM

## 2024-03-27 PROCEDURE — 85025 COMPLETE CBC W/AUTO DIFF WBC: CPT | Performed by: PODIATRIST

## 2024-03-27 PROCEDURE — 99233 SBSQ HOSP IP/OBS HIGH 50: CPT | Mod: ,,,

## 2024-03-27 PROCEDURE — 80053 COMPREHEN METABOLIC PANEL: CPT | Performed by: PODIATRIST

## 2024-03-27 PROCEDURE — 25000003 PHARM REV CODE 250: Performed by: PODIATRIST

## 2024-03-27 PROCEDURE — 11042 DBRDMT SUBQ TIS 1ST 20SQCM/<: CPT | Mod: ,,,

## 2024-03-27 PROCEDURE — 11045 DBRDMT SUBQ TISS EACH ADDL: CPT | Mod: ,,,

## 2024-03-27 RX ADMIN — DOCUSATE SODIUM 100 MG: 100 CAPSULE, LIQUID FILLED ORAL at 11:03

## 2024-03-27 RX ADMIN — VANCOMYCIN HYDROCHLORIDE 1000 MG: 1 INJECTION, POWDER, LYOPHILIZED, FOR SOLUTION INTRAVENOUS at 02:03

## 2024-03-27 RX ADMIN — ASPIRIN 81 MG CHEWABLE TABLET 81 MG: 81 TABLET CHEWABLE at 11:03

## 2024-03-27 RX ADMIN — DOCUSATE SODIUM 100 MG: 100 CAPSULE, LIQUID FILLED ORAL at 08:03

## 2024-03-27 RX ADMIN — OXYCODONE HYDROCHLORIDE 5 MG: 5 TABLET ORAL at 06:03

## 2024-03-27 RX ADMIN — CLOPIDOGREL BISULFATE 75 MG: 75 TABLET ORAL at 11:03

## 2024-03-27 RX ADMIN — GABAPENTIN 300 MG: 300 CAPSULE ORAL at 02:03

## 2024-03-27 RX ADMIN — OXYCODONE HYDROCHLORIDE 5 MG: 5 TABLET ORAL at 02:03

## 2024-03-27 RX ADMIN — OXYCODONE HYDROCHLORIDE 5 MG: 5 TABLET ORAL at 11:03

## 2024-03-27 RX ADMIN — PIPERACILLIN SODIUM AND TAZOBACTAM SODIUM 4.5 G: 4; .5 INJECTION, POWDER, LYOPHILIZED, FOR SOLUTION INTRAVENOUS at 02:03

## 2024-03-27 RX ADMIN — CILOSTAZOL 50 MG: 50 TABLET ORAL at 11:03

## 2024-03-27 RX ADMIN — PIPERACILLIN SODIUM AND TAZOBACTAM SODIUM 4.5 G: 4; .5 INJECTION, POWDER, LYOPHILIZED, FOR SOLUTION INTRAVENOUS at 08:03

## 2024-03-27 RX ADMIN — THERA TABS 1 TABLET: TAB at 11:03

## 2024-03-27 RX ADMIN — LIDOCAINE 5% 1 PATCH: 700 PATCH TOPICAL at 11:03

## 2024-03-27 RX ADMIN — FERROUS SULFATE TAB 325 MG (65 MG ELEMENTAL FE) 1 EACH: 325 (65 FE) TAB at 11:03

## 2024-03-27 RX ADMIN — LOSARTAN POTASSIUM 100 MG: 50 TABLET, FILM COATED ORAL at 11:03

## 2024-03-27 RX ADMIN — DULOXETINE HYDROCHLORIDE 30 MG: 30 CAPSULE, DELAYED RELEASE ORAL at 11:03

## 2024-03-27 RX ADMIN — CILOSTAZOL 50 MG: 50 TABLET ORAL at 08:03

## 2024-03-27 RX ADMIN — GABAPENTIN 600 MG: 300 CAPSULE ORAL at 08:03

## 2024-03-27 RX ADMIN — AMLODIPINE BESYLATE 10 MG: 5 TABLET ORAL at 11:03

## 2024-03-27 RX ADMIN — GABAPENTIN 300 MG: 300 CAPSULE ORAL at 08:03

## 2024-03-27 RX ADMIN — GABAPENTIN 300 MG: 300 CAPSULE ORAL at 11:03

## 2024-03-27 RX ADMIN — OXYCODONE HYDROCHLORIDE 5 MG: 5 TABLET ORAL at 08:03

## 2024-03-27 RX ADMIN — PIPERACILLIN SODIUM AND TAZOBACTAM SODIUM 4.5 G: 4; .5 INJECTION, POWDER, LYOPHILIZED, FOR SOLUTION INTRAVENOUS at 06:03

## 2024-03-27 RX ADMIN — MUPIROCIN: 20 OINTMENT TOPICAL at 08:03

## 2024-03-27 RX ADMIN — ENOXAPARIN SODIUM 40 MG: 40 INJECTION SUBCUTANEOUS at 06:03

## 2024-03-27 NOTE — PROGRESS NOTES
[facility] - 8th Floor Med Surg  Wound Care  Progress Note    Patient Name: Shaheen Christie  MRN: [id number]  Admission Date: 3/15/2024  Hospital Length of Stay: 12 days  Attending Physician: Enzo Thibodeaux MD  Primary Care Provider: Viktor Russ MD     Subjective:     HPI:  Wound medicine Re-check - Sacral ulcer    The patient is a 69 year old WM, with past medical history of HTN, COPD, DM2 with neuropathy, PAD, chronic venous hypertension. He was sent  to [facility] on 3/15/24 from [facility] for evaluation of sacral wound due to recent diagnosis of osteomyelitis involving the sacrum on 2/27/24. Previous plans for LTAC placement were not successful; he will require assistance with placement for continuing IV antibiotics post discharge.   Review of records reveals + culture of left leg wound with Pseudomonas, Providencia, ESBL Proteus on 2/8/24 and + sacral wound culture with MRSA, Providencia, Enterococcus and Pseudomonas. 2/27/24 MRI of sacrum shows osteomyelitis involving the sacrococcygeal bone.   He presents without systemic signs of infection. He was evaluated by surgery (3/15/24), no plan for surgical intervention at this time.     He was seen in the outpatient wound care clinic here at [facility] form 9/2022 - 2/2023 for ulcer of left ankle.   He is currently with multiple chronic appearing wounds to BLE and sacrum. He has been seen by Dr. Queen with work up and intervention for occlusion of LLE in the past. Most recent peripheral angiography performed on BLE (3/13/24) per Dr. Queen. Found to have non critical stenosis in the left SFA. Right SFA with stenosis however with excellent three-vessel runoff. (Wounds to be medically managed)    ID consulted; and guiding antibiotic stewardship.   Podiatry consulted - Surgical debridement of BLE done on 3/26/24    Wound medicine has been consulted in addition to WOCN for evaluation of these severe wounds.   First evaluation compelted  on 3/19/24. Found to have severe BLE unstageable pressure injuries to both lateral legs and both heels and chronic sacral pressure ulcer.     Wound recheck done today, 3/27/24. Met patient in his room, 804, awake, alert and agreeable to wound assessment and treatment. CNA currently in room [facility] patient care, patient positioned on right side for wound assesment. Lying on low air loss mattress without heel lift boots. Ongoing complaints of bilateral knee pain causing difficulty with turning and positioning/offloading.         Hospital Course:   No notes on file      Follow-up For: Procedure(s) (LRB):  DEBRIDEMENT, FOOT (Bilateral)    Post-Operative Day: 1 Day Post-Op    Scheduled Meds:   amLODIPine  10 mg Oral Daily    aspirin  81 mg Oral Daily    cilostazoL  50 mg Oral BID    clopidogreL  75 mg Oral Daily    docusate sodium  100 mg Oral BID    DULoxetine  30 mg Oral Daily    enoxaparin  40 mg Subcutaneous Daily    ferrous sulfate  1 tablet Oral Daily    gabapentin  300 mg Oral TID    gabapentin  600 mg Oral QHS    LIDOcaine  1 patch Transdermal Q24H    losartan  100 mg Oral Daily    multivitamin  1 tablet Oral Daily    mupirocin   Nasal BID    oxyCODONE  5 mg Oral Q4H    piperacillin-tazobactam (Zosyn) IV (PEDS and ADULTS) (extended infusion is not appropriate)  4.5 g Intravenous Q8H    polyethylene glycol  17 g Oral Daily    vancomycin (VANCOCIN) IV (PEDS and ADULTS)  1,000 mg Intravenous Q12H     Continuous Infusions:  PRN Meds:acetaminophen, albuterol, aluminum-magnesium hydroxide-simethicone, HYDROcodone-acetaminophen, melatonin, simethicone, vancomycin - pharmacy to dose    Review of Systems   Musculoskeletal:  Positive for arthralgias.   Skin:  Positive for wound.     Objective:     Vital Signs (Most Recent):  Temp: 97.6 °F (36.4 °C) (03/27/24 0748)  Pulse: 77 (03/27/24 0748)  Resp: 18 (03/27/24 1440)  BP: 137/71 (03/27/24 1117)  SpO2: 96 % (03/27/24 0748) Vital Signs (24h Range):  Temp:  [97.6 °F (36.4  "°C)-98.1 °F (36.7 °C)] 97.6 °F (36.4 °C)  Pulse:  [65-77] 77  Resp:  [16-18] 18  SpO2:  [93 %-98 %] 96 %  BP: (137-151)/(71-78) 137/71     Weight: 68 kg (150 lb)  Body mass index is 22.14 kg/m².     Physical Exam  Constitutional:       General: He is awake. He is not in acute distress.     Appearance: Normal appearance. He is ill-appearing (chronic).      Comments: BLE dressings intact with ACE bandages; some strike through bleeding on right heel.   Diffuse muscle wasting to BLE     Cardiovascular:      Rate and Rhythm: Normal rate and regular rhythm.      Pulses:           Dorsalis pedis pulses are detected w/ Doppler on the right side and 1+ on the left side.   Pulmonary:      Effort: Pulmonary effort is normal. No respiratory distress.   Musculoskeletal:      Right lower le+ Edema present.      Left lower le+ Edema present.   Skin:     General: Skin is warm and dry.      Capillary Refill: Capillary refill takes less than 2 seconds.             Comments: Large stage 4 pressure ulcer to sacral region; moderate amount exudate to old dressing without odor. Wound bed with aprox. 75% healthy appearing red tissue. Cecilia-wound with some redness and tenderness to touch at inferior edge. Without signs of acute infection    Sacrum: 19.5 x 8.5 x 1.4 cm        Neurological:      Mental Status: He is alert and oriented to person, place, and time.   Psychiatric:         Mood and Affect: Mood normal.         Behavior: Behavior normal. Behavior is cooperative.          Laboratory:  A1C:   Recent Labs   Lab 24  0445   HGBA1C 5.0     ABGs: No results for input(s): "PH", "PCO2", "HCO3", "POCSATURATED", "BE" in the last 168 hours.  Blood Cultures: No results for input(s): "LABBLOO" in the last 48 hours.  BMP:   Recent Labs   Lab 24  0424      K 3.8   CO2 26   BUN 5.8*   CREATININE 0.68*   CALCIUM 9.1     CBC:   Recent Labs   Lab 24  0003   WBC 6.73   RBC 3.02*   HGB 7.9*   HCT 26.1*      MCV 86.4 " "  MCH 26.2*   MCHC 30.3*     CMP:   Recent Labs   Lab 03/27/24 0424   CALCIUM 9.1   ALBUMIN 2.7*      K 3.8   CO2 26   BUN 5.8*   CREATININE 0.68*   ALKPHOS 83   ALT 7   AST 9   BILITOT 0.3     Coagulation: No results for input(s): "PT", "INR", "APTT" in the last 168 hours.  CRP:   Recent Labs   Lab 03/25/24 0422   CRP 29.40*     ESR:   Recent Labs   Lab 03/25/24 0422   SEDRATE 30*     LFTs:   Recent Labs   Lab 03/27/24 0424   ALT 7   AST 9   ALKPHOS 83   BILITOT 0.3   ALBUMIN 2.7*     Prealbumin: No results for input(s): "PREALBUMIN" in the last 48 hours.  Wound Cultures: No results for input(s): "LABAERO" in the last 4320 hours.  Microbiology Results (last 7 days)       Procedure Component Value Units Date/Time    Tissue Culture - Aerobic [[id number]] Collected: 03/26/24 1833    Order Status: Completed Specimen: Tissue from Foot, Heel Left Updated: 03/27/24 0714     CULTURE, TISSUE- AEROBIC ([facility]) No Growth At 24 Hours    Anaerobic Culture [[id number]] Collected: 03/26/24 1833    Order Status: Sent Specimen: Tissue from Foot, Heel Left Updated: 03/26/24 1843          Specimen (24h ago, onward)      None          No results for input(s): "COLORU", "CLARITYU", "SPECGRAV", "PHUR", "PROTEINUA", "GLUCOSEU", "BILIRUBINCON", "BLOODU", "WBCU", "RBCU", "BACTERIA", "MUCUS", "NITRITE", "LEUKOCYTESUR", "UROBILINOGEN", "HYALINECASTS" in the last 168 hours.    Diagnostic Results:  I have reviewed all pertinent imaging results/findings within the past 24 hours.        Assessment/Plan:     Stage 4 pressure ulcer to sacrum - with evidence of osteomyelitis admitted for initiation of IV antibiotics.   Unstageable pressure ulcers to bilateral heels - Podiatry consulted - surgical debridement on 3/26/24  Open wound/ulcers to right lateral lower leg and left lateral lower leg  Peripheral arterial disease - with history of vascular intervention with Dr. Queen (most recent angio 3/13/24; medical management)   PVD  History of " polymicrobial sacral wound culture  History of polymicrobial wound culture to leg wound  DM2  Anemia  History of smoking  Low prealbumin      PLAN:     Chart reviewed, patient examined and wounds assessed.  Opinion: The patient has a large stage 4 pressure ulcer to his sacrum that does not appear to be infected; bedside debridement done today, removing eschar to left lateral edge of wound. Wound Vac ordered, will be placed by WOCN on 3/28/24.   BLE wrapped with ACE bandages s/p surgical debridement on 3.26.24 with Dr. Nichole. Strike through bleeding noted to right heel RNAde notified and dressings reinforced.   Patients bed mobility is severely limited due to pain and discomfort associated with movement. This is affecting his ability to properly offload; which will affect ability to heal his wounds. Educated patient on importance of aggressive offloading, verbalizes understanding. Patient reports he has not had his sacral dressings changed since Monday due to having surgery yesterday.  Wound care orders: Sacrum: Cleanse wound with Vashe; apply Vashe moistened gauze to wound bed and cover with ABD/Tape until wound Vac applied.   Monitor for signs & symptoms of deterioration  Offloading of sacrum/buttocks/heels at all times: BRADEN mattress, turning q 2 hrs; use of wedges and heel offloading devices to be used at all times while in bed; ( SALONI Arellano).   Incontinence: control moisture/wound contamination: No briefs  Nutrition: Recommend aggressive nutritional support, protein supplementation along with vitamin and mineral supplements  and petr to support wound healing; Low prealbumin - 13.8 on 3/19/24, follow dietitian recommendation  Diabetes: A1C good  Will try to follow weekly while admitted, but every nurse assigned to patient on every shift of every day needs to address daily wound care dressing changes and offloading modalities including using heel offloading devices, wedges, BRADEN mattress etc  Discussed with  patient as well as nurse caring for patient today                 The time spent including preparing to see the patient, obtaining patient history and assessment, evaluation of the plan of care, patient/caregiver counseling and education, orders, documentation, coordination of care, and other professional medical management activities for today's encounter was 50 minutes.         ANGEL Chavira  Wound Care  [facility] - 8th Floor Med Surg

## 2024-03-27 NOTE — PRE ADMISSION SCREENING
[facility]    Pre-Admission Patient Screening                    Pre-Screen type:  LTAC:  Reason for Admission:    Osteomyelitis vertebra, sacrum  Pressure ulcer sacral region stage IV  Unspecified protein calorie malnutrition      LTACH Admission Criteria:    Complex wound care for infected/necrotic skin conditions, Stage III or IV pressure injury, surgical wounds, or burns requiring at least one of the following:  Complex dressing changes at least 2 times every 24 hours  Wound Management requiring 24 hour observation and positioning at least every 2 hours by licensed personnel    Must meet at least one of the following concomitant treatments/intervention daily unless notes: (excludes PO meds unless notes)  IV medication per therapeutic regimen, Cardiac Monitoring, Insulin adjustment (daily), Laboratory assessment and medication adjustment(s), and Rehab Therapy (PT/OT/ST) 1-3 hours a day greater than or equal to 5 days a week      LTACH more appropriate than other levels of care (eg, skilled nursing facility, home health care), as indicated by:    Clinical management of patient deemed too frequent and needed beyond the capabilities of alternative levels of care as evidence by: Continued Titration of IV Medications, Blood glucose monitoring greater than or equal to 4 times daily requiring clinical intervention, and Frequency of IV medications greater than or equal to 2 times daily  Frequent diagnostic services needed on an inpatient basis, including clinical assessments, laboratory, and imaging as evidence by: Frequent monitoring and clinical assessments performed by a licensed RN to identify current and future patient needs by incorporating the recognition of normal vs abnormal body physiology, and to prompt recognition of pertinent changes to identify and prioritize appropriate interventions that can be performed within the acute inpatient setting. , Frequent monitoring and clinical  assessments performed by a licensed RT to identify current and future patient needs by incorporating the recognition of normal vs abnormal body physiology of the Respiratory System, and to prompt recognition of pertinent changes to identify and prioritize appropriate interventions that can be performed within the acute inpatient setting. , and Frequent laboratory testing and/or imaging to aide in the improvement and effectiveness of patient's individualized treatment plan.   More intensive services, such as speciality nursing care, and/or onsite physician assessments needed that are not available at a lower level of care as evidence by: Daily physician intervention , Collaboration between consulting and attending providers still deemed a necessity to aide in the improvement and effectiveness of patient's individualized treatment plan, which can be [facility]. , and Therapy Services to be included in patient's treatment plan in an effort to restore/improve patient's modality status to a safe level of functioning prior to acute illness.    Lower level of care has failed (eg, patient readmitted to acute care from a lower level of care).     [facility], as indicated by ALL of the following:      Hypotension Absent     Cardiovascular status stable     Stable chest findings     Renal function accepctable   Pain adequately managed    Intake acceptable       No acute significant hepatic dysfunction (eg, new encephalopathy)   No acute severe unstable neurologic abnormalities (eg, Altered mental status that is severe or persistent, or evidence of ongoing CNS embolization or ischemia, worsening hydrocephalus)   No active bleeding or unstable disorders of hemostasis (eg, no recent need for transfusion, severe thrombocytopenia with bleeding)   No need for respiratory or other isolation, OR manageable at LTACH level of care    Long-term enteral feeding (eg, PEG) and intravenous  access established, not needed, OR to be placed at LTACH level of care      Anticipated Discharge Disposition:    Intermediate nursing facility (long term care nursing home)    Facility Status: Accept     Referring Physician:  Enzo Thibodeaux MD    Admitting Physician:  Enzo Thibodeaux MD    Primary Care Physician:  Viktor Russ MD    History         Patient Active Problem List    Diagnosis Date Noted    Pressure injury of sacral region, stage 4 03/19/2024    Skin ulcer of right lower leg 03/19/2024    Skin ulcer of left lower leg 03/19/2024    Unstageable pressure ulcer of right heel 03/19/2024    Unstageable pressure ulcer of left heel 03/19/2024    Osteomyelitis 03/15/2024    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, left knee 07/29/2023    Cigarette nicotine dependence without complication 12/27/2022    Skin ulcer of left ankle 09/20/2022    PAD (peripheral artery disease) 09/20/2022    Benign hypertension 09/20/2022    Cigar smoker 09/20/2022    Hyperlipidemia 09/20/2022    Other chronic pain 09/20/2022         Previous Specialties/Consulted physicians:      General Surgery , Infectious Diseases , Nephrology , Podiatry , and Wound Care       Past and Current Medical History    Past Medical History:   Diagnosis Date    COPD (chronic obstructive pulmonary disease)     Depression     Hypertension     Neuropathy            History of Present Illness     69-year-old male, nursing home resident, with past medical history of HTN, COPD, DM type 2, with neuropathy, is admitted to [facility] on 03/15/2024 sent via EMS for sacral wound evaluation, apparently diagnosed with osteomyelitis involving the sacrum on 02/27/2024 with plan to be admitted to LTAC which had not been successful as an outpatient.  On presentation he was noted to have no fevers and no leukocytosis, anemic with low albumin.  Blood cultures have been negative.  Review of his records show a 02/08/2024 left leg wound culture  with Pseudomonas, Providencia, ESBL Proteus and 11/30/2023 sacral wound culture with MRSA, Providencia, Enterococcus and Pseudomonas.  2/27 MRI of the pelvis shows osteomyelitis involving the sacrococcygeal bone as well as right trochanteric bursitis which may be septic.  He was seen by surgery team with no plan for surgical intervention.  He is on antibiotic coverage with vancomycin and Zosyn.     Assessment/Plan:  1. Chronic sacral pressure wound with osteomyelitis  2. History of polymicrobial sacral wound culture-MRSA, Providencia, Pseudomonas, Enterococcus  3.  Chronic left lower extremity wound with history of polymicrobial wound culture-Pseudomonas, ESBL Proteus, Providencia  4. Multiple pressure wounds  5.  Diabetes type 2  6.  Anemia  7.  Protein calorie malnutrition      -Continue vancomycin and Zosyn, plan end date of 04/26, following inflammatory markers  -No fevers noted and no leukocytosis  -3/19 ESR 12 and CRP 23.7, follow   -3/15 blood cultures negative  -2/8 left leg wound cultures with many Pseudomonas, Providencia, Proteus  -11/30/2023 sacral wound with many Providencia, MRSA, Enterococcus, Pseudomonas  -Seen by the surgery team with inputs noted including no plan for surgical intervention     Patient is requiring ltac placement for continuation of IV antibiotics x2 (both being >q24hr) with a completion date of 04/26/24, complex wound management of sacrum and BLE requiring bid minimum dressing changes. The patient is a resident of [facility] who confirms they patient's required level of care is too complex for what they are equipped and willing to accommodate.      Patient Traveled outside of the [place] in the last 3 months? no     Patient discharged from this LTAC to SNF within the last 45 days? no    Patient discharged from this LTAC to Rehab within the last 27 days? no    Prior residence: nursing home/SNF    Prior Post-Acute Services: N/A     Allergies: Review of patient's  allergies indicates:  No Known Allergies    Has patient received the current influenza vaccine (Oct 1 - March 31)? Unknown     Has patient received PPD skin test prior to admit? N/A     Code Status: DNR    Orientation: Time, Place, and Person    Speech: normal     Vital Signs:     Date 03/27/24 03/27/24   Blood Pressure 137/71 151/76   Pulse 77 69   Respiratory Rate 18 18   O2 Saturation 96% 93%   Temperature 97.6        Bowel/Bladder: continent of bladder and continent of bowel  Bowel/Bladder Appliance: N/A    Dialysis: N/A         Peripheral IV - Single Lumen 03/15/24 0327 18 G Right Forearm (Active)   Site Assessment Clean;Dry;Intact 03/27/24 1200   Extremity Assessment Distal to IV No abnormal discoloration 03/26/24 2100   Line Status Infusing;Flushed 03/26/24 2100   Dressing Status Old drainage;Dry;Intact 03/26/24 2100   Dressing Intervention Integrity maintained 03/26/24 2100   Number of days: 12       CBGs/Accuchecks: Yes     Precautions: Fall and Cardiac    Restraints: No     Isolation Precautions: Contact Isolation       Facility-Administered Medications as of 3/27/2024   Medication Dose Route Frequency Provider Last Rate Last Admin    acetaminophen tablet 650 mg  650 mg Oral Q6H PRN Tom Nichole DPM        albuterol inhaler 2 puff  2 puff Inhalation QID PRN Tom Nichole DPM        aluminum-magnesium hydroxide-simethicone 200-200-20 mg/5 mL suspension 15 mL  15 mL Oral Q6H PRN Tom Nichole DPM        amLODIPine tablet 10 mg  10 mg Oral Daily Tom Nichole DPM   10 mg at 03/27/24 1117    aspirin chewable tablet 81 mg  81 mg Oral Daily Tom Nichole DPM   81 mg at 03/27/24 1117    cilostazoL tablet 50 mg  50 mg Oral BID Tom Nichole DPM   50 mg at 03/27/24 1117    clopidogreL tablet 75 mg  75 mg Oral Daily Tom Nichole DPM   75 mg at 03/27/24 1117    diazePAM tablet 10 mg  10 mg Oral On Call Procedure Deven Queen MD   10 mg at 03/13/24 0618    diphenhydrAMINE capsule  50 mg  50 mg Oral On Call Procedure Deven Queen MD   50 mg at 03/13/24 0619    docusate sodium capsule 100 mg  100 mg Oral BID Tom Nichole DPM   100 mg at 03/27/24 1117    DULoxetine DR capsule 30 mg  30 mg Oral Daily Tom Nichole DPM   30 mg at 03/27/24 1117    enoxaparin injection 40 mg  40 mg Subcutaneous Daily Tom Nichole DPM   40 mg at 03/26/24 2143    ferrous sulfate tablet 1 each  1 tablet Oral Daily Tom Nichole DPM   1 each at 03/27/24 1118    gabapentin capsule 300 mg  300 mg Oral TID Tom Nichole DPM   300 mg at 03/27/24 1118    gabapentin capsule 600 mg  600 mg Oral QHS Tom Nichole DPM   600 mg at 03/26/24 2130    HYDROcodone-acetaminophen 5-325 mg per tablet 1 tablet  1 tablet Oral Q4H PRN Tom Nichole DPM        iopamidoL (ISOVUE-370) injection    PRN Deven Queen MD   40 mL at 03/13/24 0809    [COMPLETED] lactated ringers bolus 1,000 mL  1,000 mL Intravenous ED 1 Time Freddie Chapman MD   Stopped at 03/15/24 0523    LIDOcaine 5 % patch 1 patch  1 patch Transdermal Q24H Tom Nichole DPM   1 patch at 03/27/24 1116    losartan tablet 100 mg  100 mg Oral Daily Tom Nichole DPM   100 mg at 03/27/24 1117    melatonin tablet 6 mg  6 mg Oral Nightly PRN Tom Nichole DPM   6 mg at 03/26/24 2130    [COMPLETED] morphine injection 4 mg  4 mg Intravenous ED 1 Time Freddie Chapman MD   4 mg at 03/15/24 0427    multivitamin tablet  1 tablet Oral Daily Tom Nichole DPM   1 tablet at 03/27/24 1117    mupirocin 2 % ointment   Nasal BID Tom Nichole DPM   Given at 03/26/24 2132    [COMPLETED] ondansetron injection 4 mg  4 mg Intravenous ED 1 Time Freddie Chapman MD   4 mg at 03/15/24 0427    oxyCODONE immediate release tablet 5 mg  5 mg Oral Q4H Tom Nichole, DPM   5 mg at 03/27/24 1117    [COMPLETED] piperacillin-tazobactam (ZOSYN) 4.5 g in dextrose 5 % in water (D5W) 100 mL IVPB (MB+)  4.5 g Intravenous ED 1 Time Freddie Chapman MD    Stopped at 03/15/24 0523    piperacillin-tazobactam (ZOSYN) 4.5 g in dextrose 5 % in water (D5W) 100 mL IVPB (MB+)  4.5 g Intravenous Q8H Tom Nichole DPM   Stopped at 03/27/24 1006    polyethylene glycol packet 17 g  17 g Oral Daily Tom Nichole DPM   17 g at 03/25/24 0912    simethicone chewable tablet 80 mg  80 mg Oral Q6H PRN Tom Nichole DPM        [COMPLETED] sodium chloride 0.9% bolus 500 mL 500 mL  500 mL Intravenous Once Enzo Thibodeaux MD   Stopped at 03/17/24 0955    vancomycin - pharmacy to dose   Intravenous pharmacy to manage frequency Tom Nichole DPM        [COMPLETED] vancomycin 1.75 g in 5 % dextrose 500 mL IVPB  1,750 mg Intravenous Once Enzo Thibodeaux MD   Stopped at 03/17/24 1216    [COMPLETED] vancomycin 2 g in dextrose 5 % 500 mL IVPB  2,000 mg Intravenous ED 1 Time Freddie Chapman MD   Stopped at 03/15/24 0728    vancomycin in dextrose 5 % 1 gram/250 mL IVPB 1,000 mg  1,000 mg Intravenous Q12H Tom Nichole DPM   Stopped at 03/27/24 0340     Outpatient Medications as of 3/27/2024   Medication Sig Dispense Refill    amLODIPine (NORVASC) 10 MG tablet Take 10 mg by mouth once daily.      aspirin 81 MG Chew Take 81 mg by mouth once daily.      cilostazoL (PLETAL) 50 MG Tab Take 50 mg by mouth 2 (two) times daily.      clopidogreL (PLAVIX) 75 mg tablet Take 75 mg by mouth once daily.      docusate sodium (COLACE) 100 MG capsule Take 100 mg by mouth 2 (two) times daily.      DULoxetine (CYMBALTA) 30 MG capsule Take 30 mg by mouth once daily.      ferrous sulfate 325 (65 FE) MG EC tablet Take 325 mg by mouth once daily.      gabapentin (NEURONTIN) 300 MG capsule Take 1 capsule (300 mg total) by mouth 3 (three) times daily.      LIDOcaine (LIDODERM) 5 % Place 1 patch onto the skin every 24 hours. Remove & Discard patch within 12 hours or as directed by MD      losartan (COZAAR) 100 MG tablet Take 100 mg by mouth once daily.      melatonin (MELATIN) 3 mg tablet  "Take 6 mg by mouth nightly as needed for Insomnia.      multivitamin (THERAGRAN) per tablet Take 1 tablet by mouth once daily.      oxyCODONE (ROXICODONE) 10 mg Tab immediate release tablet Take 10 mg by mouth every 4 (four) hours.      polyethylene glycol (GLYCOLAX) 17 gram PwPk Take 17 g by mouth once daily. 10 each 0    acetaminophen (TYLENOL) 325 MG tablet Take 650 mg by mouth every 6 (six) hours as needed for Pain.      albuterol (PROVENTIL/VENTOLIN HFA) 90 mcg/actuation inhaler Inhale into the lungs 4 (four) times daily as needed.      aluminum & magnesium hydroxide-simethicone (MYLANTA MAX STRENGTH) 400-400-40 mg/5 mL suspension Take by mouth every 6 (six) hours as needed for Indigestion.      gabapentin (NEURONTIN) 600 MG tablet Take 600 mg by mouth every evening.      simethicone (MYLICON) 80 MG chewable tablet Take 80 mg by mouth every 6 (six) hours as needed for Flatulence.          Cardiovascular:    Cardiovascular Review: Within definable limits (WDL)    Telemetry: Yes    Rhythm: N/A    AICD: No      Respiratory:    Oxygen: N/A    CPT/Frequency: N/A    Incentive Spirometer/Frequency: N/A    CPAP/Settings: N/A    BiPAP/Settings: N/A    Oxygen Saturation: N/A    Suction Frequency: N/A          Nutrition:      Ht Readings from Last 3 Encounters:   03/20/24 5' 9.02" (1.753 m)   03/13/24 5' 9" (1.753 m)   01/22/24 5' 9" (1.753 m)     Wt Readings from Last 1 Encounters:   03/20/24 1315 68 kg (150 lb)   03/15/24 1621 68 kg (150 lb)   03/15/24 0307 68 kg (150 lb)       Feeding Status:   Current Diet: Heart Healthy   Supplements: Boost and Pierre TID   Tube Feeding: N/A   Flushes: N/A      Integumentary:    Integumentary: Within definable limits (WDL)              Altered Skin Integrity 01/22/24 1351 Sacral spine #4 (Active)   01/22/24 1351   Altered Skin Integrity Present on Admission - Did Patient arrive to the hospital with altered skin?: yes   Side:    Orientation:    Location: Sacral spine   Wound Number: #4 "   Is this injury device related?: No   Primary Wound Type:    Description of Altered Skin Integrity:    Ankle-Brachial Index:    Pulses:    Removal Indication and Assessment:    Wound Outcome:    (Retired) Wound Length (cm):    (Retired) Wound Width (cm):    (Retired) Depth (cm):    Wound Description (Comments):    Removal Indications:    Wound Image   03/19/24 2000   Dressing Appearance Dry;Intact;Clean 03/26/24 0800   Drainage Amount Small 03/25/24 0740   Drainage Characteristics/Odor Serosanguineous 03/25/24 0740   Appearance Dressing in place, unable to visualize 03/26/24 0800   Tissue loss description Full thickness 03/24/24 1500   Periwound Area Macerated 03/24/24 1500   Wound Edges Irregular 03/24/24 1500   Care Cleansed with:;Wound cleanser 03/25/24 0740   Dressing Changed;Gauze, wet to dry;Absorptive Pad 03/25/24 0740   Packing packing removed 03/24/24 1500   Number of days: 65            Altered Skin Integrity 03/16/24 0800 Left Heel Arterial Ulcer (Active)   03/16/24 0800   Altered Skin Integrity Present on Admission - Did Patient arrive to the hospital with altered skin?: yes   Side: Left   Orientation:    Location: Heel   Wound Number:    Is this injury device related?: No   Primary Wound Type: Arterial ulc   Description of Altered Skin Integrity:    Ankle-Brachial Index:    Pulses:    Removal Indication and Assessment:    Wound Outcome:    (Retired) Wound Length (cm):    (Retired) Wound Width (cm):    (Retired) Depth (cm):    Wound Description (Comments):    Removal Indications:    Wound Image   03/19/24 2000   Dressing Appearance Dry;Intact;Clean 03/26/24 0800   Drainage Amount None 03/25/24 2000   Appearance Dressing in place, unable to visualize 03/26/24 0800   Wound Edges Irregular 03/20/24 2122   Care Cleansed with:;Povidone iodine;Applied: 03/23/24 0830   Dressing Rolled gauze 03/24/24 2000   Number of days: 11            Altered Skin Integrity 03/16/24 0800 Right Heel Arterial Ulcer (Active)    03/16/24 0800   Altered Skin Integrity Present on Admission - Did Patient arrive to the hospital with altered skin?: yes   Side: Right   Orientation:    Location: Heel   Wound Number:    Is this injury device related?:    Primary Wound Type: Arterial ulc   Description of Altered Skin Integrity:    Ankle-Brachial Index:    Pulses:    Removal Indication and Assessment:    Wound Outcome:    (Retired) Wound Length (cm):    (Retired) Wound Width (cm):    (Retired) Depth (cm):    Wound Description (Comments):    Removal Indications:    Wound Image   03/19/24 2000   Dressing Appearance Dry;Intact;Clean 03/26/24 0800   Drainage Amount None 03/25/24 2000   Appearance Dressing in place, unable to visualize 03/26/24 0800   Wound Edges Irregular 03/20/24 2122   Care Other (see comments) 03/21/24 1747   Dressing Rolled gauze 03/24/24 2000   Number of days: 11            Altered Skin Integrity 03/16/24 0800 Left anterior;lower Leg Arterial Ulcer (Active)   03/16/24 0800   Altered Skin Integrity Present on Admission - Did Patient arrive to the hospital with altered skin?: yes   Side: Left   Orientation: anterior;lower   Location: Leg   Wound Number:    Is this injury device related?:    Primary Wound Type: Arterial ulc   Description of Altered Skin Integrity:    Ankle-Brachial Index:    Pulses:    Removal Indication and Assessment:    Wound Outcome:    (Retired) Wound Length (cm):    (Retired) Wound Width (cm):    (Retired) Depth (cm):    Wound Description (Comments):    Removal Indications:    Wound Image     03/19/24 2000   Dressing Appearance Intact;Dry;Clean 03/26/24 0800   Drainage Amount None 03/25/24 2000   Appearance Dressing in place, unable to visualize 03/26/24 0800   Tissue loss description Full thickness 03/20/24 2122   Periwound Area [place] 03/17/24 2146   Wound Edges Irregular 03/20/24 2122   Care Other (see comments) 03/21/24 1747   Dressing Rolled gauze 03/24/24 2000   Number of days: 11            Incision/Site  03/26/24 1847 Left Heel (Active)   03/26/24 1847   Present Prior to Hospital Arrival?:    Side: Left   Location: Heel   Orientation:    Incision Type:    Closure Method: Other (see comments)   Additional Comments: adaptic, 4x4s, abd pads, kerlex, and ace bandge to BLE   Removal Indication and Assessment:    Wound Outcome:    Removal Indications:    Dressing Appearance Dry;Intact;Clean 03/26/24 2100   Drainage Amount None 03/26/24 2100   Number of days: 1            Incision/Site 03/26/24 1848 Right Heel (Active)   03/26/24 1848   Present Prior to Hospital Arrival?:    Side: Right   Location: Heel   Orientation:    Incision Type:    Closure Method: Other (see comments)   Additional Comments: adaptic, 4x4s, abd pads, kerlex, and ace bandge to BLE   Removal Indication and Assessment:    Wound Outcome:    Removal Indications:    Dressing Appearance Dry;Intact;Clean 03/26/24 2100   Drainage Amount None 03/26/24 2100   Number of days: 1         Musculoskeletal:    Transfer assist: Activity did not occur    Weight Bearing Status: Non-Weight Bearing    Comments: N/A    ADL Assist: Activity did not occur    Special Equipment: N/A    Radiology:    Radiology (Last 168 hours)               03/27 0712 CARDIAC MONITORING STRIPS     03/27 0309 CARDIAC MONITORING STRIPS     03/26 2335 CARDIAC MONITORING STRIPS     03/26 1524 CARDIAC MONITORING STRIPS     03/26 1114 CARDIAC MONITORING STRIPS     03/26 0731 CARDIAC MONITORING STRIPS     03/26 0309 CARDIAC MONITORING STRIPS     03/25 2326 CARDIAC MONITORING STRIPS     03/25 1847 CARDIAC MONITORING STRIPS              X-Ray Calcaneus 2 View Right  Narrative: EXAMINATION:  XR CALCANEUS 2 VIEW RIGHT    CLINICAL HISTORY:  pressure ulcer;    TECHNIQUE:  Tangential and lateral views of the right calcaneus were performed.    COMPARISON:  None    FINDINGS:  There is a mottled appearance to all of the tarsal bones.  The bones are osteopenic.  Some degenerative changes and compression is seen  along the talar dome.  The calcaneus has some mild periosteal elevation posteriorly and inferiorly.  There is a ulceration seen in the heel.  No fracture seen.  No dislocation is seen.  Impression: Diffuse mottled appearance to all of the tarsal bones as well as the calcaneus with mild periosteal elevation seen along the posteroinferior aspect of the calcaneus.  Osteomyelitis should be excluded.    Electronically signed by: Mak Merchant  Date:    03/19/2024  Time:    17:42  X-Ray Calcaneus 2 View Left  EXAMINATION  XR CALCANEUS 2 VIEW LEFT    CLINICAL HISTORY  pressure ulcer;    TECHNIQUE  A total of 2 views submitted of the calcaneus.    COMPARISON  None available at the time of initial interpretation.    FINDINGS  Regional arthritic changes noted, as well as widespread appearance of decreased bone mineralization. No convincing acutely displaced fracture or dislocation is identified. No aggressive osseous lesion or periosteal reaction is appreciated.    Skin contour irregularity with subjacent lucency visualized at the posterior aspect of the heel, without tracking subcutaneous gas or evidence of radiopaque foreign body.  Scattered vascular calcifications are also noted.    IMPRESSION  1. Posterior heel soft tissue ulceration without tracking gas or radiopaque foreign body.  2. No convincing acute osseous abnormality.  3. Chronic secondary details discussed above.  ===========    Please note early changes of osteomyelitis typically are radiographically occult; follow-up with MRI is recommended if there is elevated clinical concern (or nuclear medicine three-phase bone scan if MR assessment is contraindicated).    Electronically signed by: Ramon Song  Date:    03/19/2024  Time:    17:41  X-Ray Tibia Fibula 2 View Left  EXAMINATION  XR TIBIA FIBULA 2 VIEW LEFT    CLINICAL HISTORY  lateral leg pressure ulcer;    TECHNIQUE  A total of 4 images submitted of the left leg.    COMPARISON  None available at the time of  initial interpretation.    FINDINGS  Extensive tricompartmental degenerative changes are appreciated at the knee, as well as degenerative alterations throughout the visualized hindfoot.  There is widespread appearance of bone demineralization.  No convincing acutely displaced fracture or dislocation is identified.  No aggressive osseous lesion or periosteal reaction is appreciated.    Regional soft tissue edema is present.  There is focal contour irregularity and lucency at the lateral proximal aspect of the leg, may represent ulceration.  No tracking subcutaneous gas is identified.  There are widespread vascular calcifications.  Additionally, punctate metallic fragments are incidentally seen at the lateral aspect of the distal thigh.  Stenting of the SFA and popliteal arteries also noted.    IMPRESSION  1. Nonspecific widespread soft tissue edema.  2. Regional arthritic changes in appearance of diffuse bone demineralization.  3. Cannot exclude soft tissue ulceration at the proximal lateral leg.    Electronically signed by: Ramon Song  Date:    03/19/2024  Time:    17:40  X-Ray Tibia Fibula 2 View Right  EXAMINATION  XR TIBIA FIBULA 2 VIEW RIGHT    CLINICAL HISTORY  pressure ulcer;    TECHNIQUE  A total of 4 images submitted of the right leg.    COMPARISON  None available at the time of initial interpretation.    FINDINGS  Regional arthritic changes and widespread appearance of decreased bone mineralization noted.  There is incidentally visualized right knee arthroplasty hardware without evidence of gross osseous loosening or component malposition.  No convincing acutely displaced fracture or dislocation is identified.  No aggressive osseous lesion or periosteal reaction is appreciated.    No definite focal soft tissue abnormality or tracking subcutaneous gas is appreciated.  There is no radiopaque foreign body.  Scattered vascular calcifications are present.  Nonspecific lucent contour along the mid through  distal lateral leg may represent soft tissue wound, otherwise limited assessment by radiograph exam.    IMPRESSION  1. Regional degenerative changes and bone demineralization, without convincing acute osseous displacement.  2. Mid/distal lateral leg lucency may represent soft tissue wound.  3. Additional chronic secondary details discussed above.    Electronically signed by: Ramon Song  Date:    03/19/2024  Time:    17:11      Lab/Cultures:    Comprehensive Metabolic Panel [[id number]] (Abnormal) Collected: 03/27/24 0424   Specimen: Blood Updated: 03/27/24 0517    Sodium Level 139 mmol/L     Potassium Level 3.8 mmol/L     Chloride 106 mmol/L     Carbon Dioxide 26 mmol/L     Glucose Level 108 mg/dL     Blood Urea Nitrogen 5.8 Low  mg/dL     Creatinine 0.68 Low  mg/dL     Calcium Level Total 9.1 mg/dL     Protein Total 5.7 Low  gm/dL     Albumin Level 2.7 Low  g/dL     Globulin 3.0 gm/dL     Albumin/Globulin Ratio 0.9 Low  ratio     Bilirubin Total 0.3 mg/dL     Alkaline Phosphatase 83 unit/L     Alanine Aminotransferase 7 unit/L     Aspartate Aminotransferase 9 unit/L     eGFR >60 mls/min/1.73/m2    CBC Auto Differential [[id number]] (Abnormal) Collected: 03/27/24 0003   Specimen: Blood Updated: 03/27/24 0014   Narrative:     The following orders were created for panel order CBC Auto Differential.  Procedure                               Abnormality         Status                    ---------                               -----------         ------                    CBC with Differential[[id number]]       Abnormal            Final result                Please view results for these tests on the individual orders.   CBC with Differential [[id number]] (Abnormal) Collected: 03/27/24 0003   Specimen: Blood Updated: 03/27/24 0014    WBC 6.73 x10(3)/mcL     RBC 3.02 Low  x10(6)/mcL     Hgb 7.9 Low  g/dL     Hct 26.1 Low  %     MCV 86.4 fL     MCH 26.2 Low  pg     MCHC 30.3 Low  g/dL     RDW 18.1 High  %     Platelet 297  "x10(3)/mcL     MPV 8.5 fL     Neut % 62.0 %     Lymph % 17.7 %     Mono % 10.4 %     Eos % 8.3 %     Basophil % 1.5 %     Lymph # 1.19 x10(3)/mcL     Neut # 4.17 x10(3)/mcL     Mono # 0.70 x10(3)/mcL     Eos # 0.56 x10(3)/mcL     Baso # 0.10 x10(3)/mcL     IG# 0.01 x10(3)/mcL     IG% 0.1 %     NRBC% 0.0 %        Blood Urea Nitrogen   Date Value Ref Range Status   03/27/2024 5.8 (L) 8.4 - 25.7 mg/dL Final   03/25/2024 7.2 (L) 8.4 - 25.7 mg/dL Final     Creatinine   Date Value Ref Range Status   03/27/2024 0.68 (L) 0.73 - 1.18 mg/dL Final   03/25/2024 0.65 (L) 0.73 - 1.18 mg/dL Final     WBC   Date Value Ref Range Status   03/27/2024 6.73 4.50 - 11.50 x10(3)/mcL Final   03/25/2024 6.96 4.50 - 11.50 x10(3)/mcL Final      CULTURE, BLOOD ([facility])   Date Value Ref Range Status   03/15/2024 No Growth at 5 days  Final   03/15/2024 No Growth at 5 days  Final     Wound Culture   Date Value Ref Range Status   02/08/2024 Many Pseudomonas aeruginosa (A)  Final   02/08/2024 Many Providencia stuartii (A)  Final   02/08/2024 Many Proteus mirabilis ESBL (A)  Final     No results for input(s): "PH", "PCO2", "PO2", "HCO3", "POCSATURATED", "BE" in the last 72 hours.       "

## 2024-03-27 NOTE — OP NOTE
[facility]                       [address]                      [place] [address]    PATIENT NAME:      HAWA KUMAR  YOB: 1955  CSN:               [id number]  MRN:               [id number]  ADMIT DATE:        03/15/2024 03:09:00  PHYSICIAN:         Tom Nichole DPM                          OPERATIVE REPORT      DATE OF SURGERY:    03/26/2024 10:44:43    SURGEON:  Tom Nichole DPM    PREOPERATIVE DIAGNOSIS:  Bilateral lower extremity wounds.    POSTOPERATIVE DIAGNOSIS:  Bilateral lower extremity wounds.    PROCEDURE:  Excisional debridement of lower extremity wounds.    ANESTHESIA:  Local with MAC.    HEMOSTASIS:  None.    ESTIMATED BLOOD LOSS:  Less than 20 cc.    MATERIALS:  None.    INJECTABLES:  None.    PATHOLOGY:  Debridement products sent along with cultures on the left heel.    COMPLICATIONS:  None.    DESCRIPTION OF PROCEDURE:  The patient was transferred from the floor to   holding, having been n.p.o. past midnight.  History and physical and preop   studies reviewed and there were no contraindications noted.  While in holding,   appropriate IVs were started.  The patient was taken to the OR and placed in the   supine position, after which appropriate anesthetics administered.  This was   supplemented with a total of 15 cc of 2% lidocaine plain infiltrated in regional   blocks of all the wound sites.  Extremities were then prepped and draped   aseptically.    Attention was directed initially to the left side where there was a large wound   along the lateral aspect of the ankle and leg along with a necrotic heel wound.    The leg wound actually for the most part was fairly viable except the area   anterior, which encompassed some of the peroneal tendons that were exposed and   necrotic along with some several areas along that margin.  Utilizing scalpel   forceps, we proceeded with removal of all nonviable  epithelium subcutaneous   tissues along with necrotic peroneal tendon, getting back to tissues that were   more viable and bleeding throughout the entire length of the wound.  The   adjacent surfaces again were clean, some bioburden.  These were debrided   utilizing curette.  There was good viability throughout.  This wound measured   approximately 13 cm in length and approximately 3-4 cm in maximal width.  The   area was irrigated.  Hemostasis obtained.    Attention was then directed to the heel where it was primarily necrotic lifting   up along the superior margin just adjacent to the Achilles.  Again, utilizing   scalpel forceps, we proceeded with excisional debridement of all the nonviable   necrotic tissue, which extended down through the epithelium into the mid   subcutaneous level.  It did not extend down to the calcaneal margin and it was   just adjacent to the Achilles, but no tendon visible.  All adjacent structures   appeared to be viable, although there were some liquefactive changes within the   subcu.  Tissue cultures were obtained on this side.  This wound was copiously   irrigated.  It measured approximately 5 x 5 cm.  Hemostasis was obtained.  The   foot and the leg wounds also were dressed aseptically.    Attention was then directed to the right heel where there were almost identical   wounds to the heel and lateral ankle and leg.  Again, utilizing the same   techniques, scalpel, forceps and in some areas on that right heel rongeur to   remove all necrotic nonviable epithelial and subcutaneous tissues.  On the right   side, there was no tendon involvement.  This all appeared to be primarily   subcutaneous compromise.  There were a little bit more fibrotic areas more   proximally on this particular wound and distally.  There was good viability   throughout all these sites.  There was no evidence of abscess in any these   areas.  These wounds were copiously irrigated.  Lateral wound measured    approximately 10 cm in length and roughly 2 and 0.5 cm in maximal diameter.  The   heel wound was 6 x 5 cm.  Once we had adequate hemostasis, the wounds were then   dressed in an aseptic manner.    The patient tolerated the procedure and anesthesia well and left the OR to   recovery with vital signs stable.  Vascular status intact to both extremities.    Once recovered, the patient will be transferred back to the floor for postop   management.  I attempted to contact the daughter Genoveva after surgery and had   to leave a message.        ______________________________  XIMENA Huang/AQS  DD:  03/26/2024  Time:  07:03PM  DT:  03/27/2024  Time:  12:31AM  Job #:  [id number]      OPERATIVE REPORT

## 2024-03-27 NOTE — CONSULTS
[facility] - 8th Floor Med Surg  Wound Care    Patient Name:  Shaheen Christie   MRN:  [id number]  Date: 3/27/2024  Diagnosis: Osteomyelitis    History:     Past Medical History:   Diagnosis Date    COPD (chronic obstructive pulmonary disease)     Depression     Hypertension     Neuropathy        Social History     Socioeconomic History    Marital status:    Occupational History    Occupation: retired   Tobacco Use    Smoking status: Former     Average packs/day: 0.5 packs/day for 16.2 years (7.5 ttl pk-yrs)     Types: Cigarettes     Start date: 2008    Smokeless tobacco: Never   Substance and Sexual Activity    Alcohol use: Yes     Alcohol/week: 3.0 - 6.0 standard drinks of alcohol     Types: 3 - 6 Cans of beer per week    Drug use: Yes     Types: Marijuana    Sexual activity: Not Currently       Precautions:     Allergies as of 03/15/2024    (No Known Allergies)       [facility] Assessment Details/Treatment     Wound care consulted for wound vac placement. Discussed with Lory ORTIZ. Debridement performed this morning. Dressing was changed. Wound vac placement for 3/28. Will apply wound vac on 3/28. Nursing to continue with preventative measures. Will follow up.     03/27/2024

## 2024-03-27 NOTE — SUBJECTIVE & OBJECTIVE
Subjective:     HPI:  Wound medicine Re-check - Sacral ulcer    The patient is a 69 year old WM, with past medical history of HTN, COPD, DM2 with neuropathy, PAD, chronic venous hypertension. He was sent  to [facility] on 3/15/24 from [facility] for evaluation of sacral wound due to recent diagnosis of osteomyelitis involving the sacrum on 2/27/24. Previous plans for LTAC placement were not successful; he will require assistance with placement for continuing IV antibiotics post discharge.   Review of records reveals + culture of left leg wound with Pseudomonas, Providencia, ESBL Proteus on 2/8/24 and + sacral wound culture with MRSA, Providencia, Enterococcus and Pseudomonas. 2/27/24 MRI of sacrum shows osteomyelitis involving the sacrococcygeal bone.   He presents without systemic signs of infection. He was evaluated by surgery (3/15/24), no plan for surgical intervention at this time.   ID consulted; and guiding antibiotic stewardship.   He was seen in the outpatient wound care clinic here at [facility] form 9/2022 - 2/2023 for ulcer of left ankle.   He is currently with multiple chronic appearing wounds to BLE and sacrum. He has been seen by Dr. Queen with work up and intervention for occlusion of LLE in the past. Most recent peripheral angiography performed on BLE (3/13/24) per Dr. Queen. Found to have non critical stenosis in the left SFA. Right SFA with stenosis however with excellent three-vessel runoff. (Wounds to be medically managed)    Podiatry consulted - Surgical debridement of BLE done on 3/26/24    Wound medicine has been consulted in addition to WOCN for evaluation of these severe wounds.   First evaluation compelted on 3/19/24. Found to have severe BLE unstageable pressure injuries to both lateral legs and both heels and chronic sacral pressure ulcer.     Wound recheck done today, 3/27/24. Met patient in his room, 804, awake, alert and agreeable to wound assessment and treatment. CNA  currently in room [facility] patient care, patient positioned on right side for wound assesment. Lying on low air loss mattress without heel lift boots. Ongoing complaints of bilateral knee pain causing difficulty with turning and positioning/offloading.         Hospital Course:   No notes on file      Follow-up For: Procedure(s) (LRB):  DEBRIDEMENT, FOOT (Bilateral)    Post-Operative Day: 1 Day Post-Op    Scheduled Meds:   amLODIPine  10 mg Oral Daily    aspirin  81 mg Oral Daily    cilostazoL  50 mg Oral BID    clopidogreL  75 mg Oral Daily    docusate sodium  100 mg Oral BID    DULoxetine  30 mg Oral Daily    enoxaparin  40 mg Subcutaneous Daily    ferrous sulfate  1 tablet Oral Daily    gabapentin  300 mg Oral TID    gabapentin  600 mg Oral QHS    LIDOcaine  1 patch Transdermal Q24H    losartan  100 mg Oral Daily    multivitamin  1 tablet Oral Daily    mupirocin   Nasal BID    oxyCODONE  5 mg Oral Q4H    piperacillin-tazobactam (Zosyn) IV (PEDS and ADULTS) (extended infusion is not appropriate)  4.5 g Intravenous Q8H    polyethylene glycol  17 g Oral Daily    vancomycin (VANCOCIN) IV (PEDS and ADULTS)  1,000 mg Intravenous Q12H     Continuous Infusions:  PRN Meds:acetaminophen, albuterol, aluminum-magnesium hydroxide-simethicone, HYDROcodone-acetaminophen, melatonin, simethicone, vancomycin - pharmacy to dose    Review of Systems   Musculoskeletal:  Positive for arthralgias.   Skin:  Positive for wound.     Objective:     Vital Signs (Most Recent):  Temp: 97.6 °F (36.4 °C) (03/27/24 0748)  Pulse: 77 (03/27/24 0748)  Resp: 18 (03/27/24 1440)  BP: 137/71 (03/27/24 1117)  SpO2: 96 % (03/27/24 0748) Vital Signs (24h Range):  Temp:  [97.6 °F (36.4 °C)-98.1 °F (36.7 °C)] 97.6 °F (36.4 °C)  Pulse:  [65-77] 77  Resp:  [16-18] 18  SpO2:  [93 %-98 %] 96 %  BP: (137-151)/(71-78) 137/71     Weight: 68 kg (150 lb)  Body mass index is 22.14 kg/m².     Physical Exam  Constitutional:       General: He is awake. He is not in  "acute distress.     Appearance: Normal appearance. He is ill-appearing (chronic).      Comments: BLE dressings intact with ACE bandages; some strike through bleeding on right heel.   Diffuse muscle wasting to BLE     Cardiovascular:      Rate and Rhythm: Normal rate and regular rhythm.      Pulses:           Dorsalis pedis pulses are detected w/ Doppler on the right side and 1+ on the left side.   Pulmonary:      Effort: Pulmonary effort is normal. No respiratory distress.   Musculoskeletal:      Right lower le+ Edema present.      Left lower le+ Edema present.   Skin:     General: Skin is warm and dry.      Capillary Refill: Capillary refill takes less than 2 seconds.             Comments: Large stage 4 pressure ulcer to sacral region; moderate amount exudate to old dressing without odor. Wound bed with aprox. 75% healthy appearing red tissue. Cecilia-wound with some redness and tenderness to touch at inferior edge. Without signs of acute infection     Neurological:      Mental Status: He is alert and oriented to person, place, and time.   Psychiatric:         Mood and Affect: Mood normal.         Behavior: Behavior normal. Behavior is cooperative.          Laboratory:  A1C:   Recent Labs   Lab 24  0445   HGBA1C 5.0     ABGs: No results for input(s): "PH", "PCO2", "HCO3", "POCSATURATED", "BE" in the last 168 hours.  Blood Cultures: No results for input(s): "LABBLOO" in the last 48 hours.  BMP:   Recent Labs   Lab 24  0424      K 3.8   CO2 26   BUN 5.8*   CREATININE 0.68*   CALCIUM 9.1     CBC:   Recent Labs   Lab 24  0003   WBC 6.73   RBC 3.02*   HGB 7.9*   HCT 26.1*      MCV 86.4   MCH 26.2*   MCHC 30.3*     CMP:   Recent Labs   Lab 24  0424   CALCIUM 9.1   ALBUMIN 2.7*      K 3.8   CO2 26   BUN 5.8*   CREATININE 0.68*   ALKPHOS 83   ALT 7   AST 9   BILITOT 0.3     Coagulation: No results for input(s): "PT", "INR", "APTT" in the last 168 hours.  CRP:   Recent Labs " "  Lab 03/25/24 0422   CRP 29.40*     ESR:   Recent Labs   Lab 03/25/24 0422   SEDRATE 30*     LFTs:   Recent Labs   Lab 03/27/24 0424   ALT 7   AST 9   ALKPHOS 83   BILITOT 0.3   ALBUMIN 2.7*     Prealbumin: No results for input(s): "PREALBUMIN" in the last 48 hours.  Wound Cultures: No results for input(s): "LABAERO" in the last 4320 hours.  Microbiology Results (last 7 days)       Procedure Component Value Units Date/Time    Tissue Culture - Aerobic [[id number]] Collected: 03/26/24 1833    Order Status: Completed Specimen: Tissue from Foot, Heel Left Updated: 03/27/24 0714     CULTURE, TISSUE- AEROBIC ([facility]) No Growth At 24 Hours    Anaerobic Culture [[id number]] Collected: 03/26/24 1833    Order Status: Sent Specimen: Tissue from Foot, Heel Left Updated: 03/26/24 1843          Specimen (24h ago, onward)      None          No results for input(s): "COLORU", "CLARITYU", "SPECGRAV", "PHUR", "PROTEINUA", "GLUCOSEU", "BILIRUBINCON", "BLOODU", "WBCU", "RBCU", "BACTERIA", "MUCUS", "NITRITE", "LEUKOCYTESUR", "UROBILINOGEN", "HYALINECASTS" in the last 168 hours.    Diagnostic Results:  I have reviewed all pertinent imaging results/findings within the past 24 hours.        "

## 2024-03-27 NOTE — PROGRESS NOTES
[facility]                       [address] [address]    PATIENT NAME:       HAWA KUMAR  YOB: 1955  CSN:                [id number]   MRN:                [id number]  ADMIT DATE:         03/15/2024 03:09:00  PHYSICIAN:          Tom Nichole DPM                            PROGRESS NOTE    DATE:  03/27/2024 00:00:00    SUBJECTIVE:  The patient was seen today.  He is doing well, not voicing any   complaints.  Underwent excisional debridement of his lower extremity wounds   yesterday.  I got contacted earlier this morning concerning some bleeding from   the right heel.  Dressings were changed.  No major issues reported.  Pain has   been controlled.    PHYSICAL EXAMINATION:  VITAL SIGNS:  Stable and afebrile.    Dressings are clean, dry, intact today.  Wounds are viable.  No significant   bleeding at this point.  Good capillary refill to the toes.  Achilles   contractures.  No other issues were noted.  Sacral wound not inspected.    ASSESSMENT:  Bilateral lower extremity wounds status post debridement, currently   doing well.    PLAN:  We will continue with the current care.  We will take dressings down   again tomorrow.  I did do cultures on him yesterday.  We will wait for those   results from the left side.  Right side was unremarkable and there was no   evidence of any bone exposure at any site.        ______________________________  Tom Nichole DPM    GAS/AQS  DD:  03/27/2024  Time:  04:15PM  DT:  03/27/2024  Time:  06:43PM  Job #:  [id number]      PROGRESS NOTE

## 2024-03-27 NOTE — ANESTHESIA POSTPROCEDURE EVALUATION
Anesthesia Post Evaluation    Patient: Shaheen Christie    Procedure(s) Performed: Procedure(s) (LRB):  DEBRIDEMENT, FOOT (Bilateral)    Final Anesthesia Type: MAC      Patient location during evaluation: med/surg floor  Patient participation: Yes- Able to Participate  Level of consciousness: awake and alert  Post-procedure vital signs: reviewed and stable  Pain management: adequate  Airway patency: patent  ISAAC mitigation strategies: Multimodal analgesia  PONV status at discharge: No PONV  Anesthetic complications: no      Cardiovascular status: blood pressure returned to baseline and hemodynamically stable  Respiratory status: unassisted and room air  Hydration status: euvolemic  Follow-up not needed.          Vitals Value Taken Time   /77 03/26/24 1932   Temp 36.6 °C (97.8 °F) 03/26/24 1932   Pulse 65 03/26/24 1932   Resp 16 03/26/24 1917   SpO2 98 % 03/26/24 1932         No case tracking events are documented in the log.      Pain/Khadra Score: Pain Rating Prior to Med Admin: 7 (3/26/2024  7:17 PM)  Pain Rating Post Med Admin: 2 (3/26/2024  3:00 PM)

## 2024-03-28 LAB
ALBUMIN SERPL-MCNC: 2.5 G/DL (ref 3.4–4.8)
ALBUMIN/GLOB SERPL: 0.9 RATIO (ref 1.1–2)
ALP SERPL-CCNC: 71 UNIT/L (ref 40–150)
ALT SERPL-CCNC: 5 UNIT/L (ref 0–55)
AST SERPL-CCNC: 7 UNIT/L (ref 5–34)
BASOPHILS # BLD AUTO: 0.08 X10(3)/MCL
BASOPHILS NFR BLD AUTO: 1.3 %
BILIRUB SERPL-MCNC: 0.2 MG/DL
BUN SERPL-MCNC: 7.1 MG/DL (ref 8.4–25.7)
CALCIUM SERPL-MCNC: 8.7 MG/DL (ref 8.8–10)
CHLORIDE SERPL-SCNC: 107 MMOL/L (ref 98–107)
CO2 SERPL-SCNC: 25 MMOL/L (ref 23–31)
CREAT SERPL-MCNC: 0.79 MG/DL (ref 0.73–1.18)
EOSINOPHIL # BLD AUTO: 0.63 X10(3)/MCL (ref 0–0.9)
EOSINOPHIL NFR BLD AUTO: 10 %
ERYTHROCYTE [DISTWIDTH] IN BLOOD BY AUTOMATED COUNT: 18.2 % (ref 11.5–17)
GFR SERPLBLD CREATININE-BSD FMLA CKD-EPI: >60 MLS/MIN/1.73/M2
GLOBULIN SER-MCNC: 2.9 GM/DL (ref 2.4–3.5)
GLUCOSE SERPL-MCNC: 101 MG/DL (ref 82–115)
HCT VFR BLD AUTO: 25.4 % (ref 42–52)
HGB BLD-MCNC: 7.4 G/DL (ref 14–18)
IMM GRANULOCYTES # BLD AUTO: 0.02 X10(3)/MCL (ref 0–0.04)
IMM GRANULOCYTES NFR BLD AUTO: 0.3 %
LYMPHOCYTES # BLD AUTO: 1.72 X10(3)/MCL (ref 0.6–4.6)
LYMPHOCYTES NFR BLD AUTO: 27.3 %
MCH RBC QN AUTO: 25.8 PG (ref 27–31)
MCHC RBC AUTO-ENTMCNC: 29.1 G/DL (ref 33–36)
MCV RBC AUTO: 88.5 FL (ref 80–94)
MONOCYTES # BLD AUTO: 0.77 X10(3)/MCL (ref 0.1–1.3)
MONOCYTES NFR BLD AUTO: 12.2 %
NEUTROPHILS # BLD AUTO: 3.08 X10(3)/MCL (ref 2.1–9.2)
NEUTROPHILS NFR BLD AUTO: 48.9 %
NRBC BLD AUTO-RTO: 0 %
PLATELET # BLD AUTO: 297 X10(3)/MCL (ref 130–400)
PMV BLD AUTO: 8.9 FL (ref 7.4–10.4)
POTASSIUM SERPL-SCNC: 3.2 MMOL/L (ref 3.5–5.1)
PROT SERPL-MCNC: 5.4 GM/DL (ref 5.8–7.6)
RBC # BLD AUTO: 2.87 X10(6)/MCL (ref 4.7–6.1)
SODIUM SERPL-SCNC: 138 MMOL/L (ref 136–145)
WBC # SPEC AUTO: 6.3 X10(3)/MCL (ref 4.5–11.5)

## 2024-03-28 PROCEDURE — 63600175 PHARM REV CODE 636 W HCPCS: Performed by: PODIATRIST

## 2024-03-28 PROCEDURE — 80053 COMPREHEN METABOLIC PANEL: CPT | Performed by: INTERNAL MEDICINE

## 2024-03-28 PROCEDURE — 25000003 PHARM REV CODE 250: Performed by: INTERNAL MEDICINE

## 2024-03-28 PROCEDURE — 36569 INSJ PICC 5 YR+ W/O IMAGING: CPT

## 2024-03-28 PROCEDURE — 97606 NEG PRS WND THER DME>50 SQCM: CPT

## 2024-03-28 PROCEDURE — 02HV33Z INSERTION OF INFUSION DEVICE INTO SUPERIOR VENA CAVA, PERCUTANEOUS APPROACH: ICD-10-PCS | Performed by: INTERNAL MEDICINE

## 2024-03-28 PROCEDURE — 21400001 HC TELEMETRY ROOM

## 2024-03-28 PROCEDURE — 11000001 HC ACUTE MED/SURG PRIVATE ROOM

## 2024-03-28 PROCEDURE — 80202 ASSAY OF VANCOMYCIN: CPT | Performed by: INTERNAL MEDICINE

## 2024-03-28 PROCEDURE — 25000003 PHARM REV CODE 250: Performed by: PODIATRIST

## 2024-03-28 PROCEDURE — C1751 CATH, INF, PER/CENT/MIDLINE: HCPCS

## 2024-03-28 PROCEDURE — A4216 STERILE WATER/SALINE, 10 ML: HCPCS | Performed by: INTERNAL MEDICINE

## 2024-03-28 PROCEDURE — 85025 COMPLETE CBC W/AUTO DIFF WBC: CPT | Performed by: PODIATRIST

## 2024-03-28 RX ORDER — SODIUM CHLORIDE 0.9 % (FLUSH) 0.9 %
10 SYRINGE (ML) INJECTION EVERY 6 HOURS
Status: CANCELLED | OUTPATIENT
Start: 2024-03-28

## 2024-03-28 RX ORDER — ENOXAPARIN SODIUM 100 MG/ML
40 INJECTION SUBCUTANEOUS EVERY 12 HOURS
Qty: 60 EACH | Refills: 0 | Status: SHIPPED | OUTPATIENT
Start: 2024-03-28 | End: 2024-04-30 | Stop reason: CLARIF

## 2024-03-28 RX ORDER — SODIUM CHLORIDE 0.9 % (FLUSH) 0.9 %
10 SYRINGE (ML) INJECTION
Status: CANCELLED | OUTPATIENT
Start: 2024-03-28

## 2024-03-28 RX ORDER — VANCOMYCIN HCL IN 5 % DEXTROSE 1G/250ML
1000 PLASTIC BAG, INJECTION (ML) INTRAVENOUS EVERY 12 HOURS
Qty: 15000 ML | Refills: 0 | Status: SHIPPED | OUTPATIENT
Start: 2024-03-28 | End: 2024-04-29

## 2024-03-28 RX ORDER — SODIUM CHLORIDE 0.9 % (FLUSH) 0.9 %
10 SYRINGE (ML) INJECTION EVERY 6 HOURS
Status: DISCONTINUED | OUTPATIENT
Start: 2024-03-28 | End: 2024-04-30

## 2024-03-28 RX ORDER — SODIUM CHLORIDE 0.9 % (FLUSH) 0.9 %
10 SYRINGE (ML) INJECTION
Status: DISCONTINUED | OUTPATIENT
Start: 2024-03-28 | End: 2024-04-30

## 2024-03-28 RX ADMIN — OXYCODONE HYDROCHLORIDE 5 MG: 5 TABLET ORAL at 09:03

## 2024-03-28 RX ADMIN — GABAPENTIN 300 MG: 300 CAPSULE ORAL at 08:03

## 2024-03-28 RX ADMIN — AMLODIPINE BESYLATE 10 MG: 5 TABLET ORAL at 09:03

## 2024-03-28 RX ADMIN — MUPIROCIN: 20 OINTMENT TOPICAL at 09:03

## 2024-03-28 RX ADMIN — OXYCODONE HYDROCHLORIDE 5 MG: 5 TABLET ORAL at 01:03

## 2024-03-28 RX ADMIN — VANCOMYCIN HYDROCHLORIDE 1000 MG: 1 INJECTION, POWDER, LYOPHILIZED, FOR SOLUTION INTRAVENOUS at 01:03

## 2024-03-28 RX ADMIN — DOCUSATE SODIUM 100 MG: 100 CAPSULE, LIQUID FILLED ORAL at 09:03

## 2024-03-28 RX ADMIN — PIPERACILLIN SODIUM AND TAZOBACTAM SODIUM 4.5 G: 4; .5 INJECTION, POWDER, LYOPHILIZED, FOR SOLUTION INTRAVENOUS at 01:03

## 2024-03-28 RX ADMIN — OXYCODONE HYDROCHLORIDE 5 MG: 5 TABLET ORAL at 10:03

## 2024-03-28 RX ADMIN — PIPERACILLIN SODIUM AND TAZOBACTAM SODIUM 4.5 G: 4; .5 INJECTION, POWDER, LYOPHILIZED, FOR SOLUTION INTRAVENOUS at 05:03

## 2024-03-28 RX ADMIN — SODIUM CHLORIDE, PRESERVATIVE FREE 10 ML: 5 INJECTION INTRAVENOUS at 05:03

## 2024-03-28 RX ADMIN — MUPIROCIN: 20 OINTMENT TOPICAL at 08:03

## 2024-03-28 RX ADMIN — CILOSTAZOL 50 MG: 50 TABLET ORAL at 08:03

## 2024-03-28 RX ADMIN — OXYCODONE HYDROCHLORIDE 5 MG: 5 TABLET ORAL at 05:03

## 2024-03-28 RX ADMIN — FERROUS SULFATE TAB 325 MG (65 MG ELEMENTAL FE) 1 EACH: 325 (65 FE) TAB at 09:03

## 2024-03-28 RX ADMIN — GABAPENTIN 300 MG: 300 CAPSULE ORAL at 01:03

## 2024-03-28 RX ADMIN — ASPIRIN 81 MG CHEWABLE TABLET 81 MG: 81 TABLET CHEWABLE at 09:03

## 2024-03-28 RX ADMIN — ENOXAPARIN SODIUM 40 MG: 40 INJECTION SUBCUTANEOUS at 05:03

## 2024-03-28 RX ADMIN — THERA TABS 1 TABLET: TAB at 09:03

## 2024-03-28 RX ADMIN — PIPERACILLIN SODIUM AND TAZOBACTAM SODIUM 4.5 G: 4; .5 INJECTION, POWDER, LYOPHILIZED, FOR SOLUTION INTRAVENOUS at 10:03

## 2024-03-28 RX ADMIN — DOCUSATE SODIUM 100 MG: 100 CAPSULE, LIQUID FILLED ORAL at 08:03

## 2024-03-28 RX ADMIN — LIDOCAINE 5% 1 PATCH: 700 PATCH TOPICAL at 09:03

## 2024-03-28 RX ADMIN — CILOSTAZOL 50 MG: 50 TABLET ORAL at 09:03

## 2024-03-28 RX ADMIN — DULOXETINE HYDROCHLORIDE 30 MG: 30 CAPSULE, DELAYED RELEASE ORAL at 09:03

## 2024-03-28 RX ADMIN — GABAPENTIN 600 MG: 300 CAPSULE ORAL at 08:03

## 2024-03-28 RX ADMIN — CLOPIDOGREL BISULFATE 75 MG: 75 TABLET ORAL at 09:03

## 2024-03-28 RX ADMIN — VANCOMYCIN HYDROCHLORIDE 1000 MG: 1 INJECTION, POWDER, LYOPHILIZED, FOR SOLUTION INTRAVENOUS at 12:03

## 2024-03-28 RX ADMIN — GABAPENTIN 300 MG: 300 CAPSULE ORAL at 09:03

## 2024-03-28 NOTE — PROCEDURES
"Shaheen Christie is a 69 y.o. male patient.    Temp: 98.6 °F (37 °C) (03/28/24 1155)  Pulse: 68 (03/28/24 1155)  Resp: 20 (03/28/24 1155)  BP: 121/69 (03/28/24 1155)  SpO2: 97 % (03/28/24 1155)  Weight: 68 kg (150 lb) (03/20/24 1315)  Height: 5' 9.02" (175.3 cm) (03/20/24 1315)    PICC  Date/Time: 3/28/2024 12:44 PM  Performed by: Grupo Christie, RN  Consent Done: Yes  Time out: Immediately prior to procedure a time out was called to verify the correct patient, procedure, equipment, support staff and site/side marked as required  Indications: med administration and vascular access  Anesthesia: local infiltration  Local anesthetic: lidocaine 1% without epinephrine  Anesthetic Total (mL): 5  Preparation: skin prepped with ChloraPrep  Skin prep agent dried: skin prep agent completely dried prior to procedure  Sterile barriers: all five maximum sterile barriers used - cap, mask, sterile gown, sterile gloves, and large sterile sheet  Hand hygiene: hand hygiene performed prior to central venous catheter insertion  Location details: right basilic  Catheter type: double lumen  Catheter size: 5 Fr  Catheter Length: 36cm    Ultrasound guidance: yes  Vessel Caliber: patent, compressibility normal  Vascular Doppler: not done  Needle advanced into vessel with real time Ultrasound guidance.  Guidewire confirmed in vessel.  Sterile sheath used.  no esophageal manometryNumber of attempts: 1  Post-procedure: sterile dressing applied    Assessment: placement verified by x-ray  Complications: none          Grupo Christie RN  3/28/2024    "

## 2024-03-28 NOTE — DISCHARGE SUMMARY
[facility] - 8th Floor [facility] Surg  [facility] Medicine  Discharge Summary      Patient Name: Shaheen Christie  MRN: [id number]  Admission Date: 3/15/2024  Hospital Length of Stay: 13 days  Discharge Date and Time:  03/28/2024 6:20 AM  Attending Physician: Enzo Thibodeaux MD   Discharging Provider: Enzo Thibodeaux MD  Discharge Provider Team: Networked reference to record PCT   Primary Care Provider: Viktor Russ MD        HPI:       [facility] from [facility] for sacral wound evaluation.       Patient is a 69 year old male with a hx of COPD, DM, HTN, and neuropathy presents to the ED from [facility] via EMS for sacral wound evaluation. Pt was diagnosed with sacral decubitus with osteomyelitis on 2/27. Pt was trying to get into LTAC for wound care, but was denied. Pt  was then sent here for wound evaluation, antibiotics. Pt also has chronic bilateral LE wounds that he is being treated for. Pt denies any fever.     Procedure(s) (LRB):  DEBRIDEMENT, FOOT (Bilateral)      Hospital Course:  Patient seen by wound care.  Also by Infectious Disease.  He was started preemptively on broad-spectrum antibiotics.  It is suspected that he has not osteomyelitis the the presentation inflammation markers.  Initially seen by surgery but then passed on to hyperbaric wound care and patient was debride.  He has done well.  He does have some degree of malnourishment which actually has not needed his wound improvement probably have deteriorated and caused the wound to get worse.  Since here he has been doing quite well.  Pain is well controlled appetite is good.  He is sleeping well.  He will need IV antibiotics for 6 weeks.  He aggressive wound care and nutritional support.  We will try to get him in to LTAC today if possible.    Consults:   Consults (From admission, onward)          Status Ordering Provider     Inpatient consult to Cardiology  Once        Provider:  Deven Queen MD Completed SOBIESK  VALENTINO VALLEJO     Inpatient consult to Podiatry  Once        Provider:  Valentino Nichole DPM    Acknowledged VALENTINO NICHOLE     Inpatient consult to Wound Care Physician  Once        Provider:  Lizabeth Mckeon MD    Completed PAULETTE YOUSIF     Pharmacy to dose Vancomycin consult  Once        Provider:  (Not yet assigned)    Acknowledged VALENTINO NICHOLE     Inpatient consult to Social Work/Case Management  Once        Provider:  (Not yet assigned)    Acknowledged VALENTINO NICHOLE     Inpatient consult to Infectious Diseases  Once        Provider:  Alexis Ibrahim MD    Completed PAULETTE YOUSIF     Inpatient consult to General surgery  Once        Provider:  Delon Draper MD    Completed ULI ADLER            Final Active Diagnoses:    Diagnosis Date Noted POA    PRINCIPAL PROBLEM:  Osteomyelitis [M86.9] 03/15/2024 Yes    Pressure injury of sacral region, stage 4 [L89.154] 03/19/2024 Yes    Skin ulcer of right lower leg [L97.919] 03/19/2024 Yes    Skin ulcer of left lower leg [L97.929] 03/19/2024 Yes    Unstageable pressure ulcer of right heel [L89.610] 03/19/2024 Yes    Unstageable pressure ulcer of left heel [L89.620] 03/19/2024 Yes      Problems Resolved During this Admission:      Discharged Condition: stable    Disposition: Long Term Care    Follow Up:   Follow-up Information       Deven Queen MD Follow up on 4/12/2024.    Specialty: Cardiology  Why: @ 10:00AM  Contact information:  [address] [place] [id number]  [phone]                           Patient Instructions:   No discharge procedures on file.  Medications:  Reconciled Home Medications:      Medication List        ASK your doctor about these medications      albuterol 90 mcg/actuation inhaler  Commonly known as: PROVENTIL/VENTOLIN HFA  Inhale into the lungs 4 (four) times daily as needed.     aluminum & magnesium hydroxide-simethicone 400-400-40 mg/5 mL suspension  Commonly known as: MYLANTA MAX STRENGTH  Take by  mouth every 6 (six) hours as needed for Indigestion.     amLODIPine 10 MG tablet  Commonly known as: NORVASC  Take 10 mg by mouth once daily.     aspirin 81 MG Chew  Take 81 mg by mouth once daily.     cilostazoL 50 MG Tab  Commonly known as: PLETAL  Take 50 mg by mouth 2 (two) times daily.     clopidogreL 75 mg tablet  Commonly known as: PLAVIX  Take 75 mg by mouth once daily.     docusate sodium 100 MG capsule  Commonly known as: COLACE  Take 100 mg by mouth 2 (two) times daily.     DULoxetine 30 MG capsule  Commonly known as: CYMBALTA  Take 30 mg by mouth once daily.     ferrous sulfate 325 (65 FE) MG EC tablet  Take 325 mg by mouth once daily.     * gabapentin 300 MG capsule  Commonly known as: NEURONTIN  Take 1 capsule (300 mg total) by mouth 3 (three) times daily.     * gabapentin 600 MG tablet  Commonly known as: NEURONTIN  Take 600 mg by mouth every evening.     LIDOcaine 5 %  Commonly known as: LIDODERM  Place 1 patch onto the skin every 24 hours. Remove & Discard patch within 12 hours or as directed by MD     losartan 100 MG tablet  Commonly known as: COZAAR  Take 100 mg by mouth once daily.     melatonin 3 mg tablet  Commonly known as: MELATIN  Take 6 mg by mouth nightly as needed for Insomnia.     multivitamin per tablet  Commonly known as: THERAGRAN  Take 1 tablet by mouth once daily.     oxyCODONE 10 mg Tab immediate release tablet  Commonly known as: ROXICODONE  Take 10 mg by mouth every 4 (four) hours.     polyethylene glycol 17 gram Pwpk  Commonly known as: GLYCOLAX  Take 17 g by mouth once daily.     simethicone 80 MG chewable tablet  Commonly known as: MYLICON  Take 80 mg by mouth every 6 (six) hours as needed for Flatulence.     TylenoL 325 MG tablet  Generic drug: acetaminophen  Take 650 mg by mouth every 6 (six) hours as needed for Pain.           * This list has 2 medication(s) that are the same as other medications prescribed for you. Read the directions carefully, and ask your doctor or  other care provider to review them with you.                  Significant Diagnostic Studies: Labs: BMP:   Recent Labs   Lab 03/27/24  0424 03/28/24  0454    138   K 3.8 3.2*   CO2 26 25   BUN 5.8* 7.1*   CREATININE 0.68* 0.79   CALCIUM 9.1 8.7*   , CMP   Recent Labs   Lab 03/27/24  0424 03/28/24  0454    138   K 3.8 3.2*   CO2 26 25   BUN 5.8* 7.1*   CREATININE 0.68* 0.79   CALCIUM 9.1 8.7*   ALBUMIN 2.7* 2.5*   BILITOT 0.3 0.2   ALKPHOS 83 71   AST 9 7   ALT 7 5   , and CBC   Recent Labs   Lab 03/27/24  0003 03/28/24  0454   WBC 6.73 6.30   HGB 7.9* 7.4*   HCT 26.1* 25.4*    297     Microbiology: Wound Culture: positive for MRSA, Providencia, Pseudomonas and Enterococcus.    Pending Diagnostic Studies:       None          Indwelling Lines/Drains at time of discharge:   Lines/Drains/Airways       None                   Time spent on the discharge of patient: 25 minutes     This patient will need further surgical debridement once an LTAC and will probably need 1 on a weekly basis.    Enzo Thibodeaux MD  Department of Hospital Medicine  [facility] - 8th Floor Med Surg

## 2024-03-28 NOTE — PROGRESS NOTES
Pharmacokinetic Assessment Follow Up: IV Vancomycin    Vancomycin serum concentration assessment(s):  The trough level was drawn incorrectly and cannot be used to guide therapy at this time.    Vancomycin Regimen Plan:  Level was drawn around 1300, about an hour after dose was administered at 1200  Pharmacy was notified via medication message by nursing that lab called for critical level of 36.9  Notified nursing that pharmacy will order new trough and place MAR reminder to help prevent inaccurate levels  Continue regimen to Vancomycin 1000 mg IV every 12 hours with next serum trough concentration measured at 0000 prior to next dose on 03/27      Drug levels (last 3 results):  Recent Labs   Lab Result Units 03/26/24  1156   Vancomycin Trough ug/ml 14.5*       Vancomycin Administrations:  vancomycin given in the last 96 hours                     vancomycin in dextrose 5 % 1 gram/250 mL IVPB 1,000 mg (mg) 1,000 mg New Bag 03/28/24 1208     1,000 mg New Bag  0115     1,000 mg New Bag 03/27/24 1440     1,000 mg New Bag  0210     1,000 mg New Bag 03/26/24 1401    vancomycin 750 mg in dextrose 5 % 250 mL IVPB (ready to mix) (mg) 750 mg New Bag 03/26/24 0142     750 mg New Bag 03/25/24 1452     750 mg New Bag  0200     750 mg New Bag 03/24/24 1504                    Pharmacy will continue to follow and monitor vancomycin.    Please contact pharmacy at extension [phone] for questions regarding this assessment.    Thank you for the consult,   Natalia Russ       Patient brief summary:  Shaheen Christie is a 69 y.o. male initiated on antimicrobial therapy with IV Vancomycin for treatment of bone/joint infection    The patient's current regimen is 1000 mg IVPB Q12H    Drug Allergies:   Review of patient's allergies indicates:  No Known Allergies    Actual Body Weight:  Wt Readings from Last 1 Encounters:   03/20/24 68 kg (150 lb)       Renal Function:   Estimated Creatinine Clearance: 82.5 mL/min (based on SCr of 0.79 mg/dL).,      Dialysis Method (if applicable):  N/A    CBC (last 72 hours):  Recent Labs   Lab Result Units 03/27/24  0003 03/28/24  0454   WBC x10(3)/mcL 6.73 6.30   Hgb g/dL 7.9* 7.4*   Hct % 26.1* 25.4*   Platelet x10(3)/mcL 297 297   Mono % % 10.4 12.2   Eos % % 8.3 10.0   Basophil % % 1.5 1.3       Metabolic Panel (last 72 hours):  Recent Labs   Lab Result Units 03/27/24  0424 03/28/24  0454   Sodium Level mmol/L 139 138   Potassium Level mmol/L 3.8 3.2*   Chloride mmol/L 106 107   Carbon Dioxide mmol/L 26 25   Glucose Level mg/dL 108 101   Blood Urea Nitrogen mg/dL 5.8* 7.1*   Creatinine mg/dL 0.68* 0.79   Albumin Level g/dL 2.7* 2.5*   Bilirubin Total mg/dL 0.3 0.2   Alkaline Phosphatase unit/L 83 71   Aspartate Aminotransferase unit/L 9 7   Alanine Aminotransferase unit/L 7 5       Microbiologic Results:  Microbiology Results (last 7 days)       Procedure Component Value Units Date/Time    Tissue Culture - Aerobic [[id number]]  (Abnormal) Collected: 03/26/24 1833    Order Status: Completed Specimen: Tissue from Foot, Heel Left Updated: 03/28/24 0848     CULTURE, TISSUE- AEROBIC ([facility]) Few Presumptive proteus species    Anaerobic Culture [[id number]] Collected: 03/26/24 1833    Order Status: Completed Specimen: Tissue from Foot, Heel Left Updated: 03/28/24 0733     Anaerobe Culture No Anaerobes Isolated

## 2024-03-28 NOTE — PROGRESS NOTES
[facility]  [id number]   Re: LTAC Placement  Payer source: [employer]  Date: 3/28/2024  P2P Physician: Aristides Chinchilla MD  P2P Call Time: [phone]  Outcome: Denial upheld  Recommendation: Urgent Appeal     HPI: Patient is a 69 year old male with a hx of COPD, DM, HTN, and neuropathy presents to the ED from [facility] via EMS on 3/15/2024 for sacral wound evaluation. Pt was diagnosed with sacral decubitus with osteomyelitis on 2/27. Pt was trying to get into LTAC for wound care, but was denied. Pt  was then sent for wound evaluation and IV antibiotics. Pt also has chronic bilateral LE wounds that are being treated.     Cultures obtained and empiric broad spectrum ABs initiated.  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  Infectious disease consulted and recommends x6 weeks broad spectrum IV ABs (final dose on 4/26).  Wound care performed sacral excisional debridement on 3/25, and placed a wound vac on 3/28.  Patient will require future debridements.  Also of note, he has stable (but chronic) severe anemia that will likely require at least x1 blood transfusion. Podiatry following BL LE wounds s/p surgical debridement on 3/26.  Stable for transfer to LTAC for continued IV ABs, aggressive wound care with wound vac, future surgical debridement(s), and nutritional support.  [employer] managed medicare denied LTAC placement, and [id number] has been scheduled.                           Dx: S4 level through the tip of the coccyx polymicrobial osteomyelitis s/p excisional debridement on 3/25 and wound vac placement on 3/28  - Right lower latteral leg wound with polymicrobial wound culture  - bilateral heel wounds s/p surgical debridement on  "3/26  - severe anemia with H&H 7.4/25.4 on 3/28  - severe protein calorie malnutrition  - moderate-to-severe bilateral L5-S1 neural foraminal stenosis     Criteria for LTAC  1. Medical management: IV ABs Vanc + Zosyn (q8 and q12 with monitoring and adjustments) for a total of x6 weeks - end date 4/26, close monitoring of inflammatory markers and CBC to assess/rassess treatment response, future surgical debridement, aggressive wound care with wound vac management, podiatry to continue treating BL heel wounds s/p surgical debridement, daily physician rounds, likely need for blood transfusion(s), aggressive nutritional support  2. Aggressive multidisciplinary therapies: PT, OT, wound care nurse, registered dietician   3. Support system: Plan to discharge back to LTC facility after completing IV ABs     Summary of P2P discussion  Extensively discussed the complex wound care, need for IV ABs, future debridements, aggressive nutritional support, and probable need for blood transfusion(s).  The anemia is secondary to chronic inflammation/disease, and not the result of blood loss or some other acute surgical issue.  Blood transfusion is not indicated at this current moment.    Dr. Chinchilla expressed a concern about the need to be "transferred to acute care for surgery." I explained that surgical debridements are done at the LTAC and do not require transfer to acute care.  He then suggested that in his opinion, "this patient's acute care stay does not seem to be complete." I summarized that this is a stable patient in need of prolonged IV antibiotics and complex wound care. Vital signs remain stable, WBCs normal, no recent fevers, and stable cognitive status. What makes this patient appropriate for a continued acute care stay?  He said that this was "simply my opinion, and our opinions are different."  I then asked if Dr. Chinchilla felt this patient was "too acute for LTAC." He would not agree that his statement and my " statement were equivalent.  He suggested the patient's  [facility]'s , and somehow they would be better equipped to determine a more appropriate next level of care.  I then reiterated - this is a stable nursing home resident who's facility does not have the capabilities to safely meet his current medical needs.  Why is it appropriate to extend his acute care stay?    *OF NOTE* - This is the second fdim-[id number]-[id number] that I have done in the past month with a [facility] physician who suggested further discussions with case management to find an appropriate path.  The last denial was overturned on appeal.       ENCOUNTER LOG   [id number] : Chart review  [phone]: P2P call  [phone]: Documentation     Edgar Saunders MD, CMD  Internal Medicine     DO NOT BILL

## 2024-03-28 NOTE — PROGRESS NOTES
Inpatient Nutrition Assessment    Admit Date: 3/15/2024   Total duration of encounter: 13 days   Patient Age: 69 y.o.    Nutrition Recommendation/Prescription     -Continue Heart Healthy Diet as tolerated.    -Encourage Pierre BID for wound healing.   -Discontinue Boost Plus TID per pt request.   -Continue MVI with minerals as medically feasible.   -Monitor wt, labs, and intake. Replete K as medically feasible.     Communication of Recommendations: reviewed with patient    Nutrition Assessment     Malnutrition Assessment/Nutrition-Focused Physical Exam    Malnutrition Context: chronic illness (03/20/24 1317)  Malnutrition Level: severe (03/20/24 1317)  Energy Intake (Malnutrition): less than 75% for greater than or equal to 3 months (03/20/24 1317)  Weight Loss (Malnutrition): greater than 7.5% in 3 months (03/20/24 1317)  Subcutaneous Fat (Malnutrition): severe depletion (03/20/24 1317)  Orbital Region (Subcutaneous Fat Loss): severe depletion  Upper Arm Region (Subcutaneous Fat Loss): severe depletion     Muscle Mass (Malnutrition): severe depletion (03/20/24 1317)  [place] (Muscle Loss): severe depletion                       Fluid Accumulation (Malnutrition): other (see comments) (does not meet criteria) (03/20/24 1317)  Hand  Strength, Left (Malnutrition): unable to evaluate (03/20/24 1317)  Hand  Strength, Right (Malnutrition): unable to evaluate (03/20/24 1317)  A minimum of two characteristics is recommended for diagnosis of either severe or non-severe malnutrition.    Chart Review    Reason Seen: continuous nutrition monitoring and follow-up    Malnutrition Screening Tool Results   Have you recently lost weight without trying?: No  Have you been eating poorly because of a decreased appetite?: No   MST Score: 0   Diagnosis:  Stage 4 pressure ulcer to sacrum  Unstageable pressure ulcers to bilateral heels  Open wound/ulcers to right lateral lower leg and left lateral lower leg  Peripheral  arterial disease - with history of vascular intervention with Dr. Queen (most recent angio 3/13/24)  PVD  History of polymicrobial sacral wound culture  History of polymicrobial wound culture to leg wound  DM2  Anemia  History of smoking  Low prealbumin     Relevant Medical History: HTN, COPD, DM2 with neuropathy, PAD, chronic venous hypertension     Scheduled Medications:  amLODIPine, 10 mg, Daily  aspirin, 81 mg, Daily  cilostazoL, 50 mg, BID  clopidogreL, 75 mg, Daily  docusate sodium, 100 mg, BID  DULoxetine, 30 mg, Daily  enoxaparin, 40 mg, Daily  ferrous sulfate, 1 tablet, Daily  gabapentin, 300 mg, TID  gabapentin, 600 mg, QHS  LIDOcaine, 1 patch, Q24H  losartan, 100 mg, Daily  multivitamin, 1 tablet, Daily  mupirocin, , BID  oxyCODONE, 5 mg, Q4H  piperacillin-tazobactam (Zosyn) IV (PEDS and ADULTS) (extended infusion is not appropriate), 4.5 g, Q8H  polyethylene glycol, 17 g, Daily  vancomycin (VANCOCIN) IV (PEDS and ADULTS), 1,000 mg, Q12H    Continuous Infusions:   PRN Medications: acetaminophen, albuterol, aluminum-magnesium hydroxide-simethicone, HYDROcodone-acetaminophen, melatonin, simethicone, vancomycin - pharmacy to dose    Calorie Containing IV Medications: no significant kcals from medications at this time    Recent Labs   Lab 03/22/24  0004 03/22/24  1237 03/24/24  0351 03/25/24  0422 03/27/24  0003 03/27/24  0424 03/28/24  0454   NA  --   --  139 141  --  139 138   K  --   --  3.5 3.9  --  3.8 3.2*   CALCIUM  --   --  8.6* 8.9  --  9.1 8.7*   CHLORIDE  --   --  109* 110*  --  106 107   CO2  --   --  22* 25  --  26 25   BUN  --   --  7.2* 7.2*  --  5.8* 7.1*   CREATININE  --  0.69* 0.60* 0.65*  --  0.68* 0.79   EGFRNORACEVR  --  >60 >60 >60  --  >60 >60   GLUCOSE  --   --  124* 115  --  108 101   BILITOT  --   --  0.2 0.2  --  0.3 0.2   ALKPHOS  --   --  78 76  --  83 71   ALT  --   --  7 8  --  7 5   AST  --   --  9 9  --  9 7   ALBUMIN  --   --  2.4* 2.5*  --  2.7* 2.5*   CRP  --   --   --   "29.40*  --   --   --    WBC 8.33  --   --  6.96 6.73  --  6.30   HGB 7.6*  --   --  7.9* 7.9*  --  7.4*   HCT 24.8*  --   --  26.9* 26.1*  --  25.4*       Nutrition Orders:  Diet Heart Healthy      Appetite/Oral Intake: NPO/NPO  Factors Affecting Nutritional Intake: NPO  Food/Jehovah's witness/Cultural Preferences: none reported  Food Allergies: no known food allergies  Last Bowel Movement: 24  Wound(s):     Altered Skin Integrity 24 1351 Sacral spine #4-Tissue loss description: Full thickness       Altered Skin Integrity 24 0800 Left anterior;lower Leg Arterial Ulcer-Tissue loss description: Full thickness heel ulcers, sacrum pressure ulcers    Comments    3/20: Pt reports current good intake/appetite but intake the past few months has been poor 2/2 disliking food from NH. Pt reports usual wt of ~170 lbs. Pt reports he would like oral supplements.     3/22: Pt reports good appetite/intake of meals, denies n/v; drinking some of the oral supplement but not taking the Pierre.     3/26: Pt NPO;  plans to go to OR today.     3/28: Pt eating well; denies n/v; states that he is not drinking oral supplement 2/2 being too full after he eats his meals; will d/c per his request. Pt is receptive to trying to drink the Pierre for wound healing.     Anthropometrics    Height: 5' 9.02" (175.3 cm), Height Method: Stated  Last Weight: 68 kg (150 lb) (24 1315), Weight Method: Standard Scale  BMI (Calculated): 22.1  BMI Classification: normal (BMI 18.5-24.9)        Ideal Body Weight (IBW), Male: 160.12 lb     % Ideal Body Weight, Male (lb): 93.68 %                 Usual Body Weight (UBW), k.27 kg  % Usual Body Weight: 88.24  % Weight Change From Usual Weight: -11.95 %  Usual Weight Provided By: patient    Wt Readings from Last 5 Encounters:   24 68 kg (150 lb)   24 68.5 kg (151 lb)   24 63.5 kg (140 lb)   23 72.6 kg (160 lb)   23 77.1 kg (170 lb)     Weight Change(s) Since Admission: "   Wt Readings from Last 1 Encounters:   03/20/24 1315 68 kg (150 lb)   03/15/24 1621 68 kg (150 lb)   03/15/24 0307 68 kg (150 lb)   Admit Weight: 68 kg (150 lb) (03/15/24 0307), Weight Method: Stated  3/28: no new wt noted    Estimated Needs    Weight Used For Calorie Calculations: 68 kg (149 lb 14.6 oz)  Energy Calorie Requirements (kcal): 7985-5038 kcal (30-35 kcal/kg)  Energy Need Method: Kcal/kg  Weight Used For Protein Calculations: 68 kg (149 lb 14.6 oz)  Protein Requirements: 102 gm (1.5g/kg)  Fluid Requirements (mL): 2040 mL    Enteral Nutrition     Patient not receiving enteral nutrition at this time.    Parenteral Nutrition     Patient not receiving parenteral nutrition support at this time.    Evaluation of Received Nutrient Intake    Calories: meeting estimated needs  Protein: meeting estimated needs    Patient Education     Not applicable.    Nutrition Diagnosis     PES: Increased nutrient needs (protein) related to increased protein energy demand for wound healing as evidenced by stage 4 pressure ulcer. (active)     PES: Severe chronic disease or condition related malnutrition related to suboptimal protein/energy intake as evidenced by less than 75% needs met for greater than or equal to 3 months, severe fat depletion, severe muscle depletion, and greater than 7.5% weight loss in 3 months. (active)    Nutrition Interventions     Intervention(s): general/healthful diet, commercial beverage, commercial food, multivitamin/mineral supplement therapy, and collaboration with other providers    Goal: Meet greater than 80% of nutritional needs by follow-up. (goal progressing)  Goal: Maintain weight throughout hospitalization. (goal progressing)    Nutrition Goals & Monitoring     Dietitian will monitor: energy intake and weight    Nutrition Risk/Follow-Up: high (follow-up in 1-4 days)   Please consult if re-assessment needed sooner.

## 2024-03-28 NOTE — PROGRESS NOTES
[facility] - 8th Floor Med Surg  Wound Care    Patient Name:  Shaheen Christie   MRN:  [id number]  Date: 3/28/2024  Diagnosis: Osteomyelitis    History:     Past Medical History:   Diagnosis Date    COPD (chronic obstructive pulmonary disease)     Depression     Hypertension     Neuropathy        Social History     Socioeconomic History    Marital status:    Occupational History    Occupation: retired   Tobacco Use    Smoking status: Former     Average packs/day: 0.5 packs/day for 16.2 years (7.5 ttl pk-yrs)     Types: Cigarettes     Start date: 2008    Smokeless tobacco: Never   Substance and Sexual Activity    Alcohol use: Yes     Alcohol/week: 3.0 - 6.0 standard drinks of alcohol     Types: 3 - 6 Cans of beer per week    Drug use: Yes     Types: Marijuana    Sexual activity: Not Currently       Precautions:     Allergies as of 03/15/2024    (No Known Allergies)       WOC Assessment Details/Treatment        03/28/24 1045   WOCN Assessment   Visit Date 03/28/24   Visit Time 1045   Consult Type New   WOCN Speciality Wound   WOCN List wound vac   Intervention assessed;chart review;orders   Teaching on-going   Skin Interventions   Device Skin Pressure Protection absorbent pad utilized/changed   Pressure Reduction Devices specialty bed utilized        Altered Skin Integrity 01/22/24 1351 Sacral spine #4   Date First Assessed/Time First Assessed: 01/22/24 1351   Altered Skin Integrity Present on Admission - Did Patient arrive to the hospital with altered skin?: yes  Location: Sacral spine  Wound Number: #4  Is this injury device related?: No   Wound Image    Dressing Appearance Dry;Intact;Clean   Drainage Amount Small   Drainage Characteristics/Odor Serosanguineous   Appearance Red   Tissue loss description Full thickness   Red (%), Wound Tissue Color 75 %   Yellow (%), Wound Tissue Color 25 %   Periwound Area Moist;Scar tissue   Wound Edges Irregular   Wound Length (cm) 19.5 cm   Wound Width (cm) 17  cm   Wound Depth (cm) 2 cm   Wound Volume (cm^3) 663 cm^3   Wound Surface Area (cm^2) 331.5 cm^2   Care Cleansed with:;Sterile normal saline   Dressing Applied  (versatel, NPWT)        Negative Pressure Wound Therapy  03/28/24   Placement Date: 03/28/24   Location: Sacral Spine   NPWT Type Vacuum Therapy   Therapy Setting NPWT Continuous therapy   Pressure Setting NPWT 125 mmHg   Sponges Inserted NPWT Black     NPWT applied to sacral. Excellent seal noted at 125 mm/ hg continuous. Nursing to continue with preventative measures. Will Follow up.   Measurement is 7 cm x 19.5cm x 2cm.    03/28/2024

## 2024-03-28 NOTE — PROGRESS NOTES
Infectious Diseases Progress Note  69-year-old male, nursing home resident, with past medical history of HTN, COPD, DM type 2, with neuropathy, is admitted to [facility] on 03/15/2024 sent via EMS for sacral wound evaluation, apparently diagnosed with osteomyelitis involving the sacrum on 02/27/2024 with plan to be admitted to LTAC which had not been successful as an outpatient.  On presentation he was noted to have no fevers and no leukocytosis, anemic with low albumin.  Blood cultures have been negative.  Review of his records show a 02/08/2024 left leg wound culture with Pseudomonas, Providencia, ESBL Proteus and 11/30/2023 sacral wound culture with MRSA, Providencia, Enterococcus and Pseudomonas.  2/27 MRI of the pelvis shows osteomyelitis involving the sacrococcygeal bone as well as right trochanteric bursitis which may be septic.  He was seen by surgery team with no plan for surgical intervention.  He is on antibiotic coverage with vancomycin and Zosyn.     Subjective:  Lying in bed in no acute distress. No new complaints voiced, doing about the same. Afebrile.       Past Medical History:   Diagnosis Date    COPD (chronic obstructive pulmonary disease)     Depression     Hypertension     Neuropathy      Past Surgical History:   Procedure Laterality Date    angiogram Bilateral     stents placed in left leg    anterior neck surgery      ARTHROTOMY OF ANKLE Left 6/28/2023    Procedure: ARTHROTOMY, ANKLE;  Surgeon: Tom Nichole DPM;  Location: [facility];  Service: Podiatry;  Laterality: Left;    cataracts Left     CHOLECYSTECTOMY      DEBRIDEMENT OF FOOT Bilateral 3/26/2024    Procedure: DEBRIDEMENT, FOOT;  Surgeon: Tom Nichole DPM;  Location: [facility] OR;  Service: Podiatry;  Laterality: Bilateral;    EYE SURGERY Left     HAND SURGERY Left     broken bone    herina repair      INCISION AND DRAINAGE, LOWER EXTREMITY Left 7/28/2023    Procedure: INCISION AND DRAINAGE, LOWER  EXTREMITY;  Surgeon: Avinash Garces MD;  Location: [facility] OR;  Service: Orthopedics;  Laterality: Left;  Supine, vascular bed, bone foam  last case  cysto tubing, laparoscopic trocar, experience, vanc, hemovac drain    KNEE SURGERY Bilateral     PERIPHERAL ANGIOGRAPHY N/A 3/13/2024    Procedure: Peripheral angiography;  Surgeon: Deven Queen MD;  Location: [facility] CATH LAB;  Service: Cardiology;  Laterality: N/A;  MICHELLE ANGIO W/ ANEST.    posterior neck surgery      SPINE SURGERY      TOTAL REPLACEMENT OF BOTH HIP JOINTS USING COMPUTER-ASSISTED NAVIGATION Bilateral      Social History     Socioeconomic History    Marital status:    Occupational History    Occupation: retired   Tobacco Use    Smoking status: Former     Average packs/day: 0.5 packs/day for 16.2 years (7.5 ttl pk-yrs)     Types: Cigarettes     Start date: 2008    Smokeless tobacco: Never   Substance and Sexual Activity    Alcohol use: Yes     Alcohol/week: 3.0 - 6.0 standard drinks of alcohol     Types: 3 - 6 Cans of beer per week    Drug use: Yes     Types: Marijuana    Sexual activity: Not Currently       ROS  Constitutional:  Positive for malaise/fatigue.   HENT: Negative.     Respiratory: Negative.     Gastrointestinal: Negative.    Genitourinary: Negative.    Musculoskeletal: Negative.    Skin:         Multiple pressure wounds   Neurological:  Positive for weakness.   Endo/Heme/Allergies: Negative.    Psychiatric/Behavioral: Negative.     All other Systems review done and negative.    Review of patient's allergies indicates:  No Known Allergies      Scheduled Meds:   amLODIPine  10 mg Oral Daily    aspirin  81 mg Oral Daily    cilostazoL  50 mg Oral BID    clopidogreL  75 mg Oral Daily    docusate sodium  100 mg Oral BID    DULoxetine  30 mg Oral Daily    enoxaparin  40 mg Subcutaneous Daily    ferrous sulfate  1 tablet Oral Daily    gabapentin  300 mg Oral TID    gabapentin  600 mg Oral QHS    LIDOcaine  1 patch Transdermal Q24H    losartan   "100 mg Oral Daily    multivitamin  1 tablet Oral Daily    mupirocin   Nasal BID    oxyCODONE  5 mg Oral Q4H    piperacillin-tazobactam (Zosyn) IV (PEDS and ADULTS) (extended infusion is not appropriate)  4.5 g Intravenous Q8H    polyethylene glycol  17 g Oral Daily    vancomycin (VANCOCIN) IV (PEDS and ADULTS)  1,000 mg Intravenous Q12H     Continuous Infusions:  PRN Meds:acetaminophen, albuterol, aluminum-magnesium hydroxide-simethicone, HYDROcodone-acetaminophen, melatonin, simethicone, vancomycin - pharmacy to dose    Objective:  BP (!) 120/53   Pulse 84   Temp 98.2 °F (36.8 °C) (Oral)   Resp 16   Ht 5' 9.02" (1.753 m)   Wt 68 kg (150 lb)   SpO2 97%   BMI 22.14 kg/m²     Physical Exam:   Physical Exam  Vitals reviewed.   Constitutional:       General: He is not in acute distress.  HENT:      Head: Normocephalic and atraumatic.   Cardiovascular:      Rate and Rhythm: Normal rate and regular rhythm.      Heart sounds: Normal heart sounds.   Pulmonary:      Effort: Pulmonary effort is normal. No respiratory distress.      Breath sounds: Normal breath sounds.   Abdominal:      General: Bowel sounds are normal. There is no distension.      Palpations: Abdomen is soft.      Tenderness: There is no abdominal tenderness.   Musculoskeletal:         General: No deformity.      Cervical back: Neck supple.   Skin:     Findings: No erythema or rash.      Comments: Wounds dressed   Neurological:      Mental Status: He is alert and oriented to person, place, and time.   Psychiatric:      Comments: Calm and cooperative     Imaging  Imaging Results    None          Lab Review   Recent Results (from the past 24 hour(s))   CBC with Differential    Collection Time: 03/27/24 12:03 AM   Result Value Ref Range    WBC 6.73 4.50 - 11.50 x10(3)/mcL    RBC 3.02 (L) 4.70 - 6.10 x10(6)/mcL    Hgb 7.9 (L) 14.0 - 18.0 g/dL    Hct 26.1 (L) 42.0 - 52.0 %    MCV 86.4 80.0 - 94.0 fL    MCH 26.2 (L) 27.0 - 31.0 pg    MCHC 30.3 (L) 33.0 - " 36.0 g/dL    RDW 18.1 (H) 11.5 - 17.0 %    Platelet 297 130 - 400 x10(3)/mcL    MPV 8.5 7.4 - 10.4 fL    Neut % 62.0 %    Lymph % 17.7 %    Mono % 10.4 %    Eos % 8.3 %    Basophil % 1.5 %    Lymph # 1.19 0.6 - 4.6 x10(3)/mcL    Neut # 4.17 2.1 - 9.2 x10(3)/mcL    Mono # 0.70 0.1 - 1.3 x10(3)/mcL    Eos # 0.56 0 - 0.9 x10(3)/mcL    Baso # 0.10 <=0.2 x10(3)/mcL    IG# 0.01 0 - 0.04 x10(3)/mcL    IG% 0.1 %    NRBC% 0.0 %   Comprehensive Metabolic Panel    Collection Time: 03/27/24  4:24 AM   Result Value Ref Range    Sodium Level 139 136 - 145 mmol/L    Potassium Level 3.8 3.5 - 5.1 mmol/L    Chloride 106 98 - 107 mmol/L    Carbon Dioxide 26 23 - 31 mmol/L    Glucose Level 108 82 - 115 mg/dL    Blood Urea Nitrogen 5.8 (L) 8.4 - 25.7 mg/dL    Creatinine 0.68 (L) 0.73 - 1.18 mg/dL    Calcium Level Total 9.1 8.8 - 10.0 mg/dL    Protein Total 5.7 (L) 5.8 - 7.6 gm/dL    Albumin Level 2.7 (L) 3.4 - 4.8 g/dL    Globulin 3.0 2.4 - 3.5 gm/dL    Albumin/Globulin Ratio 0.9 (L) 1.1 - 2.0 ratio    Bilirubin Total 0.3 <=1.5 mg/dL    Alkaline Phosphatase 83 40 - 150 unit/L    Alanine Aminotransferase 7 0 - 55 unit/L    Aspartate Aminotransferase 9 5 - 34 unit/L    eGFR >60 mls/min/1.73/m2         Assessment/Plan:  1. Chronic sacral pressure wound with osteomyelitis  2. History of polymicrobial sacral wound culture-MRSA, Providencia, Pseudomonas, Enterococcus  3.  Chronic left lower extremity wound with history of polymicrobial wound culture-Pseudomonas, ESBL Proteus, Providencia  4. Multiple pressure wounds  5.  Diabetes type 2  6.  Anemia  7.  Protein calorie malnutrition      -Continue vancomycin and Zosyn, plan end date of 04/26, following inflammatory markers  -No fevers noted and no leukocytosis  -3/19 ESR 12 and CRP 23.7, follow   -3/15 blood cultures negative  -2/8 left leg wound cultures with many Pseudomonas, Providencia, Proteus  -11/30/2023 sacral wound with many Providencia, MRSA, Enterococcus, Pseudomonas  -Seen by the  surgery team with inputs noted including no plan for surgical intervention   -Podiatry on board s/p debridement 3/26, cultures negative so far, follow  -We will continue aggressive wound care per Podiatry and the wound care team  - Discussed with patient and nursing staff. Case management working on LTAC placement. Disposition per primary team

## 2024-03-29 LAB
BACTERIA SPEC ANAEROBE CULT: NORMAL
BACTERIA TISS AEROBE CULT: ABNORMAL
BASOPHILS # BLD AUTO: 0.11 X10(3)/MCL
BASOPHILS NFR BLD AUTO: 1.5 %
EOSINOPHIL # BLD AUTO: 0.69 X10(3)/MCL (ref 0–0.9)
EOSINOPHIL NFR BLD AUTO: 9.7 %
ERYTHROCYTE [DISTWIDTH] IN BLOOD BY AUTOMATED COUNT: 18.5 % (ref 11.5–17)
HCT VFR BLD AUTO: 24.9 % (ref 42–52)
HGB BLD-MCNC: 7.6 G/DL (ref 14–18)
IMM GRANULOCYTES # BLD AUTO: 0.02 X10(3)/MCL (ref 0–0.04)
IMM GRANULOCYTES NFR BLD AUTO: 0.3 %
LYMPHOCYTES # BLD AUTO: 1.66 X10(3)/MCL (ref 0.6–4.6)
LYMPHOCYTES NFR BLD AUTO: 23.3 %
MCH RBC QN AUTO: 26.5 PG (ref 27–31)
MCHC RBC AUTO-ENTMCNC: 30.5 G/DL (ref 33–36)
MCV RBC AUTO: 86.8 FL (ref 80–94)
MONOCYTES # BLD AUTO: 0.76 X10(3)/MCL (ref 0.1–1.3)
MONOCYTES NFR BLD AUTO: 10.7 %
NEUTROPHILS # BLD AUTO: 3.88 X10(3)/MCL (ref 2.1–9.2)
NEUTROPHILS NFR BLD AUTO: 54.5 %
NRBC BLD AUTO-RTO: 0 %
PLATELET # BLD AUTO: 328 X10(3)/MCL (ref 130–400)
PMV BLD AUTO: 8.7 FL (ref 7.4–10.4)
RBC # BLD AUTO: 2.87 X10(6)/MCL (ref 4.7–6.1)
VANCOMYCIN TROUGH SERPL-MCNC: 17.8 UG/ML (ref 15–20)
WBC # SPEC AUTO: 7.12 X10(3)/MCL (ref 4.5–11.5)

## 2024-03-29 PROCEDURE — 63600175 PHARM REV CODE 636 W HCPCS: Performed by: PODIATRIST

## 2024-03-29 PROCEDURE — A4216 STERILE WATER/SALINE, 10 ML: HCPCS | Performed by: INTERNAL MEDICINE

## 2024-03-29 PROCEDURE — 85025 COMPLETE CBC W/AUTO DIFF WBC: CPT | Performed by: PODIATRIST

## 2024-03-29 PROCEDURE — 25000003 PHARM REV CODE 250: Performed by: INTERNAL MEDICINE

## 2024-03-29 PROCEDURE — 25000003 PHARM REV CODE 250: Performed by: PODIATRIST

## 2024-03-29 PROCEDURE — 25000003 PHARM REV CODE 250: Performed by: FAMILY MEDICINE

## 2024-03-29 PROCEDURE — 11000001 HC ACUTE MED/SURG PRIVATE ROOM

## 2024-03-29 PROCEDURE — 21400001 HC TELEMETRY ROOM

## 2024-03-29 RX ORDER — POTASSIUM CHLORIDE 20 MEQ/1
20 TABLET, EXTENDED RELEASE ORAL ONCE
Status: COMPLETED | OUTPATIENT
Start: 2024-03-29 | End: 2024-03-29

## 2024-03-29 RX ADMIN — LIDOCAINE 5% 1 PATCH: 700 PATCH TOPICAL at 11:03

## 2024-03-29 RX ADMIN — DOCUSATE SODIUM 100 MG: 100 CAPSULE, LIQUID FILLED ORAL at 08:03

## 2024-03-29 RX ADMIN — GABAPENTIN 300 MG: 300 CAPSULE ORAL at 02:03

## 2024-03-29 RX ADMIN — MUPIROCIN: 20 OINTMENT TOPICAL at 08:03

## 2024-03-29 RX ADMIN — SODIUM CHLORIDE, PRESERVATIVE FREE 10 ML: 5 INJECTION INTRAVENOUS at 11:03

## 2024-03-29 RX ADMIN — AMLODIPINE BESYLATE 10 MG: 5 TABLET ORAL at 08:03

## 2024-03-29 RX ADMIN — OXYCODONE HYDROCHLORIDE 5 MG: 5 TABLET ORAL at 06:03

## 2024-03-29 RX ADMIN — GABAPENTIN 300 MG: 300 CAPSULE ORAL at 08:03

## 2024-03-29 RX ADMIN — OXYCODONE HYDROCHLORIDE 5 MG: 5 TABLET ORAL at 03:03

## 2024-03-29 RX ADMIN — LOSARTAN POTASSIUM 100 MG: 50 TABLET, FILM COATED ORAL at 08:03

## 2024-03-29 RX ADMIN — ASPIRIN 81 MG CHEWABLE TABLET 81 MG: 81 TABLET CHEWABLE at 08:03

## 2024-03-29 RX ADMIN — OXYCODONE HYDROCHLORIDE 5 MG: 5 TABLET ORAL at 11:03

## 2024-03-29 RX ADMIN — THERA TABS 1 TABLET: TAB at 08:03

## 2024-03-29 RX ADMIN — CLOPIDOGREL BISULFATE 75 MG: 75 TABLET ORAL at 08:03

## 2024-03-29 RX ADMIN — FERROUS SULFATE TAB 325 MG (65 MG ELEMENTAL FE) 1 EACH: 325 (65 FE) TAB at 08:03

## 2024-03-29 RX ADMIN — OXYCODONE HYDROCHLORIDE 5 MG: 5 TABLET ORAL at 10:03

## 2024-03-29 RX ADMIN — SODIUM CHLORIDE, PRESERVATIVE FREE 10 ML: 5 INJECTION INTRAVENOUS at 05:03

## 2024-03-29 RX ADMIN — POTASSIUM CHLORIDE 20 MEQ: 1500 TABLET, EXTENDED RELEASE ORAL at 08:03

## 2024-03-29 RX ADMIN — CILOSTAZOL 50 MG: 50 TABLET ORAL at 08:03

## 2024-03-29 RX ADMIN — GABAPENTIN 600 MG: 300 CAPSULE ORAL at 08:03

## 2024-03-29 RX ADMIN — OXYCODONE HYDROCHLORIDE 5 MG: 5 TABLET ORAL at 05:03

## 2024-03-29 RX ADMIN — PIPERACILLIN SODIUM AND TAZOBACTAM SODIUM 4.5 G: 4; .5 INJECTION, POWDER, LYOPHILIZED, FOR SOLUTION INTRAVENOUS at 06:03

## 2024-03-29 RX ADMIN — VANCOMYCIN HYDROCHLORIDE 1000 MG: 1 INJECTION, POWDER, LYOPHILIZED, FOR SOLUTION INTRAVENOUS at 01:03

## 2024-03-29 RX ADMIN — DULOXETINE HYDROCHLORIDE 30 MG: 30 CAPSULE, DELAYED RELEASE ORAL at 08:03

## 2024-03-29 RX ADMIN — PIPERACILLIN SODIUM AND TAZOBACTAM SODIUM 4.5 G: 4; .5 INJECTION, POWDER, LYOPHILIZED, FOR SOLUTION INTRAVENOUS at 11:03

## 2024-03-29 RX ADMIN — ENOXAPARIN SODIUM 40 MG: 40 INJECTION SUBCUTANEOUS at 05:03

## 2024-03-29 RX ADMIN — PIPERACILLIN SODIUM AND TAZOBACTAM SODIUM 4.5 G: 4; .5 INJECTION, POWDER, LYOPHILIZED, FOR SOLUTION INTRAVENOUS at 01:03

## 2024-03-29 RX ADMIN — OXYCODONE HYDROCHLORIDE 5 MG: 5 TABLET ORAL at 02:03

## 2024-03-29 NOTE — NURSING
Pt refused wound care d/t pain. Stated he would do it tomorrow with sobiesk. Educated pt on need for wound care, pt voiced understanding yet still refused.

## 2024-03-29 NOTE — PROGRESS NOTES
[facility] - 8th Floor [facility] Medicine  Progress Note    Patient Name: Shaheen Christie  MRN: [id number]  Patient Class: IP- Inpatient   Admission Date: 3/15/2024  Length of Stay: 14 days  Attending Physician: Enzo Thibodeaux MD  Primary Care Provider: Viktor Russ MD        Subjective:     Principal Problem:Osteomyelitis    Interval History:  Awaiting placement and approval by insurance    Review of Systems   Constitutional:  Negative for activity change and appetite change.   HENT:  Negative for congestion.    Respiratory:  Negative for cough.    Cardiovascular:  Negative for chest pain.   Gastrointestinal:  Negative for abdominal pain.   Genitourinary:         No change in urine color or odor   Musculoskeletal:  Back pain: Pain in his sacral area has mild wound there.   Neurological:  Negative for headaches.   Psychiatric/Behavioral:  Negative for behavioral problems.      Objective:     Vital Signs (Most Recent):  Temp: 97.8 °F (36.6 °C) (03/29/24 0414)  Pulse: 64 (03/29/24 0414)  Resp: 18 (03/29/24 0414)  BP: 135/67 (03/29/24 0414)  SpO2: 96 % (03/29/24 0414) Vital Signs (24h Range):  Temp:  [97.8 °F (36.6 °C)-98.9 °F (37.2 °C)] 97.8 °F (36.6 °C)  Pulse:  [64-71] 64  Resp:  [18-20] 18  SpO2:  [92 %-97 %] 96 %  BP: (112-135)/(52-69) 135/67     Weight: 68 kg (150 lb)  Body mass index is 22.14 kg/m².    Intake/Output Summary (Last 24 hours) at 3/29/2024 0604  Last data filed at 3/28/2024 1911  Gross per 24 hour   Intake 3977.07 ml   Output 300 ml   Net 3677.07 ml      Physical Exam  Vitals and nursing note reviewed.   Constitutional:       General: He is not in acute distress.     Appearance: Normal appearance. He is not ill-appearing.   Cardiovascular:      Rate and Rhythm: Normal rate and regular rhythm.      Heart sounds: No murmur heard.     No friction rub. No gallop.   Pulmonary:      Effort: Pulmonary effort is normal. No respiratory distress.      Breath sounds: Normal  breath sounds.   Abdominal:      General: Abdomen is flat.      Palpations: Abdomen is soft.   Musculoskeletal:         General: No swelling or deformity.   Skin:     General: Skin is warm and dry.   Neurological:      Mental Status: He is alert.      Cranial Nerves: No cranial nerve deficit.   Psychiatric:         Mood and Affect: Mood normal.         Behavior: Behavior normal.         Thought Content: Thought content normal.         Judgment: Judgment normal.           Overview/Hospital Course:  Patient was to be discharged yesterday however insurance has not approved his LTAC care    Significant Labs: All pertinent labs within the past 24 hours have been reviewed.  Recent Lab Results         03/29/24  0431   03/28/24  2348        Baso # 0.11         Basophil % 1.5         Eos # 0.69         Eos % 9.7         Hematocrit 24.9         Hemoglobin 7.6         Immature Grans (Abs) 0.02         Immature Granulocytes 0.3         Lymph # 1.66         LYMPH % 23.3         MCH 26.5         MCHC 30.5         MCV 86.8         Mono # 0.76         Mono % 10.7         MPV 8.7         Neut # 3.88         Neut % 54.5         nRBC 0.0         Platelet Count 328         RBC 2.87         RDW 18.5         Vancomycin-Trough   17.8       WBC 7.12                 Significant Imaging: I have reviewed all pertinent imaging results/findings within the past 24 hours.    Assessment/Plan:    Awaiting placement patient with hypokalemia however since he has been discharged unable to order additional potassium will repeat in morning  Active Diagnoses:    Diagnosis Date Noted POA    PRINCIPAL PROBLEM:  Osteomyelitis [M86.9] 03/15/2024 Yes    Pressure injury of sacral region, stage 4 [L89.154] 03/19/2024 Yes    Skin ulcer of right lower leg [L97.919] 03/19/2024 Yes    Skin ulcer of left lower leg [L97.929] 03/19/2024 Yes    Unstageable pressure ulcer of right heel [L89.610] 03/19/2024 Yes    Unstageable pressure ulcer of left heel [L89.620]  03/19/2024 Yes      Problems Resolved During this Admission:     VTE Risk Mitigation (From admission, onward)           Ordered     enoxaparin injection 40 mg  Daily         03/15/24 0934                       Shaheen Talamantes MD  Department of Hospital Medicine   [facility] - 8th Floor Med Surg

## 2024-03-29 NOTE — PROGRESS NOTES
Pharmacokinetic Assessment Follow Up: IV Vancomycin    Vancomycin serum concentration assessment(s):    The trough level was drawn correctly and can be used to guide therapy at this time. The measurement is within the desired definitive target range of 15 to 20 mcg/mL.    Vancomycin Regimen Plan:    Continue regimen to Vancomycin 1000 mg IV every 12 hours with next serum trough concentration measured at 1 hour prior to 1300 dose on 03/31    Drug levels (last 3 results):  Recent Labs   Lab Result Units 03/26/24  1156 03/28/24  2348   Vancomycin Trough ug/ml 14.5* 17.8       Pharmacy will continue to follow and monitor vancomycin.    Please contact pharmacy at extension [phone] for questions regarding this assessment.    Thank you for the consult,   Clinton Lane       Patient brief summary:  Shaheen Christie is a 69 y.o. male initiated on antimicrobial therapy with IV Vancomycin for treatment of bone/joint infection    The patient's current regimen is 1000mg q12h    Drug Allergies:   Review of patient's allergies indicates:  No Known Allergies    Actual Body Weight:   68    Renal Function:   Estimated Creatinine Clearance: 82.5 mL/min (based on SCr of 0.79 mg/dL).,     Dialysis Method (if applicable):  N/A    CBC (last 72 hours):  Recent Labs   Lab Result Units 03/27/24  0003 03/28/24  0454   WBC x10(3)/mcL 6.73 6.30   Hgb g/dL 7.9* 7.4*   Hct % 26.1* 25.4*   Platelet x10(3)/mcL 297 297   Mono % % 10.4 12.2   Eos % % 8.3 10.0   Basophil % % 1.5 1.3       Metabolic Panel (last 72 hours):  Recent Labs   Lab Result Units 03/27/24  0424 03/28/24  0454   Sodium Level mmol/L 139 138   Potassium Level mmol/L 3.8 3.2*   Chloride mmol/L 106 107   Carbon Dioxide mmol/L 26 25   Glucose Level mg/dL 108 101   Blood Urea Nitrogen mg/dL 5.8* 7.1*   Creatinine mg/dL 0.68* 0.79   Albumin Level g/dL 2.7* 2.5*   Bilirubin Total mg/dL 0.3 0.2   Alkaline Phosphatase unit/L 83 71   Aspartate Aminotransferase unit/L 9 7   Alanine  Aminotransferase unit/L 7 5       Vancomycin Administrations:  vancomycin given in the last 96 hours                     vancomycin in dextrose 5 % 1 gram/250 mL IVPB 1,000 mg (mg) 1,000 mg New Bag 03/29/24 0127     1,000 mg New Bag 03/28/24 1208     1,000 mg New Bag  0115     1,000 mg New Bag 03/27/24 1440     1,000 mg New Bag  0210     1,000 mg New Bag 03/26/24 1401    vancomycin 750 mg in dextrose 5 % 250 mL IVPB (ready to mix) (mg) 750 mg New Bag 03/26/24 0142     750 mg New Bag 03/25/24 1452                    Microbiologic Results:  Microbiology Results (last 7 days)       Procedure Component Value Units Date/Time    Tissue Culture - Aerobic [[id number]]  (Abnormal) Collected: 03/26/24 1833    Order Status: Completed Specimen: Tissue from Foot, Heel Left Updated: 03/28/24 0848     CULTURE, TISSUE- AEROBIC ([facility]) Few Presumptive proteus species    Anaerobic Culture [[id number]] Collected: 03/26/24 1833    Order Status: Completed Specimen: Tissue from Foot, Heel Left Updated: 03/28/24 0733     Anaerobe Culture No Anaerobes Isolated

## 2024-03-29 NOTE — PROGRESS NOTES
[facility]                       [address] [address]    PATIENT NAME:       HAWA KUMAR  YOB: 1955  CSN:                [id number]   MRN:                [id number]  ADMIT DATE:         03/15/2024 03:09:00  PHYSICIAN:          Tom Nichole DPM                            PROGRESS NOTE    DATE:  03/28/2024 00:00:00    SUBJECTIVE:  The patient is seen today.  He is doing well, not voicing any   complaints.  No reported fevers or chills.  No chest pain or shortness of   breath.    PHYSICAL EXAMINATION:  VITAL SIGNS:  Stable and he is afebrile.    Dressings intact to both lower extremities.  No substantial bleeding or strike   through.  Wounds all look markedly better, clean, viable.  No further necrosis   to any of these particular sites.  Has some pinpoint areas of bleeding on the   right side, but nothing excessive.    His labs reviewed.  White cell counts remain normal at 6.3, H and H 7.4 and   25.4.  Cultures from the left heel, currently growing out few presumptive   Proteus.  Anaerobes are negative-to-date.    ASSESSMENT:  Bilateral lower extremity wounds secondary infection of left heel.    PLAN:  Change dressings.  Clinically, he is doing well.  Offloading.  General   plan is for LTAC placement.        ______________________________  Tom Nichole DPM    GAS/AQS  DD:  03/28/2024  Time:  04:46PM  DT:  03/28/2024  Time:  07:12PM  Job #:  [id number]      PROGRESS NOTE

## 2024-03-29 NOTE — PROGRESS NOTES
Infectious Diseases Progress Note  69-year-old male, nursing home resident, with past medical history of HTN, COPD, DM type 2, with neuropathy, is admitted to [facility] on 03/15/2024 sent via EMS for sacral wound evaluation, apparently diagnosed with osteomyelitis involving the sacrum on 02/27/2024 with plan to be admitted to LTAC which had not been successful as an outpatient.  On presentation he was noted to have no fevers and no leukocytosis, anemic with low albumin.  Blood cultures have been negative.  Review of his records show a 02/08/2024 left leg wound culture with Pseudomonas, Providencia, ESBL Proteus and 11/30/2023 sacral wound culture with MRSA, Providencia, Enterococcus and Pseudomonas.  2/27 MRI of the pelvis shows osteomyelitis involving the sacrococcygeal bone as well as right trochanteric bursitis which may be septic.  He was seen by surgery team with no plan for surgical intervention.  He is on antibiotic coverage with vancomycin and Zosyn.     Subjective:  Lying in bed in no acute distress. No new complaints voiced, doing about the same. Afebrile.       Past Medical History:   Diagnosis Date    COPD (chronic obstructive pulmonary disease)     Depression     Hypertension     Neuropathy      Past Surgical History:   Procedure Laterality Date    angiogram Bilateral     stents placed in left leg    anterior neck surgery      ARTHROTOMY OF ANKLE Left 6/28/2023    Procedure: ARTHROTOMY, ANKLE;  Surgeon: Tom Nichole DPM;  Location: [facility];  Service: Podiatry;  Laterality: Left;    cataracts Left     CHOLECYSTECTOMY      DEBRIDEMENT OF FOOT Bilateral 3/26/2024    Procedure: DEBRIDEMENT, FOOT;  Surgeon: Tom Nichole DPM;  Location: [facility] OR;  Service: Podiatry;  Laterality: Bilateral;    EYE SURGERY Left     HAND SURGERY Left     broken bone    herina repair      INCISION AND DRAINAGE, LOWER EXTREMITY Left 7/28/2023    Procedure: INCISION AND DRAINAGE, LOWER  EXTREMITY;  Surgeon: Avinash Garces MD;  Location: [facility] OR;  Service: Orthopedics;  Laterality: Left;  Supine, vascular bed, bone foam  last case  cysto tubing, laparoscopic trocar, experience, vanc, hemovac drain    KNEE SURGERY Bilateral     PERIPHERAL ANGIOGRAPHY N/A 3/13/2024    Procedure: Peripheral angiography;  Surgeon: Deven Queen MD;  Location: [facility] CATH LAB;  Service: Cardiology;  Laterality: N/A;  MICHELLE ANGIO W/ ANEST.    posterior neck surgery      SPINE SURGERY      TOTAL REPLACEMENT OF BOTH HIP JOINTS USING COMPUTER-ASSISTED NAVIGATION Bilateral      Social History     Socioeconomic History    Marital status:    Occupational History    Occupation: retired   Tobacco Use    Smoking status: Former     Average packs/day: 0.5 packs/day for 16.2 years (7.5 ttl pk-yrs)     Types: Cigarettes     Start date: 2008    Smokeless tobacco: Never   Substance and Sexual Activity    Alcohol use: Yes     Alcohol/week: 3.0 - 6.0 standard drinks of alcohol     Types: 3 - 6 Cans of beer per week    Drug use: Yes     Types: Marijuana    Sexual activity: Not Currently       ROS  Constitutional:  Positive for malaise/fatigue.   HENT: Negative.     Respiratory: Negative.     Gastrointestinal: Negative.    Genitourinary: Negative.    Musculoskeletal: Negative.    Skin:         Multiple pressure wounds   Neurological:  Positive for weakness.   Endo/Heme/Allergies: Negative.    Psychiatric/Behavioral: Negative.     All other Systems review done and negative.    Review of patient's allergies indicates:  No Known Allergies      Scheduled Meds:   amLODIPine  10 mg Oral Daily    aspirin  81 mg Oral Daily    cilostazoL  50 mg Oral BID    clopidogreL  75 mg Oral Daily    docusate sodium  100 mg Oral BID    DULoxetine  30 mg Oral Daily    enoxaparin  40 mg Subcutaneous Daily    ferrous sulfate  1 tablet Oral Daily    gabapentin  300 mg Oral TID    gabapentin  600 mg Oral QHS    LIDOcaine  1 patch Transdermal Q24H    losartan   "100 mg Oral Daily    multivitamin  1 tablet Oral Daily    mupirocin   Nasal BID    oxyCODONE  5 mg Oral Q4H    piperacillin-tazobactam (Zosyn) IV (PEDS and ADULTS) (extended infusion is not appropriate)  4.5 g Intravenous Q8H    polyethylene glycol  17 g Oral Daily    sodium chloride 0.9%  10 mL Intravenous Q6H    vancomycin (VANCOCIN) IV (PEDS and ADULTS)  1,000 mg Intravenous Q12H     Continuous Infusions:  PRN Meds:acetaminophen, albuterol, aluminum-magnesium hydroxide-simethicone, HYDROcodone-acetaminophen, melatonin, simethicone, Flushing PICC/Midline Protocol **AND** sodium chloride 0.9% **AND** sodium chloride 0.9%, vancomycin - pharmacy to dose    Objective:  /62   Pulse 71   Temp 98.9 °F (37.2 °C) (Oral)   Resp 18   Ht 5' 9.02" (1.753 m)   Wt 68 kg (150 lb)   SpO2 (!) 92%   BMI 22.14 kg/m²     Physical Exam:   Physical Exam  Vitals reviewed.   Constitutional:       General: He is not in acute distress.  HENT:      Head: Normocephalic and atraumatic.   Cardiovascular:      Rate and Rhythm: Normal rate and regular rhythm.      Heart sounds: Normal heart sounds.   Pulmonary:      Effort: Pulmonary effort is normal. No respiratory distress.      Breath sounds: Normal breath sounds.   Abdominal:      General: Bowel sounds are normal. There is no distension.      Palpations: Abdomen is soft.      Tenderness: There is no abdominal tenderness.   Musculoskeletal:         General: No deformity.      Cervical back: Neck supple.   Skin:     Findings: No erythema or rash.      Comments: Wounds dressed   Neurological:      Mental Status: He is alert and oriented to person, place, and time.   Psychiatric:      Comments: Calm and cooperative     Imaging  Imaging Results    None          Lab Review   Recent Results (from the past 24 hour(s))   Comprehensive Metabolic Panel    Collection Time: 03/28/24  4:54 AM   Result Value Ref Range    Sodium Level 138 136 - 145 mmol/L    Potassium Level 3.2 (L) 3.5 - 5.1 " mmol/L    Chloride 107 98 - 107 mmol/L    Carbon Dioxide 25 23 - 31 mmol/L    Glucose Level 101 82 - 115 mg/dL    Blood Urea Nitrogen 7.1 (L) 8.4 - 25.7 mg/dL    Creatinine 0.79 0.73 - 1.18 mg/dL    Calcium Level Total 8.7 (L) 8.8 - 10.0 mg/dL    Protein Total 5.4 (L) 5.8 - 7.6 gm/dL    Albumin Level 2.5 (L) 3.4 - 4.8 g/dL    Globulin 2.9 2.4 - 3.5 gm/dL    Albumin/Globulin Ratio 0.9 (L) 1.1 - 2.0 ratio    Bilirubin Total 0.2 <=1.5 mg/dL    Alkaline Phosphatase 71 40 - 150 unit/L    Alanine Aminotransferase 5 0 - 55 unit/L    Aspartate Aminotransferase 7 5 - 34 unit/L    eGFR >60 mls/min/1.73/m2   CBC with Differential    Collection Time: 03/28/24  4:54 AM   Result Value Ref Range    WBC 6.30 4.50 - 11.50 x10(3)/mcL    RBC 2.87 (L) 4.70 - 6.10 x10(6)/mcL    Hgb 7.4 (L) 14.0 - 18.0 g/dL    Hct 25.4 (L) 42.0 - 52.0 %    MCV 88.5 80.0 - 94.0 fL    MCH 25.8 (L) 27.0 - 31.0 pg    MCHC 29.1 (L) 33.0 - 36.0 g/dL    RDW 18.2 (H) 11.5 - 17.0 %    Platelet 297 130 - 400 x10(3)/mcL    MPV 8.9 7.4 - 10.4 fL    Neut % 48.9 %    Lymph % 27.3 %    Mono % 12.2 %    Eos % 10.0 %    Basophil % 1.3 %    Lymph # 1.72 0.6 - 4.6 x10(3)/mcL    Neut # 3.08 2.1 - 9.2 x10(3)/mcL    Mono # 0.77 0.1 - 1.3 x10(3)/mcL    Eos # 0.63 0 - 0.9 x10(3)/mcL    Baso # 0.08 <=0.2 x10(3)/mcL    IG# 0.02 0 - 0.04 x10(3)/mcL    IG% 0.3 %    NRBC% 0.0 %         Assessment/Plan:  1. Chronic sacral pressure wound with osteomyelitis  2. History of polymicrobial sacral wound culture-MRSA, Providencia, Pseudomonas, Enterococcus  3.  Chronic left lower extremity wound with history of polymicrobial wound culture-Pseudomonas, ESBL Proteus, Providencia  4. Multiple pressure wounds  5.  Diabetes type 2  6.  Anemia  7.  Protein calorie malnutrition      -Continue vancomycin and Zosyn, plan end date of 04/26, following inflammatory markers  -No fevers noted and no leukocytosis  -3/19 ESR 12 and CRP 23.7, follow   -3/15 blood cultures negative  -2/8 left leg wound  cultures with many Pseudomonas, Providencia, Proteus  -11/30/2023 sacral wound with many Providencia, MRSA, Enterococcus, Pseudomonas  -Seen by the surgery team with inputs noted including no plan for surgical intervention   -Podiatry on board s/p debridement 3/26, cultures negative so far, follow  -We will continue aggressive wound care per Podiatry and the wound care team  - Discussed with patient and nursing staff. Case management working on LTAC placement. Disposition per primary team

## 2024-03-30 LAB
ANION GAP SERPL CALC-SCNC: 7 MEQ/L
BASOPHILS # BLD AUTO: 0.1 X10(3)/MCL
BASOPHILS NFR BLD AUTO: 1.4 %
BUN SERPL-MCNC: 9.7 MG/DL (ref 8.4–25.7)
CALCIUM SERPL-MCNC: 8.7 MG/DL (ref 8.8–10)
CHLORIDE SERPL-SCNC: 107 MMOL/L (ref 98–107)
CO2 SERPL-SCNC: 26 MMOL/L (ref 23–31)
CREAT SERPL-MCNC: 0.68 MG/DL (ref 0.73–1.18)
CREAT/UREA NIT SERPL: 14
EOSINOPHIL # BLD AUTO: 0.67 X10(3)/MCL (ref 0–0.9)
EOSINOPHIL NFR BLD AUTO: 9.3 %
ERYTHROCYTE [DISTWIDTH] IN BLOOD BY AUTOMATED COUNT: 18.6 % (ref 11.5–17)
GFR SERPLBLD CREATININE-BSD FMLA CKD-EPI: >60 MLS/MIN/1.73/M2
GLUCOSE SERPL-MCNC: 117 MG/DL (ref 82–115)
HCT VFR BLD AUTO: 25.6 % (ref 42–52)
HGB BLD-MCNC: 7.8 G/DL (ref 14–18)
IMM GRANULOCYTES # BLD AUTO: 0.02 X10(3)/MCL (ref 0–0.04)
IMM GRANULOCYTES NFR BLD AUTO: 0.3 %
LYMPHOCYTES # BLD AUTO: 1.42 X10(3)/MCL (ref 0.6–4.6)
LYMPHOCYTES NFR BLD AUTO: 19.7 %
MCH RBC QN AUTO: 26.6 PG (ref 27–31)
MCHC RBC AUTO-ENTMCNC: 30.5 G/DL (ref 33–36)
MCV RBC AUTO: 87.4 FL (ref 80–94)
MONOCYTES # BLD AUTO: 0.87 X10(3)/MCL (ref 0.1–1.3)
MONOCYTES NFR BLD AUTO: 12.1 %
NEUTROPHILS # BLD AUTO: 4.12 X10(3)/MCL (ref 2.1–9.2)
NEUTROPHILS NFR BLD AUTO: 57.2 %
NRBC BLD AUTO-RTO: 0 %
PLATELET # BLD AUTO: 326 X10(3)/MCL (ref 130–400)
PMV BLD AUTO: 8.8 FL (ref 7.4–10.4)
POCT GLUCOSE: 126 MG/DL (ref 70–110)
POTASSIUM SERPL-SCNC: 3.4 MMOL/L (ref 3.5–5.1)
RBC # BLD AUTO: 2.93 X10(6)/MCL (ref 4.7–6.1)
SODIUM SERPL-SCNC: 140 MMOL/L (ref 136–145)
WBC # SPEC AUTO: 7.2 X10(3)/MCL (ref 4.5–11.5)

## 2024-03-30 PROCEDURE — 80048 BASIC METABOLIC PNL TOTAL CA: CPT | Performed by: FAMILY MEDICINE

## 2024-03-30 PROCEDURE — 11000001 HC ACUTE MED/SURG PRIVATE ROOM

## 2024-03-30 PROCEDURE — 25000003 PHARM REV CODE 250: Performed by: INTERNAL MEDICINE

## 2024-03-30 PROCEDURE — 25000003 PHARM REV CODE 250: Performed by: PODIATRIST

## 2024-03-30 PROCEDURE — A4216 STERILE WATER/SALINE, 10 ML: HCPCS | Performed by: INTERNAL MEDICINE

## 2024-03-30 PROCEDURE — 85025 COMPLETE CBC W/AUTO DIFF WBC: CPT | Performed by: PODIATRIST

## 2024-03-30 PROCEDURE — 63600175 PHARM REV CODE 636 W HCPCS: Performed by: PODIATRIST

## 2024-03-30 PROCEDURE — 21400001 HC TELEMETRY ROOM

## 2024-03-30 RX ADMIN — OXYCODONE HYDROCHLORIDE 5 MG: 5 TABLET ORAL at 02:03

## 2024-03-30 RX ADMIN — OXYCODONE HYDROCHLORIDE 5 MG: 5 TABLET ORAL at 04:03

## 2024-03-30 RX ADMIN — GABAPENTIN 300 MG: 300 CAPSULE ORAL at 09:03

## 2024-03-30 RX ADMIN — LOSARTAN POTASSIUM 100 MG: 50 TABLET, FILM COATED ORAL at 09:03

## 2024-03-30 RX ADMIN — LIDOCAINE 5% 1 PATCH: 700 PATCH TOPICAL at 10:03

## 2024-03-30 RX ADMIN — DULOXETINE HYDROCHLORIDE 30 MG: 30 CAPSULE, DELAYED RELEASE ORAL at 09:03

## 2024-03-30 RX ADMIN — ASPIRIN 81 MG CHEWABLE TABLET 81 MG: 81 TABLET CHEWABLE at 09:03

## 2024-03-30 RX ADMIN — OXYCODONE HYDROCHLORIDE 5 MG: 5 TABLET ORAL at 09:03

## 2024-03-30 RX ADMIN — ENOXAPARIN SODIUM 40 MG: 40 INJECTION SUBCUTANEOUS at 04:03

## 2024-03-30 RX ADMIN — MUPIROCIN: 20 OINTMENT TOPICAL at 09:03

## 2024-03-30 RX ADMIN — PIPERACILLIN SODIUM AND TAZOBACTAM SODIUM 4.5 G: 4; .5 INJECTION, POWDER, LYOPHILIZED, FOR SOLUTION INTRAVENOUS at 06:03

## 2024-03-30 RX ADMIN — SODIUM CHLORIDE, PRESERVATIVE FREE 10 ML: 5 INJECTION INTRAVENOUS at 12:03

## 2024-03-30 RX ADMIN — THERA TABS 1 TABLET: TAB at 09:03

## 2024-03-30 RX ADMIN — VANCOMYCIN HYDROCHLORIDE 1000 MG: 1 INJECTION, POWDER, LYOPHILIZED, FOR SOLUTION INTRAVENOUS at 09:03

## 2024-03-30 RX ADMIN — PIPERACILLIN SODIUM AND TAZOBACTAM SODIUM 4.5 G: 4; .5 INJECTION, POWDER, LYOPHILIZED, FOR SOLUTION INTRAVENOUS at 01:03

## 2024-03-30 RX ADMIN — PIPERACILLIN SODIUM AND TAZOBACTAM SODIUM 4.5 G: 4; .5 INJECTION, POWDER, LYOPHILIZED, FOR SOLUTION INTRAVENOUS at 09:03

## 2024-03-30 RX ADMIN — CLOPIDOGREL BISULFATE 75 MG: 75 TABLET ORAL at 09:03

## 2024-03-30 RX ADMIN — OXYCODONE HYDROCHLORIDE 5 MG: 5 TABLET ORAL at 06:03

## 2024-03-30 RX ADMIN — DOCUSATE SODIUM 100 MG: 100 CAPSULE, LIQUID FILLED ORAL at 09:03

## 2024-03-30 RX ADMIN — CILOSTAZOL 50 MG: 50 TABLET ORAL at 09:03

## 2024-03-30 RX ADMIN — FERROUS SULFATE TAB 325 MG (65 MG ELEMENTAL FE) 1 EACH: 325 (65 FE) TAB at 09:03

## 2024-03-30 RX ADMIN — GABAPENTIN 300 MG: 300 CAPSULE ORAL at 03:03

## 2024-03-30 RX ADMIN — VANCOMYCIN HYDROCHLORIDE 1000 MG: 1 INJECTION, POWDER, LYOPHILIZED, FOR SOLUTION INTRAVENOUS at 02:03

## 2024-03-30 RX ADMIN — OXYCODONE HYDROCHLORIDE 5 MG: 5 TABLET ORAL at 10:03

## 2024-03-30 RX ADMIN — GABAPENTIN 600 MG: 300 CAPSULE ORAL at 09:03

## 2024-03-30 RX ADMIN — AMLODIPINE BESYLATE 10 MG: 5 TABLET ORAL at 09:03

## 2024-03-30 NOTE — PROGRESS NOTES
[facility] - 8th Floor [facility] Medicine  Progress Note    Patient Name: Shaheen Christie  MRN: [id number]  Patient Class: IP- Inpatient   Admission Date: 3/15/2024  Length of Stay: 15 days  Attending Physician: Enzo Thibodeaux MD  Primary Care Provider: Viktor Russ MD        Subjective:     Principal Problem:Osteomyelitis    Interval History:  Patient has declined have his bandages changed except by the podiatrist.  Otherwise no problems continues with pain somewhat relieved with the oxycodone  Review of Systems   Constitutional:  Negative for activity change and appetite change.   HENT:  Negative for congestion.    Respiratory:  Positive for cough (once yesterday).         States he was congested yesterday especially on the left side but had a 1 time productive cough and now he is better   Gastrointestinal:  Negative for abdominal distention, abdominal pain, constipation and diarrhea.   Genitourinary:         No change in urine color or odor   Musculoskeletal:         Pains in both lower extremities from wounds   Skin:  Positive for wound (Chronic).   Neurological:  Negative for headaches.   Psychiatric/Behavioral:  Negative for agitation and behavioral problems.      Objective:     Vital Signs (Most Recent):  Temp: 97.9 °F (36.6 °C) (03/30/24 0420)  Pulse: 79 (03/30/24 0420)  Resp: 18 (03/30/24 0420)  BP: (!) 111/58 (03/30/24 0420)  SpO2: 95 % (03/30/24 0420) Vital Signs (24h Range):  Temp:  [97.9 °F (36.6 °C)-99 °F (37.2 °C)] 97.9 °F (36.6 °C)  Pulse:  [60-85] 79  Resp:  [18] 18  SpO2:  [93 %-96 %] 95 %  BP: (110-151)/(49-67) 111/58     Weight: 68 kg (150 lb)  Body mass index is 22.14 kg/m².    Intake/Output Summary (Last 24 hours) at 3/30/2024 0602  Last data filed at 3/29/2024 1516  Gross per 24 hour   Intake 1380 ml   Output 450 ml   Net 930 ml      Physical Exam  Constitutional:       General: He is not in acute distress.     Appearance: Normal appearance.   HENT:       Head: Normocephalic.   Eyes:      General: No scleral icterus.     Conjunctiva/sclera: Conjunctivae normal.   Neck:      Comments: General appearance of the neck is normal  Cardiovascular:      Rate and Rhythm: Normal rate and regular rhythm.      Heart sounds: No murmur heard.     No friction rub. No gallop.   Pulmonary:      Effort: Pulmonary effort is normal.      Breath sounds: Normal breath sounds. No wheezing or rales.   Abdominal:      General: Abdomen is flat.      Palpations: Abdomen is soft.      Tenderness: There is no abdominal tenderness.   Musculoskeletal:      Comments: Both lower extremities are wrapped and has wound VAC   Skin:     General: Skin is warm and dry.   Neurological:      General: No focal deficit present.      Mental Status: He is alert and oriented to person, place, and time.   Psychiatric:         Mood and Affect: Mood normal.         Behavior: Behavior normal.         Thought Content: Thought content normal.         Judgment: Judgment normal.           Overview/Hospital Course:  Wound VAC in place    Significant Labs: All pertinent labs within the past 24 hours have been reviewed.  Recent Lab Results         03/30/24  0508        Anion Gap 7.0       Baso # 0.10       Basophil % 1.4       BUN 9.7       BUN/CREAT RATIO 14       Calcium 8.7       Chloride 107       CO2 26       Creatinine 0.68       eGFR >60       Eos # 0.67       Eos % 9.3       Glucose 117       Hematocrit 25.6       Hemoglobin 7.8       Immature Grans (Abs) 0.02       Immature Granulocytes 0.3       Lymph # 1.42       LYMPH % 19.7       MCH 26.6       MCHC 30.5       MCV 87.4       Mono # 0.87       Mono % 12.1       MPV 8.8       Neut # 4.12       Neut % 57.2       nRBC 0.0       Platelet Count 326       Potassium 3.4       RBC 2.93       RDW 18.6       Sodium 140       WBC 7.20               Significant Imaging: I have reviewed all pertinent imaging results/findings within the past 24 hours.    Assessment/Plan:     Patient's potassium is stable although low awaiting placement.  Patient will have bandages changed accept by podiatrist turns on his own bilateral lower extremities bandaged   Active Diagnoses:    Diagnosis Date Noted POA    PRINCIPAL PROBLEM:  Osteomyelitis [M86.9] 03/15/2024 Yes    Pressure injury of sacral region, stage 4 [L89.154] 03/19/2024 Yes    Skin ulcer of right lower leg [L97.919] 03/19/2024 Yes    Skin ulcer of left lower leg [L97.929] 03/19/2024 Yes    Unstageable pressure ulcer of right heel [L89.610] 03/19/2024 Yes    Unstageable pressure ulcer of left heel [L89.620] 03/19/2024 Yes      Problems Resolved During this Admission:     VTE Risk Mitigation (From admission, onward)           Ordered     enoxaparin injection 40 mg  Daily         03/15/24 0934                       Shaheen Talamantes MD  Department of Hospital Medicine   [facility] - 8th Floor Med Surg

## 2024-03-30 NOTE — PROGRESS NOTES
[facility]                        [address] [address]    PATIENT NAME:       HAWA KUMAR  YOB: 1955  CSN:                [id number]   MRN:                [id number]  ADMIT DATE:         03/15/2024 03:09:00  PHYSICIAN:          Tom Nichole DPM                            PROGRESS NOTE    DATE:  03/30/2024 00:00:00    SUBJECTIVE:  The patient is seen today.  He is doing about the same.  Main   complaint is some notable left knee pain.  No fevers or chills.    PHYSICAL EXAMINATION:  Vital signs are stable and afebrile.    LABORATORY DATA:  Reviewed.  White cell counts are 7.2.  BUN and creatinine 9.7   and 0.68.    The wound cultures from his left heel grew out Proteus.  Anaerobes are negative.    Wound sites actually are doing well, clean and viable.  There is no stanley   necrosis, exudate noted.  No bone exposure.  He does have some warmth to the   left knee with pain upon any sort of manipulation.    ASSESSMENT:    1. Infected sacral wounds and bilateral lower extremity wounds with secondary   Proteus infection.  2. Left knee pain.    PLAN:  Dressings were changed.  Clinically, he is doing well.  Antibiotics as   per ID.  Will get x-rays of the left knee.        ______________________________  Tom Nichole DPM    GAS/AQS  DD:  03/30/2024  Time:  09:39AM  DT:  03/30/2024  Time:  10:20AM  Job #:  [id number]      PROGRESS NOTE

## 2024-03-31 PROBLEM — M25.462 KNEE EFFUSION, LEFT: Status: ACTIVE | Noted: 2024-03-31

## 2024-03-31 LAB
BASOPHILS # BLD AUTO: 0.09 X10(3)/MCL
BASOPHILS NFR BLD AUTO: 1.4 %
EOSINOPHIL # BLD AUTO: 0.73 X10(3)/MCL (ref 0–0.9)
EOSINOPHIL NFR BLD AUTO: 11.2 %
ERYTHROCYTE [DISTWIDTH] IN BLOOD BY AUTOMATED COUNT: 18.5 % (ref 11.5–17)
HCT VFR BLD AUTO: 24.8 % (ref 42–52)
HGB BLD-MCNC: 7.5 G/DL (ref 14–18)
IMM GRANULOCYTES # BLD AUTO: 0.02 X10(3)/MCL (ref 0–0.04)
IMM GRANULOCYTES NFR BLD AUTO: 0.3 %
LYMPHOCYTES # BLD AUTO: 1.44 X10(3)/MCL (ref 0.6–4.6)
LYMPHOCYTES NFR BLD AUTO: 22.1 %
MCH RBC QN AUTO: 26.7 PG (ref 27–31)
MCHC RBC AUTO-ENTMCNC: 30.2 G/DL (ref 33–36)
MCV RBC AUTO: 88.3 FL (ref 80–94)
MONOCYTES # BLD AUTO: 0.72 X10(3)/MCL (ref 0.1–1.3)
MONOCYTES NFR BLD AUTO: 11 %
NEUTROPHILS # BLD AUTO: 3.53 X10(3)/MCL (ref 2.1–9.2)
NEUTROPHILS NFR BLD AUTO: 54 %
NRBC BLD AUTO-RTO: 0 %
PLATELET # BLD AUTO: 327 X10(3)/MCL (ref 130–400)
PMV BLD AUTO: 8.5 FL (ref 7.4–10.4)
RBC # BLD AUTO: 2.81 X10(6)/MCL (ref 4.7–6.1)
WBC # SPEC AUTO: 6.53 X10(3)/MCL (ref 4.5–11.5)

## 2024-03-31 PROCEDURE — 25000003 PHARM REV CODE 250: Performed by: INTERNAL MEDICINE

## 2024-03-31 PROCEDURE — 85025 COMPLETE CBC W/AUTO DIFF WBC: CPT | Performed by: PODIATRIST

## 2024-03-31 PROCEDURE — 63600175 PHARM REV CODE 636 W HCPCS: Performed by: PODIATRIST

## 2024-03-31 PROCEDURE — A4216 STERILE WATER/SALINE, 10 ML: HCPCS | Performed by: INTERNAL MEDICINE

## 2024-03-31 PROCEDURE — 25000003 PHARM REV CODE 250: Performed by: PODIATRIST

## 2024-03-31 PROCEDURE — 11000001 HC ACUTE MED/SURG PRIVATE ROOM

## 2024-03-31 PROCEDURE — 21400001 HC TELEMETRY ROOM

## 2024-03-31 RX ADMIN — OXYCODONE HYDROCHLORIDE 5 MG: 5 TABLET ORAL at 10:03

## 2024-03-31 RX ADMIN — GABAPENTIN 600 MG: 300 CAPSULE ORAL at 09:03

## 2024-03-31 RX ADMIN — DOCUSATE SODIUM 100 MG: 100 CAPSULE, LIQUID FILLED ORAL at 09:03

## 2024-03-31 RX ADMIN — PIPERACILLIN SODIUM AND TAZOBACTAM SODIUM 4.5 G: 4; .5 INJECTION, POWDER, LYOPHILIZED, FOR SOLUTION INTRAVENOUS at 10:03

## 2024-03-31 RX ADMIN — SODIUM CHLORIDE, PRESERVATIVE FREE 10 ML: 5 INJECTION INTRAVENOUS at 12:03

## 2024-03-31 RX ADMIN — THERA TABS 1 TABLET: TAB at 10:03

## 2024-03-31 RX ADMIN — ASPIRIN 81 MG CHEWABLE TABLET 81 MG: 81 TABLET CHEWABLE at 10:03

## 2024-03-31 RX ADMIN — CLOPIDOGREL BISULFATE 75 MG: 75 TABLET ORAL at 10:03

## 2024-03-31 RX ADMIN — OXYCODONE HYDROCHLORIDE 5 MG: 5 TABLET ORAL at 02:03

## 2024-03-31 RX ADMIN — OXYCODONE HYDROCHLORIDE 5 MG: 5 TABLET ORAL at 09:03

## 2024-03-31 RX ADMIN — GABAPENTIN 300 MG: 300 CAPSULE ORAL at 10:03

## 2024-03-31 RX ADMIN — DULOXETINE HYDROCHLORIDE 30 MG: 30 CAPSULE, DELAYED RELEASE ORAL at 10:03

## 2024-03-31 RX ADMIN — DOCUSATE SODIUM 100 MG: 100 CAPSULE, LIQUID FILLED ORAL at 10:03

## 2024-03-31 RX ADMIN — ENOXAPARIN SODIUM 40 MG: 40 INJECTION SUBCUTANEOUS at 05:03

## 2024-03-31 RX ADMIN — VANCOMYCIN HYDROCHLORIDE 1000 MG: 1 INJECTION, POWDER, LYOPHILIZED, FOR SOLUTION INTRAVENOUS at 10:03

## 2024-03-31 RX ADMIN — FERROUS SULFATE TAB 325 MG (65 MG ELEMENTAL FE) 1 EACH: 325 (65 FE) TAB at 10:03

## 2024-03-31 RX ADMIN — OXYCODONE HYDROCHLORIDE 5 MG: 5 TABLET ORAL at 05:03

## 2024-03-31 RX ADMIN — GABAPENTIN 300 MG: 300 CAPSULE ORAL at 09:03

## 2024-03-31 RX ADMIN — PIPERACILLIN SODIUM AND TAZOBACTAM SODIUM 4.5 G: 4; .5 INJECTION, POWDER, LYOPHILIZED, FOR SOLUTION INTRAVENOUS at 02:03

## 2024-03-31 RX ADMIN — SODIUM CHLORIDE, PRESERVATIVE FREE 10 ML: 5 INJECTION INTRAVENOUS at 05:03

## 2024-03-31 RX ADMIN — CILOSTAZOL 50 MG: 50 TABLET ORAL at 09:03

## 2024-03-31 RX ADMIN — PIPERACILLIN SODIUM AND TAZOBACTAM SODIUM 4.5 G: 4; .5 INJECTION, POWDER, LYOPHILIZED, FOR SOLUTION INTRAVENOUS at 05:03

## 2024-03-31 RX ADMIN — CILOSTAZOL 50 MG: 50 TABLET ORAL at 10:03

## 2024-03-31 RX ADMIN — POLYETHYLENE GLYCOL 3350 17 G: 17 POWDER, FOR SOLUTION ORAL at 10:03

## 2024-03-31 RX ADMIN — SODIUM CHLORIDE, PRESERVATIVE FREE 10 ML: 5 INJECTION INTRAVENOUS at 06:03

## 2024-03-31 RX ADMIN — LIDOCAINE 5% 1 PATCH: 700 PATCH TOPICAL at 10:03

## 2024-03-31 NOTE — PROGRESS NOTES
[facility] - 8th Floor [facility] Medicine  Progress Note    Patient Name: Shaheen Christie  MRN: [id number]  Patient Class: IP- Inpatient   Admission Date: 3/15/2024  Length of Stay: 16 days  Attending Physician: Enzo Thibodeaux MD  Primary Care Provider: Viktor Russ MD        Subjective:     Principal Problem:Osteomyelitis    Interval History:  Increasing pain to the left knee x-rays ordered by podiatrist showed degenerative changes and demineralization and effusion  Review of Systems   Constitutional:  Negative for activity change and appetite change.   HENT:  Negative for congestion.    Respiratory:  Negative for shortness of breath.    Cardiovascular:  Negative for chest pain.   Gastrointestinal:  Negative for abdominal pain and constipation.   Genitourinary:  Negative for difficulty urinating and dysuria.   Musculoskeletal:         Pain in the left knee no history of trauma history of prior surgery   Skin:  Positive for wound (Unchanged).   Neurological:  Negative for headaches.   Psychiatric/Behavioral:  Negative for agitation, behavioral problems and confusion.      Objective:     Vital Signs (Most Recent):  Temp: 98.2 °F (36.8 °C) (03/31/24 0418)  Pulse: 62 (03/31/24 0418)  Resp: 18 (03/31/24 0418)  BP: (!) 129/56 (03/31/24 0418)  SpO2: 95 % (03/31/24 0418) Vital Signs (24h Range):  Temp:  [98 °F (36.7 °C)-98.4 °F (36.9 °C)] 98.2 °F (36.8 °C)  Pulse:  [62-89] 62  Resp:  [16-18] 18  SpO2:  [91 %-98 %] 95 %  BP: (106-142)/(50-68) 129/56     Weight: 68 kg (150 lb)  Body mass index is 22.14 kg/m².    Intake/Output Summary (Last 24 hours) at 3/31/2024 0537  Last data filed at 3/30/2024 2300  Gross per 24 hour   Intake 1520 ml   Output 1651 ml   Net -131 ml      Physical Exam  Constitutional:       General: He is in acute distress (from pain in his knee).      Appearance: Normal appearance.   HENT:      Head: Normocephalic.   Neck:      Comments: Neck appears grossly  normal  Cardiovascular:      Rate and Rhythm: Normal rate and regular rhythm.      Heart sounds: No murmur heard.     No friction rub. No gallop.   Pulmonary:      Effort: Pulmonary effort is normal.      Breath sounds: No wheezing or rales.   Abdominal:      General: Abdomen is flat.      Palpations: Abdomen is soft.      Tenderness: There is no abdominal tenderness.   Musculoskeletal:      Comments: Marked edema and tenderness to the left knee especially to the medial aspect of the knee edema to the left lower extremity much more pronounced than it had been in the past couple of days   Skin:     General: Skin is warm and dry.   Neurological:      General: No focal deficit present.      Mental Status: He is alert and oriented to person, place, and time.   Psychiatric:         Mood and Affect: Mood normal.         Behavior: Behavior normal.         Thought Content: Thought content normal.           Overview/Hospital Course:  Increasing edema to the left lower extremity increasing pain to the left knee with knee effusion    Significant Labs: All pertinent labs within the past 24 hours have been reviewed.  Recent Lab Results         03/30/24  1705        POCT Glucose 126               Significant Imaging: I have reviewed all pertinent imaging results/findings within the past 24 hours.    Assessment/Plan:    Orthopedic consult for the knee is allow him to side what other x-rays MRIs CTs he would like of the knee venous Doppler studies of both lower extremities as he has some edema into the right lower extremity very marked in the left CBC today  Active Diagnoses:    Diagnosis Date Noted POA    PRINCIPAL PROBLEM:  Osteomyelitis [M86.9] 03/15/2024 Yes    Pressure injury of sacral region, stage 4 [L89.154] 03/19/2024 Yes    Skin ulcer of right lower leg [L97.919] 03/19/2024 Yes    Skin ulcer of left lower leg [L97.929] 03/19/2024 Yes    Unstageable pressure ulcer of right heel [L89.610] 03/19/2024 Yes    Unstageable  pressure ulcer of left heel [L89.620] 03/19/2024 Yes      Problems Resolved During this Admission:     VTE Risk Mitigation (From admission, onward)           Ordered     enoxaparin injection 40 mg  Daily         03/15/24 0934                       Shaheen Talamantes MD  Department of Hospital Medicine   [facility] - 8th Floor Med Surg

## 2024-04-01 LAB
BASOPHILS # BLD AUTO: 0.08 X10(3)/MCL
BASOPHILS NFR BLD AUTO: 1.3 %
EOSINOPHIL # BLD AUTO: 0.67 X10(3)/MCL (ref 0–0.9)
EOSINOPHIL NFR BLD AUTO: 10.8 %
ERYTHROCYTE [DISTWIDTH] IN BLOOD BY AUTOMATED COUNT: 18.4 % (ref 11.5–17)
HCT VFR BLD AUTO: 25.8 % (ref 42–52)
HGB BLD-MCNC: 7.9 G/DL (ref 14–18)
IMM GRANULOCYTES # BLD AUTO: 0.01 X10(3)/MCL (ref 0–0.04)
IMM GRANULOCYTES NFR BLD AUTO: 0.2 %
LYMPHOCYTES # BLD AUTO: 1.28 X10(3)/MCL (ref 0.6–4.6)
LYMPHOCYTES NFR BLD AUTO: 20.6 %
MCH RBC QN AUTO: 27 PG (ref 27–31)
MCHC RBC AUTO-ENTMCNC: 30.6 G/DL (ref 33–36)
MCV RBC AUTO: 88.1 FL (ref 80–94)
MONOCYTES # BLD AUTO: 0.71 X10(3)/MCL (ref 0.1–1.3)
MONOCYTES NFR BLD AUTO: 11.4 %
NEUTROPHILS # BLD AUTO: 3.47 X10(3)/MCL (ref 2.1–9.2)
NEUTROPHILS NFR BLD AUTO: 55.7 %
NRBC BLD AUTO-RTO: 0 %
PLATELET # BLD AUTO: 304 X10(3)/MCL (ref 130–400)
PMV BLD AUTO: 8.5 FL (ref 7.4–10.4)
PREALB SERPL-MCNC: 12.6 MG/DL (ref 16–42)
RBC # BLD AUTO: 2.93 X10(6)/MCL (ref 4.7–6.1)
VANCOMYCIN TROUGH SERPL-MCNC: 17.7 UG/ML (ref 15–20)
WBC # SPEC AUTO: 6.22 X10(3)/MCL (ref 4.5–11.5)

## 2024-04-01 PROCEDURE — 63600175 PHARM REV CODE 636 W HCPCS: Performed by: PODIATRIST

## 2024-04-01 PROCEDURE — 97606 NEG PRS WND THER DME>50 SQCM: CPT

## 2024-04-01 PROCEDURE — 84134 ASSAY OF PREALBUMIN: CPT

## 2024-04-01 PROCEDURE — 25000003 PHARM REV CODE 250: Performed by: PODIATRIST

## 2024-04-01 PROCEDURE — 11045 DBRDMT SUBQ TISS EACH ADDL: CPT | Mod: ,,,

## 2024-04-01 PROCEDURE — 85025 COMPLETE CBC W/AUTO DIFF WBC: CPT | Performed by: PODIATRIST

## 2024-04-01 PROCEDURE — 0JB70ZZ EXCISION OF BACK SUBCUTANEOUS TISSUE AND FASCIA, OPEN APPROACH: ICD-10-PCS | Performed by: INTERNAL MEDICINE

## 2024-04-01 PROCEDURE — 11000001 HC ACUTE MED/SURG PRIVATE ROOM

## 2024-04-01 PROCEDURE — 21400001 HC TELEMETRY ROOM

## 2024-04-01 PROCEDURE — A4216 STERILE WATER/SALINE, 10 ML: HCPCS | Performed by: INTERNAL MEDICINE

## 2024-04-01 PROCEDURE — 11042 DBRDMT SUBQ TIS 1ST 20SQCM/<: CPT | Mod: ,,,

## 2024-04-01 PROCEDURE — 99233 SBSQ HOSP IP/OBS HIGH 50: CPT | Mod: 25,,,

## 2024-04-01 PROCEDURE — 80202 ASSAY OF VANCOMYCIN: CPT | Performed by: INTERNAL MEDICINE

## 2024-04-01 PROCEDURE — 25000003 PHARM REV CODE 250: Performed by: INTERNAL MEDICINE

## 2024-04-01 RX ADMIN — GABAPENTIN 600 MG: 300 CAPSULE ORAL at 08:04

## 2024-04-01 RX ADMIN — SODIUM CHLORIDE, PRESERVATIVE FREE 10 ML: 5 INJECTION INTRAVENOUS at 11:04

## 2024-04-01 RX ADMIN — PIPERACILLIN SODIUM AND TAZOBACTAM SODIUM 4.5 G: 4; .5 INJECTION, POWDER, LYOPHILIZED, FOR SOLUTION INTRAVENOUS at 10:04

## 2024-04-01 RX ADMIN — ENOXAPARIN SODIUM 40 MG: 40 INJECTION SUBCUTANEOUS at 05:04

## 2024-04-01 RX ADMIN — DOCUSATE SODIUM 100 MG: 100 CAPSULE, LIQUID FILLED ORAL at 09:04

## 2024-04-01 RX ADMIN — OXYCODONE HYDROCHLORIDE 5 MG: 5 TABLET ORAL at 06:04

## 2024-04-01 RX ADMIN — OXYCODONE HYDROCHLORIDE 5 MG: 5 TABLET ORAL at 10:04

## 2024-04-01 RX ADMIN — THERA TABS 1 TABLET: TAB at 10:04

## 2024-04-01 RX ADMIN — DOCUSATE SODIUM 100 MG: 100 CAPSULE, LIQUID FILLED ORAL at 08:04

## 2024-04-01 RX ADMIN — VANCOMYCIN HYDROCHLORIDE 1000 MG: 1 INJECTION, POWDER, LYOPHILIZED, FOR SOLUTION INTRAVENOUS at 10:04

## 2024-04-01 RX ADMIN — ASPIRIN 81 MG CHEWABLE TABLET 81 MG: 81 TABLET CHEWABLE at 10:04

## 2024-04-01 RX ADMIN — CILOSTAZOL 50 MG: 50 TABLET ORAL at 09:04

## 2024-04-01 RX ADMIN — POLYETHYLENE GLYCOL 3350 17 G: 17 POWDER, FOR SOLUTION ORAL at 10:04

## 2024-04-01 RX ADMIN — CLOPIDOGREL BISULFATE 75 MG: 75 TABLET ORAL at 10:04

## 2024-04-01 RX ADMIN — LOSARTAN POTASSIUM 100 MG: 50 TABLET, FILM COATED ORAL at 10:04

## 2024-04-01 RX ADMIN — FERROUS SULFATE TAB 325 MG (65 MG ELEMENTAL FE) 1 EACH: 325 (65 FE) TAB at 10:04

## 2024-04-01 RX ADMIN — GABAPENTIN 300 MG: 300 CAPSULE ORAL at 08:04

## 2024-04-01 RX ADMIN — PIPERACILLIN SODIUM AND TAZOBACTAM SODIUM 4.5 G: 4; .5 INJECTION, POWDER, LYOPHILIZED, FOR SOLUTION INTRAVENOUS at 06:04

## 2024-04-01 RX ADMIN — OXYCODONE HYDROCHLORIDE 5 MG: 5 TABLET ORAL at 05:04

## 2024-04-01 RX ADMIN — SODIUM CHLORIDE, PRESERVATIVE FREE 10 ML: 5 INJECTION INTRAVENOUS at 12:04

## 2024-04-01 RX ADMIN — SODIUM CHLORIDE, PRESERVATIVE FREE 10 ML: 5 INJECTION INTRAVENOUS at 06:04

## 2024-04-01 RX ADMIN — PIPERACILLIN SODIUM AND TAZOBACTAM SODIUM 4.5 G: 4; .5 INJECTION, POWDER, LYOPHILIZED, FOR SOLUTION INTRAVENOUS at 01:04

## 2024-04-01 RX ADMIN — LIDOCAINE 5% 1 PATCH: 700 PATCH TOPICAL at 10:04

## 2024-04-01 RX ADMIN — CILOSTAZOL 50 MG: 50 TABLET ORAL at 08:04

## 2024-04-01 RX ADMIN — DULOXETINE HYDROCHLORIDE 30 MG: 30 CAPSULE, DELAYED RELEASE ORAL at 10:04

## 2024-04-01 RX ADMIN — OXYCODONE HYDROCHLORIDE 5 MG: 5 TABLET ORAL at 01:04

## 2024-04-01 RX ADMIN — VANCOMYCIN HYDROCHLORIDE 1000 MG: 1 INJECTION, POWDER, LYOPHILIZED, FOR SOLUTION INTRAVENOUS at 11:04

## 2024-04-01 RX ADMIN — AMLODIPINE BESYLATE 10 MG: 5 TABLET ORAL at 10:04

## 2024-04-01 RX ADMIN — OXYCODONE HYDROCHLORIDE 5 MG: 5 TABLET ORAL at 02:04

## 2024-04-01 RX ADMIN — HYDROCODONE BITARTRATE AND ACETAMINOPHEN 1 TABLET: 5; 325 TABLET ORAL at 11:04

## 2024-04-01 RX ADMIN — GABAPENTIN 300 MG: 300 CAPSULE ORAL at 09:04

## 2024-04-01 NOTE — PROCEDURES
"Shaheen Christie is a 69 y.o. male patient.    Temp: 97.8 °F (36.6 °C) (04/01/24 1510)  Pulse: 70 (04/01/24 1510)  Resp: 18 (04/01/24 1510)  BP: 121/68 (04/01/24 1510)  SpO2: 95 % (04/01/24 1510)  Weight: 68 kg (150 lb) (03/20/24 1315)  Height: 5' 9.02" (175.3 cm) (03/20/24 1315)       Debridement    Date/Time: 4/1/2024 3:17 PM    Performed by: Lory Mckeon FNP  Authorized by: Lory Mckeon FNP  Associated wounds:        Altered Skin Integrity 01/22/24 1351 Sacral spine #4  Time out: Immediately prior to procedure a "time out" was called to verify the correct patient, procedure, equipment, support staff and site/side marked as required.    Consent Done?:  Yes (Verbal)  Local anesthesia used?: Yes    Local anesthetic:  Topical anesthetic    Wound Details:    Location:  Sacrum    Type of Debridement:  Excisional       Length (cm):  7       Area (sq cm):  133       Width (cm):  19       Percent Debrided (%):  25       Depth (cm):  1.4       Total Area Debrided (sq cm):  33.25    Depth of debridement:  Subcutaneous tissue    Tissue debrided:  Dermis, Epidermis and Subcutaneous    Devitalized tissue debrided:  Slough and Biofilm    Instruments:  Curette and Scissors  Bleeding:  Minimal  Hemostasis Achieved: Yes  Method Used:  Pressure  Patient tolerance:  Patient tolerated the procedure well with no immediate complications      4/1/2024    "

## 2024-04-01 NOTE — PROGRESS NOTES
Inpatient Nutrition Assessment    Admit Date: 3/15/2024   Total duration of encounter: 17 days   Patient Age: 69 y.o.    Nutrition Recommendation/Prescription     -Continue Heart Healthy Diet as tolerated.    -Encourage Pierre BID for wound healing.   -Continue MVI with minerals as medically feasible.   -Monitor wt, labs, and intake.    Communication of Recommendations: reviewed with patient    Nutrition Assessment     Malnutrition Assessment/Nutrition-Focused Physical Exam    Malnutrition Context: chronic illness (03/20/24 1317)  Malnutrition Level: severe (03/20/24 1317)  Energy Intake (Malnutrition): less than 75% for greater than or equal to 3 months (03/20/24 1317)  Weight Loss (Malnutrition): greater than 7.5% in 3 months (03/20/24 1317)  Subcutaneous Fat (Malnutrition): severe depletion (03/20/24 1317)  Orbital Region (Subcutaneous Fat Loss): severe depletion  Upper Arm Region (Subcutaneous Fat Loss): severe depletion     Muscle Mass (Malnutrition): severe depletion (03/20/24 1317)  [place] Region (Muscle Loss): severe depletion                       Fluid Accumulation (Malnutrition): other (see comments) (does not meet criteria) (03/20/24 1317)  Hand  Strength, Left (Malnutrition): unable to evaluate (03/20/24 1317)  Hand  Strength, Right (Malnutrition): unable to evaluate (03/20/24 1317)  A minimum of two characteristics is recommended for diagnosis of either severe or non-severe malnutrition.    Chart Review    Reason Seen: continuous nutrition monitoring and follow-up    Malnutrition Screening Tool Results   Have you recently lost weight without trying?: No  Have you been eating poorly because of a decreased appetite?: No   MST Score: 0   Diagnosis:  Stage 4 pressure ulcer to sacrum  Unstageable pressure ulcers to bilateral heels  Open wound/ulcers to right lateral lower leg and left lateral lower leg  Peripheral arterial disease - with history of vascular intervention with Dr. Queen (most recent  angio 3/13/24)  PVD  History of polymicrobial sacral wound culture  History of polymicrobial wound culture to leg wound  DM2  Anemia  History of smoking  Low prealbumin     Relevant Medical History: HTN, COPD, DM2 with neuropathy, PAD, chronic venous hypertension     Scheduled Medications:  amLODIPine, 10 mg, Daily  aspirin, 81 mg, Daily  cilostazoL, 50 mg, BID  clopidogreL, 75 mg, Daily  docusate sodium, 100 mg, BID  DULoxetine, 30 mg, Daily  enoxaparin, 40 mg, Daily  ferrous sulfate, 1 tablet, Daily  gabapentin, 300 mg, TID  gabapentin, 600 mg, QHS  LIDOcaine, 1 patch, Q24H  losartan, 100 mg, Daily  multivitamin, 1 tablet, Daily  oxyCODONE, 5 mg, Q4H  piperacillin-tazobactam (Zosyn) IV (PEDS and ADULTS) (extended infusion is not appropriate), 4.5 g, Q8H  polyethylene glycol, 17 g, Daily  sodium chloride 0.9%, 10 mL, Q6H  vancomycin (VANCOCIN) IV (PEDS and ADULTS), 1,000 mg, Q12H    Continuous Infusions:   PRN Medications: acetaminophen, albuterol, aluminum-magnesium hydroxide-simethicone, HYDROcodone-acetaminophen, melatonin, simethicone, Flushing PICC/Midline Protocol **AND** sodium chloride 0.9% **AND** sodium chloride 0.9%, vancomycin - pharmacy to dose    Calorie Containing IV Medications: no significant kcals from medications at this time    Recent Labs   Lab 03/27/24  0003 03/27/24  0424 03/28/24  0454 03/29/24  0431 03/30/24  0508 03/31/24  0512 04/01/24  0502   NA  --  139 138  --  140  --   --    K  --  3.8 3.2*  --  3.4*  --   --    CALCIUM  --  9.1 8.7*  --  8.7*  --   --    CHLORIDE  --  106 107  --  107  --   --    CO2  --  26 25  --  26  --   --    BUN  --  5.8* 7.1*  --  9.7  --   --    CREATININE  --  0.68* 0.79  --  0.68*  --   --    EGFRNORACEVR  --  >60 >60  --  >60  --   --    GLUCOSE  --  108 101  --  117*  --   --    BILITOT  --  0.3 0.2  --   --   --   --    ALKPHOS  --  83 71  --   --   --   --    ALT  --  7 5  --   --   --   --    AST  --  9 7  --   --   --   --    ALBUMIN  --  2.7* 2.5*   "--   --   --   --    WBC 6.73  --  6.30 7.12 7.20 6.53 6.22   HGB 7.9*  --  7.4* 7.6* 7.8* 7.5* 7.9*   HCT 26.1*  --  25.4* 24.9* 25.6* 24.8* 25.8*       Nutrition Orders:  Diet Heart Healthy      Appetite/Oral Intake: good/% of meals  Factors Affecting Nutritional Intake: none identified  Food/Anglican/Cultural Preferences: none reported  Food Allergies: no known food allergies  Last Bowel Movement: 24  Wound(s):     Altered Skin Integrity 24 1351 Sacral spine #4-Tissue loss description: Full thickness       Altered Skin Integrity 24 0800 Left anterior;lower Leg Arterial Ulcer-Tissue loss description: Full thickness heel ulcers, sacrum pressure ulcers    Comments    3/20: Pt reports current good intake/appetite but intake the past few months has been poor 2/2 disliking food from NH. Pt reports usual wt of ~170 lbs. Pt reports he would like oral supplements.     3/22: Pt reports good appetite/intake of meals, denies n/v; drinking some of the oral supplement but not taking the Pierre.     3/26: Pt NPO;  plans to go to OR today.     3/28: Pt eating well; denies n/v; states that he is not drinking oral supplement 2/2 being too full after he eats his meals; will d/c per his request. Pt is receptive to trying to drink the Pierre for wound healing.     : Pt continues with good appetite/intake; tolerating diet. Pt reports that he likes the Pierre but is not taking it regularly- encouraged to take for wound healing and pt was receptive.     Anthropometrics    Height: 5' 9.02" (175.3 cm), Height Method: Stated  Last Weight: 68 kg (150 lb) (24 1315), Weight Method: Standard Scale  BMI (Calculated): 22.1  BMI Classification: normal (BMI 18.5-24.9)        Ideal Body Weight (IBW), Male: 160.12 lb     % Ideal Body Weight, Male (lb): 93.68 %                 Usual Body Weight (UBW), k.27 kg  % Usual Body Weight: 88.24  % Weight Change From Usual Weight: -11.95 %  Usual Weight Provided By: " patient    Wt Readings from Last 5 Encounters:   03/20/24 68 kg (150 lb)   03/13/24 68.5 kg (151 lb)   01/22/24 63.5 kg (140 lb)   07/28/23 72.6 kg (160 lb)   06/22/23 77.1 kg (170 lb)     Weight Change(s) Since Admission:   Wt Readings from Last 1 Encounters:   03/20/24 1315 68 kg (150 lb)   03/15/24 1621 68 kg (150 lb)   03/15/24 0307 68 kg (150 lb)   Admit Weight: 68 kg (150 lb) (03/15/24 0307), Weight Method: Stated  3/28-4/1: no new wt noted    Estimated Needs    Weight Used For Calorie Calculations: 68 kg (149 lb 14.6 oz)  Energy Calorie Requirements (kcal): 2098-7168 kcal (30-35 kcal/kg)  Energy Need Method: Kcal/kg  Weight Used For Protein Calculations: 68 kg (149 lb 14.6 oz)  Protein Requirements: 102 gm (1.5g/kg)  Fluid Requirements (mL): 2040 mL    Enteral Nutrition     Patient not receiving enteral nutrition at this time.    Parenteral Nutrition     Patient not receiving parenteral nutrition support at this time.    Evaluation of Received Nutrient Intake    Calories: meeting estimated needs  Protein: meeting estimated needs    Patient Education     Not applicable.    Nutrition Diagnosis     PES: Increased nutrient needs (protein) related to increased protein energy demand for wound healing as evidenced by stage 4 pressure ulcer. (active)     PES: Severe chronic disease or condition related malnutrition related to suboptimal protein/energy intake as evidenced by less than 75% needs met for greater than or equal to 3 months, severe fat depletion, severe muscle depletion, and greater than 7.5% weight loss in 3 months. (active)    Nutrition Interventions     Intervention(s): general/healthful diet, commercial beverage, commercial food, multivitamin/mineral supplement therapy, and collaboration with other providers    Goal: Meet greater than 80% of nutritional needs by follow-up. (goal progressing)  Goal: Maintain weight throughout hospitalization. (goal progressing)    Nutrition Goals & Monitoring      Dietitian will monitor: energy intake and weight    Nutrition Risk/Follow-Up: high (follow-up in 1-4 days)   Please consult if re-assessment needed sooner.

## 2024-04-01 NOTE — PROGRESS NOTES
[facility] 8th Floor [facility] Medicine  Progress Note    Patient Name: Shaheen Christie  MRN: [id number]  Patient Class: IP- Inpatient   Admission Date: 3/15/2024  Length of Stay: 17 days  Attending Physician: Enzo Thibodeaux MD  Primary Care Provider: Viktor Russ MD        Subjective:     Principal Problem:Osteomyelitis    Interval History:  The patient has was ready for discharge back to the LTAC for further treatment of his chronic condition that it worsened during his stay at the nursing home.  Apparently the reviewing physician from [facility] did not agree about the need for LTAC and wanted to keep this patient in acute care for further time.  I have read Dr. Hicks dictation in opinion and I agree 100% with Dr. Saunders.  This patient needs LTAC and LTAC as the appropriate setting for him.  Do not need further inpatient acute care.    Review of Systems  Objective:     Vital Signs (Most Recent):  Temp: 97.8 °F (36.6 °C) (04/01/24 1510)  Pulse: 70 (04/01/24 1510)  Resp: 18 (04/01/24 1510)  BP: 121/68 (04/01/24 1510)  SpO2: 95 % (04/01/24 1510) Vital Signs (24h Range):  Temp:  [97.8 °F (36.6 °C)-99.8 °F (37.7 °C)] 97.8 °F (36.6 °C)  Pulse:  [67-82] 70  Resp:  [16-18] 18  SpO2:  [90 %-96 %] 95 %  BP: (121-156)/(53-76) 121/68     Weight: 68 kg (150 lb)  Body mass index is 22.14 kg/m².    Intake/Output Summary (Last 24 hours) at 4/1/2024 1512  Last data filed at 4/1/2024 1026  Gross per 24 hour   Intake 960 ml   Output 1750 ml   Net -790 ml      Physical Exam  HENT:      Head: Normocephalic and atraumatic.      Right Ear: External ear normal.      Left Ear: External ear normal.      Mouth/Throat:      Mouth: Mucous membranes are dry.   Eyes:      Extraocular Movements: Extraocular movements intact.   Cardiovascular:      Rate and Rhythm: Normal rate and regular rhythm.      Pulses: Normal pulses.      Heart sounds: Normal heart sounds.   Pulmonary:      Effort: Pulmonary effort is  "normal.      Breath sounds: Normal breath sounds.   Abdominal:      General: Abdomen is flat. Bowel sounds are normal.      Palpations: Abdomen is soft.   Musculoskeletal:      Cervical back: Neck supple.   Skin:     General: Skin is warm and dry.   Neurological:      General: No focal deficit present.      Mental Status: He is alert and oriented to person, place, and time. Mental status is at baseline.           Overview/Hospital Course:  White blood cell count continues to be normal and as expected.  Hemoglobin improving with a proper diet and supplements as expected.  The patient clinically stable to be transferred to LTAC to finish treatment of his osteomyelitis and wound care.    Significant Labs: All pertinent labs within the past 24 hours have been reviewed.  CBC:   Recent Labs   Lab 03/31/24  0512 04/01/24  0502   WBC 6.53 6.22   HGB 7.5* 7.9*   HCT 24.8* 25.8*    304     CMP: No results for input(s): "NA", "K", "CL", "CO2", "GLU", "BUN", "CREATININE", "CALCIUM", "PROT", "ALBUMIN", "BILITOT", "ALKPHOS", "AST", "ALT", "ANIONGAP", "EGFRNONAA" in the last 48 hours.    Invalid input(s): "ESTGFAFRICA"    Significant Imaging: I have reviewed all pertinent imaging results/findings within the past 24 hours.    Assessment/Plan:      Active Diagnoses:    Diagnosis Date Noted POA    PRINCIPAL PROBLEM:  Osteomyelitis [M86.9] 03/15/2024 Yes    Knee effusion, left [M25.462] 03/31/2024 No    Pressure injury of sacral region, stage 4 [L89.154] 03/19/2024 Yes    Skin ulcer of right lower leg [L97.919] 03/19/2024 Yes    Skin ulcer of left lower leg [L97.929] 03/19/2024 Yes    Unstageable pressure ulcer of right heel [L89.610] 03/19/2024 Yes    Unstageable pressure ulcer of left heel [L89.620] 03/19/2024 Yes      Problems Resolved During this Admission:     VTE Risk Mitigation (From admission, onward)           Ordered     enoxaparin injection 40 mg  Daily         03/15/24 0934                       Enzo Thibodeaux" MD  Department of Hospital Medicine   [facility] - 8th Floor Med Surg

## 2024-04-01 NOTE — PROGRESS NOTES
[facility] - 8th Floor Med Surg  Wound Care  Progress Note    Patient Name: Shaheen Christie  MRN: [id number]  Admission Date: 3/15/2024  Hospital Length of Stay: 17 days  Attending Physician: Enzo Thibodeaux MD  Primary Care Provider: Viktor Russ MD     Subjective:     HPI:  Wound medicine Re-check - Sacral ulcer    The patient is a 69 year old WM, with past medical history of HTN, COPD, DM2 with neuropathy, PAD, chronic venous hypertension. He was sent  to [facility] on 3/15/24 from [facility] for evaluation of sacral wound due to recent diagnosis of osteomyelitis involving the sacrum on 2/27/24. Previous plans for LTAC placement were not successful; he will require assistance with placement for continuing IV antibiotics post discharge.   Review of records reveals + culture of left leg wound with Pseudomonas, Providencia, ESBL Proteus on 2/8/24 and + sacral wound culture with MRSA, Providencia, Enterococcus and Pseudomonas. 2/27/24 MRI of sacrum shows osteomyelitis involving the sacrococcygeal bone.   He presents without systemic signs of infection. He was evaluated by surgery (3/15/24), no plan for surgical intervention at this time.   ID consulted; and guiding antibiotic stewardship.   He was seen in the outpatient wound care clinic here at [facility] form 9/2022 - 2/2023 for ulcer of left ankle.   He is currently with multiple chronic appearing wounds to BLE and sacrum. He has been seen by Dr. Queen with work up and intervention for occlusion of LLE in the past. Most recent peripheral angiography performed on BLE (3/13/24) per Dr. Queen. Found to have non critical stenosis in the left SFA. Right SFA with stenosis however with excellent three-vessel runoff. (Wounds to be medically managed)    Podiatry consulted - Surgical debridement of BLE done on 3/26/24    Wound medicine has been consulted in addition to WOCN for evaluation of these severe wounds.   First evaluation compelted on  3/19/24. Found to have severe BLE unstageable pressure injuries to both lateral legs and both heels and chronic sacral pressure ulcer.   Wound Vac applied on 3/28/24  Wound recheck done today, 4/1/24. Met patient in his room, 804, awake, alert and agreeable to wound assessment and treatment. Accompanied by WOCN for wound vac change. Lying on low air loss mattress, wedge under left side without heel lift boots; Bilateral lower legs with dressings, intact.  Ongoing complaints of bilateral knee pain causing difficulty with turning and positioning/offloading. CNA reports patient often refuses repositioning/offloading.         Hospital Course:   No notes on file      Follow-up For: Procedure(s) (LRB):  DEBRIDEMENT, FOOT (Bilateral)    Post-Operative Day: 6 Days Post-Op    Scheduled Meds:   amLODIPine  10 mg Oral Daily    aspirin  81 mg Oral Daily    cilostazoL  50 mg Oral BID    clopidogreL  75 mg Oral Daily    docusate sodium  100 mg Oral BID    DULoxetine  30 mg Oral Daily    enoxaparin  40 mg Subcutaneous Daily    ferrous sulfate  1 tablet Oral Daily    gabapentin  300 mg Oral TID    gabapentin  600 mg Oral QHS    LIDOcaine  1 patch Transdermal Q24H    losartan  100 mg Oral Daily    multivitamin  1 tablet Oral Daily    oxyCODONE  5 mg Oral Q4H    piperacillin-tazobactam (Zosyn) IV (PEDS and ADULTS) (extended infusion is not appropriate)  4.5 g Intravenous Q8H    polyethylene glycol  17 g Oral Daily    sodium chloride 0.9%  10 mL Intravenous Q6H    vancomycin (VANCOCIN) IV (PEDS and ADULTS)  1,000 mg Intravenous Q12H     Continuous Infusions:  PRN Meds:acetaminophen, albuterol, aluminum-magnesium hydroxide-simethicone, HYDROcodone-acetaminophen, melatonin, simethicone, Flushing PICC/Midline Protocol **AND** sodium chloride 0.9% **AND** sodium chloride 0.9%, vancomycin - pharmacy to dose    Review of Systems   Musculoskeletal:  Positive for arthralgias and back pain.   Skin:  Positive for wound.     Objective:     Vital  "Signs (Most Recent):  Temp: 97.9 °F (36.6 °C) (04/01/24 1145)  Pulse: 67 (04/01/24 1145)  Resp: 18 (04/01/24 1327)  BP: (!) 127/53 (04/01/24 1145)  SpO2: 96 % (04/01/24 1145) Vital Signs (24h Range):  Temp:  [97.8 °F (36.6 °C)-99.8 °F (37.7 °C)] 97.9 °F (36.6 °C)  Pulse:  [67-82] 67  Resp:  [16-18] 18  SpO2:  [90 %-96 %] 96 %  BP: (127-156)/(53-76) 127/53     Weight: 68 kg (150 lb)  Body mass index is 22.14 kg/m².     Physical Exam  Vitals reviewed.   Constitutional:       General: He is awake.      Appearance: Normal appearance. He is not ill-appearing or toxic-appearing.   HENT:      Head: Normocephalic and atraumatic.      Ears:      Comments: Hard of hearing    Cardiovascular:      Rate and Rhythm: Normal rate and regular rhythm.   Pulmonary:      Effort: Pulmonary effort is normal. No respiratory distress.   Skin:     General: Skin is warm and dry.      Capillary Refill: Capillary refill takes less than 2 seconds.      Findings: Wound present.             Comments: Large stage 4 pressure ulcer to sacrum extending further toward left side. Wound vac removed, wound bed beefy red with small areas of slough to left lateral and medial portion of wound. Some ligamentous material exposed. No purulent drainage or foul odor noted to wound. Minimal bleeding with removal; hemostasis achieved with pressure.    Neurological:      General: No focal deficit present.      Mental Status: He is alert and oriented to person, place, and time.   Psychiatric:         Mood and Affect: Mood normal.         Behavior: Behavior normal. Behavior is cooperative.       Sacrum: 7 x 19 cm   (measurements transposed under media tab)      Sacrum: post debridement            Laboratory:  A1C:   Recent Labs   Lab 02/02/24  0445   HGBA1C 5.0     ABGs: No results for input(s): "PH", "PCO2", "HCO3", "POCSATURATED", "BE" in the last 168 hours.  Blood Cultures: No results for input(s): "LABBLOO" in the last 48 hours.  BMP:   Recent Labs   Lab " "03/30/24  0508      K 3.4*   CO2 26   BUN 9.7   CREATININE 0.68*   CALCIUM 8.7*     CBC:   Recent Labs   Lab 04/01/24  0502   WBC 6.22   RBC 2.93*   HGB 7.9*   HCT 25.8*      MCV 88.1   MCH 27.0   MCHC 30.6*     CMP:   Recent Labs   Lab 03/28/24  0454 03/30/24  0508   CALCIUM 8.7* 8.7*   ALBUMIN 2.5*  --     140   K 3.2* 3.4*   CO2 25 26   BUN 7.1* 9.7   CREATININE 0.79 0.68*   ALKPHOS 71  --    ALT 5  --    AST 7  --    BILITOT 0.2  --      Coagulation: No results for input(s): "PT", "INR", "APTT" in the last 168 hours.  CRP: No results for input(s): "CRP" in the last 168 hours.  ESR: No results for input(s): "SEDRATE" in the last 168 hours.  LFTs:   Recent Labs   Lab 03/28/24  0454   ALT 5   AST 7   ALKPHOS 71   BILITOT 0.2   ALBUMIN 2.5*     Prealbumin: No results for input(s): "PREALBUMIN" in the last 48 hours.  Wound Cultures: No results for input(s): "LABAERO" in the last 4320 hours.  Microbiology Results (last 7 days)       Procedure Component Value Units Date/Time    Tissue Culture - Aerobic [[id number]]  (Abnormal)  (Susceptibility) Collected: 03/26/24 1833    Order Status: Completed Specimen: Tissue from Foot, Heel Left Updated: 03/29/24 1102     CULTURE, TISSUE- AEROBIC ([facility]) Few Proteus mirabilis ESBL    Anaerobic Culture [[id number]] Collected: 03/26/24 1833    Order Status: Completed Specimen: Tissue from Foot, Heel Left Updated: 03/29/24 0733     Anaerobe Culture No Anaerobes Isolated          Specimen (24h ago, onward)      None          No results for input(s): "COLORU", "CLARITYU", "SPECGRAV", "PHUR", "PROTEINUA", "GLUCOSEU", "BILIRUBINCON", "BLOODU", "WBCU", "RBCU", "BACTERIA", "MUCUS", "NITRITE", "LEUKOCYTESUR", "UROBILINOGEN", "HYALINECASTS" in the last 168 hours.    Diagnostic Results:  I have reviewed all pertinent imaging results/findings within the past 24 hours.    Assessment/Plan:     Stage 4 pressure ulcer to sacrum - with evidence of osteomyelitis admitted for " initiation of IV antibiotics.   Unstageable pressure ulcers to bilateral heels - Podiatry consulted - surgical debridement on 3/26/24  Open wound/ulcers to right lateral lower leg and left lateral lower leg  Peripheral arterial disease - with history of vascular intervention with Dr. Queen (most recent angio 3/13/24; medical management)   PVD  History of polymicrobial sacral wound culture  History of polymicrobial wound culture to leg wound  DM2  Anemia  History of smoking  Low prealbumin      PLAN:     Chart reviewed, patient examined and wounds assessed.  Opinion: The patient has a large stage 4 pressure ulcer to his sacrum that does not appear to be infected; bedside debridement done today, removing slough/biofilm from left lateral edge of wound. Wound vac initiated on 3/28/24; tolerated well. Will continue with Wound Vac.   BLE Dressings in place followed by Podiatry - did not visualize wounds today.   Patients bed mobility is severely limited due to pain and discomfort associated with movement. This is affecting his ability to properly offload; which will affect ability to heal his wounds. Educated patient on importance of aggressive offloading, verbalizes understanding. Staff reports some refusal with repositioning at times.   Wound care orders: Wound Vac  Monitor for signs & symptoms of deterioration  Offloading of sacrum/buttocks/heels at all times: BRADEN mattress, turning q 2 hrs; use of wedges and heel offloading devices to be used at all times while in bed; ( SALONI Arellano).   Incontinence: control moisture/wound contamination: No briefs  Nutrition: Recommend aggressive nutritional support, protein supplementation along with vitamin and mineral supplements  and petr to support wound healing; Low prealbumin - 13.8 on 3/19/24, follow dietitian recommendation  Diabetes: A1C good  Will try to follow weekly while admitted, but every nurse assigned to patient on every shift of every day needs to address daily  wound care dressing changes and offloading modalities including using heel offloading devices, wedges, BRADEN mattress etc       The time spent including preparing to see the patient, obtaining patient history and assessment, evaluation of the plan of care, patient/caregiver counseling and education, orders, documentation, coordination of care, and other professional medical management activities for today's encounter was 40 minutes.            ANGEL Chavira  Wound Care  [facility] - 8th Floor Med Surg

## 2024-04-01 NOTE — PROGRESS NOTES
[facility] - 8th Floor Med Surg  Wound Care    Patient Name:  Shaheen Christie   MRN:  [id number]  Date: 4/1/2024  Diagnosis: Osteomyelitis    History:     Past Medical History:   Diagnosis Date    COPD (chronic obstructive pulmonary disease)     Depression     Hypertension     Neuropathy        Social History     Socioeconomic History    Marital status:    Occupational History    Occupation: retired   Tobacco Use    Smoking status: Former     Average packs/day: 0.5 packs/day for 16.2 years (7.5 ttl pk-yrs)     Types: Cigarettes     Start date: 2008    Smokeless tobacco: Never   Substance and Sexual Activity    Alcohol use: Yes     Alcohol/week: 3.0 - 6.0 standard drinks of alcohol     Types: 3 - 6 Cans of beer per week    Drug use: Yes     Types: Marijuana    Sexual activity: Not Currently       Precautions:     Allergies as of 03/15/2024    (No Known Allergies)       WOC Assessment Details/Treatment        04/01/24 1042   WOCN Assessment   Visit Date 04/01/24   Visit Time 1042   Consult Type Follow Up   WOCN Speciality Wound   WOCN List wound vac   Intervention changed   Teaching on-going   Skin Interventions   Device Skin Pressure Protection absorbent pad utilized/changed   Pressure Reduction Devices specialty bed utilized        Altered Skin Integrity 01/22/24 1351 Sacral spine #4   Date First Assessed/Time First Assessed: 01/22/24 1351   Altered Skin Integrity Present on Admission - Did Patient arrive to the hospital with altered skin?: yes  Location: Sacral spine  Wound Number: #4  Is this injury device related?: No   Wound Image    Dressing Appearance Dry;Clean;Intact   Drainage Amount Moderate   Drainage Characteristics/Odor Serosanguineous   Appearance Red;Yellow;Smooth;Fibrin;Slough   Tissue loss description Full thickness   Red (%), Wound Tissue Color 75 %   Yellow (%), Wound Tissue Color 25 %   Periwound Area Macerated;Moist   Wound Edges Irregular   Wound Length (cm) 19 cm   Wound  Width (cm) 7 cm   Wound Depth (cm) 2 cm   Wound Volume (cm^3) 266 cm^3   Wound Surface Area (cm^2) 133 cm^2   Care Cleansed with:;Sterile normal saline   Dressing Applied        Negative Pressure Wound Therapy  03/28/24   Placement Date: 03/28/24   Location: Sacral Spine   NPWT Type Vacuum Therapy   Therapy Setting NPWT Continuous therapy   Pressure Setting NPWT 125 mmHg   Sponges Inserted NPWT Black  (versatel)   Sponges Removed NPWT Black  (versatel)     Lory CHAMBERSP at bedside for evaluation. NPWT reapplied to sacral ulcer. Excellent seal noted at 125 mm/ hg continuous. Nursing to continue with preventative measures. On BRADEN mattress. Will Follow up.   Measurement is 7cm x 19cm x 2cm     04/01/2024

## 2024-04-01 NOTE — PROGRESS NOTES
[facility]                       [address] [address]    PATIENT NAME:       HAWA KUMAR  YOB: 1955  CSN:                [id number]   MRN:                [id number]  ADMIT DATE:         03/15/2024 03:09:00  PHYSICIAN:          Tom Nichole DPM                            PROGRESS NOTE    DATE:  04/01/2024 00:00:00    SUBJECTIVE:  The patient was seen today.  I have been informed that [employer] had   denied LTAC placement, currently in the process of appealing.  The patient has   no complaints.  Still primarily some discomfort in the left knee.  Wound Care   saw the patient earlier today.    PHYSICAL EXAMINATION:  VITAL SIGNS:  Stable and afebrile.    Sacral photos reviewed.  The leg wounds and heel wounds are all much ,   viable.  Pinpoint areas of bleeding.  No substantial infectious process noted.    ASSESSMENT:  Chronic lower extremity wounds and decubitus with secondary   osteomyelitis, currently on vancomycin, Zosyn.    PLAN:  Continue with current care.  Dressings changed to the lower extremities.    Continue offloading.  I did replace his heel lift boot on the right side and   the left side is up on a pillow.  Seems to be more comfortable for his knee.        ______________________________  Tom Nichole DPM    GAS/AQS  DD:  04/01/2024  Time:  03:49PM  DT:  04/01/2024  Time:  06:15PM  Job #:  [id number]      PROGRESS NOTE

## 2024-04-01 NOTE — SUBJECTIVE & OBJECTIVE
Pre-Operative H & P     CC:  Preoperative exam to assess for increased cardiopulmonary risk while undergoing surgery and anesthesia.    Date of Encounter: 3/10/2023  Primary Care Physician:  No Ref-Primary, Physician     Reason for visit: Diverticulosis of large intestine without hemorrhage; Diverticulitis of colon     HPI  Sangita Meraz is a 53 y/o female who presents for pre-operative H&P in preparation for COLECTOMY, SIGMOID, LAPAROSCOPIC with Bhavana Johansen MD on 3/31/23 at [facility] for treatment of Diverticulosis of large intestine without hemorrhage; Diverticulitis of colon.       Patient is being evaluated for comorbid conditions of AILEEN, former tobacco use, depression, Meniere s, obesity, PCOS.       Ms. Meraz has a history of recurrent diverticulitis since 07/2020.  In total, she has had 5-7 episodes of acute diverticulitis. Each time she was treated with oral antibiotics. Most recently, she presented to the ED on 12/5/22 and subsequently required Cipro and Flagyl.   In the setting of significant recurrent episodes, the above procedure has been recommended.      History was obtained from patient & chart review.     Hx of abnormal bleeding or anti-platelet use: denies    Menstrual history: Patient's last menstrual period was 12/09/2018.:       Past Medical History  Past Medical History:   Diagnosis Date     Depressive disorder 1982     Diverticulosis of large intestine without hemorrhage      Obstructive sleep apnea syndrome 10/17/2022       Past Surgical History  Past Surgical History:   Procedure Laterality Date     ABDOMEN SURGERY  '92,'09,'15    2 c-secs, abdomenplasty     CHOLECYSTECTOMY  '98     COLONOSCOPY N/A 03/16/2021    Procedure: COLONOSCOPY, WITH BIOPSY AND CLIPPING;  Surgeon: Kemar Mckeon MD;  Location: [facility] OR     DILATION AND CURETTAGE, OPERATIVE HYSTEROSCOPY, COMBINED N/A 10/18/2022    Procedure: HYSTEROSCOPY, WITH DILATION AND    Subjective:     HPI:  Wound medicine Re-check - Sacral ulcer    The patient is a 69 year old WM, with past medical history of HTN, COPD, DM2 with neuropathy, PAD, chronic venous hypertension. He was sent  to [facility] on 3/15/24 from [facility] for evaluation of sacral wound due to recent diagnosis of osteomyelitis involving the sacrum on 2/27/24. Previous plans for LTAC placement were not successful; he will require assistance with placement for continuing IV antibiotics post discharge.   Review of records reveals + culture of left leg wound with Pseudomonas, Providencia, ESBL Proteus on 2/8/24 and + sacral wound culture with MRSA, Providencia, Enterococcus and Pseudomonas. 2/27/24 MRI of sacrum shows osteomyelitis involving the sacrococcygeal bone.   He presents without systemic signs of infection. He was evaluated by surgery (3/15/24), no plan for surgical intervention at this time.   ID consulted; and guiding antibiotic stewardship.   He was seen in the outpatient wound care clinic here at [facility] form 9/2022 - 2/2023 for ulcer of left ankle.   He is currently with multiple chronic appearing wounds to BLE and sacrum. He has been seen by Dr. Queen with work up and intervention for occlusion of LLE in the past. Most recent peripheral angiography performed on BLE (3/13/24) per Dr. Queen. Found to have non critical stenosis in the left SFA. Right SFA with stenosis however with excellent three-vessel runoff. (Wounds to be medically managed)    Podiatry consulted - Surgical debridement of BLE done on 3/26/24    Wound medicine has been consulted in addition to WOCN for evaluation of these severe wounds.   First evaluation compelted on 3/19/24. Found to have severe BLE unstageable pressure injuries to both lateral legs and both heels and chronic sacral pressure ulcer.   Wound Vac applied on 3/28/24  Wound recheck done today, 4/1/24. Met patient in his room, 804, awake, alert and agreeable to wound  "CURETTAGE OF UTERUS and polypectomy;  Surgeon: Ines Chi MD;  Location: [facility] OR     ESOPHAGOSCOPY, GASTROSCOPY, DUODENOSCOPY (EGD), COMBINED N/A 12/09/2020    Procedure: ESOPHAGOGASTRODUODENOSCOPY, WITH BIOPSY;  Surgeon: Anton Knott DO;  Location: [facility] GI     GENITOURINARY SURGERY  '01, '03, '07    tubal ligation, anterior/posterior repair, tubal ligation reversal       Prior to Admission Medications  Current Outpatient Medications   Medication Sig Dispense Refill     acetaminophen (TYLENOL) 500 MG tablet Take 500 mg by mouth every 6 hours as needed for mild pain       estradiol (ESTRING) 2 MG vaginal ring Place 1 each vaginally every 3 months 3 each 4     omeprazole 20 MG tablet Take 20 mg by mouth every evening       ondansetron (ZOFRAN ODT) 4 MG ODT tab Take 1 tablet (4 mg) by mouth every 8 hours as needed for nausea 10 tablet 0     sertraline (ZOLOFT) 100 MG tablet Take 150 mg by mouth every evening       UNABLE TO FIND every morning MEDICATION NAME: Mix of Supplements; Tumeric, Green tea, Vit D and Probiotic       vitamin C (ASCORBIC ACID) 1000 MG TABS Take 1,000 mg by mouth as needed       metroNIDAZOLE (FLAGYL) 500 MG tablet Take 1 tablet (500 mg) by mouth every 6 hours At 8:00 am, 2:00 pm, 8:00 pm the day prior to your surgery with neomycin and zofran. 3 tablet 0     neomycin (MYCIFRADIN) 500 MG tablet Take 2 tablets (1,000 mg) by mouth every 6 hours At 8:00 am, 2:00 pm, 8:00 pm the day prior to your surgery with flagyl and zofran. 6 tablet 0     ondansetron (ZOFRAN) 4 MG tablet Take 1 tablet (4 mg) by mouth every 6 hours At 8:00 am, 2:00 pm, 8:00 pm the day prior to your surgery with neomycin and flagyl. 3 tablet 0     polyethylene glycol (MIRALAX) 17 g packet Take 238 g by mouth See Admin Instructions Starting at 4 pm night prior to surgery. Refer to \"Getting Ready for Surgery\" instructions. 14 packet 0     psyllium (METAMUCIL/KONSYL) 58.6 % powder Take by mouth daily (Patient not " assessment and treatment. Accompanied by WOCN for wound vac change. Lying on low air loss mattress, wedge under left side without heel lift boots; Bilateral lower legs with dressings, intact.  Ongoing complaints of bilateral knee pain causing difficulty with turning and positioning/offloading. CNA reports patient often refuses repositioning/offloading.         Hospital Course:   No notes on file      Follow-up For: Procedure(s) (LRB):  DEBRIDEMENT, FOOT (Bilateral)    Post-Operative Day: 6 Days Post-Op    Scheduled Meds:   amLODIPine  10 mg Oral Daily    aspirin  81 mg Oral Daily    cilostazoL  50 mg Oral BID    clopidogreL  75 mg Oral Daily    docusate sodium  100 mg Oral BID    DULoxetine  30 mg Oral Daily    enoxaparin  40 mg Subcutaneous Daily    ferrous sulfate  1 tablet Oral Daily    gabapentin  300 mg Oral TID    gabapentin  600 mg Oral QHS    LIDOcaine  1 patch Transdermal Q24H    losartan  100 mg Oral Daily    multivitamin  1 tablet Oral Daily    oxyCODONE  5 mg Oral Q4H    piperacillin-tazobactam (Zosyn) IV (PEDS and ADULTS) (extended infusion is not appropriate)  4.5 g Intravenous Q8H    polyethylene glycol  17 g Oral Daily    sodium chloride 0.9%  10 mL Intravenous Q6H    vancomycin (VANCOCIN) IV (PEDS and ADULTS)  1,000 mg Intravenous Q12H     Continuous Infusions:  PRN Meds:acetaminophen, albuterol, aluminum-magnesium hydroxide-simethicone, HYDROcodone-acetaminophen, melatonin, simethicone, Flushing PICC/Midline Protocol **AND** sodium chloride 0.9% **AND** sodium chloride 0.9%, vancomycin - pharmacy to dose    Review of Systems   Musculoskeletal:  Positive for arthralgias and back pain.   Skin:  Positive for wound.     Objective:     Vital Signs (Most Recent):  Temp: 97.9 °F (36.6 °C) (04/01/24 1145)  Pulse: 67 (04/01/24 1145)  Resp: 18 (04/01/24 1327)  BP: (!) 127/53 (04/01/24 1145)  SpO2: 96 % (04/01/24 1145) Vital Signs (24h Range):  Temp:  [97.8 °F (36.6 °C)-99.8 °F (37.7 °C)] 97.9 °F (36.6  "°C)  Pulse:  [67-82] 67  Resp:  [16-18] 18  SpO2:  [90 %-96 %] 96 %  BP: (127-156)/(53-76) 127/53     Weight: 68 kg (150 lb)  Body mass index is 22.14 kg/m².     Physical Exam  Vitals reviewed.   Constitutional:       General: He is awake.      Appearance: Normal appearance. He is not ill-appearing or toxic-appearing.   HENT:      Head: Normocephalic and atraumatic.      Ears:      Comments: Hard of hearing    Cardiovascular:      Rate and Rhythm: Normal rate and regular rhythm.   Pulmonary:      Effort: Pulmonary effort is normal. No respiratory distress.   Skin:     General: Skin is warm and dry.      Capillary Refill: Capillary refill takes less than 2 seconds.      Findings: Wound present.             Comments: Large stage 4 pressure ulcer to sacrum extending further toward left side. Wound vac removed, wound bed beefy red with small areas of slough to left lateral and medial portion of wound. Some ligamentous material exposed. No purulent drainage or foul odor noted to wound. Minimal bleeding with removal; hemostasis achieved with pressure.    Neurological:      General: No focal deficit present.      Mental Status: He is alert and oriented to person, place, and time.   Psychiatric:         Mood and Affect: Mood normal.         Behavior: Behavior normal. Behavior is cooperative.       Sacrum: 7 x 19 cm   (measurements transposed under media tab)      Sacrum: post debridement            Laboratory:  A1C:   Recent Labs   Lab 02/02/24  0445   HGBA1C 5.0     ABGs: No results for input(s): "PH", "PCO2", "HCO3", "POCSATURATED", "BE" in the last 168 hours.  Blood Cultures: No results for input(s): "LABBLOO" in the last 48 hours.  BMP:   Recent Labs   Lab 03/30/24  0508      K 3.4*   CO2 26   BUN 9.7   CREATININE 0.68*   CALCIUM 8.7*     CBC:   Recent Labs   Lab 04/01/24  0502   WBC 6.22   RBC 2.93*   HGB 7.9*   HCT 25.8*      MCV 88.1   MCH 27.0   MCHC 30.6*     CMP:   Recent Labs   Lab 03/28/24  0454 " taking: Reported on 2023)         Allergies  Allergies   Allergen Reactions     Ivp Dye [Contrast Dye] Anaphylaxis     Other (Do Not Use)      Other reaction(s): Itching,Watering Eyes, Rash, Sneezing, Itching,Watering Eyes, Rash, Sneezing, Itching,Watering Eyes, Rash, Sneezing       Social History  Social History     Socioeconomic History     Marital status:      Spouse name: Not on file     Number of children: Not on file     Years of education: Not on file     Highest education level: Not on file   Occupational History     Not on file   Tobacco Use     Smoking status: Former     Packs/day: 1.00     Years: 10.00     Pack years: 10.00     Types: Cigarettes     Start date: 10/10/1982     Quit date: 1991     Years since quittin.8     Smokeless tobacco: Never   Vaping Use     Vaping Use: Never used   Substance and Sexual Activity     Alcohol use: Yes     Comment: 1-2 beer/month     Drug use: Never     Sexual activity: Yes     Partners: Male     Birth control/protection: Post-menopausal   Other Topics Concern     Parent/sibling w/ CABG, MI or angioplasty before 65F 55M? No   Social History Narrative     Not on file     Social Determinants of Health     Financial Resource Strain: Not on file   Food Insecurity: Not on file   Transportation Needs: Not on file   Physical Activity: Not on file   Stress: Not on file   Social Connections: Not on file   Intimate Partner Violence: Not on file   Housing Stability: Not on file       Family History  Family History   Problem Relation Age of Onset     Uterine Cancer Mother         endometrial cancer     Diabetes Mother      Colon Polyps Father      Diabetes Father      Hypertension Father      Diabetes Brother      Hypertension Brother      Pulmonary Embolism Brother         following leg surgery     Anesthesia Reaction No family hx of        Review of Systems  The complete review of systems is negative other than noted in the HPI or here.   Anesthesia  "03/30/24  0508   CALCIUM 8.7* 8.7*   ALBUMIN 2.5*  --     140   K 3.2* 3.4*   CO2 25 26   BUN 7.1* 9.7   CREATININE 0.79 0.68*   ALKPHOS 71  --    ALT 5  --    AST 7  --    BILITOT 0.2  --      Coagulation: No results for input(s): "PT", "INR", "APTT" in the last 168 hours.  CRP: No results for input(s): "CRP" in the last 168 hours.  ESR: No results for input(s): "SEDRATE" in the last 168 hours.  LFTs:   Recent Labs   Lab 03/28/24  0454   ALT 5   AST 7   ALKPHOS 71   BILITOT 0.2   ALBUMIN 2.5*     Prealbumin: No results for input(s): "PREALBUMIN" in the last 48 hours.  Wound Cultures: No results for input(s): "LABAERO" in the last 4320 hours.  Microbiology Results (last 7 days)       Procedure Component Value Units Date/Time    Tissue Culture - Aerobic [[id number]]  (Abnormal)  (Susceptibility) Collected: 03/26/24 1833    Order Status: Completed Specimen: Tissue from Foot, Heel Left Updated: 03/29/24 1102     CULTURE, TISSUE- AEROBIC ([facility]) Few Proteus mirabilis ESBL    Anaerobic Culture [[id number]] Collected: 03/26/24 1833    Order Status: Completed Specimen: Tissue from Foot, Heel Left Updated: 03/29/24 0733     Anaerobe Culture No Anaerobes Isolated          Specimen (24h ago, onward)      None          No results for input(s): "COLORU", "CLARITYU", "SPECGRAV", "PHUR", "PROTEINUA", "GLUCOSEU", "BILIRUBINCON", "BLOODU", "WBCU", "RBCU", "BACTERIA", "MUCUS", "NITRITE", "LEUKOCYTESUR", "UROBILINOGEN", "HYALINECASTS" in the last 168 hours.    Diagnostic Results:  I have reviewed all pertinent imaging results/findings within the past 24 hours.    " Evaluation   Pt has had prior anesthetic.     History of anesthetic complications   Hypotension w/ epidural during childbirth.    ROS/MED HX  ENT/Pulmonary:     (+) sleep apnea, uses CPAP, tobacco use, Past use,  (-) asthma and recent URI   Neurologic: Comment: Meniere's, tinnitus, very mild balance issues. Hearing loss.   (-) no seizures and no CVA   Cardiovascular:       METS/Exercise Tolerance: 4 - Raking leaves, gardening    Hematologic: Comments: Extremely difficult IV access; has required US guidance in past.  PIVs don't last long and tend to infiltrate.   (-) history of blood clots and history of blood transfusion   Musculoskeletal:  - neg musculoskeletal ROS     GI/Hepatic: Comment: Rectal prolapse    Diverticulitis    PUD      (+) GERD, Asymptomatic on medication,  (-) liver disease   Renal/Genitourinary:  - neg Renal ROS  (-) renal disease   Endo: Comment: PCOS    (+) Obesity,  (-) Type II DM   Psychiatric/Substance Use:     (+) psychiatric history depression     Infectious Disease:  - neg infectious disease ROS     Malignancy:  - neg malignancy ROS     Other:  - neg other ROS          Virtual visit -  No vitals were obtained    Physical Exam  Constitutional: Awake, alert, cooperative, no apparent distress, and appears stated age.  HENT: Normocephalic  Respiratory: non labored breathing   Neurologic: Awake, alert, oriented to name, place and time.   Neuropsychiatric: Calm, cooperative. Normal affect.      Prior Labs/Diagnostic Studies   All labs and imaging personally reviewed     CT ABDOMEN PELVIS W/O CONTRAST 12/25/2022   IMPRESSION:    1.  Persistent mild mural thickening of the proximal sigmoid/distal descending colon with adjacent free fluid, when compared to 12/5/2022 the degree of inflammation in this area is decreased consistent with improving but continuing diverticulitis. Within   limits of a noncontrast CT scan no intra-abdominal abscess/fluid collection is seen at this time.   2.  Increased  number of mesenteric lymph nodes with mild mesenteric intravenous again noted, this was present on the prior exam and is a very subtle in appearance, favored to at least in part be reactive in nature secondary to the patient's    diverticulitis, a more chronic process such as mesenteric panniculitis may also be present and would have a similar appearance. Correlation with patient's clinical history is recommended.     The patient's records and results personally reviewed by this provider.     Labs to be collected on 3/13/23:  CMP, CBC, prealb, T&S    Assessment      Sangita Meraz is a 54 year old female seen as a PAC referral for risk assessment and optimization for anesthesia.    Plan/Recommendations  Pt will be optimized for the proposed procedure.  See below for details on the assessment, risk, and preoperative recommendations    NEUROLOGY  - No history of TIA, CVA or seizure    -Post Op delirium risk factors:  No risk identified    ENT  - No current airway concerns.  Will need to be reassessed day of surgery.  Mallampati: Unable to assess  TM: Unable to assess    CARDIAC  - No history of CAD, Hypertension and Afib  - METS (Metabolic Equivalents)  Patient performs 4 or more METS exercise without symptoms            Total Score: 0      RCRI-Low risk: Class 2 0.9% complication rate            Total Score: 1    RCRI: High Risk Surgery        PULMONARY  - AILEEN w/ CPAP    AILEEN High Risk            Total Score: 5    AILEEN: Snores loudly    AILEEN: Often tired    AILEEN: Observed stopped breathing    AILEEN: BMI over 35 kg/m2    AILEEN: Over 50 ys old      - Denies asthma or inhaler use  - Tobacco History      History   Smoking Status     Former     Packs/day: 1.00     Years: 10.00     Types: Cigarettes     Start date: 10/10/1982     Quit date: 5/16/1991   Smokeless Tobacco     Never       GI  - GERD, asymptomatic on prilosec. Hx gastric ulcer.  - Diverticulitis    PONV High Risk  Total Score: 4           1 AN PONV: Pt is Female    1  "AN PONV: Patient is not a current smoker    1 AN PONV: Patient has history of PONV    1 AN PONV: Intended Post Op Opioids          ENDOCRINE    - BMI: Estimated body mass index is 38.48 kg/m  as calculated from the following:    Height as of 1/18/23: 1.6 m (5' 3\").    Weight as of 1/18/23: 98.5 kg (217 lb 3.2 oz).  Obesity (BMI >30)  - No history of Diabetes Mellitus   - PCOS    HEME  VTE Low Risk 0.26%            Total Score: 0      - No history of abnormal bleeding or antiplatelet use.      The patient is aware that the final anesthesia plan will be decided by the assigned anesthesia provider on the date of service.    The patient is optimized for their procedure. AVS with information on surgery time/arrival time, meds and NPO status given by nursing staff. No further diagnostic testing indicated.    Please refer to the physical examination documented by the anesthesiologist in the anesthesia record on the day of surgery.    Video-Visit Details    Type of service:  Video Visit    Provider received verbal consent for a Video Visit from the patient? Yes     Originating Location (pt. Location): Home    Distant Location (provider location):  Off-site  Mode of Communication:  Video Conference via [employer]  On the day of service:     Prep time: 14 minutes  Visit time: 11 minutes  Documentation time: 12 minutes  ------------------------------------------  Total time: 37 minutes      Neelima Swanson PA-C  Preoperative Assessment Center  [facility]  Clinic and Surgery Center  Phone: [phone]  Fax: [phone]  "

## 2024-04-01 NOTE — PROGRESS NOTES
Pharmacokinetic Assessment Follow Up: IV Vancomycin    Vancomycin serum concentration assessment(s):    The trough level was drawn correctly and can be used to guide therapy at this time. The measurement is within the desired definitive target range of 15 to 20 mcg/mL.    Vancomycin Regimen Plan:    Continue regimen to Vancomycin 1000 mg IV every 12 hours with next serum trough concentration measured at 0900 prior to the dose on 04/04    Scheduled Administration Times    1000  2200      Drug levels (last 3 results):  Recent Labs   Lab Result Units 04/01/24  0854   Vancomycin Trough ug/ml 17.7       Vancomycin Administrations:  vancomycin given in the last 96 hours                     vancomycin in dextrose 5 % 1 gram/250 mL IVPB 1,000 mg (mg) 1,000 mg New Bag 03/31/24 2202     1,000 mg New Bag  1025     1,000 mg New Bag 03/30/24 2126     1,000 mg New Bag  0219     1,000 mg New Bag 03/29/24 1346     1,000 mg New Bag  0127     1,000 mg New Bag 03/28/24 1208                    Pharmacy will continue to follow and monitor vancomycin.    Please contact pharmacy at extension [phone] for questions regarding this assessment.    Thank you for the consult,   Cortes Tran       Patient brief summary:  Shaheen Christie is a 69 y.o. male initiated on antimicrobial therapy with IV Vancomycin for treatment of bone/joint infection    The patient's current regimen is 1000mg q12h    Drug Allergies:   Review of patient's allergies indicates:  No Known Allergies    Actual Body Weight:  Wt Readings from Last 1 Encounters:   03/20/24 68 kg (150 lb)       Renal Function:   Estimated Creatinine Clearance: 95.8 mL/min (A) (based on SCr of 0.68 mg/dL (L)).,     Dialysis Method (if applicable):  N/A    CBC (last 72 hours):  Recent Labs   Lab Result Units 03/30/24  0508 03/31/24  0512 04/01/24  0502   WBC x10(3)/mcL 7.20 6.53 6.22   Hgb g/dL 7.8* 7.5* 7.9*   Hct % 25.6* 24.8* 25.8*   Platelet x10(3)/mcL 326 327 304   Mono % % 12.1 11.0 11.4    Eos % % 9.3 11.2 10.8   Basophil % % 1.4 1.4 1.3       Metabolic Panel (last 72 hours):  Recent Labs   Lab Result Units 03/30/24  0508   Sodium Level mmol/L 140   Potassium Level mmol/L 3.4*   Chloride mmol/L 107   Carbon Dioxide mmol/L 26   Glucose Level mg/dL 117*   Blood Urea Nitrogen mg/dL 9.7   Creatinine mg/dL 0.68*       Microbiologic Results:  Microbiology Results (last 7 days)       Procedure Component Value Units Date/Time    Tissue Culture - Aerobic [[id number]]  (Abnormal)  (Susceptibility) Collected: 03/26/24 1833    Order Status: Completed Specimen: Tissue from Foot, Heel Left Updated: 03/29/24 1102     CULTURE, TISSUE- AEROBIC ([facility]) Few Proteus mirabilis ESBL    Anaerobic Culture [[id number]] Collected: 03/26/24 1833    Order Status: Completed Specimen: Tissue from Foot, Heel Left Updated: 03/29/24 0733     Anaerobe Culture No Anaerobes Isolated

## 2024-04-02 LAB
ALBUMIN SERPL-MCNC: 2.5 G/DL (ref 3.4–4.8)
ALBUMIN/GLOB SERPL: 0.8 RATIO (ref 1.1–2)
ALP SERPL-CCNC: 77 UNIT/L (ref 40–150)
ALT SERPL-CCNC: 6 UNIT/L (ref 0–55)
AST SERPL-CCNC: 11 UNIT/L (ref 5–34)
BASOPHILS # BLD AUTO: 0.09 X10(3)/MCL
BASOPHILS NFR BLD AUTO: 1.5 %
BILIRUB SERPL-MCNC: 0.2 MG/DL
BUN SERPL-MCNC: 6.6 MG/DL (ref 8.4–25.7)
CALCIUM SERPL-MCNC: 9 MG/DL (ref 8.8–10)
CHLORIDE SERPL-SCNC: 107 MMOL/L (ref 98–107)
CO2 SERPL-SCNC: 26 MMOL/L (ref 23–31)
CREAT SERPL-MCNC: 0.75 MG/DL (ref 0.73–1.18)
EOSINOPHIL # BLD AUTO: 0.66 X10(3)/MCL (ref 0–0.9)
EOSINOPHIL NFR BLD AUTO: 10.9 %
ERYTHROCYTE [DISTWIDTH] IN BLOOD BY AUTOMATED COUNT: 18.3 % (ref 11.5–17)
GFR SERPLBLD CREATININE-BSD FMLA CKD-EPI: >60 MLS/MIN/1.73/M2
GLOBULIN SER-MCNC: 3.1 GM/DL (ref 2.4–3.5)
GLUCOSE SERPL-MCNC: 92 MG/DL (ref 82–115)
HCT VFR BLD AUTO: 25 % (ref 42–52)
HGB BLD-MCNC: 7.5 G/DL (ref 14–18)
IMM GRANULOCYTES # BLD AUTO: 0.01 X10(3)/MCL (ref 0–0.04)
IMM GRANULOCYTES NFR BLD AUTO: 0.2 %
LYMPHOCYTES # BLD AUTO: 1.4 X10(3)/MCL (ref 0.6–4.6)
LYMPHOCYTES NFR BLD AUTO: 23.2 %
MCH RBC QN AUTO: 26.3 PG (ref 27–31)
MCHC RBC AUTO-ENTMCNC: 30 G/DL (ref 33–36)
MCV RBC AUTO: 87.7 FL (ref 80–94)
MONOCYTES # BLD AUTO: 0.71 X10(3)/MCL (ref 0.1–1.3)
MONOCYTES NFR BLD AUTO: 11.8 %
NEUTROPHILS # BLD AUTO: 3.16 X10(3)/MCL (ref 2.1–9.2)
NEUTROPHILS NFR BLD AUTO: 52.4 %
NRBC BLD AUTO-RTO: 0 %
PLATELET # BLD AUTO: 337 X10(3)/MCL (ref 130–400)
PMV BLD AUTO: 8.8 FL (ref 7.4–10.4)
POTASSIUM SERPL-SCNC: 3.9 MMOL/L (ref 3.5–5.1)
PROT SERPL-MCNC: 5.6 GM/DL (ref 5.8–7.6)
RBC # BLD AUTO: 2.85 X10(6)/MCL (ref 4.7–6.1)
SODIUM SERPL-SCNC: 140 MMOL/L (ref 136–145)
WBC # SPEC AUTO: 6.03 X10(3)/MCL (ref 4.5–11.5)

## 2024-04-02 PROCEDURE — 11000001 HC ACUTE MED/SURG PRIVATE ROOM

## 2024-04-02 PROCEDURE — A4216 STERILE WATER/SALINE, 10 ML: HCPCS | Performed by: INTERNAL MEDICINE

## 2024-04-02 PROCEDURE — 21400001 HC TELEMETRY ROOM

## 2024-04-02 PROCEDURE — 25000003 PHARM REV CODE 250: Performed by: INTERNAL MEDICINE

## 2024-04-02 PROCEDURE — 25000003 PHARM REV CODE 250: Performed by: PODIATRIST

## 2024-04-02 PROCEDURE — 63600175 PHARM REV CODE 636 W HCPCS: Performed by: PODIATRIST

## 2024-04-02 PROCEDURE — 85025 COMPLETE CBC W/AUTO DIFF WBC: CPT | Performed by: INTERNAL MEDICINE

## 2024-04-02 PROCEDURE — 80053 COMPREHEN METABOLIC PANEL: CPT | Performed by: INTERNAL MEDICINE

## 2024-04-02 RX ORDER — OXYCODONE HYDROCHLORIDE 10 MG/1
10 TABLET ORAL EVERY 4 HOURS
Status: DISCONTINUED | OUTPATIENT
Start: 2024-04-02 | End: 2024-04-30 | Stop reason: HOSPADM

## 2024-04-02 RX ADMIN — DULOXETINE HYDROCHLORIDE 30 MG: 30 CAPSULE, DELAYED RELEASE ORAL at 09:04

## 2024-04-02 RX ADMIN — PIPERACILLIN SODIUM AND TAZOBACTAM SODIUM 4.5 G: 4; .5 INJECTION, POWDER, LYOPHILIZED, FOR SOLUTION INTRAVENOUS at 06:04

## 2024-04-02 RX ADMIN — SODIUM CHLORIDE, PRESERVATIVE FREE 10 ML: 5 INJECTION INTRAVENOUS at 05:04

## 2024-04-02 RX ADMIN — FERROUS SULFATE TAB 325 MG (65 MG ELEMENTAL FE) 1 EACH: 325 (65 FE) TAB at 09:04

## 2024-04-02 RX ADMIN — CILOSTAZOL 50 MG: 50 TABLET ORAL at 09:04

## 2024-04-02 RX ADMIN — DOCUSATE SODIUM 100 MG: 100 CAPSULE, LIQUID FILLED ORAL at 09:04

## 2024-04-02 RX ADMIN — LOSARTAN POTASSIUM 100 MG: 50 TABLET, FILM COATED ORAL at 09:04

## 2024-04-02 RX ADMIN — PIPERACILLIN SODIUM AND TAZOBACTAM SODIUM 4.5 G: 4; .5 INJECTION, POWDER, LYOPHILIZED, FOR SOLUTION INTRAVENOUS at 10:04

## 2024-04-02 RX ADMIN — POLYETHYLENE GLYCOL 3350 17 G: 17 POWDER, FOR SOLUTION ORAL at 09:04

## 2024-04-02 RX ADMIN — ENOXAPARIN SODIUM 40 MG: 40 INJECTION SUBCUTANEOUS at 05:04

## 2024-04-02 RX ADMIN — PIPERACILLIN SODIUM AND TAZOBACTAM SODIUM 4.5 G: 4; .5 INJECTION, POWDER, LYOPHILIZED, FOR SOLUTION INTRAVENOUS at 02:04

## 2024-04-02 RX ADMIN — VANCOMYCIN HYDROCHLORIDE 1000 MG: 1 INJECTION, POWDER, LYOPHILIZED, FOR SOLUTION INTRAVENOUS at 10:04

## 2024-04-02 RX ADMIN — GABAPENTIN 600 MG: 300 CAPSULE ORAL at 08:04

## 2024-04-02 RX ADMIN — ASPIRIN 81 MG CHEWABLE TABLET 81 MG: 81 TABLET CHEWABLE at 09:04

## 2024-04-02 RX ADMIN — OXYCODONE HYDROCHLORIDE 10 MG: 10 TABLET ORAL at 10:04

## 2024-04-02 RX ADMIN — DOCUSATE SODIUM 100 MG: 100 CAPSULE, LIQUID FILLED ORAL at 08:04

## 2024-04-02 RX ADMIN — OXYCODONE HYDROCHLORIDE 5 MG: 5 TABLET ORAL at 09:04

## 2024-04-02 RX ADMIN — GABAPENTIN 300 MG: 300 CAPSULE ORAL at 09:04

## 2024-04-02 RX ADMIN — CILOSTAZOL 50 MG: 50 TABLET ORAL at 08:04

## 2024-04-02 RX ADMIN — LIDOCAINE 5% 1 PATCH: 700 PATCH TOPICAL at 10:04

## 2024-04-02 RX ADMIN — GABAPENTIN 300 MG: 300 CAPSULE ORAL at 08:04

## 2024-04-02 RX ADMIN — GABAPENTIN 300 MG: 300 CAPSULE ORAL at 02:04

## 2024-04-02 RX ADMIN — OXYCODONE HYDROCHLORIDE 10 MG: 10 TABLET ORAL at 05:04

## 2024-04-02 RX ADMIN — OXYCODONE HYDROCHLORIDE 5 MG: 5 TABLET ORAL at 03:04

## 2024-04-02 RX ADMIN — THERA TABS 1 TABLET: TAB at 09:04

## 2024-04-02 RX ADMIN — AMLODIPINE BESYLATE 10 MG: 5 TABLET ORAL at 09:04

## 2024-04-02 RX ADMIN — OXYCODONE HYDROCHLORIDE 5 MG: 5 TABLET ORAL at 06:04

## 2024-04-02 RX ADMIN — OXYCODONE HYDROCHLORIDE 10 MG: 10 TABLET ORAL at 02:04

## 2024-04-02 RX ADMIN — CLOPIDOGREL BISULFATE 75 MG: 75 TABLET ORAL at 09:04

## 2024-04-02 RX ADMIN — SODIUM CHLORIDE, PRESERVATIVE FREE 10 ML: 5 INJECTION INTRAVENOUS at 12:04

## 2024-04-02 NOTE — PROGRESS NOTES
[facility]                       [address] [address]    PATIENT NAME:       HAWA KUMAR  YOB: 1955  CSN:                [id number]   MRN:                [id number]  ADMIT DATE:         03/15/2024 03:09:00  PHYSICIAN:          Tom Nichole DPM                            PROGRESS NOTE    DATE:  04/02/2024 00:00:00    SUBJECTIVE:  The patient was seen today.  He is doing well.  No major issues   reported.  No fevers, no chills.  Still working on placement and negotiates with   [employer].  No other problems.    PHYSICAL EXAMINATION:  VITAL SIGNS:  Stable and afebrile.    Dressings intact.  Wounds all continue to improve.  Still with some exudate from   both sites left greater than right, but no stanley necrosis to any of these   areas.    No palpable pedal pulses.  The feet and legs are warm.  No contractures.  Still   with some pain to that left knee with palpation, range of motion.    ASSESSMENT:  Bilateral lower extremity wounds, status post debridement.    PLAN:  Continue with current care.  Dressings were changed.  Continue   offloading.        ______________________________  Tom Nichole DPM    GAS/AQS  DD:  04/02/2024  Time:  03:30PM  DT:  04/02/2024  Time:  06:01PM  Job #:  [id number]      PROGRESS NOTE

## 2024-04-02 NOTE — PROGRESS NOTES
[facility] - 8th Floor [facility] Medicine  Progress Note    Patient Name: Shaheen Christie  MRN: [id number]  Patient Class: IP- Inpatient   Admission Date: 3/15/2024  Length of Stay: 18 days  Attending Physician: Enzo Thibodeaux MD  Primary Care Provider: Viktor Russ MD        Subjective:     Principal Problem:Osteomyelitis    Interval History:  No changes in condition    Review of Systems   Neurological:  Syncope: .  Patient doing well..   All other systems reviewed and are negative.    Objective:     Vital Signs (Most Recent):  Temp: 98.2 °F (36.8 °C) (04/02/24 0744)  Pulse: 72 (04/02/24 0744)  Resp: 18 (04/02/24 0744)  BP: 132/63 (04/02/24 0744)  SpO2: (!) 92 % (04/02/24 0744) Vital Signs (24h Range):  Temp:  [97.8 °F (36.6 °C)-98.6 °F (37 °C)] 98.2 °F (36.8 °C)  Pulse:  [67-81] 72  Resp:  [18] 18  SpO2:  [90 %-96 %] 92 %  BP: (111-132)/(52-68) 132/63     Weight: 68 kg (150 lb)  Body mass index is 22.14 kg/m².    Intake/Output Summary (Last 24 hours) at 4/2/2024 0822  Last data filed at 4/1/2024 2247  Gross per 24 hour   Intake 940 ml   Output 1626 ml   Net -686 ml      Physical Exam  HENT:      Head: Normocephalic and atraumatic.      Right Ear: External ear normal.      Left Ear: External ear normal.      Nose: Nose normal.      Mouth/Throat:      Mouth: Mucous membranes are dry.   Eyes:      Extraocular Movements: Extraocular movements intact.   Cardiovascular:      Rate and Rhythm: Normal rate and regular rhythm.      Pulses: Normal pulses.      Heart sounds: Normal heart sounds.   Pulmonary:      Effort: Pulmonary effort is normal.      Breath sounds: Normal breath sounds.   Abdominal:      General: Bowel sounds are normal.      Palpations: Abdomen is soft.   Musculoskeletal:      Cervical back: Neck supple.   Skin:     General: Skin is warm and dry.   Neurological:      General: No focal deficit present.      Mental Status: He is alert and oriented to person, place, and  "time. Mental status is at baseline.           Overview/Hospital Course:  Patient clinically stable.  Eating well.  Still a bit anemic.  He should have been accepted in LTAC for further treatment and aggressive wound care and debridement along with a vena antibiotics and nutritional support.  From my point of view there is nothing else I can add or change in this patient's case at the moment.  Continue with wound care.  Await transition into LTAC which is where he should be.    Significant Labs: All pertinent labs within the past 24 hours have been reviewed.  BMP: No results for input(s): "GLU", "NA", "K", "CL", "CO2", "BUN", "CREATININE", "CALCIUM", "MG" in the last 48 hours.  CBC:   Recent Labs   Lab 04/01/24  0502 04/02/24  0435   WBC 6.22 6.03   HGB 7.9* 7.5*   HCT 25.8* 25.0*    337     CMP: No results for input(s): "NA", "K", "CL", "CO2", "GLU", "BUN", "CREATININE", "CALCIUM", "PROT", "ALBUMIN", "BILITOT", "ALKPHOS", "AST", "ALT", "ANIONGAP", "EGFRNONAA" in the last 48 hours.    Invalid input(s): "ESTGFAFRICA"    Significant Imaging: I have reviewed all pertinent imaging results/findings within the past 24 hours.    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

## 2024-04-03 LAB
BASOPHILS # BLD AUTO: 0.1 X10(3)/MCL
BASOPHILS NFR BLD AUTO: 1.6 %
EOSINOPHIL # BLD AUTO: 0.75 X10(3)/MCL (ref 0–0.9)
EOSINOPHIL NFR BLD AUTO: 11.7 %
ERYTHROCYTE [DISTWIDTH] IN BLOOD BY AUTOMATED COUNT: 18.6 % (ref 11.5–17)
HCT VFR BLD AUTO: 24.7 % (ref 42–52)
HGB BLD-MCNC: 7.3 G/DL (ref 14–18)
IMM GRANULOCYTES # BLD AUTO: 0.01 X10(3)/MCL (ref 0–0.04)
IMM GRANULOCYTES NFR BLD AUTO: 0.2 %
LYMPHOCYTES # BLD AUTO: 1.5 X10(3)/MCL (ref 0.6–4.6)
LYMPHOCYTES NFR BLD AUTO: 23.4 %
MCH RBC QN AUTO: 26.7 PG (ref 27–31)
MCHC RBC AUTO-ENTMCNC: 29.6 G/DL (ref 33–36)
MCV RBC AUTO: 90.5 FL (ref 80–94)
MONOCYTES # BLD AUTO: 0.73 X10(3)/MCL (ref 0.1–1.3)
MONOCYTES NFR BLD AUTO: 11.4 %
NEUTROPHILS # BLD AUTO: 3.31 X10(3)/MCL (ref 2.1–9.2)
NEUTROPHILS NFR BLD AUTO: 51.7 %
NRBC BLD AUTO-RTO: 0 %
PLATELET # BLD AUTO: 283 X10(3)/MCL (ref 130–400)
PMV BLD AUTO: 8.3 FL (ref 7.4–10.4)
RBC # BLD AUTO: 2.73 X10(6)/MCL (ref 4.7–6.1)
WBC # SPEC AUTO: 6.4 X10(3)/MCL (ref 4.5–11.5)

## 2024-04-03 PROCEDURE — 25000003 PHARM REV CODE 250: Performed by: INTERNAL MEDICINE

## 2024-04-03 PROCEDURE — 25000003 PHARM REV CODE 250: Performed by: PODIATRIST

## 2024-04-03 PROCEDURE — 25000003 PHARM REV CODE 250: Performed by: NURSE PRACTITIONER

## 2024-04-03 PROCEDURE — 63600175 PHARM REV CODE 636 W HCPCS: Performed by: NURSE PRACTITIONER

## 2024-04-03 PROCEDURE — 21400001 HC TELEMETRY ROOM

## 2024-04-03 PROCEDURE — A4216 STERILE WATER/SALINE, 10 ML: HCPCS | Performed by: INTERNAL MEDICINE

## 2024-04-03 PROCEDURE — 63600175 PHARM REV CODE 636 W HCPCS: Performed by: PODIATRIST

## 2024-04-03 PROCEDURE — 85025 COMPLETE CBC W/AUTO DIFF WBC: CPT | Performed by: INTERNAL MEDICINE

## 2024-04-03 PROCEDURE — 11000001 HC ACUTE MED/SURG PRIVATE ROOM

## 2024-04-03 RX ADMIN — PIPERACILLIN SODIUM AND TAZOBACTAM SODIUM 4.5 G: 4; .5 INJECTION, POWDER, LYOPHILIZED, FOR SOLUTION INTRAVENOUS at 01:04

## 2024-04-03 RX ADMIN — GABAPENTIN 300 MG: 300 CAPSULE ORAL at 09:04

## 2024-04-03 RX ADMIN — HYDROCODONE BITARTRATE AND ACETAMINOPHEN 1 TABLET: 5; 325 TABLET ORAL at 04:04

## 2024-04-03 RX ADMIN — CLOPIDOGREL BISULFATE 75 MG: 75 TABLET ORAL at 09:04

## 2024-04-03 RX ADMIN — CILOSTAZOL 50 MG: 50 TABLET ORAL at 09:04

## 2024-04-03 RX ADMIN — DULOXETINE HYDROCHLORIDE 30 MG: 30 CAPSULE, DELAYED RELEASE ORAL at 09:04

## 2024-04-03 RX ADMIN — ENOXAPARIN SODIUM 40 MG: 40 INJECTION SUBCUTANEOUS at 04:04

## 2024-04-03 RX ADMIN — FERROUS SULFATE TAB 325 MG (65 MG ELEMENTAL FE) 1 EACH: 325 (65 FE) TAB at 09:04

## 2024-04-03 RX ADMIN — MEROPENEM 1 G: 1 INJECTION, POWDER, FOR SOLUTION INTRAVENOUS at 10:04

## 2024-04-03 RX ADMIN — LIDOCAINE 5% 1 PATCH: 700 PATCH TOPICAL at 09:04

## 2024-04-03 RX ADMIN — DOCUSATE SODIUM 100 MG: 100 CAPSULE, LIQUID FILLED ORAL at 09:04

## 2024-04-03 RX ADMIN — PIPERACILLIN SODIUM AND TAZOBACTAM SODIUM 4.5 G: 4; .5 INJECTION, POWDER, LYOPHILIZED, FOR SOLUTION INTRAVENOUS at 05:04

## 2024-04-03 RX ADMIN — LOSARTAN POTASSIUM 100 MG: 50 TABLET, FILM COATED ORAL at 09:04

## 2024-04-03 RX ADMIN — HYDROCODONE BITARTRATE AND ACETAMINOPHEN 1 TABLET: 5; 325 TABLET ORAL at 12:04

## 2024-04-03 RX ADMIN — OXYCODONE HYDROCHLORIDE 10 MG: 10 TABLET ORAL at 01:04

## 2024-04-03 RX ADMIN — GABAPENTIN 600 MG: 300 CAPSULE ORAL at 09:04

## 2024-04-03 RX ADMIN — THERA TABS 1 TABLET: TAB at 09:04

## 2024-04-03 RX ADMIN — OXYCODONE HYDROCHLORIDE 10 MG: 10 TABLET ORAL at 09:04

## 2024-04-03 RX ADMIN — OXYCODONE HYDROCHLORIDE 10 MG: 10 TABLET ORAL at 02:04

## 2024-04-03 RX ADMIN — VANCOMYCIN HYDROCHLORIDE 1000 MG: 1 INJECTION, POWDER, LYOPHILIZED, FOR SOLUTION INTRAVENOUS at 09:04

## 2024-04-03 RX ADMIN — OXYCODONE HYDROCHLORIDE 10 MG: 10 TABLET ORAL at 05:04

## 2024-04-03 RX ADMIN — SODIUM CHLORIDE, PRESERVATIVE FREE 10 ML: 5 INJECTION INTRAVENOUS at 04:04

## 2024-04-03 RX ADMIN — ASPIRIN 81 MG CHEWABLE TABLET 81 MG: 81 TABLET CHEWABLE at 09:04

## 2024-04-03 RX ADMIN — AMLODIPINE BESYLATE 10 MG: 5 TABLET ORAL at 09:04

## 2024-04-03 RX ADMIN — GABAPENTIN 300 MG: 300 CAPSULE ORAL at 04:04

## 2024-04-03 NOTE — PROGRESS NOTES
[facility] - 8th Floor [facility] Medicine  Progress Note    Patient Name: Shaheen Christie  MRN: [id number]  Patient Class: IP- Inpatient   Admission Date: 3/15/2024  Length of Stay: 19 days  Attending Physician: Enzo Thibodeaux MD  Primary Care Provider: Viktor Russ MD        Subjective:     Principal Problem:Osteomyelitis    Interval History:  No new issues.  Patient is eating quite well.  No complaints.    Review of Systems   All other systems reviewed and are negative.    Objective:     Vital Signs (Most Recent):  Temp: 98 °F (36.7 °C) (04/03/24 0749)  Pulse: 76 (04/03/24 0749)  Resp: 18 (04/03/24 0749)  BP: 118/63 (04/03/24 0749)  SpO2: (!) 91 % (04/03/24 0749) Vital Signs (24h Range):  Temp:  [96.3 °F (35.7 °C)-99.3 °F (37.4 °C)] 98 °F (36.7 °C)  Pulse:  [67-76] 76  Resp:  [16-18] 18  SpO2:  [91 %-96 %] 91 %  BP: (111-137)/(51-73) 118/63     Weight: 68 kg (150 lb)  Body mass index is 22.14 kg/m².    Intake/Output Summary (Last 24 hours) at 4/3/2024 0902  Last data filed at 4/3/2024 0535  Gross per 24 hour   Intake 1480 ml   Output 1925 ml   Net -445 ml      Physical Exam  HENT:      Head: Normocephalic and atraumatic.      Right Ear: External ear normal.      Left Ear: External ear normal.      Nose: Nose normal.      Mouth/Throat:      Mouth: Mucous membranes are dry.   Eyes:      Extraocular Movements: Extraocular movements intact.   Cardiovascular:      Rate and Rhythm: Normal rate and regular rhythm.      Pulses: Normal pulses.      Heart sounds: Normal heart sounds.   Pulmonary:      Effort: Pulmonary effort is normal.   Abdominal:      General: Bowel sounds are normal.      Palpations: Abdomen is soft.   Musculoskeletal:      Cervical back: Neck supple.   Skin:     General: Skin is warm and dry.   Neurological:      General: No focal deficit present.      Mental Status: He is alert and oriented to person, place, and time. Mental status is at baseline.   Psychiatric:          Mood and Affect: Mood normal.           Overview/Hospital Course:  Patient clinically stable.  He would need to progress to LTAC for better care.  I do not think that in his sniff facility he would have the appropriate level of care which include debridement of the wound and aggressive wound care plus proper nutritional support.  Hopefully his insurance will see the light and let us send this patient to the appropriate setting which is LTAC.   Patient is still getting a little bit anemic.  I did supplement his diet with a multivitamin with iron.  I will see if I can add other sources of iron to his diet.  If not I will be forced to offer him a blood transfusion.    Significant Labs: All pertinent labs within the past 24 hours have been reviewed.  BMP:   Recent Labs   Lab 04/02/24  0757      K 3.9   CO2 26   BUN 6.6*   CREATININE 0.75   CALCIUM 9.0     CBC:   Recent Labs   Lab 04/02/24  0435 04/03/24  0451   WBC 6.03 6.40   HGB 7.5* 7.3*   HCT 25.0* 24.7*    283     CMP:   Recent Labs   Lab 04/02/24  0757      K 3.9   CO2 26   BUN 6.6*   CREATININE 0.75   CALCIUM 9.0   ALBUMIN 2.5*   BILITOT 0.2   ALKPHOS 77   AST 11   ALT 6       Significant Imaging: I have reviewed all pertinent imaging results/findings within the past 24 hours.    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

## 2024-04-04 LAB
ALBUMIN SERPL-MCNC: 2.5 G/DL (ref 3.4–4.8)
ALBUMIN/GLOB SERPL: 0.8 RATIO (ref 1.1–2)
ALP SERPL-CCNC: 77 UNIT/L (ref 40–150)
ALT SERPL-CCNC: 6 UNIT/L (ref 0–55)
ANISOCYTOSIS BLD QL SMEAR: ABNORMAL
AST SERPL-CCNC: 9 UNIT/L (ref 5–34)
BASOPHILS # BLD AUTO: 0.08 X10(3)/MCL
BASOPHILS NFR BLD AUTO: 1.5 %
BILIRUB SERPL-MCNC: 0.2 MG/DL
BUN SERPL-MCNC: 8.7 MG/DL (ref 8.4–25.7)
CALCIUM SERPL-MCNC: 8.9 MG/DL (ref 8.8–10)
CHLORIDE SERPL-SCNC: 105 MMOL/L (ref 98–107)
CO2 SERPL-SCNC: 27 MMOL/L (ref 23–31)
CREAT SERPL-MCNC: 0.72 MG/DL (ref 0.73–1.18)
CRP SERPL-MCNC: 39.2 MG/L
EOSINOPHIL # BLD AUTO: 0.7 X10(3)/MCL (ref 0–0.9)
EOSINOPHIL NFR BLD AUTO: 13 %
ERYTHROCYTE [DISTWIDTH] IN BLOOD BY AUTOMATED COUNT: 18.3 % (ref 11.5–17)
ERYTHROCYTE [SEDIMENTATION RATE] IN BLOOD: 29 MM/HR (ref 0–15)
GFR SERPLBLD CREATININE-BSD FMLA CKD-EPI: >60 MLS/MIN/1.73/M2
GLOBULIN SER-MCNC: 3 GM/DL (ref 2.4–3.5)
GLUCOSE SERPL-MCNC: 86 MG/DL (ref 82–115)
HCT VFR BLD AUTO: 27.8 % (ref 42–52)
HGB BLD-MCNC: 8 G/DL (ref 14–18)
IMM GRANULOCYTES # BLD AUTO: 0.01 X10(3)/MCL (ref 0–0.04)
IMM GRANULOCYTES NFR BLD AUTO: 0.2 %
LYMPHOCYTES # BLD AUTO: 0.83 X10(3)/MCL (ref 0.6–4.6)
LYMPHOCYTES NFR BLD AUTO: 15.5 %
MCH RBC QN AUTO: 25.9 PG (ref 27–31)
MCHC RBC AUTO-ENTMCNC: 28.8 G/DL (ref 33–36)
MCV RBC AUTO: 90 FL (ref 80–94)
MONOCYTES # BLD AUTO: 0.64 X10(3)/MCL (ref 0.1–1.3)
MONOCYTES NFR BLD AUTO: 11.9 %
NEUTROPHILS # BLD AUTO: 3.11 X10(3)/MCL (ref 2.1–9.2)
NEUTROPHILS NFR BLD AUTO: 57.9 %
NRBC BLD AUTO-RTO: 0 %
PLATELET # BLD AUTO: 337 X10(3)/MCL (ref 130–400)
PLATELET # BLD EST: NORMAL 10*3/UL
PMV BLD AUTO: 8.6 FL (ref 7.4–10.4)
POLYCHROMASIA BLD QL SMEAR: SLIGHT
POTASSIUM SERPL-SCNC: 4 MMOL/L (ref 3.5–5.1)
PROT SERPL-MCNC: 5.5 GM/DL (ref 5.8–7.6)
RBC # BLD AUTO: 3.09 X10(6)/MCL (ref 4.7–6.1)
RBC MORPH BLD: ABNORMAL
SODIUM SERPL-SCNC: 140 MMOL/L (ref 136–145)
VANCOMYCIN TROUGH SERPL-MCNC: 17.1 UG/ML (ref 15–20)
WBC # SPEC AUTO: 5.37 X10(3)/MCL (ref 4.5–11.5)

## 2024-04-04 PROCEDURE — 25000003 PHARM REV CODE 250: Performed by: PODIATRIST

## 2024-04-04 PROCEDURE — 85652 RBC SED RATE AUTOMATED: CPT | Performed by: NURSE PRACTITIONER

## 2024-04-04 PROCEDURE — 97606 NEG PRS WND THER DME>50 SQCM: CPT

## 2024-04-04 PROCEDURE — 85025 COMPLETE CBC W/AUTO DIFF WBC: CPT | Performed by: INTERNAL MEDICINE

## 2024-04-04 PROCEDURE — A4216 STERILE WATER/SALINE, 10 ML: HCPCS | Performed by: INTERNAL MEDICINE

## 2024-04-04 PROCEDURE — 11000001 HC ACUTE MED/SURG PRIVATE ROOM

## 2024-04-04 PROCEDURE — 99233 SBSQ HOSP IP/OBS HIGH 50: CPT | Mod: ,,,

## 2024-04-04 PROCEDURE — 80053 COMPREHEN METABOLIC PANEL: CPT | Performed by: INTERNAL MEDICINE

## 2024-04-04 PROCEDURE — 80202 ASSAY OF VANCOMYCIN: CPT | Performed by: INTERNAL MEDICINE

## 2024-04-04 PROCEDURE — 25000003 PHARM REV CODE 250: Performed by: INTERNAL MEDICINE

## 2024-04-04 PROCEDURE — 63600175 PHARM REV CODE 636 W HCPCS: Performed by: PODIATRIST

## 2024-04-04 PROCEDURE — 21400001 HC TELEMETRY ROOM

## 2024-04-04 PROCEDURE — 25000003 PHARM REV CODE 250: Performed by: NURSE PRACTITIONER

## 2024-04-04 PROCEDURE — 63600175 PHARM REV CODE 636 W HCPCS: Performed by: NURSE PRACTITIONER

## 2024-04-04 PROCEDURE — 86140 C-REACTIVE PROTEIN: CPT | Performed by: NURSE PRACTITIONER

## 2024-04-04 RX ADMIN — THERA TABS 1 TABLET: TAB at 09:04

## 2024-04-04 RX ADMIN — OXYCODONE HYDROCHLORIDE 10 MG: 10 TABLET ORAL at 01:04

## 2024-04-04 RX ADMIN — GABAPENTIN 300 MG: 300 CAPSULE ORAL at 05:04

## 2024-04-04 RX ADMIN — LIDOCAINE 5% 1 PATCH: 700 PATCH TOPICAL at 10:04

## 2024-04-04 RX ADMIN — DOCUSATE SODIUM 100 MG: 100 CAPSULE, LIQUID FILLED ORAL at 09:04

## 2024-04-04 RX ADMIN — ENOXAPARIN SODIUM 40 MG: 40 INJECTION SUBCUTANEOUS at 05:04

## 2024-04-04 RX ADMIN — OXYCODONE HYDROCHLORIDE 10 MG: 10 TABLET ORAL at 05:04

## 2024-04-04 RX ADMIN — CILOSTAZOL 50 MG: 50 TABLET ORAL at 09:04

## 2024-04-04 RX ADMIN — SODIUM CHLORIDE, PRESERVATIVE FREE 10 ML: 5 INJECTION INTRAVENOUS at 12:04

## 2024-04-04 RX ADMIN — FERROUS SULFATE TAB 325 MG (65 MG ELEMENTAL FE) 1 EACH: 325 (65 FE) TAB at 09:04

## 2024-04-04 RX ADMIN — MEROPENEM 1 G: 1 INJECTION, POWDER, FOR SOLUTION INTRAVENOUS at 05:04

## 2024-04-04 RX ADMIN — ASPIRIN 81 MG CHEWABLE TABLET 81 MG: 81 TABLET CHEWABLE at 09:04

## 2024-04-04 RX ADMIN — VANCOMYCIN HYDROCHLORIDE 1000 MG: 1 INJECTION, POWDER, LYOPHILIZED, FOR SOLUTION INTRAVENOUS at 09:04

## 2024-04-04 RX ADMIN — MEROPENEM 1 G: 1 INJECTION, POWDER, FOR SOLUTION INTRAVENOUS at 12:04

## 2024-04-04 RX ADMIN — SODIUM CHLORIDE, PRESERVATIVE FREE 10 ML: 5 INJECTION INTRAVENOUS at 05:04

## 2024-04-04 RX ADMIN — LOSARTAN POTASSIUM 100 MG: 50 TABLET, FILM COATED ORAL at 09:04

## 2024-04-04 RX ADMIN — GABAPENTIN 300 MG: 300 CAPSULE ORAL at 09:04

## 2024-04-04 RX ADMIN — OXYCODONE HYDROCHLORIDE 10 MG: 10 TABLET ORAL at 09:04

## 2024-04-04 RX ADMIN — AMLODIPINE BESYLATE 10 MG: 5 TABLET ORAL at 09:04

## 2024-04-04 RX ADMIN — CLOPIDOGREL BISULFATE 75 MG: 75 TABLET ORAL at 09:04

## 2024-04-04 RX ADMIN — OXYCODONE HYDROCHLORIDE 10 MG: 10 TABLET ORAL at 10:04

## 2024-04-04 RX ADMIN — MEROPENEM 1 G: 1 INJECTION, POWDER, FOR SOLUTION INTRAVENOUS at 09:04

## 2024-04-04 RX ADMIN — GABAPENTIN 600 MG: 300 CAPSULE ORAL at 09:04

## 2024-04-04 RX ADMIN — DULOXETINE HYDROCHLORIDE 30 MG: 30 CAPSULE, DELAYED RELEASE ORAL at 09:04

## 2024-04-04 NOTE — PROGRESS NOTES
[facility] - 8th Floor Med Surg  Wound Care    Patient Name:  Shaheen Christie   MRN:  [id number]  Date: 4/4/2024  Diagnosis: Osteomyelitis    History:     Past Medical History:   Diagnosis Date    COPD (chronic obstructive pulmonary disease)     Depression     Hypertension     Neuropathy        Social History     Socioeconomic History    Marital status:    Occupational History    Occupation: retired   Tobacco Use    Smoking status: Former     Average packs/day: 0.5 packs/day for 16.3 years (7.5 ttl pk-yrs)     Types: Cigarettes     Start date: 2008    Smokeless tobacco: Never   Substance and Sexual Activity    Alcohol use: Yes     Alcohol/week: 3.0 - 6.0 standard drinks of alcohol     Types: 3 - 6 Cans of beer per week    Drug use: Yes     Types: Marijuana    Sexual activity: Not Currently       Precautions:     Allergies as of 03/15/2024    (No Known Allergies)       WOC Assessment Details/Treatment        04/04/24 1123   WOCN Assessment   Visit Date 04/04/24   Visit Time 1123   Consult Type Follow Up   WOCN Speciality Wound   WOCN List wound vac   Intervention changed   Teaching on-going   Skin Interventions   Device Skin Pressure Protection absorbent pad utilized/changed   Pressure Reduction Devices specialty bed utilized        Altered Skin Integrity 01/22/24 1351 Sacral spine #4   Date First Assessed/Time First Assessed: 01/22/24 1351   Altered Skin Integrity Present on Admission - Did Patient arrive to the hospital with altered skin?: yes  Location: Sacral spine  Wound Number: #4  Is this injury device related?: No   Wound Image    Dressing Appearance Dry;Intact;Clean   Drainage Amount Small   Drainage Characteristics/Odor Serosanguineous   Appearance Red;Yellow;Fibrin;Slough   Tissue loss description Full thickness   Red (%), Wound Tissue Color 75 %   Yellow (%), Wound Tissue Color 25 %   Periwound Area Moist;Macerated   Wound Edges Irregular   Wound Length (cm) 7 cm   Wound Width (cm) 18  cm   Wound Depth (cm) 2 cm   Wound Volume (cm^3) 252 cm^3   Wound Surface Area (cm^2) 126 cm^2   Undermining (depth (cm)/location) 1cm from 12-3oclock   Care Cleansed with:;Antimicrobial agent   Dressing Applied  (npwt)        Negative Pressure Wound Therapy  03/28/24   Placement Date: 03/28/24   Location: Sacral Spine   NPWT Type Vacuum Therapy   Therapy Setting NPWT Continuous therapy   Pressure Setting NPWT 125 mmHg   Therapy Interventions NPWT Dressing changed   Sponges Inserted NPWT Black  (versatel)   Sponges Removed NPWT Black  (versatel)       Lory ORTIZ at bedside for evaluation. NPWT reapplied to sacral ulcer. Excellent seal noted at 125 mm/ hg continuous. Nursing to continue with preventative measures. On BRADEN mattress. Will Follow up.     04/04/2024

## 2024-04-04 NOTE — SUBJECTIVE & OBJECTIVE
Subjective:     HPI:  Wound medicine Re-check - Sacral ulcer    The patient is a 69 year old WM, with past medical history of HTN, COPD, DM2 with neuropathy, PAD, chronic venous hypertension. He was sent  to [facility] on 3/15/24 from [facility] for evaluation of sacral wound due to recent diagnosis of osteomyelitis involving the sacrum on 2/27/24. Previous plans for LTAC placement were not successful; he will require assistance with placement for continuing IV antibiotics post discharge.   Review of records reveals + culture of left leg wound with Pseudomonas, Providencia, ESBL Proteus on 2/8/24 and + sacral wound culture with MRSA, Providencia, Enterococcus and Pseudomonas. 2/27/24 MRI of sacrum shows osteomyelitis involving the sacrococcygeal bone.   He presents without systemic signs of infection. He was evaluated by surgery (3/15/24), no plan for surgical intervention at this time.   ID consulted; and guiding antibiotic stewardship.   He was seen in the outpatient wound care clinic here at [facility] form 9/2022 - 2/2023 for ulcer of left ankle.   He is currently with multiple chronic appearing wounds to BLE and sacrum. He has been seen by Dr. Queen with work up and intervention for occlusion of LLE in the past. Most recent peripheral angiography performed on BLE (3/13/24) per Dr. Queen. Found to have non critical stenosis in the left SFA. Right SFA with stenosis however with excellent three-vessel runoff. (Wounds to be medically managed)    Wound medicine has been consulted in addition to WOCN for evaluation.  First evaluation compelted on 3/19/24. Found to have severe BLE unstageable pressure injuries to both lateral legs and both heels and chronic sacral pressure ulcer.   Podiatry consulted and following - Surgical debridement of BLE done on 3/26/24  Wound Vac applied on 3/28/24  Wound recheck done today, 4/4/24. Met patient in his room, 804, awake, alert and agreeable to wound assessment and  treatment. Accompanied by WOCN for wound vac change. Lying on low air loss mattress,  without heel lift boots; Bilateral lower legs with dressings, intact some drainage on left heel.  Ongoing complaints of bilateral knee pain causing difficulty with turning and positioning/offloading.         Hospital Course:   No notes on file      Follow-up For: Procedure(s) (LRB):  DEBRIDEMENT, FOOT (Bilateral)    Post-Operative Day: 9 Days Post-Op    Scheduled Meds:   amLODIPine  10 mg Oral Daily    aspirin  81 mg Oral Daily    cilostazoL  50 mg Oral BID    clopidogreL  75 mg Oral Daily    docusate sodium  100 mg Oral BID    DULoxetine  30 mg Oral Daily    enoxaparin  40 mg Subcutaneous Daily    ferrous sulfate  1 tablet Oral Daily    gabapentin  300 mg Oral TID    gabapentin  600 mg Oral QHS    LIDOcaine  1 patch Transdermal Q24H    losartan  100 mg Oral Daily    meropenem IV (PEDS and ADULTS)  1 g Intravenous Q8H    multivitamin  1 tablet Oral Daily    oxyCODONE  10 mg Oral Q4H    polyethylene glycol  17 g Oral Daily    sodium chloride 0.9%  10 mL Intravenous Q6H    vancomycin (VANCOCIN) IV (PEDS and ADULTS)  1,000 mg Intravenous Q12H     Continuous Infusions:  PRN Meds:acetaminophen, albuterol, aluminum-magnesium hydroxide-simethicone, HYDROcodone-acetaminophen, melatonin, simethicone, Flushing PICC/Midline Protocol **AND** sodium chloride 0.9% **AND** sodium chloride 0.9%, vancomycin - pharmacy to dose    Review of Systems   Constitutional:  Positive for activity change. Negative for chills and fever.   HENT:          Hard of hearing     Musculoskeletal:  Positive for arthralgias, back pain and joint swelling.   Skin:  Positive for wound.     Objective:     Vital Signs (Most Recent):  Temp: 97.6 °F (36.4 °C) (04/04/24 1143)  Pulse: 86 (04/04/24 1143)  Resp: 16 (04/04/24 1303)  BP: 130/67 (04/04/24 1143)  SpO2: 96 % (04/04/24 1143) Vital Signs (24h Range):  Temp:  [97.4 °F (36.3 °C)-98.6 °F (37 °C)] 97.6 °F (36.4 °C)  Pulse:   "[60-86] 86  Resp:  [16-18] 16  SpO2:  [91 %-96 %] 96 %  BP: (102-141)/(58-78) 130/67     Weight: 68 kg (150 lb)  Body mass index is 22.14 kg/m².     Physical Exam  Vitals reviewed.   Constitutional:       General: He is awake. He is not in acute distress.     Appearance: Normal appearance. He is ill-appearing (chronic). He is not toxic-appearing.      Comments: Muscle wasting BLE    HENT:      Head: Normocephalic and atraumatic.   Cardiovascular:      Rate and Rhythm: Normal rate.   Pulmonary:      Effort: Pulmonary effort is normal. No respiratory distress.   Musculoskeletal:         General: Swelling and tenderness present.   Skin:     General: Skin is warm and dry.      Capillary Refill: Capillary refill takes less than 2 seconds.      Findings: Wound present.      Comments: Dressings intact to BLE with some drainage to left heel/dressings not changed yet today. Wounds not visualized    Sacrum: Large stage 4 pressure ulcer to sacrum extending further toward left side. Wound vac removed, wound bed beefy red with small areas of slough to left lateral and medial portion of wound. Some ligamentous material exposed in center of wound. No purulent drainage or foul odor noted to wound. Small denuded area with opening lateral to lower right savanna-wound skin; mild maceration of savanna-wound skin   Neurological:      Mental Status: He is alert and oriented to person, place, and time.      Motor: Weakness present.   Psychiatric:         Mood and Affect: Mood normal.         Behavior: Behavior normal. Behavior is cooperative.       Sacrum: 8 x 18 x 2 cm with undermining from 12 o'clock to 3 o'clock  with deepest 1 cm.          Laboratory:  A1C:   Recent Labs   Lab 02/02/24  0445   HGBA1C 5.0     ABGs: No results for input(s): "PH", "PCO2", "HCO3", "POCSATURATED", "BE" in the last 168 hours.  Blood Cultures: No results for input(s): "LABBLOO" in the last 48 hours.  BMP:   Recent Labs   Lab 04/04/24  0531      K 4.0   CO2 " "27   BUN 8.7   CREATININE 0.72*   CALCIUM 8.9     CBC:   Recent Labs   Lab 04/04/24 0531   WBC 5.37   RBC 3.09*   HGB 8.0*   HCT 27.8*      MCV 90.0   MCH 25.9*   MCHC 28.8*     CMP:   Recent Labs   Lab 04/04/24 0531   CALCIUM 8.9   ALBUMIN 2.5*      K 4.0   CO2 27   BUN 8.7   CREATININE 0.72*   ALKPHOS 77   ALT 6   AST 9   BILITOT 0.2     Coagulation: No results for input(s): "PT", "INR", "APTT" in the last 168 hours.  CPS: {:LNK,LABRCNTIP[  CRP:   Recent Labs   Lab 04/04/24 0531   CRP 39.20*     ESR:   Recent Labs   Lab 04/04/24 0531   SEDRATE 29*     LFTs:   Recent Labs   Lab 04/04/24 0531   ALT 6   AST 9   ALKPHOS 77   BILITOT 0.2   ALBUMIN 2.5*     Prealbumin: No results for input(s): "PREALBUMIN" in the last 48 hours.  Wound Cultures: No results for input(s): "LABAERO" in the last 4320 hours.  Microbiology Results (last 7 days)       Procedure Component Value Units Date/Time    Tissue Culture - Aerobic [[id number]]  (Abnormal)  (Susceptibility) Collected: 03/26/24 1833    Order Status: Completed Specimen: Tissue from Foot, Heel Left Updated: 03/29/24 1102     CULTURE, TISSUE- AEROBIC ([facility]) Few Proteus mirabilis ESBL    Anaerobic Culture [[id number]] Collected: 03/26/24 1833    Order Status: Completed Specimen: Tissue from Foot, Heel Left Updated: 03/29/24 0733     Anaerobe Culture No Anaerobes Isolated          Specimen (24h ago, onward)      None          No results for input(s): "COLORU", "CLARITYU", "SPECGRAV", "PHUR", "PROTEINUA", "GLUCOSEU", "BILIRUBINCON", "BLOODU", "WBCU", "RBCU", "BACTERIA", "MUCUS", "NITRITE", "LEUKOCYTESUR", "UROBILINOGEN", "HYALINECASTS" in the last 168 hours.    Diagnostic Results:  I have reviewed all pertinent imaging results/findings within the past 24 hours.    "

## 2024-04-04 NOTE — PROGRESS NOTES
[facility] - 8th Floor [facility] Medicine  Progress Note    Patient Name: Shaheen Christie  MRN: [id number]  Patient Class: IP- Inpatient   Admission Date: 3/15/2024  Length of Stay: 20 days  Attending Physician: Enzo Thibodeaux MD  Primary Care Provider: Viktor Russ MD        Subjective:     Principal Problem:Osteomyelitis    Interval History:  Not much change.  Still appealing the LTAC refusal from patient's insurance [employer].    Review of Systems   All other systems reviewed and are negative.    Objective:     Vital Signs (Most Recent):  Temp: 98.6 °F (37 °C) (04/04/24 0738)  Pulse: 83 (04/04/24 0738)  Resp: 18 (04/04/24 0738)  BP: (!) 141/73 (04/04/24 0738)  SpO2: (!) 94 % (04/04/24 0738) Vital Signs (24h Range):  Temp:  [97.4 °F (36.3 °C)-98.6 °F (37 °C)] 98.6 °F (37 °C)  Pulse:  [60-83] 83  Resp:  [16-18] 18  SpO2:  [91 %-95 %] 94 %  BP: (102-141)/(58-78) 141/73     Weight: 68 kg (150 lb)  Body mass index is 22.14 kg/m².    Intake/Output Summary (Last 24 hours) at 4/4/2024 0828  Last data filed at 4/4/2024 0500  Gross per 24 hour   Intake 860 ml   Output 1725 ml   Net -865 ml      Physical Exam  HENT:      Head: Normocephalic and atraumatic.      Right Ear: External ear normal.      Left Ear: External ear normal.      Mouth/Throat:      Mouth: Mucous membranes are dry.   Eyes:      Extraocular Movements: Extraocular movements intact.   Cardiovascular:      Rate and Rhythm: Normal rate and regular rhythm.      Pulses: Normal pulses.      Heart sounds: Normal heart sounds.   Pulmonary:      Effort: Pulmonary effort is normal.      Breath sounds: Normal breath sounds.   Abdominal:      General: Bowel sounds are normal.      Palpations: Abdomen is soft.   Skin:     General: Skin is warm and dry.   Neurological:      Mental Status: He is alert and oriented to person, place, and time. Mental status is at baseline.   Psychiatric:         Mood and Affect: Mood normal.            Overview/Hospital Course:  Appetite is good.  Hemoglobin starting to rise.  Responding well to vitamins with iron.  Pain under controlled.  Wound Care following patient.  Continues on IV antibiotics.  Awaiting a proper destination for this patient to be cared all an LTAC is the proper place.  He would do very poorly and sniff as evidenced by the fact that his wound actually got worse while he was not sniff care.    Significant Labs: All pertinent labs within the past 24 hours have been reviewed.  BMP:   Recent Labs   Lab 04/04/24  0531      K 4.0   CO2 27   BUN 8.7   CREATININE 0.72*   CALCIUM 8.9     CBC:   Recent Labs   Lab 04/03/24  0451 04/04/24  0531   WBC 6.40 5.37   HGB 7.3* 8.0*   HCT 24.7* 27.8*    337     CMP:   Recent Labs   Lab 04/04/24  0531      K 4.0   CO2 27   BUN 8.7   CREATININE 0.72*   CALCIUM 8.9   ALBUMIN 2.5*   BILITOT 0.2   ALKPHOS 77   AST 9   ALT 6       Significant Imaging: I have reviewed all pertinent imaging results/findings within the past 24 hours.    Assessment/Plan:      Active Diagnoses:    Diagnosis Date Noted POA    PRINCIPAL PROBLEM:  Osteomyelitis [M86.9] 03/15/2024 Yes    Knee effusion, left [M25.462] 03/31/2024 No    Pressure injury of sacral region, stage 4 [L89.154] 03/19/2024 Yes    Skin ulcer of right lower leg [L97.919] 03/19/2024 Yes    Skin ulcer of left lower leg [L97.929] 03/19/2024 Yes    Unstageable pressure ulcer of right heel [L89.610] 03/19/2024 Yes    Unstageable pressure ulcer of left heel [L89.620] 03/19/2024 Yes      Problems Resolved During this Admission:     VTE Risk Mitigation (From admission, onward)           Ordered     enoxaparin injection 40 mg  Daily         03/15/24 0990                       Enzo Thibodeaux MD  Department of Hospital Medicine   [facility] - 8th Floor Med Surg

## 2024-04-04 NOTE — PROGRESS NOTES
Inpatient Nutrition Assessment    Admit Date: 3/15/2024   Total duration of encounter: 20 days   Patient Age: 69 y.o.    Nutrition Recommendation/Prescription     -Continue Heart Healthy Diet as tolerated.    -Assist with meals as needed.   -Encourage Pierre BID for wound healing.   -Continue MVI with minerals as medically feasible.   -Monitor wt, labs, and intake.    Communication of Recommendations: reviewed with patient    Nutrition Assessment     Malnutrition Assessment/Nutrition-Focused Physical Exam    Malnutrition Context: chronic illness (03/20/24 1317)  Malnutrition Level: severe (03/20/24 1317)  Energy Intake (Malnutrition): less than 75% for greater than or equal to 3 months (03/20/24 1317)  Weight Loss (Malnutrition): greater than 7.5% in 3 months (03/20/24 1317)  Subcutaneous Fat (Malnutrition): severe depletion (03/20/24 1317)  Orbital Region (Subcutaneous Fat Loss): severe depletion  Upper Arm Region (Subcutaneous Fat Loss): severe depletion     Muscle Mass (Malnutrition): severe depletion (03/20/24 1317)  [place] Region (Muscle Loss): severe depletion                       Fluid Accumulation (Malnutrition): other (see comments) (does not meet criteria) (03/20/24 1317)  Hand  Strength, Left (Malnutrition): unable to evaluate (03/20/24 1317)  Hand  Strength, Right (Malnutrition): unable to evaluate (03/20/24 1317)  A minimum of two characteristics is recommended for diagnosis of either severe or non-severe malnutrition.    Chart Review    Reason Seen: continuous nutrition monitoring and follow-up    Malnutrition Screening Tool Results   Have you recently lost weight without trying?: No  Have you been eating poorly because of a decreased appetite?: No   MST Score: 0   Diagnosis:  Stage 4 pressure ulcer to sacrum  Unstageable pressure ulcers to bilateral heels  Open wound/ulcers to right lateral lower leg and left lateral lower leg  Peripheral arterial disease - with history of vascular  intervention with Dr. Queen (most recent angio 3/13/24)  PVD  History of polymicrobial sacral wound culture  History of polymicrobial wound culture to leg wound  DM2  Anemia  History of smoking  Low prealbumin     Relevant Medical History: HTN, COPD, DM2 with neuropathy, PAD, chronic venous hypertension     Scheduled Medications:  amLODIPine, 10 mg, Daily  aspirin, 81 mg, Daily  cilostazoL, 50 mg, BID  clopidogreL, 75 mg, Daily  docusate sodium, 100 mg, BID  DULoxetine, 30 mg, Daily  enoxaparin, 40 mg, Daily  ferrous sulfate, 1 tablet, Daily  gabapentin, 300 mg, TID  gabapentin, 600 mg, QHS  LIDOcaine, 1 patch, Q24H  losartan, 100 mg, Daily  meropenem IV (PEDS and ADULTS), 1 g, Q8H  multivitamin, 1 tablet, Daily  oxyCODONE, 10 mg, Q4H  polyethylene glycol, 17 g, Daily  sodium chloride 0.9%, 10 mL, Q6H  vancomycin (VANCOCIN) IV (PEDS and ADULTS), 1,000 mg, Q12H    Continuous Infusions:   PRN Medications: acetaminophen, albuterol, aluminum-magnesium hydroxide-simethicone, HYDROcodone-acetaminophen, melatonin, simethicone, Flushing PICC/Midline Protocol **AND** sodium chloride 0.9% **AND** sodium chloride 0.9%, vancomycin - pharmacy to dose    Calorie Containing IV Medications: no significant kcals from medications at this time    Recent Labs   Lab 03/29/24  0431 03/30/24  0508 03/31/24  0512 04/01/24  0502 04/01/24  1913 04/02/24  0435 04/02/24  0757 04/03/24  0451 04/04/24  0531   NA  --  140  --   --   --   --  140  --  140   K  --  3.4*  --   --   --   --  3.9  --  4.0   CALCIUM  --  8.7*  --   --   --   --  9.0  --  8.9   CHLORIDE  --  107  --   --   --   --  107  --  105   CO2  --  26  --   --   --   --  26  --  27   BUN  --  9.7  --   --   --   --  6.6*  --  8.7   CREATININE  --  0.68*  --   --   --   --  0.75  --  0.72*   EGFRNORACEVR  --  >60  --   --   --   --  >60  --  >60   GLUCOSE  --  117*  --   --   --   --  92  --  86   BILITOT  --   --   --   --   --   --  0.2  --  0.2   ALKPHOS  --   --   --   --   --  "  --  77  --  77   ALT  --   --   --   --   --   --  6  --  6   AST  --   --   --   --   --   --  11  --  9   ALBUMIN  --   --   --   --   --   --  2.5*  --  2.5*   PREALB  --   --   --   --  12.6*  --   --   --   --    CRP  --   --   --   --   --   --   --   --  39.20*   WBC 7.12 7.20 6.53 6.22  --  6.03  --  6.40 5.37   HGB 7.6* 7.8* 7.5* 7.9*  --  7.5*  --  7.3* 8.0*   HCT 24.9* 25.6* 24.8* 25.8*  --  25.0*  --  24.7* 27.8*       Nutrition Orders:  Diet Heart Healthy      Appetite/Oral Intake: good/% of meals  Factors Affecting Nutritional Intake: none identified  Food/Anabaptist/Cultural Preferences: none reported  Food Allergies: no known food allergies  Last Bowel Movement: 04/02/24  Wound(s):     Altered Skin Integrity 01/22/24 1351 Sacral spine #4-Tissue loss description: Full thickness       Altered Skin Integrity 03/16/24 0800 Left anterior;lower Leg Arterial Ulcer-Tissue loss description: Full thickness heel ulcers, sacrum pressure ulcers    Comments    3/20: Pt reports current good intake/appetite but intake the past few months has been poor 2/2 disliking food from NH. Pt reports usual wt of ~170 lbs. Pt reports he would like oral supplements.     3/22: Pt reports good appetite/intake of meals, denies n/v; drinking some of the oral supplement but not taking the Pierre.     3/26: Pt NPO;  plans to go to OR today.     3/28: Pt eating well; denies n/v; states that he is not drinking oral supplement 2/2 being too full after he eats his meals; will d/c per his request. Pt is receptive to trying to drink the Pierre for wound healing.     4/1: Pt continues with good appetite/intake; tolerating diet. Pt reports that he likes the Pierre but is not taking it regularly- encouraged to take for wound healing and pt was receptive.     4/4: Pt reports good appetite and PO intake of meals. Not drinking much Pierre, large supply in his room noted. Pt denies GI complaints.     Anthropometrics    Height: 5' 9.02" (175.3 " cm), Height Method: Stated  Last Weight: 68 kg (150 lb) (24 1315), Weight Method: Standard Scale  BMI (Calculated): 22.1  BMI Classification: normal (BMI 18.5-24.9)        Ideal Body Weight (IBW), Male: 160.12 lb     % Ideal Body Weight, Male (lb): 93.68 %                 Usual Body Weight (UBW), k.27 kg  % Usual Body Weight: 88.24  % Weight Change From Usual Weight: -11.95 %  Usual Weight Provided By: patient    Wt Readings from Last 5 Encounters:   24 68 kg (150 lb)   24 68.5 kg (151 lb)   24 63.5 kg (140 lb)   23 72.6 kg (160 lb)   23 77.1 kg (170 lb)     Weight Change(s) Since Admission:   Wt Readings from Last 1 Encounters:   24 1315 68 kg (150 lb)   03/15/24 1621 68 kg (150 lb)   03/15/24 0307 68 kg (150 lb)   Admit Weight: 68 kg (150 lb) (03/15/24 0307), Weight Method: Stated  3/28-: no new wt noted    Estimated Needs    Weight Used For Calorie Calculations: 68 kg (149 lb 14.6 oz)  Energy Calorie Requirements (kcal): 0205-6311 kcal (30-35 kcal/kg)  Energy Need Method: Kcal/kg  Weight Used For Protein Calculations: 68 kg (149 lb 14.6 oz)  Protein Requirements: 102 gm (1.5g/kg)  Fluid Requirements (mL): 2040 mL    Enteral Nutrition     Patient not receiving enteral nutrition at this time.    Parenteral Nutrition     Patient not receiving parenteral nutrition support at this time.    Evaluation of Received Nutrient Intake    Calories: meeting estimated needs  Protein: meeting estimated needs    Patient Education     Not applicable.    Nutrition Diagnosis     PES: Increased nutrient needs (protein) related to increased protein energy demand for wound healing as evidenced by stage 4 pressure ulcer. (active)     PES: Severe chronic disease or condition related malnutrition related to suboptimal protein/energy intake as evidenced by less than 75% needs met for greater than or equal to 3 months, severe fat depletion, severe muscle depletion, and greater than 7.5%  weight loss in 3 months. (active)    Nutrition Interventions     Intervention(s): general/healthful diet, commercial beverage, commercial food, multivitamin/mineral supplement therapy, and collaboration with other providers    Goal: Meet greater than 80% of nutritional needs by follow-up. (goal progressing)  Goal: Maintain weight throughout hospitalization. (goal progressing)    Nutrition Goals & Monitoring     Dietitian will monitor: energy intake and weight    Nutrition Risk/Follow-Up: high (follow-up in 1-4 days)   Please consult if re-assessment needed sooner.

## 2024-04-04 NOTE — PROGRESS NOTES
Pharmacokinetic Assessment Follow Up: IV Vancomycin    Vancomycin serum concentration assessment(s):    The trough level was drawn correctly and can be used to guide therapy at this time. The measurement is within the desired definitive target range of 15 to 20 mcg/mL.    Vancomycin Regimen Plan:    Continue regimen to Vancomycin 1000 mg IV every q12h hours with next serum trough concentration measured at 0900 prior to 4th dose on 04/06    Scheduled Administration Times        Drug levels (last 3 results):  Recent Labs   Lab Result Units 04/04/24  0842   Vancomycin Trough ug/ml 17.1       Vancomycin Administrations:  vancomycin given in the last 96 hours                     vancomycin in dextrose 5 % 1 gram/250 mL IVPB 1,000 mg (mg) 1,000 mg New Bag 04/04/24 0912     1,000 mg New Bag 04/03/24 2159     1,000 mg New Bag  0946     1,000 mg New Bag 04/02/24 2225     1,000 mg New Bag  1008     1,000 mg New Bag 04/01/24 2247     1,000 mg New Bag  1135     1,000 mg New Bag 03/31/24 2202                    Pharmacy will continue to follow and monitor vancomycin.    Please contact pharmacy at extension [phone] for questions regarding this assessment.    Thank you for the consult,   Yanci Joseph       Patient brief summary:  Shaheen Christie is a 69 y.o. male initiated on antimicrobial therapy with IV Vancomycin for treatment of bone/joint infection        Drug Allergies:   Review of patient's allergies indicates:  No Known Allergies    Actual Body Weight:  Wt Readings from Last 1 Encounters:   03/20/24 68 kg (150 lb)       Renal Function:   Estimated Creatinine Clearance: 90.5 mL/min (A) (based on SCr of 0.72 mg/dL (L)).,     Dialysis Method (if applicable):  N/A    CBC (last 72 hours):  Recent Labs   Lab Result Units 04/02/24  0435 04/03/24  0451 04/04/24  0531   WBC x10(3)/mcL 6.03 6.40 5.37   Hgb g/dL 7.5* 7.3* 8.0*   Hct % 25.0* 24.7* 27.8*   Platelet x10(3)/mcL 337 283 337   Mono % % 11.8 11.4 11.9   Eos % % 10.9 11.7 13.0    Basophil % % 1.5 1.6 1.5       Metabolic Panel (last 72 hours):  Recent Labs   Lab Result Units 04/02/24  0757 04/04/24  0531   Sodium Level mmol/L 140 140   Potassium Level mmol/L 3.9 4.0   Chloride mmol/L 107 105   Carbon Dioxide mmol/L 26 27   Glucose Level mg/dL 92 86   Blood Urea Nitrogen mg/dL 6.6* 8.7   Creatinine mg/dL 0.75 0.72*   Albumin Level g/dL 2.5* 2.5*   Bilirubin Total mg/dL 0.2 0.2   Alkaline Phosphatase unit/L 77 77   Aspartate Aminotransferase unit/L 11 9   Alanine Aminotransferase unit/L 6 6       Microbiologic Results:  Microbiology Results (last 7 days)       Procedure Component Value Units Date/Time    Tissue Culture - Aerobic [[id number]]  (Abnormal)  (Susceptibility) Collected: 03/26/24 1833    Order Status: Completed Specimen: Tissue from Foot, Heel Left Updated: 03/29/24 1102     CULTURE, TISSUE- AEROBIC ([facility]) Few Proteus mirabilis ESBL    Anaerobic Culture [[id number]] Collected: 03/26/24 1833    Order Status: Completed Specimen: Tissue from Foot, Heel Left Updated: 03/29/24 0733     Anaerobe Culture No Anaerobes Isolated

## 2024-04-04 NOTE — PROGRESS NOTES
[facility] General - 8th Floor Med Surg  Wound Care  Progress Note    Patient Name: Shaheen Christie  MRN: [id number]  Admission Date: 3/15/2024  Hospital Length of Stay: 20 days  Attending Physician: Enzo Thibodeaux MD  Primary Care Provider: Viktor Russ MD     Subjective:     HPI:  Wound medicine Re-check - Sacral ulcer    The patient is a 69 year old WM, with past medical history of HTN, COPD, DM2 with neuropathy, PAD, chronic venous hypertension. He was sent  to [facility] on 3/15/24 from [facility] for evaluation of sacral wound due to recent diagnosis of osteomyelitis involving the sacrum on 2/27/24. Previous plans for LTAC placement were not successful; he will require assistance with placement for continuing IV antibiotics post discharge.   Review of records reveals + culture of left leg wound with Pseudomonas, Providencia, ESBL Proteus on 2/8/24 and + sacral wound culture with MRSA, Providencia, Enterococcus and Pseudomonas. 2/27/24 MRI of sacrum shows osteomyelitis involving the sacrococcygeal bone.   He presents without systemic signs of infection. He was evaluated by surgery (3/15/24), no plan for surgical intervention at this time.   ID consulted; and guiding antibiotic stewardship.   He was seen in the outpatient wound care clinic here at [facility] form 9/2022 - 2/2023 for ulcer of left ankle.   He is currently with multiple chronic appearing wounds to BLE and sacrum. He has been seen by Dr. Queen with work up and intervention for occlusion of LLE in the past. Most recent peripheral angiography performed on [facility] (3/13/24) per Dr. Queen. Found to have non critical stenosis in the left SFA. Right SFA with stenosis however with excellent three-vessel runoff. (Wounds to be medically managed)    Wound medicine has been consulted in addition to WOCN for evaluation.  First evaluation compelted on 3/19/24. Found to have severe BLE unstageable pressure injuries to both lateral legs and  both heels and chronic sacral pressure ulcer.   Podiatry consulted and following - Surgical debridement of BLE done on 3/26/24  Wound Vac applied on 3/28/24  Wound recheck done today, 4/4/24. Met patient in his room, 804, awake, alert and agreeable to wound assessment and treatment. Accompanied by WOCN for wound vac change. Lying on low air loss mattress,  without heel lift boots; Bilateral lower legs with dressings, intact some drainage on left heel.  Ongoing complaints of bilateral knee pain causing difficulty with turning and positioning/offloading.         Hospital Course:   No notes on file      Follow-up For: Procedure(s) (LRB):  DEBRIDEMENT, FOOT (Bilateral)    Post-Operative Day: 9 Days Post-Op    Scheduled Meds:   amLODIPine  10 mg Oral Daily    aspirin  81 mg Oral Daily    cilostazoL  50 mg Oral BID    clopidogreL  75 mg Oral Daily    docusate sodium  100 mg Oral BID    DULoxetine  30 mg Oral Daily    enoxaparin  40 mg Subcutaneous Daily    ferrous sulfate  1 tablet Oral Daily    gabapentin  300 mg Oral TID    gabapentin  600 mg Oral QHS    LIDOcaine  1 patch Transdermal Q24H    losartan  100 mg Oral Daily    meropenem IV (PEDS and ADULTS)  1 g Intravenous Q8H    multivitamin  1 tablet Oral Daily    oxyCODONE  10 mg Oral Q4H    polyethylene glycol  17 g Oral Daily    sodium chloride 0.9%  10 mL Intravenous Q6H    vancomycin (VANCOCIN) IV (PEDS and ADULTS)  1,000 mg Intravenous Q12H     Continuous Infusions:  PRN Meds:acetaminophen, albuterol, aluminum-magnesium hydroxide-simethicone, HYDROcodone-acetaminophen, melatonin, simethicone, Flushing PICC/Midline Protocol **AND** sodium chloride 0.9% **AND** sodium chloride 0.9%, vancomycin - pharmacy to dose    Review of Systems   Constitutional:  Positive for activity change. Negative for chills and fever.   HENT:          Hard of hearing     Musculoskeletal:  Positive for arthralgias, back pain and joint swelling.   Skin:  Positive for wound.     Objective:      Vital Signs (Most Recent):  Temp: 97.6 °F (36.4 °C) (04/04/24 1143)  Pulse: 86 (04/04/24 1143)  Resp: 16 (04/04/24 1303)  BP: 130/67 (04/04/24 1143)  SpO2: 96 % (04/04/24 1143) Vital Signs (24h Range):  Temp:  [97.4 °F (36.3 °C)-98.6 °F (37 °C)] 97.6 °F (36.4 °C)  Pulse:  [60-86] 86  Resp:  [16-18] 16  SpO2:  [91 %-96 %] 96 %  BP: (102-141)/(58-78) 130/67     Weight: 68 kg (150 lb)  Body mass index is 22.14 kg/m².     Physical Exam  Vitals reviewed.   Constitutional:       General: He is awake. He is not in acute distress.     Appearance: Normal appearance. He is ill-appearing (chronic). He is not toxic-appearing.      Comments: Muscle wasting BLE    HENT:      Head: Normocephalic and atraumatic.   Cardiovascular:      Rate and Rhythm: Normal rate.   Pulmonary:      Effort: Pulmonary effort is normal. No respiratory distress.   Musculoskeletal:         General: Swelling and tenderness present.   Skin:     General: Skin is warm and dry.      Capillary Refill: Capillary refill takes less than 2 seconds.      Findings: Wound present.      Comments: Dressings intact to BLE with some drainage to left heel/dressings not changed yet today. Wounds not visualized    Sacrum: Large stage 4 pressure ulcer to sacrum extending further toward left side. Wound vac removed, wound bed beefy red with small areas of slough to left lateral and medial portion of wound. Some ligamentous material exposed in center of wound. No purulent drainage or foul odor noted to wound. Small denuded area with opening lateral to lower right savanna-wound skin; mild maceration of savanna-wound skin   Neurological:      Mental Status: He is alert and oriented to person, place, and time.      Motor: Weakness present.   Psychiatric:         Mood and Affect: Mood normal.         Behavior: Behavior normal. Behavior is cooperative.       Sacrum: 8 x 18 x 2 cm with undermining from 12 o'clock to 3 o'clock  with deepest 1 cm.          Laboratory:  A1C:   Recent  "Labs   Lab 02/02/24  0445   HGBA1C 5.0     ABGs: No results for input(s): "PH", "PCO2", "HCO3", "POCSATURATED", "BE" in the last 168 hours.  Blood Cultures: No results for input(s): "LABBLOO" in the last 48 hours.  BMP:   Recent Labs   Lab 04/04/24  0531      K 4.0   CO2 27   BUN 8.7   CREATININE 0.72*   CALCIUM 8.9     CBC:   Recent Labs   Lab 04/04/24 0531   WBC 5.37   RBC 3.09*   HGB 8.0*   HCT 27.8*      MCV 90.0   MCH 25.9*   MCHC 28.8*     CMP:   Recent Labs   Lab 04/04/24  0531   CALCIUM 8.9   ALBUMIN 2.5*      K 4.0   CO2 27   BUN 8.7   CREATININE 0.72*   ALKPHOS 77   ALT 6   AST 9   BILITOT 0.2     Coagulation: No results for input(s): "PT", "INR", "APTT" in the last 168 hours.  CPS: {:LNK,LABRCNTIP[  CRP:   Recent Labs   Lab 04/04/24 0531   CRP 39.20*     ESR:   Recent Labs   Lab 04/04/24 0531   SEDRATE 29*     LFTs:   Recent Labs   Lab 04/04/24 0531   ALT 6   AST 9   ALKPHOS 77   BILITOT 0.2   ALBUMIN 2.5*     Prealbumin: No results for input(s): "PREALBUMIN" in the last 48 hours.  Wound Cultures: No results for input(s): "LABAERO" in the last 4320 hours.  Microbiology Results (last 7 days)       Procedure Component Value Units Date/Time    Tissue Culture - Aerobic [[id number]]  (Abnormal)  (Susceptibility) Collected: 03/26/24 1833    Order Status: Completed Specimen: Tissue from Foot, Heel Left Updated: 03/29/24 1102     CULTURE, TISSUE- AEROBIC ([facility]) Few Proteus mirabilis ESBL    Anaerobic Culture [[id number]] Collected: 03/26/24 1833    Order Status: Completed Specimen: Tissue from Foot, Heel Left Updated: 03/29/24 0733     Anaerobe Culture No Anaerobes Isolated          Specimen (24h ago, onward)      None          No results for input(s): "COLORU", "CLARITYU", "SPECGRAV", "PHUR", "PROTEINUA", "GLUCOSEU", "BILIRUBINCON", "BLOODU", "WBCU", "RBCU", "BACTERIA", "MUCUS", "NITRITE", "LEUKOCYTESUR", "UROBILINOGEN", "HYALINECASTS" in the last 168 hours.    Diagnostic Results:  I have " reviewed all pertinent imaging results/findings within the past 24 hours.    Assessment/Plan:     Stage 4 pressure ulcer to sacrum - with evidence of osteomyelitis admitted for initiation of IV antibiotics.   Unstageable pressure ulcers to bilateral heels - Podiatry consulted and following - surgical debridement on 3/26/24  Open wound/ulcers to right lateral lower leg and left lateral lower leg - podiatry following  Peripheral arterial disease - with history of vascular intervention with Dr. Queen (most recent angio 3/13/24; medical management)   PVD  History of polymicrobial sacral wound culture  History of polymicrobial wound culture to leg wound  DM2  Anemia  History of smoking  Low prealbumin      PLAN:     Chart reviewed, patient examined and wounds assessed.  Opinion: The patient has a large stage 4 pressure ulcer to his sacrum that does not appear to be infected;  Wound vac initiated on 3/28/24; tolerating well. Will continue with Wound Vac. Awaiting LTAC placement  BLE Dressings in place followed by Podiatry - did not visualize wounds today.   Patients bed mobility is severely limited due to pain and discomfort associated with movement. This is affecting his ability to properly offload; which will affect ability to heal his wounds. Educated patient on importance of aggressive offloading, verbalizes understanding. Staff reports some refusal with repositioning at times.   Wound care orders: Wound Vac  Monitor for signs & symptoms of deterioration  Offloading of sacrum/buttocks/heels at all times: BRADEN mattress, turning q 2 hrs; use of wedges and heel offloading devices to be used at all times while in bed; ( SALONI Arellano).   Incontinence: control moisture/wound contamination: No briefs  Nutrition: Recommend aggressive nutritional support, protein supplementation along with vitamin and mineral supplements  and petr to support wound healing; Low prealbumin - 13.8 on 3/19/24, follow dietitian  recommendation  Diabetes: A1C good  Will try to follow weekly while admitted, but every nurse assigned to patient on every shift of every day needs to address daily wound care dressing changes and offloading modalities including using heel offloading devices, wedges, BRADEN mattress etc        The time spent including preparing to see the patient, obtaining patient history and assessment, evaluation of the plan of care, patient/caregiver counseling and education, orders, documentation, coordination of care, and other professional medical management activities for today's encounter was 30 minutes.         ANGEL Chavira  Wound Care  [facility] - 8th Floor Med Surg     no chest pain, no cough, and no shortness of breath.

## 2024-04-04 NOTE — PROGRESS NOTES
Infectious Diseases Progress Note  69-year-old male, nursing home resident, with past medical history of HTN, COPD, DM type 2, with neuropathy, is admitted to [facility] on 03/15/2024 sent via EMS for sacral wound evaluation, apparently diagnosed with osteomyelitis involving the sacrum on 02/27/2024 with plan to be admitted to LTAC which had not been successful as an outpatient.  On presentation he was noted to have no fevers and no leukocytosis, anemic with low albumin.  Blood cultures have been negative.  Review of his records show a 02/08/2024 left leg wound culture with Pseudomonas, Providencia, ESBL Proteus and 11/30/2023 sacral wound culture with MRSA, Providencia, Enterococcus and Pseudomonas.  2/27 MRI of the pelvis shows osteomyelitis involving the sacrococcygeal bone as well as right trochanteric bursitis which may be septic.  He was seen by surgery team with no plan for surgical intervention.  He is on antibiotic coverage with vancomycin and Zosyn.     Subjective:  Lying in bed in no acute distress. No new complaints voiced, doing about the same. Afebrile.       Past Medical History:   Diagnosis Date    COPD (chronic obstructive pulmonary disease)     Depression     Hypertension     Neuropathy      Past Surgical History:   Procedure Laterality Date    angiogram Bilateral     stents placed in left leg    anterior neck surgery      ARTHROTOMY OF ANKLE Left 6/28/2023    Procedure: ARTHROTOMY, ANKLE;  Surgeon: Tom Nichole DPM;  Location: [facility];  Service: Podiatry;  Laterality: Left;    cataracts Left     CHOLECYSTECTOMY      DEBRIDEMENT OF FOOT Bilateral 3/26/2024    Procedure: DEBRIDEMENT, FOOT;  Surgeon: Tom Nichole DPM;  Location: [facility] OR;  Service: Podiatry;  Laterality: Bilateral;    EYE SURGERY Left     HAND SURGERY Left     broken bone    herina repair      INCISION AND DRAINAGE, LOWER EXTREMITY Left 7/28/2023    Procedure: INCISION AND DRAINAGE, LOWER  EXTREMITY;  Surgeon: Avinash Garces MD;  Location: [facility] OR;  Service: Orthopedics;  Laterality: Left;  Supine, vascular bed, bone foam  last case  cysto tubing, laparoscopic trocar, experience, vanc, hemovac drain    KNEE SURGERY Bilateral     PERIPHERAL ANGIOGRAPHY N/A 3/13/2024    Procedure: Peripheral angiography;  Surgeon: Deven Queen MD;  Location: [facility] CATH LAB;  Service: Cardiology;  Laterality: N/A;  MICHELLE ANGIO W/ ANEST.    posterior neck surgery      SPINE SURGERY      TOTAL REPLACEMENT OF BOTH HIP JOINTS USING COMPUTER-ASSISTED NAVIGATION Bilateral      Social History     Socioeconomic History    Marital status:    Occupational History    Occupation: retired   Tobacco Use    Smoking status: Former     Average packs/day: 0.5 packs/day for 16.3 years (7.5 ttl pk-yrs)     Types: Cigarettes     Start date: 2008    Smokeless tobacco: Never   Substance and Sexual Activity    Alcohol use: Yes     Alcohol/week: 3.0 - 6.0 standard drinks of alcohol     Types: 3 - 6 Cans of beer per week    Drug use: Yes     Types: Marijuana    Sexual activity: Not Currently       ROS  Constitutional:  Positive for malaise/fatigue.   HENT: Negative.     Respiratory: Negative.     Gastrointestinal: Negative.    Genitourinary: Negative.    Musculoskeletal: Negative.    Skin: Multiple pressure wounds   Neurological:  Positive for weakness.   Endo/Heme/Allergies: Negative.    Psychiatric/Behavioral: Negative.     All other Systems review done and negative.    Review of patient's allergies indicates:  No Known Allergies      Scheduled Meds:   amLODIPine  10 mg Oral Daily    aspirin  81 mg Oral Daily    cilostazoL  50 mg Oral BID    clopidogreL  75 mg Oral Daily    docusate sodium  100 mg Oral BID    DULoxetine  30 mg Oral Daily    enoxaparin  40 mg Subcutaneous Daily    ferrous sulfate  1 tablet Oral Daily    gabapentin  300 mg Oral TID    gabapentin  600 mg Oral QHS    LIDOcaine  1 patch Transdermal Q24H    losartan  100 mg  "Oral Daily    multivitamin  1 tablet Oral Daily    oxyCODONE  10 mg Oral Q4H    piperacillin-tazobactam (Zosyn) IV (PEDS and ADULTS) (extended infusion is not appropriate)  4.5 g Intravenous Q8H    polyethylene glycol  17 g Oral Daily    sodium chloride 0.9%  10 mL Intravenous Q6H    vancomycin (VANCOCIN) IV (PEDS and ADULTS)  1,000 mg Intravenous Q12H     Continuous Infusions:  PRN Meds:acetaminophen, albuterol, aluminum-magnesium hydroxide-simethicone, HYDROcodone-acetaminophen, melatonin, simethicone, Flushing PICC/Midline Protocol **AND** sodium chloride 0.9% **AND** sodium chloride 0.9%, vancomycin - pharmacy to dose    Objective:  BP (!) 102/58   Pulse 76   Temp 98.5 °F (36.9 °C) (Oral)   Resp 16   Ht 5' 9.02" (1.753 m)   Wt 68 kg (150 lb)   SpO2 (!) 91%   BMI 22.14 kg/m²     Physical Exam:   Physical Exam  Vitals reviewed.   Constitutional:       General: He is not in acute distress.  HENT:      Head: Normocephalic and atraumatic.   Cardiovascular:      Rate and Rhythm: Normal rate and regular rhythm.      Heart sounds: Normal heart sounds.   Pulmonary:      Effort: Pulmonary effort is normal. No respiratory distress.      Breath sounds: Normal breath sounds.   Abdominal:      General: Bowel sounds are normal. There is no distension.      Palpations: Abdomen is soft.      Tenderness: There is no abdominal tenderness.   Musculoskeletal:         General: No deformity.      Cervical back: Neck supple.   Skin:     Findings: No erythema or rash.      Comments: Wounds dressed   Neurological:      Mental Status: He is alert and oriented to person, place, and time.   Psychiatric:      Comments: Calm and cooperative     Imaging  Imaging Results    None          Lab Review   Recent Results (from the past 24 hour(s))   CBC with Differential    Collection Time: 04/03/24  4:51 AM   Result Value Ref Range    WBC 6.40 4.50 - 11.50 x10(3)/mcL    RBC 2.73 (L) 4.70 - 6.10 x10(6)/mcL    Hgb 7.3 (L) 14.0 - 18.0 g/dL    " Hct 24.7 (L) 42.0 - 52.0 %    MCV 90.5 80.0 - 94.0 fL    MCH 26.7 (L) 27.0 - 31.0 pg    MCHC 29.6 (L) 33.0 - 36.0 g/dL    RDW 18.6 (H) 11.5 - 17.0 %    Platelet 283 130 - 400 x10(3)/mcL    MPV 8.3 7.4 - 10.4 fL    Neut % 51.7 %    Lymph % 23.4 %    Mono % 11.4 %    Eos % 11.7 %    Basophil % 1.6 %    Lymph # 1.50 0.6 - 4.6 x10(3)/mcL    Neut # 3.31 2.1 - 9.2 x10(3)/mcL    Mono # 0.73 0.1 - 1.3 x10(3)/mcL    Eos # 0.75 0 - 0.9 x10(3)/mcL    Baso # 0.10 <=0.2 x10(3)/mcL    IG# 0.01 0 - 0.04 x10(3)/mcL    IG% 0.2 %    NRBC% 0.0 %         Assessment/Plan:  1. Chronic sacral pressure wound with osteomyelitis  2. History of polymicrobial sacral wound culture-MRSA, Providencia, Pseudomonas, Enterococcus  3.  Chronic left lower extremity wound with history of polymicrobial wound culture-Pseudomonas, ESBL Proteus, Providencia  4. Multiple pressure wounds  5.  Diabetes type 2  6.  Anemia  7.  Protein calorie malnutrition      -Continue vancomycin and D/C Zosyn. Place on [place], plan end date of 04/26, following inflammatory markers  -No fevers and no leukocytosis  -3/19 ESR 12 and CRP 23.7, follow   -3/15 blood cultures negative  -2/8 left leg wound cultures with many Pseudomonas, Providencia, Proteus  -11/30/2023 sacral wound with many Providencia, MRSA, Enterococcus, Pseudomonas  -Seen by the surgery team with inputs noted including no plan for surgical intervention   -Podiatry on board s/p debridement 3/26, cultures revealed ESBL Proteus  -We will continue aggressive wound care per Podiatry and the wound care team  -Discussed with patient and nursing staff. Case management working on LTAC placement. Disposition per primary team

## 2024-04-05 LAB
ALBUMIN SERPL-MCNC: 2.3 G/DL (ref 3.4–4.8)
ALBUMIN/GLOB SERPL: 0.8 RATIO (ref 1.1–2)
ALP SERPL-CCNC: 76 UNIT/L (ref 40–150)
ALT SERPL-CCNC: 7 UNIT/L (ref 0–55)
AST SERPL-CCNC: 9 UNIT/L (ref 5–34)
BASOPHILS # BLD AUTO: 0.05 X10(3)/MCL
BASOPHILS NFR BLD AUTO: 0.7 %
BILIRUB SERPL-MCNC: 0.1 MG/DL
BUN SERPL-MCNC: 10.2 MG/DL (ref 8.4–25.7)
CALCIUM SERPL-MCNC: 8.7 MG/DL (ref 8.8–10)
CHLORIDE SERPL-SCNC: 105 MMOL/L (ref 98–107)
CO2 SERPL-SCNC: 24 MMOL/L (ref 23–31)
CREAT SERPL-MCNC: 0.62 MG/DL (ref 0.73–1.18)
EOSINOPHIL # BLD AUTO: 1.05 X10(3)/MCL (ref 0–0.9)
EOSINOPHIL NFR BLD AUTO: 14.1 %
ERYTHROCYTE [DISTWIDTH] IN BLOOD BY AUTOMATED COUNT: 18.3 % (ref 11.5–17)
GFR SERPLBLD CREATININE-BSD FMLA CKD-EPI: >60 MLS/MIN/1.73/M2
GLOBULIN SER-MCNC: 2.9 GM/DL (ref 2.4–3.5)
GLUCOSE SERPL-MCNC: 127 MG/DL (ref 82–115)
HCT VFR BLD AUTO: 29 % (ref 42–52)
HGB BLD-MCNC: 8.3 G/DL (ref 14–18)
IMM GRANULOCYTES # BLD AUTO: 0.02 X10(3)/MCL (ref 0–0.04)
IMM GRANULOCYTES NFR BLD AUTO: 0.3 %
LYMPHOCYTES # BLD AUTO: 0.63 X10(3)/MCL (ref 0.6–4.6)
LYMPHOCYTES NFR BLD AUTO: 8.5 %
MCH RBC QN AUTO: 25.9 PG (ref 27–31)
MCHC RBC AUTO-ENTMCNC: 28.6 G/DL (ref 33–36)
MCV RBC AUTO: 90.6 FL (ref 80–94)
MONOCYTES # BLD AUTO: 0.85 X10(3)/MCL (ref 0.1–1.3)
MONOCYTES NFR BLD AUTO: 11.4 %
NEUTROPHILS # BLD AUTO: 4.85 X10(3)/MCL (ref 2.1–9.2)
NEUTROPHILS NFR BLD AUTO: 65 %
NRBC BLD AUTO-RTO: 0 %
PLATELET # BLD AUTO: 364 X10(3)/MCL (ref 130–400)
PMV BLD AUTO: 8.5 FL (ref 7.4–10.4)
POTASSIUM SERPL-SCNC: 3.6 MMOL/L (ref 3.5–5.1)
PROT SERPL-MCNC: 5.2 GM/DL (ref 5.8–7.6)
RBC # BLD AUTO: 3.2 X10(6)/MCL (ref 4.7–6.1)
SODIUM SERPL-SCNC: 137 MMOL/L (ref 136–145)
WBC # SPEC AUTO: 7.45 X10(3)/MCL (ref 4.5–11.5)

## 2024-04-05 PROCEDURE — 63600175 PHARM REV CODE 636 W HCPCS: Performed by: PODIATRIST

## 2024-04-05 PROCEDURE — 25000003 PHARM REV CODE 250: Performed by: PODIATRIST

## 2024-04-05 PROCEDURE — 25000003 PHARM REV CODE 250: Performed by: INTERNAL MEDICINE

## 2024-04-05 PROCEDURE — 85025 COMPLETE CBC W/AUTO DIFF WBC: CPT | Performed by: INTERNAL MEDICINE

## 2024-04-05 PROCEDURE — 11000001 HC ACUTE MED/SURG PRIVATE ROOM

## 2024-04-05 PROCEDURE — 80053 COMPREHEN METABOLIC PANEL: CPT | Performed by: INTERNAL MEDICINE

## 2024-04-05 PROCEDURE — A4216 STERILE WATER/SALINE, 10 ML: HCPCS | Performed by: INTERNAL MEDICINE

## 2024-04-05 PROCEDURE — 25000003 PHARM REV CODE 250: Performed by: NURSE PRACTITIONER

## 2024-04-05 PROCEDURE — 21400001 HC TELEMETRY ROOM

## 2024-04-05 PROCEDURE — 63600175 PHARM REV CODE 636 W HCPCS: Performed by: NURSE PRACTITIONER

## 2024-04-05 RX ADMIN — MEROPENEM 1 G: 1 INJECTION, POWDER, FOR SOLUTION INTRAVENOUS at 05:04

## 2024-04-05 RX ADMIN — FERROUS SULFATE TAB 325 MG (65 MG ELEMENTAL FE) 1 EACH: 325 (65 FE) TAB at 10:04

## 2024-04-05 RX ADMIN — SODIUM CHLORIDE, PRESERVATIVE FREE 10 ML: 5 INJECTION INTRAVENOUS at 12:04

## 2024-04-05 RX ADMIN — CILOSTAZOL 50 MG: 50 TABLET ORAL at 09:04

## 2024-04-05 RX ADMIN — CLOPIDOGREL BISULFATE 75 MG: 75 TABLET ORAL at 10:04

## 2024-04-05 RX ADMIN — OXYCODONE HYDROCHLORIDE 10 MG: 10 TABLET ORAL at 05:04

## 2024-04-05 RX ADMIN — GABAPENTIN 600 MG: 300 CAPSULE ORAL at 09:04

## 2024-04-05 RX ADMIN — SODIUM CHLORIDE, PRESERVATIVE FREE 10 ML: 5 INJECTION INTRAVENOUS at 05:04

## 2024-04-05 RX ADMIN — GABAPENTIN 300 MG: 300 CAPSULE ORAL at 02:04

## 2024-04-05 RX ADMIN — LOSARTAN POTASSIUM 100 MG: 50 TABLET, FILM COATED ORAL at 10:04

## 2024-04-05 RX ADMIN — OXYCODONE HYDROCHLORIDE 10 MG: 10 TABLET ORAL at 09:04

## 2024-04-05 RX ADMIN — VANCOMYCIN HYDROCHLORIDE 1000 MG: 1 INJECTION, POWDER, LYOPHILIZED, FOR SOLUTION INTRAVENOUS at 10:04

## 2024-04-05 RX ADMIN — CILOSTAZOL 50 MG: 50 TABLET ORAL at 10:04

## 2024-04-05 RX ADMIN — MEROPENEM 1 G: 1 INJECTION, POWDER, FOR SOLUTION INTRAVENOUS at 10:04

## 2024-04-05 RX ADMIN — AMLODIPINE BESYLATE 10 MG: 5 TABLET ORAL at 10:04

## 2024-04-05 RX ADMIN — GABAPENTIN 300 MG: 300 CAPSULE ORAL at 10:04

## 2024-04-05 RX ADMIN — LIDOCAINE 5% 1 PATCH: 700 PATCH TOPICAL at 10:04

## 2024-04-05 RX ADMIN — OXYCODONE HYDROCHLORIDE 10 MG: 10 TABLET ORAL at 02:04

## 2024-04-05 RX ADMIN — OXYCODONE HYDROCHLORIDE 10 MG: 10 TABLET ORAL at 10:04

## 2024-04-05 RX ADMIN — GABAPENTIN 300 MG: 300 CAPSULE ORAL at 09:04

## 2024-04-05 RX ADMIN — THERA TABS 1 TABLET: TAB at 10:04

## 2024-04-05 RX ADMIN — DOCUSATE SODIUM 100 MG: 100 CAPSULE, LIQUID FILLED ORAL at 10:04

## 2024-04-05 RX ADMIN — DOCUSATE SODIUM 100 MG: 100 CAPSULE, LIQUID FILLED ORAL at 09:04

## 2024-04-05 RX ADMIN — ENOXAPARIN SODIUM 40 MG: 40 INJECTION SUBCUTANEOUS at 05:04

## 2024-04-05 RX ADMIN — MEROPENEM 1 G: 1 INJECTION, POWDER, FOR SOLUTION INTRAVENOUS at 12:04

## 2024-04-05 RX ADMIN — ASPIRIN 81 MG CHEWABLE TABLET 81 MG: 81 TABLET CHEWABLE at 10:04

## 2024-04-05 RX ADMIN — DULOXETINE HYDROCHLORIDE 30 MG: 30 CAPSULE, DELAYED RELEASE ORAL at 10:04

## 2024-04-05 NOTE — PROGRESS NOTES
[facility] - 8th Floor [facility] Medicine  Progress Note    Patient Name: Shaheen Christie  MRN: [id number]  Patient Class: IP- Inpatient   Admission Date: 3/15/2024  Length of Stay: 21 days  Attending Physician: Enzo Thibodeaux MD  Primary Care Provider: Viktor Russ MD        Subjective:     Principal Problem:Osteomyelitis    Interval History:  No complaints.    Review of Systems  Objective:     Vital Signs (Most Recent):  Temp: 98.8 °F (37.1 °C) (04/05/24 1102)  Pulse: 84 (04/05/24 1102)  Resp: 19 (04/05/24 1102)  BP: 129/70 (04/05/24 1102)  SpO2: (!) 92 % (04/05/24 1102) Vital Signs (24h Range):  Temp:  [97.3 °F (36.3 °C)-98.8 °F (37.1 °C)] 98.8 °F (37.1 °C)  Pulse:  [] 84  Resp:  [16-19] 19  SpO2:  [91 %-96 %] 92 %  BP: (102-137)/(43-70) 129/70     Weight: 68 kg (150 lb)  Body mass index is 22.14 kg/m².    Intake/Output Summary (Last 24 hours) at 4/5/2024 1127  Last data filed at 4/5/2024 1102  Gross per 24 hour   Intake 1640 ml   Output 2300 ml   Net -660 ml      Physical Exam  HENT:      Head: Normocephalic and atraumatic.      Right Ear: External ear normal.      Left Ear: External ear normal.      Mouth/Throat:      Mouth: Mucous membranes are dry.   Cardiovascular:      Rate and Rhythm: Normal rate and regular rhythm.      Pulses: Normal pulses.      Heart sounds: Normal heart sounds.   Pulmonary:      Effort: Pulmonary effort is normal.      Breath sounds: Normal breath sounds.   Abdominal:      General: Abdomen is flat. Bowel sounds are normal.      Palpations: Abdomen is soft.   Musculoskeletal:      Cervical back: Neck supple.   Neurological:      General: No focal deficit present.      Mental Status: He is alert and oriented to person, place, and time. Mental status is at baseline.           Overview/Hospital Course:  Hemoglobin improving with proper supplementation and diet.  Wound care still seeing patient.  Of course we will need more debridement as per our  plan previously stated.  This patient deserves to be an LTAC to give him a chance to be who keep his foot.  Hopefully we will get him there sooner than later.    Significant Labs: All pertinent labs within the past 24 hours have been reviewed.  BMP:   Recent Labs   Lab 04/05/24  0532      K 3.6   CO2 24   BUN 10.2   CREATININE 0.62*   CALCIUM 8.7*     CBC:   Recent Labs   Lab 04/04/24  0531 04/05/24  0532   WBC 5.37 7.45   HGB 8.0* 8.3*   HCT 27.8* 29.0*    364     CMP:   Recent Labs   Lab 04/04/24  0531 04/05/24  0532    137   K 4.0 3.6   CO2 27 24   BUN 8.7 10.2   CREATININE 0.72* 0.62*   CALCIUM 8.9 8.7*   ALBUMIN 2.5* 2.3*   BILITOT 0.2 0.1   ALKPHOS 77 76   AST 9 9   ALT 6 7       Significant Imaging: I have reviewed all pertinent imaging results/findings within the past 24 hours.    Assessment/Plan:      Active Diagnoses:    Diagnosis Date Noted POA    PRINCIPAL PROBLEM:  Osteomyelitis [M86.9] 03/15/2024 Yes    Knee effusion, left [M25.462] 03/31/2024 No    Pressure injury of sacral region, stage 4 [L89.154] 03/19/2024 Yes    Skin ulcer of right lower leg [L97.919] 03/19/2024 Yes    Skin ulcer of left lower leg [L97.929] 03/19/2024 Yes    Unstageable pressure ulcer of right heel [L89.610] 03/19/2024 Yes    Unstageable pressure ulcer of left heel [L89.620] 03/19/2024 Yes      Problems Resolved During this Admission:     VTE Risk Mitigation (From admission, onward)           Ordered     enoxaparin injection 40 mg  Daily         03/15/24 0912                       Enzo Thibodeaux MD  Department of Hospital Medicine   [facility] - 8th Floor Med Surg

## 2024-04-05 NOTE — PROGRESS NOTES
Infectious Diseases Progress Note  69-year-old male, nursing home resident, with past medical history of HTN, COPD, DM type 2, with neuropathy, is admitted to [facility] on 03/15/2024 sent via EMS for sacral wound evaluation, apparently diagnosed with osteomyelitis involving the sacrum on 02/27/2024 with plan to be admitted to LTAC which had not been successful as an outpatient.  On presentation he was noted to have no fevers and no leukocytosis, anemic with low albumin.  Blood cultures have been negative.  Review of his records show a 02/08/2024 left leg wound culture with Pseudomonas, Providencia, ESBL Proteus and 11/30/2023 sacral wound culture with MRSA, Providencia, Enterococcus and Pseudomonas.  2/27 MRI of the pelvis shows osteomyelitis involving the sacrococcygeal bone as well as right trochanteric bursitis which may be septic.  He was seen by surgery team with no plan for surgical intervention.  He is on antibiotic coverage with vancomycin and Zosyn.     Subjective:  Lying in bed in no acute distress. No new complaints voiced, doing about the same. Afebrile.     Past Medical History:   Diagnosis Date    COPD (chronic obstructive pulmonary disease)     Depression     Hypertension     Neuropathy      Past Surgical History:   Procedure Laterality Date    angiogram Bilateral     stents placed in left leg    anterior neck surgery      ARTHROTOMY OF ANKLE Left 6/28/2023    Procedure: ARTHROTOMY, ANKLE;  Surgeon: Tom Nichole DPM;  Location: [facility];  Service: Podiatry;  Laterality: Left;    cataracts Left     CHOLECYSTECTOMY      DEBRIDEMENT OF FOOT Bilateral 3/26/2024    Procedure: DEBRIDEMENT, FOOT;  Surgeon: Tom Nichole DPM;  Location: [facility] OR;  Service: Podiatry;  Laterality: Bilateral;    EYE SURGERY Left     HAND SURGERY Left     broken bone    herina repair      INCISION AND DRAINAGE, LOWER EXTREMITY Left 7/28/2023    Procedure: INCISION AND DRAINAGE, LOWER  EXTREMITY;  Surgeon: Avinash Garces MD;  Location: [facility] OR;  Service: Orthopedics;  Laterality: Left;  Supine, vascular bed, bone foam  last case  cysto tubing, laparoscopic trocar, experience, vanc, hemovac drain    KNEE SURGERY Bilateral     PERIPHERAL ANGIOGRAPHY N/A 3/13/2024    Procedure: Peripheral angiography;  Surgeon: Deven Queen MD;  Location: [facility] CATH LAB;  Service: Cardiology;  Laterality: N/A;  MICHELLE ANGIO W/ ANEST.    posterior neck surgery      SPINE SURGERY      TOTAL REPLACEMENT OF BOTH HIP JOINTS USING COMPUTER-ASSISTED NAVIGATION Bilateral      Social History     Socioeconomic History    Marital status:    Occupational History    Occupation: retired   Tobacco Use    Smoking status: Former     Average packs/day: 0.5 packs/day for 16.3 years (7.5 ttl pk-yrs)     Types: Cigarettes     Start date: 2008    Smokeless tobacco: Never   Substance and Sexual Activity    Alcohol use: Yes     Alcohol/week: 3.0 - 6.0 standard drinks of alcohol     Types: 3 - 6 Cans of beer per week    Drug use: Yes     Types: Marijuana    Sexual activity: Not Currently       ROS  Constitutional:  Positive for malaise/fatigue.   HENT: Negative.     Respiratory: Negative.     Gastrointestinal: Negative.    Genitourinary: Negative.    Musculoskeletal: Negative.    Skin: Multiple pressure wounds   Neurological:  Positive for weakness.   Endo/Heme/Allergies: Negative.    Psychiatric/Behavioral: Negative.     All other Systems review done and negative.    Review of patient's allergies indicates:  No Known Allergies      Scheduled Meds:   amLODIPine  10 mg Oral Daily    aspirin  81 mg Oral Daily    cilostazoL  50 mg Oral BID    clopidogreL  75 mg Oral Daily    docusate sodium  100 mg Oral BID    DULoxetine  30 mg Oral Daily    enoxaparin  40 mg Subcutaneous Daily    ferrous sulfate  1 tablet Oral Daily    gabapentin  300 mg Oral TID    gabapentin  600 mg Oral QHS    LIDOcaine  1 patch Transdermal Q24H    losartan  100 mg  "Oral Daily    meropenem IV (PEDS and ADULTS)  1 g Intravenous Q8H    multivitamin  1 tablet Oral Daily    oxyCODONE  10 mg Oral Q4H    polyethylene glycol  17 g Oral Daily    sodium chloride 0.9%  10 mL Intravenous Q6H    vancomycin (VANCOCIN) IV (PEDS and ADULTS)  1,000 mg Intravenous Q12H     Continuous Infusions:  PRN Meds:acetaminophen, albuterol, aluminum-magnesium hydroxide-simethicone, HYDROcodone-acetaminophen, melatonin, simethicone, Flushing PICC/Midline Protocol **AND** sodium chloride 0.9% **AND** sodium chloride 0.9%, vancomycin - pharmacy to dose    Objective:  /64   Pulse 73   Temp 98.9 °F (37.2 °C) (Oral)   Resp 18   Ht 5' 9.02" (1.753 m)   Wt 68 kg (150 lb)   SpO2 95%   BMI 22.14 kg/m²     Physical Exam:   Physical Exam  Vitals reviewed.   Constitutional:       General: He is not in acute distress.  HENT:      Head: Normocephalic and atraumatic.   Cardiovascular:      Rate and Rhythm: Normal rate and regular rhythm.      Heart sounds: Normal heart sounds.   Pulmonary:      Effort: Pulmonary effort is normal. No respiratory distress.      Breath sounds: Normal breath sounds.   Abdominal:      General: Bowel sounds are normal. There is no distension.      Palpations: Abdomen is soft.      Tenderness: There is no abdominal tenderness.   Musculoskeletal:         General: No deformity.      Cervical back: Neck supple.   Skin:     Findings: No erythema or rash.      Comments: Wound vac to sacrum  Neurological:      Mental Status: He is alert and oriented to person, place, and time.   Psychiatric:      Comments: Calm and cooperative     Imaging  Imaging Results    None          Lab Review   Recent Results (from the past 24 hour(s))   Comprehensive Metabolic Panel    Collection Time: 04/05/24  5:32 AM   Result Value Ref Range    Sodium Level 137 136 - 145 mmol/L    Potassium Level 3.6 3.5 - 5.1 mmol/L    Chloride 105 98 - 107 mmol/L    Carbon Dioxide 24 23 - 31 mmol/L    Glucose Level 127 (H) " 82 - 115 mg/dL    Blood Urea Nitrogen 10.2 8.4 - 25.7 mg/dL    Creatinine 0.62 (L) 0.73 - 1.18 mg/dL    Calcium Level Total 8.7 (L) 8.8 - 10.0 mg/dL    Protein Total 5.2 (L) 5.8 - 7.6 gm/dL    Albumin Level 2.3 (L) 3.4 - 4.8 g/dL    Globulin 2.9 2.4 - 3.5 gm/dL    Albumin/Globulin Ratio 0.8 (L) 1.1 - 2.0 ratio    Bilirubin Total 0.1 <=1.5 mg/dL    Alkaline Phosphatase 76 40 - 150 unit/L    Alanine Aminotransferase 7 0 - 55 unit/L    Aspartate Aminotransferase 9 5 - 34 unit/L    eGFR >60 mls/min/1.73/m2   CBC with Differential    Collection Time: 04/05/24  5:32 AM   Result Value Ref Range    WBC 7.45 4.50 - 11.50 x10(3)/mcL    RBC 3.20 (L) 4.70 - 6.10 x10(6)/mcL    Hgb 8.3 (L) 14.0 - 18.0 g/dL    Hct 29.0 (L) 42.0 - 52.0 %    MCV 90.6 80.0 - 94.0 fL    MCH 25.9 (L) 27.0 - 31.0 pg    MCHC 28.6 (L) 33.0 - 36.0 g/dL    RDW 18.3 (H) 11.5 - 17.0 %    Platelet 364 130 - 400 x10(3)/mcL    MPV 8.5 7.4 - 10.4 fL    Neut % 65.0 %    Lymph % 8.5 %    Mono % 11.4 %    Eos % 14.1 %    Basophil % 0.7 %    Lymph # 0.63 0.6 - 4.6 x10(3)/mcL    Neut # 4.85 2.1 - 9.2 x10(3)/mcL    Mono # 0.85 0.1 - 1.3 x10(3)/mcL    Eos # 1.05 (H) 0 - 0.9 x10(3)/mcL    Baso # 0.05 <=0.2 x10(3)/mcL    IG# 0.02 0 - 0.04 x10(3)/mcL    IG% 0.3 %    NRBC% 0.0 %         Assessment/Plan:  1. Chronic sacral pressure wound with osteomyelitis  2. History of polymicrobial sacral wound culture-MRSA, Providencia, Pseudomonas, Enterococcus  3.  Chronic left lower extremity wound with history of polymicrobial wound culture-Pseudomonas, ESBL Proteus, Providencia  4. Multiple pressure wounds  5.  Diabetes type 2  6.  Anemia  7.  Protein calorie malnutrition      -Continue Vancomycin and Merrem, plan end date of 04/26, following inflammatory markers  -No fevers and no leukocytosis  -3/19 ESR 12 and CRP 23.7, follow   -3/15 blood cultures negative  -2/8 left leg wound cultures with many Pseudomonas, Providencia, Proteus  -11/30/2023 sacral wound with many Providencia,  MRSA, Enterococcus, Pseudomonas  -Seen by the surgery team with inputs noted including no plan for surgical intervention   -Podiatry on board s/p debridement 3/26, cultures revealed ESBL Proteus  -We will continue aggressive wound care per Podiatry and the wound care team  -Discussed with patient and nursing staff. Case management working on LTAC placement. Disposition per primary team

## 2024-04-06 LAB
ALBUMIN SERPL-MCNC: 2.3 G/DL (ref 3.4–4.8)
ALBUMIN/GLOB SERPL: 0.8 RATIO (ref 1.1–2)
ALP SERPL-CCNC: 81 UNIT/L (ref 40–150)
ALT SERPL-CCNC: 7 UNIT/L (ref 0–55)
AST SERPL-CCNC: 10 UNIT/L (ref 5–34)
BASOPHILS # BLD AUTO: 0.04 X10(3)/MCL
BASOPHILS NFR BLD AUTO: 0.6 %
BILIRUB SERPL-MCNC: 0.2 MG/DL
BUN SERPL-MCNC: 12.5 MG/DL (ref 8.4–25.7)
CALCIUM SERPL-MCNC: 8.7 MG/DL (ref 8.8–10)
CHLORIDE SERPL-SCNC: 106 MMOL/L (ref 98–107)
CO2 SERPL-SCNC: 28 MMOL/L (ref 23–31)
CREAT SERPL-MCNC: 0.71 MG/DL (ref 0.73–1.18)
EOSINOPHIL # BLD AUTO: 1.21 X10(3)/MCL (ref 0–0.9)
EOSINOPHIL NFR BLD AUTO: 17.5 %
ERYTHROCYTE [DISTWIDTH] IN BLOOD BY AUTOMATED COUNT: 18.6 % (ref 11.5–17)
GFR SERPLBLD CREATININE-BSD FMLA CKD-EPI: >60 MLS/MIN/1.73/M2
GLOBULIN SER-MCNC: 2.9 GM/DL (ref 2.4–3.5)
GLUCOSE SERPL-MCNC: 101 MG/DL (ref 82–115)
HCT VFR BLD AUTO: 27.4 % (ref 42–52)
HGB BLD-MCNC: 8.3 G/DL (ref 14–18)
IMM GRANULOCYTES # BLD AUTO: 0.02 X10(3)/MCL (ref 0–0.04)
IMM GRANULOCYTES NFR BLD AUTO: 0.3 %
LYMPHOCYTES # BLD AUTO: 0.7 X10(3)/MCL (ref 0.6–4.6)
LYMPHOCYTES NFR BLD AUTO: 10.1 %
MCH RBC QN AUTO: 26.7 PG (ref 27–31)
MCHC RBC AUTO-ENTMCNC: 30.3 G/DL (ref 33–36)
MCV RBC AUTO: 88.1 FL (ref 80–94)
MONOCYTES # BLD AUTO: 0.84 X10(3)/MCL (ref 0.1–1.3)
MONOCYTES NFR BLD AUTO: 12.2 %
NEUTROPHILS # BLD AUTO: 4.09 X10(3)/MCL (ref 2.1–9.2)
NEUTROPHILS NFR BLD AUTO: 59.3 %
NRBC BLD AUTO-RTO: 0 %
PLATELET # BLD AUTO: 363 X10(3)/MCL (ref 130–400)
PMV BLD AUTO: 8.7 FL (ref 7.4–10.4)
POTASSIUM SERPL-SCNC: 3.9 MMOL/L (ref 3.5–5.1)
PROT SERPL-MCNC: 5.2 GM/DL (ref 5.8–7.6)
RBC # BLD AUTO: 3.11 X10(6)/MCL (ref 4.7–6.1)
SODIUM SERPL-SCNC: 138 MMOL/L (ref 136–145)
VANCOMYCIN TROUGH SERPL-MCNC: 19.8 UG/ML (ref 15–20)
WBC # SPEC AUTO: 6.9 X10(3)/MCL (ref 4.5–11.5)

## 2024-04-06 PROCEDURE — 25000003 PHARM REV CODE 250: Performed by: NURSE PRACTITIONER

## 2024-04-06 PROCEDURE — 63600175 PHARM REV CODE 636 W HCPCS: Performed by: NURSE PRACTITIONER

## 2024-04-06 PROCEDURE — 25000003 PHARM REV CODE 250: Performed by: PODIATRIST

## 2024-04-06 PROCEDURE — C1751 CATH, INF, PER/CENT/MIDLINE: HCPCS

## 2024-04-06 PROCEDURE — 63600175 PHARM REV CODE 636 W HCPCS: Performed by: PODIATRIST

## 2024-04-06 PROCEDURE — 25000003 PHARM REV CODE 250: Performed by: INTERNAL MEDICINE

## 2024-04-06 PROCEDURE — A4216 STERILE WATER/SALINE, 10 ML: HCPCS | Performed by: INTERNAL MEDICINE

## 2024-04-06 PROCEDURE — 21400001 HC TELEMETRY ROOM

## 2024-04-06 PROCEDURE — 63600175 PHARM REV CODE 636 W HCPCS: Performed by: INTERNAL MEDICINE

## 2024-04-06 PROCEDURE — 11000001 HC ACUTE MED/SURG PRIVATE ROOM

## 2024-04-06 PROCEDURE — 85025 COMPLETE CBC W/AUTO DIFF WBC: CPT | Performed by: INTERNAL MEDICINE

## 2024-04-06 PROCEDURE — 80053 COMPREHEN METABOLIC PANEL: CPT | Performed by: INTERNAL MEDICINE

## 2024-04-06 PROCEDURE — 80202 ASSAY OF VANCOMYCIN: CPT | Performed by: INTERNAL MEDICINE

## 2024-04-06 RX ADMIN — SODIUM CHLORIDE, PRESERVATIVE FREE 10 ML: 5 INJECTION INTRAVENOUS at 12:04

## 2024-04-06 RX ADMIN — LOSARTAN POTASSIUM 100 MG: 50 TABLET, FILM COATED ORAL at 09:04

## 2024-04-06 RX ADMIN — SODIUM CHLORIDE, PRESERVATIVE FREE 10 ML: 5 INJECTION INTRAVENOUS at 11:04

## 2024-04-06 RX ADMIN — THERA TABS 1 TABLET: TAB at 09:04

## 2024-04-06 RX ADMIN — SODIUM CHLORIDE, PRESERVATIVE FREE 10 ML: 5 INJECTION INTRAVENOUS at 05:04

## 2024-04-06 RX ADMIN — AMLODIPINE BESYLATE 10 MG: 5 TABLET ORAL at 09:04

## 2024-04-06 RX ADMIN — CILOSTAZOL 50 MG: 50 TABLET ORAL at 08:04

## 2024-04-06 RX ADMIN — ENOXAPARIN SODIUM 40 MG: 40 INJECTION SUBCUTANEOUS at 05:04

## 2024-04-06 RX ADMIN — OXYCODONE HYDROCHLORIDE 10 MG: 10 TABLET ORAL at 07:04

## 2024-04-06 RX ADMIN — GABAPENTIN 300 MG: 300 CAPSULE ORAL at 08:04

## 2024-04-06 RX ADMIN — PIPERACILLIN SODIUM AND TAZOBACTAM SODIUM 4.5 G: 4; .5 INJECTION, POWDER, LYOPHILIZED, FOR SOLUTION INTRAVENOUS at 12:04

## 2024-04-06 RX ADMIN — PIPERACILLIN SODIUM AND TAZOBACTAM SODIUM 4.5 G: 4; .5 INJECTION, POWDER, LYOPHILIZED, FOR SOLUTION INTRAVENOUS at 08:04

## 2024-04-06 RX ADMIN — CILOSTAZOL 50 MG: 50 TABLET ORAL at 09:04

## 2024-04-06 RX ADMIN — OXYCODONE HYDROCHLORIDE 10 MG: 10 TABLET ORAL at 05:04

## 2024-04-06 RX ADMIN — GABAPENTIN 300 MG: 300 CAPSULE ORAL at 09:04

## 2024-04-06 RX ADMIN — GABAPENTIN 600 MG: 300 CAPSULE ORAL at 08:04

## 2024-04-06 RX ADMIN — GABAPENTIN 300 MG: 300 CAPSULE ORAL at 03:04

## 2024-04-06 RX ADMIN — OXYCODONE HYDROCHLORIDE 10 MG: 10 TABLET ORAL at 11:04

## 2024-04-06 RX ADMIN — FERROUS SULFATE TAB 325 MG (65 MG ELEMENTAL FE) 1 EACH: 325 (65 FE) TAB at 09:04

## 2024-04-06 RX ADMIN — MEROPENEM 1 G: 1 INJECTION, POWDER, FOR SOLUTION INTRAVENOUS at 05:04

## 2024-04-06 RX ADMIN — OXYCODONE HYDROCHLORIDE 10 MG: 10 TABLET ORAL at 02:04

## 2024-04-06 RX ADMIN — CLOPIDOGREL BISULFATE 75 MG: 75 TABLET ORAL at 09:04

## 2024-04-06 RX ADMIN — ASPIRIN 81 MG CHEWABLE TABLET 81 MG: 81 TABLET CHEWABLE at 09:04

## 2024-04-06 RX ADMIN — VANCOMYCIN HYDROCHLORIDE 1000 MG: 1 INJECTION, POWDER, LYOPHILIZED, FOR SOLUTION INTRAVENOUS at 09:04

## 2024-04-06 RX ADMIN — OXYCODONE HYDROCHLORIDE 10 MG: 10 TABLET ORAL at 09:04

## 2024-04-06 RX ADMIN — DULOXETINE HYDROCHLORIDE 30 MG: 30 CAPSULE, DELAYED RELEASE ORAL at 09:04

## 2024-04-06 RX ADMIN — DOCUSATE SODIUM 100 MG: 100 CAPSULE, LIQUID FILLED ORAL at 08:04

## 2024-04-06 RX ADMIN — LIDOCAINE 5% 1 PATCH: 700 PATCH TOPICAL at 09:04

## 2024-04-06 NOTE — PROGRESS NOTES
[facility]                       [address] [address]    PATIENT NAME:       HAWA KUMAR  YOB: 1955  CSN:                [id number]   MRN:                [id number]  ADMIT DATE:         03/15/2024 03:09:00  PHYSICIAN:          Tom Nichole DPM                            PROGRESS NOTE    DATE:  04/06/2024 00:00:00    SUBJECTIVE:  The patient is seen today.  He is doing well.  Still primary   complaints of some left knee pain, but otherwise no other issues.  Still working   on placement.    PHYSICAL EXAMINATION:  VITAL SIGNS:  Stable and he is currently afebrile.  EXTREMITIES:  Lower extremities remain stable.  Diminished pedal pulses.  Trace   edema.  Notable weakness and gastroc contracture.    Wounds overall are clean and viable.  Still with some moderate serous exudate on   the extremities.  A little bit of greenish tinge on the left side.  No pus.  No   odor.  No fluctuance, crepitation or bogginess.  No stanley necrosis at any of   the sites.    ASSESSMENT:  Chronic lower extremity wounds with recent secondary infection.    PLAN:  Continue with current care.  Dressings were placed.        ______________________________  Tom Nichole DPM    GAS/AQS  DD:  04/06/2024  Time:  12:05PM  DT:  04/06/2024  Time:  12:30PM  Job #:  [id number]      PROGRESS NOTE

## 2024-04-06 NOTE — PROGRESS NOTES
VANCOMYCIN DOSING BY PHARMACY DISCONTINUATION NOTE    Shaheen Christie is a 69 y.o. male who had been consulted for vancomycin dosing.    The pharmacy consult for vancomycin dosing has been discontinued.     Vancomycin Dosing by Pharmacy Consult will sign-off. Please reconsult if necessary. Thank you for allowing us to participate in this patient's care.     Thank you for the consult,   Bryce Strauss, Pharm.D.  Inpatient Pharmacist  [phone]

## 2024-04-06 NOTE — PROGRESS NOTES
[facility] - 8th Floor [facility] Medicine  Progress Note    Patient Name: Shaheen Christie  MRN: [id number]  Patient Class: IP- Inpatient   Admission Date: 3/15/2024  Length of Stay: 22 days  Attending Physician: Enzo Thibodeaux MD  Primary Care Provider: Viktor Russ MD        Subjective:     Principal Problem:Osteomyelitis    Interval History:  No new issues    Review of Systems   All other systems reviewed and are negative.    Objective:     Vital Signs (Most Recent):  Temp: 98.1 °F (36.7 °C) (04/06/24 0749)  Pulse: 78 (04/06/24 0749)  Resp: 18 (04/06/24 0749)  BP: 123/67 (04/06/24 0749)  SpO2: 95 % (04/06/24 0749) Vital Signs (24h Range):  Temp:  [97.7 °F (36.5 °C)-99.4 °F (37.4 °C)] 98.1 °F (36.7 °C)  Pulse:  [68-85] 78  Resp:  [16-19] 18  SpO2:  [92 %-95 %] 95 %  BP: (115-129)/(57-70) 123/67     Weight: 68 kg (150 lb)  Body mass index is 22.14 kg/m².    Intake/Output Summary (Last 24 hours) at 4/6/2024 0818  Last data filed at 4/6/2024 0600  Gross per 24 hour   Intake 1240 ml   Output 1000 ml   Net 240 ml      Physical Exam  HENT:      Head: Normocephalic and atraumatic.      Right Ear: External ear normal.      Left Ear: External ear normal.      Mouth/Throat:      Mouth: Mucous membranes are dry.   Eyes:      Extraocular Movements: Extraocular movements intact.   Cardiovascular:      Rate and Rhythm: Normal rate and regular rhythm.      Pulses: Normal pulses.      Heart sounds: Normal heart sounds.   Pulmonary:      Effort: Pulmonary effort is normal.      Breath sounds: Normal breath sounds.   Abdominal:      General: Abdomen is flat. Bowel sounds are normal.   Musculoskeletal:      Cervical back: Neck supple.   Skin:     General: Skin is warm and dry.   Neurological:      General: No focal deficit present.      Mental Status: He is alert and oriented to person, place, and time. Mental status is at baseline.   Psychiatric:         Mood and Affect: Mood normal.            Overview/Hospital Course:  Clinically stable proceeding as expected.  Anemia stable.  Continue wound care IV antibiotics.  Awaiting LTAC placement.    Significant Labs: All pertinent labs within the past 24 hours have been reviewed.  BMP:   Recent Labs   Lab 04/06/24  0534      K 3.9   CO2 28   BUN 12.5   CREATININE 0.71*   CALCIUM 8.7*     CBC:   Recent Labs   Lab 04/05/24  0532 04/06/24  0534   WBC 7.45 6.90   HGB 8.3* 8.3*   HCT 29.0* 27.4*    363     CMP:   Recent Labs   Lab 04/05/24  0532 04/06/24  0534    138   K 3.6 3.9   CO2 24 28   BUN 10.2 12.5   CREATININE 0.62* 0.71*   CALCIUM 8.7* 8.7*   ALBUMIN 2.3* 2.3*   BILITOT 0.1 0.2   ALKPHOS 76 81   AST 9 10   ALT 7 7       Significant Imaging: I have reviewed all pertinent imaging results/findings within the past 24 hours.    Assessment/Plan:      Active Diagnoses:    Diagnosis Date Noted POA    PRINCIPAL PROBLEM:  Osteomyelitis [M86.9] 03/15/2024 Yes    Knee effusion, left [M25.462] 03/31/2024 No    Pressure injury of sacral region, stage 4 [L89.154] 03/19/2024 Yes    Skin ulcer of right lower leg [L97.919] 03/19/2024 Yes    Skin ulcer of left lower leg [L97.929] 03/19/2024 Yes    Unstageable pressure ulcer of right heel [L89.610] 03/19/2024 Yes    Unstageable pressure ulcer of left heel [L89.620] 03/19/2024 Yes      Problems Resolved During this Admission:     VTE Risk Mitigation (From admission, onward)           Ordered     enoxaparin injection 40 mg  Daily         03/15/24 0939                       Enzo Thibodeaux MD  Department of Hospital Medicine   [facility] - 8th Floor Med Surg

## 2024-04-07 LAB
ALBUMIN SERPL-MCNC: 2.3 G/DL (ref 3.4–4.8)
ALBUMIN/GLOB SERPL: 0.9 RATIO (ref 1.1–2)
ALP SERPL-CCNC: 83 UNIT/L (ref 40–150)
ALT SERPL-CCNC: 7 UNIT/L (ref 0–55)
AST SERPL-CCNC: 9 UNIT/L (ref 5–34)
BASOPHILS # BLD AUTO: 0.06 X10(3)/MCL
BASOPHILS NFR BLD AUTO: 0.8 %
BILIRUB SERPL-MCNC: 0.2 MG/DL
BUN SERPL-MCNC: 10.5 MG/DL (ref 8.4–25.7)
CALCIUM SERPL-MCNC: 8.5 MG/DL (ref 8.8–10)
CHLORIDE SERPL-SCNC: 106 MMOL/L (ref 98–107)
CO2 SERPL-SCNC: 27 MMOL/L (ref 23–31)
CREAT SERPL-MCNC: 0.64 MG/DL (ref 0.73–1.18)
EOSINOPHIL # BLD AUTO: 1.3 X10(3)/MCL (ref 0–0.9)
EOSINOPHIL NFR BLD AUTO: 18.3 %
ERYTHROCYTE [DISTWIDTH] IN BLOOD BY AUTOMATED COUNT: 18.1 % (ref 11.5–17)
GFR SERPLBLD CREATININE-BSD FMLA CKD-EPI: >60 MLS/MIN/1.73/M2
GLOBULIN SER-MCNC: 2.7 GM/DL (ref 2.4–3.5)
GLUCOSE SERPL-MCNC: 93 MG/DL (ref 82–115)
HCT VFR BLD AUTO: 24.8 % (ref 42–52)
HGB BLD-MCNC: 7.7 G/DL (ref 14–18)
IMM GRANULOCYTES # BLD AUTO: 0.01 X10(3)/MCL (ref 0–0.04)
IMM GRANULOCYTES NFR BLD AUTO: 0.1 %
LYMPHOCYTES # BLD AUTO: 0.97 X10(3)/MCL (ref 0.6–4.6)
LYMPHOCYTES NFR BLD AUTO: 13.7 %
MCH RBC QN AUTO: 26.6 PG (ref 27–31)
MCHC RBC AUTO-ENTMCNC: 31 G/DL (ref 33–36)
MCV RBC AUTO: 85.8 FL (ref 80–94)
MONOCYTES # BLD AUTO: 0.84 X10(3)/MCL (ref 0.1–1.3)
MONOCYTES NFR BLD AUTO: 11.8 %
NEUTROPHILS # BLD AUTO: 3.92 X10(3)/MCL (ref 2.1–9.2)
NEUTROPHILS NFR BLD AUTO: 55.3 %
NRBC BLD AUTO-RTO: 0 %
PLATELET # BLD AUTO: 352 X10(3)/MCL (ref 130–400)
PMV BLD AUTO: 8.6 FL (ref 7.4–10.4)
POTASSIUM SERPL-SCNC: 3.9 MMOL/L (ref 3.5–5.1)
PROT SERPL-MCNC: 5 GM/DL (ref 5.8–7.6)
RBC # BLD AUTO: 2.89 X10(6)/MCL (ref 4.7–6.1)
SODIUM SERPL-SCNC: 138 MMOL/L (ref 136–145)
WBC # SPEC AUTO: 7.1 X10(3)/MCL (ref 4.5–11.5)

## 2024-04-07 PROCEDURE — 25000003 PHARM REV CODE 250: Performed by: INTERNAL MEDICINE

## 2024-04-07 PROCEDURE — 25000003 PHARM REV CODE 250: Performed by: PODIATRIST

## 2024-04-07 PROCEDURE — 85025 COMPLETE CBC W/AUTO DIFF WBC: CPT | Performed by: INTERNAL MEDICINE

## 2024-04-07 PROCEDURE — A4216 STERILE WATER/SALINE, 10 ML: HCPCS | Performed by: INTERNAL MEDICINE

## 2024-04-07 PROCEDURE — 63600175 PHARM REV CODE 636 W HCPCS: Performed by: INTERNAL MEDICINE

## 2024-04-07 PROCEDURE — 63600175 PHARM REV CODE 636 W HCPCS: Performed by: PODIATRIST

## 2024-04-07 PROCEDURE — 11000001 HC ACUTE MED/SURG PRIVATE ROOM

## 2024-04-07 PROCEDURE — 80053 COMPREHEN METABOLIC PANEL: CPT | Performed by: INTERNAL MEDICINE

## 2024-04-07 RX ADMIN — OXYCODONE HYDROCHLORIDE 10 MG: 10 TABLET ORAL at 08:04

## 2024-04-07 RX ADMIN — PIPERACILLIN SODIUM AND TAZOBACTAM SODIUM 4.5 G: 4; .5 INJECTION, POWDER, LYOPHILIZED, FOR SOLUTION INTRAVENOUS at 05:04

## 2024-04-07 RX ADMIN — DOCUSATE SODIUM 100 MG: 100 CAPSULE, LIQUID FILLED ORAL at 09:04

## 2024-04-07 RX ADMIN — FERROUS SULFATE TAB 325 MG (65 MG ELEMENTAL FE) 1 EACH: 325 (65 FE) TAB at 09:04

## 2024-04-07 RX ADMIN — OXYCODONE HYDROCHLORIDE 10 MG: 10 TABLET ORAL at 11:04

## 2024-04-07 RX ADMIN — DULOXETINE HYDROCHLORIDE 30 MG: 30 CAPSULE, DELAYED RELEASE ORAL at 09:04

## 2024-04-07 RX ADMIN — ENOXAPARIN SODIUM 40 MG: 40 INJECTION SUBCUTANEOUS at 09:04

## 2024-04-07 RX ADMIN — LIDOCAINE 5% 1 PATCH: 700 PATCH TOPICAL at 10:04

## 2024-04-07 RX ADMIN — PIPERACILLIN SODIUM AND TAZOBACTAM SODIUM 4.5 G: 4; .5 INJECTION, POWDER, LYOPHILIZED, FOR SOLUTION INTRAVENOUS at 01:04

## 2024-04-07 RX ADMIN — OXYCODONE HYDROCHLORIDE 10 MG: 10 TABLET ORAL at 03:04

## 2024-04-07 RX ADMIN — LOSARTAN POTASSIUM 100 MG: 50 TABLET, FILM COATED ORAL at 09:04

## 2024-04-07 RX ADMIN — SODIUM CHLORIDE, PRESERVATIVE FREE 10 ML: 5 INJECTION INTRAVENOUS at 11:04

## 2024-04-07 RX ADMIN — CILOSTAZOL 50 MG: 50 TABLET ORAL at 09:04

## 2024-04-07 RX ADMIN — OXYCODONE HYDROCHLORIDE 10 MG: 10 TABLET ORAL at 06:04

## 2024-04-07 RX ADMIN — CLOPIDOGREL BISULFATE 75 MG: 75 TABLET ORAL at 09:04

## 2024-04-07 RX ADMIN — SODIUM CHLORIDE, PRESERVATIVE FREE 10 ML: 5 INJECTION INTRAVENOUS at 05:04

## 2024-04-07 RX ADMIN — GABAPENTIN 300 MG: 300 CAPSULE ORAL at 09:04

## 2024-04-07 RX ADMIN — THERA TABS 1 TABLET: TAB at 09:04

## 2024-04-07 RX ADMIN — ASPIRIN 81 MG CHEWABLE TABLET 81 MG: 81 TABLET CHEWABLE at 09:04

## 2024-04-07 RX ADMIN — SODIUM CHLORIDE, PRESERVATIVE FREE 10 ML: 5 INJECTION INTRAVENOUS at 06:04

## 2024-04-07 RX ADMIN — PIPERACILLIN SODIUM AND TAZOBACTAM SODIUM 4.5 G: 4; .5 INJECTION, POWDER, LYOPHILIZED, FOR SOLUTION INTRAVENOUS at 09:04

## 2024-04-07 RX ADMIN — OXYCODONE HYDROCHLORIDE 10 MG: 10 TABLET ORAL at 02:04

## 2024-04-07 RX ADMIN — GABAPENTIN 300 MG: 300 CAPSULE ORAL at 03:04

## 2024-04-07 RX ADMIN — GABAPENTIN 600 MG: 300 CAPSULE ORAL at 09:04

## 2024-04-07 NOTE — PROGRESS NOTES
[facility]                       [address] [address]    PATIENT NAME:       HAWA KUMAR  YOB: 1955  CSN:                [id number]   MRN:                [id number]  ADMIT DATE:         03/15/2024 03:09:00  PHYSICIAN:          Tom Nichole DPM                            PROGRESS NOTE    DATE:  04/07/2024 00:00:00    SUBJECTIVE:  The patient was seen earlier this morning.  No major issues   reported.  Nothing noted over the evening hours.  Still working on placement for   him.    PHYSICAL EXAMINATION:  Vital signs are stable and he is currently afebrile.    LABORATORY DATA:  Today reveal white cell counts remain normalized at 7, H and H   7.7 and 24.8.  BUN and creatinine 7.5 and 0.64.    Dressings are clean, dry, and intact to both lower extremities.  No substantial   bleeding or drainage from any of the particular sites.  Good capillary refill.    Contracture of the Achilles.  No contractures of the knees.  He is floating.    ASSESSMENT:  Bilateral lower extremity wounds with secondary infection on the   left, status post debridement.    PLAN:  Continue with current care.  Again, waiting on placement authorization.        ______________________________  Tom Nichole DPM    GAS/AQS  DD:  04/07/2024  Time:  10:41AM  DT:  04/07/2024  Time:  11:28AM  Job #:  [id number]      PROGRESS NOTE

## 2024-04-07 NOTE — PROGRESS NOTES
[facility] - 8th Floor [facility] Medicine  Progress Note    Patient Name: Shaheen Christie  MRN: [id number]  Patient Class: IP- Inpatient   Admission Date: 3/15/2024  Length of Stay: 23 days  Attending Physician: Enzo Thibodeaux MD  Primary Care Provider: Viktor Russ MD        Subjective:     Principal Problem:Osteomyelitis    Interval History:  No events noted from last night.  Patient doing okay.    Review of Systems   All other systems reviewed and are negative.    Objective:     Vital Signs (Most Recent):  Temp: 99.3 °F (37.4 °C) (04/07/24 0802)  Pulse: 84 (04/07/24 0802)  Resp: 18 (04/07/24 0802)  BP: (!) 116/55 (04/07/24 0802)  SpO2: (!) 91 % (04/07/24 0802) Vital Signs (24h Range):  Temp:  [98.2 °F (36.8 °C)-99.3 °F (37.4 °C)] 99.3 °F (37.4 °C)  Pulse:  [70-84] 84  Resp:  [18] 18  SpO2:  [91 %-94 %] 91 %  BP: ()/(47-61) 116/55     Weight: 68 kg (150 lb)  Body mass index is 22.14 kg/m².    Intake/Output Summary (Last 24 hours) at 4/7/2024 1021  Last data filed at 4/7/2024 0645  Gross per 24 hour   Intake 1735 ml   Output 1800 ml   Net -65 ml      Physical Exam  HENT:      Head: Normocephalic and atraumatic.      Right Ear: External ear normal.      Left Ear: External ear normal.   Eyes:      Extraocular Movements: Extraocular movements intact.   Cardiovascular:      Rate and Rhythm: Normal rate.      Pulses: Normal pulses.      Heart sounds: Normal heart sounds.   Pulmonary:      Effort: Pulmonary effort is normal.      Breath sounds: Normal breath sounds.   Abdominal:      General: Abdomen is flat. Bowel sounds are normal.      Palpations: Abdomen is soft.   Musculoskeletal:      Cervical back: Neck supple.   Skin:     General: Skin is warm and dry.   Neurological:      General: No focal deficit present.      Mental Status: He is alert and oriented to person, place, and time. Mental status is at baseline.           Overview/Hospital Course:  No events noted from last  night.  Patient doing quite well.  Hemoglobin did drop a bit.  This could be multifactorial.  From the infection and blood loss from debridement.  He is almost at the point of a transfusion.  He is also on DVT prophylaxis and a course on Plavix and this can cause anemia.  A repeat CBC and continue with the iron supplementation.  Hopefully we will be able to transfused this patient if needed during this stay.  Awaiting LTAC admission.    Significant Labs: All pertinent labs within the past 24 hours have been reviewed.  BMP:   Recent Labs   Lab 04/07/24  0534      K 3.9   CO2 27   BUN 10.5   CREATININE 0.64*   CALCIUM 8.5*     CBC:   Recent Labs   Lab 04/06/24 0534 04/07/24  0534   WBC 6.90 7.10   HGB 8.3* 7.7*   HCT 27.4* 24.8*    352     CMP:   Recent Labs   Lab 04/06/24 0534 04/07/24  0534    138   K 3.9 3.9   CO2 28 27   BUN 12.5 10.5   CREATININE 0.71* 0.64*   CALCIUM 8.7* 8.5*   ALBUMIN 2.3* 2.3*   BILITOT 0.2 0.2   ALKPHOS 81 83   AST 10 9   ALT 7 7       Significant Imaging: I have reviewed all pertinent imaging results/findings within the past 24 hours.    Assessment/Plan:      Active Diagnoses:    Diagnosis Date Noted POA    PRINCIPAL PROBLEM:  Osteomyelitis [M86.9] 03/15/2024 Yes    Knee effusion, left [M25.462] 03/31/2024 No    Pressure injury of sacral region, stage 4 [L89.154] 03/19/2024 Yes    Skin ulcer of right lower leg [L97.919] 03/19/2024 Yes    Skin ulcer of left lower leg [L97.929] 03/19/2024 Yes    Unstageable pressure ulcer of right heel [L89.610] 03/19/2024 Yes    Unstageable pressure ulcer of left heel [L89.620] 03/19/2024 Yes      Problems Resolved During this Admission:     VTE Risk Mitigation (From admission, onward)           Ordered     enoxaparin injection 40 mg  Daily         03/15/24 0934                       Enzo Thibodeaux MD  Department of Hospital Medicine   [facility] - 8th Floor Med Surg

## 2024-04-08 LAB
ALBUMIN SERPL-MCNC: 2.4 G/DL (ref 3.4–4.8)
ALBUMIN/GLOB SERPL: 0.8 RATIO (ref 1.1–2)
ALP SERPL-CCNC: 92 UNIT/L (ref 40–150)
ALT SERPL-CCNC: 9 UNIT/L (ref 0–55)
AST SERPL-CCNC: 10 UNIT/L (ref 5–34)
BASOPHILS # BLD AUTO: 0.05 X10(3)/MCL
BASOPHILS NFR BLD AUTO: 0.7 %
BILIRUB SERPL-MCNC: 0.2 MG/DL
BUN SERPL-MCNC: 12.1 MG/DL (ref 8.4–25.7)
CALCIUM SERPL-MCNC: 8.9 MG/DL (ref 8.8–10)
CHLORIDE SERPL-SCNC: 106 MMOL/L (ref 98–107)
CO2 SERPL-SCNC: 25 MMOL/L (ref 23–31)
CREAT SERPL-MCNC: 0.64 MG/DL (ref 0.73–1.18)
EOSINOPHIL # BLD AUTO: 1.37 X10(3)/MCL (ref 0–0.9)
EOSINOPHIL NFR BLD AUTO: 19.8 %
ERYTHROCYTE [DISTWIDTH] IN BLOOD BY AUTOMATED COUNT: 18 % (ref 11.5–17)
GFR SERPLBLD CREATININE-BSD FMLA CKD-EPI: >60 MLS/MIN/1.73/M2
GLOBULIN SER-MCNC: 3 GM/DL (ref 2.4–3.5)
GLUCOSE SERPL-MCNC: 100 MG/DL (ref 82–115)
HCT VFR BLD AUTO: 24.4 % (ref 42–52)
HGB BLD-MCNC: 7.7 G/DL (ref 14–18)
IMM GRANULOCYTES # BLD AUTO: 0.02 X10(3)/MCL (ref 0–0.04)
IMM GRANULOCYTES NFR BLD AUTO: 0.3 %
LYMPHOCYTES # BLD AUTO: 1.41 X10(3)/MCL (ref 0.6–4.6)
LYMPHOCYTES NFR BLD AUTO: 20.3 %
MCH RBC QN AUTO: 26.9 PG (ref 27–31)
MCHC RBC AUTO-ENTMCNC: 31.6 G/DL (ref 33–36)
MCV RBC AUTO: 85.3 FL (ref 80–94)
MONOCYTES # BLD AUTO: 0.78 X10(3)/MCL (ref 0.1–1.3)
MONOCYTES NFR BLD AUTO: 11.3 %
NEUTROPHILS # BLD AUTO: 3.3 X10(3)/MCL (ref 2.1–9.2)
NEUTROPHILS NFR BLD AUTO: 47.6 %
NRBC BLD AUTO-RTO: 0 %
PLATELET # BLD AUTO: 335 X10(3)/MCL (ref 130–400)
PMV BLD AUTO: 8 FL (ref 7.4–10.4)
POTASSIUM SERPL-SCNC: 4 MMOL/L (ref 3.5–5.1)
PROT SERPL-MCNC: 5.4 GM/DL (ref 5.8–7.6)
RBC # BLD AUTO: 2.86 X10(6)/MCL (ref 4.7–6.1)
SODIUM SERPL-SCNC: 139 MMOL/L (ref 136–145)
WBC # SPEC AUTO: 6.93 X10(3)/MCL (ref 4.5–11.5)

## 2024-04-08 PROCEDURE — 63600175 PHARM REV CODE 636 W HCPCS: Performed by: PODIATRIST

## 2024-04-08 PROCEDURE — 99233 SBSQ HOSP IP/OBS HIGH 50: CPT | Mod: ,,,

## 2024-04-08 PROCEDURE — 25000003 PHARM REV CODE 250: Performed by: INTERNAL MEDICINE

## 2024-04-08 PROCEDURE — 97606 NEG PRS WND THER DME>50 SQCM: CPT

## 2024-04-08 PROCEDURE — 80053 COMPREHEN METABOLIC PANEL: CPT | Performed by: INTERNAL MEDICINE

## 2024-04-08 PROCEDURE — 25000003 PHARM REV CODE 250: Performed by: PODIATRIST

## 2024-04-08 PROCEDURE — 85025 COMPLETE CBC W/AUTO DIFF WBC: CPT | Performed by: INTERNAL MEDICINE

## 2024-04-08 PROCEDURE — A4216 STERILE WATER/SALINE, 10 ML: HCPCS | Performed by: INTERNAL MEDICINE

## 2024-04-08 PROCEDURE — 63600175 PHARM REV CODE 636 W HCPCS: Performed by: INTERNAL MEDICINE

## 2024-04-08 PROCEDURE — 11000001 HC ACUTE MED/SURG PRIVATE ROOM

## 2024-04-08 RX ADMIN — OXYCODONE HYDROCHLORIDE 10 MG: 10 TABLET ORAL at 03:04

## 2024-04-08 RX ADMIN — DOCUSATE SODIUM 100 MG: 100 CAPSULE, LIQUID FILLED ORAL at 10:04

## 2024-04-08 RX ADMIN — POLYETHYLENE GLYCOL 3350 17 G: 17 POWDER, FOR SOLUTION ORAL at 10:04

## 2024-04-08 RX ADMIN — GABAPENTIN 300 MG: 300 CAPSULE ORAL at 03:04

## 2024-04-08 RX ADMIN — LIDOCAINE 5% 1 PATCH: 700 PATCH TOPICAL at 10:04

## 2024-04-08 RX ADMIN — ASPIRIN 81 MG CHEWABLE TABLET 81 MG: 81 TABLET CHEWABLE at 10:04

## 2024-04-08 RX ADMIN — OXYCODONE HYDROCHLORIDE 10 MG: 10 TABLET ORAL at 08:04

## 2024-04-08 RX ADMIN — DULOXETINE HYDROCHLORIDE 30 MG: 30 CAPSULE, DELAYED RELEASE ORAL at 10:04

## 2024-04-08 RX ADMIN — PIPERACILLIN SODIUM AND TAZOBACTAM SODIUM 4.5 G: 4; .5 INJECTION, POWDER, LYOPHILIZED, FOR SOLUTION INTRAVENOUS at 01:04

## 2024-04-08 RX ADMIN — FERROUS SULFATE TAB 325 MG (65 MG ELEMENTAL FE) 1 EACH: 325 (65 FE) TAB at 10:04

## 2024-04-08 RX ADMIN — GABAPENTIN 300 MG: 300 CAPSULE ORAL at 08:04

## 2024-04-08 RX ADMIN — THERA TABS 1 TABLET: TAB at 10:04

## 2024-04-08 RX ADMIN — CILOSTAZOL 50 MG: 50 TABLET ORAL at 10:04

## 2024-04-08 RX ADMIN — DOCUSATE SODIUM 100 MG: 100 CAPSULE, LIQUID FILLED ORAL at 08:04

## 2024-04-08 RX ADMIN — PIPERACILLIN SODIUM AND TAZOBACTAM SODIUM 4.5 G: 4; .5 INJECTION, POWDER, LYOPHILIZED, FOR SOLUTION INTRAVENOUS at 05:04

## 2024-04-08 RX ADMIN — ENOXAPARIN SODIUM 40 MG: 40 INJECTION SUBCUTANEOUS at 05:04

## 2024-04-08 RX ADMIN — CILOSTAZOL 50 MG: 50 TABLET ORAL at 08:04

## 2024-04-08 RX ADMIN — OXYCODONE HYDROCHLORIDE 10 MG: 10 TABLET ORAL at 10:04

## 2024-04-08 RX ADMIN — SODIUM CHLORIDE, PRESERVATIVE FREE 10 ML: 5 INJECTION INTRAVENOUS at 06:04

## 2024-04-08 RX ADMIN — SODIUM CHLORIDE, PRESERVATIVE FREE 10 ML: 5 INJECTION INTRAVENOUS at 11:04

## 2024-04-08 RX ADMIN — GABAPENTIN 300 MG: 300 CAPSULE ORAL at 10:04

## 2024-04-08 RX ADMIN — SODIUM CHLORIDE, PRESERVATIVE FREE 10 ML: 5 INJECTION INTRAVENOUS at 12:04

## 2024-04-08 RX ADMIN — GABAPENTIN 600 MG: 300 CAPSULE ORAL at 08:04

## 2024-04-08 RX ADMIN — CLOPIDOGREL BISULFATE 75 MG: 75 TABLET ORAL at 10:04

## 2024-04-08 RX ADMIN — PIPERACILLIN SODIUM AND TAZOBACTAM SODIUM 4.5 G: 4; .5 INJECTION, POWDER, LYOPHILIZED, FOR SOLUTION INTRAVENOUS at 08:04

## 2024-04-08 RX ADMIN — SODIUM CHLORIDE, PRESERVATIVE FREE 10 ML: 5 INJECTION INTRAVENOUS at 05:04

## 2024-04-08 NOTE — SUBJECTIVE & OBJECTIVE
Subjective:     HPI:  Wound medicine Re-check - Sacral ulcer    The patient is a 69 year old WM, with past medical history of HTN, COPD, DM2 with neuropathy, PAD, chronic venous hypertension. He was sent  to [facility] on 3/15/24 from [facility] for evaluation of sacral wound due to recent diagnosis of osteomyelitis involving the sacrum on 2/27/24. Previous plans for LTAC placement were not successful; he will require assistance with placement for continuing IV antibiotics post discharge.   Review of records reveals + culture of left leg wound with Pseudomonas, Providencia, ESBL Proteus on 2/8/24 and + sacral wound culture with MRSA, Providencia, Enterococcus and Pseudomonas. 2/27/24 MRI of sacrum shows osteomyelitis involving the sacrococcygeal bone.   He presents without systemic signs of infection. He was evaluated by surgery (3/15/24), no plan for surgical intervention at this time.   ID consulted; and guiding antibiotic stewardship.   He was seen in the outpatient wound care clinic here at [facility] form 9/2022 - 2/2023 for ulcer of left ankle.   He is currently with multiple chronic appearing wounds to BLE and sacrum. He has been seen by Dr. Queen with work up and intervention for occlusion of LLE in the past. Most recent peripheral angiography performed on BLE (3/13/24) per Dr. Queen. Found to have non critical stenosis in the left SFA. Right SFA with stenosis however with excellent three-vessel runoff. (Wounds to be medically managed)    Wound medicine has been consulted in addition to WOCN for evaluation.  First evaluation compelted on 3/19/24. Found to have severe BLE unstageable pressure injuries to both lateral legs and both heels and chronic sacral pressure ulcer.   Podiatry consulted and following - Surgical debridement of BLE done on 3/26/24  Wound Vac applied on 3/28/24  Wound recheck done today, 4/8/24. Met patient in his room, 804, awake, alert and agreeable to wound assessment and  treatment. Accompanied by WOCN for wound vac change. Lying on low air loss mattress,  without heel lift boots; Bilateral lower legs with dressings intact.         Hospital Course:   No notes on file      Follow-up For: Procedure(s) (LRB):  DEBRIDEMENT, FOOT (Bilateral)    Post-Operative Day: 13 Days Post-Op    Scheduled Meds:   amLODIPine  10 mg Oral Daily    aspirin  81 mg Oral Daily    cilostazoL  50 mg Oral BID    clopidogreL  75 mg Oral Daily    docusate sodium  100 mg Oral BID    DULoxetine  30 mg Oral Daily    enoxaparin  40 mg Subcutaneous Daily    ferrous sulfate  1 tablet Oral Daily    gabapentin  300 mg Oral TID    gabapentin  600 mg Oral QHS    LIDOcaine  1 patch Transdermal Q24H    losartan  100 mg Oral Daily    multivitamin  1 tablet Oral Daily    oxyCODONE  10 mg Oral Q4H    piperacillin-tazobactam (Zosyn) IV (PEDS and ADULTS) (extended infusion is not appropriate)  4.5 g Intravenous Q8H    polyethylene glycol  17 g Oral Daily    sodium chloride 0.9%  10 mL Intravenous Q6H     Continuous Infusions:  PRN Meds:acetaminophen, albuterol, aluminum-magnesium hydroxide-simethicone, HYDROcodone-acetaminophen, melatonin, simethicone, Flushing PICC/Midline Protocol **AND** sodium chloride 0.9% **AND** sodium chloride 0.9%    Review of Systems   Constitutional:  Negative for chills, fatigue and fever.   HENT:          Hard of hearing     Skin:  Positive for wound.     Objective:     Vital Signs (Most Recent):  Temp: 98.5 °F (36.9 °C) (04/08/24 0755)  Pulse: 80 (04/08/24 0755)  Resp: 18 (04/08/24 1023)  BP: (!) 105/45 (04/08/24 0755)  SpO2: (!) 91 % (04/08/24 0755) Vital Signs (24h Range):  Temp:  [98.3 °F (36.8 °C)-99.4 °F (37.4 °C)] 98.5 °F (36.9 °C)  Pulse:  [78-91] 80  Resp:  [18] 18  SpO2:  [90 %-94 %] 91 %  BP: (102-127)/(37-66) 105/45     Weight: 68 kg (150 lb)  Body mass index is 22.14 kg/m².     Physical Exam  Vitals reviewed.   Constitutional:       General: He is awake. He is not in acute distress.      "Appearance: He is not toxic-appearing.      Comments: Chronically ill appearing   HENT:      Head: Normocephalic and atraumatic.      Nose: Nose normal.   Cardiovascular:      Rate and Rhythm: Normal rate and regular rhythm.   Pulmonary:      Effort: Pulmonary effort is normal. No respiratory distress.   Musculoskeletal:      Left lower leg: Edema present.   Skin:     Findings: Wound present.             Comments: Sacrum: Large stage 4 pressure ulcer to sacrum extending further toward left side. Wound vac removed, wound bed beefy red with evidence of granulation. Small areas of slough to left lateral and medial portion of wound. Some ligamentous material exposed in center of wound. No purulent drainage or foul odor noted to wound.     Dressings intact to BLE. Wounds not visualized   Neurological:      General: No focal deficit present.      Mental Status: He is alert and oriented to person, place, and time. Mental status is at baseline.   Psychiatric:         Mood and Affect: Mood normal.         Behavior: Behavior normal. Behavior is cooperative.       Sacrum > on left: 18 x 7 x 1 cm with undermining from 12 o'clock to 3 o'clock with deepest at 1.8 cm          Laboratory:  A1C:   Recent Labs   Lab 02/02/24  0445   HGBA1C 5.0     ABGs: No results for input(s): "PH", "PCO2", "HCO3", "POCSATURATED", "BE" in the last 168 hours.  Blood Cultures: No results for input(s): "LABBLOO" in the last 48 hours.  BMP:   Recent Labs   Lab 04/08/24  0532      K 4.0   CO2 25   BUN 12.1   CREATININE 0.64*   CALCIUM 8.9     Cardiac Markers: No results for input(s): "CKMB", "TROPONINT", "MYOGLOBIN" in the last 168 hours.  CBC:   Recent Labs   Lab 04/08/24  0003   WBC 6.93   RBC 2.86*   HGB 7.7*   HCT 24.4*      MCV 85.3   MCH 26.9*   MCHC 31.6*     CMP:   Recent Labs   Lab 04/08/24  0532   CALCIUM 8.9   ALBUMIN 2.4*      K 4.0   CO2 25   BUN 12.1   CREATININE 0.64*   ALKPHOS 92   ALT 9   AST 10   BILITOT 0.2 " "    Coagulation: No results for input(s): "PT", "INR", "APTT" in the last 168 hours.  CPS: {:LNK,LABRCNTIP[  CRP:   Recent Labs   Lab 04/04/24 0531   CRP 39.20*     ESR:   Recent Labs   Lab 04/04/24  0531   SEDRATE 29*     LFTs:   Recent Labs   Lab 04/08/24  0532   ALT 9   AST 10   ALKPHOS 92   BILITOT 0.2   ALBUMIN 2.4*     Prealbumin: No results for input(s): "PREALBUMIN" in the last 48 hours.  Wound Cultures: No results for input(s): "LABAERO" in the last 4320 hours.  Microbiology Results (last 7 days)       ** No results found for the last 168 hours. **          Specimen (24h ago, onward)      None          No results for input(s): "COLORU", "CLARITYU", "SPECGRAV", "PHUR", "PROTEINUA", "GLUCOSEU", "BILIRUBINCON", "BLOODU", "WBCU", "RBCU", "BACTERIA", "MUCUS", "NITRITE", "LEUKOCYTESUR", "UROBILINOGEN", "HYALINECASTS" in the last 168 hours.    Diagnostic Results:  I have reviewed all pertinent imaging results/findings within the past 24 hours.    "

## 2024-04-08 NOTE — PROGRESS NOTES
Inpatient Nutrition Assessment    Admit Date: 3/15/2024   Total duration of encounter: 24 days   Patient Age: 69 y.o.    Nutrition Recommendation/Prescription     -Continue Heart Healthy Diet as tolerated.    -Assist with meals as needed.   -Encourage Pierre BID for wound healing.   -Continue MVI with minerals as medically feasible.   -Monitor wt, labs, and intake. Obtain and document a more recent measured weight.     Communication of Recommendations: reviewed with nurse    Nutrition Assessment     Malnutrition Assessment/Nutrition-Focused Physical Exam    Malnutrition Context: chronic illness (03/20/24 1317)  Malnutrition Level: severe (03/20/24 1317)  Energy Intake (Malnutrition): less than 75% for greater than or equal to 3 months (03/20/24 1317)  Weight Loss (Malnutrition): greater than 7.5% in 3 months (03/20/24 1317)  Subcutaneous Fat (Malnutrition): severe depletion (03/20/24 1317)  Orbital Region (Subcutaneous Fat Loss): severe depletion  Upper Arm Region (Subcutaneous Fat Loss): severe depletion     Muscle Mass (Malnutrition): severe depletion (03/20/24 1317)  Hinduism Region (Muscle Loss): severe depletion                       Fluid Accumulation (Malnutrition): other (see comments) (does not meet criteria) (03/20/24 1317)  Hand  Strength, Left (Malnutrition): unable to evaluate (03/20/24 1317)  Hand  Strength, Right (Malnutrition): unable to evaluate (03/20/24 1317)  A minimum of two characteristics is recommended for diagnosis of either severe or non-severe malnutrition.    Chart Review    Reason Seen: continuous nutrition monitoring and follow-up    Malnutrition Screening Tool Results   Have you recently lost weight without trying?: No  Have you been eating poorly because of a decreased appetite?: No   MST Score: 0   Diagnosis:  Stage 4 pressure ulcer to sacrum  Unstageable pressure ulcers to bilateral heels  Open wound/ulcers to right lateral lower leg and left lateral lower leg  Peripheral  arterial disease - with history of vascular intervention with Dr. Queen (most recent angio 3/13/24)  PVD  History of polymicrobial sacral wound culture  History of polymicrobial wound culture to leg wound  DM2  Anemia  History of smoking  Low prealbumin     Relevant Medical History: HTN, COPD, DM2 with neuropathy, PAD, chronic venous hypertension     Scheduled Medications:  amLODIPine, 10 mg, Daily  aspirin, 81 mg, Daily  cilostazoL, 50 mg, BID  clopidogreL, 75 mg, Daily  docusate sodium, 100 mg, BID  DULoxetine, 30 mg, Daily  enoxaparin, 40 mg, Daily  ferrous sulfate, 1 tablet, Daily  gabapentin, 300 mg, TID  gabapentin, 600 mg, QHS  LIDOcaine, 1 patch, Q24H  losartan, 100 mg, Daily  multivitamin, 1 tablet, Daily  oxyCODONE, 10 mg, Q4H  piperacillin-tazobactam (Zosyn) IV (PEDS and ADULTS) (extended infusion is not appropriate), 4.5 g, Q8H  polyethylene glycol, 17 g, Daily  sodium chloride 0.9%, 10 mL, Q6H    Continuous Infusions:   PRN Medications: acetaminophen, albuterol, aluminum-magnesium hydroxide-simethicone, HYDROcodone-acetaminophen, melatonin, simethicone, Flushing PICC/Midline Protocol **AND** sodium chloride 0.9% **AND** sodium chloride 0.9%    Calorie Containing IV Medications: no significant kcals from medications at this time    Recent Labs   Lab 04/01/24  1913 04/02/24  0435 04/02/24  0757 04/03/24  0451 04/04/24  0531 04/05/24  0532 04/06/24  0534 04/07/24  0534 04/08/24  0003 04/08/24  0532   NA  --   --  140  --  140 137 138 138  --  139   K  --   --  3.9  --  4.0 3.6 3.9 3.9  --  4.0   CALCIUM  --   --  9.0  --  8.9 8.7* 8.7* 8.5*  --  8.9   CHLORIDE  --   --  107  --  105 105 106 106  --  106   CO2  --   --  26  --  27 24 28 27  --  25   BUN  --   --  6.6*  --  8.7 10.2 12.5 10.5  --  12.1   CREATININE  --   --  0.75  --  0.72* 0.62* 0.71* 0.64*  --  0.64*   EGFRNORACEVR  --   --  >60  --  >60 >60 >60 >60  --  >60   GLUCOSE  --   --  92  --  86 127* 101 93  --  100   BILITOT  --   --  0.2  --   0.2 0.1 0.2 0.2  --  0.2   ALKPHOS  --   --  77  --  77 76 81 83  --  92   ALT  --   --  6  --  6 7 7 7  --  9   AST  --   --  11  --  9 9 10 9  --  10   ALBUMIN  --   --  2.5*  --  2.5* 2.3* 2.3* 2.3*  --  2.4*   PREALB 12.6*  --   --   --   --   --   --   --   --   --    CRP  --   --   --   --  39.20*  --   --   --   --   --    WBC  --  6.03  --  6.40 5.37 7.45 6.90 7.10 6.93  --    HGB  --  7.5*  --  7.3* 8.0* 8.3* 8.3* 7.7* 7.7*  --    HCT  --  25.0*  --  24.7* 27.8* 29.0* 27.4* 24.8* 24.4*  --        Nutrition Orders:  Diet Heart Healthy      Appetite/Oral Intake: good/% of meals  Factors Affecting Nutritional Intake: none identified  Food/Yarsani/Cultural Preferences: none reported  Food Allergies: no known food allergies  Last Bowel Movement: 04/05/24  Wound(s):     Altered Skin Integrity 01/22/24 1351 Sacral spine #4-Tissue loss description: Full thickness       Altered Skin Integrity 03/16/24 0800 Left anterior;lower Leg Arterial Ulcer-Tissue loss description: Full thickness heel ulcers, sacrum pressure ulcers    Comments    3/20: Pt reports current good intake/appetite but intake the past few months has been poor 2/2 disliking food from NH. Pt reports usual wt of ~170 lbs. Pt reports he would like oral supplements.     3/22: Pt reports good appetite/intake of meals, denies n/v; drinking some of the oral supplement but not taking the Pierre.     3/26: Pt NPO;  plans to go to OR today.     3/28: Pt eating well; denies n/v; states that he is not drinking oral supplement 2/2 being too full after he eats his meals; will d/c per his request. Pt is receptive to trying to drink the Pierre for wound healing.     4/1: Pt continues with good appetite/intake; tolerating diet. Pt reports that he likes the Pierre but is not taking it regularly- encouraged to take for wound healing and pt was receptive.     4/4: Pt reports good appetite and PO intake of meals. Not drinking much Pierre, large supply in his room noted.  "Pt denies GI complaints.     : Pt sleeping during rounds; eating very well per RN.     Anthropometrics    Height: 5' 9.02" (175.3 cm), Height Method: Stated  Last Weight: 68 kg (150 lb) (24 1315), Weight Method: Standard Scale  BMI (Calculated): 22.1  BMI Classification: normal (BMI 18.5-24.9)        Ideal Body Weight (IBW), Male: 160.12 lb     % Ideal Body Weight, Male (lb): 93.68 %                 Usual Body Weight (UBW), k.27 kg  % Usual Body Weight: 88.24  % Weight Change From Usual Weight: -11.95 %  Usual Weight Provided By: patient    Wt Readings from Last 5 Encounters:   24 68 kg (150 lb)   24 68.5 kg (151 lb)   24 63.5 kg (140 lb)   23 72.6 kg (160 lb)   23 77.1 kg (170 lb)     Weight Change(s) Since Admission:   Wt Readings from Last 1 Encounters:   24 1315 68 kg (150 lb)   03/15/24 1621 68 kg (150 lb)   03/15/24 0307 68 kg (150 lb)   Admit Weight: 68 kg (150 lb) (03/15/24 0307), Weight Method: Stated  3/28-: no new wt noted    Estimated Needs    Weight Used For Calorie Calculations: 68 kg (149 lb 14.6 oz)  Energy Calorie Requirements (kcal): 7618-3932 kcal (30-35 kcal/kg)  Energy Need Method: Kcal/kg  Weight Used For Protein Calculations: 68 kg (149 lb 14.6 oz)  Protein Requirements: 102 gm (1.5g/kg)  Fluid Requirements (mL): 2040 mL    Enteral Nutrition     Patient not receiving enteral nutrition at this time.    Parenteral Nutrition     Patient not receiving parenteral nutrition support at this time.    Evaluation of Received Nutrient Intake    Calories: meeting estimated needs  Protein: meeting estimated needs    Patient Education     Not applicable.    Nutrition Diagnosis     PES: Increased nutrient needs (protein) related to increased protein energy demand for wound healing as evidenced by stage 4 pressure ulcer. (active)     PES: Severe chronic disease or condition related malnutrition related to suboptimal protein/energy intake as evidenced by " less than 75% needs met for greater than or equal to 3 months, severe fat depletion, severe muscle depletion, and greater than 7.5% weight loss in 3 months. (active)    Nutrition Interventions     Intervention(s): general/healthful diet, commercial beverage, commercial food, multivitamin/mineral supplement therapy, and collaboration with other providers    Goal: Meet greater than 80% of nutritional needs by follow-up. (goal progressing)  Goal: Maintain weight throughout hospitalization. (goal progressing)    Nutrition Goals & Monitoring     Dietitian will monitor: energy intake and weight    Nutrition Risk/Follow-Up: high (follow-up in 1-4 days)   Please consult if re-assessment needed sooner.

## 2024-04-08 NOTE — PROGRESS NOTES
[facility] - 8th Floor [facility] Medicine  Progress Note    Patient Name: Shaheen Christie  MRN: [id number]  Patient Class: IP- Inpatient   Admission Date: 3/15/2024  Length of Stay: 24 days  Attending Physician: Enzo Thibodeaux MD  Primary Care Provider: Viktor Russ MD        Subjective:     Principal Problem:Osteomyelitis    Interval History:  No issues from the weekend.  Doing well    Review of Systems   All other systems reviewed and are negative.    Objective:     Vital Signs (Most Recent):  Temp: 98.5 °F (36.9 °C) (04/08/24 0755)  Pulse: 80 (04/08/24 0755)  Resp: 18 (04/08/24 0755)  BP: (!) 105/45 (04/08/24 0755)  SpO2: (!) 91 % (04/08/24 0755) Vital Signs (24h Range):  Temp:  [98.3 °F (36.8 °C)-99.4 °F (37.4 °C)] 98.5 °F (36.9 °C)  Pulse:  [78-91] 80  Resp:  [18] 18  SpO2:  [89 %-94 %] 91 %  BP: (102-127)/(37-66) 105/45     Weight: 68 kg (150 lb)  Body mass index is 22.14 kg/m².    Intake/Output Summary (Last 24 hours) at 4/8/2024 0806  Last data filed at 4/8/2024 0500  Gross per 24 hour   Intake 240 ml   Output 1950 ml   Net -1710 ml      Physical Exam  HENT:      Head: Normocephalic and atraumatic.      Right Ear: External ear normal.      Left Ear: External ear normal.      Mouth/Throat:      Mouth: Mucous membranes are dry.   Eyes:      Extraocular Movements: Extraocular movements intact.   Cardiovascular:      Rate and Rhythm: Normal rate and regular rhythm.      Pulses: Normal pulses.      Heart sounds: Normal heart sounds.   Pulmonary:      Effort: Pulmonary effort is normal.      Breath sounds: Normal breath sounds.   Abdominal:      General: Bowel sounds are normal.      Palpations: Abdomen is soft.   Musculoskeletal:      Cervical back: Neck supple.   Neurological:      General: No focal deficit present.      Mental Status: He is alert and oriented to person, place, and time. Mental status is at baseline.           Overview/Hospital Course:  Patient on well.   Awaiting LTAC placement    Significant Labs: All pertinent labs within the past 24 hours have been reviewed.  BMP:   Recent Labs   Lab 04/08/24  0532      K 4.0   CO2 25   BUN 12.1   CREATININE 0.64*   CALCIUM 8.9     CBC:   Recent Labs   Lab 04/07/24  0534 04/08/24  0003   WBC 7.10 6.93   HGB 7.7* 7.7*   HCT 24.8* 24.4*    335     CMP:   Recent Labs   Lab 04/07/24  0534 04/08/24  0532    139   K 3.9 4.0   CO2 27 25   BUN 10.5 12.1   CREATININE 0.64* 0.64*   CALCIUM 8.5* 8.9   ALBUMIN 2.3* 2.4*   BILITOT 0.2 0.2   ALKPHOS 83 92   AST 9 10   ALT 7 9       Significant Imaging: I have reviewed all pertinent imaging results/findings within the past 24 hours.    Assessment/Plan:      Active Diagnoses:    Diagnosis Date Noted POA    PRINCIPAL PROBLEM:  Osteomyelitis [M86.9] 03/15/2024 Yes    Knee effusion, left [M25.462] 03/31/2024 No    Pressure injury of sacral region, stage 4 [L89.154] 03/19/2024 Yes    Skin ulcer of right lower leg [L97.919] 03/19/2024 Yes    Skin ulcer of left lower leg [L97.929] 03/19/2024 Yes    Unstageable pressure ulcer of right heel [L89.610] 03/19/2024 Yes    Unstageable pressure ulcer of left heel [L89.620] 03/19/2024 Yes      Problems Resolved During this Admission:     VTE Risk Mitigation (From admission, onward)           Ordered     enoxaparin injection 40 mg  Daily         03/15/24 0934                       Enzo Thibodeaux MD  Department of Hospital Medicine   [facility] - 8th Floor Med Surg

## 2024-04-08 NOTE — PROGRESS NOTES
[facility] - 8th Floor Med Surg  Wound Care  Progress Note    Patient Name: Shaheen Christie  MRN: [id number]  Admission Date: 3/15/2024  Hospital Length of Stay: 24 days  Attending Physician: Enzo Thibodeaux MD  Primary Care Provider: Viktor Russ MD     Subjective:     HPI:  Wound medicine Re-check - Sacral ulcer    The patient is a 69 year old WM, with past medical history of HTN, COPD, DM2 with neuropathy, PAD, chronic venous hypertension. He was sent  to [facility] on 3/15/24 from [facility] for evaluation of sacral wound due to recent diagnosis of osteomyelitis involving the sacrum on 2/27/24. Previous plans for LTAC placement were not successful; he will require assistance with placement for continuing IV antibiotics post discharge.   Review of records reveals + culture of left leg wound with Pseudomonas, Providencia, ESBL Proteus on 2/8/24 and + sacral wound culture with MRSA, Providencia, Enterococcus and Pseudomonas. 2/27/24 MRI of sacrum shows osteomyelitis involving the sacrococcygeal bone.   He presents without systemic signs of infection. He was evaluated by surgery (3/15/24), no plan for surgical intervention at this time.   ID consulted; and guiding antibiotic stewardship.   He was seen in the outpatient wound care clinic here at [facility] form 9/2022 - 2/2023 for ulcer of left ankle.   He is currently with multiple chronic appearing wounds to BLE and sacrum. He has been seen by Dr. Queen with work up and intervention for occlusion of LLE in the past. Most recent peripheral angiography performed on [facility] (3/13/24) per Dr. Queen. Found to have non critical stenosis in the left SFA. Right SFA with stenosis however with excellent three-vessel runoff. (Wounds to be medically managed)    Wound medicine has been consulted in addition to WOCN for evaluation.  First evaluation compelted on 3/19/24. Found to have severe BLE unstageable pressure injuries to both lateral legs and  both heels and chronic sacral pressure ulcer.   Podiatry consulted and following - Surgical debridement of BLE done on 3/26/24  Wound Vac applied on 3/28/24  Wound recheck done today, 4/8/24. Met patient in his room, 804, awake, alert and agreeable to wound assessment and treatment. Accompanied by WOCN for wound vac change. Lying on low air loss mattress,  without heel lift boots; Bilateral lower legs with dressings intact.         Hospital Course:   No notes on file      Follow-up For: Procedure(s) (LRB):  DEBRIDEMENT, FOOT (Bilateral)    Post-Operative Day: 13 Days Post-Op    Scheduled Meds:   amLODIPine  10 mg Oral Daily    aspirin  81 mg Oral Daily    cilostazoL  50 mg Oral BID    clopidogreL  75 mg Oral Daily    docusate sodium  100 mg Oral BID    DULoxetine  30 mg Oral Daily    enoxaparin  40 mg Subcutaneous Daily    ferrous sulfate  1 tablet Oral Daily    gabapentin  300 mg Oral TID    gabapentin  600 mg Oral QHS    LIDOcaine  1 patch Transdermal Q24H    losartan  100 mg Oral Daily    multivitamin  1 tablet Oral Daily    oxyCODONE  10 mg Oral Q4H    piperacillin-tazobactam (Zosyn) IV (PEDS and ADULTS) (extended infusion is not appropriate)  4.5 g Intravenous Q8H    polyethylene glycol  17 g Oral Daily    sodium chloride 0.9%  10 mL Intravenous Q6H     Continuous Infusions:  PRN Meds:acetaminophen, albuterol, aluminum-magnesium hydroxide-simethicone, HYDROcodone-acetaminophen, melatonin, simethicone, Flushing PICC/Midline Protocol **AND** sodium chloride 0.9% **AND** sodium chloride 0.9%    Review of Systems   Constitutional:  Negative for chills, fatigue and fever.   HENT:          Hard of hearing     Skin:  Positive for wound.     Objective:     Vital Signs (Most Recent):  Temp: 98.5 °F (36.9 °C) (04/08/24 0755)  Pulse: 80 (04/08/24 0755)  Resp: 18 (04/08/24 1023)  BP: (!) 105/45 (04/08/24 0755)  SpO2: (!) 91 % (04/08/24 0755) Vital Signs (24h Range):  Temp:  [98.3 °F (36.8 °C)-99.4 °F (37.4 °C)] 98.5 °F  (36.9 °C)  Pulse:  [78-91] 80  Resp:  [18] 18  SpO2:  [90 %-94 %] 91 %  BP: (102-127)/(37-66) 105/45     Weight: 68 kg (150 lb)  Body mass index is 22.14 kg/m².     Physical Exam  Vitals reviewed.   Constitutional:       General: He is awake. He is not in acute distress.     Appearance: He is not toxic-appearing.      Comments: Chronically ill appearing   HENT:      Head: Normocephalic and atraumatic.      Nose: Nose normal.   Cardiovascular:      Rate and Rhythm: Normal rate and regular rhythm.   Pulmonary:      Effort: Pulmonary effort is normal. No respiratory distress.   Musculoskeletal:      Left lower leg: Edema present.   Skin:     Findings: Wound present.             Comments: Sacrum: Large stage 4 pressure ulcer to sacrum extending further toward left side. Wound vac removed, wound bed beefy red with evidence of granulation. Small areas of slough to left lateral and medial portion of wound. Some ligamentous material exposed in center of wound. No purulent drainage or foul odor noted to wound.     Dressings intact to BLE. Wounds not visualized   Neurological:      General: No focal deficit present.      Mental Status: He is alert and oriented to person, place, and time. Mental status is at baseline.   Psychiatric:         Mood and Affect: Mood normal.         Behavior: Behavior normal. Behavior is cooperative.       Sacrum > on left: 18 x 7 x 1 cm with undermining from 12 o'clock to 3 o'clock with deepest at 1.8 cm          Laboratory:  A1C:   Recent Labs   Lab 02/02/24  0445   HGBA1C 5.0       BMP:   Recent Labs   Lab 04/08/24  0532      K 4.0   CO2 25   BUN 12.1   CREATININE 0.64*   CALCIUM 8.9     CBC:   Recent Labs   Lab 04/08/24  0003   WBC 6.93   RBC 2.86*   HGB 7.7*   HCT 24.4*      MCV 85.3   MCH 26.9*   MCHC 31.6*     CMP:   Recent Labs   Lab 04/08/24  0532   CALCIUM 8.9   ALBUMIN 2.4*      K 4.0   CO2 25   BUN 12.1   CREATININE 0.64*   ALKPHOS 92   ALT 9   AST 10   BILITOT 0.2  "      CRP:   Recent Labs   Lab 04/04/24  0531   CRP 39.20*     ESR:   Recent Labs   Lab 04/04/24  0531   SEDRATE 29*     LFTs:   Recent Labs   Lab 04/08/24  0532   ALT 9   AST 10   ALKPHOS 92   BILITOT 0.2   ALBUMIN 2.4*       Microbiology Results (last 7 days)       ** No results found for the last 168 hours. **          Specimen (24h ago, onward)      None          No results for input(s): "COLORU", "CLARITYU", "SPECGRAV", "PHUR", "PROTEINUA", "GLUCOSEU", "BILIRUBINCON", "BLOODU", "WBCU", "RBCU", "BACTERIA", "MUCUS", "NITRITE", "LEUKOCYTESUR", "UROBILINOGEN", "HYALINECASTS" in the last 168 hours.    Diagnostic Results:  I have reviewed all pertinent imaging results/findings within the past 24 hours.    Assessment/Plan:       Stage 4 pressure ulcer to sacrum - with evidence of osteomyelitis admitted for initiation of IV antibiotics.   Unstageable pressure ulcers to bilateral heels - Podiatry consulted and following - surgical debridement on 3/26/24  Open wound/ulcers to right lateral lower leg and left lateral lower leg - podiatry following  Peripheral arterial disease - with history of vascular intervention with Dr. Queen (most recent angio 3/13/24; medical management)   PVD  History of polymicrobial sacral wound culture  History of polymicrobial wound culture to leg wound  DM2  Anemia  History of smoking  Low prealbumin      PLAN:     Chart reviewed, patient examined and wounds assessed.  Opinion: The patient has a large stage 4 pressure ulcer to his sacrum that does not appear to be infected;  Wound vac initiated on 3/28/24; tolerating well. Will continue with Wound Vac. Awaiting LTAC placement  BLE Dressings in place followed by Podiatry - did not visualize wounds today.   Patients bed mobility is severely limited due to pain and discomfort associated with movement. This is affecting his ability to properly offload; which will affect ability to heal his wounds. Educated patient on importance of aggressive " offloading, verbalizes understanding. Staff reports some refusal with repositioning at times.   Wound care orders: Wound Vac  Monitor for signs & symptoms of deterioration  Offloading of sacrum/buttocks/heels at all times: BRADEN mattress, turning q 2 hrs; use of wedges and heel offloading devices to be used at all times while in bed; ( SALONI Arellano).   Incontinence: control moisture/wound contamination: No briefs  Nutrition: Recommend aggressive nutritional support, protein supplementation along with vitamin and mineral supplements  and pierre to support wound healing; Low prealbumin - 13.8 on 3/19/24 and rechecked on 4/1/24 - 12.6; follow dietitian recommendation; encourage to drink Pierre; reports good appetite  Diabetes: A1C good  Will try to follow weekly while admitted, but every nurse assigned to patient on every shift of every day needs to address daily wound care dressing changes and offloading modalities including using heel offloading devices, wedges, BRADEN mattress etc        The time spent including preparing to see the patient, obtaining patient history and assessment, evaluation of the plan of care, patient/caregiver counseling and education, orders, documentation, coordination of care, and other professional medical management activities for today's encounter was 30 minutes.    ANGEL Chavira  Wound Care  [facility] - 8th Floor Med Surg

## 2024-04-08 NOTE — PROGRESS NOTES
[facility] - 8th Floor Med Surg  Wound Care    Patient Name:  Shaheen Christie   MRN:  [id number]  Date: 4/8/2024  Diagnosis: Osteomyelitis    History:     Past Medical History:   Diagnosis Date    COPD (chronic obstructive pulmonary disease)     Depression     Hypertension     Neuropathy        Social History     Socioeconomic History    Marital status:    Occupational History    Occupation: retired   Tobacco Use    Smoking status: Former     Average packs/day: 0.5 packs/day for 16.3 years (7.5 ttl pk-yrs)     Types: Cigarettes     Start date: 2008    Smokeless tobacco: Never   Substance and Sexual Activity    Alcohol use: Yes     Alcohol/week: 3.0 - 6.0 standard drinks of alcohol     Types: 3 - 6 Cans of beer per week    Drug use: Yes     Types: Marijuana    Sexual activity: Not Currently       Precautions:     Allergies as of 03/15/2024    (No Known Allergies)       WOC Assessment Details/Treatment        04/08/24 1151   WOCN Assessment   Visit Date 04/08/24   Visit Time 1151   Consult Type Follow Up   WOCN Speciality Wound   WOCN List wound vac   Procedure wound vac   Intervention changed   Teaching on-going   Skin Interventions   Device Skin Pressure Protection absorbent pad utilized/changed   Pressure Reduction Devices specialty bed utilized        Altered Skin Integrity 01/22/24 1351 Sacral spine #4   Date First Assessed/Time First Assessed: 01/22/24 1351   Altered Skin Integrity Present on Admission - Did Patient arrive to the hospital with altered skin?: yes  Location: Sacral spine  Wound Number: #4  Is this injury device related?: No   Wound Image    Dressing Appearance Dry;Intact;Clean   Drainage Amount Moderate   Drainage Characteristics/Odor Serosanguineous   Appearance Red;Yellow;Slough   Tissue loss description Full thickness   Red (%), Wound Tissue Color 75 %   Yellow (%), Wound Tissue Color 25 %   Periwound Area Moist;Macerated   Wound Edges Irregular   Wound Length (cm) 7 cm    Wound Width (cm) 18 cm   Wound Depth (cm) 1 cm   Wound Volume (cm^3) 126 cm^3   Wound Surface Area (cm^2) 126 cm^2   Undermining (depth (cm)/location) 1.8cm from 12-3oclock   Care Cleansed with:;Antimicrobial agent   Dressing Applied  (npwt)        Negative Pressure Wound Therapy  03/28/24   Placement Date: 03/28/24   Location: Sacral Spine   NPWT Type Vacuum Therapy   Therapy Setting NPWT Continuous therapy   Pressure Setting NPWT 125 mmHg   Therapy Interventions NPWT Dressing changed   Sponges Inserted NPWT Black  (versatel)   Sponges Removed NPWT Black  (versatel)       Lory ORTIZ at bedside for evaluation. NPWT reapplied to sacral ulcer. Excellent seal noted at 125 mm/ hg continuous. Nursing to continue with preventative measures. On BRADEN mattress. Will Follow up.     04/08/2024     2 pair

## 2024-04-09 LAB
ALBUMIN SERPL-MCNC: 2.5 G/DL (ref 3.4–4.8)
ALBUMIN/GLOB SERPL: 0.8 RATIO (ref 1.1–2)
ALP SERPL-CCNC: 101 UNIT/L (ref 40–150)
ALT SERPL-CCNC: 10 UNIT/L (ref 0–55)
AST SERPL-CCNC: 11 UNIT/L (ref 5–34)
BASOPHILS # BLD AUTO: 0.07 X10(3)/MCL
BASOPHILS NFR BLD AUTO: 0.9 %
BILIRUB SERPL-MCNC: 0.2 MG/DL
BUN SERPL-MCNC: 9.9 MG/DL (ref 8.4–25.7)
CALCIUM SERPL-MCNC: 9.2 MG/DL (ref 8.8–10)
CHLORIDE SERPL-SCNC: 104 MMOL/L (ref 98–107)
CO2 SERPL-SCNC: 25 MMOL/L (ref 23–31)
CREAT SERPL-MCNC: 0.61 MG/DL (ref 0.73–1.18)
CRP SERPL-MCNC: 72.1 MG/L
EOSINOPHIL # BLD AUTO: 1.51 X10(3)/MCL (ref 0–0.9)
EOSINOPHIL NFR BLD AUTO: 19.9 %
ERYTHROCYTE [DISTWIDTH] IN BLOOD BY AUTOMATED COUNT: 17.9 % (ref 11.5–17)
ERYTHROCYTE [SEDIMENTATION RATE] IN BLOOD: 76 MM/HR (ref 0–15)
GFR SERPLBLD CREATININE-BSD FMLA CKD-EPI: >60 MLS/MIN/1.73/M2
GLOBULIN SER-MCNC: 3.1 GM/DL (ref 2.4–3.5)
GLUCOSE SERPL-MCNC: 110 MG/DL (ref 82–115)
HCT VFR BLD AUTO: 27.8 % (ref 42–52)
HGB BLD-MCNC: 8.7 G/DL (ref 14–18)
IMM GRANULOCYTES # BLD AUTO: 0.02 X10(3)/MCL (ref 0–0.04)
IMM GRANULOCYTES NFR BLD AUTO: 0.3 %
LYMPHOCYTES # BLD AUTO: 1.13 X10(3)/MCL (ref 0.6–4.6)
LYMPHOCYTES NFR BLD AUTO: 14.9 %
MCH RBC QN AUTO: 26.6 PG (ref 27–31)
MCHC RBC AUTO-ENTMCNC: 31.3 G/DL (ref 33–36)
MCV RBC AUTO: 85 FL (ref 80–94)
MONOCYTES # BLD AUTO: 0.81 X10(3)/MCL (ref 0.1–1.3)
MONOCYTES NFR BLD AUTO: 10.7 %
NEUTROPHILS # BLD AUTO: 4.06 X10(3)/MCL (ref 2.1–9.2)
NEUTROPHILS NFR BLD AUTO: 53.3 %
NRBC BLD AUTO-RTO: 0 %
PLATELET # BLD AUTO: 398 X10(3)/MCL (ref 130–400)
PMV BLD AUTO: 8.7 FL (ref 7.4–10.4)
POTASSIUM SERPL-SCNC: 3.8 MMOL/L (ref 3.5–5.1)
PROT SERPL-MCNC: 5.6 GM/DL (ref 5.8–7.6)
RBC # BLD AUTO: 3.27 X10(6)/MCL (ref 4.7–6.1)
SODIUM SERPL-SCNC: 137 MMOL/L (ref 136–145)
WBC # SPEC AUTO: 7.6 X10(3)/MCL (ref 4.5–11.5)

## 2024-04-09 PROCEDURE — 27000221 HC OXYGEN, UP TO 24 HOURS

## 2024-04-09 PROCEDURE — A4216 STERILE WATER/SALINE, 10 ML: HCPCS | Performed by: INTERNAL MEDICINE

## 2024-04-09 PROCEDURE — 86140 C-REACTIVE PROTEIN: CPT | Performed by: NURSE PRACTITIONER

## 2024-04-09 PROCEDURE — 11000001 HC ACUTE MED/SURG PRIVATE ROOM

## 2024-04-09 PROCEDURE — 85025 COMPLETE CBC W/AUTO DIFF WBC: CPT | Performed by: NURSE PRACTITIONER

## 2024-04-09 PROCEDURE — 63600175 PHARM REV CODE 636 W HCPCS: Performed by: PODIATRIST

## 2024-04-09 PROCEDURE — 63600175 PHARM REV CODE 636 W HCPCS: Performed by: INTERNAL MEDICINE

## 2024-04-09 PROCEDURE — 25000003 PHARM REV CODE 250: Performed by: INTERNAL MEDICINE

## 2024-04-09 PROCEDURE — 85652 RBC SED RATE AUTOMATED: CPT | Performed by: NURSE PRACTITIONER

## 2024-04-09 PROCEDURE — 25000003 PHARM REV CODE 250: Performed by: PODIATRIST

## 2024-04-09 PROCEDURE — 80053 COMPREHEN METABOLIC PANEL: CPT | Performed by: INTERNAL MEDICINE

## 2024-04-09 RX ADMIN — OXYCODONE HYDROCHLORIDE 10 MG: 10 TABLET ORAL at 08:04

## 2024-04-09 RX ADMIN — SODIUM CHLORIDE, PRESERVATIVE FREE 10 ML: 5 INJECTION INTRAVENOUS at 05:04

## 2024-04-09 RX ADMIN — GABAPENTIN 300 MG: 300 CAPSULE ORAL at 08:04

## 2024-04-09 RX ADMIN — PIPERACILLIN SODIUM AND TAZOBACTAM SODIUM 4.5 G: 4; .5 INJECTION, POWDER, LYOPHILIZED, FOR SOLUTION INTRAVENOUS at 05:04

## 2024-04-09 RX ADMIN — LOSARTAN POTASSIUM 100 MG: 50 TABLET, FILM COATED ORAL at 09:04

## 2024-04-09 RX ADMIN — DULOXETINE HYDROCHLORIDE 30 MG: 30 CAPSULE, DELAYED RELEASE ORAL at 09:04

## 2024-04-09 RX ADMIN — OXYCODONE HYDROCHLORIDE 10 MG: 10 TABLET ORAL at 03:04

## 2024-04-09 RX ADMIN — PIPERACILLIN SODIUM AND TAZOBACTAM SODIUM 4.5 G: 4; .5 INJECTION, POWDER, LYOPHILIZED, FOR SOLUTION INTRAVENOUS at 01:04

## 2024-04-09 RX ADMIN — GABAPENTIN 300 MG: 300 CAPSULE ORAL at 09:04

## 2024-04-09 RX ADMIN — OXYCODONE HYDROCHLORIDE 10 MG: 10 TABLET ORAL at 11:04

## 2024-04-09 RX ADMIN — CILOSTAZOL 50 MG: 50 TABLET ORAL at 09:04

## 2024-04-09 RX ADMIN — THERA TABS 1 TABLET: TAB at 09:04

## 2024-04-09 RX ADMIN — CILOSTAZOL 50 MG: 50 TABLET ORAL at 08:04

## 2024-04-09 RX ADMIN — AMLODIPINE BESYLATE 10 MG: 5 TABLET ORAL at 09:04

## 2024-04-09 RX ADMIN — FERROUS SULFATE TAB 325 MG (65 MG ELEMENTAL FE) 1 EACH: 325 (65 FE) TAB at 09:04

## 2024-04-09 RX ADMIN — PIPERACILLIN SODIUM AND TAZOBACTAM SODIUM 4.5 G: 4; .5 INJECTION, POWDER, LYOPHILIZED, FOR SOLUTION INTRAVENOUS at 08:04

## 2024-04-09 RX ADMIN — CLOPIDOGREL BISULFATE 75 MG: 75 TABLET ORAL at 09:04

## 2024-04-09 RX ADMIN — GABAPENTIN 300 MG: 300 CAPSULE ORAL at 03:04

## 2024-04-09 RX ADMIN — ASPIRIN 81 MG CHEWABLE TABLET 81 MG: 81 TABLET CHEWABLE at 09:04

## 2024-04-09 RX ADMIN — DOCUSATE SODIUM 100 MG: 100 CAPSULE, LIQUID FILLED ORAL at 08:04

## 2024-04-09 RX ADMIN — SODIUM CHLORIDE, PRESERVATIVE FREE 10 ML: 5 INJECTION INTRAVENOUS at 11:04

## 2024-04-09 RX ADMIN — OXYCODONE HYDROCHLORIDE 10 MG: 10 TABLET ORAL at 05:04

## 2024-04-09 RX ADMIN — DOCUSATE SODIUM 100 MG: 100 CAPSULE, LIQUID FILLED ORAL at 09:04

## 2024-04-09 RX ADMIN — LIDOCAINE 5% 1 PATCH: 700 PATCH TOPICAL at 10:04

## 2024-04-09 RX ADMIN — GABAPENTIN 600 MG: 300 CAPSULE ORAL at 08:04

## 2024-04-09 RX ADMIN — ENOXAPARIN SODIUM 40 MG: 40 INJECTION SUBCUTANEOUS at 05:04

## 2024-04-09 NOTE — PROGRESS NOTES
[facility]                       [address] [address]    PATIENT NAME:       HAWA CHRISTIE  YOB: 1955  CSN:                [id number]   MRN:                [id number]  ADMIT DATE:         03/15/2024 03:09:00  PHYSICIAN:          Tom Nichole DPM                            PROGRESS NOTE    DATE:  04/08/2024 07:32:08    SUBJECTIVE:  Mr. Christie is seen today.  Still have not had any updates concerning   placement for this gentleman.  Last updates were on the 5th.  The patient was   seen by Wound Care today.  Remains on antibiotics.    PHYSICAL EXAMINATION:  Vital signs are stable and afebrile.    Dressings are intact.  Apparently were taken down today.  No wound inspection.    Still with reasonable capillary refill to the toes.    ASSESSMENT:  Bilateral lower extremity wounds status post debridement with   secondary infection.    PLAN:  Continue with current care.  We will reassess the wounds tomorrow.    Hopefully, we have some updates concerning placement.        ______________________________  Tom Nichole DPM    GAS/AQS  DD:  04/08/2024  Time:  07:14PM  DT:  04/08/2024  Time:  09:05PM  Job #:  [id number]      PROGRESS NOTE

## 2024-04-09 NOTE — PROGRESS NOTES
[facility] - 8th Floor [facility] Medicine  Progress Note    Patient Name: Shaheen Christie  MRN: [id number]  Patient Class: IP- Inpatient   Admission Date: 3/15/2024  Length of Stay: 25 days  Attending Physician: Enzo Thibodeaux MD  Primary Care Provider: Viktor Russ MD        Subjective:     Principal Problem:Osteomyelitis    Interval History:  No changes in patient's condition.    Review of Systems   All other systems reviewed and are negative.    Objective:  Continues to be followed by wound care.     Vital Signs (Most Recent):  Temp: 98.6 °F (37 °C) (04/09/24 0750)  Pulse: 77 (04/09/24 0750)  Resp: 18 (04/09/24 0750)  BP: 127/62 (04/09/24 0750)  SpO2: (!) 92 % (04/09/24 0750) Vital Signs (24h Range):  Temp:  [98.4 °F (36.9 °C)-99.6 °F (37.6 °C)] 98.6 °F (37 °C)  Pulse:  [74-84] 77  Resp:  [16-18] 18  SpO2:  [91 %-93 %] 92 %  BP: (115-151)/(62-78) 127/62     Weight: 68 kg (150 lb)  Body mass index is 22.14 kg/m².    Intake/Output Summary (Last 24 hours) at 4/9/2024 0756  Last data filed at 4/9/2024 0500  Gross per 24 hour   Intake 180 ml   Output 1000 ml   Net -820 ml      Physical Exam  HENT:      Head: Normocephalic and atraumatic.      Right Ear: External ear normal.      Left Ear: External ear normal.      Mouth/Throat:      Mouth: Mucous membranes are dry.   Eyes:      Extraocular Movements: Extraocular movements intact.   Cardiovascular:      Rate and Rhythm: Normal rate and regular rhythm.      Pulses: Normal pulses.      Heart sounds: Normal heart sounds.   Pulmonary:      Effort: Pulmonary effort is normal.      Breath sounds: Normal breath sounds.   Abdominal:      General: Bowel sounds are normal.      Palpations: Abdomen is soft.   Musculoskeletal:      Cervical back: Neck supple.   Skin:     General: Skin is warm and dry.   Neurological:      General: No focal deficit present.      Mental Status: He is alert and oriented to person, place, and time. Mental status is  at baseline.           Overview/Hospital Course:  Patient doing well.  Hemoglobin up.  Electrolytes stable.  Awaiting placement in LTAC.    Significant Labs: All pertinent labs within the past 24 hours have been reviewed.  BMP:   Recent Labs   Lab 04/09/24  0550      K 3.8   CO2 25   BUN 9.9   CREATININE 0.61*   CALCIUM 9.2     CBC:   Recent Labs   Lab 04/08/24  0003 04/09/24  0550   WBC 6.93 7.60   HGB 7.7* 8.7*   HCT 24.4* 27.8*    398     CMP:   Recent Labs   Lab 04/08/24  0532 04/09/24  0550    137   K 4.0 3.8   CO2 25 25   BUN 12.1 9.9   CREATININE 0.64* 0.61*   CALCIUM 8.9 9.2   ALBUMIN 2.4* 2.5*   BILITOT 0.2 0.2   ALKPHOS 92 101   AST 10 11   ALT 9 10       Significant Imaging: I have reviewed all pertinent imaging results/findings within the past 24 hours.    Assessment/Plan:      Active Diagnoses:    Diagnosis Date Noted POA    PRINCIPAL PROBLEM:  Osteomyelitis [M86.9] 03/15/2024 Yes    Knee effusion, left [M25.462] 03/31/2024 No    Pressure injury of sacral region, stage 4 [L89.154] 03/19/2024 Yes    Skin ulcer of right lower leg [L97.919] 03/19/2024 Yes    Skin ulcer of left lower leg [L97.929] 03/19/2024 Yes    Unstageable pressure ulcer of right heel [L89.610] 03/19/2024 Yes    Unstageable pressure ulcer of left heel [L89.620] 03/19/2024 Yes      Problems Resolved During this Admission:     VTE Risk Mitigation (From admission, onward)           Ordered     enoxaparin injection 40 mg  Daily         03/15/24 0934                       Enzo Thibodeaux MD  Department of Hospital Medicine   [facility] - 8th Floor Med Surg

## 2024-04-09 NOTE — PROGRESS NOTES
Infectious Diseases Progress Note  69-year-old male, nursing home resident, with past medical history of HTN, COPD, DM type 2, with neuropathy, is admitted to [facility] on 03/15/2024 sent via EMS for sacral wound evaluation, apparently diagnosed with osteomyelitis involving the sacrum on 02/27/2024 with plan to be admitted to LTAC which had not been successful as an outpatient.  On presentation he was noted to have no fevers and no leukocytosis, anemic with low albumin.  Blood cultures have been negative.  Review of his records show a 02/08/2024 left leg wound culture with Pseudomonas, Providencia, ESBL Proteus and 11/30/2023 sacral wound culture with MRSA, Providencia, Enterococcus and Pseudomonas.  2/27 MRI of the pelvis shows osteomyelitis involving the sacrococcygeal bone as well as right trochanteric bursitis which may be septic.  He was seen by surgery team with no plan for surgical intervention.  He is on antibiotic coverage with vancomycin and Zosyn.     Subjective:  Lying in bed in no acute distress. No new complaints voiced, doing about the same. Afebrile.     Past Medical History:   Diagnosis Date    COPD (chronic obstructive pulmonary disease)     Depression     Hypertension     Neuropathy      Past Surgical History:   Procedure Laterality Date    angiogram Bilateral     stents placed in left leg    anterior neck surgery      ARTHROTOMY OF ANKLE Left 6/28/2023    Procedure: ARTHROTOMY, ANKLE;  Surgeon: Tom Nichole DPM;  Location: [facility];  Service: Podiatry;  Laterality: Left;    cataracts Left     CHOLECYSTECTOMY      DEBRIDEMENT OF FOOT Bilateral 3/26/2024    Procedure: DEBRIDEMENT, FOOT;  Surgeon: Tom Nichole DPM;  Location: [facility] OR;  Service: Podiatry;  Laterality: Bilateral;    EYE SURGERY Left     HAND SURGERY Left     broken bone    herina repair      INCISION AND DRAINAGE, LOWER EXTREMITY Left 7/28/2023    Procedure: INCISION AND DRAINAGE, LOWER  EXTREMITY;  Surgeon: Avinash Garces MD;  Location: [facility] OR;  Service: Orthopedics;  Laterality: Left;  Supine, vascular bed, bone foam  last case  cysto tubing, laparoscopic trocar, experience, vanc, hemovac drain    KNEE SURGERY Bilateral     PERIPHERAL ANGIOGRAPHY N/A 3/13/2024    Procedure: Peripheral angiography;  Surgeon: Deven Queen MD;  Location: [facility] CATH LAB;  Service: Cardiology;  Laterality: N/A;  MICHELLE ANGIO W/ ANEST.    posterior neck surgery      SPINE SURGERY      TOTAL REPLACEMENT OF BOTH HIP JOINTS USING COMPUTER-ASSISTED NAVIGATION Bilateral      Social History     Socioeconomic History    Marital status:    Occupational History    Occupation: retired   Tobacco Use    Smoking status: Former     Average packs/day: 0.5 packs/day for 16.3 years (7.5 ttl pk-yrs)     Types: Cigarettes     Start date: 2008    Smokeless tobacco: Never   Substance and Sexual Activity    Alcohol use: Yes     Alcohol/week: 3.0 - 6.0 standard drinks of alcohol     Types: 3 - 6 Cans of beer per week    Drug use: Yes     Types: Marijuana    Sexual activity: Not Currently       ROS  Constitutional:  Positive for malaise/fatigue.   HENT: Negative.     Respiratory: Negative.     Gastrointestinal: Negative.    Genitourinary: Negative.    Musculoskeletal: Negative.    Skin: Multiple pressure wounds   Neurological:  Positive for weakness.   Endo/Heme/Allergies: Negative.    Psychiatric/Behavioral: Negative.     All other Systems review done and negative.    Review of patient's allergies indicates:  No Known Allergies      Scheduled Meds:   amLODIPine  10 mg Oral Daily    aspirin  81 mg Oral Daily    cilostazoL  50 mg Oral BID    clopidogreL  75 mg Oral Daily    docusate sodium  100 mg Oral BID    DULoxetine  30 mg Oral Daily    enoxaparin  40 mg Subcutaneous Daily    ferrous sulfate  1 tablet Oral Daily    gabapentin  300 mg Oral TID    gabapentin  600 mg Oral QHS    LIDOcaine  1 patch Transdermal Q24H    losartan  100 mg  "Oral Daily    multivitamin  1 tablet Oral Daily    oxyCODONE  10 mg Oral Q4H    piperacillin-tazobactam (Zosyn) IV (PEDS and ADULTS) (extended infusion is not appropriate)  4.5 g Intravenous Q8H    polyethylene glycol  17 g Oral Daily    sodium chloride 0.9%  10 mL Intravenous Q6H     Continuous Infusions:  PRN Meds:acetaminophen, albuterol, aluminum-magnesium hydroxide-simethicone, HYDROcodone-acetaminophen, melatonin, simethicone, Flushing PICC/Midline Protocol **AND** sodium chloride 0.9% **AND** sodium chloride 0.9%    Objective:  BP (!) 144/69   Pulse 83   Temp 99.6 °F (37.6 °C) (Oral)   Resp 18   Ht 5' 9.02" (1.753 m)   Wt 68 kg (150 lb)   SpO2 (!) 91%   BMI 22.14 kg/m²     Physical Exam:   Physical Exam  Vitals reviewed.   Constitutional:       General: He is not in acute distress.  HENT:      Head: Normocephalic and atraumatic.   Cardiovascular:      Rate and Rhythm: Normal rate and regular rhythm.      Heart sounds: Normal heart sounds.   Pulmonary:      Effort: Pulmonary effort is normal. No respiratory distress.      Breath sounds: Normal breath sounds.   Abdominal:      General: Bowel sounds are normal. There is no distension.      Palpations: Abdomen is soft.      Tenderness: There is no abdominal tenderness.   Musculoskeletal:         General: No deformity.      Cervical back: Neck supple.   Skin:     Findings: No erythema or rash.      Comments: Wound vac to sacrum  Neurological:      Mental Status: He is alert and oriented to person, place, and time.   Psychiatric:      Comments: Calm and cooperative     Imaging  Imaging Results    None          Lab Review   Recent Results (from the past 24 hour(s))   CBC with Differential    Collection Time: 04/08/24 12:03 AM   Result Value Ref Range    WBC 6.93 4.50 - 11.50 x10(3)/mcL    RBC 2.86 (L) 4.70 - 6.10 x10(6)/mcL    Hgb 7.7 (L) 14.0 - 18.0 g/dL    Hct 24.4 (L) 42.0 - 52.0 %    MCV 85.3 80.0 - 94.0 fL    MCH 26.9 (L) 27.0 - 31.0 pg    MCHC 31.6 (L) " 33.0 - 36.0 g/dL    RDW 18.0 (H) 11.5 - 17.0 %    Platelet 335 130 - 400 x10(3)/mcL    MPV 8.0 7.4 - 10.4 fL    Neut % 47.6 %    Lymph % 20.3 %    Mono % 11.3 %    Eos % 19.8 %    Basophil % 0.7 %    Lymph # 1.41 0.6 - 4.6 x10(3)/mcL    Neut # 3.30 2.1 - 9.2 x10(3)/mcL    Mono # 0.78 0.1 - 1.3 x10(3)/mcL    Eos # 1.37 (H) 0 - 0.9 x10(3)/mcL    Baso # 0.05 <=0.2 x10(3)/mcL    IG# 0.02 0 - 0.04 x10(3)/mcL    IG% 0.3 %    NRBC% 0.0 %   Comprehensive Metabolic Panel    Collection Time: 04/08/24  5:32 AM   Result Value Ref Range    Sodium Level 139 136 - 145 mmol/L    Potassium Level 4.0 3.5 - 5.1 mmol/L    Chloride 106 98 - 107 mmol/L    Carbon Dioxide 25 23 - 31 mmol/L    Glucose Level 100 82 - 115 mg/dL    Blood Urea Nitrogen 12.1 8.4 - 25.7 mg/dL    Creatinine 0.64 (L) 0.73 - 1.18 mg/dL    Calcium Level Total 8.9 8.8 - 10.0 mg/dL    Protein Total 5.4 (L) 5.8 - 7.6 gm/dL    Albumin Level 2.4 (L) 3.4 - 4.8 g/dL    Globulin 3.0 2.4 - 3.5 gm/dL    Albumin/Globulin Ratio 0.8 (L) 1.1 - 2.0 ratio    Bilirubin Total 0.2 <=1.5 mg/dL    Alkaline Phosphatase 92 40 - 150 unit/L    Alanine Aminotransferase 9 0 - 55 unit/L    Aspartate Aminotransferase 10 5 - 34 unit/L    eGFR >60 mls/min/1.73/m2         Assessment/Plan:  1. Chronic sacral pressure wound with osteomyelitis  2. History of polymicrobial sacral wound culture - MRSA, Providencia, Pseudomonas, Enterococcus  3.  Chronic left lower extremity wound with history of polymicrobial wound culture-Pseudomonas, ESBL Proteus, Providencia  4. Multiple pressure wounds  5.  Diabetes type 2  6.  Anemia  7.  Protein calorie malnutrition      -Continue Vancomycin and Zosyn, plan end date of 04/26, following inflammatory markers  -Remains afebrile without leukocytosis  -4/4 ESR 29 from 12 and CRP 39.20 from 23.7   -3/15 blood cultures negative  -2/8 left leg wound cultures with many Pseudomonas, Providencia, Proteus  -11/30/2023 sacral wound with many Providencia, MRSA, Enterococcus,  Pseudomonas  -Seen by the surgery team with inputs noted including no plan for surgical intervention   -Podiatry on board s/p debridement 3/26, cultures revealed ESBL Proteus  -We will continue aggressive wound care per Podiatry and the wound care team  -Discussed with patient and nursing staff. Case management working on LTAC placement. Disposition per primary team

## 2024-04-10 LAB
ALBUMIN SERPL-MCNC: 2.3 G/DL (ref 3.4–4.8)
ALBUMIN/GLOB SERPL: 0.8 RATIO (ref 1.1–2)
ALP SERPL-CCNC: 92 UNIT/L (ref 40–150)
ALT SERPL-CCNC: 9 UNIT/L (ref 0–55)
AST SERPL-CCNC: 11 UNIT/L (ref 5–34)
BILIRUB SERPL-MCNC: 0.3 MG/DL
BUN SERPL-MCNC: 8.6 MG/DL (ref 8.4–25.7)
CALCIUM SERPL-MCNC: 8.7 MG/DL (ref 8.8–10)
CHLORIDE SERPL-SCNC: 104 MMOL/L (ref 98–107)
CO2 SERPL-SCNC: 25 MMOL/L (ref 23–31)
CREAT SERPL-MCNC: 0.65 MG/DL (ref 0.73–1.18)
GFR SERPLBLD CREATININE-BSD FMLA CKD-EPI: >60 MLS/MIN/1.73/M2
GLOBULIN SER-MCNC: 2.9 GM/DL (ref 2.4–3.5)
GLUCOSE SERPL-MCNC: 111 MG/DL (ref 82–115)
POTASSIUM SERPL-SCNC: 3.4 MMOL/L (ref 3.5–5.1)
PROT SERPL-MCNC: 5.2 GM/DL (ref 5.8–7.6)
SODIUM SERPL-SCNC: 136 MMOL/L (ref 136–145)

## 2024-04-10 PROCEDURE — 63600175 PHARM REV CODE 636 W HCPCS: Performed by: PODIATRIST

## 2024-04-10 PROCEDURE — A4216 STERILE WATER/SALINE, 10 ML: HCPCS | Performed by: INTERNAL MEDICINE

## 2024-04-10 PROCEDURE — 80053 COMPREHEN METABOLIC PANEL: CPT | Performed by: INTERNAL MEDICINE

## 2024-04-10 PROCEDURE — 11000001 HC ACUTE MED/SURG PRIVATE ROOM

## 2024-04-10 PROCEDURE — 99223 1ST HOSP IP/OBS HIGH 75: CPT | Mod: ,,, | Performed by: ORTHOPAEDIC SURGERY

## 2024-04-10 PROCEDURE — 25000003 PHARM REV CODE 250: Performed by: INTERNAL MEDICINE

## 2024-04-10 PROCEDURE — 27000221 HC OXYGEN, UP TO 24 HOURS

## 2024-04-10 PROCEDURE — 25000003 PHARM REV CODE 250: Performed by: PODIATRIST

## 2024-04-10 PROCEDURE — 63600175 PHARM REV CODE 636 W HCPCS: Performed by: INTERNAL MEDICINE

## 2024-04-10 RX ADMIN — ENOXAPARIN SODIUM 40 MG: 40 INJECTION SUBCUTANEOUS at 05:04

## 2024-04-10 RX ADMIN — OXYCODONE HYDROCHLORIDE 10 MG: 10 TABLET ORAL at 09:04

## 2024-04-10 RX ADMIN — PIPERACILLIN SODIUM AND TAZOBACTAM SODIUM 4.5 G: 4; .5 INJECTION, POWDER, LYOPHILIZED, FOR SOLUTION INTRAVENOUS at 12:04

## 2024-04-10 RX ADMIN — GABAPENTIN 300 MG: 300 CAPSULE ORAL at 11:04

## 2024-04-10 RX ADMIN — AMLODIPINE BESYLATE 10 MG: 5 TABLET ORAL at 11:04

## 2024-04-10 RX ADMIN — DULOXETINE HYDROCHLORIDE 30 MG: 30 CAPSULE, DELAYED RELEASE ORAL at 11:04

## 2024-04-10 RX ADMIN — SODIUM CHLORIDE, PRESERVATIVE FREE 10 ML: 5 INJECTION INTRAVENOUS at 05:04

## 2024-04-10 RX ADMIN — DOCUSATE SODIUM 100 MG: 100 CAPSULE, LIQUID FILLED ORAL at 08:04

## 2024-04-10 RX ADMIN — DOCUSATE SODIUM 100 MG: 100 CAPSULE, LIQUID FILLED ORAL at 11:04

## 2024-04-10 RX ADMIN — CLOPIDOGREL BISULFATE 75 MG: 75 TABLET ORAL at 11:04

## 2024-04-10 RX ADMIN — ASPIRIN 81 MG CHEWABLE TABLET 81 MG: 81 TABLET CHEWABLE at 11:04

## 2024-04-10 RX ADMIN — THERA TABS 1 TABLET: TAB at 11:04

## 2024-04-10 RX ADMIN — CILOSTAZOL 50 MG: 50 TABLET ORAL at 11:04

## 2024-04-10 RX ADMIN — PIPERACILLIN SODIUM AND TAZOBACTAM SODIUM 4.5 G: 4; .5 INJECTION, POWDER, LYOPHILIZED, FOR SOLUTION INTRAVENOUS at 08:04

## 2024-04-10 RX ADMIN — OXYCODONE HYDROCHLORIDE 10 MG: 10 TABLET ORAL at 03:04

## 2024-04-10 RX ADMIN — CILOSTAZOL 50 MG: 50 TABLET ORAL at 08:04

## 2024-04-10 RX ADMIN — OXYCODONE HYDROCHLORIDE 10 MG: 10 TABLET ORAL at 11:04

## 2024-04-10 RX ADMIN — PIPERACILLIN SODIUM AND TAZOBACTAM SODIUM 4.5 G: 4; .5 INJECTION, POWDER, LYOPHILIZED, FOR SOLUTION INTRAVENOUS at 04:04

## 2024-04-10 RX ADMIN — SODIUM CHLORIDE, PRESERVATIVE FREE 10 ML: 5 INJECTION INTRAVENOUS at 11:04

## 2024-04-10 RX ADMIN — OXYCODONE HYDROCHLORIDE 10 MG: 10 TABLET ORAL at 08:04

## 2024-04-10 RX ADMIN — GABAPENTIN 600 MG: 300 CAPSULE ORAL at 08:04

## 2024-04-10 RX ADMIN — LOSARTAN POTASSIUM 100 MG: 50 TABLET, FILM COATED ORAL at 11:04

## 2024-04-10 RX ADMIN — LIDOCAINE 5% 1 PATCH: 700 PATCH TOPICAL at 09:04

## 2024-04-10 RX ADMIN — FERROUS SULFATE TAB 325 MG (65 MG ELEMENTAL FE) 1 EACH: 325 (65 FE) TAB at 11:04

## 2024-04-10 RX ADMIN — OXYCODONE HYDROCHLORIDE 10 MG: 10 TABLET ORAL at 04:04

## 2024-04-10 NOTE — PROGRESS NOTES
[facility]                       [address] [address]    PATIENT NAME:       HWAA KUMAR  YOB: 1955  CSN:                [id number]   MRN:                [id number]  ADMIT DATE:         03/15/2024 03:09:00  PHYSICIAN:          Tom Nichole DPM                            PROGRESS NOTE    DATE:  04/10/2024 00:00:00    SUBJECTIVE:  The patient was seen early this morning.  He had been seen by   Orthopedics previously and underwent aspiration.  The patient still complains of   knee pain.  Otherwise, tolerating all treatments.    PHYSICAL EXAMINATION:  VITAL SIGNS:  Stable and afebrile.    Labs reviewed.  BUN and creatinine today were 8.6 and 0.65 respectively.    The patient did have a CT revealed severe bony demineralization, arthritic   change with no significant joint effusion.  Leg and foot wounds remain about the   same.  Still with some exudate from the site.  Still notable discomfort with   any sort of manipulation of that left leg.    ASSESSMENT:    1. Bilateral lower extremity wounds secondary infection.  2. Chronic sacral wound with underlying osteomyelitis.    PLAN:  Continue with current care.        ______________________________  Tom Nichole DPM    GAS/AQS  DD:  04/10/2024  Time:  05:26PM  DT:  04/10/2024  Time:  06:49PM  Job #:  [id number]      PROGRESS NOTE

## 2024-04-10 NOTE — PROGRESS NOTES
[facility]                       [address] [address]    PATIENT NAME:       HAWA KUMAR  YOB: 1955  CSN:                [id number]   MRN:                [id number]  ADMIT DATE:         03/15/2024 03:09:00  PHYSICIAN:          Tom Nichole DPM                            PROGRESS NOTE    DATE:  04/09/2024 06:26:37    SUBJECTIVE:  The patient is seen today.  Continues to complain of pain to that   left knee.  He got a little bit better and now is back to where he was before.    He has seen Orthopedics for this in the past, has a history of what appears to   be a septic arthritis.  He has had previous I and D and aspirations.  Otherwise,   he is tolerating all treatments for his sacral wound and lower extremity   wounds.    PHYSICAL EXAMINATION:  Vital signs are stable and afebrile.    LABORATORY DATA:  Reviewed.  White cell count 7.6, H and H 8.7, 27.8.    Still with some notable discomfort to that left knee with some warmth.  He has a   very difficult time, just tolerate any sort of manipulation.    Leg wounds and heel wounds are doing about the same.  Fibrogranular.  Serous   exudate.    ASSESSMENT:    1. Chronic left lower extremity wounds, slow improvement, history of infection.  2. Chronic left knee pain, history of septic arthritis.    PLAN:  We will go ahead and consult Orthopedics to come and evaluate this knee.    They were still waiting on potential transfer to [facility].  We will have to evaluate   to see if there is anything they can provide.  The patient actually told me   today that he is contemplating actually having the leg removed.        ______________________________  Tom Nichole DPM    GAS/AQS  DD:  04/09/2024  Time:  05:46PM  DT:  04/09/2024  Time:  08:11PM  Job #:  [id number]      PROGRESS NOTE

## 2024-04-10 NOTE — PROGRESS NOTES
[facility] - 8th Floor [facility] Medicine  Progress Note    Patient Name: Shaheen Christie  MRN: [id number]  Patient Class: IP- Inpatient   Admission Date: 3/15/2024  Length of Stay: 26 days  Attending Physician: Enzo Thibodeaux MD  Primary Care Provider: Viktor Russ MD        Subjective:     Principal Problem:Osteomyelitis    Interval History: No new issues    Review of Systems   All other systems reviewed and are negative.    Objective:     Vital Signs (Most Recent):  Temp: 97.9 °F (36.6 °C) (04/10/24 0740)  Pulse: 73 (04/10/24 0851)  Resp: 16 (04/10/24 0956)  BP: 119/66 (04/10/24 0851)  SpO2: (!) 92 % (04/10/24 0740) Vital Signs (24h Range):  Temp:  [97.9 °F (36.6 °C)-99 °F (37.2 °C)] 97.9 °F (36.6 °C)  Pulse:  [73-90] 73  Resp:  [16-20] 16  SpO2:  [91 %-97 %] 92 %  BP: ()/(54-66) 119/66     Weight: 68 kg (150 lb)  Body mass index is 22.14 kg/m².    Intake/Output Summary (Last 24 hours) at 4/10/2024 1103  Last data filed at 4/10/2024 0800  Gross per 24 hour   Intake 620 ml   Output 1050 ml   Net -430 ml      Physical Exam  HENT:      Head: Normocephalic and atraumatic.      Right Ear: External ear normal.      Left Ear: External ear normal.      Mouth/Throat:      Mouth: Mucous membranes are dry.   Eyes:      Extraocular Movements: Extraocular movements intact.   Cardiovascular:      Pulses: Normal pulses.      Heart sounds: Normal heart sounds.   Pulmonary:      Effort: Pulmonary effort is normal.      Breath sounds: Normal breath sounds.   Abdominal:      General: Bowel sounds are normal.      Palpations: Abdomen is soft.   Musculoskeletal:      Cervical back: Neck supple.   Skin:     General: Skin is warm and dry.   Neurological:      General: No focal deficit present.      Mental Status: He is alert and oriented to person, place, and time. Mental status is at baseline.           Overview/Hospital Course: Stable, continuetreatment    Significant Labs: All pertinent labs  within the past 24 hours have been reviewed.  BMP:   Recent Labs   Lab 04/10/24  0504      K 3.4*   CO2 25   BUN 8.6   CREATININE 0.65*   CALCIUM 8.7*     CBC:   Recent Labs   Lab 04/09/24  0550   WBC 7.60   HGB 8.7*   HCT 27.8*        CMP:   Recent Labs   Lab 04/09/24  0550 04/10/24  0504    136   K 3.8 3.4*   CO2 25 25   BUN 9.9 8.6   CREATININE 0.61* 0.65*   CALCIUM 9.2 8.7*   ALBUMIN 2.5* 2.3*   BILITOT 0.2 0.3   ALKPHOS 101 92   AST 11 11   ALT 10 9       Significant Imaging: I have reviewed all pertinent imaging results/findings within the past 24 hours.    Assessment/Plan:      Active Diagnoses:    Diagnosis Date Noted POA    PRINCIPAL PROBLEM:  Osteomyelitis [M86.9] 03/15/2024 Yes    Knee effusion, left [M25.462] 03/31/2024 No    Pressure injury of sacral region, stage 4 [L89.154] 03/19/2024 Yes    Skin ulcer of right lower leg [L97.919] 03/19/2024 Yes    Skin ulcer of left lower leg [L97.929] 03/19/2024 Yes    Unstageable pressure ulcer of right heel [L89.610] 03/19/2024 Yes    Unstageable pressure ulcer of left heel [L89.620] 03/19/2024 Yes      Problems Resolved During this Admission:     VTE Risk Mitigation (From admission, onward)           Ordered     enoxaparin injection 40 mg  Daily         03/15/24 0934                       Enzo Thibodeaux MD  Department of Hospital Medicine   [facility] - 8th Floor Med Surg

## 2024-04-10 NOTE — PROGRESS NOTES
Pt does not have effusion today. We completed a bedside dry tap. He does have severe knee pain and stopped following up with Dr Drew for TKA. With his infections he is not a candidate for TKA at this time. Recommend out pt follow up.     No orthopedic intervention planned.     Full note to follow    This note/OR report was created with the assistance of  voice recognition software or phone  dictation.  There may be transcription errors as a result of using this technology however minimal. Effort has been made to assure accuracy of transcription but any obvious errors or omissions should be clarified with the author of the document.       Rohit Pinedo, DO  Orthopedic Trauma Surgery

## 2024-04-11 LAB — PREALB SERPL-MCNC: 10.5 MG/DL (ref 16–42)

## 2024-04-11 PROCEDURE — 63600175 PHARM REV CODE 636 W HCPCS: Performed by: PODIATRIST

## 2024-04-11 PROCEDURE — 25000003 PHARM REV CODE 250: Performed by: NURSE PRACTITIONER

## 2024-04-11 PROCEDURE — 63600175 PHARM REV CODE 636 W HCPCS: Performed by: NURSE PRACTITIONER

## 2024-04-11 PROCEDURE — 25000003 PHARM REV CODE 250: Performed by: INTERNAL MEDICINE

## 2024-04-11 PROCEDURE — 84134 ASSAY OF PREALBUMIN: CPT

## 2024-04-11 PROCEDURE — 99233 SBSQ HOSP IP/OBS HIGH 50: CPT | Mod: ,,,

## 2024-04-11 PROCEDURE — 63600175 PHARM REV CODE 636 W HCPCS: Performed by: INTERNAL MEDICINE

## 2024-04-11 PROCEDURE — 25000003 PHARM REV CODE 250: Performed by: PODIATRIST

## 2024-04-11 PROCEDURE — 11000001 HC ACUTE MED/SURG PRIVATE ROOM

## 2024-04-11 PROCEDURE — 97606 NEG PRS WND THER DME>50 SQCM: CPT

## 2024-04-11 PROCEDURE — A4216 STERILE WATER/SALINE, 10 ML: HCPCS | Performed by: INTERNAL MEDICINE

## 2024-04-11 RX ORDER — VANCOMYCIN HCL IN 5 % DEXTROSE 1G/250ML
1000 PLASTIC BAG, INJECTION (ML) INTRAVENOUS
Status: DISCONTINUED | OUTPATIENT
Start: 2024-04-12 | End: 2024-04-15

## 2024-04-11 RX ADMIN — ASPIRIN 81 MG CHEWABLE TABLET 81 MG: 81 TABLET CHEWABLE at 08:04

## 2024-04-11 RX ADMIN — GABAPENTIN 600 MG: 300 CAPSULE ORAL at 09:04

## 2024-04-11 RX ADMIN — ENOXAPARIN SODIUM 40 MG: 40 INJECTION SUBCUTANEOUS at 06:04

## 2024-04-11 RX ADMIN — PIPERACILLIN SODIUM AND TAZOBACTAM SODIUM 4.5 G: 4; .5 INJECTION, POWDER, LYOPHILIZED, FOR SOLUTION INTRAVENOUS at 02:04

## 2024-04-11 RX ADMIN — GABAPENTIN 300 MG: 300 CAPSULE ORAL at 08:04

## 2024-04-11 RX ADMIN — PIPERACILLIN SODIUM AND TAZOBACTAM SODIUM 4.5 G: 4; .5 INJECTION, POWDER, LYOPHILIZED, FOR SOLUTION INTRAVENOUS at 09:04

## 2024-04-11 RX ADMIN — SODIUM CHLORIDE, PRESERVATIVE FREE 10 ML: 5 INJECTION INTRAVENOUS at 05:04

## 2024-04-11 RX ADMIN — DULOXETINE HYDROCHLORIDE 30 MG: 30 CAPSULE, DELAYED RELEASE ORAL at 08:04

## 2024-04-11 RX ADMIN — SODIUM CHLORIDE, PRESERVATIVE FREE 10 ML: 5 INJECTION INTRAVENOUS at 06:04

## 2024-04-11 RX ADMIN — SODIUM CHLORIDE, PRESERVATIVE FREE 10 ML: 5 INJECTION INTRAVENOUS at 12:04

## 2024-04-11 RX ADMIN — THERA TABS 1 TABLET: TAB at 08:04

## 2024-04-11 RX ADMIN — OXYCODONE HYDROCHLORIDE 10 MG: 10 TABLET ORAL at 11:04

## 2024-04-11 RX ADMIN — OXYCODONE HYDROCHLORIDE 10 MG: 10 TABLET ORAL at 04:04

## 2024-04-11 RX ADMIN — CILOSTAZOL 50 MG: 50 TABLET ORAL at 08:04

## 2024-04-11 RX ADMIN — PIPERACILLIN SODIUM AND TAZOBACTAM SODIUM 4.5 G: 4; .5 INJECTION, POWDER, LYOPHILIZED, FOR SOLUTION INTRAVENOUS at 04:04

## 2024-04-11 RX ADMIN — GABAPENTIN 300 MG: 300 CAPSULE ORAL at 02:04

## 2024-04-11 RX ADMIN — CILOSTAZOL 50 MG: 50 TABLET ORAL at 09:04

## 2024-04-11 RX ADMIN — VANCOMYCIN HYDROCHLORIDE 1750 MG: 500 INJECTION, POWDER, LYOPHILIZED, FOR SOLUTION INTRAVENOUS at 12:04

## 2024-04-11 RX ADMIN — DOCUSATE SODIUM 100 MG: 100 CAPSULE, LIQUID FILLED ORAL at 08:04

## 2024-04-11 RX ADMIN — DOCUSATE SODIUM 100 MG: 100 CAPSULE, LIQUID FILLED ORAL at 09:04

## 2024-04-11 RX ADMIN — OXYCODONE HYDROCHLORIDE 10 MG: 10 TABLET ORAL at 07:04

## 2024-04-11 RX ADMIN — OXYCODONE HYDROCHLORIDE 10 MG: 10 TABLET ORAL at 10:04

## 2024-04-11 RX ADMIN — FERROUS SULFATE TAB 325 MG (65 MG ELEMENTAL FE) 1 EACH: 325 (65 FE) TAB at 08:04

## 2024-04-11 RX ADMIN — LIDOCAINE 5% 1 PATCH: 700 PATCH TOPICAL at 12:04

## 2024-04-11 RX ADMIN — CLOPIDOGREL BISULFATE 75 MG: 75 TABLET ORAL at 08:04

## 2024-04-11 RX ADMIN — OXYCODONE HYDROCHLORIDE 10 MG: 10 TABLET ORAL at 08:04

## 2024-04-11 NOTE — PROGRESS NOTES
[facility]                       [address] [address]    PATIENT NAME:       HAWA KUMAR  YOB: 1955  CSN:                [id number]   MRN:                [id number]  ADMIT DATE:         03/15/2024 03:09:00  PHYSICIAN:          Tom Nichole DPM                            PROGRESS NOTE    DATE:  04/11/2024 00:00:00    SUBJECTIVE:  The patient was seen today, doing about the same.  Main complaint   is of knee pain.  No other issues reported.  Still waiting on placement options.    There was an appeal that has been denied.    PHYSICAL EXAMINATION:  VITAL SIGNS:  Stable and afebrile.    Wounds unchanged.  Still with some moderate serous exudate, more so from the   left side than the right.  Again, significant pain with any sort of manipulation   on that left lower extremity.    ASSESSMENT:  Chronic lower extremity wounds, slowly improving.    PLAN:  We will continue with current care.  The patient again asked me about   whether or not amputation is an option of the extremities.  He claims, he does   think he can go through the rest of his life with the discomfort in the knee.    Apparently, he is not a candidate for any sort of joint.  The patient   debilitated.  He will continue to think about this.  We will continue to follow.    Long-term placement is going to be difficult.        ______________________________  Tom Nichole DPM    GAS/AQS  DD:  04/11/2024  Time:  05:29PM  DT:  04/11/2024  Time:  06:31PM  Job #:  [id number]      PROGRESS NOTE

## 2024-04-11 NOTE — NURSING
Norvasc and Losartan Potassium per MAR held due to BP   Per Dr Celeste instruction     PT aware and has no s/s of hypotension at this time

## 2024-04-11 NOTE — NURSING
Reported to Dr Thibodeaux   Potassium for 4/10/2024     PT is without S/S of Hypokalemia at this time

## 2024-04-11 NOTE — PROGRESS NOTES
"Pharmacokinetic Initial Assessment: IV Vancomycin    Assessment/Plan:  Patient was previously on vancomycin until 4/6.  Per ID, patient should be on vancomycin and Zosyn for osteomyelitis.  Re-initiate intravenous vancomycin with loading dose of 1750 mg once followed by a maintenance dose of vancomycin 1000 mg IV every 12 hours  Desired empiric serum trough concentration is 15 to 20 mcg/mL  Draw vancomycin trough level 60 min prior to 5th dose on 04/13 at approximately 1100  Pharmacy will continue to follow and monitor vancomycin.      Please contact pharmacy at extension [phone] with any questions regarding this assessment.     Thank you for the consult,   Natalia Russ       Patient brief summary:  Shaheen Christie is a 69 y.o. male initiated on antimicrobial therapy with IV Vancomycin for treatment of suspected bone/joint infection    Drug Allergies:   Review of patient's allergies indicates:  No Known Allergies    Actual Body Weight:   Wt Readings from Last 1 Encounters:   03/20/24 68 kg (150 lb)       Renal Function:   Estimated Creatinine Clearance: 100.3 mL/min (A) (based on SCr of 0.65 mg/dL (L)).,     Dialysis Method (if applicable):  N/A    CBC (last 72 hours):  Recent Labs   Lab Result Units 04/09/24  0550   WBC x10(3)/mcL 7.60   Hgb g/dL 8.7*   Hct % 27.8*   Platelet x10(3)/mcL 398   Mono % % 10.7   Eos % % 19.9   Basophil % % 0.9       Metabolic Panel (last 72 hours):  Recent Labs   Lab Result Units 04/09/24  0550 04/10/24  0504   Sodium Level mmol/L 137 136   Potassium Level mmol/L 3.8 3.4*   Chloride mmol/L 104 104   Carbon Dioxide mmol/L 25 25   Glucose Level mg/dL 110 111   Blood Urea Nitrogen mg/dL 9.9 8.6   Creatinine mg/dL 0.61* 0.65*   Albumin Level g/dL 2.5* 2.3*   Bilirubin Total mg/dL 0.2 0.3   Alkaline Phosphatase unit/L 101 92   Aspartate Aminotransferase unit/L 11 11   Alanine Aminotransferase unit/L 10 9       Drug levels (last 3 results):  No results for input(s): "VANCOMYCINRA", " ""VANCORANDOM", "VANCOMYCINPE", "VANCOPEAK", "VANCOMYCINTR", "VANCOTROUGH" in the last 72 hours.    Microbiologic Results:  Microbiology Results (last 7 days)       Procedure Component Value Units Date/Time    Gram Stain [[id number]]     Order Status: Sent Specimen: Body Fluid from Joint Fluid, Left Knee     Body Fluid Culture [[id number]]     Order Status: Sent Specimen: Body Fluid from Joint Fluid, Left Knee            "

## 2024-04-11 NOTE — SUBJECTIVE & OBJECTIVE
Subjective:     HPI:  Wound medicine Re-check - chronic sacral pressure ulcer with osteomyelitis       Wound recheck done today, 4/11/24. Met patient in his room, 804, awake, alert and agreeable to wound assessment and treatment. Accompanied by WOCN for wound vac change. Lying on low air loss mattress,  without heel lift boots, bilateral lower extremities elevated on pillows. Bilateral lower legs with dressings intact, wounds not visualized. Continues to complain of pain to left knee even with slight touch or movement.        Hospital Course:   No notes on file      Follow-up For: Procedure(s) (LRB):  DEBRIDEMENT, FOOT (Bilateral)    Post-Operative Day: 16 Days Post-Op    Scheduled Meds:   amLODIPine  10 mg Oral Daily    aspirin  81 mg Oral Daily    cilostazoL  50 mg Oral BID    clopidogreL  75 mg Oral Daily    docusate sodium  100 mg Oral BID    DULoxetine  30 mg Oral Daily    enoxaparin  40 mg Subcutaneous Daily    ferrous sulfate  1 tablet Oral Daily    gabapentin  300 mg Oral TID    gabapentin  600 mg Oral QHS    LIDOcaine  1 patch Transdermal Q24H    losartan  100 mg Oral Daily    multivitamin  1 tablet Oral Daily    oxyCODONE  10 mg Oral Q4H    piperacillin-tazobactam (Zosyn) IV (PEDS and ADULTS) (extended infusion is not appropriate)  4.5 g Intravenous Q8H    polyethylene glycol  17 g Oral Daily    sodium chloride 0.9%  10 mL Intravenous Q6H    vancomycin (VANCOCIN) IV (PEDS and ADULTS)  1,750 mg Intravenous Once    [START ON 4/12/2024] vancomycin (VANCOCIN) IV (PEDS and ADULTS)  1,000 mg Intravenous Q12H     Continuous Infusions:  PRN Meds:acetaminophen, albuterol, aluminum-magnesium hydroxide-simethicone, HYDROcodone-acetaminophen, melatonin, simethicone, Flushing PICC/Midline Protocol **AND** sodium chloride 0.9% **AND** sodium chloride 0.9%, Pharmacy to dose Vancomycin consult **AND** vancomycin - pharmacy to dose    Review of Systems   Constitutional:  Positive for activity change. Negative for chills  "and fatigue.   HENT:          Hard of hearing     Respiratory: Negative.     Gastrointestinal: Negative.    Skin:  Positive for wound.   Neurological:  Positive for weakness.     Objective:     Vital Signs (Most Recent):  Temp: 99.3 °F (37.4 °C) (04/11/24 0755)  Pulse: 74 (04/11/24 0755)  Resp: 18 (04/11/24 1131)  BP: 115/68 (04/11/24 0755)  SpO2: (!) 90 % (04/11/24 0755) Vital Signs (24h Range):  Temp:  [98.1 °F (36.7 °C)-99.3 °F (37.4 °C)] 99.3 °F (37.4 °C)  Pulse:  [68-94] 74  Resp:  [16-18] 18  SpO2:  [90 %-94 %] 90 %  BP: (108-125)/(54-68) 115/68     Weight: 68 kg (150 lb)  Body mass index is 22.14 kg/m².     Physical Exam  Vitals reviewed.   Constitutional:       General: He is awake.      Appearance: He is ill-appearing (chronic). He is not toxic-appearing.      Comments: Muscle wasting BLE    HENT:      Head: Normocephalic and atraumatic.   Cardiovascular:      Rate and Rhythm: Normal rate.   Pulmonary:      Effort: Pulmonary effort is normal. No respiratory distress.   Musculoskeletal:         General: Swelling present.   Skin:     General: Skin is warm and dry.             Comments: Sacral ulcer, Stage 4 beefy red granulation tissue in wound bed with small amount of slough on lateral edge and at center, exposed ligament. No purulent drainage, odor, fluctuance or induration.    Bilateral lower extremity dressings intact, Drainage to dressing on left lower leg.    Neurological:      General: No focal deficit present.      Mental Status: He is alert and oriented to person, place, and time. Mental status is at baseline.   Psychiatric:         Mood and Affect: Mood normal.         Behavior: Behavior normal. Behavior is cooperative.       Sacrum: 7 x 18 x 1.5 cm with undermining from 12 o'clock to 3 o'clock, deepest 1.2 cm            Laboratory:  A1C:   Recent Labs   Lab 02/02/24  0445   HGBA1C 5.0     ABGs: No results for input(s): "PH", "PCO2", "HCO3", "POCSATURATED", "BE" in the last 168 hours.  Blood " "Cultures: No results for input(s): "LABBLOO" in the last 48 hours.  BMP:   Recent Labs   Lab 04/10/24  0504      K 3.4*   CO2 25   BUN 8.6   CREATININE 0.65*   CALCIUM 8.7*     Cardiac Markers: No results for input(s): "CKMB", "TROPONINT", "MYOGLOBIN" in the last 168 hours.  CBC:   Recent Labs   Lab 04/09/24  0550   WBC 7.60   RBC 3.27*   HGB 8.7*   HCT 27.8*      MCV 85.0   MCH 26.6*   MCHC 31.3*     CMP:   Recent Labs   Lab 04/10/24  0504   CALCIUM 8.7*   ALBUMIN 2.3*      K 3.4*   CO2 25   BUN 8.6   CREATININE 0.65*   ALKPHOS 92   ALT 9   AST 11   BILITOT 0.3     Coagulation: No results for input(s): "PT", "INR", "APTT" in the last 168 hours.  CPS: {:LNK,LABRCNTIP[  CRP:   Recent Labs   Lab 04/09/24  0550   CRP 72.10*     ESR:   Recent Labs   Lab 04/09/24  0550   SEDRATE 76*     LFTs:   Recent Labs   Lab 04/10/24  0504   ALT 9   AST 11   ALKPHOS 92   BILITOT 0.3   ALBUMIN 2.3*     Prealbumin: No results for input(s): "PREALBUMIN" in the last 48 hours.  Wound Cultures: No results for input(s): "LABAERO" in the last 4320 hours.  Microbiology Results (last 7 days)       Procedure Component Value Units Date/Time    Gram Stain [[id number]]     Order Status: Sent Specimen: Body Fluid from Joint Fluid, Left Knee     Body Fluid Culture [[id number]]     Order Status: Sent Specimen: Body Fluid from Joint Fluid, Left Knee           Specimen (24h ago, onward)      None          No results for input(s): "COLORU", "CLARITYU", "SPECGRAV", "PHUR", "PROTEINUA", "GLUCOSEU", "BILIRUBINCON", "BLOODU", "WBCU", "RBCU", "BACTERIA", "MUCUS", "NITRITE", "LEUKOCYTESUR", "UROBILINOGEN", "HYALINECASTS" in the last 168 hours.    Diagnostic Results:  I have reviewed all pertinent imaging results/findings within the past 24 hours.    "

## 2024-04-11 NOTE — PROGRESS NOTES
Infectious Diseases Progress Note  69-year-old male, nursing home resident, with past medical history of HTN, COPD, DM type 2, with neuropathy, is admitted to [facility] on 03/15/2024 sent via EMS for sacral wound evaluation, apparently diagnosed with osteomyelitis involving the sacrum on 02/27/2024 with plan to be admitted to LTAC which had not been successful as an outpatient.  On presentation he was noted to have no fevers and no leukocytosis, anemic with low albumin.  Blood cultures have been negative.  Review of his records show a 02/08/2024 left leg wound culture with Pseudomonas, Providencia, ESBL Proteus and 11/30/2023 sacral wound culture with MRSA, Providencia, Enterococcus and Pseudomonas.  2/27 MRI of the pelvis shows osteomyelitis involving the sacrococcygeal bone as well as right trochanteric bursitis which may be septic.  He was seen by surgery team with no plan for surgical intervention.  He is on antibiotic coverage with vancomycin and Zosyn.     Subjective:  Lying in bed in no acute distress. C/O L knee pain. Afebrile.     Past Medical History:   Diagnosis Date    COPD (chronic obstructive pulmonary disease)     Depression     Hypertension     Neuropathy      Past Surgical History:   Procedure Laterality Date    angiogram Bilateral     stents placed in left leg    anterior neck surgery      ARTHROTOMY OF ANKLE Left 6/28/2023    Procedure: ARTHROTOMY, ANKLE;  Surgeon: Tom Nichole DPM;  Location: [facility];  Service: Podiatry;  Laterality: Left;    cataracts Left     CHOLECYSTECTOMY      DEBRIDEMENT OF FOOT Bilateral 3/26/2024    Procedure: DEBRIDEMENT, FOOT;  Surgeon: Tom Nichole DPM;  Location: [facility] OR;  Service: Podiatry;  Laterality: Bilateral;    EYE SURGERY Left     HAND SURGERY Left     broken bone    herina repair      INCISION AND DRAINAGE, LOWER EXTREMITY Left 7/28/2023    Procedure: INCISION AND DRAINAGE, LOWER EXTREMITY;  Surgeon: Avinash Garces,  MD;  Location: [facility] OR;  Service: Orthopedics;  Laterality: Left;  Supine, vascular bed, bone foam  last case  cysto tubing, laparoscopic trocar, experience, vanc, hemovac drain    KNEE SURGERY Bilateral     PERIPHERAL ANGIOGRAPHY N/A 3/13/2024    Procedure: Peripheral angiography;  Surgeon: Deven Queen MD;  Location: [facility] CATH LAB;  Service: Cardiology;  Laterality: N/A;  MICHELLE ANGIO W/ ANEST.    posterior neck surgery      SPINE SURGERY      TOTAL REPLACEMENT OF BOTH HIP JOINTS USING COMPUTER-ASSISTED NAVIGATION Bilateral      Social History     Socioeconomic History    Marital status:    Occupational History    Occupation: retired   Tobacco Use    Smoking status: Former     Average packs/day: 0.5 packs/day for 16.3 years (7.5 ttl pk-yrs)     Types: Cigarettes     Start date: 2008    Smokeless tobacco: Never   Substance and Sexual Activity    Alcohol use: Yes     Alcohol/week: 3.0 - 6.0 standard drinks of alcohol     Types: 3 - 6 Cans of beer per week    Drug use: Yes     Types: Marijuana    Sexual activity: Not Currently       ROS  Constitutional:  Positive for malaise/fatigue.   HENT: Negative.     Respiratory: Negative.     Gastrointestinal: Negative.    Genitourinary: Negative.    Musculoskeletal: Positive for L knee pain   Skin: Multiple pressure wounds   Neurological:  Positive for weakness.   Endo/Heme/Allergies: Negative.    Psychiatric/Behavioral: Negative.     All other Systems review done and negative.    Review of patient's allergies indicates:  No Known Allergies      Scheduled Meds:   amLODIPine  10 mg Oral Daily    aspirin  81 mg Oral Daily    cilostazoL  50 mg Oral BID    clopidogreL  75 mg Oral Daily    docusate sodium  100 mg Oral BID    DULoxetine  30 mg Oral Daily    enoxaparin  40 mg Subcutaneous Daily    ferrous sulfate  1 tablet Oral Daily    gabapentin  300 mg Oral TID    gabapentin  600 mg Oral QHS    LIDOcaine  1 patch Transdermal Q24H    losartan  100 mg Oral Daily     "multivitamin  1 tablet Oral Daily    oxyCODONE  10 mg Oral Q4H    piperacillin-tazobactam (Zosyn) IV (PEDS and ADULTS) (extended infusion is not appropriate)  4.5 g Intravenous Q8H    polyethylene glycol  17 g Oral Daily    sodium chloride 0.9%  10 mL Intravenous Q6H     Continuous Infusions:  PRN Meds:acetaminophen, albuterol, aluminum-magnesium hydroxide-simethicone, HYDROcodone-acetaminophen, melatonin, simethicone, Flushing PICC/Midline Protocol **AND** sodium chloride 0.9% **AND** sodium chloride 0.9%    Objective:  BP (!) 116/54   Pulse 87   Temp 98.1 °F (36.7 °C) (Oral)   Resp 18   Ht 5' 9.02" (1.753 m)   Wt 68 kg (150 lb)   SpO2 (!) 92%   BMI 22.14 kg/m²     Physical Exam:   Physical Exam  Vitals reviewed.   Constitutional:       General: He is not in acute distress.  HENT:      Head: Normocephalic and atraumatic.   Cardiovascular:      Rate and Rhythm: Normal rate and regular rhythm.      Heart sounds: Normal heart sounds.   Pulmonary:      Effort: Pulmonary effort is normal. No respiratory distress.      Breath sounds: Normal breath sounds.   Abdominal:      General: Bowel sounds are normal. There is no distension.      Palpations: Abdomen is soft.      Tenderness: There is no abdominal tenderness.   Musculoskeletal:         General: No deformity.      Cervical back: Neck supple.   Skin:     Findings: No erythema or rash.      Comments: Wound vac to sacrum  Neurological:      Mental Status: He is alert and oriented to person, place, and time.   Psychiatric:      Comments: Calm and cooperative     Imaging  Imaging Results    None          Lab Review   Recent Results (from the past 24 hour(s))   Comprehensive Metabolic Panel    Collection Time: 04/10/24  5:04 AM   Result Value Ref Range    Sodium Level 136 136 - 145 mmol/L    Potassium Level 3.4 (L) 3.5 - 5.1 mmol/L    Chloride 104 98 - 107 mmol/L    Carbon Dioxide 25 23 - 31 mmol/L    Glucose Level 111 82 - 115 mg/dL    Blood Urea Nitrogen 8.6 8.4 - " 25.7 mg/dL    Creatinine 0.65 (L) 0.73 - 1.18 mg/dL    Calcium Level Total 8.7 (L) 8.8 - 10.0 mg/dL    Protein Total 5.2 (L) 5.8 - 7.6 gm/dL    Albumin Level 2.3 (L) 3.4 - 4.8 g/dL    Globulin 2.9 2.4 - 3.5 gm/dL    Albumin/Globulin Ratio 0.8 (L) 1.1 - 2.0 ratio    Bilirubin Total 0.3 <=1.5 mg/dL    Alkaline Phosphatase 92 40 - 150 unit/L    Alanine Aminotransferase 9 0 - 55 unit/L    Aspartate Aminotransferase 11 5 - 34 unit/L    eGFR >60 mls/min/1.73/m2         Assessment/Plan:  1. Chronic sacral pressure wound with osteomyelitis  2. History of polymicrobial sacral wound culture - MRSA, Providencia, Pseudomonas, Enterococcus  3.  Chronic left lower extremity wound with history of polymicrobial wound culture-Pseudomonas, ESBL Proteus, Providencia  4. Multiple pressure wounds  5.  Diabetes type 2  6.  Anemia  7.  Protein calorie malnutrition      -Continue Vancomycin and Zosyn, plan end date of 04/26, following inflammatory markers  -Remains afebrile without leukocytosis  -CT L knee without contrast today 4/10 with no joint effusion, nonfocal soft tissue swelling, severe bone demineralization, arthritis  -Seen by Ortho, inputs noted. S/P L knee aspiration today with cultures pending, follow  -4/9 ESR 76 from 29 and CRP 72.10 from 39.20  -3/15 blood cultures negative  -2/8 left leg wound cultures with many Pseudomonas, Providencia, Proteus  -11/30/2023 sacral wound with many Providencia, MRSA, Enterococcus, Pseudomonas  -Seen by the surgery team with inputs noted including no plan for surgical intervention   -Podiatry on board s/p debridement 3/26, cultures revealed ESBL Proteus  -We will continue aggressive wound care per Podiatry and the wound care team  -Discussed with patient and nursing staff. Case management working on LTAC placement. Disposition per primary team

## 2024-04-11 NOTE — PROGRESS NOTES
[facility] - 8th Floor [facility] Medicine  Progress Note    Patient Name: Shaheen Christie  MRN: [id number]  Patient Class: IP- Inpatient   Admission Date: 3/15/2024  Length of Stay: 27 days  Attending Physician: Enzo Thibodeaux MD  Primary Care Provider: Viktor Russ MD        Subjective:     Principal Problem:Osteomyelitis    Interval History:  Had a very quiet night.  Infectious Disease saw patient yesterday.    Review of Systems   All other systems reviewed and are negative.    Objective:     Vital Signs (Most Recent):  Temp: 99.3 °F (37.4 °C) (04/11/24 0755)  Pulse: 74 (04/11/24 0755)  Resp: 18 (04/11/24 0755)  BP: 115/68 (04/11/24 0755)  SpO2: (!) 90 % (04/11/24 0755) Vital Signs (24h Range):  Temp:  [98.1 °F (36.7 °C)-99.3 °F (37.4 °C)] 99.3 °F (37.4 °C)  Pulse:  [68-94] 74  Resp:  [16-18] 18  SpO2:  [90 %-94 %] 90 %  BP: (108-125)/(54-70) 115/68     Weight: 68 kg (150 lb)  Body mass index is 22.14 kg/m².    Intake/Output Summary (Last 24 hours) at 4/11/2024 0826  Last data filed at 4/11/2024 0530  Gross per 24 hour   Intake 900 ml   Output 1050 ml   Net -150 ml      Physical Exam  HENT:      Head: Normocephalic and atraumatic.      Mouth/Throat:      Mouth: Mucous membranes are dry.   Eyes:      Extraocular Movements: Extraocular movements intact.   Cardiovascular:      Rate and Rhythm: Normal rate and regular rhythm.      Pulses: Normal pulses.      Heart sounds: Normal heart sounds.   Pulmonary:      Effort: Pulmonary effort is normal.      Breath sounds: Normal breath sounds.   Abdominal:      General: Bowel sounds are normal.      Palpations: Abdomen is soft.   Musculoskeletal:      Cervical back: Neck supple.   Skin:     General: Skin is warm and dry.   Neurological:      Mental Status: He is alert. Mental status is at baseline.           Overview/Hospital Course:  She is.  Potassium slightly low still a little bit into the knee    Significant Labs: All pertinent labs  "within the past 24 hours have been reviewed.  BMP:   Recent Labs   Lab 04/10/24  0504      K 3.4*   CO2 25   BUN 8.6   CREATININE 0.65*   CALCIUM 8.7*     CBC: No results for input(s): "WBC", "HGB", "HCT", "PLT" in the last 48 hours.  CMP:   Recent Labs   Lab 04/10/24  0504      K 3.4*   CO2 25   BUN 8.6   CREATININE 0.65*   CALCIUM 8.7*   ALBUMIN 2.3*   BILITOT 0.3   ALKPHOS 92   AST 11   ALT 9       Significant Imaging: I have reviewed all pertinent imaging results/findings within the past 24 hours.    Assessment/Plan:      Active Diagnoses:    Diagnosis Date Noted POA    PRINCIPAL PROBLEM:  Osteomyelitis [M86.9] 03/15/2024 Yes    Knee effusion, left [M25.462] 03/31/2024 No    Pressure injury of sacral region, stage 4 [L89.154] 03/19/2024 Yes    Skin ulcer of right lower leg [L97.919] 03/19/2024 Yes    Skin ulcer of left lower leg [L97.929] 03/19/2024 Yes    Unstageable pressure ulcer of right heel [L89.610] 03/19/2024 Yes    Unstageable pressure ulcer of left heel [L89.620] 03/19/2024 Yes      Problems Resolved During this Admission:     VTE Risk Mitigation (From admission, onward)           Ordered     enoxaparin injection 40 mg  Daily         03/15/24 0934                Read CBC and CMP for tomorrow.  Continue with analgesics support for knee pain.  Awaiting placement in LTAC for further treatment of his osteomyelitis.       Enzo Thibodeaux MD  Department of Hospital Medicine   [facility] - 8th Floor Med Surg    "

## 2024-04-11 NOTE — PROGRESS NOTES
[facility] - 8th Floor Med Surg  Wound Care  Progress Note    Patient Name: Shaheen Christie  MRN: [id number]  Admission Date: 3/15/2024  Hospital Length of Stay: 27 days  Attending Physician: Enzo Thibodeaux MD  Primary Care Provider: Viktor Russ MD     Subjective:     HPI:  Wound medicine Re-check - chronic sacral pressure ulcer with osteomyelitis       Wound recheck done today, 4/11/24. Met patient in his room, 804, awake, alert and agreeable to wound assessment and treatment. Accompanied by WOCN for wound vac change. Lying on low air loss mattress,  without heel lift boots, bilateral lower extremities elevated on pillows. Bilateral lower legs with dressings intact, wounds not visualized. Continues to complain of pain to left knee even with slight touch or movement.        Hospital Course:   No notes on file      Follow-up For: Procedure(s) (LRB):  DEBRIDEMENT, FOOT (Bilateral)    Post-Operative Day: 16 Days Post-Op    Scheduled Meds:   amLODIPine  10 mg Oral Daily    aspirin  81 mg Oral Daily    cilostazoL  50 mg Oral BID    clopidogreL  75 mg Oral Daily    docusate sodium  100 mg Oral BID    DULoxetine  30 mg Oral Daily    enoxaparin  40 mg Subcutaneous Daily    ferrous sulfate  1 tablet Oral Daily    gabapentin  300 mg Oral TID    gabapentin  600 mg Oral QHS    LIDOcaine  1 patch Transdermal Q24H    losartan  100 mg Oral Daily    multivitamin  1 tablet Oral Daily    oxyCODONE  10 mg Oral Q4H    piperacillin-tazobactam (Zosyn) IV (PEDS and ADULTS) (extended infusion is not appropriate)  4.5 g Intravenous Q8H    polyethylene glycol  17 g Oral Daily    sodium chloride 0.9%  10 mL Intravenous Q6H    vancomycin (VANCOCIN) IV (PEDS and ADULTS)  1,750 mg Intravenous Once    [START ON 4/12/2024] vancomycin (VANCOCIN) IV (PEDS and ADULTS)  1,000 mg Intravenous Q12H     Continuous Infusions:  PRN Meds:acetaminophen, albuterol, aluminum-magnesium hydroxide-simethicone, HYDROcodone-acetaminophen,  melatonin, simethicone, Flushing PICC/Midline Protocol **AND** sodium chloride 0.9% **AND** sodium chloride 0.9%, Pharmacy to dose Vancomycin consult **AND** vancomycin - pharmacy to dose    Review of Systems   Constitutional:  Positive for activity change. Negative for chills and fatigue.   HENT:          Hard of hearing     Respiratory: Negative.     Gastrointestinal: Negative.    Skin:  Positive for wound.   Neurological:  Positive for weakness.     Objective:     Vital Signs (Most Recent):  Temp: 99.3 °F (37.4 °C) (04/11/24 0755)  Pulse: 74 (04/11/24 0755)  Resp: 18 (04/11/24 1131)  BP: 115/68 (04/11/24 0755)  SpO2: (!) 90 % (04/11/24 0755) Vital Signs (24h Range):  Temp:  [98.1 °F (36.7 °C)-99.3 °F (37.4 °C)] 99.3 °F (37.4 °C)  Pulse:  [68-94] 74  Resp:  [16-18] 18  SpO2:  [90 %-94 %] 90 %  BP: (108-125)/(54-68) 115/68     Weight: 68 kg (150 lb)  Body mass index is 22.14 kg/m².     Physical Exam  Vitals reviewed.   Constitutional:       General: He is awake.      Appearance: He is ill-appearing (chronic). He is not toxic-appearing.      Comments: Muscle wasting BLE    HENT:      Head: Normocephalic and atraumatic.   Cardiovascular:      Rate and Rhythm: Normal rate.   Pulmonary:      Effort: Pulmonary effort is normal. No respiratory distress.   Musculoskeletal:         General: Swelling present.   Skin:     General: Skin is warm and dry.             Comments: Sacral ulcer, Stage 4 beefy red granulation tissue in wound bed with small amount of slough on lateral edge and at center, exposed ligament. No purulent drainage, odor, fluctuance or induration.    Bilateral lower extremity dressings intact, Drainage to dressing on left lower leg.    Neurological:      General: No focal deficit present.      Mental Status: He is alert and oriented to person, place, and time. Mental status is at baseline.   Psychiatric:         Mood and Affect: Mood normal.         Behavior: Behavior normal. Behavior is cooperative.        Sacrum: 7 x 18 x 1.5 cm with undermining from 12 o'clock to 3 o'clock, deepest 1.2 cm            Laboratory:  A1C:   Recent Labs   Lab 02/02/24  0445   HGBA1C 5.0       BMP:   Recent Labs   Lab 04/10/24  0504      K 3.4*   CO2 25   BUN 8.6   CREATININE 0.65*   CALCIUM 8.7*       CBC:   Recent Labs   Lab 04/09/24  0550   WBC 7.60   RBC 3.27*   HGB 8.7*   HCT 27.8*      MCV 85.0   MCH 26.6*   MCHC 31.3*     CMP:   Recent Labs   Lab 04/10/24  0504   CALCIUM 8.7*   ALBUMIN 2.3*      K 3.4*   CO2 25   BUN 8.6   CREATININE 0.65*   ALKPHOS 92   ALT 9   AST 11   BILITOT 0.3       CRP:   Recent Labs   Lab 04/09/24  0550   CRP 72.10*     ESR:   Recent Labs   Lab 04/09/24  0550   SEDRATE 76*     LFTs:   Recent Labs   Lab 04/10/24  0504   ALT 9   AST 11   ALKPHOS 92   BILITOT 0.3   ALBUMIN 2.3*       Microbiology Results (last 7 days)       Procedure Component Value Units Date/Time    Gram Stain [[id number]]     Order Status: Sent Specimen: Body Fluid from Joint Fluid, Left Knee     Body Fluid Culture [[id number]]     Order Status: Sent Specimen: Body Fluid from Joint Fluid, Left Knee           Specimen (24h ago, onward)      None            Diagnostic Results:  I have reviewed all pertinent imaging results/findings within the past 24 hours.    Assessment/Plan:     Stage 4 pressure ulcer to sacrum - with evidence of osteomyelitis on MRI of 2/27/24. admitted for initiation of IV antibiotics. Wound VAC placed on 3/28/24  Unstageable pressure ulcers to bilateral heels - Podiatry consulted and following - surgical debridement on 3/26/24  Open wound/ulcers to right lateral lower leg and left lateral lower leg - podiatry following  Peripheral arterial disease - with history of vascular intervention with Dr. Queen (most recent angio 3/13/24; medical management)   PVD  History of polymicrobial sacral wound culture  History of polymicrobial wound culture to leg wound  DM2  Anemia  History of smoking  Low  prealbumin      PLAN:     Chart reviewed, patient examined and wounds assessed.  Opinion: The patient has a large stage 4 pressure ulcer to his sacrum that does not appear to be infected;  Wound vac initiated on 3/28/24; tolerating well. Wound is slowly forming granulation tissue and there has been no significant reduction in size. His prealbumin is low, voices his appetite is good but he doesn't always like what he is served. Multiple packets of unopened Pierre on shelf and says he is not drinking them; discussed importance of this supplement for wound healing and he agrees to start drinking them twice daily. Suspect poor nutrition hindering more rapid improvement of sacral wound with VAC. Will follow prealbumin. Will continue with Wound Vac. Also, ESR up from 29 to 76 and CRP up from 39.20 to 72.10 on 4/9/24. Still on Vancomycin and Zosyn. ID following.   BLE Dressings in place followed by Podiatry - did not visualize wounds today.   Patients bed mobility is severely limited due to pain and discomfort associated with movement. This is affecting his ability to properly offload; which will also affect ability to heal his wounds. Educated patient on importance of aggressive offloading, verbalizes understanding.   Wound care orders: Wound Vac  Monitor for signs & symptoms of deterioration  Offloading of sacrum/buttocks/heels at all times: BRADEN mattress, turning q 2 hrs; use of wedges and heel offloading devices to be used at all times while in bed; ( SALONI Arellano).   Incontinence: control moisture/wound contamination: No briefs  Nutrition: Recommend aggressive nutritional support, protein supplementation along with vitamin and mineral supplements  and pierre to support wound healing; Low prealbumin - 13.8 on 3/19/24 and rechecked on 4/1/24 - 12.6; follow dietitian recommendation; encourage to drink Pierre; reports good appetite  Diabetes: A1C good  Will try to follow weekly while admitted, but every nurse assigned to  patient on every shift of every day needs to address daily wound care dressing changes and offloading modalities including using heel offloading devices, wedges, BRADEN mattress etc            The time spent including preparing to see the patient, obtaining patient history and assessment, evaluation of the plan of care, patient/caregiver counseling and education, orders, documentation, coordination of care, and other professional medical management activities for today's encounter was 30 minutes.         ANGEL Chavira  Wound Care  [facility] - 8th Floor Med Surg

## 2024-04-11 NOTE — PROGRESS NOTES
[facility] - 8th Floor Med Surg  Wound Care    Patient Name:  Shaheen Christie   MRN:  [id number]  Date: 4/11/2024  Diagnosis: Osteomyelitis    History:     Past Medical History:   Diagnosis Date    COPD (chronic obstructive pulmonary disease)     Depression     Hypertension     Neuropathy        Social History     Socioeconomic History    Marital status:    Occupational History    Occupation: retired   Tobacco Use    Smoking status: Former     Average packs/day: 0.5 packs/day for 16.3 years (7.5 ttl pk-yrs)     Types: Cigarettes     Start date: 2008    Smokeless tobacco: Never   Substance and Sexual Activity    Alcohol use: Yes     Alcohol/week: 3.0 - 6.0 standard drinks of alcohol     Types: 3 - 6 Cans of beer per week    Drug use: Yes     Types: Marijuana    Sexual activity: Not Currently       Precautions:     Allergies as of 03/15/2024    (No Known Allergies)       WOC Assessment Details/Treatment        04/11/24 1113   WOCN Assessment   Visit Date 04/11/24   Visit Time 1113   Consult Type Follow Up   WOCN Speciality Wound   WOCN List wound vac   Procedure wound vac   Intervention changed   Teaching on-going   Skin Interventions   Device Skin Pressure Protection absorbent pad utilized/changed   Pressure Reduction Devices specialty bed utilized        Altered Skin Integrity 01/22/24 1351 Sacral spine #4   Date First Assessed/Time First Assessed: 01/22/24 1351   Altered Skin Integrity Present on Admission - Did Patient arrive to the hospital with altered skin?: yes  Location: Sacral spine  Wound Number: #4  Is this injury device related?: No   Wound Image    Dressing Appearance Intact;Dry;Clean   Drainage Amount Moderate   Drainage Characteristics/Odor Serosanguineous   Appearance Red;Yellow;Slough;Fibrin   Tissue loss description Full thickness   Red (%), Wound Tissue Color 75 %   Yellow (%), Wound Tissue Color 25 %   Periwound Area Moist;Macerated   Wound Edges Irregular   Wound Length (cm) 7  cm   Wound Width (cm) 18 cm   Wound Depth (cm) 1 cm   Wound Volume (cm^3) 126 cm^3   Wound Surface Area (cm^2) 126 cm^2   Undermining (depth (cm)/location) 1.2 from 12-3oclock   Dressing Applied        Negative Pressure Wound Therapy  03/28/24   Placement Date: 03/28/24   Location: Sacral Spine   NPWT Type Vacuum Therapy   Therapy Setting NPWT Continuous therapy   Pressure Setting NPWT 125 mmHg   Therapy Interventions NPWT Dressing changed   Sponges Inserted NPWT Black  (versatel)   Sponges Removed NPWT Black  (versatel)     Lory CHAMBERSP at bedside for evaluation. NPWT reapplied to sacral ulcer. Excellent seal noted at 125 mm/ hg continuous. Nursing to continue with preventative measures. On BRADEN mattress. Will Follow up.     04/11/2024

## 2024-04-11 NOTE — PLAN OF CARE
Notified by Mila Bey with [facility] that secondary appeal has been denied.

## 2024-04-12 LAB
ALBUMIN SERPL-MCNC: 2.3 G/DL (ref 3.4–4.8)
ALBUMIN/GLOB SERPL: 0.8 RATIO (ref 1.1–2)
ALP SERPL-CCNC: 96 UNIT/L (ref 40–150)
ALT SERPL-CCNC: 10 UNIT/L (ref 0–55)
AST SERPL-CCNC: 13 UNIT/L (ref 5–34)
BASOPHILS # BLD AUTO: 0.14 X10(3)/MCL
BASOPHILS NFR BLD AUTO: 1.7 %
BILIRUB SERPL-MCNC: 0.3 MG/DL
BUN SERPL-MCNC: 10.5 MG/DL (ref 8.4–25.7)
CALCIUM SERPL-MCNC: 8.7 MG/DL (ref 8.8–10)
CHLORIDE SERPL-SCNC: 104 MMOL/L (ref 98–107)
CO2 SERPL-SCNC: 22 MMOL/L (ref 23–31)
CREAT SERPL-MCNC: 0.71 MG/DL (ref 0.73–1.18)
EOSINOPHIL # BLD AUTO: 2 X10(3)/MCL (ref 0–0.9)
EOSINOPHIL NFR BLD AUTO: 23.8 %
ERYTHROCYTE [DISTWIDTH] IN BLOOD BY AUTOMATED COUNT: 17.4 % (ref 11.5–17)
GFR SERPLBLD CREATININE-BSD FMLA CKD-EPI: >60 MLS/MIN/1.73/M2
GLOBULIN SER-MCNC: 3 GM/DL (ref 2.4–3.5)
GLUCOSE SERPL-MCNC: 101 MG/DL (ref 82–115)
HCT VFR BLD AUTO: 25.9 % (ref 42–52)
HGB BLD-MCNC: 7.8 G/DL (ref 14–18)
IMM GRANULOCYTES # BLD AUTO: 0.03 X10(3)/MCL (ref 0–0.04)
IMM GRANULOCYTES NFR BLD AUTO: 0.4 %
LYMPHOCYTES # BLD AUTO: 1.64 X10(3)/MCL (ref 0.6–4.6)
LYMPHOCYTES NFR BLD AUTO: 19.5 %
MCH RBC QN AUTO: 25.7 PG (ref 27–31)
MCHC RBC AUTO-ENTMCNC: 30.1 G/DL (ref 33–36)
MCV RBC AUTO: 85.5 FL (ref 80–94)
MONOCYTES # BLD AUTO: 0.83 X10(3)/MCL (ref 0.1–1.3)
MONOCYTES NFR BLD AUTO: 9.9 %
NEUTROPHILS # BLD AUTO: 3.76 X10(3)/MCL (ref 2.1–9.2)
NEUTROPHILS NFR BLD AUTO: 44.7 %
NRBC BLD AUTO-RTO: 0 %
PLATELET # BLD AUTO: 387 X10(3)/MCL (ref 130–400)
PMV BLD AUTO: 8.7 FL (ref 7.4–10.4)
POTASSIUM SERPL-SCNC: 3.4 MMOL/L (ref 3.5–5.1)
PROT SERPL-MCNC: 5.3 GM/DL (ref 5.8–7.6)
RBC # BLD AUTO: 3.03 X10(6)/MCL (ref 4.7–6.1)
SODIUM SERPL-SCNC: 135 MMOL/L (ref 136–145)
WBC # SPEC AUTO: 8.4 X10(3)/MCL (ref 4.5–11.5)

## 2024-04-12 PROCEDURE — 63600175 PHARM REV CODE 636 W HCPCS: Performed by: INTERNAL MEDICINE

## 2024-04-12 PROCEDURE — 25000003 PHARM REV CODE 250: Performed by: PODIATRIST

## 2024-04-12 PROCEDURE — 63600175 PHARM REV CODE 636 W HCPCS: Performed by: NURSE PRACTITIONER

## 2024-04-12 PROCEDURE — 63600175 PHARM REV CODE 636 W HCPCS: Performed by: PODIATRIST

## 2024-04-12 PROCEDURE — A4216 STERILE WATER/SALINE, 10 ML: HCPCS | Performed by: INTERNAL MEDICINE

## 2024-04-12 PROCEDURE — 11000001 HC ACUTE MED/SURG PRIVATE ROOM

## 2024-04-12 PROCEDURE — 80053 COMPREHEN METABOLIC PANEL: CPT | Performed by: INTERNAL MEDICINE

## 2024-04-12 PROCEDURE — 25000003 PHARM REV CODE 250: Performed by: NURSE PRACTITIONER

## 2024-04-12 PROCEDURE — 85025 COMPLETE CBC W/AUTO DIFF WBC: CPT | Performed by: INTERNAL MEDICINE

## 2024-04-12 PROCEDURE — 25000003 PHARM REV CODE 250: Performed by: INTERNAL MEDICINE

## 2024-04-12 RX ADMIN — OXYCODONE HYDROCHLORIDE 10 MG: 10 TABLET ORAL at 12:04

## 2024-04-12 RX ADMIN — SODIUM CHLORIDE, PRESERVATIVE FREE 10 ML: 5 INJECTION INTRAVENOUS at 11:04

## 2024-04-12 RX ADMIN — OXYCODONE HYDROCHLORIDE 10 MG: 10 TABLET ORAL at 08:04

## 2024-04-12 RX ADMIN — GABAPENTIN 300 MG: 300 CAPSULE ORAL at 08:04

## 2024-04-12 RX ADMIN — OXYCODONE HYDROCHLORIDE 10 MG: 10 TABLET ORAL at 04:04

## 2024-04-12 RX ADMIN — SODIUM CHLORIDE, PRESERVATIVE FREE 10 ML: 5 INJECTION INTRAVENOUS at 12:04

## 2024-04-12 RX ADMIN — GABAPENTIN 300 MG: 300 CAPSULE ORAL at 04:04

## 2024-04-12 RX ADMIN — AMLODIPINE BESYLATE 10 MG: 5 TABLET ORAL at 08:04

## 2024-04-12 RX ADMIN — ASPIRIN 81 MG CHEWABLE TABLET 81 MG: 81 TABLET CHEWABLE at 08:04

## 2024-04-12 RX ADMIN — CILOSTAZOL 50 MG: 50 TABLET ORAL at 08:04

## 2024-04-12 RX ADMIN — SODIUM CHLORIDE, PRESERVATIVE FREE 10 ML: 5 INJECTION INTRAVENOUS at 05:04

## 2024-04-12 RX ADMIN — DULOXETINE HYDROCHLORIDE 30 MG: 30 CAPSULE, DELAYED RELEASE ORAL at 08:04

## 2024-04-12 RX ADMIN — LOSARTAN POTASSIUM 100 MG: 50 TABLET, FILM COATED ORAL at 08:04

## 2024-04-12 RX ADMIN — LIDOCAINE 5% 1 PATCH: 700 PATCH TOPICAL at 09:04

## 2024-04-12 RX ADMIN — VANCOMYCIN HYDROCHLORIDE 1000 MG: 1 INJECTION, POWDER, LYOPHILIZED, FOR SOLUTION INTRAVENOUS at 02:04

## 2024-04-12 RX ADMIN — OXYCODONE HYDROCHLORIDE 10 MG: 10 TABLET ORAL at 02:04

## 2024-04-12 RX ADMIN — PIPERACILLIN SODIUM AND TAZOBACTAM SODIUM 4.5 G: 4; .5 INJECTION, POWDER, LYOPHILIZED, FOR SOLUTION INTRAVENOUS at 12:04

## 2024-04-12 RX ADMIN — ENOXAPARIN SODIUM 40 MG: 40 INJECTION SUBCUTANEOUS at 04:04

## 2024-04-12 RX ADMIN — OXYCODONE HYDROCHLORIDE 10 MG: 10 TABLET ORAL at 11:04

## 2024-04-12 RX ADMIN — FERROUS SULFATE TAB 325 MG (65 MG ELEMENTAL FE) 1 EACH: 325 (65 FE) TAB at 08:04

## 2024-04-12 RX ADMIN — PIPERACILLIN SODIUM AND TAZOBACTAM SODIUM 4.5 G: 4; .5 INJECTION, POWDER, LYOPHILIZED, FOR SOLUTION INTRAVENOUS at 05:04

## 2024-04-12 RX ADMIN — PIPERACILLIN SODIUM AND TAZOBACTAM SODIUM 4.5 G: 4; .5 INJECTION, POWDER, LYOPHILIZED, FOR SOLUTION INTRAVENOUS at 08:04

## 2024-04-12 RX ADMIN — VANCOMYCIN HYDROCHLORIDE 1000 MG: 1 INJECTION, POWDER, LYOPHILIZED, FOR SOLUTION INTRAVENOUS at 11:04

## 2024-04-12 RX ADMIN — VANCOMYCIN HYDROCHLORIDE 1000 MG: 1 INJECTION, POWDER, LYOPHILIZED, FOR SOLUTION INTRAVENOUS at 12:04

## 2024-04-12 RX ADMIN — GABAPENTIN 600 MG: 300 CAPSULE ORAL at 08:04

## 2024-04-12 RX ADMIN — CLOPIDOGREL BISULFATE 75 MG: 75 TABLET ORAL at 08:04

## 2024-04-12 RX ADMIN — THERA TABS 1 TABLET: TAB at 08:04

## 2024-04-12 NOTE — CONSULTS
[facility] - 8th Floor Med Surg  Orthopedic Trauma  Consult Note    Patient Name: Shaheen Christie  MRN: [id number]  Admission Date: 3/15/2024  Hospital Length of Stay: 28 days  Attending Provider: Enzo Thibodeaux MD  Primary Care Provider: Viktor Russ MD        Inpatient consult to Orthopedics  Consult performed by: Rohit Pinedo DO  Consult ordered by: Tom Nichole DPM      Inpatient consult to Orthopedics  Consult performed by: Rohit Pinedo DO  Consult ordered by: Shaheen Talamantes MD        Subjective:         Chief Complaint:   Chief Complaint   Patient presents with    Wound Check     [facility] from [facility] for sacral wound evaluation.         HPI: Pt known to Dr Drew for Left knee pain. He has significant pain. No injury to the left knee. No numbness or tingling.    Past Medical History:   Diagnosis Date    COPD (chronic obstructive pulmonary disease)     Depression     Hypertension     Neuropathy        Past Surgical History:   Procedure Laterality Date    angiogram Bilateral     stents placed in left leg    anterior neck surgery      ARTHROTOMY OF ANKLE Left 6/28/2023    Procedure: ARTHROTOMY, ANKLE;  Surgeon: Tom Nichole DPM;  Location: [facility];  Service: Podiatry;  Laterality: Left;    cataracts Left     CHOLECYSTECTOMY      DEBRIDEMENT OF FOOT Bilateral 3/26/2024    Procedure: DEBRIDEMENT, FOOT;  Surgeon: Tom Nichole DPM;  Location: [facility];  Service: Podiatry;  Laterality: Bilateral;    EYE SURGERY Left     HAND SURGERY Left     broken bone    herina repair      INCISION AND DRAINAGE, LOWER EXTREMITY Left 7/28/2023    Procedure: INCISION AND DRAINAGE, LOWER EXTREMITY;  Surgeon: Avinash Garces MD;  Location: [facility];  Service: Orthopedics;  Laterality: Left;  Supine, vascular bed, bone foam  last case  cysto tubing, laparoscopic trocar, experience, vanc, hemovac drain    KNEE SURGERY Bilateral     PERIPHERAL ANGIOGRAPHY N/A 3/13/2024    Procedure:  Peripheral angiography;  Surgeon: Deven Queen MD;  Location: [facility] CATH LAB;  Service: Cardiology;  Laterality: N/A;  MICHELLE ANGIO W/ ANEST.    posterior neck surgery      SPINE SURGERY      TOTAL REPLACEMENT OF BOTH HIP JOINTS USING COMPUTER-ASSISTED NAVIGATION Bilateral        Review of patient's allergies indicates:  No Known Allergies    Current Facility-Administered Medications   Medication    acetaminophen tablet 650 mg    albuterol inhaler 2 puff    aluminum-magnesium hydroxide-simethicone 200-200-20 mg/5 mL suspension 15 mL    amLODIPine tablet 10 mg    aspirin chewable tablet 81 mg    cilostazoL tablet 50 mg    clopidogreL tablet 75 mg    docusate sodium capsule 100 mg    DULoxetine DR capsule 30 mg    enoxaparin injection 40 mg    ferrous sulfate tablet 1 each    gabapentin capsule 300 mg    gabapentin capsule 600 mg    HYDROcodone-acetaminophen 5-325 mg per tablet 1 tablet    LIDOcaine 5 % patch 1 patch    losartan tablet 100 mg    melatonin tablet 6 mg    multivitamin tablet    oxyCODONE immediate release tablet Tab 10 mg    piperacillin-tazobactam (ZOSYN) 4.5 g in dextrose 5 % in water (D5W) 100 mL IVPB (MB+)    polyethylene glycol packet 17 g    simethicone chewable tablet 80 mg    sodium chloride 0.9% flush 10 mL    And    sodium chloride 0.9% flush 10 mL    vancomycin - pharmacy to dose    vancomycin in dextrose 5 % 1 gram/250 mL IVPB 1,000 mg     Facility-Administered Medications Ordered in Other Encounters   Medication    diazePAM tablet 10 mg    diphenhydrAMINE capsule 50 mg    iopamidoL (ISOVUE-370) injection     Family History       Problem Relation (Age of Onset)    Cancer Father    Heart disease Mother    Lung disease Father          Tobacco Use    Smoking status: Former     Average packs/day: 0.5 packs/day for 16.3 years (7.5 ttl pk-yrs)     Types: Cigarettes     Start date: 2008    Smokeless tobacco: Never   Substance and Sexual Activity    Alcohol use: Yes     Alcohol/week: 3.0 - 6.0  "standard drinks of alcohol     Types: 3 - 6 Cans of beer per week    Drug use: Yes     Types: Marijuana    Sexual activity: Not Currently       ROS:  Constitutional: Denies fever chills  Eyes: No change in vision  ENT: No ringing or current infections  CV: No chest pain  Resp: No labored breathing  MSK: Pain evident at site of injury located in HPI,   Integ: No signs of abrasions or lacerations  Neuro: No numbness or tingling  Lymphatic: No swelling outside the area of injury   Objective:     Vital Signs (Most Recent):  Temp: 98.5 °F (36.9 °C) (04/12/24 1527)  Pulse: 70 (04/12/24 1527)  Resp: 18 (04/12/24 1625)  BP: (!) 121/56 (04/12/24 1527)  SpO2: (!) 94 % (04/12/24 1527) Vital Signs (24h Range):  Temp:  [98 °F (36.7 °C)-99 °F (37.2 °C)] 98.5 °F (36.9 °C)  Pulse:  [67-75] 70  Resp:  [18] 18  SpO2:  [90 %-94 %] 94 %  BP: (103-135)/(53-78) 121/56     Weight: 68 kg (150 lb)  Height: 5' 9.02" (175.3 cm)  Body mass index is 22.14 kg/m².      Intake/Output Summary (Last 24 hours) at 4/12/2024 1859  Last data filed at 4/12/2024 1724  Gross per 24 hour   Intake 1790 ml   Output 1470 ml   Net 320 ml       Ortho/SPM Exam  General the patient is alert and oriented x3 no acute distress nontoxic-appearing appropriate affect.    Constitutional: Vital signs are examined and stable.  Resp: No signs of labored breathing      LLE: -Skin: No effusion, no signs of infection near the knee No signs of new abrasions or lacerations, no scars           -MSK: EHL/FHL, Gastroc/Tib anterior Strength 5/5, knee ROM painful           -Neuro:  Sensation  grossly intact               -CV: Capillary refill is less than 2 seconds. DP/PT pulses 2/4. Compartments soft    Significant Labs:  I have reviewed all labs in relation to Orthopedics  Recent Lab Results         04/12/24  0005   04/11/24  1219   04/10/24  0504        Albumin/Globulin Ratio 0.8     0.8       Albumin 2.3     2.3       ALP 96     92       ALT 10     9       AST 13     11       " Baso # 0.14           Basophil % 1.7           BILIRUBIN TOTAL 0.3     0.3       BUN 10.5     8.6       Calcium 8.7     8.7       Chloride 104     104       CO2 22     25       Creatinine 0.71     0.65       eGFR >60     >60       Eos # 2.00           Eos % 23.8           Globulin, Total 3.0     2.9       Glucose 101     111       Hematocrit 25.9           Hemoglobin 7.8           Immature Grans (Abs) 0.03           Immature Granulocytes 0.4           Lymph # 1.64           LYMPH % 19.5           MCH 25.7           MCHC 30.1           MCV 85.5           Mono # 0.83           Mono % 9.9           MPV 8.7           Neut # 3.76           Neut % 44.7           nRBC 0.0           Platelet Count 387           Potassium 3.4     3.4       Prealbumin   10.5         PROTEIN TOTAL 5.3     5.2       RBC 3.03           RDW 17.4           Sodium 135     136       WBC 8.40                   Significant Imaging: I have reviewed all pertinent imaging results/findings.  CT Knee Without Contrast Left    Result Date: 4/10/2024  EXAMINATION: CT KNEE WITHOUT CONTRAST LEFT CLINICAL HISTORY: possible effusion? history of OA; TECHNIQUE: Helically acquired imaging through the left knee were obtained without the IV administration of contrast. Axial, sagittal and coronal reformations were created and interpreted. Automated tube current modulation, weight-based exposure dosing, and/or iterative reconstruction technique utilized to reach lowest reasonably achievable exposure rate. DLP: 218 mGy*cm COMPARISON: Left knee radiographs 03/30/2024 FINDINGS: BONES/JOINTS: The bones are severely demineralized.  There are tracks from removed hardware at the distal femur and proximal tibia.  Possible tunneled ligament repair.  No appreciable acute fracture.  No significant joint effusion.  Arthritis at the knee with marginal osteophytes and osteochondral defects.. SOFT TISSUES: There is metallic debris about the knee similar to prior plain radiograph which  may be posttraumatic or postoperative.  There is arterial stent at the distal SFA/popliteal.  There is soft tissue swelling and edema, nonfocal. OTHER: N/A     1. No significant joint effusion 2. Nonfocal soft tissue swelling 3. Severe bony demineralization 4. Arthritis 5. The preliminary and final reports are concordant. Electronically signed by: Allyson Pierce Date:    04/10/2024 Time:    07:35    CV Ultrasound doppler venous legs bilat    Result Date: 4/6/2024  Technically difficult study due to severe edema. The right lower extremity venous system is patent with no evidence of superficial or deep vein thrombosis. The left lower extremity venous system is patent with no evidence of superficial or deep vein thrombosis.     X-Ray Knee Complete 4 or More Views Left    Result Date: 3/30/2024  EXAMINATION: XR KNEE COMP 4 OR MORE VIEWS LEFT CLINICAL HISTORY: pain; TECHNIQUE: AP, lateral, and bilateral oblique views of the left knee. COMPARISON: 10/05/2023 FINDINGS: The bones are demineralized.  Degenerative changes are present at the knee.  Evaluation slightly challenging due to bony demineralization and patient positioning.  No gross fracture appreciable.  No dislocation. There is a knee joint effusion.  There is soft tissue swelling.  There are atherosclerotic calcifications and a stent.     The bones are demineralized. Degenerative change at the knee with joint effusion. Electronically signed by: Allyson Pierce Date:    03/30/2024 Time:    11:16    X-Ray Chest 1 View for Line/Tube Placement    Result Date: 3/28/2024  EXAMINATION: XR CHEST 1 VIEW FOR LINE/TUBE PLACEMENT CLINICAL HISTORY: PICC placement; COMPARISON: 6 July 2023 FINDINGS: Frontal view of the chest was obtained. Right PICC tip overlies the mid SVC.  The heart is not enlarged.  Grossly clear lungs.  No pneumothorax.  There are bilateral healing rib fractures.     Right PICC tip overlies the mid SVC. Electronically signed by: Gentry Welsh  Date:    03/28/2024 Time:    13:26    X-Ray Calcaneus 2 View Right    Result Date: 3/19/2024  EXAMINATION: XR CALCANEUS 2 VIEW RIGHT CLINICAL HISTORY: pressure ulcer; TECHNIQUE: Tangential and lateral views of the right calcaneus were performed. COMPARISON: None FINDINGS: There is a mottled appearance to all of the tarsal bones.  The bones are osteopenic.  Some degenerative changes and compression is seen along the talar dome.  The calcaneus has some mild periosteal elevation posteriorly and inferiorly.  There is a ulceration seen in the heel.  No fracture seen.  No dislocation is seen.     Diffuse mottled appearance to all of the tarsal bones as well as the calcaneus with mild periosteal elevation seen along the posteroinferior aspect of the calcaneus.  Osteomyelitis should be excluded. Electronically signed by: Mak Merchant Date:    03/19/2024 Time:    17:42    X-Ray Calcaneus 2 View Left    Result Date: 3/19/2024  EXAMINATION XR CALCANEUS 2 VIEW LEFT CLINICAL HISTORY pressure ulcer; TECHNIQUE A total of 2 views submitted of the calcaneus. COMPARISON None available at the time of initial interpretation. FINDINGS Regional arthritic changes noted, as well as widespread appearance of decreased bone mineralization. No convincing acutely displaced fracture or dislocation is identified. No aggressive osseous lesion or periosteal reaction is appreciated. Skin contour irregularity with subjacent lucency visualized at the posterior aspect of the heel, without tracking subcutaneous gas or evidence of radiopaque foreign body.  Scattered vascular calcifications are also noted. IMPRESSION 1. Posterior heel soft tissue ulceration without tracking gas or radiopaque foreign body. 2. No convincing acute osseous abnormality. 3. Chronic secondary details discussed above. =========== Please note early changes of osteomyelitis typically are radiographically occult; follow-up with MRI is recommended if there is elevated clinical  concern (or nuclear medicine three-phase bone scan if MR assessment is contraindicated). Electronically signed by: Ramon Song Date:    03/19/2024 Time:    17:41    X-Ray Tibia Fibula 2 View Left    Result Date: 3/19/2024  EXAMINATION XR TIBIA FIBULA 2 VIEW LEFT CLINICAL HISTORY lateral leg pressure ulcer; TECHNIQUE A total of 4 images submitted of the left leg. COMPARISON None available at the time of initial interpretation. FINDINGS Extensive tricompartmental degenerative changes are appreciated at the knee, as well as degenerative alterations throughout the visualized hindfoot.  There is widespread appearance of bone demineralization.  No convincing acutely displaced fracture or dislocation is identified.  No aggressive osseous lesion or periosteal reaction is appreciated. Regional soft tissue edema is present.  There is focal contour irregularity and lucency at the lateral proximal aspect of the leg, may represent ulceration.  No tracking subcutaneous gas is identified.  There are widespread vascular calcifications.  Additionally, punctate metallic fragments are incidentally seen at the lateral aspect of the distal thigh.  Stenting of the SFA and popliteal arteries also noted. IMPRESSION 1. Nonspecific widespread soft tissue edema. 2. Regional arthritic changes in appearance of diffuse bone demineralization. 3. Cannot exclude soft tissue ulceration at the proximal lateral leg. Electronically signed by: Ramon Song Date:    03/19/2024 Time:    17:40    X-Ray Tibia Fibula 2 View Right    Result Date: 3/19/2024  EXAMINATION XR TIBIA FIBULA 2 VIEW RIGHT CLINICAL HISTORY pressure ulcer; TECHNIQUE A total of 4 images submitted of the right leg. COMPARISON None available at the time of initial interpretation. FINDINGS Regional arthritic changes and widespread appearance of decreased bone mineralization noted.  There is incidentally visualized right knee arthroplasty hardware without evidence of gross osseous  loosening or component malposition.  No convincing acutely displaced fracture or dislocation is identified.  No aggressive osseous lesion or periosteal reaction is appreciated. No definite focal soft tissue abnormality or tracking subcutaneous gas is appreciated.  There is no radiopaque foreign body.  Scattered vascular calcifications are present.  Nonspecific lucent contour along the mid through distal lateral leg may represent soft tissue wound, otherwise limited assessment by radiograph exam. IMPRESSION 1. Regional degenerative changes and bone demineralization, without convincing acute osseous displacement. 2. Mid/distal lateral leg lucency may represent soft tissue wound. 3. Additional chronic secondary details discussed above. 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 Severe OA. No signs of lytic pricess     Patient has significant pain in the left knee.  He has end-stage osteoarthritis.  He stopped following up with a joint surgeon last year.  He would need to follow up this surgeon again.  At this time we do not recommend any surgical intervention. I do not recommend amputation for pain control due to knee arthritis. Pt will need to follow up with Joint specialist outpatient.       This note/OR report was created with the assistance of  voice recognition  software or phone  dictation.  There may be transcription errors as a result of using this technology however minimal. Effort has been made to assure accuracy of transcription but any obvious errors or omissions should be clarified with the author of the document.       Rohit Pinedo DO   Orthopedic Trauma Surgery  [facility] - 8th Floor Med Surg

## 2024-04-12 NOTE — PROGRESS NOTES
Inpatient Nutrition Assessment    Admit Date: 3/15/2024   Total duration of encounter: 28 days   Patient Age: 69 y.o.    Nutrition Recommendation/Prescription     -Continue Heart Healthy Diet as tolerated.    -Assist with meals as needed.   -Encourage Pierre BID for wound healing.   -Continue MVI with minerals as medically feasible.   -Monitor wt, labs, and intake. Obtain and document a more recent measured weight.     Communication of Recommendations: reviewed with nurse    Nutrition Assessment     Malnutrition Assessment/Nutrition-Focused Physical Exam    Malnutrition Context: chronic illness (03/20/24 1317)  Malnutrition Level: severe (03/20/24 1317)  Energy Intake (Malnutrition): less than 75% for greater than or equal to 3 months (03/20/24 1317)  Weight Loss (Malnutrition): greater than 7.5% in 3 months (03/20/24 1317)  Subcutaneous Fat (Malnutrition): severe depletion (03/20/24 1317)  Orbital Region (Subcutaneous Fat Loss): severe depletion  Upper Arm Region (Subcutaneous Fat Loss): severe depletion     Muscle Mass (Malnutrition): severe depletion (03/20/24 1317)  Congregational Region (Muscle Loss): severe depletion                       Fluid Accumulation (Malnutrition): other (see comments) (does not meet criteria) (03/20/24 1317)  Hand  Strength, Left (Malnutrition): unable to evaluate (03/20/24 1317)  Hand  Strength, Right (Malnutrition): unable to evaluate (03/20/24 1317)  A minimum of two characteristics is recommended for diagnosis of either severe or non-severe malnutrition.    Chart Review    Reason Seen: continuous nutrition monitoring and follow-up    Malnutrition Screening Tool Results   Have you recently lost weight without trying?: No  Have you been eating poorly because of a decreased appetite?: No   MST Score: 0   Diagnosis:  Stage 4 pressure ulcer to sacrum  Unstageable pressure ulcers to bilateral heels  Open wound/ulcers to right lateral lower leg and left lateral lower leg  Peripheral  arterial disease - with history of vascular intervention with Dr. Queen (most recent angio 3/13/24)  PVD  History of polymicrobial sacral wound culture  History of polymicrobial wound culture to leg wound  DM2  Anemia  History of smoking  Low prealbumin     Relevant Medical History: HTN, COPD, DM2 with neuropathy, PAD, chronic venous hypertension     Scheduled Medications:  amLODIPine, 10 mg, Daily  aspirin, 81 mg, Daily  cilostazoL, 50 mg, BID  clopidogreL, 75 mg, Daily  docusate sodium, 100 mg, BID  DULoxetine, 30 mg, Daily  enoxaparin, 40 mg, Daily  ferrous sulfate, 1 tablet, Daily  gabapentin, 300 mg, TID  gabapentin, 600 mg, QHS  LIDOcaine, 1 patch, Q24H  losartan, 100 mg, Daily  multivitamin, 1 tablet, Daily  oxyCODONE, 10 mg, Q4H  piperacillin-tazobactam (Zosyn) IV (PEDS and ADULTS) (extended infusion is not appropriate), 4.5 g, Q8H  polyethylene glycol, 17 g, Daily  sodium chloride 0.9%, 10 mL, Q6H  vancomycin (VANCOCIN) IV (PEDS and ADULTS), 1,000 mg, Q12H    Continuous Infusions:   PRN Medications: acetaminophen, albuterol, aluminum-magnesium hydroxide-simethicone, HYDROcodone-acetaminophen, melatonin, simethicone, Flushing PICC/Midline Protocol **AND** sodium chloride 0.9% **AND** sodium chloride 0.9%, Pharmacy to dose Vancomycin consult **AND** vancomycin - pharmacy to dose    Calorie Containing IV Medications: no significant kcals from medications at this time    Recent Labs   Lab 04/06/24  0534 04/07/24  0534 04/08/24  0003 04/08/24  0532 04/09/24  0550 04/10/24  0504 04/11/24  1219 04/12/24  0005    138  --  139 137 136  --  135*   K 3.9 3.9  --  4.0 3.8 3.4*  --  3.4*   CALCIUM 8.7* 8.5*  --  8.9 9.2 8.7*  --  8.7*   CHLORIDE 106 106  --  106 104 104  --  104   CO2 28 27  --  25 25 25  --  22*   BUN 12.5 10.5  --  12.1 9.9 8.6  --  10.5   CREATININE 0.71* 0.64*  --  0.64* 0.61* 0.65*  --  0.71*   EGFRNORACEVR >60 >60  --  >60 >60 >60  --  >60   GLUCOSE 101 93  --  100 110 111  --  101   BILITOT  0.2 0.2  --  0.2 0.2 0.3  --  0.3   ALKPHOS 81 83  --  92 101 92  --  96   ALT 7 7  --  9 10 9  --  10   AST 10 9  --  10 11 11  --  13   ALBUMIN 2.3* 2.3*  --  2.4* 2.5* 2.3*  --  2.3*   PREALB  --   --   --   --   --   --  10.5*  --    CRP  --   --   --   --  72.10*  --   --   --    WBC 6.90 7.10 6.93  --  7.60  --   --  8.40   HGB 8.3* 7.7* 7.7*  --  8.7*  --   --  7.8*   HCT 27.4* 24.8* 24.4*  --  27.8*  --   --  25.9*       Nutrition Orders:  Diet Heart Healthy      Appetite/Oral Intake: good/% of meals  Factors Affecting Nutritional Intake: none identified  Food/Baptism/Cultural Preferences: none reported  Food Allergies: no known food allergies  Last Bowel Movement: 04/11/24  Wound(s):     Altered Skin Integrity 01/22/24 1351 Sacral spine #4-Tissue loss description: Full thickness       Altered Skin Integrity 03/16/24 0800 Left anterior;lower Leg Arterial Ulcer-Tissue loss description: Full thickness heel ulcers, sacrum pressure ulcers    Comments    3/20: Pt reports current good intake/appetite but intake the past few months has been poor 2/2 disliking food from NH. Pt reports usual wt of ~170 lbs. Pt reports he would like oral supplements.     3/22: Pt reports good appetite/intake of meals, denies n/v; drinking some of the oral supplement but not taking the Pierre.     3/26: Pt NPO;  plans to go to OR today.     3/28: Pt eating well; denies n/v; states that he is not drinking oral supplement 2/2 being too full after he eats his meals; will d/c per his request. Pt is receptive to trying to drink the Pierre for wound healing.     4/1: Pt continues with good appetite/intake; tolerating diet. Pt reports that he likes the Pierre but is not taking it regularly- encouraged to take for wound healing and pt was receptive.     4/4: Pt reports good appetite and PO intake of meals. Not drinking much Pierre, large supply in his room noted. Pt denies GI complaints.     4/8: Pt sleeping during rounds; eating very well  "per RN.     : Pt reports good appetite/intake; denies n/v; taking the Pierre.     Anthropometrics    Height: 5' 9.02" (175.3 cm), Height Method: Stated  Last Weight: 68 kg (150 lb) (24 1315), Weight Method: Standard Scale  BMI (Calculated): 22.1  BMI Classification: normal (BMI 18.5-24.9)        Ideal Body Weight (IBW), Male: 160.12 lb     % Ideal Body Weight, Male (lb): 93.68 %                 Usual Body Weight (UBW), k.27 kg  % Usual Body Weight: 88.24  % Weight Change From Usual Weight: -11.95 %  Usual Weight Provided By: patient    Wt Readings from Last 5 Encounters:   24 68 kg (150 lb)   24 68.5 kg (151 lb)   24 63.5 kg (140 lb)   23 72.6 kg (160 lb)   23 77.1 kg (170 lb)     Weight Change(s) Since Admission:   Wt Readings from Last 1 Encounters:   24 1315 68 kg (150 lb)   03/15/24 1621 68 kg (150 lb)   03/15/24 0307 68 kg (150 lb)   Admit Weight: 68 kg (150 lb) (03/15/24 0307), Weight Method: Stated  3/28-: no new wt noted    Estimated Needs    Weight Used For Calorie Calculations: 68 kg (149 lb 14.6 oz)  Energy Calorie Requirements (kcal): 7329-9163 kcal (30-35 kcal/kg)  Energy Need Method: Kcal/kg  Weight Used For Protein Calculations: 68 kg (149 lb 14.6 oz)  Protein Requirements: 102 gm (1.5g/kg)  Fluid Requirements (mL): 2040 mL    Enteral Nutrition     Patient not receiving enteral nutrition at this time.    Parenteral Nutrition     Patient not receiving parenteral nutrition support at this time.    Evaluation of Received Nutrient Intake    Calories: meeting estimated needs  Protein: meeting estimated needs    Patient Education     Not applicable.    Nutrition Diagnosis     PES: Increased nutrient needs (protein) related to increased protein energy demand for wound healing as evidenced by stage 4 pressure ulcer. (active)     PES: Severe chronic disease or condition related malnutrition related to suboptimal protein/energy intake as evidenced by less " than 75% needs met for greater than or equal to 3 months, severe fat depletion, severe muscle depletion, and greater than 7.5% weight loss in 3 months. (active)    Nutrition Interventions     Intervention(s): general/healthful diet, commercial beverage, commercial food, multivitamin/mineral supplement therapy, and collaboration with other providers    Goal: Meet greater than 80% of nutritional needs by follow-up. (goal progressing)  Goal: Maintain weight throughout hospitalization. (goal progressing)    Nutrition Goals & Monitoring     Dietitian will monitor: energy intake and weight    Nutrition Risk/Follow-Up: high (follow-up in 1-4 days)   Please consult if re-assessment needed sooner.

## 2024-04-12 NOTE — PROGRESS NOTES
[facility] - 8th Floor [facility] Medicine  Progress Note    Patient Name: Shaheen Christie  MRN: [id number]  Patient Class: IP- Inpatient   Admission Date: 3/15/2024  Length of Stay: 28 days  Attending Physician: Enzo Thibodeaux MD  Primary Care Provider: Viktor Russ MD        Subjective:     Principal Problem:Osteomyelitis    Interval History: No new issues    Review of Systems   All other systems reviewed and are negative.    Objective:     Vital Signs (Most Recent):  Temp: 98 °F (36.7 °C) (04/12/24 1158)  Pulse: 75 (04/12/24 1158)  Resp: 18 (04/12/24 1211)  BP: 108/66 (04/12/24 1158)  SpO2: (!) 93 % (04/12/24 1158) Vital Signs (24h Range):  Temp:  [98 °F (36.7 °C)-99 °F (37.2 °C)] 98 °F (36.7 °C)  Pulse:  [67-75] 75  Resp:  [18] 18  SpO2:  [90 %-93 %] 93 %  BP: (103-135)/(53-78) 108/66     Weight: 68 kg (150 lb)  Body mass index is 22.14 kg/m².    Intake/Output Summary (Last 24 hours) at 4/12/2024 1521  Last data filed at 4/12/2024 0600  Gross per 24 hour   Intake 640 ml   Output 750 ml   Net -110 ml      Physical Exam  HENT:      Head: Normocephalic and atraumatic.      Right Ear: External ear normal.      Left Ear: External ear normal.      Nose: Nose normal.      Mouth/Throat:      Mouth: Mucous membranes are dry.      Pharynx: Oropharynx is clear.   Eyes:      Extraocular Movements: Extraocular movements intact.   Cardiovascular:      Rate and Rhythm: Normal rate and regular rhythm.      Pulses: Normal pulses.      Heart sounds: Normal heart sounds.   Pulmonary:      Effort: Pulmonary effort is normal.      Breath sounds: Normal breath sounds.   Abdominal:      General: Abdomen is flat. Bowel sounds are normal.      Palpations: Abdomen is soft.   Skin:     General: Skin is warm and dry.   Neurological:      General: No focal deficit present.      Mental Status: He is alert and oriented to person, place, and time. Mental status is at baseline.   Psychiatric:         Mood and  Affect: Mood normal.           Overview/Hospital Course: Doing well, continue treatment    Significant Labs: All pertinent labs within the past 24 hours have been reviewed.  BMP:   Recent Labs   Lab 04/12/24  0005   *   K 3.4*   CO2 22*   BUN 10.5   CREATININE 0.71*   CALCIUM 8.7*     CBC:   Recent Labs   Lab 04/12/24  0005   WBC 8.40   HGB 7.8*   HCT 25.9*        CMP:   Recent Labs   Lab 04/12/24  0005   *   K 3.4*   CO2 22*   BUN 10.5   CREATININE 0.71*   CALCIUM 8.7*   ALBUMIN 2.3*   BILITOT 0.3   ALKPHOS 96   AST 13   ALT 10       Significant Imaging: I have reviewed all pertinent imaging results/findings within the past 24 hours.    Assessment/Plan:      Active Diagnoses:    Diagnosis Date Noted POA    PRINCIPAL PROBLEM:  Osteomyelitis [M86.9] 03/15/2024 Yes    Knee effusion, left [M25.462] 03/31/2024 No    Pressure injury of sacral region, stage 4 [L89.154] 03/19/2024 Yes    Skin ulcer of right lower leg [L97.919] 03/19/2024 Yes    Skin ulcer of left lower leg [L97.929] 03/19/2024 Yes    Unstageable pressure ulcer of right heel [L89.610] 03/19/2024 Yes    Unstageable pressure ulcer of left heel [L89.620] 03/19/2024 Yes      Problems Resolved During this Admission:     VTE Risk Mitigation (From admission, onward)           Ordered     enoxaparin injection 40 mg  Daily         03/15/24 0934                       Enzo Thibodeaux MD  Department of Hospital Medicine   [facility] - 8th Floor Med Surg

## 2024-04-13 LAB — VANCOMYCIN TROUGH SERPL-MCNC: 18.8 UG/ML (ref 15–20)

## 2024-04-13 PROCEDURE — 80202 ASSAY OF VANCOMYCIN: CPT | Performed by: NURSE PRACTITIONER

## 2024-04-13 PROCEDURE — 63600175 PHARM REV CODE 636 W HCPCS: Performed by: PODIATRIST

## 2024-04-13 PROCEDURE — 11000001 HC ACUTE MED/SURG PRIVATE ROOM

## 2024-04-13 PROCEDURE — 25000003 PHARM REV CODE 250: Performed by: NURSE PRACTITIONER

## 2024-04-13 PROCEDURE — 25000003 PHARM REV CODE 250: Performed by: INTERNAL MEDICINE

## 2024-04-13 PROCEDURE — 63600175 PHARM REV CODE 636 W HCPCS: Performed by: INTERNAL MEDICINE

## 2024-04-13 PROCEDURE — 63600175 PHARM REV CODE 636 W HCPCS: Performed by: NURSE PRACTITIONER

## 2024-04-13 PROCEDURE — 25000003 PHARM REV CODE 250: Performed by: PODIATRIST

## 2024-04-13 PROCEDURE — A4216 STERILE WATER/SALINE, 10 ML: HCPCS | Performed by: INTERNAL MEDICINE

## 2024-04-13 RX ADMIN — PIPERACILLIN SODIUM AND TAZOBACTAM SODIUM 4.5 G: 4; .5 INJECTION, POWDER, LYOPHILIZED, FOR SOLUTION INTRAVENOUS at 09:04

## 2024-04-13 RX ADMIN — OXYCODONE HYDROCHLORIDE 10 MG: 10 TABLET ORAL at 11:04

## 2024-04-13 RX ADMIN — THERA TABS 1 TABLET: TAB at 08:04

## 2024-04-13 RX ADMIN — GABAPENTIN 300 MG: 300 CAPSULE ORAL at 04:04

## 2024-04-13 RX ADMIN — SODIUM CHLORIDE, PRESERVATIVE FREE 10 ML: 5 INJECTION INTRAVENOUS at 12:04

## 2024-04-13 RX ADMIN — CILOSTAZOL 50 MG: 50 TABLET ORAL at 09:04

## 2024-04-13 RX ADMIN — ASPIRIN 81 MG CHEWABLE TABLET 81 MG: 81 TABLET CHEWABLE at 08:04

## 2024-04-13 RX ADMIN — GABAPENTIN 300 MG: 300 CAPSULE ORAL at 09:04

## 2024-04-13 RX ADMIN — OXYCODONE HYDROCHLORIDE 10 MG: 10 TABLET ORAL at 08:04

## 2024-04-13 RX ADMIN — AMLODIPINE BESYLATE 10 MG: 5 TABLET ORAL at 08:04

## 2024-04-13 RX ADMIN — LOSARTAN POTASSIUM 100 MG: 50 TABLET, FILM COATED ORAL at 08:04

## 2024-04-13 RX ADMIN — PIPERACILLIN SODIUM AND TAZOBACTAM SODIUM 4.5 G: 4; .5 INJECTION, POWDER, LYOPHILIZED, FOR SOLUTION INTRAVENOUS at 05:04

## 2024-04-13 RX ADMIN — OXYCODONE HYDROCHLORIDE 10 MG: 10 TABLET ORAL at 04:04

## 2024-04-13 RX ADMIN — GABAPENTIN 600 MG: 300 CAPSULE ORAL at 09:04

## 2024-04-13 RX ADMIN — ENOXAPARIN SODIUM 40 MG: 40 INJECTION SUBCUTANEOUS at 04:04

## 2024-04-13 RX ADMIN — PIPERACILLIN SODIUM AND TAZOBACTAM SODIUM 4.5 G: 4; .5 INJECTION, POWDER, LYOPHILIZED, FOR SOLUTION INTRAVENOUS at 12:04

## 2024-04-13 RX ADMIN — VANCOMYCIN HYDROCHLORIDE 1000 MG: 1 INJECTION, POWDER, LYOPHILIZED, FOR SOLUTION INTRAVENOUS at 12:04

## 2024-04-13 RX ADMIN — SODIUM CHLORIDE, PRESERVATIVE FREE 10 ML: 5 INJECTION INTRAVENOUS at 05:04

## 2024-04-13 RX ADMIN — OXYCODONE HYDROCHLORIDE 10 MG: 10 TABLET ORAL at 07:04

## 2024-04-13 RX ADMIN — DULOXETINE HYDROCHLORIDE 30 MG: 30 CAPSULE, DELAYED RELEASE ORAL at 08:04

## 2024-04-13 RX ADMIN — SODIUM CHLORIDE, PRESERVATIVE FREE 10 ML: 5 INJECTION INTRAVENOUS at 11:04

## 2024-04-13 RX ADMIN — CLOPIDOGREL BISULFATE 75 MG: 75 TABLET ORAL at 08:04

## 2024-04-13 RX ADMIN — LIDOCAINE 5% 1 PATCH: 700 PATCH TOPICAL at 11:04

## 2024-04-13 RX ADMIN — GABAPENTIN 300 MG: 300 CAPSULE ORAL at 08:04

## 2024-04-13 RX ADMIN — CILOSTAZOL 50 MG: 50 TABLET ORAL at 08:04

## 2024-04-13 RX ADMIN — VANCOMYCIN HYDROCHLORIDE 1000 MG: 1 INJECTION, POWDER, LYOPHILIZED, FOR SOLUTION INTRAVENOUS at 11:04

## 2024-04-13 RX ADMIN — FERROUS SULFATE TAB 325 MG (65 MG ELEMENTAL FE) 1 EACH: 325 (65 FE) TAB at 08:04

## 2024-04-13 NOTE — PROGRESS NOTES
Infectious Diseases Progress Note  69-year-old male, nursing home resident, with past medical history of HTN, COPD, DM type 2, with neuropathy, is admitted to [facility] on 03/15/2024 sent via EMS for sacral wound evaluation, apparently diagnosed with osteomyelitis involving the sacrum on 02/27/2024 with plan to be admitted to LTAC which had not been successful as an outpatient.  On presentation he was noted to have no fevers and no leukocytosis, anemic with low albumin.  Blood cultures have been negative.  Review of his records show a 02/08/2024 left leg wound culture with Pseudomonas, Providencia, ESBL Proteus and 11/30/2023 sacral wound culture with MRSA, Providencia, Enterococcus and Pseudomonas.  2/27 MRI of the pelvis shows osteomyelitis involving the sacrococcygeal bone as well as right trochanteric bursitis which may be septic.  He was seen by surgery team with no plan for surgical intervention.  He is on antibiotic coverage with vancomycin and Zosyn.     Subjective:  Lying in bed in no acute distress. Still reports L knee pain. Afebrile.     Past Medical History:   Diagnosis Date    COPD (chronic obstructive pulmonary disease)     Depression     Hypertension     Neuropathy      Past Surgical History:   Procedure Laterality Date    angiogram Bilateral     stents placed in left leg    anterior neck surgery      ARTHROTOMY OF ANKLE Left 6/28/2023    Procedure: ARTHROTOMY, ANKLE;  Surgeon: Tom Nichole DPM;  Location: [facility];  Service: Podiatry;  Laterality: Left;    cataracts Left     CHOLECYSTECTOMY      DEBRIDEMENT OF FOOT Bilateral 3/26/2024    Procedure: DEBRIDEMENT, FOOT;  Surgeon: Tom Nichole DPM;  Location: [facility] OR;  Service: Podiatry;  Laterality: Bilateral;    EYE SURGERY Left     HAND SURGERY Left     broken bone    herina repair      INCISION AND DRAINAGE, LOWER EXTREMITY Left 7/28/2023    Procedure: INCISION AND DRAINAGE, LOWER EXTREMITY;  Surgeon: Avinash  GENESIS Garces MD;  Location: [facility] OR;  Service: Orthopedics;  Laterality: Left;  Supine, vascular bed, bone foam  last case  cysto tubing, laparoscopic trocar, experience, vanc, hemovac drain    KNEE SURGERY Bilateral     PERIPHERAL ANGIOGRAPHY N/A 3/13/2024    Procedure: Peripheral angiography;  Surgeon: Deven Queen MD;  Location: [facility] CATH LAB;  Service: Cardiology;  Laterality: N/A;  MICHELLE ANGIO W/ ANEST.    posterior neck surgery      SPINE SURGERY      TOTAL REPLACEMENT OF BOTH HIP JOINTS USING COMPUTER-ASSISTED NAVIGATION Bilateral      Social History     Socioeconomic History    Marital status:    Occupational History    Occupation: retired   Tobacco Use    Smoking status: Former     Average packs/day: 0.5 packs/day for 16.3 years (7.5 ttl pk-yrs)     Types: Cigarettes     Start date: 2008    Smokeless tobacco: Never   Substance and Sexual Activity    Alcohol use: Yes     Alcohol/week: 3.0 - 6.0 standard drinks of alcohol     Types: 3 - 6 Cans of beer per week    Drug use: Yes     Types: Marijuana    Sexual activity: Not Currently       ROS  Constitutional:  Positive for malaise/fatigue.   HENT: Negative.     Respiratory: Negative.     Gastrointestinal: Negative.    Genitourinary: Negative.    Musculoskeletal: Positive for L knee pain   Skin: Multiple pressure wounds   Neurological:  Positive for weakness.   Endo/Heme/Allergies: Negative.    Psychiatric/Behavioral: Negative.     All other Systems review done and negative.    Review of patient's allergies indicates:  No Known Allergies      Scheduled Meds:   amLODIPine  10 mg Oral Daily    aspirin  81 mg Oral Daily    cilostazoL  50 mg Oral BID    clopidogreL  75 mg Oral Daily    docusate sodium  100 mg Oral BID    DULoxetine  30 mg Oral Daily    enoxaparin  40 mg Subcutaneous Daily    ferrous sulfate  1 tablet Oral Daily    gabapentin  300 mg Oral TID    gabapentin  600 mg Oral QHS    LIDOcaine  1 patch Transdermal Q24H    losartan  100 mg Oral Daily     "multivitamin  1 tablet Oral Daily    oxyCODONE  10 mg Oral Q4H    piperacillin-tazobactam (Zosyn) IV (PEDS and ADULTS) (extended infusion is not appropriate)  4.5 g Intravenous Q8H    polyethylene glycol  17 g Oral Daily    sodium chloride 0.9%  10 mL Intravenous Q6H    vancomycin (VANCOCIN) IV (PEDS and ADULTS)  1,000 mg Intravenous Q12H     Continuous Infusions:  PRN Meds:acetaminophen, albuterol, aluminum-magnesium hydroxide-simethicone, HYDROcodone-acetaminophen, melatonin, simethicone, Flushing PICC/Midline Protocol **AND** sodium chloride 0.9% **AND** sodium chloride 0.9%, Pharmacy to dose Vancomycin consult **AND** vancomycin - pharmacy to dose    Objective:  BP (!) 121/56   Pulse 70   Temp 98.5 °F (36.9 °C) (Oral)   Resp 17   Ht 5' 9.02" (1.753 m)   Wt 68 kg (150 lb)   SpO2 (!) 94%   BMI 22.14 kg/m²     Physical Exam:   Physical Exam  Vitals reviewed.   Constitutional:       General: He is not in acute distress.  HENT:      Head: Normocephalic and atraumatic.   Cardiovascular:      Rate and Rhythm: Normal rate and regular rhythm.      Heart sounds: Normal heart sounds.   Pulmonary:      Effort: Pulmonary effort is normal. No respiratory distress.      Breath sounds: Normal breath sounds.   Abdominal:      General: Bowel sounds are normal. There is no distension.      Palpations: Abdomen is soft.      Tenderness: There is no abdominal tenderness.   Musculoskeletal:         General: No deformity.      Cervical back: Neck supple.   Skin:     Findings: No erythema or rash.      Comments: Wound vac to sacrum  Neurological:      Mental Status: He is alert and oriented to person, place, and time.   Psychiatric:      Comments: Calm and cooperative     Imaging  Imaging Results    None          Lab Review   Recent Results (from the past 24 hour(s))   Comprehensive Metabolic Panel    Collection Time: 04/12/24 12:05 AM   Result Value Ref Range    Sodium Level 135 (L) 136 - 145 mmol/L    Potassium Level 3.4 (L) " 3.5 - 5.1 mmol/L    Chloride 104 98 - 107 mmol/L    Carbon Dioxide 22 (L) 23 - 31 mmol/L    Glucose Level 101 82 - 115 mg/dL    Blood Urea Nitrogen 10.5 8.4 - 25.7 mg/dL    Creatinine 0.71 (L) 0.73 - 1.18 mg/dL    Calcium Level Total 8.7 (L) 8.8 - 10.0 mg/dL    Protein Total 5.3 (L) 5.8 - 7.6 gm/dL    Albumin Level 2.3 (L) 3.4 - 4.8 g/dL    Globulin 3.0 2.4 - 3.5 gm/dL    Albumin/Globulin Ratio 0.8 (L) 1.1 - 2.0 ratio    Bilirubin Total 0.3 <=1.5 mg/dL    Alkaline Phosphatase 96 40 - 150 unit/L    Alanine Aminotransferase 10 0 - 55 unit/L    Aspartate Aminotransferase 13 5 - 34 unit/L    eGFR >60 mls/min/1.73/m2   CBC with Differential    Collection Time: 04/12/24 12:05 AM   Result Value Ref Range    WBC 8.40 4.50 - 11.50 x10(3)/mcL    RBC 3.03 (L) 4.70 - 6.10 x10(6)/mcL    Hgb 7.8 (L) 14.0 - 18.0 g/dL    Hct 25.9 (L) 42.0 - 52.0 %    MCV 85.5 80.0 - 94.0 fL    MCH 25.7 (L) 27.0 - 31.0 pg    MCHC 30.1 (L) 33.0 - 36.0 g/dL    RDW 17.4 (H) 11.5 - 17.0 %    Platelet 387 130 - 400 x10(3)/mcL    MPV 8.7 7.4 - 10.4 fL    Neut % 44.7 %    Lymph % 19.5 %    Mono % 9.9 %    Eos % 23.8 %    Basophil % 1.7 %    Lymph # 1.64 0.6 - 4.6 x10(3)/mcL    Neut # 3.76 2.1 - 9.2 x10(3)/mcL    Mono # 0.83 0.1 - 1.3 x10(3)/mcL    Eos # 2.00 (H) 0 - 0.9 x10(3)/mcL    Baso # 0.14 <=0.2 x10(3)/mcL    IG# 0.03 0 - 0.04 x10(3)/mcL    IG% 0.4 %    NRBC% 0.0 %         Assessment/Plan:  1. Chronic sacral pressure wound with osteomyelitis  2. History of polymicrobial sacral wound culture - MRSA, Providencia, Pseudomonas, Enterococcus  3.  Chronic left lower extremity wound with history of polymicrobial wound culture-Pseudomonas, ESBL Proteus, Providencia  4. Multiple pressure wounds  5.  Diabetes type 2  6.  Anemia  7.  Protein calorie malnutrition      -Continue Vancomycin and Zosyn, plan end date of 04/26, following inflammatory markers  -Remains afebrile without leukocytosis  -CT L knee without contrast today 4/10 with no joint effusion,  nonfocal soft tissue swelling, severe bone demineralization, arthritis  -Seen by Ortho, inputs noted. S/P L knee aspiration on 3/26 with cultures pending, follow  -4/9 ESR 76 from 29 and CRP 72.10 from 39.20  -3/15 blood cultures negative  -2/8 left leg wound cultures with many Pseudomonas, Providencia, Proteus  -11/30/2023 sacral wound with many Providencia, MRSA, Enterococcus, Pseudomonas  -Seen by the surgery team with inputs noted including no plan for surgical intervention   -Podiatry on board s/p debridement 3/26, cultures revealed ESBL Proteus  -We will continue aggressive wound care per Podiatry and the wound care team  -Discussed with patient and nursing staff. Case management working on LTAC placement. Disposition per primary team

## 2024-04-13 NOTE — PROGRESS NOTES
Pharmacokinetic Assessment Follow Up: IV Vancomycin    Vancomycin serum concentration assessment(s):    The trough level was drawn correctly and can be used to guide therapy at this time. The measurement is within the desired definitive target range of 15 to 20 mcg/mL.    Vancomycin Regimen Plan:    Continue regimen to Vancomycin 1000 mg IV every 12 hours with next serum trough concentration measured at 1100 prior to   dose on 04/15.    Drug levels (last 3 results):  Recent Labs   Lab Result Units 04/13/24  1058   Vancomycin Trough ug/ml 18.8       Pharmacy will continue to follow and monitor vancomycin.    Please contact pharmacy at extension [phone] for questions regarding this assessment.    Thank you for the consult,   Elan Boston       Patient brief summary:  Shaheen Christie is a 69 y.o. male initiated on antimicrobial therapy with IV Vancomycin for treatment of bone/joint infection    The patient's current regimen is 1000mg q12h    Drug Allergies:   Review of patient's allergies indicates:  No Known Allergies    Actual Body Weight:   68kg    Renal Function:   Estimated Creatinine Clearance: 91.8 mL/min (A) (based on SCr of 0.71 mg/dL (L)).,     Dialysis Method (if applicable):  N/A    CBC (last 72 hours):  Recent Labs   Lab Result Units 04/12/24  0005   WBC x10(3)/mcL 8.40   Hgb g/dL 7.8*   Hct % 25.9*   Platelet x10(3)/mcL 387   Mono % % 9.9   Eos % % 23.8   Basophil % % 1.7       Metabolic Panel (last 72 hours):  Recent Labs   Lab Result Units 04/12/24  0005   Sodium Level mmol/L 135*   Potassium Level mmol/L 3.4*   Chloride mmol/L 104   Carbon Dioxide mmol/L 22*   Glucose Level mg/dL 101   Blood Urea Nitrogen mg/dL 10.5   Creatinine mg/dL 0.71*   Albumin Level g/dL 2.3*   Bilirubin Total mg/dL 0.3   Alkaline Phosphatase unit/L 96   Aspartate Aminotransferase unit/L 13   Alanine Aminotransferase unit/L 10       Vancomycin Administrations:  vancomycin given in the last 96 hours                     vancomycin in  dextrose 5 % 1 gram/250 mL IVPB 1,000 mg (mg) 1,000 mg New Bag 04/12/24 2321     1,000 mg New Bag  1436     1,000 mg New Bag  0059    vancomycin 1.75 g in 5 % dextrose 500 mL IVPB (mg) 1,750 mg New Bag 04/11/24 1229                    Microbiologic Results:  Microbiology Results (last 7 days)       Procedure Component Value Units Date/Time    Gram Stain [[id number]]     Order Status: Sent Specimen: Body Fluid from Joint Fluid, Left Knee     Body Fluid Culture [[id number]]     Order Status: Sent Specimen: Body Fluid from Joint Fluid, Left Knee

## 2024-04-13 NOTE — PROGRESS NOTES
[facility]                       [address] [address]    PATIENT NAME:       HAWA KUMAR  YOB: 1955  CSN:                [id number]   MRN:                [id number]  ADMIT DATE:         03/15/2024 03:09:00  PHYSICIAN:          Tom Nichole DPM                            PROGRESS NOTE    DATE:  04/12/2024 06:30:26    SUBJECTIVE:  The patient was seen today, doing well.  No major issues reported.    No fevers.  No chills.  Tolerating all treatments.  Still waiting on   authorization for placement.  There has been no noted updates as of yet.    PHYSICAL EXAMINATION:  VITAL SIGNS:  Stable and afebrile.  EXTREMITIES:  Dressings are intact.  Wounds remain stable, clean, viable.  No   stanley necrosis.  Still with some mild-to-moderate exudate, more so on the left   than the right.  Still with profound pain to the left knee with any sort of   manipulation.    ASSESSMENT:  Bilateral lower extremity wounds, currently stable.    PLAN:  Continue with current care.        ______________________________  Tom Nichole DPM    GAS/AQS  DD:  04/12/2024  Time:  05:49PM  DT:  04/12/2024  Time:  08:03PM  Job #:  [id number]      PROGRESS NOTE

## 2024-04-13 NOTE — PROGRESS NOTES
[facility] - 8th Floor [facility] Medicine  Progress Note    Patient Name: Shaheen Christie  MRN: [id number]  Patient Class: IP- Inpatient   Admission Date: 3/15/2024  Length of Stay: 29 days  Attending Physician: Enzo Thibodeaux MD  Primary Care Provider: Viktor Russ MD        Subjective:     Principal Problem:Osteomyelitis    Interval History: 69 year old male admitted from [facility] with sacral and bilateral lower extremity osteomyelitis. ID is seeing patient and cultures revealed pseudomonas so he is on Zosyn and Vancomycin at present.  He has had debridement of sacral wound during this hospital stay and has a wound vac in place at present.    Review of Systems   Constitutional: Negative.    HENT: Negative.     Eyes: Negative.    Respiratory: Negative.     Cardiovascular: Negative.    Gastrointestinal: Negative.    Genitourinary: Negative.    Musculoskeletal:         Legs weak. Some pain in left knee. 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   Skin:         Wounds on sacrum and both lower extremities.   Allergic/Immunologic: Negative.    Neurological:  Positive for weakness.        Neuropathy caused paraplegia. Bed bound.   Psychiatric/Behavioral: Negative.       Objective:     Vital Signs (Most Recent):  Temp: 98.1 °F (36.7 °C) (04/13/24 0729)  Pulse: 73 (04/13/24 0729)  Resp: 18 (04/13/24 0826)  BP: 118/68 (04/13/24 0729)  SpO2: (!) 94 % (04/13/24 0729) Vital Signs (24h Range):  Temp:  [98 °F (36.7 °C)-98.5 °F (36.9 °C)] 98.1 °F (36.7 °C)  Pulse:  [66-78] 73  Resp:  [17-18] 18  SpO2:  [92 %-94 %] 94 %  BP: (107-131)/(43-68) 118/68     Weight: 68 kg (150 lb)  Body mass index is 22.14 kg/m².    Intake/Output Summary (Last 24 hours) at 4/13/2024 0959  Last data filed at 4/13/2024 0600  Gross per 24 hour   Intake 1150 ml   Output 1220 ml   Net -70 ml      Physical Exam  Vitals reviewed.   Constitutional:       Appearance: Normal appearance.   HENT:      Head: Normocephalic and  atraumatic.      Right Ear: Tympanic membrane normal.      Left Ear: Tympanic membrane normal.      Nose: Nose normal.      Mouth/Throat:      Pharynx: Oropharynx is clear.   Eyes:      Extraocular Movements: Extraocular movements intact.      Pupils: Pupils are equal, round, and reactive to light.   Cardiovascular:      Rate and Rhythm: Normal rate and regular rhythm.      Heart sounds: Normal heart sounds.   Pulmonary:      Effort: Pulmonary effort is normal.      Breath sounds: Normal breath sounds.   Abdominal:      General: Abdomen is flat. Bowel sounds are normal.      Palpations: Abdomen is soft.   Musculoskeletal:      Cervical back: Normal range of motion and neck supple.      Comments: Paralysis of lower extremities.   Skin:     Comments: Sacral and bilateral lower ext wounds.   Neurological:      Mental Status: He is alert. Mental status is at baseline.   Psychiatric:         Mood and Affect: Mood normal.           Overview/Hospital Course: Sacral and bilateral lower ext osteomyelitis, neuropathy.    Significant Labs: All pertinent labs within the past 24 hours have been reviewed.  CBC:   Recent Labs   Lab 04/12/24  0005   WBC 8.40   HGB 7.8*   HCT 25.9*        CMP:   Recent Labs   Lab 04/12/24  0005   *   K 3.4*   CO2 22*   BUN 10.5   CREATININE 0.71*   CALCIUM 8.7*   ALBUMIN 2.3*   BILITOT 0.3   ALKPHOS 96   AST 13   ALT 10       Significant Imaging: reviewed.    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

## 2024-04-14 PROCEDURE — 63600175 PHARM REV CODE 636 W HCPCS: Performed by: PODIATRIST

## 2024-04-14 PROCEDURE — A4216 STERILE WATER/SALINE, 10 ML: HCPCS | Performed by: INTERNAL MEDICINE

## 2024-04-14 PROCEDURE — 25000003 PHARM REV CODE 250: Performed by: NURSE PRACTITIONER

## 2024-04-14 PROCEDURE — 25000003 PHARM REV CODE 250: Performed by: PODIATRIST

## 2024-04-14 PROCEDURE — 63600175 PHARM REV CODE 636 W HCPCS: Performed by: NURSE PRACTITIONER

## 2024-04-14 PROCEDURE — 63600175 PHARM REV CODE 636 W HCPCS: Performed by: INTERNAL MEDICINE

## 2024-04-14 PROCEDURE — 11000001 HC ACUTE MED/SURG PRIVATE ROOM

## 2024-04-14 PROCEDURE — 25000003 PHARM REV CODE 250: Performed by: INTERNAL MEDICINE

## 2024-04-14 RX ADMIN — THERA TABS 1 TABLET: TAB at 08:04

## 2024-04-14 RX ADMIN — SODIUM CHLORIDE, PRESERVATIVE FREE 10 ML: 5 INJECTION INTRAVENOUS at 03:04

## 2024-04-14 RX ADMIN — ASPIRIN 81 MG CHEWABLE TABLET 81 MG: 81 TABLET CHEWABLE at 08:04

## 2024-04-14 RX ADMIN — VANCOMYCIN HYDROCHLORIDE 1000 MG: 1 INJECTION, POWDER, LYOPHILIZED, FOR SOLUTION INTRAVENOUS at 11:04

## 2024-04-14 RX ADMIN — GABAPENTIN 300 MG: 300 CAPSULE ORAL at 08:04

## 2024-04-14 RX ADMIN — FERROUS SULFATE TAB 325 MG (65 MG ELEMENTAL FE) 1 EACH: 325 (65 FE) TAB at 08:04

## 2024-04-14 RX ADMIN — CILOSTAZOL 50 MG: 50 TABLET ORAL at 08:04

## 2024-04-14 RX ADMIN — GABAPENTIN 600 MG: 300 CAPSULE ORAL at 08:04

## 2024-04-14 RX ADMIN — OXYCODONE HYDROCHLORIDE 10 MG: 10 TABLET ORAL at 11:04

## 2024-04-14 RX ADMIN — SODIUM CHLORIDE, PRESERVATIVE FREE 10 ML: 5 INJECTION INTRAVENOUS at 11:04

## 2024-04-14 RX ADMIN — LOSARTAN POTASSIUM 100 MG: 50 TABLET, FILM COATED ORAL at 08:04

## 2024-04-14 RX ADMIN — DOCUSATE SODIUM 100 MG: 100 CAPSULE, LIQUID FILLED ORAL at 08:04

## 2024-04-14 RX ADMIN — SODIUM CHLORIDE, PRESERVATIVE FREE 10 ML: 5 INJECTION INTRAVENOUS at 05:04

## 2024-04-14 RX ADMIN — LIDOCAINE 5% 1 PATCH: 700 PATCH TOPICAL at 08:04

## 2024-04-14 RX ADMIN — PIPERACILLIN SODIUM AND TAZOBACTAM SODIUM 4.5 G: 4; .5 INJECTION, POWDER, LYOPHILIZED, FOR SOLUTION INTRAVENOUS at 01:04

## 2024-04-14 RX ADMIN — ENOXAPARIN SODIUM 40 MG: 40 INJECTION SUBCUTANEOUS at 03:04

## 2024-04-14 RX ADMIN — OXYCODONE HYDROCHLORIDE 10 MG: 10 TABLET ORAL at 03:04

## 2024-04-14 RX ADMIN — OXYCODONE HYDROCHLORIDE 10 MG: 10 TABLET ORAL at 07:04

## 2024-04-14 RX ADMIN — PIPERACILLIN SODIUM AND TAZOBACTAM SODIUM 4.5 G: 4; .5 INJECTION, POWDER, LYOPHILIZED, FOR SOLUTION INTRAVENOUS at 05:04

## 2024-04-14 RX ADMIN — AMLODIPINE BESYLATE 10 MG: 5 TABLET ORAL at 08:04

## 2024-04-14 RX ADMIN — CLOPIDOGREL BISULFATE 75 MG: 75 TABLET ORAL at 08:04

## 2024-04-14 RX ADMIN — DULOXETINE HYDROCHLORIDE 30 MG: 30 CAPSULE, DELAYED RELEASE ORAL at 08:04

## 2024-04-14 RX ADMIN — PIPERACILLIN SODIUM AND TAZOBACTAM SODIUM 4.5 G: 4; .5 INJECTION, POWDER, LYOPHILIZED, FOR SOLUTION INTRAVENOUS at 08:04

## 2024-04-14 RX ADMIN — GABAPENTIN 300 MG: 300 CAPSULE ORAL at 03:04

## 2024-04-14 NOTE — PROGRESS NOTES
[facility] - 8th Floor [facility] Medicine  Progress Note    Patient Name: Shaheen Christie  MRN: [id number]  Patient Class: IP- Inpatient   Admission Date: 3/15/2024  Length of Stay: 30 days  Attending Physician: Enzo Thibodeaux MD  Primary Care Provider: Viktor Russ MD        Subjective:     Principal Problem:Osteomyelitis    Interval History: 69 year old admitted from [facility] with bilateral lower extremity wounds and sacral wound  which have osteomyelitis and grew pseudomonas and proteus.  ID is seeing the patient and he is being tx with Zosyn and Vancomycin at present.  Sacral wound has been debrided during this hospital stay.  His only complaint today is left knee pain.  He states that he has spoken with Dr. Thibodeaux about amputation, as he cannot use legs.    Review of Systems   Constitutional: Negative.    HENT: Negative.     Eyes: Negative.    Respiratory: Negative.     Cardiovascular: Negative.    Gastrointestinal: Negative.    Endocrine: Negative.    Genitourinary: Negative.    Musculoskeletal:         Legs weak from neuropathy and left knee swollen and very painful.   Skin:         Sacral and bilateral lower extremity wounds.   Neurological:         Weakness in both legs.     Objective:     Vital Signs (Most Recent):  Temp: 98.4 °F (36.9 °C) (04/14/24 0523)  Pulse: 74 (04/14/24 0523)  Resp: 18 (04/14/24 0731)  BP: (!) 107/53 (04/14/24 0523)  SpO2: (!) 91 % (04/14/24 0523) Vital Signs (24h Range):  Temp:  [98.2 °F (36.8 °C)-99.3 °F (37.4 °C)] 98.4 °F (36.9 °C)  Pulse:  [74-80] 74  Resp:  [16-19] 18  SpO2:  [91 %-94 %] 91 %  BP: (105-137)/(53-65) 107/53     Weight: 68 kg (150 lb)  Body mass index is 22.14 kg/m².    Intake/Output Summary (Last 24 hours) at 4/14/2024 0741  Last data filed at 4/14/2024 0519  Gross per 24 hour   Intake 1890 ml   Output 1600 ml   Net 290 ml      Physical Exam  Vitals reviewed.   Constitutional:       Appearance: Normal  "appearance.   HENT:      Head: Normocephalic and atraumatic.      Right Ear: Tympanic membrane normal.      Left Ear: Tympanic membrane normal.      Nose: Nose normal.      Mouth/Throat:      Pharynx: Oropharynx is clear.   Eyes:      Extraocular Movements: Extraocular movements intact.      Pupils: Pupils are equal, round, and reactive to light.   Cardiovascular:      Rate and Rhythm: Normal rate and regular rhythm.      Heart sounds: Normal heart sounds.   Pulmonary:      Effort: Pulmonary effort is normal.      Breath sounds: Normal breath sounds.   Abdominal:      General: Abdomen is flat. Bowel sounds are normal.      Palpations: Abdomen is soft.   Musculoskeletal:      Cervical back: Normal range of motion.      Comments: Weakness of both lower extremities.   Skin:     Comments: Large sacral decubitus with wound vac in place, wounds both lower extremities.   Neurological:      Mental Status: He is alert. Mental status is at baseline.   Psychiatric:         Mood and Affect: Mood normal.           Overview/Hospital Course: neuropathy, sacral and bilateral lower extremity wounds with osteomyelitis.    Significant Labs: All pertinent labs within the past 24 hours have been reviewed.  CBC: No results for input(s): "WBC", "HGB", "HCT", "PLT" in the last 48 hours.  CMP: No results for input(s): "NA", "K", "CL", "CO2", "GLU", "BUN", "CREATININE", "CALCIUM", "PROT", "ALBUMIN", "BILITOT", "ALKPHOS", "AST", "ALT", "ANIONGAP", "EGFRNONAA" in the last 48 hours.    Invalid input(s): "ESTGF[place]"    Significant Imaging: reviewed.    Assessment/Plan:Continue antibiotics and wound care, repeat labs in AM.      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

## 2024-04-14 NOTE — PROGRESS NOTES
[facility]                       [address] [address]    PATIENT NAME:       HAWA KUMAR  YOB: 1955  CSN:                [id number]   MRN:                [id number]  ADMIT DATE:         03/15/2024 03:09:00  PHYSICIAN:          Tom Nichole DPM                            PROGRESS NOTE    DATE:  04/14/2024 00:00:00    SUBJECTIVE:  The patient is seen today, doing about the same.  Main complaint is   of his left knee.  He has been evaluated by Ortho, no options are available.    Patient again asking about amputation, which he would needle above knee.  He is   a non-ambulator.    PHYSICAL EXAMINATION:  VITAL SIGNS:  Stable and afebrile.    Dressings are intact, both extremity wounds.  The wounds themselves are clean,   still with some mild-to-moderate exudate.  No bone exposure.  No stanley necrosis.    Remainder of integument is intact.  Sacral wound not inspected.  Back is in   place.    ASSESSMENT:  Chronic sacral and lower extremity wounds with secondary infection   osteomyelitis.    PLAN:  Continue with current care.  Dressings changed.  The patient continues to   complain about his left knee.  Again was asking about amputation.  We will need   to see if General surgery will be inclined to proceed with an above knee on   this gentleman.        ______________________________  Tom Nichole DPM    GAS/AQS  DD:  04/14/2024  Time:  10:01AM  DT:  04/14/2024  Time:  11:41AM  Job #:  [id number]      PROGRESS NOTE

## 2024-04-15 LAB
ALBUMIN SERPL-MCNC: 2.4 G/DL (ref 3.4–4.8)
ALBUMIN/GLOB SERPL: 0.8 RATIO (ref 1.1–2)
ALP SERPL-CCNC: 101 UNIT/L (ref 40–150)
ALT SERPL-CCNC: 10 UNIT/L (ref 0–55)
AST SERPL-CCNC: 13 UNIT/L (ref 5–34)
BASOPHILS # BLD AUTO: 0.17 X10(3)/MCL
BASOPHILS NFR BLD AUTO: 2 %
BILIRUB SERPL-MCNC: 0.2 MG/DL
BUN SERPL-MCNC: 9.6 MG/DL (ref 8.4–25.7)
CALCIUM SERPL-MCNC: 8.9 MG/DL (ref 8.8–10)
CHLORIDE SERPL-SCNC: 105 MMOL/L (ref 98–107)
CO2 SERPL-SCNC: 26 MMOL/L (ref 23–31)
CREAT SERPL-MCNC: 0.64 MG/DL (ref 0.73–1.18)
EOSINOPHIL # BLD AUTO: 2.21 X10(3)/MCL (ref 0–0.9)
EOSINOPHIL NFR BLD AUTO: 25.9 %
ERYTHROCYTE [DISTWIDTH] IN BLOOD BY AUTOMATED COUNT: 17.5 % (ref 11.5–17)
GFR SERPLBLD CREATININE-BSD FMLA CKD-EPI: >60 MLS/MIN/1.73/M2
GLOBULIN SER-MCNC: 3.1 GM/DL (ref 2.4–3.5)
GLUCOSE SERPL-MCNC: 102 MG/DL (ref 82–115)
HCT VFR BLD AUTO: 26.7 % (ref 42–52)
HGB BLD-MCNC: 8.1 G/DL (ref 14–18)
IMM GRANULOCYTES # BLD AUTO: 0.04 X10(3)/MCL (ref 0–0.04)
IMM GRANULOCYTES NFR BLD AUTO: 0.5 %
LYMPHOCYTES # BLD AUTO: 1.51 X10(3)/MCL (ref 0.6–4.6)
LYMPHOCYTES NFR BLD AUTO: 17.7 %
MCH RBC QN AUTO: 26 PG (ref 27–31)
MCHC RBC AUTO-ENTMCNC: 30.3 G/DL (ref 33–36)
MCV RBC AUTO: 85.9 FL (ref 80–94)
MONOCYTES # BLD AUTO: 0.76 X10(3)/MCL (ref 0.1–1.3)
MONOCYTES NFR BLD AUTO: 8.9 %
NEUTROPHILS # BLD AUTO: 3.85 X10(3)/MCL (ref 2.1–9.2)
NEUTROPHILS NFR BLD AUTO: 45 %
NRBC BLD AUTO-RTO: 0 %
PLATELET # BLD AUTO: 442 X10(3)/MCL (ref 130–400)
PMV BLD AUTO: 8.5 FL (ref 7.4–10.4)
POTASSIUM SERPL-SCNC: 3.8 MMOL/L (ref 3.5–5.1)
PROT SERPL-MCNC: 5.5 GM/DL (ref 5.8–7.6)
RBC # BLD AUTO: 3.11 X10(6)/MCL (ref 4.7–6.1)
SODIUM SERPL-SCNC: 137 MMOL/L (ref 136–145)
VANCOMYCIN TROUGH SERPL-MCNC: 22.2 UG/ML (ref 15–20)
WBC # SPEC AUTO: 8.54 X10(3)/MCL (ref 4.5–11.5)

## 2024-04-15 PROCEDURE — 63600175 PHARM REV CODE 636 W HCPCS: Performed by: INTERNAL MEDICINE

## 2024-04-15 PROCEDURE — 80053 COMPREHEN METABOLIC PANEL: CPT | Performed by: FAMILY MEDICINE

## 2024-04-15 PROCEDURE — A4216 STERILE WATER/SALINE, 10 ML: HCPCS | Performed by: INTERNAL MEDICINE

## 2024-04-15 PROCEDURE — 25000003 PHARM REV CODE 250: Performed by: INTERNAL MEDICINE

## 2024-04-15 PROCEDURE — 25000003 PHARM REV CODE 250: Performed by: PODIATRIST

## 2024-04-15 PROCEDURE — 63600175 PHARM REV CODE 636 W HCPCS: Performed by: PODIATRIST

## 2024-04-15 PROCEDURE — 85025 COMPLETE CBC W/AUTO DIFF WBC: CPT | Performed by: FAMILY MEDICINE

## 2024-04-15 PROCEDURE — 11000001 HC ACUTE MED/SURG PRIVATE ROOM

## 2024-04-15 PROCEDURE — 80202 ASSAY OF VANCOMYCIN: CPT | Performed by: INTERNAL MEDICINE

## 2024-04-15 RX ADMIN — PIPERACILLIN SODIUM AND TAZOBACTAM SODIUM 4.5 G: 4; .5 INJECTION, POWDER, LYOPHILIZED, FOR SOLUTION INTRAVENOUS at 12:04

## 2024-04-15 RX ADMIN — CLOPIDOGREL BISULFATE 75 MG: 75 TABLET ORAL at 10:04

## 2024-04-15 RX ADMIN — PIPERACILLIN SODIUM AND TAZOBACTAM SODIUM 4.5 G: 4; .5 INJECTION, POWDER, LYOPHILIZED, FOR SOLUTION INTRAVENOUS at 08:04

## 2024-04-15 RX ADMIN — SODIUM CHLORIDE, PRESERVATIVE FREE 10 ML: 5 INJECTION INTRAVENOUS at 12:04

## 2024-04-15 RX ADMIN — OXYCODONE HYDROCHLORIDE 10 MG: 10 TABLET ORAL at 02:04

## 2024-04-15 RX ADMIN — CILOSTAZOL 50 MG: 50 TABLET ORAL at 08:04

## 2024-04-15 RX ADMIN — GABAPENTIN 300 MG: 300 CAPSULE ORAL at 02:04

## 2024-04-15 RX ADMIN — VANCOMYCIN HYDROCHLORIDE 750 MG: 750 INJECTION, POWDER, LYOPHILIZED, FOR SOLUTION INTRAVENOUS at 02:04

## 2024-04-15 RX ADMIN — OXYCODONE HYDROCHLORIDE 10 MG: 10 TABLET ORAL at 10:04

## 2024-04-15 RX ADMIN — THERA TABS 1 TABLET: TAB at 10:04

## 2024-04-15 RX ADMIN — LOSARTAN POTASSIUM 100 MG: 50 TABLET, FILM COATED ORAL at 10:04

## 2024-04-15 RX ADMIN — POLYETHYLENE GLYCOL 3350 17 G: 17 POWDER, FOR SOLUTION ORAL at 10:04

## 2024-04-15 RX ADMIN — AMLODIPINE BESYLATE 10 MG: 5 TABLET ORAL at 10:04

## 2024-04-15 RX ADMIN — DULOXETINE HYDROCHLORIDE 30 MG: 30 CAPSULE, DELAYED RELEASE ORAL at 10:04

## 2024-04-15 RX ADMIN — SODIUM CHLORIDE, PRESERVATIVE FREE 10 ML: 5 INJECTION INTRAVENOUS at 05:04

## 2024-04-15 RX ADMIN — ASPIRIN 81 MG CHEWABLE TABLET 81 MG: 81 TABLET CHEWABLE at 10:04

## 2024-04-15 RX ADMIN — GABAPENTIN 300 MG: 300 CAPSULE ORAL at 10:04

## 2024-04-15 RX ADMIN — FERROUS SULFATE TAB 325 MG (65 MG ELEMENTAL FE) 1 EACH: 325 (65 FE) TAB at 10:04

## 2024-04-15 RX ADMIN — LIDOCAINE 5% 1 PATCH: 700 PATCH TOPICAL at 10:04

## 2024-04-15 RX ADMIN — OXYCODONE HYDROCHLORIDE 10 MG: 10 TABLET ORAL at 06:04

## 2024-04-15 RX ADMIN — DOCUSATE SODIUM 100 MG: 100 CAPSULE, LIQUID FILLED ORAL at 08:04

## 2024-04-15 RX ADMIN — OXYCODONE HYDROCHLORIDE 10 MG: 10 TABLET ORAL at 07:04

## 2024-04-15 RX ADMIN — DOCUSATE SODIUM 100 MG: 100 CAPSULE, LIQUID FILLED ORAL at 10:04

## 2024-04-15 RX ADMIN — CILOSTAZOL 50 MG: 50 TABLET ORAL at 10:04

## 2024-04-15 RX ADMIN — ENOXAPARIN SODIUM 40 MG: 40 INJECTION SUBCUTANEOUS at 05:04

## 2024-04-15 RX ADMIN — OXYCODONE HYDROCHLORIDE 10 MG: 10 TABLET ORAL at 03:04

## 2024-04-15 RX ADMIN — GABAPENTIN 300 MG: 300 CAPSULE ORAL at 08:04

## 2024-04-15 RX ADMIN — PIPERACILLIN SODIUM AND TAZOBACTAM SODIUM 4.5 G: 4; .5 INJECTION, POWDER, LYOPHILIZED, FOR SOLUTION INTRAVENOUS at 05:04

## 2024-04-15 RX ADMIN — GABAPENTIN 600 MG: 300 CAPSULE ORAL at 08:04

## 2024-04-15 NOTE — PROGRESS NOTES
Pharmacokinetic Assessment Follow Up: IV Vancomycin    Vancomycin serum concentration assessment(s):    The trough level was drawn correctly and can be used to guide therapy at this time. The measurement is above the desired definitive target range of 15 to 20 mcg/mL.    Vancomycin Regimen Plan:  Patient was on Vancomycin 1000 mg IV every 12 hours  Change regimen to Vancomycin 750 mg IV every 12 hours with next serum trough concentration measured at 0200 prior to 4th dose on 04/17      Drug levels (last 3 results):  Recent Labs   Lab Result Units 04/13/24  1058 04/15/24  1045   Vancomycin Trough ug/ml 18.8 22.2*       Vancomycin Administrations:  vancomycin given in the last 96 hours                     vancomycin in dextrose 5 % 1 gram/250 mL IVPB 1,000 mg (mg) 1,000 mg New Bag 04/14/24 2325     1,000 mg New Bag  1119     1,000 mg New Bag 04/13/24 2343     1,000 mg New Bag  1259     1,000 mg New Bag 04/12/24 2321     1,000 mg New Bag  1436     1,000 mg New Bag  0059    vancomycin 1.75 g in 5 % dextrose 500 mL IVPB (mg) 1,750 mg New Bag 04/11/24 1229                    Pharmacy will continue to follow and monitor vancomycin.    Please contact pharmacy at extension [phone] for questions regarding this assessment.    Thank you for the consult,   Sudarshan Fuller       Patient brief summary:  Shaheen Christie is a 69 y.o. male initiated on antimicrobial therapy with IV Vancomycin for treatment of bone/joint infection    The patient's current regimen is 750 mg IV every 12 hours    Drug Allergies:   Review of patient's allergies indicates:  No Known Allergies    Actual Body Weight:  Wt Readings from Last 1 Encounters:   03/20/24 68 kg (150 lb)       Renal Function:   Estimated Creatinine Clearance: 101.8 mL/min (A) (based on SCr of 0.64 mg/dL (L)).,     Dialysis Method (if applicable):  N/A    CBC (last 72 hours):  Recent Labs   Lab Result Units 04/15/24  0446   WBC x10(3)/mcL 8.54   Hgb g/dL 8.1*   Hct % 26.7*   Platelet  x10(3)/mcL 442*   Mono % % 8.9   Eos % % 25.9   Basophil % % 2.0       Metabolic Panel (last 72 hours):  Recent Labs   Lab Result Units 04/15/24  0446   Sodium Level mmol/L 137   Potassium Level mmol/L 3.8   Chloride mmol/L 105   Carbon Dioxide mmol/L 26   Glucose Level mg/dL 102   Blood Urea Nitrogen mg/dL 9.6   Creatinine mg/dL 0.64*   Albumin Level g/dL 2.4*   Bilirubin Total mg/dL 0.2   Alkaline Phosphatase unit/L 101   Aspartate Aminotransferase unit/L 13   Alanine Aminotransferase unit/L 10       Microbiologic Results:  Microbiology Results (last 7 days)       Procedure Component Value Units Date/Time    Gram Stain [[id number]]     Order Status: Sent Specimen: Body Fluid from Joint Fluid, Left Knee     Body Fluid Culture [[id number]]     Order Status: Sent Specimen: Body Fluid from Joint Fluid, Left Knee

## 2024-04-15 NOTE — PROGRESS NOTES
Infectious Diseases Progress Note  69-year-old male, nursing home resident, with past medical history of HTN, COPD, DM type 2, with neuropathy, is admitted to [facility] on 03/15/2024 sent via EMS for sacral wound evaluation, apparently diagnosed with osteomyelitis involving the sacrum on 02/27/2024 with plan to be admitted to LTAC which had not been successful as an outpatient.  On presentation he was noted to have no fevers and no leukocytosis, anemic with low albumin.  Blood cultures have been negative.  Review of his records show a 02/08/2024 left leg wound culture with Pseudomonas, Providencia, ESBL Proteus and 11/30/2023 sacral wound culture with MRSA, Providencia, Enterococcus and Pseudomonas.  2/27 MRI of the pelvis shows osteomyelitis involving the sacrococcygeal bone as well as right trochanteric bursitis which may be septic.  He was seen by surgery team with no plan for surgical intervention.  He is on antibiotic coverage with vancomycin and Zosyn.     Subjective:  Lying in bed in no acute distress. Still reports L knee pain. Afebrile.     Past Medical History:   Diagnosis Date    COPD (chronic obstructive pulmonary disease)     Depression     Hypertension     Neuropathy      Past Surgical History:   Procedure Laterality Date    angiogram Bilateral     stents placed in left leg    anterior neck surgery      ARTHROTOMY OF ANKLE Left 6/28/2023    Procedure: ARTHROTOMY, ANKLE;  Surgeon: Tom Nichole DPM;  Location: [facility];  Service: Podiatry;  Laterality: Left;    cataracts Left     CHOLECYSTECTOMY      DEBRIDEMENT OF FOOT Bilateral 3/26/2024    Procedure: DEBRIDEMENT, FOOT;  Surgeon: Tom Nichole DPM;  Location: [facility] OR;  Service: Podiatry;  Laterality: Bilateral;    EYE SURGERY Left     HAND SURGERY Left     broken bone    herina repair      INCISION AND DRAINAGE, LOWER EXTREMITY Left 7/28/2023    Procedure: INCISION AND DRAINAGE, LOWER EXTREMITY;  Surgeon: Avinash  GENESIS Garces MD;  Location: [facility] OR;  Service: Orthopedics;  Laterality: Left;  Supine, vascular bed, bone foam  last case  cysto tubing, laparoscopic trocar, experience, vanc, hemovac drain    KNEE SURGERY Bilateral     PERIPHERAL ANGIOGRAPHY N/A 3/13/2024    Procedure: Peripheral angiography;  Surgeon: Deven Queen MD;  Location: [facility] CATH LAB;  Service: Cardiology;  Laterality: N/A;  MICHELLE ANGIO W/ ANEST.    posterior neck surgery      SPINE SURGERY      TOTAL REPLACEMENT OF BOTH HIP JOINTS USING COMPUTER-ASSISTED NAVIGATION Bilateral      Social History     Socioeconomic History    Marital status:    Occupational History    Occupation: retired   Tobacco Use    Smoking status: Former     Average packs/day: 0.5 packs/day for 15.0 years (7.5 ttl pk-yrs)     Types: Cigarettes     Start date: 2008    Smokeless tobacco: Never   Substance and Sexual Activity    Alcohol use: Yes     Alcohol/week: 3.0 - 6.0 standard drinks of alcohol     Types: 3 - 6 Cans of beer per week    Drug use: Yes     Types: Marijuana    Sexual activity: Not Currently       ROS  Constitutional:  Positive for malaise/fatigue.   HENT: Negative.     Respiratory: Negative.     Gastrointestinal: Negative.    Genitourinary: Negative.    Musculoskeletal: Positive for L knee pain   Skin: Multiple pressure wounds   Neurological:  Positive for weakness.   Endo/Heme/Allergies: Negative.    Psychiatric/Behavioral: Negative.     All other Systems review done and negative.    Review of patient's allergies indicates:  No Known Allergies      Scheduled Meds:  Current Facility-Administered Medications   Medication Dose Route Frequency Provider Last Rate Last Admin    acetaminophen tablet 650 mg  650 mg Oral Q6H PRN Tom Nichole DPM        albuterol inhaler 2 puff  2 puff Inhalation QID PRN Tom Nichole DPM        aluminum-magnesium hydroxide-simethicone 200-200-20 mg/5 mL suspension 15 mL  15 mL Oral Q6H PRN Tom Nichole DPM         amLODIPine tablet 10 mg  10 mg Oral Daily Tom Nichole DPM   10 mg at 04/15/24 1007    aspirin chewable tablet 81 mg  81 mg Oral Daily Tom Nichole DPM   81 mg at 04/15/24 1006    cilostazoL tablet 50 mg  50 mg Oral BID Tom Nichole DPM   50 mg at 04/15/24 1007    clopidogreL tablet 75 mg  75 mg Oral Daily Tom Nichole DPM   75 mg at 04/15/24 1007    docusate sodium capsule 100 mg  100 mg Oral BID Tom Nichole DPM   100 mg at 04/15/24 1006    DULoxetine DR capsule 30 mg  30 mg Oral Daily Tom Nichole DPM   30 mg at 04/15/24 1006    enoxaparin injection 40 mg  40 mg Subcutaneous Daily Tom Nichole DPM   40 mg at 04/15/24 1749    ferrous sulfate tablet 1 each  1 tablet Oral Daily Tom Nichole DPM   1 each at 04/15/24 1006    gabapentin capsule 300 mg  300 mg Oral TID Tom Nichole DPM   300 mg at 04/15/24 1451    gabapentin capsule 600 mg  600 mg Oral QHS Tom Nichole DPM   600 mg at 04/14/24 2041    HYDROcodone-acetaminophen 5-325 mg per tablet 1 tablet  1 tablet Oral Q4H PRN Tom Nichole DPM   1 tablet at 04/03/24 1614    LIDOcaine 5 % patch 1 patch  1 patch Transdermal Q24H Tom Nichole DPM   1 patch at 04/15/24 1007    losartan tablet 100 mg  100 mg Oral Daily Tom Nichole DPM   100 mg at 04/15/24 1007    melatonin tablet 6 mg  6 mg Oral Nightly PRN Tom Nichole DPM   6 mg at 03/26/24 2130    multivitamin tablet  1 tablet Oral Daily Tom Nichole DPM   1 tablet at 04/15/24 1007    oxyCODONE immediate release tablet Tab 10 mg  10 mg Oral Q4H Enzo Thibodeaux MD   10 mg at 04/15/24 1451    piperacillin-tazobactam (ZOSYN) 4.5 g in dextrose 5 % in water (D5W) 100 mL IVPB (MB+)  4.5 g Intravenous Q8H Enzo Thibodeaux MD   Stopped at 04/15/24 1636    polyethylene glycol packet 17 g  17 g Oral Daily Tom Nichole DPM   17 g at 04/15/24 1007    simethicone chewable tablet 80 mg  80 mg Oral Q6H PRN Tom Nichole DPM         sodium chloride 0.9% flush 10 mL  10 mL Intravenous Q6H Enzo Thibodeaux MD   10 mL at 04/15/24 1749    And    sodium chloride 0.9% flush 10 mL  10 mL Intravenous PRN Enzo Thibodeaux MD        vancomycin - pharmacy to dose   Intravenous pharmacy to manage frequency Sia Grant, FNP        vancomycin 750 mg in dextrose 5 % 250 mL IVPB (ready to mix)  750 mg Intravenous Q12H Enzo Thibodeaux MD   Stopped at 04/15/24 1554     Facility-Administered Medications Ordered in Other Encounters   Medication Dose Route Frequency Provider Last Rate Last Admin    diazePAM tablet 10 mg  10 mg Oral On Call Procedure Deven Queen MD   10 mg at 03/13/24 0618    diphenhydrAMINE capsule 50 mg  50 mg Oral On Call Procedure Deven Queen MD   50 mg at 03/13/24 0619    iopamidoL (ISOVUE-370) injection    PRN Deven Queen MD   40 mL at 03/13/24 0809     Continuous Infusions:  Current Facility-Administered Medications   Medication Dose Route Frequency Provider Last Rate Last Admin    acetaminophen tablet 650 mg  650 mg Oral Q6H PRN Tom Nichole DPM        albuterol inhaler 2 puff  2 puff Inhalation QID PRN Tom Nichole DPM        aluminum-magnesium hydroxide-simethicone 200-200-20 mg/5 mL suspension 15 mL  15 mL Oral Q6H PRN Tom Nichole DPM        amLODIPine tablet 10 mg  10 mg Oral Daily Tom Nichole DPM   10 mg at 04/15/24 1007    aspirin chewable tablet 81 mg  81 mg Oral Daily Tom Nichole DPM   81 mg at 04/15/24 1006    cilostazoL tablet 50 mg  50 mg Oral BID Tom Nichole DPM   50 mg at 04/15/24 1007    clopidogreL tablet 75 mg  75 mg Oral Daily Tom Nichole DPM   75 mg at 04/15/24 1007    docusate sodium capsule 100 mg  100 mg Oral BID Tom Nichole DPM   100 mg at 04/15/24 1006    DULoxetine DR capsule 30 mg  30 mg Oral Daily Tom Nichole DPM   30 mg at 04/15/24 1006    enoxaparin injection 40 mg  40 mg Subcutaneous Daily Tom Nichole, DPM   40 mg at 04/15/24  1749    ferrous sulfate tablet 1 each  1 tablet Oral Daily Tom Nichole DPM   1 each at 04/15/24 1006    gabapentin capsule 300 mg  300 mg Oral TID Tom Nichole DPM   300 mg at 04/15/24 1451    gabapentin capsule 600 mg  600 mg Oral QHS Tom Nichole DPM   600 mg at 04/14/24 2041    HYDROcodone-acetaminophen 5-325 mg per tablet 1 tablet  1 tablet Oral Q4H PRN Tom Nichole DPM   1 tablet at 04/03/24 1614    LIDOcaine 5 % patch 1 patch  1 patch Transdermal Q24H Tom Nichole DPM   1 patch at 04/15/24 1007    losartan tablet 100 mg  100 mg Oral Daily Tom Nichole DPM   100 mg at 04/15/24 1007    melatonin tablet 6 mg  6 mg Oral Nightly PRN Tom Nichole DPM   6 mg at 03/26/24 2130    multivitamin tablet  1 tablet Oral Daily Tom Nichole DPM   1 tablet at 04/15/24 1007    oxyCODONE immediate release tablet Tab 10 mg  10 mg Oral Q4H Enzo Thibodeaux MD   10 mg at 04/15/24 1451    piperacillin-tazobactam (ZOSYN) 4.5 g in dextrose 5 % in water (D5W) 100 mL IVPB (MB+)  4.5 g Intravenous Q8H Enzo Thibodeaux MD   Stopped at 04/15/24 1636    polyethylene glycol packet 17 g  17 g Oral Daily Tom Nichole DPM   17 g at 04/15/24 1007    simethicone chewable tablet 80 mg  80 mg Oral Q6H PRN Tom Nichole DPM        sodium chloride 0.9% flush 10 mL  10 mL Intravenous Q6H Enzo Thibodeaux MD   10 mL at 04/15/24 1749    And    sodium chloride 0.9% flush 10 mL  10 mL Intravenous PRN Enzo Thibodeaux MD        vancomycin - pharmacy to dose   Intravenous pharmacy to manage frequency Sia Grant, REYESP        vancomycin 750 mg in dextrose 5 % 250 mL IVPB (ready to mix)  750 mg Intravenous Q12H Enzo Thibodeaux MD   Stopped at 04/15/24 1554     Facility-Administered Medications Ordered in Other Encounters   Medication Dose Route Frequency Provider Last Rate Last Admin    diazePAM tablet 10 mg  10 mg Oral On Call Procedure Deven Queen MD   10 mg at 03/13/24 0618     diphenhydrAMINE capsule 50 mg  50 mg Oral On Call Procedure Deven Queen MD   50 mg at 03/13/24 0619    iopamidoL (ISOVUE-370) injection    PRN Deven Queen MD   40 mL at 03/13/24 0809     PRN Meds:  Current Facility-Administered Medications   Medication Dose Route Frequency Provider Last Rate Last Admin    acetaminophen tablet 650 mg  650 mg Oral Q6H PRN Tom Nichole DPM        albuterol inhaler 2 puff  2 puff Inhalation QID PRN Tom Nichole DPM        aluminum-magnesium hydroxide-simethicone 200-200-20 mg/5 mL suspension 15 mL  15 mL Oral Q6H PRN Tom Nichole DPM        amLODIPine tablet 10 mg  10 mg Oral Daily Tom Nichole DPM   10 mg at 04/15/24 1007    aspirin chewable tablet 81 mg  81 mg Oral Daily Tom Nichole DPM   81 mg at 04/15/24 1006    cilostazoL tablet 50 mg  50 mg Oral BID Tom Nichole DPM   50 mg at 04/15/24 1007    clopidogreL tablet 75 mg  75 mg Oral Daily Tom Nichole DPM   75 mg at 04/15/24 1007    docusate sodium capsule 100 mg  100 mg Oral BID Tom Nichole DPM   100 mg at 04/15/24 1006    DULoxetine DR capsule 30 mg  30 mg Oral Daily Tom Nichole DPM   30 mg at 04/15/24 1006    enoxaparin injection 40 mg  40 mg Subcutaneous Daily Tom Nichole DPM   40 mg at 04/15/24 1749    ferrous sulfate tablet 1 each  1 tablet Oral Daily Tom Nichole DPM   1 each at 04/15/24 1006    gabapentin capsule 300 mg  300 mg Oral TID Tom Nichole DPM   300 mg at 04/15/24 1451    gabapentin capsule 600 mg  600 mg Oral QHS Tom Nichole DPM   600 mg at 04/14/24 2041    HYDROcodone-acetaminophen 5-325 mg per tablet 1 tablet  1 tablet Oral Q4H PRN Tom Nichole DPM   1 tablet at 04/03/24 1614    LIDOcaine 5 % patch 1 patch  1 patch Transdermal Q24H Tom Nichole DPM   1 patch at 04/15/24 1007    losartan tablet 100 mg  100 mg Oral Daily Tom Nichole DPM   100 mg at 04/15/24 1007    melatonin tablet 6 mg  6 mg Oral Nightly PRN  "Tom Nichole DPM   6 mg at 03/26/24 2130    multivitamin tablet  1 tablet Oral Daily Tom Nichole DPM   1 tablet at 04/15/24 1007    oxyCODONE immediate release tablet Tab 10 mg  10 mg Oral Q4H Enzo Thibodeaux MD   10 mg at 04/15/24 1451    piperacillin-tazobactam (ZOSYN) 4.5 g in dextrose 5 % in water (D5W) 100 mL IVPB (MB+)  4.5 g Intravenous Q8H Enzo Thibodeaux MD   Stopped at 04/15/24 1636    polyethylene glycol packet 17 g  17 g Oral Daily Tom Nichole DPM   17 g at 04/15/24 1007    simethicone chewable tablet 80 mg  80 mg Oral Q6H PRN Tom Nichole DPM        sodium chloride 0.9% flush 10 mL  10 mL Intravenous Q6H Enzo Thibodeaux MD   10 mL at 04/15/24 1749    And    sodium chloride 0.9% flush 10 mL  10 mL Intravenous PRN Enzo Thibodeaux MD        vancomycin - pharmacy to dose   Intravenous pharmacy to manage frequency Sia Grant, FNP        vancomycin 750 mg in dextrose 5 % 250 mL IVPB (ready to mix)  750 mg Intravenous Q12H Enzo Thibodeaux MD   Stopped at 04/15/24 1554     Facility-Administered Medications Ordered in Other Encounters   Medication Dose Route Frequency Provider Last Rate Last Admin    diazePAM tablet 10 mg  10 mg Oral On Call Procedure Deven Queen MD   10 mg at 03/13/24 0618    diphenhydrAMINE capsule 50 mg  50 mg Oral On Call Procedure Deven Queen MD   50 mg at 03/13/24 0619    iopamidoL (ISOVUE-370) injection    PRN Deven Queen MD   40 mL at 03/13/24 0809       Objective:  BP (!) 148/65 (BP Location: Left arm, Patient Position: Lying)   Pulse (!) 59   Temp 98.4 °F (36.9 °C) (Oral)   Resp 20   Ht 5' 9.02" (1.753 m)   Wt 68 kg (150 lb)   SpO2 95%   BMI 22.14 kg/m²     Physical Exam:   Physical Exam  Vitals reviewed.   Constitutional:       General: He is not in acute distress.  HENT:      Head: Normocephalic and atraumatic.   Cardiovascular:      Rate and Rhythm: Normal rate and regular rhythm.      Heart sounds: Normal heart sounds. "   Pulmonary:      Effort: Pulmonary effort is normal. No respiratory distress.      Breath sounds: Normal breath sounds.   Abdominal:      General: Bowel sounds are normal. There is no distension.      Palpations: Abdomen is soft.      Tenderness: There is no abdominal tenderness.   Musculoskeletal:         General: No deformity.      Cervical back: Neck supple.   Skin:     Findings: No erythema or rash.      Comments: Wound vac to sacrum  Neurological:      Mental Status: He is alert and oriented to person, place, and time.   Psychiatric:      Comments: Calm and cooperative     Imaging  Imaging Results    None          Lab Review   Recent Results (from the past 24 hour(s))   Comprehensive Metabolic Panel    Collection Time: 04/15/24  4:46 AM   Result Value Ref Range    Sodium Level 137 136 - 145 mmol/L    Potassium Level 3.8 3.5 - 5.1 mmol/L    Chloride 105 98 - 107 mmol/L    Carbon Dioxide 26 23 - 31 mmol/L    Glucose Level 102 82 - 115 mg/dL    Blood Urea Nitrogen 9.6 8.4 - 25.7 mg/dL    Creatinine 0.64 (L) 0.73 - 1.18 mg/dL    Calcium Level Total 8.9 8.8 - 10.0 mg/dL    Protein Total 5.5 (L) 5.8 - 7.6 gm/dL    Albumin Level 2.4 (L) 3.4 - 4.8 g/dL    Globulin 3.1 2.4 - 3.5 gm/dL    Albumin/Globulin Ratio 0.8 (L) 1.1 - 2.0 ratio    Bilirubin Total 0.2 <=1.5 mg/dL    Alkaline Phosphatase 101 40 - 150 unit/L    Alanine Aminotransferase 10 0 - 55 unit/L    Aspartate Aminotransferase 13 5 - 34 unit/L    eGFR >60 mls/min/1.73/m2   CBC with Differential    Collection Time: 04/15/24  4:46 AM   Result Value Ref Range    WBC 8.54 4.50 - 11.50 x10(3)/mcL    RBC 3.11 (L) 4.70 - 6.10 x10(6)/mcL    Hgb 8.1 (L) 14.0 - 18.0 g/dL    Hct 26.7 (L) 42.0 - 52.0 %    MCV 85.9 80.0 - 94.0 fL    MCH 26.0 (L) 27.0 - 31.0 pg    MCHC 30.3 (L) 33.0 - 36.0 g/dL    RDW 17.5 (H) 11.5 - 17.0 %    Platelet 442 (H) 130 - 400 x10(3)/mcL    MPV 8.5 7.4 - 10.4 fL    Neut % 45.0 %    Lymph % 17.7 %    Mono % 8.9 %    Eos % 25.9 %    Basophil % 2.0 %     Lymph # 1.51 0.6 - 4.6 x10(3)/mcL    Neut # 3.85 2.1 - 9.2 x10(3)/mcL    Mono # 0.76 0.1 - 1.3 x10(3)/mcL    Eos # 2.21 (H) 0 - 0.9 x10(3)/mcL    Baso # 0.17 <=0.2 x10(3)/mcL    IG# 0.04 0 - 0.04 x10(3)/mcL    IG% 0.5 %    NRBC% 0.0 %   VANCOMYCIN, TROUGH    Collection Time: 04/15/24 10:45 AM   Result Value Ref Range    Vancomycin Trough 22.2 (H) 15.0 - 20.0 ug/ml         Assessment/Plan:  1. Chronic sacral pressure wound with osteomyelitis  2. History of polymicrobial sacral wound culture - MRSA, Providencia, Pseudomonas, Enterococcus  3.  Chronic left lower extremity wound with history of polymicrobial wound culture-Pseudomonas, ESBL Proteus, Providencia  4. Multiple pressure wounds  5.  Diabetes type 2  6.  Anemia  7.  Protein calorie malnutrition      -Continue Vancomycin and Zosyn, plan end date of 04/26, following inflammatory markers  -No fevers and no leukocytosis  -CT L knee without contrast today 4/10 with no joint effusion, nonfocal soft tissue swelling, severe bone demineralization, arthritis  -Seen by Ortho, inputs noted. S/P L knee aspiration on 3/26   -3/26 left heel tissue cultures with few ESBL Proteus mirabilis  -4/9 ESR 76 from 29 and CRP 72.10 from 39.20  -3/15 blood cultures negative  -2/8 left leg wound cultures with many Pseudomonas, Providencia, Proteus  -11/30/2023 sacral wound with many Providencia, MRSA, Enterococcus, Pseudomonas  -Seen by the surgery team with inputs noted including no plan for surgical intervention   -Podiatry on board s/p debridement 3/26, cultures revealed ESBL Proteus  -We will continue aggressive wound care per Podiatry and the wound care team  -Discussed with patient and nursing staff. Case management working on LTAC placement. Disposition per primary team

## 2024-04-15 NOTE — PLAN OF CARE
CM updated patient on second appeal denial for LTAC placement. Skilled nursing choice list has been given to patient and explained. He informed CM his sister is currently touring facilities and will be by today to see patient. List was left with the patient.

## 2024-04-15 NOTE — PROGRESS NOTES
[facility] - 8th Floor Med Surg  Wound Care    Patient Name:  Shaheen Christie   MRN:  [id number]  Date: 4/15/2024  Diagnosis: Osteomyelitis    History:     Past Medical History:   Diagnosis Date    COPD (chronic obstructive pulmonary disease)     Depression     Hypertension     Neuropathy        Social History     Socioeconomic History    Marital status:    Occupational History    Occupation: retired   Tobacco Use    Smoking status: Former     Average packs/day: 0.5 packs/day for 15.0 years (7.5 ttl pk-yrs)     Types: Cigarettes     Start date: 2008    Smokeless tobacco: Never   Substance and Sexual Activity    Alcohol use: Yes     Alcohol/week: 3.0 - 6.0 standard drinks of alcohol     Types: 3 - 6 Cans of beer per week    Drug use: Yes     Types: Marijuana    Sexual activity: Not Currently       Precautions:     Allergies as of 03/15/2024    (No Known Allergies)       [facility] Assessment Details/Treatment     Wound care follow up for wound vac dressing change. Patient in severe pain and refusing. Discussed with nurse Burnett who states patient has been refusing everything today. Wound vac intact with suction at 125 mm/hg will follow up tomorrow for dressing change.     04/15/2024

## 2024-04-15 NOTE — PROGRESS NOTES
[facility] - 8th Floor [facility] Medicine  Progress Note    Patient Name: Shaheen Christie  MRN: [id number]  Patient Class: IP- Inpatient   Admission Date: 3/15/2024  Length of Stay: 31 days  Attending Physician: Enzo Thibodeaux MD  Primary Care Provider: Viktor Russ MD        Subjective:     Principal Problem:Osteomyelitis    Interval History:  Patient was doing well over the weekend.  No major issues seen.  Awaiting LTAC placement.  Patient clinically stable.    Review of Systems   All other systems reviewed and are negative.    Objective:     Vital Signs (Most Recent):  Temp: 98.4 °F (36.9 °C) (04/15/24 1534)  Pulse: (!) 59 (04/15/24 1534)  Resp: 20 (04/15/24 1534)  BP: (!) 148/65 (04/15/24 1534)  SpO2: 95 % (04/15/24 1534) Vital Signs (24h Range):  Temp:  [97.8 °F (36.6 °C)-99.5 °F (37.5 °C)] 98.4 °F (36.9 °C)  Pulse:  [59-73] 59  Resp:  [16-20] 20  SpO2:  [90 %-96 %] 95 %  BP: (105-148)/(57-70) 148/65     Weight: 68 kg (150 lb)  Body mass index is 22.14 kg/m².    Intake/Output Summary (Last 24 hours) at 4/15/2024 1828  Last data filed at 4/15/2024 0700  Gross per 24 hour   Intake 600 ml   Output 1100 ml   Net -500 ml      Physical Exam  HENT:      Head: Normocephalic and atraumatic.      Right Ear: External ear normal.      Left Ear: External ear normal.      Nose: Nose normal.   Eyes:      Extraocular Movements: Extraocular movements intact.   Cardiovascular:      Rate and Rhythm: Normal rate and regular rhythm.      Pulses: Normal pulses.      Heart sounds: Normal heart sounds.   Pulmonary:      Effort: Pulmonary effort is normal.      Breath sounds: Normal breath sounds.   Abdominal:      General: Abdomen is flat. Bowel sounds are normal.      Palpations: Abdomen is soft.   Musculoskeletal:      Cervical back: Neck supple.   Skin:     General: Skin is warm and dry.   Neurological:      General: No focal deficit present.      Mental Status: He is alert and oriented to  person, place, and time. Mental status is at baseline.           Overview/Hospital Course:  Patient's laboratory exams are stable.  Still anemic secondary to his osteomyelitis he has reactive thrombocytosis which is expected.  Appetite is good.  Continues to respond to antibiotics therapy as expected.  I expect him to go to LTAC where he needs to be.    Significant Labs: All pertinent labs within the past 24 hours have been reviewed.  BMP:   Recent Labs   Lab 04/15/24  0446      K 3.8   CO2 26   BUN 9.6   CREATININE 0.64*   CALCIUM 8.9     CBC:   Recent Labs   Lab 04/15/24  0446   WBC 8.54   HGB 8.1*   HCT 26.7*   *     CMP:   Recent Labs   Lab 04/15/24  0446      K 3.8   CO2 26   BUN 9.6   CREATININE 0.64*   CALCIUM 8.9   ALBUMIN 2.4*   BILITOT 0.2   ALKPHOS 101   AST 13   ALT 10       Significant Imaging: I have reviewed all pertinent imaging results/findings within the past 24 hours.    Assessment/Plan:      Active Diagnoses:    Diagnosis Date Noted POA    PRINCIPAL PROBLEM:  Osteomyelitis [M86.9] 03/15/2024 Yes    Knee effusion, left [M25.462] 03/31/2024 No    Pressure injury of sacral region, stage 4 [L89.154] 03/19/2024 Yes    Skin ulcer of right lower leg [L97.919] 03/19/2024 Yes    Skin ulcer of left lower leg [L97.929] 03/19/2024 Yes    Unstageable pressure ulcer of right heel [L89.610] 03/19/2024 Yes    Unstageable pressure ulcer of left heel [L89.620] 03/19/2024 Yes      Problems Resolved During this Admission:     VTE Risk Mitigation (From admission, onward)           Ordered     enoxaparin injection 40 mg  Daily         03/15/24 0934                       Enzo Thibodeaux MD  Department of Hospital Medicine   [facility] - 8th Floor Med Surg

## 2024-04-15 NOTE — PLAN OF CARE
Problem: Adult Inpatient Plan of Care  Goal: Plan of Care Review  Outcome: Ongoing, Progressing  Flowsheets (Taken 4/15/2024 1052)  Plan of Care Reviewed With: patient  Goal: Patient-Specific Goal (Individualized)  Outcome: Ongoing, Progressing  Goal: Absence of Hospital-Acquired Illness or Injury  Outcome: Ongoing, Progressing  Intervention: Identify and Manage Fall Risk  Flowsheets (Taken 4/15/2024 1052)  Safety Promotion/Fall Prevention:   assistive device/personal item within reach   medications reviewed   nonskid shoes/socks when out of bed   side rails raised x 2  Intervention: Prevent Skin Injury  Flowsheets (Taken 4/15/2024 1052)  Body Position: position changed independently  Skin Protection:   adhesive use limited   incontinence pads utilized   tubing/devices free from skin contact  Intervention: Prevent and Manage VTE (Venous Thromboembolism) Risk  Flowsheets (Taken 4/15/2024 1052)  Activity Management: Ambulated to bathroom - L4  VTE Prevention/Management:   ambulation promoted   bleeding risk assessed   ROM (active) performed  Range of Motion:   active ROM (range of motion) encouraged   ROM (range of motion) performed  Intervention: Prevent Infection  Flowsheets (Taken 4/15/2024 1052)  Infection Prevention:   rest/sleep promoted   single patient room provided  Goal: Optimal Comfort and Wellbeing  Outcome: Ongoing, Progressing  Intervention: Monitor Pain and Promote Comfort  Flowsheets (Taken 4/15/2024 1052)  Pain Management Interventions:   care clustered   medication offered   pain management plan reviewed with patient/caregiver   pillow support provided   position adjusted  Intervention: Provide Person-Centered Care  Flowsheets (Taken 4/15/2024 1052)  Trust Relationship/Rapport: care explained  Goal: Readiness for Transition of Care  Outcome: Ongoing, Progressing     Problem: Impaired Wound Healing  Goal: Optimal Wound Healing  Outcome: Ongoing, Progressing  Intervention: Promote Wound  Healing  Flowsheets (Taken 4/15/2024 1052)  Oral Nutrition Promotion: rest periods promoted  Sleep/Rest Enhancement: regular sleep/rest pattern promoted  Activity Management: Ambulated to bathroom -   Pain Management Interventions:   care clustered   medication offered   pain management plan reviewed with patient/caregiver   pillow support provided   position adjusted     Problem: Skin Injury Risk Increased  Goal: Skin Health and Integrity  Outcome: Ongoing, Progressing  Intervention: Optimize Skin Protection  Flowsheets (Taken 4/15/2024 1052)  Pressure Reduction Devices: foam padding utilized  Skin Protection:   adhesive use limited   incontinence pads utilized   tubing/devices free from skin contact  Head of Bed (HOB) Positioning: HOB at 30-45 degrees  Intervention: Promote and Optimize Oral Intake  Flowsheets (Taken 4/15/2024 1052)  Oral Nutrition Promotion: rest periods promoted     Problem: Fall Injury Risk  Goal: Absence of Fall and Fall-Related Injury  Outcome: Ongoing, Progressing  Intervention: Identify and Manage Contributors  Flowsheets (Taken 4/15/2024 1052)  Self-Care Promotion: independence encouraged  Medication Review/Management: medications reviewed  Intervention: Promote Injury-Free Environment  Flowsheets (Taken 4/15/2024 1052)  Safety Promotion/Fall Prevention:   assistive device/personal item within reach   medications reviewed   nonskid shoes/socks when out of bed   side rails raised x 2     Problem: Infection  Goal: Absence of Infection Signs and Symptoms  Outcome: Ongoing, Progressing  Intervention: Prevent or Manage Infection  Flowsheets (Taken 4/15/2024 1052)  Infection Management: aseptic technique maintained  Isolation Precautions: precautions maintained      Attending with

## 2024-04-16 PROCEDURE — 99233 SBSQ HOSP IP/OBS HIGH 50: CPT | Mod: ,,,

## 2024-04-16 PROCEDURE — 25000003 PHARM REV CODE 250: Performed by: PODIATRIST

## 2024-04-16 PROCEDURE — 63600175 PHARM REV CODE 636 W HCPCS: Performed by: INTERNAL MEDICINE

## 2024-04-16 PROCEDURE — A4216 STERILE WATER/SALINE, 10 ML: HCPCS | Performed by: INTERNAL MEDICINE

## 2024-04-16 PROCEDURE — 25000003 PHARM REV CODE 250: Performed by: INTERNAL MEDICINE

## 2024-04-16 PROCEDURE — 11000001 HC ACUTE MED/SURG PRIVATE ROOM

## 2024-04-16 PROCEDURE — 97606 NEG PRS WND THER DME>50 SQCM: CPT

## 2024-04-16 PROCEDURE — 63600175 PHARM REV CODE 636 W HCPCS: Performed by: PODIATRIST

## 2024-04-16 RX ADMIN — THERA TABS 1 TABLET: TAB at 08:04

## 2024-04-16 RX ADMIN — ASPIRIN 81 MG CHEWABLE TABLET 81 MG: 81 TABLET CHEWABLE at 08:04

## 2024-04-16 RX ADMIN — FERROUS SULFATE TAB 325 MG (65 MG ELEMENTAL FE) 1 EACH: 325 (65 FE) TAB at 08:04

## 2024-04-16 RX ADMIN — SODIUM CHLORIDE, PRESERVATIVE FREE 10 ML: 5 INJECTION INTRAVENOUS at 05:04

## 2024-04-16 RX ADMIN — GABAPENTIN 600 MG: 300 CAPSULE ORAL at 08:04

## 2024-04-16 RX ADMIN — LIDOCAINE 5% 1 PATCH: 700 PATCH TOPICAL at 10:04

## 2024-04-16 RX ADMIN — GABAPENTIN 300 MG: 300 CAPSULE ORAL at 04:04

## 2024-04-16 RX ADMIN — VANCOMYCIN HYDROCHLORIDE 750 MG: 750 INJECTION, POWDER, LYOPHILIZED, FOR SOLUTION INTRAVENOUS at 04:04

## 2024-04-16 RX ADMIN — OXYCODONE HYDROCHLORIDE 10 MG: 10 TABLET ORAL at 03:04

## 2024-04-16 RX ADMIN — LOSARTAN POTASSIUM 100 MG: 50 TABLET, FILM COATED ORAL at 08:04

## 2024-04-16 RX ADMIN — AMLODIPINE BESYLATE 10 MG: 5 TABLET ORAL at 08:04

## 2024-04-16 RX ADMIN — CLOPIDOGREL BISULFATE 75 MG: 75 TABLET ORAL at 08:04

## 2024-04-16 RX ADMIN — DULOXETINE HYDROCHLORIDE 30 MG: 30 CAPSULE, DELAYED RELEASE ORAL at 08:04

## 2024-04-16 RX ADMIN — GABAPENTIN 300 MG: 300 CAPSULE ORAL at 08:04

## 2024-04-16 RX ADMIN — CILOSTAZOL 50 MG: 50 TABLET ORAL at 08:04

## 2024-04-16 RX ADMIN — VANCOMYCIN HYDROCHLORIDE 750 MG: 750 INJECTION, POWDER, LYOPHILIZED, FOR SOLUTION INTRAVENOUS at 03:04

## 2024-04-16 RX ADMIN — OXYCODONE HYDROCHLORIDE 10 MG: 10 TABLET ORAL at 04:04

## 2024-04-16 RX ADMIN — OXYCODONE HYDROCHLORIDE 10 MG: 10 TABLET ORAL at 08:04

## 2024-04-16 RX ADMIN — SODIUM CHLORIDE, PRESERVATIVE FREE 10 ML: 5 INJECTION INTRAVENOUS at 04:04

## 2024-04-16 RX ADMIN — PIPERACILLIN SODIUM AND TAZOBACTAM SODIUM 4.5 G: 4; .5 INJECTION, POWDER, LYOPHILIZED, FOR SOLUTION INTRAVENOUS at 04:04

## 2024-04-16 RX ADMIN — SODIUM CHLORIDE, PRESERVATIVE FREE 10 ML: 5 INJECTION INTRAVENOUS at 12:04

## 2024-04-16 RX ADMIN — DOCUSATE SODIUM 100 MG: 100 CAPSULE, LIQUID FILLED ORAL at 08:04

## 2024-04-16 RX ADMIN — PIPERACILLIN SODIUM AND TAZOBACTAM SODIUM 4.5 G: 4; .5 INJECTION, POWDER, LYOPHILIZED, FOR SOLUTION INTRAVENOUS at 01:04

## 2024-04-16 RX ADMIN — OXYCODONE HYDROCHLORIDE 10 MG: 10 TABLET ORAL at 11:04

## 2024-04-16 RX ADMIN — ENOXAPARIN SODIUM 40 MG: 40 INJECTION SUBCUTANEOUS at 04:04

## 2024-04-16 RX ADMIN — PIPERACILLIN SODIUM AND TAZOBACTAM SODIUM 4.5 G: 4; .5 INJECTION, POWDER, LYOPHILIZED, FOR SOLUTION INTRAVENOUS at 08:04

## 2024-04-16 NOTE — PLAN OF CARE
Met with patient and sister Andreea. Other choices added to [facility] 1) [facility] 2) [facility] notified.

## 2024-04-16 NOTE — PROGRESS NOTES
Inpatient Nutrition Assessment    Admit Date: 3/15/2024   Total duration of encounter: 32 days   Patient Age: 69 y.o.    Nutrition Recommendation/Prescription     -Continue Heart Healthy Diet as tolerated.    -Encourage Pierre BID for wound healing.   -Continue MVI with minerals as medically feasible.   -Monitor wt, labs, and intake. Obtain and document a more recent measured weight.     Communication of Recommendations: reviewed with nurse    Nutrition Assessment     Malnutrition Assessment/Nutrition-Focused Physical Exam    Malnutrition Context: chronic illness (03/20/24 1317)  Malnutrition Level: severe (03/20/24 1317)  Energy Intake (Malnutrition): less than 75% for greater than or equal to 3 months (03/20/24 1317)  Weight Loss (Malnutrition): greater than 7.5% in 3 months (03/20/24 1317)  Subcutaneous Fat (Malnutrition): severe depletion (03/20/24 1317)  Orbital Region (Subcutaneous Fat Loss): severe depletion  Upper Arm Region (Subcutaneous Fat Loss): severe depletion     Muscle Mass (Malnutrition): severe depletion (03/20/24 1317)  [place] Region (Muscle Loss): severe depletion                       Fluid Accumulation (Malnutrition): other (see comments) (does not meet criteria) (03/20/24 1317)  Hand  Strength, Left (Malnutrition): unable to evaluate (03/20/24 1317)  Hand  Strength, Right (Malnutrition): unable to evaluate (03/20/24 1317)  A minimum of two characteristics is recommended for diagnosis of either severe or non-severe malnutrition.    Chart Review    Reason Seen: continuous nutrition monitoring and follow-up    Malnutrition Screening Tool Results   Have you recently lost weight without trying?: No  Have you been eating poorly because of a decreased appetite?: No   MST Score: 0   Diagnosis:  Stage 4 pressure ulcer to sacrum  Unstageable pressure ulcers to bilateral heels  Open wound/ulcers to right lateral lower leg and left lateral lower leg  Peripheral arterial disease - with history of  vascular intervention with Dr. Queen (most recent angio 3/13/24)  PVD  History of polymicrobial sacral wound culture  History of polymicrobial wound culture to leg wound  DM2  Anemia  History of smoking  Low prealbumin     Relevant Medical History: HTN, COPD, DM2 with neuropathy, PAD, chronic venous hypertension     Scheduled Medications:  amLODIPine, 10 mg, Daily  aspirin, 81 mg, Daily  cilostazoL, 50 mg, BID  clopidogreL, 75 mg, Daily  docusate sodium, 100 mg, BID  DULoxetine, 30 mg, Daily  enoxaparin, 40 mg, Daily  ferrous sulfate, 1 tablet, Daily  gabapentin, 300 mg, TID  gabapentin, 600 mg, QHS  LIDOcaine, 1 patch, Q24H  losartan, 100 mg, Daily  multivitamin, 1 tablet, Daily  oxyCODONE, 10 mg, Q4H  piperacillin-tazobactam (Zosyn) IV (PEDS and ADULTS) (extended infusion is not appropriate), 4.5 g, Q8H  polyethylene glycol, 17 g, Daily  sodium chloride 0.9%, 10 mL, Q6H  vancomycin (VANCOCIN) IV (PEDS and ADULTS), 750 mg, Q12H    Continuous Infusions:   PRN Medications:   Review      Calorie Containing IV Medications: no significant kcals from medications at this time    Recent Labs   Lab 04/10/24  0504 04/11/24  1219 04/12/24  0005 04/15/24  0446     --  135* 137   K 3.4*  --  3.4* 3.8   CALCIUM 8.7*  --  8.7* 8.9   CHLORIDE 104  --  104 105   CO2 25  --  22* 26   BUN 8.6  --  10.5 9.6   CREATININE 0.65*  --  0.71* 0.64*   EGFRNORACEVR >60  --  >60 >60   GLUCOSE 111  --  101 102   BILITOT 0.3  --  0.3 0.2   ALKPHOS 92  --  96 101   ALT 9  --  10 10   AST 11  --  13 13   ALBUMIN 2.3*  --  2.3* 2.4*   PREALB  --  10.5*  --   --    WBC  --   --  8.40 8.54   HGB  --   --  7.8* 8.1*   HCT  --   --  25.9* 26.7*     Nutrition Orders:  Diet Heart Healthy      Appetite/Oral Intake: good/% of meals  Factors Affecting Nutritional Intake: none identified  Food/Buddhism/Cultural Preferences: none reported  Food Allergies: no known food allergies  Last Bowel Movement: 04/15/24  Wound(s):     Altered Skin Integrity  "24 1351 Sacral spine #4-Tissue loss description: Full thickness       Altered Skin Integrity 24 0800 Left anterior;lower Leg Arterial Ulcer-Tissue loss description: Full thickness heel ulcers, sacrum pressure ulcers    Comments    3/20: Pt reports current good intake/appetite but intake the past few months has been poor 2/2 disliking food from NH. Pt reports usual wt of ~170 lbs. Pt reports he would like oral supplements.     3/22: Pt reports good appetite/intake of meals, denies n/v; drinking some of the oral supplement but not taking the Pierre.     3/26: Pt NPO;  plans to go to OR today.     3/28: Pt eating well; denies n/v; states that he is not drinking oral supplement 2/2 being too full after he eats his meals; will d/c per his request. Pt is receptive to trying to drink the Pierre for wound healing.     : Pt continues with good appetite/intake; tolerating diet. Pt reports that he likes the Pierre but is not taking it regularly- encouraged to take for wound healing and pt was receptive.     : Pt reports good appetite and PO intake of meals. Not drinking much Pierre, large supply in his room noted. Pt denies GI complaints.     : Pt sleeping during rounds; eating very well per RN.     : Pt reports good appetite/intake; denies n/v; taking the Pierre.     : Pt still eating 100%.    Anthropometrics    Height: 5' 9.02" (175.3 cm), Height Method: Stated  Last Weight: 68 kg (150 lb) (24 1315), Weight Method: Standard Scale  BMI (Calculated): 22.1  BMI Classification: normal (BMI 18.5-24.9)        Ideal Body Weight (IBW), Male: 160.12 lb     % Ideal Body Weight, Male (lb): 93.68 %                 Usual Body Weight (UBW), k.27 kg  % Usual Body Weight: 88.24  % Weight Change From Usual Weight: -11.95 %  Usual Weight Provided By: patient    Wt Readings from Last 5 Encounters:   24 68 kg (150 lb)   24 68.5 kg (151 lb)   24 63.5 kg (140 lb)   23 72.6 kg (160 lb) "   06/22/23 77.1 kg (170 lb)     Weight Change(s) Since Admission:   Wt Readings from Last 1 Encounters:   03/20/24 1315 68 kg (150 lb)   03/15/24 1621 68 kg (150 lb)   03/15/24 0307 68 kg (150 lb)   Admit Weight: 68 kg (150 lb) (03/15/24 0307), Weight Method: Stated  3/28-4/12: no new wt noted    Estimated Needs    Weight Used For Calorie Calculations: 68 kg (149 lb 14.6 oz)  Energy Calorie Requirements (kcal): 4917-3248 kcal (30-35 kcal/kg)  Energy Need Method: Kcal/kg  Weight Used For Protein Calculations: 68 kg (149 lb 14.6 oz)  Protein Requirements: 102 gm (1.5g/kg)  Fluid Requirements (mL): 2040 mL    Enteral Nutrition     Patient not receiving enteral nutrition at this time.    Parenteral Nutrition     Patient not receiving parenteral nutrition support at this time.    Evaluation of Received Nutrient Intake    Calories: meeting estimated needs  Protein: meeting estimated needs    Patient Education     Not applicable.    Nutrition Diagnosis     PES: Increased nutrient needs (protein) related to increased protein energy demand for wound healing as evidenced by stage 4 pressure ulcer. (active)     PES: Severe chronic disease or condition related malnutrition related to suboptimal protein/energy intake as evidenced by less than 75% needs met for greater than or equal to 3 months, severe fat depletion, severe muscle depletion, and greater than 7.5% weight loss in 3 months. (active)    Nutrition Interventions     Intervention(s): general/healthful diet, commercial beverage, commercial food, multivitamin/mineral supplement therapy, and collaboration with other providers    Goal: Meet greater than 80% of nutritional needs by follow-up. (goal progressing)  Goal: Maintain weight throughout hospitalization. (goal progressing)    Nutrition Goals & Monitoring     Dietitian will monitor: energy intake and weight    Nutrition Risk/Follow-Up: high (follow-up in 1-4 days)   Please consult if re-assessment needed sooner.

## 2024-04-16 NOTE — PROGRESS NOTES
[facility] - 8th Floor Med Surg  Wound Care    Patient Name:  Shaheen Christie   MRN:  [id number]  Date: 4/16/2024  Diagnosis: Osteomyelitis    History:     Past Medical History:   Diagnosis Date    COPD (chronic obstructive pulmonary disease)     Depression     Hypertension     Neuropathy        Social History     Socioeconomic History    Marital status:    Occupational History    Occupation: retired   Tobacco Use    Smoking status: Former     Average packs/day: 0.5 packs/day for 15.0 years (7.5 ttl pk-yrs)     Types: Cigarettes     Start date: 2008    Smokeless tobacco: Never   Substance and Sexual Activity    Alcohol use: Yes     Alcohol/week: 3.0 - 6.0 standard drinks of alcohol     Types: 3 - 6 Cans of beer per week    Drug use: Yes     Types: Marijuana    Sexual activity: Not Currently       Precautions:     Allergies as of 03/15/2024    (No Known Allergies)       WOC Assessment Details/Treatment        04/16/24 0951   WOCN Assessment   Visit Date 04/16/24   Visit Time 0951   Consult Type Follow Up   WOCN Speciality Wound   WOCN List wound vac   Procedure wound vac   Intervention changed   Teaching on-going   Skin Interventions   Device Skin Pressure Protection absorbent pad utilized/changed   Pressure Reduction Devices specialty bed utilized        Altered Skin Integrity 01/22/24 1351 Sacral spine #4   Date First Assessed/Time First Assessed: 01/22/24 1351   Altered Skin Integrity Present on Admission - Did Patient arrive to the hospital with altered skin?: yes  Location: Sacral spine  Wound Number: #4  Is this injury device related?: No   Wound Image    Dressing Appearance Dry;Intact;Clean   Drainage Amount Moderate   Drainage Characteristics/Odor Serosanguineous   Appearance Red;Yellow;Fibrin;Slough   Tissue loss description Full thickness   Periwound Area Moist;Redness   Wound Edges Irregular   Wound Length (cm) 6.5 cm   Wound Width (cm) 18 cm   Wound Depth (cm) 1 cm   Wound Volume (cm^3)  117 cm^3   Wound Surface Area (cm^2) 117 cm^2   Undermining (depth (cm)/location) 0.7 at 1oclock   Dressing Applied        Negative Pressure Wound Therapy  03/28/24   Placement Date: 03/28/24   Location: Sacral Spine   NPWT Type Vacuum Therapy   Therapy Setting NPWT Continuous therapy   Pressure Setting NPWT 125 mmHg   Therapy Interventions NPWT Dressing changed   Sponges Inserted NPWT Black  (versatel)   Sponges Removed NPWT Black  (versatel)     Lory CHAMBERSP at bedside for evaluation. NPWT reapplied to sacral ulcer. Excellent seal noted at 125 mm/ hg continuous. Nursing to continue with preventative measures. On BRADEN mattress. Will Follow up.     04/16/2024

## 2024-04-16 NOTE — PROGRESS NOTES
[facility]                       [address] [address]    PATIENT NAME:       HAWA KUMAR  YOB: 1955  CSN:                [id number]   MRN:                [id number]  ADMIT DATE:         03/15/2024 03:09:00  PHYSICIAN:          Tom Nichole DPM                            PROGRESS NOTE    DATE:  04/16/2024 00:00:00    SUBJECTIVE:  The patient doing about the same.  Still complains about pain in   that left knee.  States he is getting to the point to where he cannot tolerate   any sort of manipulation.  Was seen by Wound Care this morning concerning his   sacral wound that has been reported he is doing well.  No other issues.  Still   having issues with trying to place this gentleman.    PHYSICAL EXAMINATION:  VITAL SIGNS:  Stable and afebrile.    No labs posted.    Dressings are intact to both extremities.  The patient is rotated onto his left   lateral decubitus position.  It is almost impossible for me to get to his   lateral leg heel area.  The right leg wound and heel wound actually are clean   and viable.  No overt signs of infection, still with exudate.  No pus.  No odor.    ASSESSMENT:  Bilateral lower extremity wounds with secondary infection.    PLAN:  Continue with current care.  We will plan on changing dressings on the   left side once he is rotated.  Continue with all other care.  Placement pending.        ______________________________  Tom Nichole DPM    GAS/AQS  DD:  04/16/2024  Time:  03:15PM  DT:  04/16/2024  Time:  05:13PM  Job #:  [id number]      PROGRESS NOTE

## 2024-04-16 NOTE — PLAN OF CARE
Patient sister Andreea phoned and asked if CM could sent referral to [facility] for nursing home placement. Pt is not wanting to go back to [facility].   Explained to sister that CM cannot guarantee placement at a new facility. If patient is ready for discharge and there is not a new facility to accept patient that he will have to return to [facility]. She voiced understanding.

## 2024-04-16 NOTE — PLAN OF CARE
[facility] sent new referrals to patients 3 choices:    1. [facility]-no available long term care beds  2. [facility]-No available LTC bed  3 .[facility]-No available LTC bed

## 2024-04-16 NOTE — PROGRESS NOTES
[facility] - 8th Floor [facility] Medicine  Progress Note    Patient Name: Shaheen Christie  MRN: [id number]  Patient Class: IP- Inpatient   Admission Date: 3/15/2024  Length of Stay: 32 days  Attending Physician: Enzo Thibodeaux MD  Primary Care Provider: Viktor Russ MD        Subjective:     Principal Problem:Osteomyelitis    Interval History:  No changes continue being treated by Wound Care.  Case management trying to place patient.    Review of Systems   All other systems reviewed and are negative.    Objective:     Vital Signs (Most Recent):  Temp: 98.6 °F (37 °C) (04/16/24 1526)  Pulse: 85 (04/16/24 1526)  Resp: 18 (04/16/24 1604)  BP: 122/68 (04/16/24 1526)  SpO2: (!) 93 % (04/16/24 1526) Vital Signs (24h Range):  Temp:  [97.9 °F (36.6 °C)-98.6 °F (37 °C)] 98.6 °F (37 °C)  Pulse:  [69-88] 85  Resp:  [14-20] 18  SpO2:  [92 %-94 %] 93 %  BP: (108-139)/(47-76) 122/68     Weight: 68 kg (150 lb)  Body mass index is 22.14 kg/m².    Intake/Output Summary (Last 24 hours) at 4/16/2024 1642  Last data filed at 4/15/2024 1800  Gross per 24 hour   Intake 480 ml   Output 500 ml   Net -20 ml      Physical Exam  HENT:      Head: Normocephalic and atraumatic.      Right Ear: External ear normal.      Left Ear: External ear normal.      Mouth/Throat:      Mouth: Mucous membranes are dry.   Eyes:      Extraocular Movements: Extraocular movements intact.   Cardiovascular:      Rate and Rhythm: Normal rate and regular rhythm.      Pulses: Normal pulses.      Heart sounds: Normal heart sounds.   Pulmonary:      Effort: Pulmonary effort is normal.      Breath sounds: Normal breath sounds.   Abdominal:      General: Bowel sounds are normal.      Palpations: Abdomen is soft.   Musculoskeletal:      Cervical back: Neck supple.   Skin:     General: Skin is warm and dry.   Neurological:      General: No focal deficit present.      Mental Status: He is alert. Mental status is at baseline.            Overview/Hospital Course:  Continue treatment.  No changes.    Significant Labs: All pertinent labs within the past 24 hours have been reviewed.  BMP:   Recent Labs   Lab 04/15/24  0446      K 3.8   CO2 26   BUN 9.6   CREATININE 0.64*   CALCIUM 8.9     CBC:   Recent Labs   Lab 04/15/24  0446   WBC 8.54   HGB 8.1*   HCT 26.7*   *       Significant Imaging: I have reviewed all pertinent imaging results/findings within the past 24 hours.    Assessment/Plan:      Active Diagnoses:    Diagnosis Date Noted POA    PRINCIPAL PROBLEM:  Osteomyelitis [M86.9] 03/15/2024 Yes    Knee effusion, left [M25.462] 03/31/2024 No    Pressure injury of sacral region, stage 4 [L89.154] 03/19/2024 Yes    Skin ulcer of right lower leg [L97.919] 03/19/2024 Yes    Skin ulcer of left lower leg [L97.929] 03/19/2024 Yes    Unstageable pressure ulcer of right heel [L89.610] 03/19/2024 Yes    Unstageable pressure ulcer of left heel [L89.620] 03/19/2024 Yes      Problems Resolved During this Admission:     VTE Risk Mitigation (From admission, onward)           Ordered     enoxaparin injection 40 mg  Daily         03/15/24 0934                       Enzo Thibodeaux MD  Department of Hospital Medicine   [facility] - 8th Floor Med Surg

## 2024-04-16 NOTE — PROGRESS NOTES
[facility] - 8th Floor Med Surg  Wound Care  Progress Note    Patient Name: Shaheen Christie  MRN: [id number]  Admission Date: 3/15/2024  Hospital Length of Stay: 32 days  Attending Physician: Enzo Thibodeaux MD  Primary Care Provider: Viktor Russ MD     Subjective:     HPI:  Wound medicine Re-check - chronic sacral pressure ulcer with osteomyelitis       Wound recheck done today, 4/16/24. Met patient in his room, 804, awake, alert and agreeable to wound assessment and treatment. Accompanied by WOCN for wound vac change. Lying on low air loss mattress,  without heel lift boots. Bilateral lower legs with dressings intact with moderate drainage to dressings, no odor.  Leg wounds not visualized continues to be followed by podiatry. Continues to complain of  pain to left knee even with slight touch or movement.        Hospital Course:   No notes on file      Follow-up For: Procedure(s) (LRB):  DEBRIDEMENT, FOOT (Bilateral)    Post-Operative Day: 21 Days Post-Op    Scheduled Meds:  Current Facility-Administered Medications   Medication Dose Route Frequency Provider Last Rate Last Admin    acetaminophen tablet 650 mg  650 mg Oral Q6H PRN Tom Nichole DPM        albuterol inhaler 2 puff  2 puff Inhalation QID PRN Tom Nichole DPM        aluminum-magnesium hydroxide-simethicone 200-200-20 mg/5 mL suspension 15 mL  15 mL Oral Q6H PRN Tom Nichole DPM        amLODIPine tablet 10 mg  10 mg Oral Daily Tom Nichole DPM   10 mg at 04/16/24 0841    aspirin chewable tablet 81 mg  81 mg Oral Daily Tom Nichoel DPM   81 mg at 04/16/24 0841    cilostazoL tablet 50 mg  50 mg Oral BID Tom Nichole DPM   50 mg at 04/16/24 0842    clopidogreL tablet 75 mg  75 mg Oral Daily Tom Nichole DPM   75 mg at 04/16/24 0841    docusate sodium capsule 100 mg  100 mg Oral BID Tom Nichole DPM   100 mg at 04/16/24 0841    DULoxetine DR capsule 30 mg  30 mg Oral Daily Tom Nichole  XIMENA VALLEJO   30 mg at 04/16/24 0841    enoxaparin injection 40 mg  40 mg Subcutaneous Daily Tom Nichole DPM   40 mg at 04/15/24 1749    ferrous sulfate tablet 1 each  1 tablet Oral Daily Tom Nichole DPM   1 each at 04/16/24 0842    gabapentin capsule 300 mg  300 mg Oral TID Tom Nichole DPM   300 mg at 04/16/24 0842    gabapentin capsule 600 mg  600 mg Oral QHS Tom Nichole DPM   600 mg at 04/15/24 2021    HYDROcodone-acetaminophen 5-325 mg per tablet 1 tablet  1 tablet Oral Q4H PRN Tom Nichole DPM   1 tablet at 04/03/24 1614    LIDOcaine 5 % patch 1 patch  1 patch Transdermal Q24H Tom Nichole DPM   1 patch at 04/16/24 1014    losartan tablet 100 mg  100 mg Oral Daily Tom Nichole DPM   100 mg at 04/16/24 0841    melatonin tablet 6 mg  6 mg Oral Nightly PRN Tom Nichole DPM   6 mg at 03/26/24 2130    multivitamin tablet  1 tablet Oral Daily Tom Nichole DPM   1 tablet at 04/16/24 0841    oxyCODONE immediate release tablet Tab 10 mg  10 mg Oral Q4H Enzo Thibodeaux MD   10 mg at 04/16/24 1125    piperacillin-tazobactam (ZOSYN) 4.5 g in dextrose 5 % in water (D5W) 100 mL IVPB (MB+)  4.5 g Intravenous Q8H Enzo Thibodeaux MD 25 mL/hr at 04/16/24 1344 4.5 g at 04/16/24 1344    polyethylene glycol packet 17 g  17 g Oral Daily Tom Nichole DPM   17 g at 04/15/24 1007    simethicone chewable tablet 80 mg  80 mg Oral Q6H PRN Tom Nichole DPM        sodium chloride 0.9% flush 10 mL  10 mL Intravenous Q6H Enzo Thibodeaux MD   10 mL at 04/16/24 0448    And    sodium chloride 0.9% flush 10 mL  10 mL Intravenous PRN Enzo Thibodeaux MD        vancomycin - pharmacy to dose   Intravenous pharmacy to manage frequency Sia Grant, FNP        vancomycin 750 mg in dextrose 5 % 250 mL IVPB (ready to mix)  750 mg Intravenous Q12H Enzo Thibodeaux MD   Stopped at 04/16/24 0400     Facility-Administered Medications Ordered in Other Encounters   Medication  Dose Route Frequency Provider Last Rate Last Admin    diazePAM tablet 10 mg  10 mg Oral On Call Procedure Deven Queen MD   10 mg at 03/13/24 0618    diphenhydrAMINE capsule 50 mg  50 mg Oral On Call Procedure Deven Queen MD   50 mg at 03/13/24 0619    iopamidoL (ISOVUE-370) injection    PRN Deven Queen MD   40 mL at 03/13/24 0809     Continuous Infusions:  Current Facility-Administered Medications   Medication Dose Route Frequency Provider Last Rate Last Admin    acetaminophen tablet 650 mg  650 mg Oral Q6H PRN Tom Nichole DPM        albuterol inhaler 2 puff  2 puff Inhalation QID PRN Tom Nichole DPM        aluminum-magnesium hydroxide-simethicone 200-200-20 mg/5 mL suspension 15 mL  15 mL Oral Q6H PRN Tom Nichole DPM        amLODIPine tablet 10 mg  10 mg Oral Daily Tom Nichole DPM   10 mg at 04/16/24 0841    aspirin chewable tablet 81 mg  81 mg Oral Daily Tom Nichole DPM   81 mg at 04/16/24 0841    cilostazoL tablet 50 mg  50 mg Oral BID Tom Nichole DPM   50 mg at 04/16/24 0842    clopidogreL tablet 75 mg  75 mg Oral Daily Tom Nichole DPM   75 mg at 04/16/24 0841    docusate sodium capsule 100 mg  100 mg Oral BID Tom Nichole DPM   100 mg at 04/16/24 0841    DULoxetine DR capsule 30 mg  30 mg Oral Daily Tom Nichole DPM   30 mg at 04/16/24 0841    enoxaparin injection 40 mg  40 mg Subcutaneous Daily Tom Nichole DPM   40 mg at 04/15/24 1749    ferrous sulfate tablet 1 each  1 tablet Oral Daily Tom Nichole DPM   1 each at 04/16/24 0842    gabapentin capsule 300 mg  300 mg Oral TID Tom Nichole DPM   300 mg at 04/16/24 0842    gabapentin capsule 600 mg  600 mg Oral QHS Tom Nichole DPM   600 mg at 04/15/24 2021    HYDROcodone-acetaminophen 5-325 mg per tablet 1 tablet  1 tablet Oral Q4H PRN Tom Nichole XIMENA   1 tablet at 04/03/24 1614    LIDOcaine 5 % patch 1 patch  1 patch Transdermal Q24H Tom Nichole, XIMENA   1  patch at 04/16/24 1014    losartan tablet 100 mg  100 mg Oral Daily Tom Nichole DPM   100 mg at 04/16/24 0841    melatonin tablet 6 mg  6 mg Oral Nightly PRN Tom Nichole DPM   6 mg at 03/26/24 2130    multivitamin tablet  1 tablet Oral Daily Tom Nichole DPM   1 tablet at 04/16/24 0841    oxyCODONE immediate release tablet Tab 10 mg  10 mg Oral Q4H Enzo Thibodeaux MD   10 mg at 04/16/24 1125    piperacillin-tazobactam (ZOSYN) 4.5 g in dextrose 5 % in water (D5W) 100 mL IVPB (MB+)  4.5 g Intravenous Q8H Enzo Thibodeaux MD 25 mL/hr at 04/16/24 1344 4.5 g at 04/16/24 1344    polyethylene glycol packet 17 g  17 g Oral Daily Tom Nichole DPM   17 g at 04/15/24 1007    simethicone chewable tablet 80 mg  80 mg Oral Q6H PRN Tom Nichole DPM        sodium chloride 0.9% flush 10 mL  10 mL Intravenous Q6H Enzo Thibodeaux MD   10 mL at 04/16/24 0448    And    sodium chloride 0.9% flush 10 mL  10 mL Intravenous PRN Enzo Thibodeaux MD        vancomycin - pharmacy to dose   Intravenous pharmacy to manage frequency Sia Grant, FNP        vancomycin 750 mg in dextrose 5 % 250 mL IVPB (ready to mix)  750 mg Intravenous Q12H Enzo Thibodeaux MD   Stopped at 04/16/24 0400     Facility-Administered Medications Ordered in Other Encounters   Medication Dose Route Frequency Provider Last Rate Last Admin    diazePAM tablet 10 mg  10 mg Oral On Call Procedure Deven Queen MD   10 mg at 03/13/24 0618    diphenhydrAMINE capsule 50 mg  50 mg Oral On Call Procedure Deven Queen MD   50 mg at 03/13/24 0619    iopamidoL (ISOVUE-370) injection    PRN Deven Queen MD   40 mL at 03/13/24 0809     PRN Meds:  Current Facility-Administered Medications   Medication Dose Route Frequency Provider Last Rate Last Admin    acetaminophen tablet 650 mg  650 mg Oral Q6H PRN Tom Nichole, XIMENA        albuterol inhaler 2 puff  2 puff Inhalation QID PRN Tom Nichole, XIMENA        aluminum-magnesium  hydroxide-simethicone 200-200-20 mg/5 mL suspension 15 mL  15 mL Oral Q6H PRN Tom Nichole DPM        amLODIPine tablet 10 mg  10 mg Oral Daily Tom Nichole DPM   10 mg at 04/16/24 0841    aspirin chewable tablet 81 mg  81 mg Oral Daily Tom Nichole DPM   81 mg at 04/16/24 0841    cilostazoL tablet 50 mg  50 mg Oral BID Tom Nichole DPM   50 mg at 04/16/24 0842    clopidogreL tablet 75 mg  75 mg Oral Daily Tom Nichole DPM   75 mg at 04/16/24 0841    docusate sodium capsule 100 mg  100 mg Oral BID Tom Nichole DPM   100 mg at 04/16/24 0841    DULoxetine DR capsule 30 mg  30 mg Oral Daily Tom Nichole DPM   30 mg at 04/16/24 0841    enoxaparin injection 40 mg  40 mg Subcutaneous Daily Tom Nichole DPM   40 mg at 04/15/24 1749    ferrous sulfate tablet 1 each  1 tablet Oral Daily Tom Nichole DPM   1 each at 04/16/24 0842    gabapentin capsule 300 mg  300 mg Oral TID Tom Nichole DPM   300 mg at 04/16/24 0842    gabapentin capsule 600 mg  600 mg Oral QHS Tom Nichole DPM   600 mg at 04/15/24 2021    HYDROcodone-acetaminophen 5-325 mg per tablet 1 tablet  1 tablet Oral Q4H PRN Tom Nichole DPM   1 tablet at 04/03/24 1614    LIDOcaine 5 % patch 1 patch  1 patch Transdermal Q24H Tom Nichole DPM   1 patch at 04/16/24 1014    losartan tablet 100 mg  100 mg Oral Daily Tom Nichole DPM   100 mg at 04/16/24 0841    melatonin tablet 6 mg  6 mg Oral Nightly PRN Tom Nichole DPM   6 mg at 03/26/24 2130    multivitamin tablet  1 tablet Oral Daily Tom Nichole DPM   1 tablet at 04/16/24 0841    oxyCODONE immediate release tablet Tab 10 mg  10 mg Oral Q4H Enzo Thibodeaux MD   10 mg at 04/16/24 1125    piperacillin-tazobactam (ZOSYN) 4.5 g in dextrose 5 % in water (D5W) 100 mL IVPB (MB+)  4.5 g Intravenous Q8H Enzo Thibodeaux MD 25 mL/hr at 04/16/24 1344 4.5 g at 04/16/24 1344    polyethylene glycol packet 17 g  17 g Oral Daily  Tom Nichole DPM   17 g at 04/15/24 1007    simethicone chewable tablet 80 mg  80 mg Oral Q6H PRN Tom Nichole DPM        sodium chloride 0.9% flush 10 mL  10 mL Intravenous Q6H nEzo Thibodeaux MD   10 mL at 04/16/24 0448    And    sodium chloride 0.9% flush 10 mL  10 mL Intravenous PRN Enzo Thibodeaux MD        vancomycin - pharmacy to dose   Intravenous pharmacy to manage frequency Sia Grant, REYESP        vancomycin 750 mg in dextrose 5 % 250 mL IVPB (ready to mix)  750 mg Intravenous Q12H Enzo Thibodeaux MD   Stopped at 04/16/24 0400     Facility-Administered Medications Ordered in Other Encounters   Medication Dose Route Frequency Provider Last Rate Last Admin    diazePAM tablet 10 mg  10 mg Oral On Call Procedure Deven Queen MD   10 mg at 03/13/24 0618    diphenhydrAMINE capsule 50 mg  50 mg Oral On Call Procedure Deven Queen MD   50 mg at 03/13/24 0619    iopamidoL (ISOVUE-370) injection    PRN Deven Queen MD   40 mL at 03/13/24 0809       Review of Systems   Constitutional:  Positive for activity change. Negative for chills, fatigue and fever.   HENT:          Hard of hearing   Respiratory:  Negative for shortness of breath.    Cardiovascular:  Positive for leg swelling. Negative for chest pain.   Musculoskeletal:  Positive for arthralgias, back pain and joint swelling.   Skin:  Positive for wound.     Objective:     Vital Signs (Most Recent):  Temp: 97.9 °F (36.6 °C) (04/16/24 1122)  Pulse: 88 (04/16/24 1122)  Resp: 18 (04/16/24 1122)  BP: 139/76 (04/16/24 1122)  SpO2: (!) 94 % (04/16/24 1122) Vital Signs (24h Range):  Temp:  [97.9 °F (36.6 °C)-98.5 °F (36.9 °C)] 97.9 °F (36.6 °C)  Pulse:  [59-88] 88  Resp:  [14-20] 18  SpO2:  [92 %-95 %] 94 %  BP: (108-148)/(47-76) 139/76     Weight: 68 kg (150 lb)  Body mass index is 22.14 kg/m².     Physical Exam  Vitals reviewed.   Constitutional:       General: He is awake. He is not in acute distress.     Appearance: He is normal  weight. He is ill-appearing (chronic). He is not toxic-appearing.      Comments: BLE muscle wasting   HENT:      Head: Normocephalic and atraumatic.   Cardiovascular:      Rate and Rhythm: Normal rate and regular rhythm.   Pulmonary:      Effort: Pulmonary effort is normal. No respiratory distress.   Abdominal:      General: Abdomen is flat.      Palpations: Abdomen is soft.   Musculoskeletal:         General: Swelling present.      Right lower leg: Edema present.      Left lower leg: Edema present.   Skin:     General: Skin is warm and dry.      Capillary Refill: Capillary refill takes less than 2 seconds.             Comments: Sacrum: Stage 4 beefy red granulation tissue in wound bed with small amount of slough on lateral edge and at center, exposed ligament. No purulent drainage, odor, fluctuance or induration. Undermining has improved considerably since last assessment.    Neurological:      Mental Status: He is alert and oriented to person, place, and time.   Psychiatric:         Mood and Affect: Mood normal.         Behavior: Behavior normal. Behavior is cooperative.         Sacrum: 6.5 x 18 x 1 cm with undermining from 12 to 1 o'clock deepest 0.7 cm                 Laboratory:  A1C:   Recent Labs   Lab 02/02/24  0445   HGBA1C 5.0       BMP:   Recent Labs   Lab 04/15/24  0446      K 3.8   CO2 26   BUN 9.6   CREATININE 0.64*   CALCIUM 8.9       CBC:   Recent Labs   Lab 04/15/24  0446   WBC 8.54   RBC 3.11*   HGB 8.1*   HCT 26.7*   *   MCV 85.9   MCH 26.0*   MCHC 30.3*     CMP:   Recent Labs   Lab 04/15/24  0446   CALCIUM 8.9   ALBUMIN 2.4*      K 3.8   CO2 26   BUN 9.6   CREATININE 0.64*   ALKPHOS 101   ALT 10   AST 13   BILITOT 0.2     LFTs:   Recent Labs   Lab 04/15/24  0446   ALT 10   AST 13   ALKPHOS 101   BILITOT 0.2   ALBUMIN 2.4*       Microbiology Results (last 7 days)       Procedure Component Value Units Date/Time    Gram Stain [[id number]]     Order Status: Sent Specimen: Body  Fluid from Joint Fluid, Left Knee     Body Fluid Culture [[id number]]     Order Status: Sent Specimen: Body Fluid from Joint Fluid, Left Knee             Diagnostic Results:  I have reviewed all pertinent imaging results/findings within the past 24 hours.    Assessment/Plan:     Stage 4 pressure ulcer to sacrum - with evidence of osteomyelitis on MRI of 2/27/24. admitted for initiation of IV antibiotics. Wound VAC placed on 3/28/24. ID guiding antibiotic stewardship  Unstageable pressure ulcers to bilateral heels - Podiatry consulted and following - surgical debridement on 3/26/24  Open wound/ulcers to right lateral lower leg and left lateral lower leg - podiatry following  Peripheral arterial disease - with history of vascular intervention with Dr. Queen (most recent angio 3/13/24; medical management)   PVD  History of polymicrobial sacral wound culture  History of polymicrobial wound culture to leg wound  DM2  Anemia  History of smoking  Low prealbumin      PLAN:     Chart reviewed, patient examined and wounds assessed.  Opinion: The patient has a large stage 4 pressure ulcer to his sacrum that does not appear to be infected;  Wound vac initiated on 3/28/24; tolerating well. Overall wound looks good. Improvement in size noted since last assessment. Wound bed beefy red with granulation tissue throughout; continues to have small areas of slough and exposed ligament. Low prealbumin; states he has been drinking Pierre since last assessment, does not like tropical punch flavor, informed nurse to request only Orange flavor. Will continue with Wound Vac. Need to improve nutrition and continue aggressive offloading to have best chance to heal sacral wound.   BLE Dressings in place followed by Podiatry - did not visualize wounds today.   Patients bed mobility is severely limited due to pain and discomfort associated with movement. This is affecting his ability to properly offload; which will also affect ability to heal his  wounds. Educated patient on importance of aggressive offloading, verbalizes understanding.   Wound care orders: Wound Vac  Monitor for signs & symptoms of deterioration  Offloading of sacrum/buttocks/heels at all times: BRADEN mattress, turning q 2 hrs; use of wedges and heel offloading devices to be used at all times while in bed; ( SALONI Arellano).   Incontinence: control moisture/wound contamination: No briefs  Nutrition: Recommend aggressive nutritional support, protein supplementation along with vitamin and mineral supplements  and pierre to support wound healing; Low prealbumin - 13.8 on 3/19/24 and rechecked on 4/1/24 - 12.6, most recent 10.5 on 4/11/24; follow dietitian recommendation; encourage to drink Pierre; reports good appetite  Diabetes: A1C good  Will try to follow weekly while admitted, but every nurse assigned to patient on every shift of every day needs to address daily wound care dressing changes and offloading modalities including using heel offloading devices, wedges, BRADEN mattress etc              The time spent including preparing to see the patient, obtaining patient history and assessment, evaluation of the plan of care, patient/caregiver counseling and education, orders, documentation, coordination of care, and other professional medical management activities for today's encounter was 30 minutes.         ANGEL Chavira  Wound Care  [facility] - 8th Floor Med Surg

## 2024-04-16 NOTE — SUBJECTIVE & OBJECTIVE
Subjective:     HPI:  Wound medicine Re-check - chronic sacral pressure ulcer with osteomyelitis       Wound recheck done today, 4/16/24. Met patient in his room, 804, awake, alert and agreeable to wound assessment and treatment. Accompanied by WOCN for wound vac change. Lying on low air loss mattress,  without heel lift boots. Bilateral lower legs with dressings intact with moderate drainage to dressings, no odor.  Leg wounds not visualized continues to be followed by podiatry. Continues to complain of pain to left knee even with slight touch or movement.        Hospital Course:   No notes on file      Follow-up For: Procedure(s) (LRB):  DEBRIDEMENT, FOOT (Bilateral)    Post-Operative Day: 21 Days Post-Op    Scheduled Meds:  Current Facility-Administered Medications   Medication Dose Route Frequency Provider Last Rate Last Admin    acetaminophen tablet 650 mg  650 mg Oral Q6H PRN Tom Nichole DPM        albuterol inhaler 2 puff  2 puff Inhalation QID PRN Tom Nichole DPM        aluminum-magnesium hydroxide-simethicone 200-200-20 mg/5 mL suspension 15 mL  15 mL Oral Q6H PRN Tom Nichole DPM        amLODIPine tablet 10 mg  10 mg Oral Daily Tom Nichole DPM   10 mg at 04/16/24 0841    aspirin chewable tablet 81 mg  81 mg Oral Daily Tom Nichole DPM   81 mg at 04/16/24 0841    cilostazoL tablet 50 mg  50 mg Oral BID Tom Nichole DPM   50 mg at 04/16/24 0842    clopidogreL tablet 75 mg  75 mg Oral Daily Tom Nichole DPM   75 mg at 04/16/24 0841    docusate sodium capsule 100 mg  100 mg Oral BID Tom Nichole DPM   100 mg at 04/16/24 0841    DULoxetine DR capsule 30 mg  30 mg Oral Daily Tom Nichole DPM   30 mg at 04/16/24 0841    enoxaparin injection 40 mg  40 mg Subcutaneous Daily Tom Nichole DPM   40 mg at 04/15/24 1749    ferrous sulfate tablet 1 each  1 tablet Oral Daily Tom Nichole DPM   1 each at 04/16/24 0842    gabapentin capsule 300 mg  300 mg  Oral TID Tom Nichole DPM   300 mg at 04/16/24 0842    gabapentin capsule 600 mg  600 mg Oral QHS Tom Nichole DPM   600 mg at 04/15/24 2021    HYDROcodone-acetaminophen 5-325 mg per tablet 1 tablet  1 tablet Oral Q4H PRN Tom Nichole DPM   1 tablet at 04/03/24 1614    LIDOcaine 5 % patch 1 patch  1 patch Transdermal Q24H Tom Nichole DPM   1 patch at 04/16/24 1014    losartan tablet 100 mg  100 mg Oral Daily Tom Nichole DPM   100 mg at 04/16/24 0841    melatonin tablet 6 mg  6 mg Oral Nightly PRN Tom Nichole DPM   6 mg at 03/26/24 2130    multivitamin tablet  1 tablet Oral Daily Tom Nichole DPM   1 tablet at 04/16/24 0841    oxyCODONE immediate release tablet Tab 10 mg  10 mg Oral Q4H Enzo Thibodeaux MD   10 mg at 04/16/24 1125    piperacillin-tazobactam (ZOSYN) 4.5 g in dextrose 5 % in water (D5W) 100 mL IVPB (MB+)  4.5 g Intravenous Q8H Enzo Thibodeaux MD 25 mL/hr at 04/16/24 1344 4.5 g at 04/16/24 1344    polyethylene glycol packet 17 g  17 g Oral Daily Tom Nichole DPM   17 g at 04/15/24 1007    simethicone chewable tablet 80 mg  80 mg Oral Q6H PRN Tom Nichole DPM        sodium chloride 0.9% flush 10 mL  10 mL Intravenous Q6H Enzo Thibodeaux MD   10 mL at 04/16/24 0448    And    sodium chloride 0.9% flush 10 mL  10 mL Intravenous PRN Enzo Thibodeaux MD        vancomycin - pharmacy to dose   Intravenous pharmacy to manage frequency Sia Grant, REYESP        vancomycin 750 mg in dextrose 5 % 250 mL IVPB (ready to mix)  750 mg Intravenous Q12H Enzo Thibodeaux MD   Stopped at 04/16/24 0400     Facility-Administered Medications Ordered in Other Encounters   Medication Dose Route Frequency Provider Last Rate Last Admin    diazePAM tablet 10 mg  10 mg Oral On Call Procedure Deven Queen MD   10 mg at 03/13/24 0618    diphenhydrAMINE capsule 50 mg  50 mg Oral On Call Procedure Deven Queen MD   50 mg at 03/13/24 0619    iopamidoL  (ISOVUE-370) injection    PRN Deven Queen MD   40 mL at 03/13/24 0809     Continuous Infusions:  Current Facility-Administered Medications   Medication Dose Route Frequency Provider Last Rate Last Admin    acetaminophen tablet 650 mg  650 mg Oral Q6H PRN Tom Nichole DPM        albuterol inhaler 2 puff  2 puff Inhalation QID PRN Tom Nichole DPM        aluminum-magnesium hydroxide-simethicone 200-200-20 mg/5 mL suspension 15 mL  15 mL Oral Q6H PRN Tom Nichole DPM        amLODIPine tablet 10 mg  10 mg Oral Daily Tom Nichole DPM   10 mg at 04/16/24 0841    aspirin chewable tablet 81 mg  81 mg Oral Daily Tom Nichole DPM   81 mg at 04/16/24 0841    cilostazoL tablet 50 mg  50 mg Oral BID Tom Nichole DPM   50 mg at 04/16/24 0842    clopidogreL tablet 75 mg  75 mg Oral Daily Tom Nichole DPM   75 mg at 04/16/24 0841    docusate sodium capsule 100 mg  100 mg Oral BID Tom Nichole DPM   100 mg at 04/16/24 0841    DULoxetine DR capsule 30 mg  30 mg Oral Daily Tom Nichole DPM   30 mg at 04/16/24 0841    enoxaparin injection 40 mg  40 mg Subcutaneous Daily Tom Nichole DPM   40 mg at 04/15/24 1749    ferrous sulfate tablet 1 each  1 tablet Oral Daily Tom Nichole DPM   1 each at 04/16/24 0842    gabapentin capsule 300 mg  300 mg Oral TID Tom Nichole DPM   300 mg at 04/16/24 0842    gabapentin capsule 600 mg  600 mg Oral QHS Tom Nichole DPM   600 mg at 04/15/24 2021    HYDROcodone-acetaminophen 5-325 mg per tablet 1 tablet  1 tablet Oral Q4H PRN Tom Nichole DPM   1 tablet at 04/03/24 1614    LIDOcaine 5 % patch 1 patch  1 patch Transdermal Q24H Tom Nichole DPM   1 patch at 04/16/24 1014    losartan tablet 100 mg  100 mg Oral Daily Tom Nichole DPM   100 mg at 04/16/24 0841    melatonin tablet 6 mg  6 mg Oral Nightly PRN Tom Nichole DPM   6 mg at 03/26/24 2130    multivitamin tablet  1 tablet Oral Daily Dayanara  Tom VALLEJO DPM   1 tablet at 04/16/24 0841    oxyCODONE immediate release tablet Tab 10 mg  10 mg Oral Q4H Enzo Thibodeaux MD   10 mg at 04/16/24 1125    piperacillin-tazobactam (ZOSYN) 4.5 g in dextrose 5 % in water (D5W) 100 mL IVPB (MB+)  4.5 g Intravenous Q8H Enzo Thibodeaux MD 25 mL/hr at 04/16/24 1344 4.5 g at 04/16/24 1344    polyethylene glycol packet 17 g  17 g Oral Daily Tom Nichole DPM   17 g at 04/15/24 1007    simethicone chewable tablet 80 mg  80 mg Oral Q6H PRN Tom Nichole DPM        sodium chloride 0.9% flush 10 mL  10 mL Intravenous Q6H Enzo Thibodeaux MD   10 mL at 04/16/24 0448    And    sodium chloride 0.9% flush 10 mL  10 mL Intravenous PRN Enzo Thibodeaux MD        vancomycin - pharmacy to dose   Intravenous pharmacy to manage frequency Sia Grant, [id number]        vancomycin 750 mg in dextrose 5 % 250 mL IVPB (ready to mix)  750 mg Intravenous Q12H Enzo Thibodeaux MD   Stopped at 04/16/24 0400     Facility-Administered Medications Ordered in Other Encounters   Medication Dose Route Frequency Provider Last Rate Last Admin    diazePAM tablet 10 mg  10 mg Oral On Call Procedure Deven Queen MD   10 mg at 03/13/24 0618    diphenhydrAMINE capsule 50 mg  50 mg Oral On Call Procedure Deven Queen MD   50 mg at 03/13/24 0619    iopamidoL (ISOVUE-370) injection    PRN Deven Queen MD   40 mL at 03/13/24 0809     PRN Meds:  Current Facility-Administered Medications   Medication Dose Route Frequency Provider Last Rate Last Admin    acetaminophen tablet 650 mg  650 mg Oral Q6H PRN Tom Nichole DPM        albuterol inhaler 2 puff  2 puff Inhalation QID PRN Tom Nichole DPM        aluminum-magnesium hydroxide-simethicone 200-200-20 mg/5 mL suspension 15 mL  15 mL Oral Q6H PRN Tom Nichole DPM        amLODIPine tablet 10 mg  10 mg Oral Daily Tom Nichole DPM   10 mg at 04/16/24 0841    aspirin chewable tablet 81 mg  81 mg Oral Daily Tom Nichole,  DPM   81 mg at 04/16/24 0841    cilostazoL tablet 50 mg  50 mg Oral BID Tom Nichole DPM   50 mg at 04/16/24 0842    clopidogreL tablet 75 mg  75 mg Oral Daily Tom Nichole DPM   75 mg at 04/16/24 0841    docusate sodium capsule 100 mg  100 mg Oral BID Tom Nichole DPM   100 mg at 04/16/24 0841    DULoxetine DR capsule 30 mg  30 mg Oral Daily Tom Nichole DPM   30 mg at 04/16/24 0841    enoxaparin injection 40 mg  40 mg Subcutaneous Daily Tom Nichole DPM   40 mg at 04/15/24 1749    ferrous sulfate tablet 1 each  1 tablet Oral Daily Tom Nichole DPM   1 each at 04/16/24 0842    gabapentin capsule 300 mg  300 mg Oral TID Tom Nichole DPM   300 mg at 04/16/24 0842    gabapentin capsule 600 mg  600 mg Oral QHS Tom Nichole DPM   600 mg at 04/15/24 2021    HYDROcodone-acetaminophen 5-325 mg per tablet 1 tablet  1 tablet Oral Q4H PRN Tom Nichole DPM   1 tablet at 04/03/24 1614    LIDOcaine 5 % patch 1 patch  1 patch Transdermal Q24H Tom Nichole DPM   1 patch at 04/16/24 1014    losartan tablet 100 mg  100 mg Oral Daily Tom Nichole DPM   100 mg at 04/16/24 0841    melatonin tablet 6 mg  6 mg Oral Nightly PRN Tom Nichole DPM   6 mg at 03/26/24 2130    multivitamin tablet  1 tablet Oral Daily Tom Nichole DPM   1 tablet at 04/16/24 0841    oxyCODONE immediate release tablet Tab 10 mg  10 mg Oral Q4H Enzo Thibodeaux MD   10 mg at 04/16/24 1125    piperacillin-tazobactam (ZOSYN) 4.5 g in dextrose 5 % in water (D5W) 100 mL IVPB (MB+)  4.5 g Intravenous Q8H Enzo Thibodeaux MD 25 mL/hr at 04/16/24 1344 4.5 g at 04/16/24 1344    polyethylene glycol packet 17 g  17 g Oral Daily Tom Nichole DPM   17 g at 04/15/24 1007    simethicone chewable tablet 80 mg  80 mg Oral Q6H PRN Tom Nichole DPM        sodium chloride 0.9% flush 10 mL  10 mL Intravenous Q6H Enzo Thibodeaux MD   10 mL at 04/16/24 0448    And    sodium chloride 0.9%  flush 10 mL  10 mL Intravenous PRN Enzo Thibodeaux MD        vancomycin - pharmacy to dose   Intravenous pharmacy to manage frequency Sia Grant, FNP        vancomycin 750 mg in dextrose 5 % 250 mL IVPB (ready to mix)  750 mg Intravenous Q12H Enzo Thibodeaux MD   Stopped at 04/16/24 0400     Facility-Administered Medications Ordered in Other Encounters   Medication Dose Route Frequency Provider Last Rate Last Admin    diazePAM tablet 10 mg  10 mg Oral On Call Procedure Deven Queen MD   10 mg at 03/13/24 0618    diphenhydrAMINE capsule 50 mg  50 mg Oral On Call Procedure Deven Queen MD   50 mg at 03/13/24 0619    iopamidoL (ISOVUE-370) injection    PRN Deven Queen MD   40 mL at 03/13/24 0809       Review of Systems   Constitutional:  Positive for activity change. Negative for chills, fatigue and fever.   HENT:          Hard of hearing   Respiratory:  Negative for shortness of breath.    Cardiovascular:  Positive for leg swelling. Negative for chest pain.   Musculoskeletal:  Positive for arthralgias, back pain and joint swelling.   Skin:  Positive for wound.     Objective:     Vital Signs (Most Recent):  Temp: 97.9 °F (36.6 °C) (04/16/24 1122)  Pulse: 88 (04/16/24 1122)  Resp: 18 (04/16/24 1122)  BP: 139/76 (04/16/24 1122)  SpO2: (!) 94 % (04/16/24 1122) Vital Signs (24h Range):  Temp:  [97.9 °F (36.6 °C)-98.5 °F (36.9 °C)] 97.9 °F (36.6 °C)  Pulse:  [59-88] 88  Resp:  [14-20] 18  SpO2:  [92 %-95 %] 94 %  BP: (108-148)/(47-76) 139/76     Weight: 68 kg (150 lb)  Body mass index is 22.14 kg/m².     Physical Exam  Vitals reviewed.   Constitutional:       General: He is awake. He is not in acute distress.     Appearance: He is normal weight. He is ill-appearing (chronic). He is not toxic-appearing.      Comments: BLE muscle wasting   HENT:      Head: Normocephalic and atraumatic.   Cardiovascular:      Rate and Rhythm: Normal rate and regular rhythm.   Pulmonary:      Effort: Pulmonary effort is  "normal. No respiratory distress.   Abdominal:      General: Abdomen is flat.      Palpations: Abdomen is soft.   Musculoskeletal:         General: Swelling present.      Right lower leg: Edema present.      Left lower leg: Edema present.   Skin:     General: Skin is warm and dry.      Capillary Refill: Capillary refill takes less than 2 seconds.             Comments: Sacrum: Stage 4 beefy red granulation tissue in wound bed with small amount of slough on lateral edge and at center, exposed ligament. No purulent drainage, odor, fluctuance or induration. Undermining has improved considerably since last assessment.    Neurological:      Mental Status: He is alert and oriented to person, place, and time.   Psychiatric:         Mood and Affect: Mood normal.         Behavior: Behavior normal. Behavior is cooperative.         Sacrum: 6.5 x 18 x 1 cm with undermining from 12 to 1 o'clock deepest 0.7 cm                 Laboratory:  A1C:   Recent Labs   Lab 02/02/24  0445   HGBA1C 5.0     ABGs: No results for input(s): "PH", "PCO2", "HCO3", "POCSATURATED", "BE" in the last 168 hours.  Blood Cultures: No results for input(s): "LABBLOO" in the last 48 hours.  BMP:   Recent Labs   Lab 04/15/24  0446      K 3.8   CO2 26   BUN 9.6   CREATININE 0.64*   CALCIUM 8.9     Cardiac Markers: No results for input(s): "CKMB", "TROPONINT", "MYOGLOBIN" in the last 168 hours.  CBC:   Recent Labs   Lab 04/15/24  0446   WBC 8.54   RBC 3.11*   HGB 8.1*   HCT 26.7*   *   MCV 85.9   MCH 26.0*   MCHC 30.3*     CMP:   Recent Labs   Lab 04/15/24  0446   CALCIUM 8.9   ALBUMIN 2.4*      K 3.8   CO2 26   BUN 9.6   CREATININE 0.64*   ALKPHOS 101   ALT 10   AST 13   BILITOT 0.2     Coagulation: No results for input(s): "PT", "INR", "APTT" in the last 168 hours.  CPS: {:LNK,LABRCNTIP[  CRP: No results for input(s): "CRP" in the last 168 hours.  ESR: No results for input(s): "SEDRATE" in the last 168 hours.  LFTs:   Recent Labs   Lab " "04/15/24  0446   ALT 10   AST 13   ALKPHOS 101   BILITOT 0.2   ALBUMIN 2.4*     Prealbumin: No results for input(s): "PREALBUMIN" in the last 48 hours.  Wound Cultures: No results for input(s): "LABAERO" in the last 4320 hours.  Microbiology Results (last 7 days)       Procedure Component Value Units Date/Time    Gram Stain [[id number]]     Order Status: Sent Specimen: Body Fluid from Joint Fluid, Left Knee     Body Fluid Culture [[id number]]     Order Status: Sent Specimen: Body Fluid from Joint Fluid, Left Knee           Specimen (24h ago, onward)      None          No results for input(s): "COLORU", "CLARITYU", "SPECGRAV", "PHUR", "PROTEINUA", "GLUCOSEU", "BILIRUBINCON", "BLOODU", "WBCU", "RBCU", "BACTERIA", "MUCUS", "NITRITE", "LEUKOCYTESUR", "UROBILINOGEN", "HYALINECASTS" in the last 168 hours.    Diagnostic Results:  I have reviewed all pertinent imaging results/findings within the past 24 hours.    "

## 2024-04-17 LAB
VANCOMYCIN SERPL-MCNC: 16.3 UG/ML (ref 15–20)
VANCOMYCIN TROUGH SERPL-MCNC: 21.4 UG/ML (ref 15–20)

## 2024-04-17 PROCEDURE — 63600175 PHARM REV CODE 636 W HCPCS: Performed by: INTERNAL MEDICINE

## 2024-04-17 PROCEDURE — 25000003 PHARM REV CODE 250: Performed by: NURSE PRACTITIONER

## 2024-04-17 PROCEDURE — 63600175 PHARM REV CODE 636 W HCPCS: Performed by: NURSE PRACTITIONER

## 2024-04-17 PROCEDURE — 11000001 HC ACUTE MED/SURG PRIVATE ROOM

## 2024-04-17 PROCEDURE — 80202 ASSAY OF VANCOMYCIN: CPT | Performed by: INTERNAL MEDICINE

## 2024-04-17 PROCEDURE — 25000003 PHARM REV CODE 250: Performed by: PODIATRIST

## 2024-04-17 PROCEDURE — A4216 STERILE WATER/SALINE, 10 ML: HCPCS | Performed by: INTERNAL MEDICINE

## 2024-04-17 PROCEDURE — 25000003 PHARM REV CODE 250: Performed by: INTERNAL MEDICINE

## 2024-04-17 PROCEDURE — 63600175 PHARM REV CODE 636 W HCPCS: Performed by: PODIATRIST

## 2024-04-17 RX ORDER — VANCOMYCIN HCL IN 5 % DEXTROSE 1G/250ML
1000 PLASTIC BAG, INJECTION (ML) INTRAVENOUS
Status: DISCONTINUED | OUTPATIENT
Start: 2024-04-17 | End: 2024-04-19

## 2024-04-17 RX ADMIN — DULOXETINE HYDROCHLORIDE 30 MG: 30 CAPSULE, DELAYED RELEASE ORAL at 08:04

## 2024-04-17 RX ADMIN — FERROUS SULFATE TAB 325 MG (65 MG ELEMENTAL FE) 1 EACH: 325 (65 FE) TAB at 08:04

## 2024-04-17 RX ADMIN — LIDOCAINE 5% 1 PATCH: 700 PATCH TOPICAL at 10:04

## 2024-04-17 RX ADMIN — THERA TABS 1 TABLET: TAB at 08:04

## 2024-04-17 RX ADMIN — GABAPENTIN 300 MG: 300 CAPSULE ORAL at 08:04

## 2024-04-17 RX ADMIN — PIPERACILLIN SODIUM AND TAZOBACTAM SODIUM 4.5 G: 4; .5 INJECTION, POWDER, LYOPHILIZED, FOR SOLUTION INTRAVENOUS at 04:04

## 2024-04-17 RX ADMIN — ASPIRIN 81 MG CHEWABLE TABLET 81 MG: 81 TABLET CHEWABLE at 08:04

## 2024-04-17 RX ADMIN — VANCOMYCIN HYDROCHLORIDE 1000 MG: 1 INJECTION, POWDER, LYOPHILIZED, FOR SOLUTION INTRAVENOUS at 02:04

## 2024-04-17 RX ADMIN — DOCUSATE SODIUM 100 MG: 100 CAPSULE, LIQUID FILLED ORAL at 08:04

## 2024-04-17 RX ADMIN — CILOSTAZOL 50 MG: 50 TABLET ORAL at 08:04

## 2024-04-17 RX ADMIN — OXYCODONE HYDROCHLORIDE 10 MG: 10 TABLET ORAL at 10:04

## 2024-04-17 RX ADMIN — OXYCODONE HYDROCHLORIDE 10 MG: 10 TABLET ORAL at 04:04

## 2024-04-17 RX ADMIN — OXYCODONE HYDROCHLORIDE 10 MG: 10 TABLET ORAL at 02:04

## 2024-04-17 RX ADMIN — AMLODIPINE BESYLATE 10 MG: 5 TABLET ORAL at 08:04

## 2024-04-17 RX ADMIN — OXYCODONE HYDROCHLORIDE 10 MG: 10 TABLET ORAL at 08:04

## 2024-04-17 RX ADMIN — PIPERACILLIN SODIUM AND TAZOBACTAM SODIUM 4.5 G: 4; .5 INJECTION, POWDER, LYOPHILIZED, FOR SOLUTION INTRAVENOUS at 08:04

## 2024-04-17 RX ADMIN — ENOXAPARIN SODIUM 40 MG: 40 INJECTION SUBCUTANEOUS at 05:04

## 2024-04-17 RX ADMIN — SODIUM CHLORIDE, PRESERVATIVE FREE 10 ML: 5 INJECTION INTRAVENOUS at 11:04

## 2024-04-17 RX ADMIN — PIPERACILLIN SODIUM AND TAZOBACTAM SODIUM 4.5 G: 4; .5 INJECTION, POWDER, LYOPHILIZED, FOR SOLUTION INTRAVENOUS at 01:04

## 2024-04-17 RX ADMIN — SODIUM CHLORIDE, PRESERVATIVE FREE 10 ML: 5 INJECTION INTRAVENOUS at 06:04

## 2024-04-17 RX ADMIN — GABAPENTIN 600 MG: 300 CAPSULE ORAL at 08:04

## 2024-04-17 RX ADMIN — CLOPIDOGREL BISULFATE 75 MG: 75 TABLET ORAL at 08:04

## 2024-04-17 RX ADMIN — GABAPENTIN 300 MG: 300 CAPSULE ORAL at 02:04

## 2024-04-17 RX ADMIN — LOSARTAN POTASSIUM 100 MG: 50 TABLET, FILM COATED ORAL at 08:04

## 2024-04-17 NOTE — PROGRESS NOTES
[facility] - 8th Floor [facility] Medicine  Progress Note    Patient Name: Shaheen Christie  MRN: [id number]  Patient Class: IP- Inpatient   Admission Date: 3/15/2024  Length of Stay: 33 days  Attending Physician: Enzo Thibodeaux MD  Primary Care Provider: Viktor Russ MD        Subjective:     Principal Problem:Osteomyelitis    Interval History:  Patient doing well.  Still followed by ID and Wound Care.    Review of Systems   All other systems reviewed and are negative.    Objective:     Vital Signs (Most Recent):  Temp: 98.3 °F (36.8 °C) (04/17/24 0354)  Pulse: 69 (04/17/24 0354)  Resp: 20 (04/17/24 0438)  BP: 112/63 (04/17/24 0354)  SpO2: 95 % (04/17/24 0354) Vital Signs (24h Range):  Temp:  [97.9 °F (36.6 °C)-99 °F (37.2 °C)] 98.3 °F (36.8 °C)  Pulse:  [69-88] 69  Resp:  [18-20] 20  SpO2:  [91 %-95 %] 95 %  BP: (112-144)/(63-76) 112/63     Weight: 68 kg (150 lb)  Body mass index is 22.14 kg/m².    Intake/Output Summary (Last 24 hours) at 4/17/2024 0626  Last data filed at 4/17/2024 0600  Gross per 24 hour   Intake 480 ml   Output 1775 ml   Net -1295 ml      Physical Exam  HENT:      Head: Normocephalic and atraumatic.      Right Ear: External ear normal.      Left Ear: External ear normal.      Mouth/Throat:      Mouth: Mucous membranes are dry.   Eyes:      Extraocular Movements: Extraocular movements intact.   Cardiovascular:      Rate and Rhythm: Normal rate and regular rhythm.      Pulses: Normal pulses.      Heart sounds: Normal heart sounds.   Pulmonary:      Effort: Pulmonary effort is normal.      Breath sounds: Normal breath sounds.   Abdominal:      General: Bowel sounds are normal.      Palpations: Abdomen is soft.   Musculoskeletal:      Cervical back: Neck supple.   Skin:     General: Skin is warm and dry.   Neurological:      General: No focal deficit present.      Mental Status: He is alert and oriented to person, place, and time. Mental status is at baseline.  "          Overview/Hospital Course:  No changes.    Significant Labs: All pertinent labs within the past 24 hours have been reviewed.  BMP: No results for input(s): "GLU", "NA", "K", "CL", "CO2", "BUN", "CREATININE", "CALCIUM", "MG" in the last 48 hours.  CBC: No results for input(s): "WBC", "HGB", "HCT", "PLT" in the last 48 hours.  CMP: No results for input(s): "NA", "K", "CL", "CO2", "GLU", "BUN", "CREATININE", "CALCIUM", "PROT", "ALBUMIN", "BILITOT", "ALKPHOS", "AST", "ALT", "ANIONGAP", "EGFRNONAA" in the last 48 hours.    Invalid input(s): "ESTGFAFRICA"    Significant Imaging: I have reviewed all pertinent imaging results/findings within the past 24 hours.    Assessment/Plan:      Active Diagnoses:    Diagnosis Date Noted POA    PRINCIPAL PROBLEM:  Osteomyelitis [M86.9] 03/15/2024 Yes    Knee effusion, left [M25.462] 03/31/2024 No    Pressure injury of sacral region, stage 4 [L89.154] 03/19/2024 Yes    Skin ulcer of right lower leg [L97.919] 03/19/2024 Yes    Skin ulcer of left lower leg [L97.929] 03/19/2024 Yes    Unstageable pressure ulcer of right heel [L89.610] 03/19/2024 Yes    Unstageable pressure ulcer of left heel [L89.620] 03/19/2024 Yes      Problems Resolved During this Admission:     VTE Risk Mitigation (From admission, onward)           Ordered     enoxaparin injection 40 mg  Daily         03/15/24 0934                   Patient is still about 9-10 more days of IV antibiotics.  I will recheck CBC CMP for tomorrow.  Continue IV antibiotics per ID recommendations.  Against patient was stay here until he finishes his IV antibiotics and then I will ask ID if they want to do some p.o. antibiotics afterwards.  Repeat CBC CMP for tomorrow.    Enzo Thibodeaux MD  Department of Hospital Medicine   [facility] - 8th Floor Med Surg    "

## 2024-04-17 NOTE — PLAN OF CARE
Spoke to sister Andreea . Notified her that the 3 choices have denied patient at this time. She states she will talk to her brother to see if he wants to give more choices.

## 2024-04-17 NOTE — PLAN OF CARE
[facility] sent new referrals to patient/fly's choices via [facility]:     Krish Winn  4.  [facility]

## 2024-04-17 NOTE — PROGRESS NOTES
Pharmacokinetic Assessment Follow Up: IV Vancomycin    Vancomycin serum concentration assessment(s):    The trough level was drawn correctly and can be used to guide therapy at this time. The measurement is above the desired definitive target range of 15 to 20 mcg/mL.    Vancomycin Regimen Plan:    Discontinue the scheduled vancomycin regimen and re-dose when the random level is less than 20 mcg/mL, next level to be drawn at 1200 on 04/17.    Drug levels (last 3 results):  Recent Labs   Lab Result Units 04/15/24  1045 04/17/24  0219   Vancomycin Trough ug/ml 22.2* 21.4*       Pharmacy will continue to follow and monitor vancomycin.    Please contact pharmacy at extension [phone] for questions regarding this assessment.    Thank you for the consult,   Clinton Lane       Patient brief summary:  Shaheen Christie is a 69 y.o. male initiated on antimicrobial therapy with IV Vancomycin for treatment of bone/joint infection    The patient's current regimen is N/A    Drug Allergies:   Review of patient's allergies indicates:  No Known Allergies    Actual Body Weight:   68kg    Renal Function:   Estimated Creatinine Clearance: 101.8 mL/min (A) (based on SCr of 0.64 mg/dL (L)).,     Dialysis Method (if applicable):  N/A    CBC (last 72 hours):  Recent Labs   Lab Result Units 04/15/24  0446   WBC x10(3)/mcL 8.54   Hgb g/dL 8.1*   Hct % 26.7*   Platelet x10(3)/mcL 442*   Mono % % 8.9   Eos % % 25.9   Basophil % % 2.0       Metabolic Panel (last 72 hours):  Recent Labs   Lab Result Units 04/15/24  0446   Sodium Level mmol/L 137   Potassium Level mmol/L 3.8   Chloride mmol/L 105   Carbon Dioxide mmol/L 26   Glucose Level mg/dL 102   Blood Urea Nitrogen mg/dL 9.6   Creatinine mg/dL 0.64*   Albumin Level g/dL 2.4*   Bilirubin Total mg/dL 0.2   Alkaline Phosphatase unit/L 101   Aspartate Aminotransferase unit/L 13   Alanine Aminotransferase unit/L 10       Vancomycin Administrations:  vancomycin given in the last 96 hours                      vancomycin 750 mg in dextrose 5 % 250 mL IVPB (ready to mix) (mg) 750 mg New Bag 04/16/24 1610     750 mg New Bag  0300     750 mg New Bag 04/15/24 1454    vancomycin in dextrose 5 % 1 gram/250 mL IVPB 1,000 mg (mg) 1,000 mg New Bag 04/14/24 2325     1,000 mg New Bag  1119     1,000 mg New Bag 04/13/24 2343     1,000 mg New Bag  1259                    Microbiologic Results:  Microbiology Results (last 7 days)       Procedure Component Value Units Date/Time    Gram Stain [[id number]]     Order Status: Sent Specimen: Body Fluid from Joint Fluid, Left Knee     Body Fluid Culture [[id number]]     Order Status: Sent Specimen: Body Fluid from Joint Fluid, Left Knee

## 2024-04-17 NOTE — PROGRESS NOTES
[facility]                       [address] [address]    PATIENT NAME:       HAWA KUMAR  YOB: 1955  CSN:                [id number]   MRN:                [id number]  ADMIT DATE:         03/15/2024 03:09:00  PHYSICIAN:          Tom Nichole DPM                            PROGRESS NOTE    DATE:  04/17/2024 00:00:00    SUBJECTIVE:  The patient was seen today, doing well, no major complaints.  Still   working on placement.  Has been sent to multiple establishments.    PHYSICAL EXAMINATION:  VITAL SIGNS:  Stable and afebrile.    Dressings are intact to both extremities.  Some serous exudate from both leg   wounds.  The wounds look about the same, still with some tendon exposure on that   left leg, but no stanley necrosis to any site.  Serous exudate noted throughout.    No fluctuance, crepitation, or bogginess noted to the tissues.  Heel wounds   continue to do well.    ASSESSMENT:  Bilateral lower extremity wounds status post debridement secondary   infection.    PLAN:  Continue with current care.  Dressings were changed.        ______________________________  Tom Nichole DPM    GAS/AQS  DD:  04/17/2024  Time:  02:00PM  DT:  04/17/2024  Time:  04:25PM  Job #:  [id number]      PROGRESS NOTE

## 2024-04-17 NOTE — PROGRESS NOTES
Pharmacokinetic Assessment Follow Up: IV Vancomycin    Vancomycin serum concentration assessment(s):    The random level was drawn correctly and can be used to guide therapy at this time. The measurement is within the desired definitive target range of 15 to 20 mcg/mL.    Vancomycin Regimen Plan:    Change regimen to Vancomycin 1000 mg IV every 24 hours with next serum trough concentration measured at 1300 prior to 3rd dose on 04/19.    Drug levels (last 3 results):  Recent Labs   Lab Result Units 04/15/24  1045 04/17/24  0219 04/17/24  1238   Vanc Lvl Random ug/ml  --   --  16.3   Vancomycin Trough ug/ml 22.2* 21.4*  --        Pharmacy will continue to follow and monitor vancomycin.    Please contact pharmacy at extension [phone] for questions regarding this assessment.    Thank you for the consult,   Renetta Barron       Patient brief summary:  Shaheen Christie is a 69 y.o. male initiated on antimicrobial therapy with IV Vancomycin for treatment of bone/joint infection    The patient's current regimen is N/A    Drug Allergies:   Review of patient's allergies indicates:  No Known Allergies    Actual Body Weight:   68 kg    Renal Function:   Estimated Creatinine Clearance: 101.8 mL/min (A) (based on SCr of 0.64 mg/dL (L)).,     Dialysis Method (if applicable):  N/A    CBC (last 72 hours):  Recent Labs   Lab Result Units 04/15/24  0446   WBC x10(3)/mcL 8.54   Hgb g/dL 8.1*   Hct % 26.7*   Platelet x10(3)/mcL 442*   Mono % % 8.9   Eos % % 25.9   Basophil % % 2.0       Metabolic Panel (last 72 hours):  Recent Labs   Lab Result Units 04/15/24  0446   Sodium Level mmol/L 137   Potassium Level mmol/L 3.8   Chloride mmol/L 105   Carbon Dioxide mmol/L 26   Glucose Level mg/dL 102   Blood Urea Nitrogen mg/dL 9.6   Creatinine mg/dL 0.64*   Albumin Level g/dL 2.4*   Bilirubin Total mg/dL 0.2   Alkaline Phosphatase unit/L 101   Aspartate Aminotransferase unit/L 13   Alanine Aminotransferase unit/L 10       Vancomycin  Administrations:  vancomycin given in the last 96 hours                     vancomycin 750 mg in dextrose 5 % 250 mL IVPB (ready to mix) (mg) 750 mg New Bag 04/16/24 1610     750 mg New Bag  0300     750 mg New Bag 04/15/24 1454    vancomycin in dextrose 5 % 1 gram/250 mL IVPB 1,000 mg (mg) 1,000 mg New Bag 04/14/24 2325     1,000 mg New Bag  1119     1,000 mg New Bag 04/13/24 2343                    Microbiologic Results:  Microbiology Results (last 7 days)       ** No results found for the last 168 hours. **

## 2024-04-17 NOTE — PLAN OF CARE
Spoke to patient and sister Andreea who is in the room. More choices added to [facility] 1)[facility] 2)[facility] 3) [facility] 4) [facility]. [facility] notified.

## 2024-04-18 LAB
ABS NEUT (OLG): 5.05 X10(3)/MCL (ref 2.1–9.2)
ALBUMIN SERPL-MCNC: 2.2 G/DL (ref 3.4–4.8)
ALBUMIN/GLOB SERPL: 0.7 RATIO (ref 1.1–2)
ALP SERPL-CCNC: 87 UNIT/L (ref 40–150)
ALT SERPL-CCNC: 11 UNIT/L (ref 0–55)
ANISOCYTOSIS BLD QL SMEAR: ABNORMAL
AST SERPL-CCNC: 13 UNIT/L (ref 5–34)
BASOPHILS NFR BLD MANUAL: 0.45 X10(3)/MCL (ref 0–0.2)
BASOPHILS NFR BLD MANUAL: 5 %
BILIRUB SERPL-MCNC: 0.2 MG/DL
BUN SERPL-MCNC: 13.1 MG/DL (ref 8.4–25.7)
CALCIUM SERPL-MCNC: 8.8 MG/DL (ref 8.8–10)
CHLORIDE SERPL-SCNC: 104 MMOL/L (ref 98–107)
CO2 SERPL-SCNC: 25 MMOL/L (ref 23–31)
CREAT SERPL-MCNC: 0.65 MG/DL (ref 0.73–1.18)
EOSINOPHIL NFR BLD MANUAL: 1.17 X10(3)/MCL (ref 0–0.9)
EOSINOPHIL NFR BLD MANUAL: 13 %
ERYTHROCYTE [DISTWIDTH] IN BLOOD BY AUTOMATED COUNT: 17.6 % (ref 11.5–17)
GFR SERPLBLD CREATININE-BSD FMLA CKD-EPI: >60 MLS/MIN/1.73/M2
GLOBULIN SER-MCNC: 3.1 GM/DL (ref 2.4–3.5)
GLUCOSE SERPL-MCNC: 141 MG/DL (ref 82–115)
HCT VFR BLD AUTO: 26.1 % (ref 42–52)
HGB BLD-MCNC: 8 G/DL (ref 14–18)
INSTRUMENT WBC (OLG): 9.01 X10(3)/MCL
LYMPHOCYTES NFR BLD MANUAL: 1.62 X10(3)/MCL
LYMPHOCYTES NFR BLD MANUAL: 18 %
MCH RBC QN AUTO: 26.5 PG (ref 27–31)
MCHC RBC AUTO-ENTMCNC: 30.7 G/DL (ref 33–36)
MCV RBC AUTO: 86.4 FL (ref 80–94)
MONOCYTES NFR BLD MANUAL: 0.63 X10(3)/MCL (ref 0.1–1.3)
MONOCYTES NFR BLD MANUAL: 7 %
MYELOCYTES NFR BLD MANUAL: 1 %
NEUTROPHILS NFR BLD MANUAL: 56 %
NRBC BLD AUTO-RTO: 0 %
PLATELET # BLD AUTO: 467 X10(3)/MCL (ref 130–400)
PLATELET # BLD EST: ABNORMAL 10*3/UL
PMV BLD AUTO: 8.4 FL (ref 7.4–10.4)
POIKILOCYTOSIS BLD QL SMEAR: ABNORMAL
POTASSIUM SERPL-SCNC: 3.7 MMOL/L (ref 3.5–5.1)
PROT SERPL-MCNC: 5.3 GM/DL (ref 5.8–7.6)
RBC # BLD AUTO: 3.02 X10(6)/MCL (ref 4.7–6.1)
RBC MORPH BLD: ABNORMAL
SODIUM SERPL-SCNC: 135 MMOL/L (ref 136–145)
STOMATOCYTES (OLG): ABNORMAL
TARGETS BLD QL SMEAR: ABNORMAL
WBC # SPEC AUTO: 9.01 X10(3)/MCL (ref 4.5–11.5)

## 2024-04-18 PROCEDURE — A4216 STERILE WATER/SALINE, 10 ML: HCPCS | Performed by: INTERNAL MEDICINE

## 2024-04-18 PROCEDURE — 25000003 PHARM REV CODE 250: Performed by: INTERNAL MEDICINE

## 2024-04-18 PROCEDURE — 25000003 PHARM REV CODE 250: Performed by: PODIATRIST

## 2024-04-18 PROCEDURE — 63600175 PHARM REV CODE 636 W HCPCS: Performed by: PODIATRIST

## 2024-04-18 PROCEDURE — 63600175 PHARM REV CODE 636 W HCPCS: Performed by: NURSE PRACTITIONER

## 2024-04-18 PROCEDURE — 85027 COMPLETE CBC AUTOMATED: CPT | Performed by: INTERNAL MEDICINE

## 2024-04-18 PROCEDURE — 11000001 HC ACUTE MED/SURG PRIVATE ROOM

## 2024-04-18 PROCEDURE — 80053 COMPREHEN METABOLIC PANEL: CPT | Performed by: INTERNAL MEDICINE

## 2024-04-18 PROCEDURE — 25000003 PHARM REV CODE 250: Performed by: NURSE PRACTITIONER

## 2024-04-18 PROCEDURE — 63600175 PHARM REV CODE 636 W HCPCS: Performed by: INTERNAL MEDICINE

## 2024-04-18 RX ADMIN — GABAPENTIN 300 MG: 300 CAPSULE ORAL at 08:04

## 2024-04-18 RX ADMIN — CILOSTAZOL 50 MG: 50 TABLET ORAL at 08:04

## 2024-04-18 RX ADMIN — OXYCODONE HYDROCHLORIDE 10 MG: 10 TABLET ORAL at 08:04

## 2024-04-18 RX ADMIN — DULOXETINE HYDROCHLORIDE 30 MG: 30 CAPSULE, DELAYED RELEASE ORAL at 08:04

## 2024-04-18 RX ADMIN — FERROUS SULFATE TAB 325 MG (65 MG ELEMENTAL FE) 1 EACH: 325 (65 FE) TAB at 08:04

## 2024-04-18 RX ADMIN — PIPERACILLIN SODIUM AND TAZOBACTAM SODIUM 4.5 G: 4; .5 INJECTION, POWDER, LYOPHILIZED, FOR SOLUTION INTRAVENOUS at 05:04

## 2024-04-18 RX ADMIN — DOCUSATE SODIUM 100 MG: 100 CAPSULE, LIQUID FILLED ORAL at 08:04

## 2024-04-18 RX ADMIN — THERA TABS 1 TABLET: TAB at 08:04

## 2024-04-18 RX ADMIN — SODIUM CHLORIDE, PRESERVATIVE FREE 10 ML: 5 INJECTION INTRAVENOUS at 06:04

## 2024-04-18 RX ADMIN — OXYCODONE HYDROCHLORIDE 10 MG: 10 TABLET ORAL at 11:04

## 2024-04-18 RX ADMIN — GABAPENTIN 600 MG: 300 CAPSULE ORAL at 08:04

## 2024-04-18 RX ADMIN — SODIUM CHLORIDE, PRESERVATIVE FREE 10 ML: 5 INJECTION INTRAVENOUS at 12:04

## 2024-04-18 RX ADMIN — PIPERACILLIN SODIUM AND TAZOBACTAM SODIUM 4.5 G: 4; .5 INJECTION, POWDER, LYOPHILIZED, FOR SOLUTION INTRAVENOUS at 01:04

## 2024-04-18 RX ADMIN — OXYCODONE HYDROCHLORIDE 10 MG: 10 TABLET ORAL at 03:04

## 2024-04-18 RX ADMIN — ASPIRIN 81 MG CHEWABLE TABLET 81 MG: 81 TABLET CHEWABLE at 08:04

## 2024-04-18 RX ADMIN — CLOPIDOGREL BISULFATE 75 MG: 75 TABLET ORAL at 08:04

## 2024-04-18 RX ADMIN — SODIUM CHLORIDE, PRESERVATIVE FREE 10 ML: 5 INJECTION INTRAVENOUS at 05:04

## 2024-04-18 RX ADMIN — PIPERACILLIN SODIUM AND TAZOBACTAM SODIUM 4.5 G: 4; .5 INJECTION, POWDER, LYOPHILIZED, FOR SOLUTION INTRAVENOUS at 08:04

## 2024-04-18 RX ADMIN — LIDOCAINE 5% 1 PATCH: 700 PATCH TOPICAL at 10:04

## 2024-04-18 RX ADMIN — ENOXAPARIN SODIUM 40 MG: 40 INJECTION SUBCUTANEOUS at 04:04

## 2024-04-18 RX ADMIN — GABAPENTIN 300 MG: 300 CAPSULE ORAL at 03:04

## 2024-04-18 RX ADMIN — OXYCODONE HYDROCHLORIDE 10 MG: 10 TABLET ORAL at 12:04

## 2024-04-18 RX ADMIN — VANCOMYCIN HYDROCHLORIDE 1000 MG: 1 INJECTION, POWDER, LYOPHILIZED, FOR SOLUTION INTRAVENOUS at 01:04

## 2024-04-18 RX ADMIN — OXYCODONE HYDROCHLORIDE 10 MG: 10 TABLET ORAL at 05:04

## 2024-04-18 NOTE — PLAN OF CARE
Patient's next 4 choices:     4. [facility]- talked to Mayra they're reviewing patient's information. Denied. Unable to accept.    5. [facility]-Denied once again, unable to meet needs.     6. [facility]-out of network with [facility] and wouldn't accept him with those wounds coming from another facility.    7. [facility]- referral received and Lisa or Corina will get back with me. Denied, no available bed via [facility].    CM notified patient will not be given anymore choices.

## 2024-04-18 NOTE — PROGRESS NOTES
[facility]                       [address] [address]    PATIENT NAME:       HAWA KUMAR  YOB: 1955  CSN:                [id number]   MRN:                [id number]  ADMIT DATE:         03/15/2024 03:09:00  PHYSICIAN:          Tom Nichole DPM                            PROGRESS NOTE    DATE:  04/18/2024 00:00:00    SUBJECTIVE:  The patient is seen today.  Apparently, he has been denied   placement at all other facilities that have been attempted for placement.  The   patient without any complaints today.    PHYSICAL EXAMINATION:  VITAL SIGNS:  Stable and afebrile.    Labs reviewed.  White cell counts 9, H and H 8 and 26.  BUN and creatinine 13.1   and 0.65.    Wounds doing about the same on both extremities.  Still with again evidence of   some tendon exposure on that left leg.  No stanley necrosis at any site.  Serous   exudate noted left greater than right.  No palpable pedal pulses.  No   contractures.  Still with notable discomfort in the left knee.    ASSESSMENT:  Bilateral lower extremity wounds status post debridement.    PLAN:  Continue with current care.  Dressings changed.  The patient is still   contemplating simple amputation of his left leg.        ______________________________  Tom Nichole DPM    GAS/AQS  DD:  04/18/2024  Time:  03:52PM  DT:  04/18/2024  Time:  06:19PM  Job #:  [id number]      PROGRESS NOTE

## 2024-04-18 NOTE — PLAN OF CARE
Spoke to sister Andreea and to patient to notify that his choices all denied acceptance to their facility.   Andreea states she has been talking with Jaskaran at [facility] and she will follow up with him before patient is ready for discharge.

## 2024-04-18 NOTE — PROGRESS NOTES
[facility] - 8th Floor [facility] Medicine  Progress Note    Patient Name: Shaheen Christie  MRN: [id number]  Patient Class: IP- Inpatient   Admission Date: 3/15/2024  Length of Stay: 34 days  Attending Physician: Enzo Thibodeaux MD  Primary Care Provider: Viktor Russ MD        Subjective:     Principal Problem:Osteomyelitis    Interval History:  Patient doing fine.  No complaints.    Review of Systems   All other systems reviewed and are negative.    Objective:     Vital Signs (Most Recent):  Temp: 98 °F (36.7 °C) (04/18/24 0421)  Pulse: 70 (04/18/24 0421)  Resp: 17 (04/18/24 0551)  BP: (!) 121/56 (04/18/24 0421)  SpO2: 96 % (04/18/24 0421) Vital Signs (24h Range):  Temp:  [97.8 °F (36.6 °C)-98.8 °F (37.1 °C)] 98 °F (36.7 °C)  Pulse:  [70-94] 70  Resp:  [16-18] 17  SpO2:  [93 %-96 %] 96 %  BP: ()/(53-65) 121/56     Weight: 68 kg (150 lb)  Body mass index is 22.14 kg/m².    Intake/Output Summary (Last 24 hours) at 4/18/2024 0634  Last data filed at 4/18/2024 0500  Gross per 24 hour   Intake 720 ml   Output 1701 ml   Net -981 ml      Physical Exam  HENT:      Head: Normocephalic and atraumatic.      Right Ear: External ear normal.      Left Ear: External ear normal.      Mouth/Throat:      Mouth: Mucous membranes are dry.   Eyes:      Extraocular Movements: Extraocular movements intact.   Cardiovascular:      Rate and Rhythm: Normal rate and regular rhythm.      Pulses: Normal pulses.      Heart sounds: Normal heart sounds.   Abdominal:      General: Abdomen is flat. Bowel sounds are normal.      Palpations: Abdomen is soft.   Musculoskeletal:      Cervical back: Neck supple.   Skin:     General: Skin is warm and dry.   Neurological:      General: No focal deficit present.      Mental Status: He is alert. Mental status is at baseline.           Overview/Hospital Course:  Doing well.  Still a little bit anemic.  Iron-deficiency time.  And of IV antibiotics in 8 today according  to ID.  No plan to continue oral antibiotics at that time.    Significant Labs: All pertinent labs within the past 24 hours have been reviewed.  BMP:   Recent Labs   Lab 04/18/24 0007   *   K 3.7   CO2 25   BUN 13.1   CREATININE 0.65*   CALCIUM 8.8     CBC:   Recent Labs   Lab 04/18/24 0007   WBC 9.01  9.01   HGB 8.0*   HCT 26.1*   *     CMP:   Recent Labs   Lab 04/18/24 0007   *   K 3.7   CO2 25   BUN 13.1   CREATININE 0.65*   CALCIUM 8.8   ALBUMIN 2.2*   BILITOT 0.2   ALKPHOS 87   AST 13   ALT 11       Significant Imaging: I have reviewed all pertinent imaging results/findings within the past 24 hours.    Assessment/Plan:      Active Diagnoses:    Diagnosis Date Noted POA    PRINCIPAL PROBLEM:  Osteomyelitis [M86.9] 03/15/2024 Yes    Knee effusion, left [M25.462] 03/31/2024 No    Pressure injury of sacral region, stage 4 [L89.154] 03/19/2024 Yes    Skin ulcer of right lower leg [L97.919] 03/19/2024 Yes    Skin ulcer of left lower leg [L97.929] 03/19/2024 Yes    Unstageable pressure ulcer of right heel [L89.610] 03/19/2024 Yes    Unstageable pressure ulcer of left heel [L89.620] 03/19/2024 Yes      Problems Resolved During this Admission:     VTE Risk Mitigation (From admission, onward)           Ordered     enoxaparin injection 40 mg  Daily         03/15/24 0934                       Enzo Thibodeaux MD  Department of Hospital Medicine   [facility] - 8th Floor Med Surg

## 2024-04-18 NOTE — PROGRESS NOTES
Infectious Diseases Progress Note  69-year-old male, nursing home resident, with past medical history of HTN, COPD, DM type 2, with neuropathy, is admitted to [facility] on 03/15/2024 sent via EMS for sacral wound evaluation, apparently diagnosed with osteomyelitis involving the sacrum on 02/27/2024 with plan to be admitted to LTAC which had not been successful as an outpatient.  On presentation he was noted to have no fevers and no leukocytosis, anemic with low albumin.  Blood cultures have been negative.  Review of his records show a 02/08/2024 left leg wound culture with Pseudomonas, Providencia, ESBL Proteus and 11/30/2023 sacral wound culture with MRSA, Providencia, Enterococcus and Pseudomonas.  2/27 MRI of the pelvis shows osteomyelitis involving the sacrococcygeal bone as well as right trochanteric bursitis which may be septic.  He was seen by surgery team with no plan for surgical intervention.  He is on antibiotic coverage with vancomycin and Zosyn.     Subjective:  Lying in bed in no acute distress. No new complaints voiced. Afebrile.     Past Medical History:   Diagnosis Date    COPD (chronic obstructive pulmonary disease)     Depression     Hypertension     Neuropathy      Past Surgical History:   Procedure Laterality Date    angiogram Bilateral     stents placed in left leg    anterior neck surgery      ARTHROTOMY OF ANKLE Left 6/28/2023    Procedure: ARTHROTOMY, ANKLE;  Surgeon: Tom Nichole DPM;  Location: [facility];  Service: Podiatry;  Laterality: Left;    cataracts Left     CHOLECYSTECTOMY      DEBRIDEMENT OF FOOT Bilateral 3/26/2024    Procedure: DEBRIDEMENT, FOOT;  Surgeon: Tom Nichole DPM;  Location: [facility] OR;  Service: Podiatry;  Laterality: Bilateral;    EYE SURGERY Left     HAND SURGERY Left     broken bone    herina repair      INCISION AND DRAINAGE, LOWER EXTREMITY Left 7/28/2023    Procedure: INCISION AND DRAINAGE, LOWER EXTREMITY;  Surgeon: Avinash HURTADO  MD Dez;  Location: [facility] OR;  Service: Orthopedics;  Laterality: Left;  Supine, vascular bed, bone foam  last case  cysto tubing, laparoscopic trocar, experience, vanc, hemovac drain    KNEE SURGERY Bilateral     PERIPHERAL ANGIOGRAPHY N/A 3/13/2024    Procedure: Peripheral angiography;  Surgeon: Deven Queen MD;  Location: [facility] CATH LAB;  Service: Cardiology;  Laterality: N/A;  MICHELLE ANGIO W/ ANEST.    posterior neck surgery      SPINE SURGERY      TOTAL REPLACEMENT OF BOTH HIP JOINTS USING COMPUTER-ASSISTED NAVIGATION Bilateral      Social History     Socioeconomic History    Marital status:    Occupational History    Occupation: retired   Tobacco Use    Smoking status: Former     Average packs/day: 0.5 packs/day for 15.0 years (7.5 ttl pk-yrs)     Types: Cigarettes     Start date: 2008    Smokeless tobacco: Never   Substance and Sexual Activity    Alcohol use: Yes     Alcohol/week: 3.0 - 6.0 standard drinks of alcohol     Types: 3 - 6 Cans of beer per week    Drug use: Yes     Types: Marijuana    Sexual activity: Not Currently       ROS  Constitutional:  Positive for malaise/fatigue.   HENT: Negative.     Respiratory: Negative.     Gastrointestinal: Negative.    Genitourinary: Negative.    Musculoskeletal: Positive for L knee pain   Skin: Multiple pressure wounds   Neurological:  Positive for weakness.   Endo/Heme/Allergies: Negative.    Psychiatric/Behavioral: Negative.     All other Systems review done and negative.    Review of patient's allergies indicates:  No Known Allergies      Scheduled Meds:  Current Facility-Administered Medications   Medication Dose Route Frequency    amLODIPine  10 mg Oral Daily    aspirin  81 mg Oral Daily    cilostazoL  50 mg Oral BID    clopidogreL  75 mg Oral Daily    docusate sodium  100 mg Oral BID    DULoxetine  30 mg Oral Daily    enoxaparin  40 mg Subcutaneous Daily    ferrous sulfate  1 tablet Oral Daily    gabapentin  300 mg Oral TID    gabapentin  600 mg Oral  "QHS    LIDOcaine  1 patch Transdermal Q24H    losartan  100 mg Oral Daily    multivitamin  1 tablet Oral Daily    oxyCODONE  10 mg Oral Q4H    piperacillin-tazobactam (Zosyn) IV (PEDS and ADULTS) (extended infusion is not appropriate)  4.5 g Intravenous Q8H    polyethylene glycol  17 g Oral Daily    sodium chloride 0.9%  10 mL Intravenous Q6H    vancomycin (VANCOCIN) IV (PEDS and ADULTS)  1,000 mg Intravenous Q24H     Continuous Infusions:  Current Facility-Administered Medications   Medication Dose Route Frequency Last Rate Last Admin     PRN Meds:    Current Facility-Administered Medications:     acetaminophen, 650 mg, Oral, Q6H PRN    albuterol, 2 puff, Inhalation, QID PRN    aluminum-magnesium hydroxide-simethicone, 15 mL, Oral, Q6H PRN    HYDROcodone-acetaminophen, 1 tablet, Oral, Q4H PRN    melatonin, 6 mg, Oral, Nightly PRN    simethicone, 80 mg, Oral, Q6H PRN    Flushing PICC/Midline Protocol, , , Until Discontinued **AND** sodium chloride 0.9%, 10 mL, Intravenous, Q6H **AND** sodium chloride 0.9%, 10 mL, Intravenous, PRN    Pharmacy to dose Vancomycin consult, , , Once **AND** vancomycin - pharmacy to dose, , Intravenous, pharmacy to manage frequency      Objective:  /61   Pulse 81   Temp 98.8 °F (37.1 °C) (Oral)   Resp 17   Ht 5' 9.02" (1.753 m)   Wt 68 kg (150 lb)   SpO2 (!) 93%   BMI 22.14 kg/m²     Physical Exam:   Physical Exam  Vitals reviewed.   Constitutional:       General: He is not in acute distress.  HENT:      Head: Normocephalic and atraumatic.   Cardiovascular:      Rate and Rhythm: Normal rate and regular rhythm.      Heart sounds: Normal heart sounds.   Pulmonary:      Effort: Pulmonary effort is normal. No respiratory distress.      Breath sounds: Normal breath sounds.   Abdominal:      General: Bowel sounds are normal. There is no distension.      Palpations: Abdomen is soft.      Tenderness: There is no abdominal tenderness.   Musculoskeletal:         General: No deformity. "      Cervical back: Neck supple.   Skin:     Findings: No erythema or rash.      Comments: Wound vac to sacrum  Neurological:      Mental Status: He is alert and oriented to person, place, and time.   Psychiatric:      Comments: Calm and cooperative     Imaging  Imaging Results    None          Lab Review   Recent Results (from the past 24 hour(s))   VANCOMYCIN, TROUGH    Collection Time: 04/17/24  2:19 AM   Result Value Ref Range    Vancomycin Trough 21.4 (H) 15.0 - 20.0 ug/ml   Vancomycin, Random    Collection Time: 04/17/24 12:38 PM   Result Value Ref Range    Vanc Lvl Random 16.3 15.0 - 20.0 ug/ml         Assessment/Plan:  1. Chronic sacral pressure wound with osteomyelitis  2. History of polymicrobial sacral wound culture - MRSA, Providencia, Pseudomonas, Enterococcus  3.  Chronic left lower extremity wound with history of polymicrobial wound culture-Pseudomonas, ESBL Proteus, Providencia  4. Multiple pressure wounds  5.  Diabetes type 2  6.  Anemia  7.  Protein calorie malnutrition      -Continue Vancomycin and Zosyn, plan end date of 04/26, following inflammatory markers  -Remains afebrile without leukocytosis  -CT L knee without contrast today 4/10 with no joint effusion, nonfocal soft tissue swelling, severe bone demineralization, arthritis  -Seen by Ortho, inputs noted. S/P L knee aspiration on 3/26   -3/26 left heel tissue cultures with few ESBL Proteus mirabilis  -4/9 ESR 76 from 29 and CRP 72.10 from 39.20  -3/15 blood cultures negative  -2/8 left leg wound cultures with many Pseudomonas, Providencia, Proteus  -11/30/2023 sacral wound with many Providencia, MRSA, Enterococcus, Pseudomonas  -Seen by the surgery team with inputs noted including no plan for surgical intervention   -Podiatry on board s/p debridement 3/26, cultures revealed ESBL Proteus  -We will continue aggressive wound care per Podiatry and the wound care team  -Discussed with patient and nursing staff. Case management working on  placement. Disposition per primary team

## 2024-04-19 LAB — VANCOMYCIN TROUGH SERPL-MCNC: 12.7 UG/ML (ref 15–20)

## 2024-04-19 PROCEDURE — 99233 SBSQ HOSP IP/OBS HIGH 50: CPT | Mod: ,,,

## 2024-04-19 PROCEDURE — 25000003 PHARM REV CODE 250: Performed by: INTERNAL MEDICINE

## 2024-04-19 PROCEDURE — 25000003 PHARM REV CODE 250: Performed by: PODIATRIST

## 2024-04-19 PROCEDURE — 63600175 PHARM REV CODE 636 W HCPCS: Performed by: PODIATRIST

## 2024-04-19 PROCEDURE — 63600175 PHARM REV CODE 636 W HCPCS: Performed by: INTERNAL MEDICINE

## 2024-04-19 PROCEDURE — 11000001 HC ACUTE MED/SURG PRIVATE ROOM

## 2024-04-19 PROCEDURE — 80202 ASSAY OF VANCOMYCIN: CPT | Performed by: NURSE PRACTITIONER

## 2024-04-19 PROCEDURE — 63600175 PHARM REV CODE 636 W HCPCS: Performed by: NURSE PRACTITIONER

## 2024-04-19 PROCEDURE — 97606 NEG PRS WND THER DME>50 SQCM: CPT

## 2024-04-19 PROCEDURE — A4216 STERILE WATER/SALINE, 10 ML: HCPCS | Performed by: INTERNAL MEDICINE

## 2024-04-19 RX ORDER — VANCOMYCIN HCL IN 5 % DEXTROSE 1G/250ML
1000 PLASTIC BAG, INJECTION (ML) INTRAVENOUS
Status: COMPLETED | OUTPATIENT
Start: 2024-04-20 | End: 2024-04-26

## 2024-04-19 RX ADMIN — DOCUSATE SODIUM 100 MG: 100 CAPSULE, LIQUID FILLED ORAL at 08:04

## 2024-04-19 RX ADMIN — GABAPENTIN 300 MG: 300 CAPSULE ORAL at 09:04

## 2024-04-19 RX ADMIN — DULOXETINE HYDROCHLORIDE 30 MG: 30 CAPSULE, DELAYED RELEASE ORAL at 09:04

## 2024-04-19 RX ADMIN — LIDOCAINE 5% 1 PATCH: 700 PATCH TOPICAL at 09:04

## 2024-04-19 RX ADMIN — SODIUM CHLORIDE, PRESERVATIVE FREE 10 ML: 5 INJECTION INTRAVENOUS at 02:04

## 2024-04-19 RX ADMIN — VANCOMYCIN HYDROCHLORIDE 1000 MG: 1 INJECTION, POWDER, LYOPHILIZED, FOR SOLUTION INTRAVENOUS at 02:04

## 2024-04-19 RX ADMIN — AMLODIPINE BESYLATE 10 MG: 5 TABLET ORAL at 09:04

## 2024-04-19 RX ADMIN — OXYCODONE HYDROCHLORIDE 10 MG: 10 TABLET ORAL at 09:04

## 2024-04-19 RX ADMIN — OXYCODONE HYDROCHLORIDE 10 MG: 10 TABLET ORAL at 02:04

## 2024-04-19 RX ADMIN — SODIUM CHLORIDE, PRESERVATIVE FREE 10 ML: 5 INJECTION INTRAVENOUS at 06:04

## 2024-04-19 RX ADMIN — GABAPENTIN 600 MG: 300 CAPSULE ORAL at 08:04

## 2024-04-19 RX ADMIN — OXYCODONE HYDROCHLORIDE 10 MG: 10 TABLET ORAL at 07:04

## 2024-04-19 RX ADMIN — PIPERACILLIN SODIUM AND TAZOBACTAM SODIUM 4.5 G: 4; .5 INJECTION, POWDER, LYOPHILIZED, FOR SOLUTION INTRAVENOUS at 04:04

## 2024-04-19 RX ADMIN — CILOSTAZOL 50 MG: 50 TABLET ORAL at 09:04

## 2024-04-19 RX ADMIN — THERA TABS 1 TABLET: TAB at 09:04

## 2024-04-19 RX ADMIN — CLOPIDOGREL BISULFATE 75 MG: 75 TABLET ORAL at 09:04

## 2024-04-19 RX ADMIN — ENOXAPARIN SODIUM 40 MG: 40 INJECTION SUBCUTANEOUS at 06:04

## 2024-04-19 RX ADMIN — PIPERACILLIN SODIUM AND TAZOBACTAM SODIUM 4.5 G: 4; .5 INJECTION, POWDER, LYOPHILIZED, FOR SOLUTION INTRAVENOUS at 02:04

## 2024-04-19 RX ADMIN — OXYCODONE HYDROCHLORIDE 10 MG: 10 TABLET ORAL at 11:04

## 2024-04-19 RX ADMIN — PIPERACILLIN SODIUM AND TAZOBACTAM SODIUM 4.5 G: 4; .5 INJECTION, POWDER, LYOPHILIZED, FOR SOLUTION INTRAVENOUS at 08:04

## 2024-04-19 RX ADMIN — LOSARTAN POTASSIUM 100 MG: 50 TABLET, FILM COATED ORAL at 09:04

## 2024-04-19 RX ADMIN — FERROUS SULFATE TAB 325 MG (65 MG ELEMENTAL FE) 1 EACH: 325 (65 FE) TAB at 09:04

## 2024-04-19 RX ADMIN — HYDROCODONE BITARTRATE AND ACETAMINOPHEN 1 TABLET: 5; 325 TABLET ORAL at 03:04

## 2024-04-19 RX ADMIN — GABAPENTIN 300 MG: 300 CAPSULE ORAL at 02:04

## 2024-04-19 RX ADMIN — OXYCODONE HYDROCHLORIDE 10 MG: 10 TABLET ORAL at 04:04

## 2024-04-19 RX ADMIN — ASPIRIN 81 MG CHEWABLE TABLET 81 MG: 81 TABLET CHEWABLE at 09:04

## 2024-04-19 RX ADMIN — SODIUM CHLORIDE, PRESERVATIVE FREE 10 ML: 5 INJECTION INTRAVENOUS at 11:04

## 2024-04-19 NOTE — PROGRESS NOTES
Inpatient Nutrition Assessment    Admit Date: 3/15/2024   Total duration of encounter: 35 days   Patient Age: 69 y.o.    Nutrition Recommendation/Prescription     -Continue Heart Healthy Diet as tolerated.    -Encourage Pierre BID for wound healing.   -Continue MVI with minerals as medically feasible.   -Monitor wt, labs, and intake. Obtain and document a more recent measured weight.     Communication of Recommendations: reviewed with patient    Nutrition Assessment     Malnutrition Assessment/Nutrition-Focused Physical Exam    Malnutrition Context: chronic illness (03/20/24 1317)  Malnutrition Level: severe (03/20/24 1317)  Energy Intake (Malnutrition): less than 75% for greater than or equal to 3 months (03/20/24 1317)  Weight Loss (Malnutrition): greater than 7.5% in 3 months (03/20/24 1317)  Subcutaneous Fat (Malnutrition): severe depletion (03/20/24 1317)  Orbital Region (Subcutaneous Fat Loss): severe depletion  Upper Arm Region (Subcutaneous Fat Loss): severe depletion     Muscle Mass (Malnutrition): severe depletion (03/20/24 1317)  [place] Region (Muscle Loss): severe depletion                       Fluid Accumulation (Malnutrition): other (see comments) (does not meet criteria) (03/20/24 1317)  Hand  Strength, Left (Malnutrition): unable to evaluate (03/20/24 1317)  Hand  Strength, Right (Malnutrition): unable to evaluate (03/20/24 1317)  A minimum of two characteristics is recommended for diagnosis of either severe or non-severe malnutrition.    Chart Review    Reason Seen: continuous nutrition monitoring and follow-up    Malnutrition Screening Tool Results   Have you recently lost weight without trying?: No  Have you been eating poorly because of a decreased appetite?: No   MST Score: 0   Diagnosis:  Stage 4 pressure ulcer to sacrum  Unstageable pressure ulcers to bilateral heels  Open wound/ulcers to right lateral lower leg and left lateral lower leg  Peripheral arterial disease - with history of  vascular intervention with Dr. Queen (most recent angio 3/13/24)  PVD  History of polymicrobial sacral wound culture  History of polymicrobial wound culture to leg wound  DM2  Anemia  History of smoking  Low prealbumin     Relevant Medical History: HTN, COPD, DM2 with neuropathy, PAD, chronic venous hypertension     Scheduled Medications:  amLODIPine, 10 mg, Daily  aspirin, 81 mg, Daily  cilostazoL, 50 mg, BID  clopidogreL, 75 mg, Daily  docusate sodium, 100 mg, BID  DULoxetine, 30 mg, Daily  enoxaparin, 40 mg, Daily  ferrous sulfate, 1 tablet, Daily  gabapentin, 300 mg, TID  gabapentin, 600 mg, QHS  LIDOcaine, 1 patch, Q24H  losartan, 100 mg, Daily  multivitamin, 1 tablet, Daily  oxyCODONE, 10 mg, Q4H  piperacillin-tazobactam (Zosyn) IV (PEDS and ADULTS) (extended infusion is not appropriate), 4.5 g, Q8H  polyethylene glycol, 17 g, Daily  sodium chloride 0.9%, 10 mL, Q6H  vancomycin (VANCOCIN) IV (PEDS and ADULTS), 1,000 mg, Q24H    Continuous Infusions:   PRN Medications:   Review      Calorie Containing IV Medications: no significant kcals from medications at this time    Recent Labs   Lab 04/15/24  0446 04/18/24  0007    135*   K 3.8 3.7   CALCIUM 8.9 8.8   CHLORIDE 105 104   CO2 26 25   BUN 9.6 13.1   CREATININE 0.64* 0.65*   EGFRNORACEVR >60 >60   GLUCOSE 102 141*   BILITOT 0.2 0.2   ALKPHOS 101 87   ALT 10 11   AST 13 13   ALBUMIN 2.4* 2.2*   WBC 8.54 9.01  9.01   HGB 8.1* 8.0*   HCT 26.7* 26.1*     Nutrition Orders:  Diet Heart Healthy      Appetite/Oral Intake: good/% of meals  Factors Affecting Nutritional Intake: none identified  Food/Sikhism/Cultural Preferences: none reported  Food Allergies: no known food allergies  Last Bowel Movement: 04/16/24  Wound(s):     Altered Skin Integrity 01/22/24 1351 Sacral spine #4-Tissue loss description: Full thickness       Altered Skin Integrity 03/16/24 0800 Left anterior;lower Leg Arterial Ulcer-Tissue loss description: Full thickness heel ulcers,  "sacrum pressure ulcers    Comments    3/20: Pt reports current good intake/appetite but intake the past few months has been poor 2/2 disliking food from NH. Pt reports usual wt of ~170 lbs. Pt reports he would like oral supplements.     3/22: Pt reports good appetite/intake of meals, denies n/v; drinking some of the oral supplement but not taking the Pierre.     3/26: Pt NPO;  plans to go to OR today.     3/28: Pt eating well; denies n/v; states that he is not drinking oral supplement 2/2 being too full after he eats his meals; will d/c per his request. Pt is receptive to trying to drink the Pierre for wound healing.     : Pt continues with good appetite/intake; tolerating diet. Pt reports that he likes the Pierre but is not taking it regularly- encouraged to take for wound healing and pt was receptive.     : Pt reports good appetite and PO intake of meals. Not drinking much Pierre, large supply in his room noted. Pt denies GI complaints.     : Pt sleeping during rounds; eating very well per RN.     : Pt reports good appetite/intake; denies n/v; taking the Pierre.     : Pt still eating 100%.    : Pt reports good appetite/intake.     Anthropometrics    Height: 5' 9.02" (175.3 cm), Height Method: Stated  Last Weight: 68 kg (150 lb) (24 1315), Weight Method: Standard Scale  BMI (Calculated): 22.1  BMI Classification: normal (BMI 18.5-24.9)        Ideal Body Weight (IBW), Male: 160.12 lb     % Ideal Body Weight, Male (lb): 93.68 %                 Usual Body Weight (UBW), k.27 kg  % Usual Body Weight: 88.24  % Weight Change From Usual Weight: -11.95 %  Usual Weight Provided By: patient    Wt Readings from Last 5 Encounters:   24 68 kg (150 lb)   24 68.5 kg (151 lb)   24 63.5 kg (140 lb)   23 72.6 kg (160 lb)   23 77.1 kg (170 lb)     Weight Change(s) Since Admission:   Wt Readings from Last 1 Encounters:   24 1315 68 kg (150 lb)   03/15/24 1621 68 kg (150 lb) "   03/15/24 0307 68 kg (150 lb)   Admit Weight: 68 kg (150 lb) (03/15/24 0307), Weight Method: Stated  3/28-4/19: no new wt noted    Estimated Needs    Weight Used For Calorie Calculations: 68 kg (149 lb 14.6 oz)  Energy Calorie Requirements (kcal): 0652-4810 kcal (30-35 kcal/kg)  Energy Need Method: Kcal/kg  Weight Used For Protein Calculations: 68 kg (149 lb 14.6 oz)  Protein Requirements: 102 gm (1.5g/kg)  Fluid Requirements (mL): 2040 mL    Enteral Nutrition     Patient not receiving enteral nutrition at this time.    Parenteral Nutrition     Patient not receiving parenteral nutrition support at this time.    Evaluation of Received Nutrient Intake    Calories: meeting estimated needs  Protein: meeting estimated needs    Patient Education     Not applicable.    Nutrition Diagnosis     PES: Increased nutrient needs (protein) related to increased protein energy demand for wound healing as evidenced by stage 4 pressure ulcer. (active)     PES: Severe chronic disease or condition related malnutrition related to suboptimal protein/energy intake as evidenced by less than 75% needs met for greater than or equal to 3 months, severe fat depletion, severe muscle depletion, and greater than 7.5% weight loss in 3 months. (active)    Nutrition Interventions     Intervention(s): general/healthful diet, commercial beverage, commercial food, multivitamin/mineral supplement therapy, and collaboration with other providers    Goal: Meet greater than 80% of nutritional needs by follow-up. (goal progressing)  Goal: Maintain weight throughout hospitalization. (goal progressing)    Nutrition Goals & Monitoring     Dietitian will monitor: energy intake and weight    Nutrition Risk/Follow-Up: high (follow-up in 1-4 days)   Please consult if re-assessment needed sooner.

## 2024-04-19 NOTE — PROGRESS NOTES
[facility] 8[facility] Med Surg  Wound Care  Progress Note    Patient Name: Shaheen Christie  MRN: [id number]  Admission Date: 3/15/2024  Hospital Length of Stay: 35 days  Attending Physician: Enzo Thibodeaux MD  Primary Care Provider: Viktor Russ MD     Subjective:     HPI:  Wound medicine Re-check - chronic sacral pressure ulcer with osteomyelitis       Wound recheck done today, 4/19/24. Met patient in his room, 804, awake, alert and agreeable to wound assessment and treatment. Accompanied by WOCN for wound vac change. Lying on low air loss mattress,  without heel lift boots. Bilateral lower legs with dressings intact with moderate drainage to dressings, no odor.  Leg wounds not visualized continues to be followed by podiatry. Continues to complain of pain to left knee even with slight touch or movement.        Hospital Course:   No notes on file      Follow-up For: Procedure(s) (LRB):  DEBRIDEMENT, FOOT (Bilateral)    Post-Operative Day: 24 Days Post-Op    Scheduled Meds:  Current Facility-Administered Medications   Medication Dose Route Frequency    amLODIPine  10 mg Oral Daily    aspirin  81 mg Oral Daily    cilostazoL  50 mg Oral BID    clopidogreL  75 mg Oral Daily    docusate sodium  100 mg Oral BID    DULoxetine  30 mg Oral Daily    enoxaparin  40 mg Subcutaneous Daily    ferrous sulfate  1 tablet Oral Daily    gabapentin  300 mg Oral TID    gabapentin  600 mg Oral QHS    LIDOcaine  1 patch Transdermal Q24H    losartan  100 mg Oral Daily    multivitamin  1 tablet Oral Daily    oxyCODONE  10 mg Oral Q4H    piperacillin-tazobactam (Zosyn) IV (PEDS and ADULTS) (extended infusion is not appropriate)  4.5 g Intravenous Q8H    polyethylene glycol  17 g Oral Daily    sodium chloride 0.9%  10 mL Intravenous Q6H    vancomycin (VANCOCIN) IV (PEDS and ADULTS)  1,000 mg Intravenous Q24H     Continuous Infusions:  Current Facility-Administered Medications   Medication Dose Route Frequency Last Rate  Last Admin     PRN Meds:  Current Facility-Administered Medications:     acetaminophen, 650 mg, Oral, Q6H PRN    albuterol, 2 puff, Inhalation, QID PRN    aluminum-magnesium hydroxide-simethicone, 15 mL, Oral, Q6H PRN    HYDROcodone-acetaminophen, 1 tablet, Oral, Q4H PRN    melatonin, 6 mg, Oral, Nightly PRN    simethicone, 80 mg, Oral, Q6H PRN    Flushing PICC/Midline Protocol, , , Until Discontinued **AND** sodium chloride 0.9%, 10 mL, Intravenous, Q6H **AND** sodium chloride 0.9%, 10 mL, Intravenous, PRN    Pharmacy to dose Vancomycin consult, , , Once **AND** vancomycin - pharmacy to dose, , Intravenous, pharmacy to manage frequency    Review of Systems   Constitutional:  Positive for activity change. Negative for chills, fatigue and fever.   Musculoskeletal:  Positive for arthralgias.   Skin:  Positive for wound.     Objective:     Vital Signs (Most Recent):  Temp: 98.3 °F (36.8 °C) (04/19/24 0756)  Pulse: 78 (04/19/24 0756)  Resp: 18 (04/19/24 0947)  BP: 119/66 (04/19/24 0947)  SpO2: (!) 92 % (04/19/24 0756) Vital Signs (24h Range):  Temp:  [97.9 °F (36.6 °C)-99 °F (37.2 °C)] 98.3 °F (36.8 °C)  Pulse:  [68-81] 78  Resp:  [16-18] 18  SpO2:  [70 %-95 %] 92 %  BP: (112-134)/(62-86) 119/66     Weight: 68 kg (150 lb)  Body mass index is 22.14 kg/m².     Physical Exam  Vitals reviewed.   Constitutional:       General: He is awake. He is not in acute distress.     Appearance: He is ill-appearing (chronic).      Comments: BLE muscle atrophy   HENT:      Nose: Nose normal.   Cardiovascular:      Rate and Rhythm: Normal rate and regular rhythm.   Pulmonary:      Effort: Pulmonary effort is normal. No respiratory distress.   Genitourinary:     Comments: Upon assessment, moderate amount of stool near anus and covering lower buttock, redness noted to lower buttock after cleaning  Skin:     General: Skin is warm and dry.      Capillary Refill: Capillary refill takes less than 2 seconds.             Comments: BLE - dressings  intact with moderate drainage; wounds not visualized    Sacrum: Large stage 4 pressure ulcer, wound bed mostly covered with beefy red granulation tissue and small amount of yellow slough to middle of wound. No exudate or odor.    Neurological:      General: No focal deficit present.      Mental Status: He is alert and oriented to person, place, and time. Mental status is at baseline.   Psychiatric:         Mood and Affect: Mood normal.         Behavior: Behavior normal. Behavior is cooperative.       Sacrum: 18 x 7 x 1.2 cm undermining at 1 and 3 o'clock 0.8 cm          Laboratory:  A1C:   Recent Labs   Lab 02/02/24  0445   HGBA1C 5.0       BMP:   Recent Labs   Lab 04/18/24  0007   *   K 3.7   CO2 25   BUN 13.1   CREATININE 0.65*   CALCIUM 8.8       CBC:   Recent Labs   Lab 04/18/24 0007   WBC 9.01  9.01   RBC 3.02*   HGB 8.0*   HCT 26.1*   *   MCV 86.4   MCH 26.5*   MCHC 30.7*     CMP:   Recent Labs   Lab 04/18/24 0007   CALCIUM 8.8   ALBUMIN 2.2*   *   K 3.7   CO2 25   BUN 13.1   CREATININE 0.65*   ALKPHOS 87   ALT 11   AST 13   BILITOT 0.2     LFTs:   Recent Labs   Lab 04/18/24  0007   ALT 11   AST 13   ALKPHOS 87   BILITOT 0.2   ALBUMIN 2.2*               Diagnostic Results:  I have reviewed all pertinent imaging results/findings within the past 24 hours.      Assessment/Plan:       Stage 4 pressure ulcer to sacrum - with evidence of osteomyelitis on MRI of 2/27/24. admitted for initiation of IV antibiotics. Wound VAC placed on 3/28/24. ID guiding antibiotic stewardship  Unstageable pressure ulcers to bilateral heels - Podiatry consulted and following - surgical debridement on 3/26/24  Open wound/ulcers to right lateral lower leg and left lateral lower leg - podiatry following  Peripheral arterial disease - with history of vascular intervention with Dr. Queen (most recent angio 3/13/24; medical management)   PVD  History of polymicrobial sacral wound culture  History of polymicrobial wound  culture to leg wound  DM2  Anemia  History of smoking  Low prealbumin      PLAN:     Chart reviewed, patient examined and wounds assessed.  Opinion: The patient has a large stage 4 pressure ulcer to his sacrum that does not appear to be infected;  Wound vac initiated on 3/28/24; tolerating well. Overall wound looks good and continues to show improvement with development of large amount of beefy red granulation tissue.  Will continue with Wound Vac. Need to improve nutrition and continue aggressive offloading to have best chance to heal sacral wound. Pain to left knee continues to affect ability to reposition and offload.   BLE Dressings in place followed by Podiatry - did not visualize wounds today.   Wound care orders: Wound Vac  Monitor for signs & symptoms of deterioration  Offloading of sacrum/buttocks/heels at all times: BRADEN mattress, turning q 2 hrs; use of wedges and heel offloading devices to be used at all times while in bed; ( SALONI Arellano).   Incontinence: control moisture/wound contamination: No briefs. Had stool covering lower buttocks today with noted redness once cleaned. Admits not calling for assistance; instructed to call as soon as he has BM so that he can be cleaned in timely manner  as this can cause further skin breakdown.   Nutrition: Continue aggressive nutritional support, protein supplementation along with vitamin and mineral supplements  and pierre to support wound healing; Low prealbumin - 13.8 on 3/19/24 and rechecked on 4/1/24 - 12.6, most recent 10.5 on 4/11/24; follow dietitian recommendation; encourage to drink Pierre; reports good appetite  Diabetes: A1C good  Will try to follow weekly while admitted, but every nurse assigned to patient on every shift of every day needs to address daily wound care dressing changes and offloading modalities including using heel offloading devices, wedges, BRADEN mattress etc              The time spent including preparing to see the patient, obtaining  patient history and assessment, evaluation of the plan of care, patient/caregiver counseling and education, orders, documentation, coordination of care, and other professional medical management activities for today's encounter was 45 minutes.         ANGEL Chavira  Wound Care  [facility] - 8th Floor Med Surg

## 2024-04-19 NOTE — PROGRESS NOTES
Pharmacokinetic Assessment Follow Up: IV Vancomycin    Vancomycin serum concentration assessment(s):    The trough level was drawn correctly and can be used to guide therapy at this time. The measurement is below the desired definitive target range of 15 to 20 mcg/mL.    Vancomycin Regimen Plan:    Change regimen to Vancomycin 1000 mg IV every 18 hours with next serum trough concentration measured at 1900 prior to 4th new dose on 04/21/24.    Drug levels (last 3 results):  Recent Labs   Lab Result Units 04/17/24  0219 04/17/24  1238 04/19/24  1311   Vanc Lvl Random ug/ml  --  16.3  --    Vancomycin Trough ug/ml 21.4*  --  12.7*       Pharmacy will continue to follow and monitor vancomycin.    Please contact pharmacy at extension [phone] for questions regarding this assessment.    Thank you for the consult,   Renetta Barron       Patient brief summary:  Shaheen Christie is a 69 y.o. male initiated on antimicrobial therapy with IV Vancomycin for treatment of bone/joint infection    The patient's current regimen is 1000 mg q24h    Drug Allergies:   Review of patient's allergies indicates:  No Known Allergies    Actual Body Weight:   68 kg    Renal Function:   Estimated Creatinine Clearance: 100.3 mL/min (A) (based on SCr of 0.65 mg/dL (L)).,     Dialysis Method (if applicable):  N/A    CBC (last 72 hours):  Recent Labs   Lab Result Units 04/18/24  0007   WBC x10(3)/mcL 9.01  9.01   Hgb g/dL 8.0*   Hct % 26.1*   Platelet x10(3)/mcL 467*   Monocytes % % 7   Eosinophils % % 13   Basophils % % 5       Metabolic Panel (last 72 hours):  Recent Labs   Lab Result Units 04/18/24  0007   Sodium Level mmol/L 135*   Potassium Level mmol/L 3.7   Chloride mmol/L 104   Carbon Dioxide mmol/L 25   Glucose Level mg/dL 141*   Blood Urea Nitrogen mg/dL 13.1   Creatinine mg/dL 0.65*   Albumin Level g/dL 2.2*   Bilirubin Total mg/dL 0.2   Alkaline Phosphatase unit/L 87   Aspartate Aminotransferase unit/L 13   Alanine Aminotransferase unit/L 11        Vancomycin Administrations:  vancomycin given in the last 96 hours                     vancomycin in dextrose 5 % 1 gram/250 mL IVPB 1,000 mg (mg) 1,000 mg New Bag 04/19/24 1430     1,000 mg New Bag 04/18/24 1341     1,000 mg New Bag 04/17/24 1450    vancomycin 750 mg in dextrose 5 % 250 mL IVPB (ready to mix) (mg) 750 mg New Bag 04/16/24 1610     750 mg New Bag  0300                    Microbiologic Results:  Microbiology Results (last 7 days)       ** No results found for the last 168 hours. **

## 2024-04-19 NOTE — PROGRESS NOTES
[facility]                       [address]                      [place] [id number]    PATIENT NAME:       HAWA KUMAR  YOB: 1955  CSN:                [id number]   MRN:                [id number]  ADMIT DATE:         03/15/2024 03:09:00  PHYSICIAN:          Tom Nichole DPM                            PROGRESS NOTE    DATE:  04/19/2024 00:00:00    SUBJECTIVE:  The patient the was seen today, still continues to complain of   unrelenting pain in that left knee with any sort of touch and/or manipulation.    The patient is now requesting the leg be removed again.  He has a history of   chronic lower extremity wounds, non-ambulator.  History of previous septic   arthritis in the left knee, requiring debridements.  Ortho has seen the patient   again this visit and no intervention is planned.  He continues to be treated for   an osteomyelitis of the sacral area along with lower extremity wounds.  Still   working on placement for this gentleman.    PHYSICAL EXAMINATION:  VITAL SIGNS:  Stable and afebrile.    Again, the wound sites are about the same.  Right leg and right heel are clean   and viable.  Left heel, a little bit of fibrotic debris.  The left leg wound   still with some visible tendon, but no stanley necrosis.  Viable tissue throughout   the rest of the sites and margins.  Still with a moderate amount of serous   exudate from the areas.  No palpable pedal pulses.  Again, severe pain with any   sort of manipulation.  The patient refused, even allowed me to continue wrapping   the leg up dressing wise.    LABORATORY DATA:  No labs posted for today.    ASSESSMENT:    1. Chronic lower extremity wounds.  2. Sacral decubitus with osteomyelitis.  3. Intractable pain, left knee.    PLAN:  I again had a long conversation with the patient concerning the   extremities.  He states that he cannot continue to live with this limb.  He has    mentioned this in the past, thus the reason for the Ortho consult, but there are   really no options at this time from their standpoint.  I am going to go ahead   and get General Surgery consulted and get their input, but he would need   an above-knee amputation.        ______________________________  XIMENA Huang/LUIS  DD:  04/19/2024  Time:  03:40PM  DT:  04/19/2024  Time:  05:45PM  Job #:  [id number]      PROGRESS NOTE

## 2024-04-19 NOTE — PROGRESS NOTES
[facility] - 8th Floor Med Surg  Wound Care    Patient Name:  Shaheen Christie   MRN:  [id number]  Date: 4/19/2024  Diagnosis: Osteomyelitis    History:     Past Medical History:   Diagnosis Date    COPD (chronic obstructive pulmonary disease)     Depression     Hypertension     Neuropathy        Social History     Socioeconomic History    Marital status:    Occupational History    Occupation: retired   Tobacco Use    Smoking status: Former     Average packs/day: 0.5 packs/day for 15.0 years (7.5 ttl pk-yrs)     Types: Cigarettes     Start date: 2008    Smokeless tobacco: Never   Substance and Sexual Activity    Alcohol use: Yes     Alcohol/week: 3.0 - 6.0 standard drinks of alcohol     Types: 3 - 6 Cans of beer per week    Drug use: Yes     Types: Marijuana    Sexual activity: Not Currently       Precautions:     Allergies as of 03/15/2024    (No Known Allergies)       WOC Assessment Details/Treatment        04/19/24 1013   WOCN Assessment   Visit Date 04/19/24   Visit Time 1013   Consult Type Follow Up   WOCN Speciality Wound   WOCN List wound vac   Procedure wound vac   Intervention changed   Teaching on-going   Skin Interventions   Device Skin Pressure Protection absorbent pad utilized/changed   Pressure Reduction Devices specialty bed utilized        Altered Skin Integrity 01/22/24 1351 Sacral spine #4   Date First Assessed/Time First Assessed: 01/22/24 1351   Altered Skin Integrity Present on Admission - Did Patient arrive to the hospital with altered skin?: yes  Location: Sacral spine  Wound Number: #4  Is this injury device related?: No   Wound Image    Dressing Appearance Dry;Intact;Clean   Drainage Amount Moderate   Drainage Characteristics/Odor Serosanguineous   Appearance Red;Yellow   Tissue loss description Full thickness   Red (%), Wound Tissue Color 75 %   Yellow (%), Wound Tissue Color 25 %   Periwound Area Moist;Redness   Wound Edges Irregular   Wound Length (cm) 7 cm   Wound Width  (cm) 18 cm   Wound Depth (cm) 1.2 cm   Wound Volume (cm^3) 151.2 cm^3   Wound Surface Area (cm^2) 126 cm^2   Undermining (depth (cm)/location) 0.8cm at 1and 3 o clock.   Dressing Applied  (versatel)        Negative Pressure Wound Therapy  03/28/24   Placement Date: 03/28/24   Location: Sacral Spine   NPWT Type Vacuum Therapy   Therapy Setting NPWT Continuous therapy   Pressure Setting NPWT 125 mmHg   Therapy Interventions NPWT Dressing changed   Sponges Inserted NPWT Black  (versatel)   Sponges Removed NPWT Black  (versatel)     Lory CHAMBERSP at bedside for evaluation. NPWT reapplied to sacral ulcer. Excellent seal noted at 125 mm/ hg continuous. Nursing to continue with preventative measures. On BRADEN mattress. Will Follow up.     04/19/2024

## 2024-04-19 NOTE — PROGRESS NOTES
[facility] - 8th Floor [facility] Medicine  Progress Note    Patient Name: Shaheen Christie  MRN: [id number]  Patient Class: IP- Inpatient   Admission Date: 3/15/2024  Length of Stay: 35 days  Attending Physician: Enzo Thibodeaux MD  Primary Care Provider: Viktor Russ MD        Subjective:     Principal Problem:Osteomyelitis    Interval History:  No problems noted from last night.    Review of Systems   All other systems reviewed and are negative.    Objective:     Vital Signs (Most Recent):  Temp: 98.1 °F (36.7 °C) (04/19/24 1145)  Pulse: 84 (04/19/24 1145)  Resp: 18 (04/19/24 1145)  BP: (!) 131/57 (04/19/24 1145)  SpO2: 96 % (04/19/24 1145) Vital Signs (24h Range):  Temp:  [98.1 °F (36.7 °C)-99 °F (37.2 °C)] 98.1 °F (36.7 °C)  Pulse:  [68-84] 84  Resp:  [16-18] 18  SpO2:  [70 %-96 %] 96 %  BP: (112-134)/(57-86) 131/57     Weight: 68 kg (150 lb)  Body mass index is 22.14 kg/m².    Intake/Output Summary (Last 24 hours) at 4/19/2024 1159  Last data filed at 4/19/2024 0545  Gross per 24 hour   Intake 360 ml   Output 975 ml   Net -615 ml      Physical Exam  HENT:      Head: Normocephalic and atraumatic.      Right Ear: External ear normal.      Left Ear: External ear normal.      Mouth/Throat:      Mouth: Mucous membranes are dry.   Eyes:      Extraocular Movements: Extraocular movements intact.   Cardiovascular:      Rate and Rhythm: Normal rate and regular rhythm.      Pulses: Normal pulses.      Heart sounds: Normal heart sounds.   Pulmonary:      Effort: Pulmonary effort is normal.      Breath sounds: Normal breath sounds.   Abdominal:      General: Bowel sounds are normal.      Palpations: Abdomen is soft.   Musculoskeletal:      Cervical back: Neck supple.   Skin:     General: Skin is warm and dry.   Neurological:      Mental Status: He is alert. Mental status is at baseline.           Overview/Hospital Course:  Continues to do well.  Continue treatment.  Discharge probably next  week.    Significant Labs: All pertinent labs within the past 24 hours have been reviewed.  BMP:   Recent Labs   Lab 04/18/24 0007   *   K 3.7   CO2 25   BUN 13.1   CREATININE 0.65*   CALCIUM 8.8     CBC:   Recent Labs   Lab 04/18/24 0007   WBC 9.01  9.01   HGB 8.0*   HCT 26.1*   *     CMP:   Recent Labs   Lab 04/18/24 0007   *   K 3.7   CO2 25   BUN 13.1   CREATININE 0.65*   CALCIUM 8.8   ALBUMIN 2.2*   BILITOT 0.2   ALKPHOS 87   AST 13   ALT 11       Significant Imaging: I have reviewed all pertinent imaging results/findings within the past 24 hours.    Assessment/Plan:      Active Diagnoses:    Diagnosis Date Noted POA    PRINCIPAL PROBLEM:  Osteomyelitis [M86.9] 03/15/2024 Yes    Knee effusion, left [M25.462] 03/31/2024 No    Pressure injury of sacral region, stage 4 [L89.154] 03/19/2024 Yes    Skin ulcer of right lower leg [L97.919] 03/19/2024 Yes    Skin ulcer of left lower leg [L97.929] 03/19/2024 Yes    Unstageable pressure ulcer of right heel [L89.610] 03/19/2024 Yes    Unstageable pressure ulcer of left heel [L89.620] 03/19/2024 Yes      Problems Resolved During this Admission:     VTE Risk Mitigation (From admission, onward)           Ordered     enoxaparin injection 40 mg  Daily         03/15/24 0934                       Enzo Thibodeaux MD  Department of Hospital Medicine   [facility] - 8th Floor Med Surg

## 2024-04-19 NOTE — PLAN OF CARE
[facility] sent updated clinicals to [facility]. CM will follow up with family before sending referral to Aniyah Guidry.

## 2024-04-19 NOTE — SUBJECTIVE & OBJECTIVE
Subjective:     HPI:  Wound medicine Re-check - chronic sacral pressure ulcer with osteomyelitis       Wound recheck done today, 4/19/24. Met patient in his room, 804, awake, alert and agreeable to wound assessment and treatment. Accompanied by WOCN for wound vac change. Lying on low air loss mattress,  without heel lift boots. Bilateral lower legs with dressings intact with moderate drainage to dressings, no odor.  Leg wounds not visualized continues to be followed by podiatry. Continues to complain of pain to left knee even with slight touch or movement.        Hospital Course:   No notes on file      Follow-up For: Procedure(s) (LRB):  DEBRIDEMENT, FOOT (Bilateral)    Post-Operative Day: 24 Days Post-Op    Scheduled Meds:  Current Facility-Administered Medications   Medication Dose Route Frequency    amLODIPine  10 mg Oral Daily    aspirin  81 mg Oral Daily    cilostazoL  50 mg Oral BID    clopidogreL  75 mg Oral Daily    docusate sodium  100 mg Oral BID    DULoxetine  30 mg Oral Daily    enoxaparin  40 mg Subcutaneous Daily    ferrous sulfate  1 tablet Oral Daily    gabapentin  300 mg Oral TID    gabapentin  600 mg Oral QHS    LIDOcaine  1 patch Transdermal Q24H    losartan  100 mg Oral Daily    multivitamin  1 tablet Oral Daily    oxyCODONE  10 mg Oral Q4H    piperacillin-tazobactam (Zosyn) IV (PEDS and ADULTS) (extended infusion is not appropriate)  4.5 g Intravenous Q8H    polyethylene glycol  17 g Oral Daily    sodium chloride 0.9%  10 mL Intravenous Q6H    vancomycin (VANCOCIN) IV (PEDS and ADULTS)  1,000 mg Intravenous Q24H     Continuous Infusions:  Current Facility-Administered Medications   Medication Dose Route Frequency Last Rate Last Admin     PRN Meds:  Current Facility-Administered Medications:     acetaminophen, 650 mg, Oral, Q6H PRN    albuterol, 2 puff, Inhalation, QID PRN    aluminum-magnesium hydroxide-simethicone, 15 mL, Oral, Q6H PRN    HYDROcodone-acetaminophen, 1 tablet, Oral, Q4H PRN     melatonin, 6 mg, Oral, Nightly PRN    simethicone, 80 mg, Oral, Q6H PRN    Flushing PICC/Midline Protocol, , , Until Discontinued **AND** sodium chloride 0.9%, 10 mL, Intravenous, Q6H **AND** sodium chloride 0.9%, 10 mL, Intravenous, PRN    Pharmacy to dose Vancomycin consult, , , Once **AND** vancomycin - pharmacy to dose, , Intravenous, pharmacy to manage frequency    Review of Systems   Constitutional:  Positive for activity change. Negative for chills, fatigue and fever.   Musculoskeletal:  Positive for arthralgias.   Skin:  Positive for wound.     Objective:     Vital Signs (Most Recent):  Temp: 98.3 °F (36.8 °C) (04/19/24 0756)  Pulse: 78 (04/19/24 0756)  Resp: 18 (04/19/24 0947)  BP: 119/66 (04/19/24 0947)  SpO2: (!) 92 % (04/19/24 0756) Vital Signs (24h Range):  Temp:  [97.9 °F (36.6 °C)-99 °F (37.2 °C)] 98.3 °F (36.8 °C)  Pulse:  [68-81] 78  Resp:  [16-18] 18  SpO2:  [70 %-95 %] 92 %  BP: (112-134)/(62-86) 119/66     Weight: 68 kg (150 lb)  Body mass index is 22.14 kg/m².     Physical Exam  Vitals reviewed.   Constitutional:       General: He is awake. He is not in acute distress.     Appearance: He is ill-appearing (chronic).      Comments: BLE muscle atrophy   HENT:      Nose: Nose normal.   Cardiovascular:      Rate and Rhythm: Normal rate and regular rhythm.   Pulmonary:      Effort: Pulmonary effort is normal. No respiratory distress.   Genitourinary:     Comments: Upon assessment, moderate amount of stool near anus and covering lower buttock, redness noted to lower buttock after cleaning  Skin:     General: Skin is warm and dry.      Capillary Refill: Capillary refill takes less than 2 seconds.             Comments: BLE - dressings intact with moderate drainage; wounds not visualized    Sacrum: Large stage 4 pressure ulcer, wound bed mostly covered with beefy red granulation tissue and small amount of yellow slough to middle of wound. No exudate or odor.    Neurological:      General: No focal deficit  present.      Mental Status: He is alert and oriented to person, place, and time. Mental status is at baseline.   Psychiatric:         Mood and Affect: Mood normal.         Behavior: Behavior normal. Behavior is cooperative.       Sacrum: 18 x 7 x 1.2 cm undermining at 1 and 3 o'clock 0.8 cm          Laboratory:  A1C:   Recent Labs   Lab 02/02/24  0445   HGBA1C 5.0       BMP:   Recent Labs   Lab 04/18/24 0007   *   K 3.7   CO2 25   BUN 13.1   CREATININE 0.65*   CALCIUM 8.8       CBC:   Recent Labs   Lab 04/18/24 0007   WBC 9.01  9.01   RBC 3.02*   HGB 8.0*   HCT 26.1*   *   MCV 86.4   MCH 26.5*   MCHC 30.7*     CMP:   Recent Labs   Lab 04/18/24 0007   CALCIUM 8.8   ALBUMIN 2.2*   *   K 3.7   CO2 25   BUN 13.1   CREATININE 0.65*   ALKPHOS 87   ALT 11   AST 13   BILITOT 0.2     LFTs:   Recent Labs   Lab 04/18/24 0007   ALT 11   AST 13   ALKPHOS 87   BILITOT 0.2   ALBUMIN 2.2*               Diagnostic Results:  I have reviewed all pertinent imaging results/findings within the past 24 hours.

## 2024-04-20 ENCOUNTER — ANESTHESIA EVENT (OUTPATIENT)
Dept: SURGERY | Facility: HOSPITAL | Age: 69
DRG: 616 | End: 2024-04-20
Payer: MEDICARE

## 2024-04-20 ENCOUNTER — ANESTHESIA (OUTPATIENT)
Dept: SURGERY | Facility: HOSPITAL | Age: 69
DRG: 616 | End: 2024-04-20
Payer: MEDICARE

## 2024-04-20 PROCEDURE — 25000003 PHARM REV CODE 250: Performed by: INTERNAL MEDICINE

## 2024-04-20 PROCEDURE — D9220A PRA ANESTHESIA: Mod: ANES,,, | Performed by: ANESTHESIOLOGY

## 2024-04-20 PROCEDURE — 27201423 OPTIME MED/SURG SUP & DEVICES STERILE SUPPLY: Performed by: SURGERY

## 2024-04-20 PROCEDURE — 37000008 HC ANESTHESIA 1ST 15 MINUTES: Performed by: SURGERY

## 2024-04-20 PROCEDURE — 97605 NEG PRS WND THER DME<=50SQCM: CPT | Mod: 59,,, | Performed by: SURGERY

## 2024-04-20 PROCEDURE — 0Y6D0Z3 DETACHMENT AT LEFT UPPER LEG, LOW, OPEN APPROACH: ICD-10-PCS | Performed by: SURGERY

## 2024-04-20 PROCEDURE — 63600175 PHARM REV CODE 636 W HCPCS: Performed by: ANESTHESIOLOGY

## 2024-04-20 PROCEDURE — 71000033 HC RECOVERY, INTIAL HOUR: Performed by: SURGERY

## 2024-04-20 PROCEDURE — 63600175 PHARM REV CODE 636 W HCPCS: Performed by: INTERNAL MEDICINE

## 2024-04-20 PROCEDURE — 27592 AMPUTATE LEG AT THIGH: CPT | Mod: LT,,, | Performed by: SURGERY

## 2024-04-20 PROCEDURE — 25000003 PHARM REV CODE 250

## 2024-04-20 PROCEDURE — 11000001 HC ACUTE MED/SURG PRIVATE ROOM

## 2024-04-20 PROCEDURE — D9220A PRA ANESTHESIA: Mod: CRNA,,,

## 2024-04-20 PROCEDURE — 63600175 PHARM REV CODE 636 W HCPCS

## 2024-04-20 PROCEDURE — 37000009 HC ANESTHESIA EA ADD 15 MINS: Performed by: SURGERY

## 2024-04-20 PROCEDURE — 88307 TISSUE EXAM BY PATHOLOGIST: CPT | Performed by: SURGERY

## 2024-04-20 PROCEDURE — 25000003 PHARM REV CODE 250: Performed by: PODIATRIST

## 2024-04-20 PROCEDURE — 36000711: Performed by: SURGERY

## 2024-04-20 PROCEDURE — 36000710: Performed by: SURGERY

## 2024-04-20 PROCEDURE — 63600175 PHARM REV CODE 636 W HCPCS: Performed by: PODIATRIST

## 2024-04-20 PROCEDURE — A4216 STERILE WATER/SALINE, 10 ML: HCPCS | Performed by: INTERNAL MEDICINE

## 2024-04-20 PROCEDURE — 0Y3D0ZZ CONTROL BLEEDING IN LEFT UPPER LEG, OPEN APPROACH: ICD-10-PCS | Performed by: EMERGENCY MEDICINE

## 2024-04-20 RX ORDER — DEXAMETHASONE SODIUM PHOSPHATE 4 MG/ML
INJECTION, SOLUTION INTRA-ARTICULAR; INTRALESIONAL; INTRAMUSCULAR; INTRAVENOUS; SOFT TISSUE
Status: DISCONTINUED | OUTPATIENT
Start: 2024-04-20 | End: 2024-04-20

## 2024-04-20 RX ORDER — PROPOFOL 10 MG/ML
INJECTION, EMULSION INTRAVENOUS
Status: DISCONTINUED | OUTPATIENT
Start: 2024-04-20 | End: 2024-04-20

## 2024-04-20 RX ORDER — MEPERIDINE HYDROCHLORIDE 25 MG/ML
12.5 INJECTION INTRAMUSCULAR; INTRAVENOUS; SUBCUTANEOUS ONCE
Status: DISCONTINUED | OUTPATIENT
Start: 2024-04-20 | End: 2024-04-20 | Stop reason: HOSPADM

## 2024-04-20 RX ORDER — HYDROMORPHONE HYDROCHLORIDE 2 MG/ML
0.5 INJECTION, SOLUTION INTRAMUSCULAR; INTRAVENOUS; SUBCUTANEOUS EVERY 5 MIN PRN
Status: DISCONTINUED | OUTPATIENT
Start: 2024-04-20 | End: 2024-04-20 | Stop reason: HOSPADM

## 2024-04-20 RX ORDER — FENTANYL CITRATE 50 UG/ML
INJECTION, SOLUTION INTRAMUSCULAR; INTRAVENOUS
Status: DISCONTINUED | OUTPATIENT
Start: 2024-04-20 | End: 2024-04-20

## 2024-04-20 RX ORDER — ONDANSETRON HYDROCHLORIDE 2 MG/ML
4 INJECTION, SOLUTION INTRAVENOUS ONCE
Status: DISCONTINUED | OUTPATIENT
Start: 2024-04-20 | End: 2024-04-20 | Stop reason: HOSPADM

## 2024-04-20 RX ORDER — DIPHENHYDRAMINE HYDROCHLORIDE 50 MG/ML
25 INJECTION INTRAMUSCULAR; INTRAVENOUS EVERY 6 HOURS PRN
Status: DISCONTINUED | OUTPATIENT
Start: 2024-04-20 | End: 2024-04-20 | Stop reason: HOSPADM

## 2024-04-20 RX ORDER — PHENYLEPHRINE HYDROCHLORIDE 10 MG/ML
INJECTION INTRAVENOUS
Status: DISCONTINUED | OUTPATIENT
Start: 2024-04-20 | End: 2024-04-20

## 2024-04-20 RX ORDER — MIDAZOLAM HYDROCHLORIDE 1 MG/ML
INJECTION INTRAMUSCULAR; INTRAVENOUS
Status: DISCONTINUED | OUTPATIENT
Start: 2024-04-20 | End: 2024-04-20

## 2024-04-20 RX ORDER — HYDROMORPHONE HYDROCHLORIDE 2 MG/ML
0.2 INJECTION, SOLUTION INTRAMUSCULAR; INTRAVENOUS; SUBCUTANEOUS EVERY 5 MIN PRN
Status: DISCONTINUED | OUTPATIENT
Start: 2024-04-20 | End: 2024-04-20 | Stop reason: HOSPADM

## 2024-04-20 RX ADMIN — FENTANYL CITRATE 50 MCG: 50 INJECTION, SOLUTION INTRAMUSCULAR; INTRAVENOUS at 09:04

## 2024-04-20 RX ADMIN — CILOSTAZOL 50 MG: 50 TABLET ORAL at 09:04

## 2024-04-20 RX ADMIN — OXYCODONE HYDROCHLORIDE 10 MG: 10 TABLET ORAL at 07:04

## 2024-04-20 RX ADMIN — DEXAMETHASONE SODIUM PHOSPHATE 8 MG: 4 INJECTION, SOLUTION INTRA-ARTICULAR; INTRALESIONAL; INTRAMUSCULAR; INTRAVENOUS; SOFT TISSUE at 09:04

## 2024-04-20 RX ADMIN — VANCOMYCIN HYDROCHLORIDE 1000 MG: 1 INJECTION, POWDER, LYOPHILIZED, FOR SOLUTION INTRAVENOUS at 09:04

## 2024-04-20 RX ADMIN — PIPERACILLIN SODIUM AND TAZOBACTAM SODIUM 4.5 G: 4; .5 INJECTION, POWDER, LYOPHILIZED, FOR SOLUTION INTRAVENOUS at 09:04

## 2024-04-20 RX ADMIN — MIDAZOLAM HYDROCHLORIDE 2 MG: 1 INJECTION, SOLUTION INTRAMUSCULAR; INTRAVENOUS at 09:04

## 2024-04-20 RX ADMIN — SODIUM CHLORIDE, PRESERVATIVE FREE 10 ML: 5 INJECTION INTRAVENOUS at 01:04

## 2024-04-20 RX ADMIN — GABAPENTIN 600 MG: 300 CAPSULE ORAL at 09:04

## 2024-04-20 RX ADMIN — DOCUSATE SODIUM 100 MG: 100 CAPSULE, LIQUID FILLED ORAL at 09:04

## 2024-04-20 RX ADMIN — FENTANYL CITRATE 50 MCG: 50 INJECTION, SOLUTION INTRAMUSCULAR; INTRAVENOUS at 10:04

## 2024-04-20 RX ADMIN — PHENYLEPHRINE HYDROCHLORIDE 100 MCG: 10 INJECTION INTRAVENOUS at 09:04

## 2024-04-20 RX ADMIN — PHENYLEPHRINE HYDROCHLORIDE 100 MCG: 10 INJECTION INTRAVENOUS at 10:04

## 2024-04-20 RX ADMIN — PIPERACILLIN SODIUM AND TAZOBACTAM SODIUM 4.5 G: 4; .5 INJECTION, POWDER, LYOPHILIZED, FOR SOLUTION INTRAVENOUS at 01:04

## 2024-04-20 RX ADMIN — OXYCODONE HYDROCHLORIDE 10 MG: 10 TABLET ORAL at 03:04

## 2024-04-20 RX ADMIN — HYDROMORPHONE HYDROCHLORIDE 0.5 MG: 2 INJECTION INTRAMUSCULAR; INTRAVENOUS; SUBCUTANEOUS at 10:04

## 2024-04-20 RX ADMIN — OXYCODONE HYDROCHLORIDE 10 MG: 10 TABLET ORAL at 01:04

## 2024-04-20 RX ADMIN — LOSARTAN POTASSIUM 100 MG: 50 TABLET, FILM COATED ORAL at 01:04

## 2024-04-20 RX ADMIN — HYDROMORPHONE HYDROCHLORIDE 0.5 MG: 2 INJECTION INTRAMUSCULAR; INTRAVENOUS; SUBCUTANEOUS at 11:04

## 2024-04-20 RX ADMIN — AMLODIPINE BESYLATE 10 MG: 5 TABLET ORAL at 01:04

## 2024-04-20 RX ADMIN — FERROUS SULFATE TAB 325 MG (65 MG ELEMENTAL FE) 1 EACH: 325 (65 FE) TAB at 01:04

## 2024-04-20 RX ADMIN — THERA TABS 1 TABLET: TAB at 01:04

## 2024-04-20 RX ADMIN — ASPIRIN 81 MG CHEWABLE TABLET 81 MG: 81 TABLET CHEWABLE at 01:04

## 2024-04-20 RX ADMIN — ENOXAPARIN SODIUM 40 MG: 40 INJECTION SUBCUTANEOUS at 04:04

## 2024-04-20 RX ADMIN — GABAPENTIN 300 MG: 300 CAPSULE ORAL at 01:04

## 2024-04-20 RX ADMIN — SODIUM CHLORIDE, SODIUM GLUCONATE, SODIUM ACETATE, POTASSIUM CHLORIDE AND MAGNESIUM CHLORIDE: 526; 502; 368; 37; 30 INJECTION, SOLUTION INTRAVENOUS at 09:04

## 2024-04-20 RX ADMIN — HYDROCODONE BITARTRATE AND ACETAMINOPHEN 1 TABLET: 5; 325 TABLET ORAL at 04:04

## 2024-04-20 RX ADMIN — PIPERACILLIN SODIUM AND TAZOBACTAM SODIUM 4.5 G: 4; .5 INJECTION, POWDER, LYOPHILIZED, FOR SOLUTION INTRAVENOUS at 05:04

## 2024-04-20 RX ADMIN — SODIUM CHLORIDE, PRESERVATIVE FREE 10 ML: 5 INJECTION INTRAVENOUS at 07:04

## 2024-04-20 RX ADMIN — SODIUM CHLORIDE, PRESERVATIVE FREE 10 ML: 5 INJECTION INTRAVENOUS at 05:04

## 2024-04-20 RX ADMIN — DULOXETINE HYDROCHLORIDE 30 MG: 30 CAPSULE, DELAYED RELEASE ORAL at 01:04

## 2024-04-20 RX ADMIN — OXYCODONE HYDROCHLORIDE 10 MG: 10 TABLET ORAL at 10:04

## 2024-04-20 RX ADMIN — PROPOFOL 100 MG: 10 INJECTION, EMULSION INTRAVENOUS at 09:04

## 2024-04-20 NOTE — PROGRESS NOTES
[facility] - Periop Services  Surgery Department  Operative Note    SUMMARY     Date of Procedure: 4/20/2024     Procedure: Procedure(s) (LRB):  AMPUTATION, ABOVE KNEE (Left)  APPLICATION, WOUND VAC (Left)    Cheri    Surgeons and Role:     * Serge Brambila Jr., MD - Primary    Assisting Surgeon: None    Pre-Operative Diagnosis: Skin ulcer of left lower leg with necrosis of muscle [L97.923]    Post-Operative Diagnosis: Post-Op Diagnosis Codes:     * Skin ulcer of left lower leg with necrosis of muscle [L97.923]    Anesthesia: Choice    Operative Findings (including complications, if any): significant amount of edema. Decided to wound vac for a couple of days to suction out the edema    Description of Technical Procedures:   After consent were obtained the patient was taken back to the operative suite and placed under general anesthesia. Next the left leg was prepped and draped in the usual sterile fashion. A time out was done to make sure we had the appropriate patient, appropriate medications and appropriate operation.     The tourniquet was inflated to 300mmHg.A fish mouth incision was incised circumferentially around the thigh with a 10 blade. Next electrocautery bovie was used to cut the soft tissue and muscle. Once down to the femur bone an oscillating saw was used to cut through the femur. Next the popliteal artery was identified and ligated with a 2-0 jacque stick tie. Next the tourniquet was released and all bleeding was stopped with bovie. There was a significant amount of edema so we decided to place a wound vac to suction out the edema for 48 hrs. Wound vac was placed with good seal. At case end all counts were correct the patient tolerated the procedure well and was extubate and sent to PACU.    Estimated Blood Loss (EBL): * No values recorded between 4/20/2024  9:33 AM and 4/20/2024 10:12 AM *           Implants: * No implants in log *    Specimens:   Specimen (24h ago, onward)        Start     Ordered    04/20/24 0941  Specimen to Pathology  RELEASE UPON ORDERING        References:    Click here for ordering Quick Tip   Question:  Release to patient  Answer:  Immediate    04/20/24 0941                            Condition: Stable    Disposition: ICU - extubated and stable.    Attestation: Op Note Attestation: I was physically present and scrubbed for the entire procedure.

## 2024-04-20 NOTE — ANESTHESIA POSTPROCEDURE EVALUATION
Anesthesia Post Evaluation    Patient: Shaheen Christie    Procedure(s) Performed: Procedure(s) (LRB):  AMPUTATION, ABOVE KNEE (Left)  APPLICATION, WOUND VAC (Left)    Final Anesthesia Type: general      Patient location during evaluation: PACU  Patient participation: Yes- Able to Participate  Level of consciousness: awake and alert  Post-procedure vital signs: reviewed and stable  Pain management: adequate  Airway patency: patent      Anesthetic complications: no      Cardiovascular status: hemodynamically stable  Respiratory status: unassisted  Hydration status: euvolemic  Follow-up not needed.              Vitals Value Taken Time   /75 04/20/24 1126   Temp 36.2 °C (97.1 °F) 04/20/24 1125   Pulse 73 04/20/24 1126   Resp 12 04/20/24 1116   SpO2 95 % 04/20/24 1126   Vitals shown include unfiled device data.      Event Time   Out of Recovery 04/20/2024 11:15:53         Pain/Khadra Score: Pain Rating Prior to Med Admin: 5 (4/20/2024 11:10 AM)  Pain Rating Post Med Admin: 3 (4/20/2024  4:00 AM)  Khadra Score: 10 (4/20/2024 11:10 AM)

## 2024-04-20 NOTE — CONSULTS
Acute Care Surgery   History and Physical    Patient Name: Shaheen Christie  YOB: 1955  Date: 04/20/2024 11:23 PM  Date of Admission: 3/15/2024  HD#36  POD#25 Days Post-Op    PRESENTING HISTORY   Chief Complaint/Reason for Admission: Osteomyelitis    History of Present Illness:  69M PMH COPD, HTN, DM, neuropathy, resident of [facility] presented via EMS, adm 3/15/24 with sacral wound with osteomyelitis, chronic BLE wounds, pain to LLE. Surgery consulted for L AKA.     Pt lying in bed in his room. Reports excruciating pain to the L knee, despite multiple surgeries in the past. Pt reports history of previous septic arthritis in the left knee. He has been on abx, and has been seen by wound care, podiatry, ID, ortho. Ortho recommended fu with joint specialist outpatient, as pt has end-stage osteoarthritis. Pt is now having severe pain to the left leg with any sort of manipulation or wound dressing change. Pt has been working with podiatry but has told them that he can no longer live with the limb and the pain.     Review of Systems:  12 point ROS negative except as stated in HPI    PAST HISTORY:   Past medical history:  Past Medical History:   Diagnosis Date    COPD (chronic obstructive pulmonary disease)     Depression     Hypertension     Neuropathy      Past surgical history:  Past Surgical History:   Procedure Laterality Date    angiogram Bilateral     stents placed in left leg    anterior neck surgery      ARTHROTOMY OF ANKLE Left 6/28/2023    Procedure: ARTHROTOMY, ANKLE;  Surgeon: Tom Nichole DPM;  Location: [facility];  Service: Podiatry;  Laterality: Left;    cataracts Left     CHOLECYSTECTOMY      DEBRIDEMENT OF FOOT Bilateral 3/26/2024    Procedure: DEBRIDEMENT, FOOT;  Surgeon: Tom Nichole DPM;  Location: [facility] OR;  Service: Podiatry;  Laterality: Bilateral;    EYE SURGERY Left     HAND SURGERY Left     broken bone    herina repair      INCISION AND DRAINAGE, LOWER EXTREMITY  Left 7/28/2023    Procedure: INCISION AND DRAINAGE, LOWER EXTREMITY;  Surgeon: Avinash Garces MD;  Location: [facility] OR;  Service: Orthopedics;  Laterality: Left;  Supine, vascular bed, bone foam  last case  cysto tubing, laparoscopic trocar, experience, vanc, hemovac drain    KNEE SURGERY Bilateral     PERIPHERAL ANGIOGRAPHY N/A 3/13/2024    Procedure: Peripheral angiography;  Surgeon: Deven Queen MD;  Location: [facility] CATH LAB;  Service: Cardiology;  Laterality: N/A;  MICHELLE ANGIO W/ ANEST.    posterior neck surgery      SPINE SURGERY      TOTAL REPLACEMENT OF BOTH HIP JOINTS USING COMPUTER-ASSISTED NAVIGATION Bilateral        Family history:  Family History   Problem Relation Name Age of Onset    Heart disease Mother      Cancer Father      Lung disease Father         Social history:  Social History     Socioeconomic History    Marital status:    Occupational History    Occupation: retired   Tobacco Use    Smoking status: Former     Average packs/day: 0.5 packs/day for 15.0 years (7.5 ttl pk-yrs)     Types: Cigarettes     Start date: 2008    Smokeless tobacco: Never   Substance and Sexual Activity    Alcohol use: Yes     Alcohol/week: 3.0 - 6.0 standard drinks of alcohol     Types: 3 - 6 Cans of beer per week    Drug use: Yes     Types: Marijuana    Sexual activity: Not Currently     Social History     Tobacco Use   Smoking Status Former    Average packs/day: 0.5 packs/day for 15.0 years (7.5 ttl pk-yrs)    Types: Cigarettes    Start date: 2008   Smokeless Tobacco Never      Social History     Substance and Sexual Activity   Alcohol Use Yes    Alcohol/week: 3.0 - 6.0 standard drinks of alcohol    Types: 3 - 6 Cans of beer per week        MEDICATIONS & ALLERGIES:         Allergies: Review of patient's allergies indicates:  No Known Allergies    Scheduled Meds:  Current Facility-Administered Medications   Medication Dose Route Frequency    amLODIPine  10 mg Oral Daily    aspirin  81 mg Oral Daily    cilostazoL   "50 mg Oral BID    clopidogreL  75 mg Oral Daily    docusate sodium  100 mg Oral BID    DULoxetine  30 mg Oral Daily    enoxaparin  40 mg Subcutaneous Daily    ferrous sulfate  1 tablet Oral Daily    gabapentin  300 mg Oral TID    gabapentin  600 mg Oral QHS    LIDOcaine  1 patch Transdermal Q24H    losartan  100 mg Oral Daily    multivitamin  1 tablet Oral Daily    oxyCODONE  10 mg Oral Q4H    piperacillin-tazobactam (Zosyn) IV (PEDS and ADULTS) (extended infusion is not appropriate)  4.5 g Intravenous Q8H    polyethylene glycol  17 g Oral Daily    sodium chloride 0.9%  10 mL Intravenous Q6H    vancomycin (VANCOCIN) IV (PEDS and ADULTS)  1,000 mg Intravenous Q18H       Continuous Infusions:  Current Facility-Administered Medications   Medication Dose Route Frequency Last Rate Last Admin       PRN Meds:  Current Facility-Administered Medications:     acetaminophen, 650 mg, Oral, Q6H PRN    albuterol, 2 puff, Inhalation, QID PRN    aluminum-magnesium hydroxide-simethicone, 15 mL, Oral, Q6H PRN    HYDROcodone-acetaminophen, 1 tablet, Oral, Q4H PRN    melatonin, 6 mg, Oral, Nightly PRN    simethicone, 80 mg, Oral, Q6H PRN    Flushing PICC/Midline Protocol, , , Until Discontinued **AND** sodium chloride 0.9%, 10 mL, Intravenous, Q6H **AND** sodium chloride 0.9%, 10 mL, Intravenous, PRN    Pharmacy to dose Vancomycin consult, , , Once **AND** vancomycin - pharmacy to dose, , Intravenous, pharmacy to manage frequency    OBJECTIVE:   Vital Signs:  VITAL SIGNS: 24 HR MIN & MAX LAST   Temp  Min: 98 °F (36.7 °C)  Max: 98.3 °F (36.8 °C)  98.2 °F (36.8 °C)   BP  Min: 100/61  Max: 146/66  (!) 108/56    Pulse  Min: 77  Max: 87  87    Resp  Min: 16  Max: 18  18    SpO2  Min: 92 %  Max: 97 %  (!) 94 %      HT: 5' 9.02" (175.3 cm)  WT: 68 kg (150 lb)  BMI: 22.1     Intake/output:  Intake/Output - Last 3 Shifts         04/18 0700  04/19 0659 04/19 0700  04/20 0659    P.O. 360 920    IV Piggyback  2236.1    Total Intake(mL/kg) 360 " (5.3) 3156.1 (46.4)    Urine (mL/kg/hr) 975 (0.6) 1000 (0.6)    Stool 0 0    Total Output 975 1000    Net -615 +2156.1          Stool Occurrence 1 x 0 x            Intake/Output Summary (Last 24 hours) at 4/20/2024 0012  Last data filed at 4/19/2024 2200  Gross per 24 hour   Intake 3156.11 ml   Output 1100 ml   Net 2056.11 ml         Physical Exam:  General: no acute distress  HEENT: Normocephalic, atraumatic  CV: RR  Resp: no increased WOB  GI:  Abdomen soft  Ext: Left lower extremity with wounds and dressings in place, extreme pain with any exam or manipulation. No palpable pulses distally  Neuro:  CNII-XII grossly intact, alert and oriented to person, place, and time    Labs:    CBC:  Recent Labs     04/18/24  0007   WBC 9.01  9.01   RBC 3.02*   HGB 8.0*   HCT 26.1*   *   MCV 86.4   MCH 26.5*   MCHC 30.7*     CMP:  Recent Labs     04/18/24  0007   CALCIUM 8.8   ALBUMIN 2.2*   *   K 3.7   CO2 25   BUN 13.1   CREATININE 0.65*   ALKPHOS 87   ALT 11   AST 13   BILITOT 0.2     Diagnostic Results:  CT Knee Without Contrast Left   Final Result      1. No significant joint effusion   2. Nonfocal soft tissue swelling   3. Severe bony demineralization   4. Arthritis   5. The preliminary and final reports are concordant.         Electronically signed by: Allyson Pierce   Date:    04/10/2024   Time:    07:35      X-Ray Knee Complete 4 or More Views Left   Final Result      The bones are demineralized.      Degenerative change at the knee with joint effusion.         Electronically signed by: Allyson Pierce   Date:    03/30/2024   Time:    11:16      X-Ray Chest 1 View for Line/Tube Placement   Final Result      Right PICC tip overlies the mid SVC.         Electronically signed by: Gentry Welsh   Date:    03/28/2024   Time:    13:26      X-Ray Tibia Fibula 2 View Right   Final Result      X-Ray Tibia Fibula 2 View Left   Final Result      X-Ray Calcaneus 2 View Right   Final Result      Diffuse mottled  appearance to all of the tarsal bones as well as the calcaneus with mild periosteal elevation seen along the posteroinferior aspect of the calcaneus.  Osteomyelitis should be excluded.         Electronically signed by: Mak Merchant   Date:    03/19/2024   Time:    17:42      X-Ray Calcaneus 2 View Left   Final Result          ASSESSMENT & PLAN:    69M PMH COPD, HTN, DM, neuropathy, resident of [facility] presented via EMS, adm 3/15/24 with sacral wound with osteomyelitis, chronic BLE wounds, pain to LLE. Surgery consulted for L AKA.     -hold plavix  -NPO midnight  -patient wishes to proceed with L AKA in AM    Kandice Espino MD  [employer] Surgery PGY4  04/20/2024

## 2024-04-20 NOTE — PROGRESS NOTES
Infectious Diseases Progress Note  69-year-old male, nursing home resident, with past medical history of HTN, COPD, DM type 2, with neuropathy, is admitted to [facility] on 03/15/2024 sent via EMS for sacral wound evaluation, apparently diagnosed with osteomyelitis involving the sacrum on 02/27/2024 with plan to be admitted to LTAC which had not been successful as an outpatient.  On presentation he was noted to have no fevers and no leukocytosis, anemic with low albumin.  Blood cultures have been negative.  Review of his records show a 02/08/2024 left leg wound culture with Pseudomonas, Providencia, ESBL Proteus and 11/30/2023 sacral wound culture with MRSA, Providencia, Enterococcus and Pseudomonas.  2/27 MRI of the pelvis shows osteomyelitis involving the sacrococcygeal bone as well as right trochanteric bursitis which may be septic.  He was seen by surgery team with no plan for surgical intervention.  He is on antibiotic coverage with vancomycin and Zosyn.     Subjective:  Lying in bed in no acute distress. No new complaints voiced. Afebrile.     Past Medical History:   Diagnosis Date    COPD (chronic obstructive pulmonary disease)     Depression     Hypertension     Neuropathy      Past Surgical History:   Procedure Laterality Date    angiogram Bilateral     stents placed in left leg    anterior neck surgery      ARTHROTOMY OF ANKLE Left 6/28/2023    Procedure: ARTHROTOMY, ANKLE;  Surgeon: Tom Nichole DPM;  Location: [facility];  Service: Podiatry;  Laterality: Left;    cataracts Left     CHOLECYSTECTOMY      DEBRIDEMENT OF FOOT Bilateral 3/26/2024    Procedure: DEBRIDEMENT, FOOT;  Surgeon: Tom Nichole DPM;  Location: [facility] OR;  Service: Podiatry;  Laterality: Bilateral;    EYE SURGERY Left     HAND SURGERY Left     broken bone    herina repair      INCISION AND DRAINAGE, LOWER EXTREMITY Left 7/28/2023    Procedure: INCISION AND DRAINAGE, LOWER EXTREMITY;  Surgeon: Avinash HURTADO  MD Dez;  Location: [facility] OR;  Service: Orthopedics;  Laterality: Left;  Supine, vascular bed, bone foam  last case  cysto tubing, laparoscopic trocar, experience, vanc, hemovac drain    KNEE SURGERY Bilateral     PERIPHERAL ANGIOGRAPHY N/A 3/13/2024    Procedure: Peripheral angiography;  Surgeon: Deven Queen MD;  Location: [facility] CATH LAB;  Service: Cardiology;  Laterality: N/A;  MICHELLE ANGIO W/ ANEST.    posterior neck surgery      SPINE SURGERY      TOTAL REPLACEMENT OF BOTH HIP JOINTS USING COMPUTER-ASSISTED NAVIGATION Bilateral      Social History     Socioeconomic History    Marital status:    Occupational History    Occupation: retired   Tobacco Use    Smoking status: Former     Average packs/day: 0.5 packs/day for 15.0 years (7.5 ttl pk-yrs)     Types: Cigarettes     Start date: 2008    Smokeless tobacco: Never   Substance and Sexual Activity    Alcohol use: Yes     Alcohol/week: 3.0 - 6.0 standard drinks of alcohol     Types: 3 - 6 Cans of beer per week    Drug use: Yes     Types: Marijuana    Sexual activity: Not Currently       ROS  Constitutional:  Positive for malaise/fatigue.   HENT: Negative.     Respiratory: Negative.     Gastrointestinal: Negative.    Genitourinary: Negative.    Musculoskeletal: Positive for L knee pain   Skin: Multiple pressure wounds   Neurological:  Positive for weakness.   Endo/Heme/Allergies: Negative.    Psychiatric/Behavioral: Negative.     All other Systems review done and negative.    Review of patient's allergies indicates:  No Known Allergies      Scheduled Meds:  Current Facility-Administered Medications   Medication Dose Route Frequency    amLODIPine  10 mg Oral Daily    aspirin  81 mg Oral Daily    cilostazoL  50 mg Oral BID    clopidogreL  75 mg Oral Daily    docusate sodium  100 mg Oral BID    DULoxetine  30 mg Oral Daily    enoxaparin  40 mg Subcutaneous Daily    ferrous sulfate  1 tablet Oral Daily    gabapentin  300 mg Oral TID    gabapentin  600 mg Oral  "QHS    LIDOcaine  1 patch Transdermal Q24H    losartan  100 mg Oral Daily    multivitamin  1 tablet Oral Daily    oxyCODONE  10 mg Oral Q4H    piperacillin-tazobactam (Zosyn) IV (PEDS and ADULTS) (extended infusion is not appropriate)  4.5 g Intravenous Q8H    polyethylene glycol  17 g Oral Daily    sodium chloride 0.9%  10 mL Intravenous Q6H    [START ON 4/20/2024] vancomycin (VANCOCIN) IV (PEDS and ADULTS)  1,000 mg Intravenous Q18H     Continuous Infusions:  Current Facility-Administered Medications   Medication Dose Route Frequency Last Rate Last Admin     PRN Meds:    Current Facility-Administered Medications:     acetaminophen, 650 mg, Oral, Q6H PRN    albuterol, 2 puff, Inhalation, QID PRN    aluminum-magnesium hydroxide-simethicone, 15 mL, Oral, Q6H PRN    HYDROcodone-acetaminophen, 1 tablet, Oral, Q4H PRN    melatonin, 6 mg, Oral, Nightly PRN    simethicone, 80 mg, Oral, Q6H PRN    Flushing PICC/Midline Protocol, , , Until Discontinued **AND** sodium chloride 0.9%, 10 mL, Intravenous, Q6H **AND** sodium chloride 0.9%, 10 mL, Intravenous, PRN    Pharmacy to dose Vancomycin consult, , , Once **AND** vancomycin - pharmacy to dose, , Intravenous, pharmacy to manage frequency      Objective:  BP (!) 146/66   Pulse 77   Temp 98.3 °F (36.8 °C) (Oral)   Resp 18   Ht 5' 9.02" (1.753 m)   Wt 68 kg (150 lb)   SpO2 97%   BMI 22.14 kg/m²     Physical Exam:   Physical Exam  Vitals reviewed.   Constitutional:       General: He is not in acute distress.  HENT:      Head: Normocephalic and atraumatic.   Cardiovascular:      Rate and Rhythm: Normal rate and regular rhythm.      Heart sounds: Normal heart sounds.   Pulmonary:      Effort: Pulmonary effort is normal. No respiratory distress.      Breath sounds: Normal breath sounds.   Abdominal:      General: Bowel sounds are normal. There is no distension.      Palpations: Abdomen is soft.      Tenderness: There is no abdominal tenderness.   Musculoskeletal:         " General: No deformity.      Cervical back: Neck supple.   Skin:     Findings: No erythema or rash.      Comments: Wound vac to sacrum  Neurological:      Mental Status: He is alert and oriented to person, place, and time.   Psychiatric:      Comments: Calm and cooperative     Imaging  Imaging Results    None          Lab Review   Recent Results (from the past 24 hour(s))   VANCOMYCIN, TROUGH    Collection Time: 04/19/24  1:11 PM   Result Value Ref Range    Vancomycin Trough 12.7 (L) 15.0 - 20.0 ug/ml         Assessment/Plan:  1. Chronic sacral pressure wound with osteomyelitis  2. History of polymicrobial sacral wound culture - MRSA, Providencia, Pseudomonas, Enterococcus  3.  Chronic left lower extremity wound with history of polymicrobial wound culture-Pseudomonas, ESBL Proteus, Providencia  4. Multiple pressure wounds  5.  Diabetes type 2  6.  Anemia  7.  Protein calorie malnutrition   8.  L knee pain     -Continue Vancomycin and Zosyn, plan end date of 04/26, following inflammatory markers  -Remains afebrile without leukocytosis  -CT L knee without contrast today 4/10 with no joint effusion, nonfocal soft tissue swelling, severe bone demineralization, arthritis  -Seen by Ortho, inputs noted. S/P L knee aspiration on 3/26   -3/26 left heel tissue cultures with few ESBL Proteus mirabilis  -4/9 ESR 76 from 29 and CRP 72.10 from 39.20  -3/15 blood cultures negative  -2/8 left leg wound cultures with many Pseudomonas, Providencia, Proteus  -11/30/2023 sacral wound with many Providencia, MRSA, Enterococcus, Pseudomonas  -Pt c/o severe L knee pain and wants L AKA. Surgery re-consulted, follow inputs  -Podiatry on board s/p debridement 3/26, cultures revealed ESBL Proteus  -We will continue aggressive wound care per Podiatry and the wound care team  -Discussed with patient and nursing staff. Case management working on placement. Disposition per primary team       Abdominal Pain, N/V/D

## 2024-04-20 NOTE — PROGRESS NOTES
Acute Care Surgery   Progress Note  Admit Date: 3/15/2024  HD#36  POD#25 Days Post-Op    Subjective:   Interval history:  RADHA Goldstein consulted for L AKA, discussed at bedside with pt overnight and again this morning. All questions answered.  NPO    Home Meds:  Current Outpatient Medications   Medication Instructions    acetaminophen (TYLENOL) 650 mg, Oral, Every 6 hours PRN    albuterol (PROVENTIL/VENTOLIN HFA) 90 mcg/actuation inhaler Inhalation, 4 times daily PRN    aluminum & magnesium hydroxide-simethicone (MYLANTA MAX STRENGTH) 400-400-40 mg/5 mL suspension Oral, Every 6 hours PRN    amLODIPine (NORVASC) 10 mg, Oral, Daily    aspirin 81 mg, Oral, Daily    cilostazoL (PLETAL) 50 mg, Oral, 2 times daily    clopidogreL (PLAVIX) 75 mg, Oral, Daily    dextrose 5 % in water (D5W) PgBk 100 mL with piperacillin-tazobactam 4.5 gram SolR 4.5 g 4.5 g, Intravenous, Every 8 hours    docusate sodium (COLACE) 100 mg, Oral, 2 times daily    DULoxetine (CYMBALTA) 30 mg, Oral, Daily    enoxaparin (LOVENOX) 40 mg, Subcutaneous, Every 12 hours    ferrous sulfate 325 mg, Oral, Daily    gabapentin (NEURONTIN) 300 mg, Oral, 3 times daily    gabapentin (NEURONTIN) 600 mg, Oral, Nightly    LIDOcaine (LIDODERM) 5 % 1 patch, Transdermal, Every 24 hours (non-standard times), Remove & Discard patch within 12 hours or as directed by MD    losartan (COZAAR) 100 mg, Oral, Daily    melatonin (MELATIN) 6 mg, Oral, Nightly PRN    multivitamin (THERAGRAN) per tablet 1 tablet, Oral, Daily    oxyCODONE (ROXICODONE) 10 mg, Oral, Every 4 hours    polyethylene glycol (GLYCOLAX) 17 g, Oral, Daily    simethicone (MYLICON) 80 mg, Oral, Every 6 hours PRN    vancomycin HCl in 5 % dextrose (VANCOMYCIN IN DEXTROSE 5 %) 1 gram/250 mL Soln 1,000 mg, Intravenous, Every 12 hours      Scheduled Meds:  Current Facility-Administered Medications   Medication Dose Route Frequency    amLODIPine  10 mg Oral Daily    aspirin  81 mg Oral Daily    cilostazoL  50 mg  "Oral BID    clopidogreL  75 mg Oral Daily    docusate sodium  100 mg Oral BID    DULoxetine  30 mg Oral Daily    enoxaparin  40 mg Subcutaneous Daily    ferrous sulfate  1 tablet Oral Daily    gabapentin  300 mg Oral TID    gabapentin  600 mg Oral QHS    LIDOcaine  1 patch Transdermal Q24H    losartan  100 mg Oral Daily    multivitamin  1 tablet Oral Daily    oxyCODONE  10 mg Oral Q4H    piperacillin-tazobactam (Zosyn) IV (PEDS and ADULTS) (extended infusion is not appropriate)  4.5 g Intravenous Q8H    polyethylene glycol  17 g Oral Daily    sodium chloride 0.9%  10 mL Intravenous Q6H    vancomycin (VANCOCIN) IV (PEDS and ADULTS)  1,000 mg Intravenous Q18H     Continuous Infusions:  Current Facility-Administered Medications   Medication Dose Route Frequency Last Rate Last Admin     PRN Meds:  Current Facility-Administered Medications:     acetaminophen, 650 mg, Oral, Q6H PRN    albuterol, 2 puff, Inhalation, QID PRN    aluminum-magnesium hydroxide-simethicone, 15 mL, Oral, Q6H PRN    HYDROcodone-acetaminophen, 1 tablet, Oral, Q4H PRN    melatonin, 6 mg, Oral, Nightly PRN    simethicone, 80 mg, Oral, Q6H PRN    Flushing PICC/Midline Protocol, , , Until Discontinued **AND** sodium chloride 0.9%, 10 mL, Intravenous, Q6H **AND** sodium chloride 0.9%, 10 mL, Intravenous, PRN    Pharmacy to dose Vancomycin consult, , , Once **AND** vancomycin - pharmacy to dose, , Intravenous, pharmacy to manage frequency     Objective:     VITAL SIGNS: 24 HR MIN & MAX LAST   Temp  Min: 97.6 °F (36.4 °C)  Max: 98.3 °F (36.8 °C)  97.6 °F (36.4 °C)   BP  Min: 100/61  Max: 146/66  114/63    Pulse  Min: 77  Max: 92  92    Resp  Min: 16  Max: 18  16    SpO2  Min: 92 %  Max: 97 %  (!) 94 %      HT: 5' 9.02" (175.3 cm)  WT: 68 kg (150 lb)  BMI: 22.1     Intake/output:  Intake/Output - Last 3 Shifts         04/18 0700  04/19 0659 04/19 0700 04/20 0659    P.O. 360 920    IV Piggyback  2236.1    Total Intake(mL/kg) 360 (5.3) 3156.1 (46.4)    " Urine (mL/kg/hr) 975 (0.6) 1500 (0.9)    Other  0    Stool 0 0    Total Output 975 1500    Net -615 +1656.1          Stool Occurrence 1 x 0 x            Intake/Output Summary (Last 24 hours) at 4/20/2024 0619  Last data filed at 4/20/2024 0600  Gross per 24 hour   Intake 3156.11 ml   Output 1500 ml   Net 1656.11 ml           Lines/drains/airway:  PICC Double Lumen 03/28/24 1244 right basilic (Active)   $ PICC Line Charges (Upon insertion) Bedside Insertion Performed Pt < 5 Years Old (no subq port/pump or image guidance);Catheter - Double Lumen (Supply) 04/06/24 0929   Line Necessity Review Poor venous access 04/15/24 1600   Site Assessment No redness;No swelling;No warmth 04/20/24 0400   Extremity Assessment Distal to IV No abnormal discoloration;No redness;No swelling;No warmth 04/20/24 0400   Line Securement Device Secured with sutureless device 04/20/24 0400   Dressing Type CHG impregnated dressing/sponge 04/20/24 0400   Dressing Status Clean;Dry;Intact 04/20/24 0400   Dressing Intervention Integrity maintained 04/20/24 0400   Date on Dressing 04/17/24 04/20/24 0400   Dressing Due to be Changed 04/24/24 04/20/24 0400   Distal Patency/Care flushed w/o difficulty 04/20/24 0400   Proximal 1 Patency/Care flushed w/o difficulty 04/20/24 0400   Number of days: 22       Physical examination:  Gen: NAD, AAOx3, answering questions appropriately  HEENT: moist mucous membranes  CV: RR  Resp: no increased work of breathing  Abd: soft, non-distended  Ext: LLE marked for OR  Neuro: CN II-XII grossly intact    Labs:  Renal:  Recent Labs     04/18/24 0007   BUN 13.1   CREATININE 0.65*       FENGI:  Recent Labs     04/18/24 0007   *   K 3.7   CO2 25   CALCIUM 8.8   ALBUMIN 2.2*   BILITOT 0.2   AST 13   ALKPHOS 87   ALT 11     Heme:  Recent Labs     04/18/24 0007   HGB 8.0*   HCT 26.1*   *     ID:  Recent Labs     04/18/24 0007   WBC 9.01  9.01     CBG:  Recent Labs     04/18/24 0007   GLUCOSE 141*         Imaging:  CT Knee Without Contrast Left   Final Result      1. No significant joint effusion   2. Nonfocal soft tissue swelling   3. Severe bony demineralization   4. Arthritis   5. The preliminary and final reports are concordant.         Electronically signed by: Allyson Pierce   Date:    04/10/2024   Time:    07:35      X-Ray Knee Complete 4 or More Views Left   Final Result      The bones are demineralized.      Degenerative change at the knee with joint effusion.         Electronically signed by: Allyson Pierce   Date:    03/30/2024   Time:    11:16      X-Ray Chest 1 View for Line/Tube Placement   Final Result      Right PICC tip overlies the mid SVC.         Electronically signed by: Gentry Welsh   Date:    03/28/2024   Time:    13:26      X-Ray Tibia Fibula 2 View Right   Final Result      X-Ray Tibia Fibula 2 View Left   Final Result      X-Ray Calcaneus 2 View Right   Final Result      Diffuse mottled appearance to all of the tarsal bones as well as the calcaneus with mild periosteal elevation seen along the posteroinferior aspect of the calcaneus.  Osteomyelitis should be excluded.         Electronically signed by: Mak Merchant   Date:    03/19/2024   Time:    17:42      X-Ray Calcaneus 2 View Left   Final Result         I have reviewed all pertinent imaging results/findings within the past 24 hours.    Assessment & Plan:   69M PMH COPD, HTN, DM, neuropathy, resident of [facility] presented via EMS, adm 3/15/24 with sacral wound with osteomyelitis, chronic BLE wounds, pain to LLE. Surgery consulted for L AKA.      -hold plavix  -NPO since midnight  -patient wishes to proceed with L AKA. LLE marked for surgery. R/b/a discussed, consents reviewed and signed    Kandice Espino MD  [employer] Surgery PGY4  04/20/2024  6:19 AM

## 2024-04-20 NOTE — ANESTHESIA PREPROCEDURE EVALUATION
04/20/2024  Shaheen Christie is a 69 y.o., male.    Hgb 8.0    COPD, HTN, DM, neuropathy, resident of [facility] presented via EMS, adm 3/15/24 with sacral wound with osteomyelitis, chronic BLE wounds, pain to LLE. Surgery consulted for L AKA.      Reports excruciating pain to the L knee, despite multiple surgeries in the past. Pt reports history of previous septic arthritis in the left knee. He has been on abx, and has been seen by wound care, podiatry, ID, ortho. Ortho recommended fu with joint specialist outpatient, as pt has end-stage osteoarthritis. Pt is now having severe pain to the left leg with any sort of manipulation or wound dressing change. Pt has been working with podiatry but has told them that he can no longer live with the limb and the pain.       Pre-op Assessment    I have reviewed the Patient Summary Reports.     I have reviewed the Nursing Notes. I have reviewed the NPO Status.   I have reviewed the Medications.     Review of Systems  Anesthesia Hx:  No problems with previous Anesthesia                Social:  Smoker       Cardiovascular:     Hypertension                                  Hypertension         Pulmonary:   COPD         Chronic Obstructive Pulmonary Disease (COPD):                      Psych:  Psychiatric History                  Physical Exam  General: Well nourished, Cooperative, Alert and Oriented    Airway:  Mallampati: II   Mouth Opening: Normal  TM Distance: Normal  Tongue: Normal  Neck ROM: Normal ROM    Dental:  Edentulous        Anesthesia Plan  Type of Anesthesia, risks & benefits discussed:    Anesthesia Type: Gen Supraglottic Airway  Intra-op Monitoring Plan: Standard ASA Monitors  Post Op Pain Control Plan: multimodal analgesia  Induction:  IV  Airway Plan: Direct, Post-Induction  Informed Consent: Informed consent signed with the Patient representative  and all parties understand the risks and agree with anesthesia plan.  All questions answered. Patient consented to blood products? Yes  ASA Score: 3  Day of Surgery Review of History & Physical: H&P Update referred to the surgeon/provider.  Anesthesia Plan Notes: LMA/ETT    Ready For Surgery From Anesthesia Perspective.     .

## 2024-04-20 NOTE — TRANSFER OF CARE
"Anesthesia Transfer of Care Note    Patient: Shaheen Christie    Procedure(s) Performed: Procedure(s) (LRB):  AMPUTATION, ABOVE KNEE (Left)  APPLICATION, WOUND VAC (Left)    Patient location: PACU    Anesthesia Type: general    Transport from OR: Transported from OR on room air with adequate spontaneous ventilation    Post pain: adequate analgesia    Post assessment: no apparent anesthetic complications and tolerated procedure well    Post vital signs: stable    Level of consciousness: sedated    Nausea/Vomiting: no nausea/vomiting    Complications: none    Transfer of care protocol was followed      Last vitals: Visit Vitals  BP (!) 146/71   Pulse 70   Temp 36.4 °C (97.6 °F) (Oral)   Resp 16   Ht 5' 9.02" (1.753 m)   Wt 68 kg (150 lb)   SpO2 95%   BMI 22.14 kg/m²     "

## 2024-04-20 NOTE — PROGRESS NOTES
[facility]                       [address] [address]    PATIENT NAME:       HAWA KUMAR  YOB: 1955  CSN:                [id number]   MRN:                [id number]  ADMIT DATE:         03/15/2024 03:09:00  PHYSICIAN:          Tom Nichole DPM                            PROGRESS NOTE    DATE:  04/20/2024 00:00:00    SUBJECTIVE:  The patient is seen today.  He was seen by General Surgery.  Plans   are for left AKA.  No other issues.    PHYSICAL EXAMINATION:  Vital signs stable and afebrile.    LABS:  Reviewed.    Dressings are intact to both extremities.  Did not take dressings down the left.    Right side wounds are stable.  Clean, scant, exudate.    ASSESSMENT:  Chronic lower extremity wounds with intractable left knee pain.    History of septic arthritis in the past.    PLAN:  Continue with current care.  Plans for left AKA.  Continue antibiotics as   per Infectious Disease.        ______________________________  Tom Nichole DPM    GAS/AQS  DD:  04/20/2024  Time:  10:10AM  DT:  04/20/2024  Time:  10:18AM  Job #:  [id number]      PROGRESS NOTE

## 2024-04-20 NOTE — PROGRESS NOTES
[facility] - 8th Floor [facility]  Progress Note    Patient Name: Shaheen Christie  MRN: [id number]  Patient Class: IP- Inpatient   Admission Date: 3/15/2024  Length of Stay: 36 days  Attending Physician: Enzo Thibodeaux MD  Primary Care Provider: Viktor Russ MD        Subjective:     Principal Problem:Osteomyelitis    Interval History:  Patient to have left above-knee amputation due to nonhealing wound.  Podiatry had a big conversation with the patient about this.  They will proceed today with left above-knee amputation.    Review of Systems   All other systems reviewed and are negative.    Objective:     Vital Signs (Most Recent):  Temp: 97.6 °F (36.4 °C) (04/20/24 0427)  Pulse: 92 (04/20/24 0427)  Resp: 16 (04/20/24 0427)  BP: 114/63 (04/20/24 0427)  SpO2: (!) 94 % (04/20/24 0427) Vital Signs (24h Range):  Temp:  [97.6 °F (36.4 °C)-98.3 °F (36.8 °C)] 97.6 °F (36.4 °C)  Pulse:  [77-92] 92  Resp:  [16-18] 16  SpO2:  [92 %-97 %] 94 %  BP: (100-146)/(56-66) 114/63     Weight: 68 kg (150 lb)  Body mass index is 22.14 kg/m².    Intake/Output Summary (Last 24 hours) at 4/20/2024 0749  Last data filed at 4/20/2024 0600  Gross per 24 hour   Intake 3156.11 ml   Output 1500 ml   Net 1656.11 ml      Physical Exam  HENT:      Head: Normocephalic and atraumatic.      Right Ear: External ear normal.      Left Ear: External ear normal.      Mouth/Throat:      Mouth: Mucous membranes are dry.   Cardiovascular:      Rate and Rhythm: Normal rate and regular rhythm.      Pulses: Normal pulses.      Heart sounds: Normal heart sounds.   Pulmonary:      Effort: Pulmonary effort is normal.      Breath sounds: Normal breath sounds.   Abdominal:      General: Bowel sounds are normal.      Palpations: Abdomen is soft.   Musculoskeletal:      Cervical back: Neck supple.   Skin:     General: Skin is warm and dry.   Neurological:      General: No focal deficit present.      Mental Status: He is  "alert. Mental status is at baseline.   Psychiatric:         Mood and Affect: Mood normal.           Overview/Hospital Course:  Repeat blood work for tomorrow.  Continue with IV antibiotics was sacral wound.  We will see how he does after the amputation.    Significant Labs: All pertinent labs within the past 24 hours have been reviewed.  BMP: No results for input(s): "GLU", "NA", "K", "CL", "CO2", "BUN", "CREATININE", "CALCIUM", "MG" in the last 48 hours.  CBC: No results for input(s): "WBC", "HGB", "HCT", "PLT" in the last 48 hours.    Significant Imaging: I have reviewed all pertinent imaging results/findings within the past 24 hours.    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

## 2024-04-21 ENCOUNTER — ANESTHESIA (OUTPATIENT)
Dept: SURGERY | Facility: HOSPITAL | Age: 69
DRG: 616 | End: 2024-04-21
Payer: MEDICARE

## 2024-04-21 ENCOUNTER — ANESTHESIA EVENT (OUTPATIENT)
Dept: SURGERY | Facility: HOSPITAL | Age: 69
DRG: 616 | End: 2024-04-21
Payer: MEDICARE

## 2024-04-21 LAB
ABO + RH BLD: NORMAL
ANION GAP SERPL CALC-SCNC: 9 MEQ/L
BASOPHILS # BLD AUTO: 0.16 X10(3)/MCL
BASOPHILS # BLD AUTO: 0.17 X10(3)/MCL
BASOPHILS NFR BLD AUTO: 1.7 %
BASOPHILS NFR BLD AUTO: 1.8 %
BLD PROD TYP BPU: NORMAL
BLOOD UNIT EXPIRATION DATE: NORMAL
BLOOD UNIT TYPE CODE: 6200
BUN SERPL-MCNC: 13 MG/DL (ref 8.4–25.7)
CALCIUM SERPL-MCNC: 8.6 MG/DL (ref 8.8–10)
CHLORIDE SERPL-SCNC: 102 MMOL/L (ref 98–107)
CO2 SERPL-SCNC: 24 MMOL/L (ref 23–31)
CREAT SERPL-MCNC: 0.88 MG/DL (ref 0.73–1.18)
CREAT/UREA NIT SERPL: 15
CROSSMATCH INTERPRETATION: NORMAL
DISPENSE STATUS: NORMAL
EOSINOPHIL # BLD AUTO: 0.35 X10(3)/MCL (ref 0–0.9)
EOSINOPHIL # BLD AUTO: 0.65 X10(3)/MCL (ref 0–0.9)
EOSINOPHIL NFR BLD AUTO: 3.7 %
EOSINOPHIL NFR BLD AUTO: 6.9 %
ERYTHROCYTE [DISTWIDTH] IN BLOOD BY AUTOMATED COUNT: 16.3 % (ref 11.5–17)
ERYTHROCYTE [DISTWIDTH] IN BLOOD BY AUTOMATED COUNT: 17.9 % (ref 11.5–17)
GFR SERPLBLD CREATININE-BSD FMLA CKD-EPI: >60 MLS/MIN/1.73/M2
GLUCOSE SERPL-MCNC: 119 MG/DL (ref 82–115)
GROUP & RH: NORMAL
HCT VFR BLD AUTO: 21.5 % (ref 42–52)
HCT VFR BLD AUTO: 32.1 % (ref 42–52)
HGB BLD-MCNC: 10.6 G/DL (ref 14–18)
HGB BLD-MCNC: 6.5 G/DL (ref 14–18)
IMM GRANULOCYTES # BLD AUTO: 0.05 X10(3)/MCL (ref 0–0.04)
IMM GRANULOCYTES # BLD AUTO: 0.09 X10(3)/MCL (ref 0–0.04)
IMM GRANULOCYTES NFR BLD AUTO: 0.5 %
IMM GRANULOCYTES NFR BLD AUTO: 1 %
INDIRECT COOMBS: NORMAL
LYMPHOCYTES # BLD AUTO: 0.77 X10(3)/MCL (ref 0.6–4.6)
LYMPHOCYTES # BLD AUTO: 1.99 X10(3)/MCL (ref 0.6–4.6)
LYMPHOCYTES NFR BLD AUTO: 21.2 %
LYMPHOCYTES NFR BLD AUTO: 8.1 %
MCH RBC QN AUTO: 26.3 PG (ref 27–31)
MCH RBC QN AUTO: 27.7 PG (ref 27–31)
MCHC RBC AUTO-ENTMCNC: 30.2 G/DL (ref 33–36)
MCHC RBC AUTO-ENTMCNC: 33 G/DL (ref 33–36)
MCV RBC AUTO: 83.8 FL (ref 80–94)
MCV RBC AUTO: 87 FL (ref 80–94)
MONOCYTES # BLD AUTO: 0.47 X10(3)/MCL (ref 0.1–1.3)
MONOCYTES # BLD AUTO: 1.29 X10(3)/MCL (ref 0.1–1.3)
MONOCYTES NFR BLD AUTO: 13.8 %
MONOCYTES NFR BLD AUTO: 5 %
NEUTROPHILS # BLD AUTO: 5.23 X10(3)/MCL (ref 2.1–9.2)
NEUTROPHILS # BLD AUTO: 7.63 X10(3)/MCL (ref 2.1–9.2)
NEUTROPHILS NFR BLD AUTO: 55.8 %
NEUTROPHILS NFR BLD AUTO: 80.5 %
NRBC BLD AUTO-RTO: 0 %
NRBC BLD AUTO-RTO: 0 %
PLATELET # BLD AUTO: 361 X10(3)/MCL (ref 130–400)
PLATELET # BLD AUTO: 471 X10(3)/MCL (ref 130–400)
PMV BLD AUTO: 8.3 FL (ref 7.4–10.4)
PMV BLD AUTO: 8.6 FL (ref 7.4–10.4)
POTASSIUM SERPL-SCNC: 4 MMOL/L (ref 3.5–5.1)
RBC # BLD AUTO: 2.47 X10(6)/MCL (ref 4.7–6.1)
RBC # BLD AUTO: 3.83 X10(6)/MCL (ref 4.7–6.1)
SODIUM SERPL-SCNC: 135 MMOL/L (ref 136–145)
SPECIMEN OUTDATE: NORMAL
UNIT NUMBER: NORMAL
VANCOMYCIN TROUGH SERPL-MCNC: 15.9 UG/ML (ref 15–20)
WBC # SPEC AUTO: 9.38 X10(3)/MCL (ref 4.5–11.5)
WBC # SPEC AUTO: 9.47 X10(3)/MCL (ref 4.5–11.5)

## 2024-04-21 PROCEDURE — 71000039 HC RECOVERY, EACH ADD'L HOUR: Performed by: SURGERY

## 2024-04-21 PROCEDURE — P9035 PLATELET PHERES LEUKOREDUCED: HCPCS

## 2024-04-21 PROCEDURE — 30233N1 TRANSFUSION OF NONAUTOLOGOUS RED BLOOD CELLS INTO PERIPHERAL VEIN, PERCUTANEOUS APPROACH: ICD-10-PCS | Performed by: INTERNAL MEDICINE

## 2024-04-21 PROCEDURE — 51700 IRRIGATION OF BLADDER: CPT

## 2024-04-21 PROCEDURE — 63600175 PHARM REV CODE 636 W HCPCS: Performed by: SURGERY

## 2024-04-21 PROCEDURE — 97605 NEG PRS WND THER DME<=50SQCM: CPT | Mod: ,,, | Performed by: SURGERY

## 2024-04-21 PROCEDURE — A4216 STERILE WATER/SALINE, 10 ML: HCPCS | Performed by: INTERNAL MEDICINE

## 2024-04-21 PROCEDURE — 80048 BASIC METABOLIC PNL TOTAL CA: CPT | Performed by: INTERNAL MEDICINE

## 2024-04-21 PROCEDURE — 25000003 PHARM REV CODE 250

## 2024-04-21 PROCEDURE — 27201423 OPTIME MED/SURG SUP & DEVICES STERILE SUPPLY: Performed by: SURGERY

## 2024-04-21 PROCEDURE — P9016 RBC LEUKOCYTES REDUCED: HCPCS | Performed by: SURGERY

## 2024-04-21 PROCEDURE — 25000003 PHARM REV CODE 250: Performed by: PODIATRIST

## 2024-04-21 PROCEDURE — 37000009 HC ANESTHESIA EA ADD 15 MINS: Performed by: SURGERY

## 2024-04-21 PROCEDURE — 63600175 PHARM REV CODE 636 W HCPCS

## 2024-04-21 PROCEDURE — 86923 COMPATIBILITY TEST ELECTRIC: CPT | Mod: 91 | Performed by: SURGERY

## 2024-04-21 PROCEDURE — 36000706: Performed by: SURGERY

## 2024-04-21 PROCEDURE — 36430 TRANSFUSION BLD/BLD COMPNT: CPT

## 2024-04-21 PROCEDURE — 80202 ASSAY OF VANCOMYCIN: CPT | Performed by: INTERNAL MEDICINE

## 2024-04-21 PROCEDURE — 86923 COMPATIBILITY TEST ELECTRIC: CPT | Mod: 91 | Performed by: INTERNAL MEDICINE

## 2024-04-21 PROCEDURE — 86850 RBC ANTIBODY SCREEN: CPT | Performed by: SURGERY

## 2024-04-21 PROCEDURE — P9016 RBC LEUKOCYTES REDUCED: HCPCS | Performed by: INTERNAL MEDICINE

## 2024-04-21 PROCEDURE — 86923 COMPATIBILITY TEST ELECTRIC: CPT | Mod: 91

## 2024-04-21 PROCEDURE — D9220A PRA ANESTHESIA: Mod: ANES,,, | Performed by: ANESTHESIOLOGY

## 2024-04-21 PROCEDURE — 71000033 HC RECOVERY, INTIAL HOUR: Performed by: SURGERY

## 2024-04-21 PROCEDURE — 25000003 PHARM REV CODE 250: Performed by: INTERNAL MEDICINE

## 2024-04-21 PROCEDURE — 85025 COMPLETE CBC W/AUTO DIFF WBC: CPT | Performed by: INTERNAL MEDICINE

## 2024-04-21 PROCEDURE — 11000001 HC ACUTE MED/SURG PRIVATE ROOM

## 2024-04-21 PROCEDURE — 25000003 PHARM REV CODE 250: Performed by: ANESTHESIOLOGY

## 2024-04-21 PROCEDURE — 21400001 HC TELEMETRY ROOM

## 2024-04-21 PROCEDURE — P9016 RBC LEUKOCYTES REDUCED: HCPCS

## 2024-04-21 PROCEDURE — 63600175 PHARM REV CODE 636 W HCPCS: Performed by: INTERNAL MEDICINE

## 2024-04-21 PROCEDURE — 85025 COMPLETE CBC W/AUTO DIFF WBC: CPT

## 2024-04-21 PROCEDURE — D9220A PRA ANESTHESIA: Mod: CRNA,,,

## 2024-04-21 PROCEDURE — 37000008 HC ANESTHESIA 1ST 15 MINUTES: Performed by: SURGERY

## 2024-04-21 PROCEDURE — 35860 EXPLORE LIMB VESSELS: CPT | Mod: 78,,, | Performed by: SURGERY

## 2024-04-21 PROCEDURE — 63600175 PHARM REV CODE 636 W HCPCS: Performed by: ANESTHESIOLOGY

## 2024-04-21 PROCEDURE — 36000707: Performed by: SURGERY

## 2024-04-21 RX ORDER — SODIUM CITRATE AND CITRIC ACID MONOHYDRATE 334; 500 MG/5ML; MG/5ML
30 SOLUTION ORAL ONCE
Status: CANCELLED | OUTPATIENT
Start: 2024-04-21 | End: 2024-04-21

## 2024-04-21 RX ORDER — SODIUM CITRATE AND CITRIC ACID MONOHYDRATE 334; 500 MG/5ML; MG/5ML
30 SOLUTION ORAL ONCE
Status: DISCONTINUED | OUTPATIENT
Start: 2024-04-21 | End: 2024-04-21

## 2024-04-21 RX ORDER — HYDROCODONE BITARTRATE AND ACETAMINOPHEN 500; 5 MG/1; MG/1
TABLET ORAL
Status: DISCONTINUED | OUTPATIENT
Start: 2024-04-21 | End: 2024-04-30 | Stop reason: HOSPADM

## 2024-04-21 RX ORDER — TAMSULOSIN HYDROCHLORIDE 0.4 MG/1
0.4 CAPSULE ORAL DAILY
Status: DISCONTINUED | OUTPATIENT
Start: 2024-04-21 | End: 2024-04-30 | Stop reason: HOSPADM

## 2024-04-21 RX ORDER — HYDROMORPHONE HYDROCHLORIDE 2 MG/ML
0.4 INJECTION, SOLUTION INTRAMUSCULAR; INTRAVENOUS; SUBCUTANEOUS EVERY 5 MIN PRN
Status: DISCONTINUED | OUTPATIENT
Start: 2024-04-21 | End: 2024-04-21 | Stop reason: HOSPADM

## 2024-04-21 RX ORDER — IPRATROPIUM BROMIDE AND ALBUTEROL SULFATE 2.5; .5 MG/3ML; MG/3ML
3 SOLUTION RESPIRATORY (INHALATION)
Status: DISCONTINUED | OUTPATIENT
Start: 2024-04-21 | End: 2024-04-21 | Stop reason: HOSPADM

## 2024-04-21 RX ORDER — SODIUM CITRATE AND CITRIC ACID MONOHYDRATE 334; 500 MG/5ML; MG/5ML
30 SOLUTION ORAL ONCE
Status: COMPLETED | OUTPATIENT
Start: 2024-04-21 | End: 2024-04-21

## 2024-04-21 RX ORDER — LIDOCAINE HYDROCHLORIDE 10 MG/ML
1 INJECTION, SOLUTION EPIDURAL; INFILTRATION; INTRACAUDAL; PERINEURAL ONCE
Status: CANCELLED | OUTPATIENT
Start: 2024-04-21 | End: 2024-04-21

## 2024-04-21 RX ORDER — METOCLOPRAMIDE HYDROCHLORIDE 5 MG/ML
10 INJECTION INTRAMUSCULAR; INTRAVENOUS ONCE
Status: CANCELLED | OUTPATIENT
Start: 2024-04-21 | End: 2024-04-21

## 2024-04-21 RX ORDER — SUCCINYLCHOLINE CHLORIDE 20 MG/ML
INJECTION INTRAMUSCULAR; INTRAVENOUS
Status: DISCONTINUED | OUTPATIENT
Start: 2024-04-21 | End: 2024-04-21

## 2024-04-21 RX ORDER — LIDOCAINE HYDROCHLORIDE 10 MG/ML
INJECTION INFILTRATION; PERINEURAL
Status: DISPENSED
Start: 2024-04-21 | End: 2024-04-21

## 2024-04-21 RX ORDER — DEXAMETHASONE SODIUM PHOSPHATE 4 MG/ML
INJECTION, SOLUTION INTRA-ARTICULAR; INTRALESIONAL; INTRAMUSCULAR; INTRAVENOUS; SOFT TISSUE
Status: DISCONTINUED | OUTPATIENT
Start: 2024-04-21 | End: 2024-04-21

## 2024-04-21 RX ORDER — LIDOCAINE HYDROCHLORIDE 10 MG/ML
1 INJECTION, SOLUTION EPIDURAL; INFILTRATION; INTRACAUDAL; PERINEURAL ONCE
Qty: 1 ML | Refills: 0 | Status: DISCONTINUED | OUTPATIENT
Start: 2024-04-21 | End: 2024-04-30 | Stop reason: HOSPADM

## 2024-04-21 RX ORDER — HYDROCODONE BITARTRATE AND ACETAMINOPHEN 500; 5 MG/1; MG/1
TABLET ORAL
Status: DISCONTINUED | OUTPATIENT
Start: 2024-04-21 | End: 2024-04-21 | Stop reason: HOSPADM

## 2024-04-21 RX ORDER — PROPOFOL 10 MG/ML
VIAL (ML) INTRAVENOUS
Status: DISCONTINUED | OUTPATIENT
Start: 2024-04-21 | End: 2024-04-21

## 2024-04-21 RX ORDER — ONDANSETRON HYDROCHLORIDE 2 MG/ML
INJECTION, SOLUTION INTRAVENOUS
Status: DISCONTINUED | OUTPATIENT
Start: 2024-04-21 | End: 2024-04-21

## 2024-04-21 RX ORDER — METOCLOPRAMIDE 10 MG/1
10 TABLET ORAL
Status: DISCONTINUED | OUTPATIENT
Start: 2024-04-22 | End: 2024-04-21 | Stop reason: HOSPADM

## 2024-04-21 RX ORDER — ONDANSETRON HYDROCHLORIDE 2 MG/ML
4 INJECTION, SOLUTION INTRAVENOUS DAILY PRN
Status: DISCONTINUED | OUTPATIENT
Start: 2024-04-21 | End: 2024-04-21 | Stop reason: HOSPADM

## 2024-04-21 RX ORDER — MEPERIDINE HYDROCHLORIDE 25 MG/ML
12.5 INJECTION INTRAMUSCULAR; INTRAVENOUS; SUBCUTANEOUS EVERY 10 MIN PRN
Status: DISCONTINUED | OUTPATIENT
Start: 2024-04-21 | End: 2024-04-21 | Stop reason: HOSPADM

## 2024-04-21 RX ORDER — HYDROMORPHONE HYDROCHLORIDE 2 MG/ML
1 INJECTION, SOLUTION INTRAMUSCULAR; INTRAVENOUS; SUBCUTANEOUS ONCE
Status: COMPLETED | OUTPATIENT
Start: 2024-04-21 | End: 2024-04-21

## 2024-04-21 RX ORDER — PHENYLEPHRINE HYDROCHLORIDE 10 MG/ML
INJECTION INTRAVENOUS
Status: DISCONTINUED | OUTPATIENT
Start: 2024-04-21 | End: 2024-04-21

## 2024-04-21 RX ORDER — ONDANSETRON 4 MG/1
8 TABLET, ORALLY DISINTEGRATING ORAL EVERY 6 HOURS PRN
Status: CANCELLED | OUTPATIENT
Start: 2024-04-21

## 2024-04-21 RX ORDER — VASOPRESSIN 20 [USP'U]/ML
INJECTION, SOLUTION INTRAMUSCULAR; SUBCUTANEOUS
Status: DISCONTINUED | OUTPATIENT
Start: 2024-04-21 | End: 2024-04-21

## 2024-04-21 RX ORDER — SODIUM CHLORIDE, SODIUM GLUCONATE, SODIUM ACETATE, POTASSIUM CHLORIDE AND MAGNESIUM CHLORIDE 30; 37; 368; 526; 502 MG/100ML; MG/100ML; MG/100ML; MG/100ML; MG/100ML
INJECTION, SOLUTION INTRAVENOUS CONTINUOUS
Status: CANCELLED | OUTPATIENT
Start: 2024-04-21 | End: 2024-05-21

## 2024-04-21 RX ORDER — METOCLOPRAMIDE HYDROCHLORIDE 5 MG/ML
10 INJECTION INTRAMUSCULAR; INTRAVENOUS ONCE
Status: COMPLETED | OUTPATIENT
Start: 2024-04-21 | End: 2024-04-21

## 2024-04-21 RX ORDER — ROCURONIUM BROMIDE 10 MG/ML
INJECTION, SOLUTION INTRAVENOUS
Status: DISCONTINUED | OUTPATIENT
Start: 2024-04-21 | End: 2024-04-21

## 2024-04-21 RX ORDER — CALCIUM CHLORIDE INJECTION 100 MG/ML
INJECTION, SOLUTION INTRAVENOUS
Status: DISCONTINUED | OUTPATIENT
Start: 2024-04-21 | End: 2024-04-21

## 2024-04-21 RX ORDER — HYDROMORPHONE HYDROCHLORIDE 2 MG/ML
0.2 INJECTION, SOLUTION INTRAMUSCULAR; INTRAVENOUS; SUBCUTANEOUS EVERY 5 MIN PRN
Status: DISCONTINUED | OUTPATIENT
Start: 2024-04-21 | End: 2024-04-21 | Stop reason: HOSPADM

## 2024-04-21 RX ORDER — FENTANYL CITRATE 50 UG/ML
INJECTION, SOLUTION INTRAMUSCULAR; INTRAVENOUS
Status: DISCONTINUED | OUTPATIENT
Start: 2024-04-21 | End: 2024-04-21

## 2024-04-21 RX ORDER — MIDAZOLAM HYDROCHLORIDE 2 MG/2ML
1 INJECTION, SOLUTION INTRAMUSCULAR; INTRAVENOUS ONCE AS NEEDED
Status: CANCELLED | OUTPATIENT
Start: 2024-04-21 | End: 2035-09-18

## 2024-04-21 RX ORDER — MIDAZOLAM HYDROCHLORIDE 1 MG/ML
INJECTION INTRAMUSCULAR; INTRAVENOUS
Status: DISCONTINUED | OUTPATIENT
Start: 2024-04-21 | End: 2024-04-21

## 2024-04-21 RX ORDER — PROCHLORPERAZINE EDISYLATE 5 MG/ML
5 INJECTION INTRAMUSCULAR; INTRAVENOUS EVERY 30 MIN PRN
Status: DISCONTINUED | OUTPATIENT
Start: 2024-04-21 | End: 2024-04-21 | Stop reason: HOSPADM

## 2024-04-21 RX ADMIN — SODIUM CHLORIDE, PRESERVATIVE FREE 10 ML: 5 INJECTION INTRAVENOUS at 03:04

## 2024-04-21 RX ADMIN — SODIUM CHLORIDE, POTASSIUM CHLORIDE, SODIUM LACTATE AND CALCIUM CHLORIDE 1000 ML: 600; 310; 30; 20 INJECTION, SOLUTION INTRAVENOUS at 05:04

## 2024-04-21 RX ADMIN — GABAPENTIN 600 MG: 300 CAPSULE ORAL at 09:04

## 2024-04-21 RX ADMIN — ROCURONIUM BROMIDE 30 MG: 10 SOLUTION INTRAVENOUS at 07:04

## 2024-04-21 RX ADMIN — ONDANSETRON 4 MG: 2 INJECTION INTRAMUSCULAR; INTRAVENOUS at 06:04

## 2024-04-21 RX ADMIN — PIPERACILLIN SODIUM AND TAZOBACTAM SODIUM 4.5 G: 4; .5 INJECTION, POWDER, LYOPHILIZED, FOR SOLUTION INTRAVENOUS at 11:04

## 2024-04-21 RX ADMIN — VASOPRESSIN 2 UNITS: 20 INJECTION INTRAVENOUS at 07:04

## 2024-04-21 RX ADMIN — SUGAMMADEX 200 MG: 100 INJECTION, SOLUTION INTRAVENOUS at 07:04

## 2024-04-21 RX ADMIN — PHENYLEPHRINE HYDROCHLORIDE 200 MCG: 10 INJECTION INTRAVENOUS at 06:04

## 2024-04-21 RX ADMIN — PIPERACILLIN SODIUM AND TAZOBACTAM SODIUM 4.5 G: 4; .5 INJECTION, POWDER, LYOPHILIZED, FOR SOLUTION INTRAVENOUS at 05:04

## 2024-04-21 RX ADMIN — HYDROMORPHONE HYDROCHLORIDE 1 MG: 2 INJECTION INTRAMUSCULAR; INTRAVENOUS; SUBCUTANEOUS at 11:04

## 2024-04-21 RX ADMIN — VANCOMYCIN HYDROCHLORIDE 1000 MG: 1 INJECTION, POWDER, LYOPHILIZED, FOR SOLUTION INTRAVENOUS at 10:04

## 2024-04-21 RX ADMIN — METOCLOPRAMIDE 10 MG: 5 INJECTION, SOLUTION INTRAMUSCULAR; INTRAVENOUS at 06:04

## 2024-04-21 RX ADMIN — DULOXETINE HYDROCHLORIDE 30 MG: 30 CAPSULE, DELAYED RELEASE ORAL at 10:04

## 2024-04-21 RX ADMIN — HYDROCODONE BITARTRATE AND ACETAMINOPHEN 1 TABLET: 5; 325 TABLET ORAL at 09:04

## 2024-04-21 RX ADMIN — ROCURONIUM BROMIDE 5 MG: 10 SOLUTION INTRAVENOUS at 06:04

## 2024-04-21 RX ADMIN — DOCUSATE SODIUM 100 MG: 100 CAPSULE, LIQUID FILLED ORAL at 10:04

## 2024-04-21 RX ADMIN — VASOPRESSIN 2 UNITS: 20 INJECTION INTRAVENOUS at 06:04

## 2024-04-21 RX ADMIN — OXYCODONE HYDROCHLORIDE 10 MG: 10 TABLET ORAL at 11:04

## 2024-04-21 RX ADMIN — GABAPENTIN 300 MG: 300 CAPSULE ORAL at 03:04

## 2024-04-21 RX ADMIN — SODIUM CHLORIDE, PRESERVATIVE FREE 10 ML: 5 INJECTION INTRAVENOUS at 05:04

## 2024-04-21 RX ADMIN — SODIUM CITRATE AND CITRIC ACID MONOHYDRATE 30 ML: 500; 334 SOLUTION ORAL at 06:04

## 2024-04-21 RX ADMIN — HYDROCODONE BITARTRATE AND ACETAMINOPHEN 1 TABLET: 5; 325 TABLET ORAL at 01:04

## 2024-04-21 RX ADMIN — MIDAZOLAM HYDROCHLORIDE 2 MG: 1 INJECTION, SOLUTION INTRAMUSCULAR; INTRAVENOUS at 06:04

## 2024-04-21 RX ADMIN — SODIUM CHLORIDE, SODIUM GLUCONATE, SODIUM ACETATE, POTASSIUM CHLORIDE AND MAGNESIUM CHLORIDE: 526; 502; 368; 37; 30 INJECTION, SOLUTION INTRAVENOUS at 06:04

## 2024-04-21 RX ADMIN — DOCUSATE SODIUM 100 MG: 100 CAPSULE, LIQUID FILLED ORAL at 09:04

## 2024-04-21 RX ADMIN — CALCIUM CHLORIDE INJECTION 0.5 G: 100 INJECTION, SOLUTION INTRAVENOUS at 07:04

## 2024-04-21 RX ADMIN — SODIUM CHLORIDE, PRESERVATIVE FREE 10 ML: 5 INJECTION INTRAVENOUS at 11:04

## 2024-04-21 RX ADMIN — OXYCODONE HYDROCHLORIDE 10 MG: 10 TABLET ORAL at 04:04

## 2024-04-21 RX ADMIN — CALCIUM CHLORIDE INJECTION 0.5 G: 100 INJECTION, SOLUTION INTRAVENOUS at 06:04

## 2024-04-21 RX ADMIN — DEXAMETHASONE SODIUM PHOSPHATE 4 MG: 4 INJECTION, SOLUTION INTRA-ARTICULAR; INTRALESIONAL; INTRAMUSCULAR; INTRAVENOUS; SOFT TISSUE at 06:04

## 2024-04-21 RX ADMIN — CILOSTAZOL 50 MG: 50 TABLET ORAL at 10:04

## 2024-04-21 RX ADMIN — GABAPENTIN 300 MG: 300 CAPSULE ORAL at 10:04

## 2024-04-21 RX ADMIN — ASPIRIN 81 MG CHEWABLE TABLET 81 MG: 81 TABLET CHEWABLE at 10:04

## 2024-04-21 RX ADMIN — TAMSULOSIN HYDROCHLORIDE 0.4 MG: 0.4 CAPSULE ORAL at 06:04

## 2024-04-21 RX ADMIN — OXYCODONE HYDROCHLORIDE 10 MG: 10 TABLET ORAL at 03:04

## 2024-04-21 RX ADMIN — SUCCINYLCHOLINE CHLORIDE 140 MG: 20 INJECTION, SOLUTION INTRAMUSCULAR; INTRAVENOUS at 06:04

## 2024-04-21 RX ADMIN — PIPERACILLIN SODIUM AND TAZOBACTAM SODIUM 4.5 G: 4; .5 INJECTION, POWDER, LYOPHILIZED, FOR SOLUTION INTRAVENOUS at 03:04

## 2024-04-21 RX ADMIN — PROPOFOL 150 MG: 10 INJECTION, EMULSION INTRAVENOUS at 06:04

## 2024-04-21 RX ADMIN — SODIUM CHLORIDE, PRESERVATIVE FREE 10 ML: 5 INJECTION INTRAVENOUS at 12:04

## 2024-04-21 RX ADMIN — THERA TABS 1 TABLET: TAB at 10:04

## 2024-04-21 RX ADMIN — VANCOMYCIN HYDROCHLORIDE 1000 MG: 1 INJECTION, POWDER, LYOPHILIZED, FOR SOLUTION INTRAVENOUS at 02:04

## 2024-04-21 RX ADMIN — FENTANYL CITRATE 50 MCG: 50 INJECTION, SOLUTION INTRAMUSCULAR; INTRAVENOUS at 06:04

## 2024-04-21 RX ADMIN — FERROUS SULFATE TAB 325 MG (65 MG ELEMENTAL FE) 1 EACH: 325 (65 FE) TAB at 10:04

## 2024-04-21 NOTE — PROGRESS NOTES
[facility] - 8th Floor [facility] Medicine  Progress Note    Patient Name: Shaheen Christie  MRN: [id number]  Patient Class: IP- Inpatient   Admission Date: 3/15/2024  Length of Stay: 37 days  Attending Physician: Enzo Thibdoeaux MD  Primary Care Provider: Viktor Russ MD        Subjective:     Principal Problem:Osteomyelitis    Interval History:  Patient was taken to the OR.  Patient did undergo debridement of the necrotic skin.    Review of Systems   All other systems reviewed and are negative.    Objective:     Vital Signs (Most Recent):  Temp: 98.1 °F (36.7 °C) (04/21/24 1033)  Pulse: 96 (04/21/24 1033)  Resp: 18 (04/21/24 1134)  BP: (!) 96/54 (04/21/24 1033)  SpO2: 95 % (04/21/24 1033) Vital Signs (24h Range):  Temp:  [97.9 °F (36.6 °C)-98.4 °F (36.9 °C)] 98.1 °F (36.7 °C)  Pulse:  [73-98] 96  Resp:  [16-18] 18  SpO2:  [86 %-95 %] 95 %  BP: ()/(43-71) 96/54     Weight: 68 kg (150 lb)  Body mass index is 22.14 kg/m².    Intake/Output Summary (Last 24 hours) at 4/21/2024 1210  Last data filed at 4/21/2024 0645  Gross per 24 hour   Intake 720 ml   Output 1000 ml   Net -280 ml      Physical Exam  HENT:      Head: Normocephalic and atraumatic.      Right Ear: External ear normal.      Left Ear: External ear normal.   Cardiovascular:      Rate and Rhythm: Normal rate and regular rhythm.      Pulses: Normal pulses.      Heart sounds: Normal heart sounds.   Pulmonary:      Effort: Pulmonary effort is normal.      Breath sounds: Normal breath sounds.   Abdominal:      General: Abdomen is flat. Bowel sounds are normal.      Palpations: Abdomen is soft.   Musculoskeletal:      Cervical back: Neck supple.   Skin:     General: Skin is warm and dry.   Neurological:      Mental Status: He is alert. Mental status is at baseline.           Overview/Hospital Course:  Patient got significantly anemic.  He will need a transfusion of 2 units of blood.  We will order that.    Significant Labs:  All pertinent labs within the past 24 hours have been reviewed.  BMP:   Recent Labs   Lab 04/21/24  0647   *   K 4.0   CO2 24   BUN 13.0   CREATININE 0.88   CALCIUM 8.6*     CBC:   Recent Labs   Lab 04/21/24  0647   WBC 9.38   HGB 6.5*   HCT 21.5*   *     CMP:   Recent Labs   Lab 04/21/24  0647   *   K 4.0   CO2 24   BUN 13.0   CREATININE 0.88   CALCIUM 8.6*       Significant Imaging: I have reviewed all pertinent imaging results/findings within the past 24 hours.    Assessment/Plan:      Active Diagnoses:    Diagnosis Date Noted POA    PRINCIPAL PROBLEM:  Osteomyelitis [M86.9] 03/15/2024 Yes    Knee effusion, left [M25.462] 03/31/2024 No    Pressure injury of sacral region, stage 4 [L89.154] 03/19/2024 Yes    Skin ulcer of right lower leg [L97.919] 03/19/2024 Yes    Skin ulcer of left lower leg [L97.929] 03/19/2024 Yes    Unstageable pressure ulcer of right heel [L89.610] 03/19/2024 Yes    Unstageable pressure ulcer of left heel [L89.620] 03/19/2024 Yes      Problems Resolved During this Admission:     VTE Risk Mitigation (From admission, onward)           Ordered     enoxaparin injection 40 mg  Daily         03/15/24 0934                       Enzo Thibodeaux MD  Department of Hospital Medicine   [facility] - 8th Floor Med Surg

## 2024-04-21 NOTE — ANESTHESIA PREPROCEDURE EVALUATION
04/21/2024  Shaheen Christie is a 69 y.o., male with COPD and severe PAD nursing home patient admitted March 13th with sacral wound/osteomyelitis.  This is the patient's 4th operation during this admission with subsequent peripheral angiogram g/laser arthrectomy with bilateral foot debridement and AKA.  Patient was operated on yesterday for AK revision with wound VAC placement and continues to have significant bleeding requiring transfusion.  Patient presents today for debridement of amputation site with wound VAC placement.  Patient is in the process of receiving 2nd unit of blood and has 2 more available as well as platelets.  Patient had full meal at 12 noon              Pre-op Assessment    I have reviewed the Patient Summary Reports.    I have reviewed the NPO Status.   I have reviewed the Medications.     Review of Systems  Anesthesia Hx:               Denies Personal Hx of Anesthesia complications.                    Social:  Non-Smoker       Hematology/Oncology:       -- Anemia:                                  Cardiovascular:     Hypertension                Functional Capacity unable to determine         Peripheral Arterial Disease                   Pulmonary:   COPD                         Physical Exam  General: Well nourished, Cooperative, Alert and Oriented    Airway:  Mallampati: II   Mouth Opening: Normal  TM Distance: Normal  Tongue: Normal  Neck ROM: Normal ROM  Full beard  Dental:  Edentulous    Chest/Lungs:  Clear to auscultation, Normal Respiratory Rate    Heart:  Rate: Tachycardia  Rhythm: Regular Rhythm        Anesthesia Plan  Type of Anesthesia, risks & benefits discussed:    Anesthesia Type: Gen ETT  Intra-op Monitoring Plan: Standard ASA Monitors  Post Op Pain Control Plan: multimodal analgesia and IV/PO Opioids PRN  Induction:  IV and rapid sequence  Airway Plan: Direct  Informed  Consent: Informed consent signed with the Patient and all parties understand the risks and agree with anesthesia plan.  All questions answered.   ASA Score: 3 Emergent  Day of Surgery Review of History & Physical: H&P Update referred to the surgeon/provider.    Ready For Surgery From Anesthesia Perspective.     .       3 (mild pain)

## 2024-04-21 NOTE — PROGRESS NOTES
Acute Care Surgery   Progress Note  Admit Date: 3/15/2024  HD#37  POD#1 Day Post-Op    Subjective:   Interval history:  POD1 s/p L AKA  Wound vac in place. L hip to good seal, to stump with some clotted blood and not holding seal  AFVSS  H/H down post-op, transfusing 2u pRBC    Home Meds:  Current Outpatient Medications   Medication Instructions    acetaminophen (TYLENOL) 650 mg, Oral, Every 6 hours PRN    albuterol (PROVENTIL/VENTOLIN HFA) 90 mcg/actuation inhaler Inhalation, 4 times daily PRN    aluminum & magnesium hydroxide-simethicone (MYLANTA MAX STRENGTH) 400-400-40 mg/5 mL suspension Oral, Every 6 hours PRN    amLODIPine (NORVASC) 10 mg, Oral, Daily    aspirin 81 mg, Oral, Daily    cilostazoL (PLETAL) 50 mg, Oral, 2 times daily    clopidogreL (PLAVIX) 75 mg, Oral, Daily    dextrose 5 % in water (D5W) PgBk 100 mL with piperacillin-tazobactam 4.5 gram SolR 4.5 g 4.5 g, Intravenous, Every 8 hours    docusate sodium (COLACE) 100 mg, Oral, 2 times daily    DULoxetine (CYMBALTA) 30 mg, Oral, Daily    enoxaparin (LOVENOX) 40 mg, Subcutaneous, Every 12 hours    ferrous sulfate 325 mg, Oral, Daily    gabapentin (NEURONTIN) 300 mg, Oral, 3 times daily    gabapentin (NEURONTIN) 600 mg, Oral, Nightly    LIDOcaine (LIDODERM) 5 % 1 patch, Transdermal, Every 24 hours (non-standard times), Remove & Discard patch within 12 hours or as directed by MD    losartan (COZAAR) 100 mg, Oral, Daily    melatonin (MELATIN) 6 mg, Oral, Nightly PRN    multivitamin (THERAGRAN) per tablet 1 tablet, Oral, Daily    oxyCODONE (ROXICODONE) 10 mg, Oral, Every 4 hours    polyethylene glycol (GLYCOLAX) 17 g, Oral, Daily    simethicone (MYLICON) 80 mg, Oral, Every 6 hours PRN    vancomycin HCl in 5 % dextrose (VANCOMYCIN IN DEXTROSE 5 %) 1 gram/250 mL Soln 1,000 mg, Intravenous, Every 12 hours      Scheduled Meds:  Current Facility-Administered Medications   Medication Dose Route Frequency    amLODIPine  10 mg Oral Daily    aspirin  81 mg Oral  "Daily    cilostazoL  50 mg Oral BID    clopidogreL  75 mg Oral Daily    docusate sodium  100 mg Oral BID    DULoxetine  30 mg Oral Daily    enoxaparin  40 mg Subcutaneous Daily    ferrous sulfate  1 tablet Oral Daily    gabapentin  300 mg Oral TID    gabapentin  600 mg Oral QHS    LIDOcaine  1 patch Transdermal Q24H    losartan  100 mg Oral Daily    multivitamin  1 tablet Oral Daily    oxyCODONE  10 mg Oral Q4H    piperacillin-tazobactam (Zosyn) IV (PEDS and ADULTS) (extended infusion is not appropriate)  4.5 g Intravenous Q8H    polyethylene glycol  17 g Oral Daily    sodium chloride 0.9%  10 mL Intravenous Q6H    tamsulosin  0.4 mg Oral Daily    vancomycin (VANCOCIN) IV (PEDS and ADULTS)  1,000 mg Intravenous Q18H     Continuous Infusions:  Current Facility-Administered Medications   Medication Dose Route Frequency Last Rate Last Admin     PRN Meds:  Current Facility-Administered Medications:     acetaminophen, 650 mg, Oral, Q6H PRN    albuterol, 2 puff, Inhalation, QID PRN    aluminum-magnesium hydroxide-simethicone, 15 mL, Oral, Q6H PRN    HYDROcodone-acetaminophen, 1 tablet, Oral, Q4H PRN    melatonin, 6 mg, Oral, Nightly PRN    simethicone, 80 mg, Oral, Q6H PRN    Flushing PICC/Midline Protocol, , , Until Discontinued **AND** sodium chloride 0.9%, 10 mL, Intravenous, Q6H **AND** sodium chloride 0.9%, 10 mL, Intravenous, PRN    Pharmacy to dose Vancomycin consult, , , Once **AND** vancomycin - pharmacy to dose, , Intravenous, pharmacy to manage frequency     Objective:     VITAL SIGNS: 24 HR MIN & MAX LAST   Temp  Min: 97.1 °F (36.2 °C)  Max: 98.2 °F (36.8 °C)  98.1 °F (36.7 °C)   BP  Min: 96/50  Max: 153/74  (!) 99/58    Pulse  Min: 66  Max: 98  98    Resp  Min: 12  Max: 18  18    SpO2  Min: 86 %  Max: 100 %  (!) 93 %      HT: 5' 9.02" (175.3 cm)  WT: 68 kg (150 lb)  BMI: 22.1     Intake/output:  Intake/Output - Last 3 Shifts         04/19 0700  04/20 0659 04/20 0700  04/21 0659    P.O. 920 331    IV Piggyback " 2236.1 650    Total Intake(mL/kg) 3156.1 (46.4) 1130 (16.6)    Urine (mL/kg/hr) 1500 (0.9) 0 (0)    Other 0 700    Stool 0 0    Total Output 1500 700    Net +1656.1 +430          Urine Occurrence  0 x    Stool Occurrence 0 x 0 x            Intake/Output Summary (Last 24 hours) at 4/21/2024 0645  Last data filed at 4/20/2024 2200  Gross per 24 hour   Intake 1130 ml   Output 700 ml   Net 430 ml           Lines/drains/airway:  PICC Double Lumen 03/28/24 1244 right basilic (Active)   $ PICC Line Charges (Upon insertion) Bedside Insertion Performed Pt < 5 Years Old (no subq port/pump or image guidance);Catheter - Double Lumen (Supply) 04/06/24 0929   Line Necessity Review Poor venous access 04/15/24 1600   Site Assessment No redness;No swelling;No warmth 04/20/24 0400   Extremity Assessment Distal to IV No abnormal discoloration;No redness;No swelling;No warmth 04/20/24 0400   Line Securement Device Secured with sutureless device 04/20/24 0400   Dressing Type CHG impregnated dressing/sponge 04/20/24 0400   Dressing Status Clean;Dry;Intact 04/20/24 0400   Dressing Intervention Integrity maintained 04/20/24 0400   Date on Dressing 04/17/24 04/20/24 0400   Dressing Due to be Changed 04/24/24 04/20/24 0400   Distal Patency/Care flushed w/o difficulty 04/20/24 0400   Proximal 1 Patency/Care flushed w/o difficulty 04/20/24 0400   Number of days: 22       Physical examination:  Gen: NAD, AAOx3, answering questions appropriately  HEENT: moist mucous membranes  CV: RR  Resp: no increased work of breathing  Abd: soft, non-distended  Ext: L AKA with wound vac in place, continued swelling  Neuro: CN II-XII grossly intact    Labs:  No new x24h    Imaging:  CT Knee Without Contrast Left   Final Result      1. No significant joint effusion   2. Nonfocal soft tissue swelling   3. Severe bony demineralization   4. Arthritis   5. The preliminary and final reports are concordant.         Electronically signed by: Allyson Pierce    Date:    04/10/2024   Time:    07:35      X-Ray Knee Complete 4 or More Views Left   Final Result      The bones are demineralized.      Degenerative change at the knee with joint effusion.         Electronically signed by: Allyson Pierce   Date:    03/30/2024   Time:    11:16      X-Ray Chest 1 View for Line/Tube Placement   Final Result      Right PICC tip overlies the mid SVC.         Electronically signed by: Gentry Welsh   Date:    03/28/2024   Time:    13:26      X-Ray Tibia Fibula 2 View Right   Final Result      X-Ray Tibia Fibula 2 View Left   Final Result      X-Ray Calcaneus 2 View Right   Final Result      Diffuse mottled appearance to all of the tarsal bones as well as the calcaneus with mild periosteal elevation seen along the posteroinferior aspect of the calcaneus.  Osteomyelitis should be excluded.         Electronically signed by: Mak Merchant   Date:    03/19/2024   Time:    17:42      X-Ray Calcaneus 2 View Left   Final Result         I have reviewed all pertinent imaging results/findings within the past 24 hours.    Assessment & Plan:   69M PMH COPD, HTN, DM, neuropathy, [facility] presented via EMS, adm 3/15/24 with sacral wound with osteomyelitis, chronic BLE wounds, pain to LLE. Surgery consulted for L AKA.   Now s/p L AKA 4/21     -wound vac to continuous suction  -we will take down and re do wound vac at bedside today, as the clot is preventing it from sealing down  -plan for return to OR week of 4/22 for closure when able. Still has significant swelling to leg. Will reassess for OR timing.     Kandice Espino MD  [employer] Surgery PGY4  04/21/2024  6:19 AM

## 2024-04-21 NOTE — PROGRESS NOTES
[facility]                       [address] [address]    PATIENT NAME:       HAWA KUMAR  YOB: 1955  CSN:                [id number]   MRN:                [id number]  ADMIT DATE:         03/15/2024 03:09:00  PHYSICIAN:          Tom Nichole DPM                            PROGRESS NOTE    DATE:  04/21/2024 00:00:00    SUBJECTIVE:  The patient is seen today.  He is status post left AKA.    Apparently, he has had a bit of some bleeding from the surgical site.  VAC is in   place.  No other issues reported.  continues to be on   antibiotics.  He is getting blood today.    PHYSICAL EXAMINATION:  Current vital signs are stable and he is afebrile.    VAC is in place for the left lower extremity.  Does have some serosanguineous   drainage.  The right leg dressings are intact.  Wounds are stable, clean, viable   on the right lateral leg and heel area.  Nothing actively bleeding.  Scant   exudate.  Generalized edema persists.    ASSESSMENT:    1. Status post AKA, left.  2. Right chronic leg and foot wounds, currently stable.  3. Sacral decubitus with osteomyelitis.    PLAN:  Continue with current care.  Dressings were changed on the right lower   extremity.  Surgery addressing to the left side.        ______________________________  Tom Nichole DPM    GAS/AQS  DD:  04/21/2024  Time:  10:43AM  DT:  04/21/2024  Time:  11:38AM  Job #:  [id number]      PROGRESS NOTE

## 2024-04-21 NOTE — ANESTHESIA PROCEDURE NOTES
Intubation    Date/Time: 4/21/2024 6:42 PM    Performed by: Gelacio Person CRNA  Authorized by: Bud Higginbotham Jr., MD    Intubation:     Induction:  Intravenous    Intubated:  Postinduction    Mask Ventilation:  Easy mask    Attempts:  1    Attempted By:  CRNA    Method of Intubation:  Direct    Blade:  Phillip 2    Laryngeal View Grade: Grade I - full view of cords      Difficult Airway Encountered?: No      Complications:  None    Airway Device:  Oral endotracheal tube    Airway Device Size:  7.5    Style/Cuff Inflation:  Cuffed (inflated to minimal occlusive pressure)    Tube secured:  22    Secured at:  The lips    Placement Verified By:  Capnometry    Complicating Factors:  None    Findings Post-Intubation:  BS equal bilateral

## 2024-04-21 NOTE — PROGRESS NOTES
Surgery update note    WV to L AKA stump site changed at bedside today. Pt given 2 mg dilaudid, and lidocaine injected for local anesthesia. Controlled some bleeding at bedside. Placed surgicell, replaced WV. Pt tolerated through had pain with manipulation of leg. 2u pRBC transfused.  Called back again with WV leakage, given continued seeping from edema and some bleeding. Attempted to patch WV.   Pt now hypotensive. RRT called. Ordered 1L bolus. Another 1u pRBC and Plts. Now holding plavix and lov, however pt has been on ASA, plavix daily, and lovenox.     Will return to OR urgently now to look at the L AKA stump and control as bleeding, and replace wound vac vs close the wound.     Kandice Espino MD  [employer] Surgery PGY4  04/21/2024  5:37 PM

## 2024-04-21 NOTE — PROGRESS NOTES
Infectious Diseases Progress Note  69-year-old male, nursing home resident, with past medical history of HTN, COPD, DM type 2, with neuropathy, is admitted to [facility] on 03/15/2024 sent via EMS for sacral wound evaluation, apparently diagnosed with osteomyelitis involving the sacrum on 02/27/2024 with plan to be admitted to LTAC which had not been successful as an outpatient.  On presentation he was noted to have no fevers and no leukocytosis, anemic with low albumin.  Blood cultures have been negative.  Review of his records show a 02/08/2024 left leg wound culture with Pseudomonas, Providencia, ESBL Proteus and 11/30/2023 sacral wound culture with MRSA, Providencia, Enterococcus and Pseudomonas.  2/27 MRI of the pelvis shows osteomyelitis involving the sacrococcygeal bone as well as right trochanteric bursitis which may be septic.  He was seen by surgery team with no plan for surgical intervention.  He is on antibiotic coverage with vancomycin and Zosyn.     Subjective:  Lying in bed in no acute distress. No new complaints voiced. Afebrile. Family member sleeping at bedside    Past Medical History:   Diagnosis Date    COPD (chronic obstructive pulmonary disease)     Depression     Hypertension     Neuropathy      Past Surgical History:   Procedure Laterality Date    angiogram Bilateral     stents placed in left leg    anterior neck surgery      ARTHROTOMY OF ANKLE Left 6/28/2023    Procedure: ARTHROTOMY, ANKLE;  Surgeon: Tom Nichole DPM;  Location: [facility];  Service: Podiatry;  Laterality: Left;    cataracts Left     CHOLECYSTECTOMY      DEBRIDEMENT OF FOOT Bilateral 3/26/2024    Procedure: DEBRIDEMENT, FOOT;  Surgeon: Tom Nichole DPM;  Location: [facility];  Service: Podiatry;  Laterality: Bilateral;    EYE SURGERY Left     HAND SURGERY Left     broken bone    herina repair      INCISION AND DRAINAGE, LOWER EXTREMITY Left 7/28/2023    Procedure: INCISION AND DRAINAGE,  LOWER EXTREMITY;  Surgeon: Avinash Garces MD;  Location: [facility] OR;  Service: Orthopedics;  Laterality: Left;  Supine, vascular bed, bone foam  last case  cysto tubing, laparoscopic trocar, experience, vanc, hemovac drain    KNEE SURGERY Bilateral     PERIPHERAL ANGIOGRAPHY N/A 3/13/2024    Procedure: Peripheral angiography;  Surgeon: Deven Queen MD;  Location: [facility] CATH LAB;  Service: Cardiology;  Laterality: N/A;  MICHELLE ANGIO W/ ANEST.    posterior neck surgery      SPINE SURGERY      TOTAL REPLACEMENT OF BOTH HIP JOINTS USING COMPUTER-ASSISTED NAVIGATION Bilateral      Social History     Socioeconomic History    Marital status:    Occupational History    Occupation: retired   Tobacco Use    Smoking status: Former     Average packs/day: 0.5 packs/day for 15.0 years (7.5 ttl pk-yrs)     Types: Cigarettes     Start date: 2008    Smokeless tobacco: Never   Substance and Sexual Activity    Alcohol use: Yes     Alcohol/week: 3.0 - 6.0 standard drinks of alcohol     Types: 3 - 6 Cans of beer per week    Drug use: Yes     Types: Marijuana    Sexual activity: Not Currently       ROS  Constitutional:  Positive for malaise/fatigue.   HENT: Negative.     Respiratory: Negative.     Gastrointestinal: Negative.    Genitourinary: Negative.    Musculoskeletal: Positive for L knee pain   Skin: Multiple pressure wounds   Neurological:  Positive for weakness.   Endo/Heme/Allergies: Negative.    Psychiatric/Behavioral: Negative.     All other Systems review done and negative.    Review of patient's allergies indicates:  No Known Allergies      Scheduled Meds:  Current Facility-Administered Medications   Medication Dose Route Frequency    amLODIPine  10 mg Oral Daily    aspirin  81 mg Oral Daily    cilostazoL  50 mg Oral BID    clopidogreL  75 mg Oral Daily    docusate sodium  100 mg Oral BID    DULoxetine  30 mg Oral Daily    enoxaparin  40 mg Subcutaneous Daily    ferrous sulfate  1 tablet Oral Daily    gabapentin  300 mg  "Oral TID    gabapentin  600 mg Oral QHS    LIDOcaine  1 patch Transdermal Q24H    losartan  100 mg Oral Daily    multivitamin  1 tablet Oral Daily    oxyCODONE  10 mg Oral Q4H    piperacillin-tazobactam (Zosyn) IV (PEDS and ADULTS) (extended infusion is not appropriate)  4.5 g Intravenous Q8H    polyethylene glycol  17 g Oral Daily    sodium chloride 0.9%  10 mL Intravenous Q6H    vancomycin (VANCOCIN) IV (PEDS and ADULTS)  1,000 mg Intravenous Q18H     Continuous Infusions:  Current Facility-Administered Medications   Medication Dose Route Frequency Last Rate Last Admin     PRN Meds:    Current Facility-Administered Medications:     acetaminophen, 650 mg, Oral, Q6H PRN    albuterol, 2 puff, Inhalation, QID PRN    aluminum-magnesium hydroxide-simethicone, 15 mL, Oral, Q6H PRN    HYDROcodone-acetaminophen, 1 tablet, Oral, Q4H PRN    melatonin, 6 mg, Oral, Nightly PRN    simethicone, 80 mg, Oral, Q6H PRN    Flushing PICC/Midline Protocol, , , Until Discontinued **AND** sodium chloride 0.9%, 10 mL, Intravenous, Q6H **AND** sodium chloride 0.9%, 10 mL, Intravenous, PRN    Pharmacy to dose Vancomycin consult, , , Once **AND** vancomycin - pharmacy to dose, , Intravenous, pharmacy to manage frequency      Objective:  BP (!) 96/50   Pulse 97   Temp 98.2 °F (36.8 °C) (Oral)   Resp 18   Ht 5' 9.02" (1.753 m)   Wt 68 kg (150 lb)   SpO2 95%   BMI 22.14 kg/m²     Physical Exam:   Physical Exam  Vitals reviewed.   Constitutional:       General: He is not in acute distress.  HENT:      Head: Normocephalic and atraumatic.   Cardiovascular:      Rate and Rhythm: Normal rate and regular rhythm.      Heart sounds: Normal heart sounds.   Pulmonary:      Effort: Pulmonary effort is normal. No respiratory distress.      Breath sounds: Normal breath sounds.   Abdominal:      General: Bowel sounds are normal. There is no distension.      Palpations: Abdomen is soft.      Tenderness: There is no abdominal tenderness. "   Musculoskeletal:         General: No deformity.      Cervical back: Neck supple.   Skin:     Findings: No erythema or rash.      Comments: Wound vac to sacrum  Neurological:      Mental Status: He is alert and oriented to person, place, and time.   Psychiatric:      Comments: Calm and cooperative     Imaging  Imaging Results    None          Lab Review   No results found for this or any previous visit (from the past 24 hour(s)).        Assessment/Plan:  1. Chronic sacral pressure wound with osteomyelitis  2. History of polymicrobial sacral wound culture - MRSA, Providencia, Pseudomonas, Enterococcus  3.  Chronic left lower extremity wound with history of polymicrobial wound culture-Pseudomonas, ESBL Proteus, Providencia  4. Multiple pressure wounds  5.  Diabetes type 2  6.  Anemia  7.  Protein calorie malnutrition   8.  L knee pain     -Continue Vancomycin and Zosyn, plan end date of 04/26, following inflammatory markers  -Remains afebrile without leukocytosis  -CT L knee without contrast today 4/10 with no joint effusion, nonfocal soft tissue swelling, severe bone demineralization, arthritis  -Seen by Ortho, inputs noted. S/P L knee aspiration on 3/26   -3/26 left heel tissue cultures with few ESBL Proteus mirabilis  -4/9 ESR 76 from 29 and CRP 72.10 from 39.20  -3/15 blood cultures negative  -2/8 left leg wound cultures with many Pseudomonas, Providencia, Proteus  -11/30/2023 sacral wound with many Providencia, MRSA, Enterococcus, Pseudomonas  -Pt c/o severe L knee pain and wants L AKA. Surgery re-consulted, inputs noted including plans for L AKA in am 4/21  -Podiatry on board s/p debridement 3/26, cultures revealed ESBL Proteus  -We will continue aggressive wound care per Podiatry and the wound care team  -Discussed with patient and nursing staff. Case management working on placement. Disposition per primary team

## 2024-04-22 LAB
ABO + RH BLD: NORMAL
ANION GAP SERPL CALC-SCNC: 8 MEQ/L
BLD PROD TYP BPU: NORMAL
BLOOD UNIT EXPIRATION DATE: NORMAL
BLOOD UNIT TYPE CODE: 5100
BUN SERPL-MCNC: 18.4 MG/DL (ref 8.4–25.7)
CALCIUM SERPL-MCNC: 9.1 MG/DL (ref 8.8–10)
CHLORIDE SERPL-SCNC: 105 MMOL/L (ref 98–107)
CO2 SERPL-SCNC: 25 MMOL/L (ref 23–31)
CREAT SERPL-MCNC: 0.82 MG/DL (ref 0.73–1.18)
CREAT/UREA NIT SERPL: 22
CROSSMATCH INTERPRETATION: NORMAL
DISPENSE STATUS: NORMAL
ERYTHROCYTE [DISTWIDTH] IN BLOOD BY AUTOMATED COUNT: 16.5 % (ref 11.5–17)
GFR SERPLBLD CREATININE-BSD FMLA CKD-EPI: >60 MLS/MIN/1.73/M2
GLUCOSE SERPL-MCNC: 138 MG/DL (ref 82–115)
HCT VFR BLD AUTO: 28 % (ref 42–52)
HGB BLD-MCNC: 9.3 G/DL (ref 14–18)
MAGNESIUM SERPL-MCNC: 1.9 MG/DL (ref 1.6–2.6)
MCH RBC QN AUTO: 27.2 PG (ref 27–31)
MCHC RBC AUTO-ENTMCNC: 33.2 G/DL (ref 33–36)
MCV RBC AUTO: 81.9 FL (ref 80–94)
NRBC BLD AUTO-RTO: 0 %
PLATELET # BLD AUTO: 331 X10(3)/MCL (ref 130–400)
PMV BLD AUTO: 8.6 FL (ref 7.4–10.4)
POCT GLUCOSE: 117 MG/DL (ref 70–110)
POTASSIUM SERPL-SCNC: 4.2 MMOL/L (ref 3.5–5.1)
RBC # BLD AUTO: 3.42 X10(6)/MCL (ref 4.7–6.1)
SODIUM SERPL-SCNC: 138 MMOL/L (ref 136–145)
UNIT NUMBER: NORMAL
WBC # SPEC AUTO: 8.27 X10(3)/MCL (ref 4.5–11.5)

## 2024-04-22 PROCEDURE — 21400001 HC TELEMETRY ROOM

## 2024-04-22 PROCEDURE — 99900031 HC PATIENT EDUCATION (STAT)

## 2024-04-22 PROCEDURE — 94799 UNLISTED PULMONARY SVC/PX: CPT | Mod: XB

## 2024-04-22 PROCEDURE — 80048 BASIC METABOLIC PNL TOTAL CA: CPT | Performed by: STUDENT IN AN ORGANIZED HEALTH CARE EDUCATION/TRAINING PROGRAM

## 2024-04-22 PROCEDURE — 25000003 PHARM REV CODE 250: Performed by: PODIATRIST

## 2024-04-22 PROCEDURE — 85027 COMPLETE CBC AUTOMATED: CPT | Performed by: STUDENT IN AN ORGANIZED HEALTH CARE EDUCATION/TRAINING PROGRAM

## 2024-04-22 PROCEDURE — 25000003 PHARM REV CODE 250: Performed by: INTERNAL MEDICINE

## 2024-04-22 PROCEDURE — 83735 ASSAY OF MAGNESIUM: CPT | Performed by: STUDENT IN AN ORGANIZED HEALTH CARE EDUCATION/TRAINING PROGRAM

## 2024-04-22 PROCEDURE — A4216 STERILE WATER/SALINE, 10 ML: HCPCS | Performed by: INTERNAL MEDICINE

## 2024-04-22 PROCEDURE — 63600175 PHARM REV CODE 636 W HCPCS: Performed by: INTERNAL MEDICINE

## 2024-04-22 PROCEDURE — 99900035 HC TECH TIME PER 15 MIN (STAT)

## 2024-04-22 RX ADMIN — OXYCODONE HYDROCHLORIDE 10 MG: 10 TABLET ORAL at 07:04

## 2024-04-22 RX ADMIN — PIPERACILLIN SODIUM AND TAZOBACTAM SODIUM 4.5 G: 4; .5 INJECTION, POWDER, LYOPHILIZED, FOR SOLUTION INTRAVENOUS at 11:04

## 2024-04-22 RX ADMIN — OXYCODONE HYDROCHLORIDE 10 MG: 10 TABLET ORAL at 11:04

## 2024-04-22 RX ADMIN — GABAPENTIN 300 MG: 300 CAPSULE ORAL at 03:04

## 2024-04-22 RX ADMIN — CILOSTAZOL 50 MG: 50 TABLET ORAL at 08:04

## 2024-04-22 RX ADMIN — OXYCODONE HYDROCHLORIDE 10 MG: 10 TABLET ORAL at 08:04

## 2024-04-22 RX ADMIN — GABAPENTIN 600 MG: 300 CAPSULE ORAL at 09:04

## 2024-04-22 RX ADMIN — THERA TABS 1 TABLET: TAB at 08:04

## 2024-04-22 RX ADMIN — DULOXETINE HYDROCHLORIDE 30 MG: 30 CAPSULE, DELAYED RELEASE ORAL at 08:04

## 2024-04-22 RX ADMIN — TAMSULOSIN HYDROCHLORIDE 0.4 MG: 0.4 CAPSULE ORAL at 08:04

## 2024-04-22 RX ADMIN — SODIUM CHLORIDE, PRESERVATIVE FREE 10 ML: 5 INJECTION INTRAVENOUS at 06:04

## 2024-04-22 RX ADMIN — PIPERACILLIN SODIUM AND TAZOBACTAM SODIUM 4.5 G: 4; .5 INJECTION, POWDER, LYOPHILIZED, FOR SOLUTION INTRAVENOUS at 06:04

## 2024-04-22 RX ADMIN — OXYCODONE HYDROCHLORIDE 10 MG: 10 TABLET ORAL at 12:04

## 2024-04-22 RX ADMIN — CILOSTAZOL 50 MG: 50 TABLET ORAL at 09:04

## 2024-04-22 RX ADMIN — AMLODIPINE BESYLATE 10 MG: 5 TABLET ORAL at 08:04

## 2024-04-22 RX ADMIN — OXYCODONE HYDROCHLORIDE 10 MG: 10 TABLET ORAL at 03:04

## 2024-04-22 RX ADMIN — HYDROCODONE BITARTRATE AND ACETAMINOPHEN 1 TABLET: 5; 325 TABLET ORAL at 11:04

## 2024-04-22 RX ADMIN — GABAPENTIN 300 MG: 300 CAPSULE ORAL at 09:04

## 2024-04-22 RX ADMIN — SODIUM CHLORIDE, PRESERVATIVE FREE 10 ML: 5 INJECTION INTRAVENOUS at 12:04

## 2024-04-22 RX ADMIN — SODIUM CHLORIDE, PRESERVATIVE FREE 10 ML: 5 INJECTION INTRAVENOUS at 05:04

## 2024-04-22 RX ADMIN — LOSARTAN POTASSIUM 100 MG: 50 TABLET, FILM COATED ORAL at 08:04

## 2024-04-22 RX ADMIN — GABAPENTIN 300 MG: 300 CAPSULE ORAL at 08:04

## 2024-04-22 RX ADMIN — VANCOMYCIN HYDROCHLORIDE 1000 MG: 1 INJECTION, POWDER, LYOPHILIZED, FOR SOLUTION INTRAVENOUS at 03:04

## 2024-04-22 RX ADMIN — PIPERACILLIN SODIUM AND TAZOBACTAM SODIUM 4.5 G: 4; .5 INJECTION, POWDER, LYOPHILIZED, FOR SOLUTION INTRAVENOUS at 03:04

## 2024-04-22 RX ADMIN — FERROUS SULFATE TAB 325 MG (65 MG ELEMENTAL FE) 1 EACH: 325 (65 FE) TAB at 08:04

## 2024-04-22 RX ADMIN — SODIUM CHLORIDE, PRESERVATIVE FREE 10 ML: 5 INJECTION INTRAVENOUS at 11:04

## 2024-04-22 RX ADMIN — DOCUSATE SODIUM 100 MG: 100 CAPSULE, LIQUID FILLED ORAL at 08:04

## 2024-04-22 NOTE — PROGRESS NOTES
Acute Care Surgery   Progress Note  Admit Date: 3/15/2024  HD#38  POD#1 Day Post-Op    Subjective:   Interval history:  s/p L FREDO on 4/21, taken back to OR overnight due to bleeding from wound vac. Bleeding cauterized, this AM wound vac with good seal 350 serosanguinous output.     Home Meds:  Current Outpatient Medications   Medication Instructions    acetaminophen (TYLENOL) 650 mg, Oral, Every 6 hours PRN    albuterol (PROVENTIL/VENTOLIN HFA) 90 mcg/actuation inhaler Inhalation, 4 times daily PRN    aluminum & magnesium hydroxide-simethicone (MYLANTA MAX STRENGTH) 400-400-40 mg/5 mL suspension Oral, Every 6 hours PRN    amLODIPine (NORVASC) 10 mg, Oral, Daily    aspirin 81 mg, Oral, Daily    cilostazoL (PLETAL) 50 mg, Oral, 2 times daily    clopidogreL (PLAVIX) 75 mg, Oral, Daily    dextrose 5 % in water (D5W) PgBk 100 mL with piperacillin-tazobactam 4.5 gram SolR 4.5 g 4.5 g, Intravenous, Every 8 hours    docusate sodium (COLACE) 100 mg, Oral, 2 times daily    DULoxetine (CYMBALTA) 30 mg, Oral, Daily    enoxaparin (LOVENOX) 40 mg, Subcutaneous, Every 12 hours    ferrous sulfate 325 mg, Oral, Daily    gabapentin (NEURONTIN) 300 mg, Oral, 3 times daily    gabapentin (NEURONTIN) 600 mg, Oral, Nightly    LIDOcaine (LIDODERM) 5 % 1 patch, Transdermal, Every 24 hours (non-standard times), Remove & Discard patch within 12 hours or as directed by MD    losartan (COZAAR) 100 mg, Oral, Daily    melatonin (MELATIN) 6 mg, Oral, Nightly PRN    multivitamin (THERAGRAN) per tablet 1 tablet, Oral, Daily    oxyCODONE (ROXICODONE) 10 mg, Oral, Every 4 hours    polyethylene glycol (GLYCOLAX) 17 g, Oral, Daily    simethicone (MYLICON) 80 mg, Oral, Every 6 hours PRN    vancomycin HCl in 5 % dextrose (VANCOMYCIN IN DEXTROSE 5 %) 1 gram/250 mL Soln 1,000 mg, Intravenous, Every 12 hours      Scheduled Meds:  Current Facility-Administered Medications   Medication Dose Route Frequency    amLODIPine  10 mg Oral Daily    cilostazoL  50  mg Oral BID    docusate sodium  100 mg Oral BID    DULoxetine  30 mg Oral Daily    ferrous sulfate  1 tablet Oral Daily    gabapentin  300 mg Oral TID    gabapentin  600 mg Oral QHS    LIDOcaine (PF) 10 mg/ml (1%)  1 mL Other Once    LIDOcaine  1 patch Transdermal Q24H    losartan  100 mg Oral Daily    multivitamin  1 tablet Oral Daily    oxyCODONE  10 mg Oral Q4H    piperacillin-tazobactam (Zosyn) IV (PEDS and ADULTS) (extended infusion is not appropriate)  4.5 g Intravenous Q8H    polyethylene glycol  17 g Oral Daily    sodium chloride 0.9%  10 mL Intravenous Q6H    tamsulosin  0.4 mg Oral Daily    vancomycin (VANCOCIN) IV (PEDS and ADULTS)  1,000 mg Intravenous Q18H     Continuous Infusions:  Current Facility-Administered Medications   Medication Dose Route Frequency Last Rate Last Admin     PRN Meds:  Current Facility-Administered Medications:     0.9%  NaCl infusion (for blood administration), , Intravenous, Q24H PRN    0.9%  NaCl infusion (for blood administration), , Intravenous, Q24H PRN    0.9%  NaCl infusion (for blood administration), , Intravenous, Q24H PRN    0.9%  NaCl infusion (for blood administration), , Intravenous, Q24H PRN    0.9%  NaCl infusion (for blood administration), , Intravenous, Q24H PRN    acetaminophen, 650 mg, Oral, Q6H PRN    albuterol, 2 puff, Inhalation, QID PRN    aluminum-magnesium hydroxide-simethicone, 15 mL, Oral, Q6H PRN    HYDROcodone-acetaminophen, 1 tablet, Oral, Q4H PRN    melatonin, 6 mg, Oral, Nightly PRN    simethicone, 80 mg, Oral, Q6H PRN    Flushing PICC/Midline Protocol, , , Until Discontinued **AND** sodium chloride 0.9%, 10 mL, Intravenous, Q6H **AND** sodium chloride 0.9%, 10 mL, Intravenous, PRN    Pharmacy to dose Vancomycin consult, , , Once **AND** vancomycin - pharmacy to dose, , Intravenous, pharmacy to manage frequency     Objective:     VITAL SIGNS: 24 HR MIN & MAX LAST   Temp  Min: 97.3 °F (36.3 °C)  Max: 98.4 °F (36.9 °C)  98.2 °F (36.8 °C)   BP  Min:  "70/35  Max: 153/74  107/64    Pulse  Min: 83  Max: 107  83    Resp  Min: 14  Max: 22  16    SpO2  Min: 91 %  Max: 99 %  (!) 91 %      HT: 5' 9.02" (175.3 cm)  WT: 68 kg (150 lb)  BMI: 22.1     Intake/output:  Intake/Output - Last 3 Shifts         04/20 0700 04/21 0659 04/21 0700 04/22 0659 04/22 0700 04/23 0659    P.O. 720 960     Blood  1273     IV Piggyback 650 1923     Total Intake(mL/kg) 1370 (20.1) 4156 (61.1)     Urine (mL/kg/hr) 200 (0.1) 1500 (0.9)     Other 800 525     Stool 0 0     Total Output 1000 2025     Net +370 +2131            Urine Occurrence 0 x      Stool Occurrence 0 x 1 x             Intake/Output Summary (Last 24 hours) at 4/22/2024 0716  Last data filed at 4/22/2024 0600  Gross per 24 hour   Intake 4156.03 ml   Output 2025 ml   Net 2131.03 ml           Lines/drains/airway:  PICC Double Lumen 03/28/24 1244 right basilic (Active)   $ PICC Line Charges (Upon insertion) Bedside Insertion Performed Pt < 5 Years Old (no subq port/pump or image guidance);Catheter - Double Lumen (Supply) 04/06/24 0929   Line Necessity Review Poor venous access 04/15/24 1600   Site Assessment No redness;No swelling;No warmth 04/20/24 0400   Extremity Assessment Distal to IV No abnormal discoloration;No redness;No swelling;No warmth 04/20/24 0400   Line Securement Device Secured with sutureless device 04/20/24 0400   Dressing Type CHG impregnated dressing/sponge 04/20/24 0400   Dressing Status Clean;Dry;Intact 04/20/24 0400   Dressing Intervention Integrity maintained 04/20/24 0400   Date on Dressing 04/17/24 04/20/24 0400   Dressing Due to be Changed 04/24/24 04/20/24 0400   Distal Patency/Care flushed w/o difficulty 04/20/24 0400   Proximal 1 Patency/Care flushed w/o difficulty 04/20/24 0400   Number of days: 22       Physical examination:  Gen: NAD, AAOx3, answering questions appropriately  HEENT: moist mucous membranes  CV: RR  Resp: no increased work of breathing  Abd: soft, non-distended  Ext: L AKA with wound " vac in place, good seal  Neuro: CN II-XII grossly intact    Labs:  No new x24h    Imaging:  CT Knee Without Contrast Left   Final Result      1. No significant joint effusion   2. Nonfocal soft tissue swelling   3. Severe bony demineralization   4. Arthritis   5. The preliminary and final reports are concordant.         Electronically signed by: Allyson Pierce   Date:    04/10/2024   Time:    07:35      X-Ray Knee Complete 4 or More Views Left   Final Result      The bones are demineralized.      Degenerative change at the knee with joint effusion.         Electronically signed by: Allyson Pierce   Date:    03/30/2024   Time:    11:16      X-Ray Chest 1 View for Line/Tube Placement   Final Result      Right PICC tip overlies the mid SVC.         Electronically signed by: Gentry Welsh   Date:    03/28/2024   Time:    13:26      X-Ray Tibia Fibula 2 View Right   Final Result      X-Ray Tibia Fibula 2 View Left   Final Result      X-Ray Calcaneus 2 View Right   Final Result      Diffuse mottled appearance to all of the tarsal bones as well as the calcaneus with mild periosteal elevation seen along the posteroinferior aspect of the calcaneus.  Osteomyelitis should be excluded.         Electronically signed by: Mak Merchant   Date:    03/19/2024   Time:    17:42      X-Ray Calcaneus 2 View Left   Final Result         I have reviewed all pertinent imaging results/findings within the past 24 hours.    Assessment & Plan:   69M PMH COPD, HTN, DM, neuropathy, resident of [facility] presented via EMS, adm 3/15/24 with sacral wound with osteomyelitis, chronic BLE wounds, pain to LLE. Surgery consulted for L AKA.   Now s/p L AKA 4/21 with take back for bleeding.    - Plan for return to OR this week for closure when able  - Will reassess for OR timing  - Continue wound vac to reduce edema prior to formalization  - Regular diet  - Monitor for bleeding  - Daily H/H    Tonia Pelayo MD  [employer] Surgery  PGY2  04/22/2024  6:19 AM

## 2024-04-22 NOTE — TRANSFER OF CARE
"Anesthesia Transfer of Care Note    Patient: Shaheen Christie    Procedure(s) Performed: Procedure(s) (LRB):  DEBRIDEMENT, AMPUTATION SITE (N/A)  REPLACEMENT, WOUND VAC (Left)    Patient location: PACU    Anesthesia Type: general    Transport from OR: Transported from OR on room air with adequate spontaneous ventilation    Post pain: adequate analgesia    Post assessment: no apparent anesthetic complications and tolerated procedure well    Post vital signs: stable    Level of consciousness: awake and alert    Nausea/Vomiting: no nausea/vomiting    Complications: none    Transfer of care protocol was followed      Last vitals: Visit Vitals  /81   Pulse 91   Temp 36.7 °C (98.1 °F)   Resp 16   Ht 5' 9.02" (1.753 m)   Wt 68 kg (150 lb)   SpO2 99%   BMI 22.14 kg/m²       "

## 2024-04-22 NOTE — PLAN OF CARE
0800- Dr Brambila aware of WV not working; WV was hooked to wall suction when I arrived for my shift; Dr Brambila stated to take it off of wall suction and his team would be changing it at bedside this AM  1045- WV dressing changed @ bedside per sx team   1245- WV blockage-- sx team instructed me to hook to wall suction  1330- Dressing reinforced by sx team and another shwetha added-- sx team instructed me to use a Y site to hook both tubings to wall suction  1545- dressing and patient's bed and gown saturated in blood-- notified sx team-- nothing more they will be able to do for the pt today at bedside-- OR booked for tomorrow, leave wrapped and hold plavix  1653- BP 70/35 during 2nd unit of blood-- notified sx team and asked if I could give him a bolus-- I was told to give 1L LR bolus, recheck CBC and contact primary-- contacted primary and his response was that this is a surgical problem so I notified sx team of the response  1710- called RRT d/t pt BP 64/35-- increased rate of transfusion to 250 and notified attending for sx  1800- pt brought back to OR      Problem: Adult Inpatient Plan of Care  Goal: Plan of Care Review  Outcome: Ongoing, Progressing  Goal: Patient-Specific Goal (Individualized)  Outcome: Ongoing, Progressing  Goal: Absence of Hospital-Acquired Illness or Injury  Outcome: Ongoing, Progressing  Goal: Optimal Comfort and Wellbeing  Outcome: Ongoing, Progressing  Goal: Readiness for Transition of Care  Outcome: Ongoing, Progressing     Problem: Impaired Wound Healing  Goal: Optimal Wound Healing  Outcome: Ongoing, Progressing     Problem: Skin Injury Risk Increased  Goal: Skin Health and Integrity  Outcome: Ongoing, Progressing     Problem: Fall Injury Risk  Goal: Absence of Fall and Fall-Related Injury  Outcome: Ongoing, Progressing     Problem: Infection  Goal: Absence of Infection Signs and Symptoms  Outcome: Ongoing, Progressing

## 2024-04-22 NOTE — ANESTHESIA POSTPROCEDURE EVALUATION
Anesthesia Post Evaluation    Patient: Shaheen Christie    Procedure(s) Performed: Procedure(s) (LRB):  DEBRIDEMENT, AMPUTATION SITE (N/A)  REPLACEMENT, WOUND VAC (Left)    Final Anesthesia Type: general      Patient location during evaluation: floor  Patient participation: Yes- Able to Participate  Level of consciousness: awake and alert  Post-procedure vital signs: reviewed and stable  Pain management: adequate  Airway patency: patent    PONV status at discharge: No PONV  Anesthetic complications: no      Cardiovascular status: blood pressure returned to baseline  Respiratory status: spontaneous ventilation and room air  Hydration status: euvolemic  Follow-up not needed.              Vitals Value Taken Time   BP 95/53 04/22/24 0728   Temp 36.7 °C (98.1 °F) 04/22/24 0728   Pulse 87 04/22/24 0728   Resp 18 04/22/24 0847   SpO2 94 % 04/22/24 0728         Event Time   Out of Recovery 04/21/2024 21:00:00         Pain/Khadra Score: Pain Rating Prior to Med Admin: 10 (4/22/2024  8:47 AM)  Pain Rating Post Med Admin: 3 (4/22/2024  4:25 AM)  Khadra Score: 10 (4/21/2024  9:00 PM)

## 2024-04-22 NOTE — PROGRESS NOTES
Infectious Diseases Progress Note  69-year-old male, nursing home resident, with past medical history of HTN, COPD, DM type 2, with neuropathy, is admitted to [facility] on 03/15/2024 sent via EMS for sacral wound evaluation, apparently diagnosed with osteomyelitis involving the sacrum on 02/27/2024 with plan to be admitted to LTAC which had not been successful as an outpatient.  On presentation he was noted to have no fevers and no leukocytosis, anemic with low albumin.  Blood cultures have been negative.  Review of his records show a 02/08/2024 left leg wound culture with Pseudomonas, Providencia, ESBL Proteus and 11/30/2023 sacral wound culture with MRSA, Providencia, Enterococcus and Pseudomonas.  2/27 MRI of the pelvis shows osteomyelitis involving the sacrococcygeal bone as well as right trochanteric bursitis which may be septic.  He was seen by surgery team with no plan for surgical intervention.  He is on antibiotic coverage with vancomycin and Zosyn.     Subjective:  Lying in bed in no acute distress. No new complaints voiced. Afebrile.    Past Medical History:   Diagnosis Date    COPD (chronic obstructive pulmonary disease)     Depression     Hypertension     Neuropathy      Past Surgical History:   Procedure Laterality Date    ABOVE-KNEE AMPUTATION Left 4/20/2024    Procedure: AMPUTATION, ABOVE KNEE;  Surgeon: Serge Brambila Jr., MD;  Location: [facility];  Service: General;  Laterality: Left;    angiogram Bilateral     stents placed in left leg    anterior neck surgery      APPLICATION OF WOUND VACUUM-ASSISTED CLOSURE DEVICE Left 4/20/2024    Procedure: APPLICATION, WOUND VAC;  Surgeon: Serge Brambila Jr., MD;  Location: [facility] OR;  Service: General;  Laterality: Left;    ARTHROTOMY OF ANKLE Left 6/28/2023    Procedure: ARTHROTOMY, ANKLE;  Surgeon: Tom Nichole DPM;  Location: [facility] OR;  Service: Podiatry;  Laterality: Left;    cataracts Left     CHOLECYSTECTOMY       DEBRIDEMENT OF AMPUTATION SITE N/A 4/21/2024    Procedure: DEBRIDEMENT, AMPUTATION SITE;  Surgeon: Serge Brambila Jr., MD;  Location: [facility] OR;  Service: General;  Laterality: N/A;    DEBRIDEMENT OF FOOT Bilateral 3/26/2024    Procedure: DEBRIDEMENT, FOOT;  Surgeon: Tom Nichole DPM;  Location: [facility] OR;  Service: Podiatry;  Laterality: Bilateral;    EYE SURGERY Left     HAND SURGERY Left     broken bone    herina repair      INCISION AND DRAINAGE, LOWER EXTREMITY Left 7/28/2023    Procedure: INCISION AND DRAINAGE, LOWER EXTREMITY;  Surgeon: Avinash Garces MD;  Location: [facility] OR;  Service: Orthopedics;  Laterality: Left;  Supine, vascular bed, bone foam  last case  cysto tubing, laparoscopic trocar, experience, vanc, hemovac drain    KNEE SURGERY Bilateral     PERIPHERAL ANGIOGRAPHY N/A 3/13/2024    Procedure: Peripheral angiography;  Surgeon: Deven Queen MD;  Location: [facility] CATH LAB;  Service: Cardiology;  Laterality: N/A;  MICHELLE ANGIO W/ ANEST.    posterior neck surgery      REPLACEMENT OF WOUND VACUUM-ASSISTED CLOSURE DEVICE Left 4/21/2024    Procedure: REPLACEMENT, WOUND VAC;  Surgeon: Serge Brambila Jr., MD;  Location: [facility];  Service: General;  Laterality: Left;    SPINE SURGERY      TOTAL REPLACEMENT OF BOTH HIP JOINTS USING COMPUTER-ASSISTED NAVIGATION Bilateral      Social History     Socioeconomic History    Marital status:    Occupational History    Occupation: retired   Tobacco Use    Smoking status: Former     Average packs/day: 0.5 packs/day for 15.0 years (7.5 ttl pk-yrs)     Types: Cigarettes     Start date: 2008    Smokeless tobacco: Never   Substance and Sexual Activity    Alcohol use: Yes     Alcohol/week: 3.0 - 6.0 standard drinks of alcohol     Types: 3 - 6 Cans of beer per week    Drug use: Yes     Types: Marijuana    Sexual activity: Not Currently       ROS  Constitutional:  Positive for malaise/fatigue.   HENT: Negative.     Respiratory: Negative.     Gastrointestinal:  Negative.    Genitourinary: Negative.    Musculoskeletal: Positive for L knee pain   Skin: Multiple pressure wounds   Neurological:  Positive for weakness.   Endo/Heme/Allergies: Negative.    Psychiatric/Behavioral: Negative.     All other Systems review done and negative.    Review of patient's allergies indicates:  No Known Allergies      Scheduled Meds:  Current Facility-Administered Medications   Medication Dose Route Frequency    amLODIPine  10 mg Oral Daily    cilostazoL  50 mg Oral BID    docusate sodium  100 mg Oral BID    DULoxetine  30 mg Oral Daily    ferrous sulfate  1 tablet Oral Daily    gabapentin  300 mg Oral TID    gabapentin  600 mg Oral QHS    LIDOcaine (PF) 10 mg/ml (1%)  1 mL Other Once    LIDOcaine  1 patch Transdermal Q24H    losartan  100 mg Oral Daily    multivitamin  1 tablet Oral Daily    oxyCODONE  10 mg Oral Q4H    piperacillin-tazobactam (Zosyn) IV (PEDS and ADULTS) (extended infusion is not appropriate)  4.5 g Intravenous Q8H    polyethylene glycol  17 g Oral Daily    sodium chloride 0.9%  10 mL Intravenous Q6H    tamsulosin  0.4 mg Oral Daily    vancomycin (VANCOCIN) IV (PEDS and ADULTS)  1,000 mg Intravenous Q18H     Continuous Infusions:  Current Facility-Administered Medications   Medication Dose Route Frequency Last Rate Last Admin     PRN Meds:    Current Facility-Administered Medications:     0.9%  NaCl infusion (for blood administration), , Intravenous, Q24H PRN    0.9%  NaCl infusion (for blood administration), , Intravenous, Q24H PRN    0.9%  NaCl infusion (for blood administration), , Intravenous, Q24H PRN    0.9%  NaCl infusion (for blood administration), , Intravenous, Q24H PRN    0.9%  NaCl infusion (for blood administration), , Intravenous, Q24H PRN    acetaminophen, 650 mg, Oral, Q6H PRN    albuterol, 2 puff, Inhalation, QID PRN    aluminum-magnesium hydroxide-simethicone, 15 mL, Oral, Q6H PRN    HYDROcodone-acetaminophen, 1 tablet, Oral, Q4H PRN    melatonin, 6 mg,  "Oral, Nightly PRN    simethicone, 80 mg, Oral, Q6H PRN    Flushing PICC/Midline Protocol, , , Until Discontinued **AND** sodium chloride 0.9%, 10 mL, Intravenous, Q6H **AND** sodium chloride 0.9%, 10 mL, Intravenous, PRN    Pharmacy to dose Vancomycin consult, , , Once **AND** vancomycin - pharmacy to dose, , Intravenous, pharmacy to manage frequency      Objective:  BP (!) 106/53   Pulse 78   Temp 98.2 °F (36.8 °C) (Oral)   Resp 18   Ht 5' 9.02" (1.753 m)   Wt 68 kg (150 lb)   SpO2 (!) 91%   BMI 22.14 kg/m²     Physical Exam:   Physical Exam  Vitals reviewed.   Constitutional:       General: He is not in acute distress.  HENT:      Head: Normocephalic and atraumatic.   Cardiovascular:      Rate and Rhythm: Normal rate and regular rhythm.      Heart sounds: Normal heart sounds.   Pulmonary:      Effort: Pulmonary effort is normal. No respiratory distress.      Breath sounds: Normal breath sounds.   Abdominal:      General: Bowel sounds are normal. There is no distension.      Palpations: Abdomen is soft.      Tenderness: There is no abdominal tenderness.   Musculoskeletal:         General: No deformity.      Cervical back: Neck supple.   Skin:     Findings: No erythema or rash.      Comments: Wound vac to sacrum  Neurological:      Mental Status: He is alert and oriented to person, place, and time.   Psychiatric:      Comments: Calm and cooperative     Imaging  Imaging Results    None          Lab Review   Recent Results (from the past 24 hour(s))   VANCOMYCIN, TROUGH    Collection Time: 04/21/24  9:57 PM   Result Value Ref Range    Vancomycin Trough 15.9 15.0 - 20.0 ug/ml   CBC with Differential    Collection Time: 04/21/24  9:57 PM   Result Value Ref Range    WBC 9.47 4.50 - 11.50 x10(3)/mcL    RBC 3.83 (L) 4.70 - 6.10 x10(6)/mcL    Hgb 10.6 (L) 14.0 - 18.0 g/dL    Hct 32.1 (L) 42.0 - 52.0 %    MCV 83.8 80.0 - 94.0 fL    MCH 27.7 27.0 - 31.0 pg    MCHC 33.0 33.0 - 36.0 g/dL    RDW 16.3 11.5 - 17.0 %    " Platelet 361 130 - 400 x10(3)/mcL    MPV 8.3 7.4 - 10.4 fL    Neut % 80.5 %    Lymph % 8.1 %    Mono % 5.0 %    Eos % 3.7 %    Basophil % 1.7 %    Lymph # 0.77 0.6 - 4.6 x10(3)/mcL    Neut # 7.63 2.1 - 9.2 x10(3)/mcL    Mono # 0.47 0.1 - 1.3 x10(3)/mcL    Eos # 0.35 0 - 0.9 x10(3)/mcL    Baso # 0.16 <=0.2 x10(3)/mcL    IG# 0.09 (H) 0 - 0.04 x10(3)/mcL    IG% 1.0 %    NRBC% 0.0 %   CBC Without Differential    Collection Time: 04/22/24  4:54 AM   Result Value Ref Range    WBC 8.27 4.50 - 11.50 x10(3)/mcL    RBC 3.42 (L) 4.70 - 6.10 x10(6)/mcL    Hgb 9.3 (L) 14.0 - 18.0 g/dL    Hct 28.0 (L) 42.0 - 52.0 %    MCV 81.9 80.0 - 94.0 fL    MCH 27.2 27.0 - 31.0 pg    MCHC 33.2 33.0 - 36.0 g/dL    RDW 16.5 11.5 - 17.0 %    Platelet 331 130 - 400 x10(3)/mcL    MPV 8.6 7.4 - 10.4 fL    NRBC% 0.0 %   Basic Metabolic Panel    Collection Time: 04/22/24  4:54 AM   Result Value Ref Range    Sodium Level 138 136 - 145 mmol/L    Potassium Level 4.2 3.5 - 5.1 mmol/L    Chloride 105 98 - 107 mmol/L    Carbon Dioxide 25 23 - 31 mmol/L    Glucose Level 138 (H) 82 - 115 mg/dL    Blood Urea Nitrogen 18.4 8.4 - 25.7 mg/dL    Creatinine 0.82 0.73 - 1.18 mg/dL    BUN/Creatinine Ratio 22     Calcium Level Total 9.1 8.8 - 10.0 mg/dL    Anion Gap 8.0 mEq/L    eGFR >60 mls/min/1.73/m2   Magnesium    Collection Time: 04/22/24  4:54 AM   Result Value Ref Range    Magnesium Level 1.90 1.60 - 2.60 mg/dL           Assessment/Plan:  1. Chronic sacral pressure wound with osteomyelitis  2. History of polymicrobial sacral wound culture - MRSA, Providencia, Pseudomonas, Enterococcus  3.  Chronic left lower extremity wound with history of polymicrobial wound culture-Pseudomonas, ESBL Proteus, Providencia  4. Multiple pressure wounds  5.  Diabetes type 2  6.  Anemia  7.  Protein calorie malnutrition   8.  L knee pain     -Continue Vancomycin and Zosyn, plan end date of 04/26, following inflammatory markers  -Remains afebrile without leukocytosis  -CT L knee  without contrast today 4/10 with no joint effusion, nonfocal soft tissue swelling, severe bone demineralization, arthritis  -Seen by Ortho, inputs noted. S/P L knee aspiration on 3/26   -3/26 left heel tissue cultures with few ESBL Proteus mirabilis  -4/9 ESR 76 from 29 and CRP 72.10 from 39.20  -3/15 blood cultures negative  -2/8 left leg wound cultures with many Pseudomonas, Providencia, Proteus  -11/30/2023 sacral wound with many Providencia, MRSA, Enterococcus, Pseudomonas  -Pt c/o severe L knee pain and wants L AKA. Surgery re-consulted, inputs noted including plans for L AKA in am 4/21  -Podiatry on board s/p debridement 3/26, cultures revealed ESBL Proteus  -We will continue aggressive wound care per Podiatry and the wound care team  -Discussed with patient and nursing staff. Case management working on placement. Disposition per primary team

## 2024-04-22 NOTE — PROGRESS NOTES
[facility] - 8th Floor [facility] Medicine  Progress Note    Patient Name: Shaheen Christie  MRN: [id number]  Patient Class: IP- Inpatient   Admission Date: 3/15/2024  Length of Stay: 38 days  Attending Physician: Enzo Thibodeaux MD  Primary Care Provider: Viktor Russ MD        Subjective:     Principal Problem:Osteomyelitis    Interval History:  Yesterday patient did go to the OR.  He has some debridement temp side.  Wound VAC placed.  Opposed apparently he was doing fine but he had some urinary retention so I started him on Flomax.    Review of Systems   All other systems reviewed and are negative.    Objective:     Vital Signs (Most Recent):  Temp: 98.2 °F (36.8 °C) (04/21/24 2340)  Pulse: 83 (04/22/24 0500)  Resp: 16 (04/22/24 0500)  BP: 107/64 (04/22/24 0500)  SpO2: (!) 91 % (04/22/24 0425) Vital Signs (24h Range):  Temp:  [97.3 °F (36.3 °C)-98.4 °F (36.9 °C)] 98.2 °F (36.8 °C)  Pulse:  [] 83  Resp:  [14-22] 16  SpO2:  [91 %-99 %] 91 %  BP: ()/(35-78) 107/64     Weight: 68 kg (150 lb)  Body mass index is 22.14 kg/m².    Intake/Output Summary (Last 24 hours) at 4/22/2024 0558  Last data filed at 4/21/2024 2200  Gross per 24 hour   Intake 4396.03 ml   Output 1875 ml   Net 2521.03 ml      Physical Exam  HENT:      Head: Normocephalic.      Left Ear: External ear normal.      Mouth/Throat:      Mouth: Mucous membranes are dry.   Cardiovascular:      Rate and Rhythm: Normal rate and regular rhythm.      Pulses: Normal pulses.   Pulmonary:      Breath sounds: Normal breath sounds.   Abdominal:      Palpations: Abdomen is soft.   Musculoskeletal:      Cervical back: Neck supple.   Skin:     General: Skin is warm and dry.   Neurological:      Mental Status: He is alert. Mental status is at baseline.           Overview/Hospital Course:  Patient transfused with 2 units of blood.  Hemoglobin seems stable at 9.3 but it dropped from 10.6 yesterday.  This is probably due to some  losses from the debridement.  Continue with antibiotics until ID says we can stop him.  Discharge back to nursing home when okay with ID and surgery.    Significant Labs: All pertinent labs within the past 24 hours have been reviewed.  BMP:   Recent Labs   Lab 04/22/24  0454      K 4.2   CO2 25   BUN 18.4   CREATININE 0.82   CALCIUM 9.1   MG 1.90     CBC:   Recent Labs   Lab 04/21/24  0647 04/21/24  2157 04/22/24  0454   WBC 9.38 9.47 8.27   HGB 6.5* 10.6* 9.3*   HCT 21.5* 32.1* 28.0*   * 361 331     CMP:   Recent Labs   Lab 04/21/24  0647 04/22/24  0454   * 138   K 4.0 4.2   CO2 24 25   BUN 13.0 18.4   CREATININE 0.88 0.82   CALCIUM 8.6* 9.1       Significant Imaging: I have reviewed all pertinent imaging results/findings within the past 24 hours.    Assessment/Plan:      Active Diagnoses:    Diagnosis Date Noted POA    PRINCIPAL PROBLEM:  Osteomyelitis [M86.9] 03/15/2024 Yes    Knee effusion, left [M25.462] 03/31/2024 No    Pressure injury of sacral region, stage 4 [L89.154] 03/19/2024 Yes    Skin ulcer of right lower leg [L97.919] 03/19/2024 Yes    Skin ulcer of left lower leg [L97.929] 03/19/2024 Yes    Unstageable pressure ulcer of right heel [L89.610] 03/19/2024 Yes    Unstageable pressure ulcer of left heel [L89.620] 03/19/2024 Yes      Problems Resolved During this Admission:     VTE Risk Mitigation (From admission, onward)      None               Enzo Thibodeaux MD  Department of Hospital Medicine   [facility] - 8th Floor Med Surg

## 2024-04-22 NOTE — OP NOTE
[facility] - Periop Services  Surgery Department  Operative Note    SUMMARY     Date of Procedure: 4/21/2024     Procedure: Cauterization of bleeding AKA    Surgeons and Role:     * Serge Brambila Jr., MD - Primary    Assisting Surgeon: None    Pre-Operative Diagnosis: Bleeding stump    Post-Operative Diagnosis: Post-Op Diagnosis Codes:     * Bleeding [R58]    Anesthesia: General    Operative Findings (including complications, if any): bleeding bone marrow, muscle belly and skin edges.    Description of Technical Procedures:   After consent were obtained the patient was taken back to the operative suite and placed under general anesthesia. Next the left AKA stump was prepped and draped in the usual sterile fashion. A time out was done to make sure we had the appropriate patient, appropriate medications and appropriate operation.    We then cauterized all bleeding with bovie. The muscle belly and bone marrow were the most amount of bleeding and this was all stopped. We also cauterized the skin edges to make no bleeding. Next we irrigated and saw no other bleeding. We then place a wound vac on the patient and were able to obtain a good seal. At case end all counts were correct and the patient tolerated the procedure well.       Estimated Blood Loss (EBL): * No values recorded between 4/21/2024  7:00 PM and 4/21/2024  7:21 PM *           Implants: * No implants in log *    Specimens:   Specimen (24h ago, onward)      None                    Condition: Stable    Disposition: PACU - hemodynamically stable.    Attestation: Op Note Attestation: I was physically present and scrubbed for the entire procedure.       Serge Brambila Jr, MD MS  Trauma Critical Care Surgery

## 2024-04-23 LAB — VANCOMYCIN TROUGH SERPL-MCNC: 16.1 UG/ML (ref 15–20)

## 2024-04-23 PROCEDURE — 94799 UNLISTED PULMONARY SVC/PX: CPT

## 2024-04-23 PROCEDURE — 99900035 HC TECH TIME PER 15 MIN (STAT)

## 2024-04-23 PROCEDURE — 36415 COLL VENOUS BLD VENIPUNCTURE: CPT | Performed by: INTERNAL MEDICINE

## 2024-04-23 PROCEDURE — 21400001 HC TELEMETRY ROOM

## 2024-04-23 PROCEDURE — 99900031 HC PATIENT EDUCATION (STAT)

## 2024-04-23 PROCEDURE — 80202 ASSAY OF VANCOMYCIN: CPT | Performed by: INTERNAL MEDICINE

## 2024-04-23 PROCEDURE — 63600175 PHARM REV CODE 636 W HCPCS: Performed by: INTERNAL MEDICINE

## 2024-04-23 PROCEDURE — 97162 PT EVAL MOD COMPLEX 30 MIN: CPT

## 2024-04-23 PROCEDURE — 25000003 PHARM REV CODE 250: Performed by: INTERNAL MEDICINE

## 2024-04-23 PROCEDURE — 25000003 PHARM REV CODE 250: Performed by: PODIATRIST

## 2024-04-23 PROCEDURE — A4216 STERILE WATER/SALINE, 10 ML: HCPCS | Performed by: INTERNAL MEDICINE

## 2024-04-23 PROCEDURE — 97165 OT EVAL LOW COMPLEX 30 MIN: CPT

## 2024-04-23 RX ADMIN — OXYCODONE HYDROCHLORIDE 10 MG: 10 TABLET ORAL at 03:04

## 2024-04-23 RX ADMIN — OXYCODONE HYDROCHLORIDE 10 MG: 10 TABLET ORAL at 10:04

## 2024-04-23 RX ADMIN — LIDOCAINE 5% 1 PATCH: 700 PATCH TOPICAL at 10:04

## 2024-04-23 RX ADMIN — TAMSULOSIN HYDROCHLORIDE 0.4 MG: 0.4 CAPSULE ORAL at 09:04

## 2024-04-23 RX ADMIN — CILOSTAZOL 50 MG: 50 TABLET ORAL at 09:04

## 2024-04-23 RX ADMIN — AMLODIPINE BESYLATE 10 MG: 5 TABLET ORAL at 09:04

## 2024-04-23 RX ADMIN — CILOSTAZOL 50 MG: 50 TABLET ORAL at 08:04

## 2024-04-23 RX ADMIN — PIPERACILLIN SODIUM AND TAZOBACTAM SODIUM 4.5 G: 4; .5 INJECTION, POWDER, LYOPHILIZED, FOR SOLUTION INTRAVENOUS at 03:04

## 2024-04-23 RX ADMIN — THERA TABS 1 TABLET: TAB at 09:04

## 2024-04-23 RX ADMIN — GABAPENTIN 300 MG: 300 CAPSULE ORAL at 09:04

## 2024-04-23 RX ADMIN — GABAPENTIN 600 MG: 300 CAPSULE ORAL at 09:04

## 2024-04-23 RX ADMIN — GABAPENTIN 300 MG: 300 CAPSULE ORAL at 03:04

## 2024-04-23 RX ADMIN — DULOXETINE HYDROCHLORIDE 30 MG: 30 CAPSULE, DELAYED RELEASE ORAL at 09:04

## 2024-04-23 RX ADMIN — PIPERACILLIN SODIUM AND TAZOBACTAM SODIUM 4.5 G: 4; .5 INJECTION, POWDER, LYOPHILIZED, FOR SOLUTION INTRAVENOUS at 06:04

## 2024-04-23 RX ADMIN — DOCUSATE SODIUM 100 MG: 100 CAPSULE, LIQUID FILLED ORAL at 09:04

## 2024-04-23 RX ADMIN — LOSARTAN POTASSIUM 100 MG: 50 TABLET, FILM COATED ORAL at 09:04

## 2024-04-23 RX ADMIN — SODIUM CHLORIDE, PRESERVATIVE FREE 10 ML: 5 INJECTION INTRAVENOUS at 06:04

## 2024-04-23 RX ADMIN — FERROUS SULFATE TAB 325 MG (65 MG ELEMENTAL FE) 1 EACH: 325 (65 FE) TAB at 09:04

## 2024-04-23 RX ADMIN — SODIUM CHLORIDE, PRESERVATIVE FREE 10 ML: 5 INJECTION INTRAVENOUS at 12:04

## 2024-04-23 RX ADMIN — VANCOMYCIN HYDROCHLORIDE 1000 MG: 1 INJECTION, POWDER, LYOPHILIZED, FOR SOLUTION INTRAVENOUS at 09:04

## 2024-04-23 RX ADMIN — OXYCODONE HYDROCHLORIDE 10 MG: 10 TABLET ORAL at 07:04

## 2024-04-23 NOTE — PLAN OF CARE
[facility] sent new referral to [facility] and clinical updates to [facility].

## 2024-04-23 NOTE — PROGRESS NOTES
Pharmacokinetic Assessment Follow Up: IV Vancomycin    Vancomycin serum concentration assessment(s):    The trough level was drawn correctly and can be used to guide therapy at this time. The measurement is within the desired definitive target range of 15 to 20 mcg/mL.    Vancomycin Regimen Plan:    Continue regimen to Vancomycin 1000 mg IV every 18 hours with next serum trough concentration measured at 1400 prior to the dose on 04/25    Scheduled Administration Times    Vanc 1 gm q18h    Drug levels (last 3 results):  Recent Labs   Lab Result Units 04/21/24  2157 04/23/24  0805   Vancomycin Trough ug/ml 15.9 16.1       Vancomycin Administrations:  vancomycin given in the last 96 hours                     vancomycin in dextrose 5 % 1 gram/250 mL IVPB 1,000 mg (mg) 1,000 mg New Bag 04/22/24 1529     1,000 mg New Bag 04/21/24 2244     1,000 mg New Bag  0201     1,000 mg New Bag 04/20/24 0913    vancomycin in dextrose 5 % 1 gram/250 mL IVPB 1,000 mg ()  Restarted 04/19/24 1536      Restarted  1430     1,000 mg New Bag  1430                    Pharmacy will continue to follow and monitor vancomycin.    Please contact pharmacy at extension [phone] for questions regarding this assessment.    Thank you for the consult,   Cortes Tran       Patient brief summary:  Shaheen Christie is a 69 y.o. male initiated on antimicrobial therapy with IV Vancomycin for treatment of bone/joint infection    The patient's current regimen is vanc 1000mg q18H    Drug Allergies:   Review of patient's allergies indicates:  No Known Allergies    Actual Body Weight:  Wt Readings from Last 1 Encounters:   03/20/24 68 kg (150 lb)       Renal Function:   Estimated Creatinine Clearance: 79.5 mL/min (based on SCr of 0.82 mg/dL).,     Dialysis Method (if applicable):  N/A    CBC (last 72 hours):  Recent Labs   Lab Result Units 04/21/24  0647 04/21/24  2157 04/22/24  0454   WBC x10(3)/mcL 9.38 9.47 8.27   Hgb g/dL 6.5* 10.6* 9.3*   Hct % 21.5* 32.1* 28.0*    Platelet x10(3)/mcL 471* 361 331   Mono % % 13.8 5.0  --    Eos % % 6.9 3.7  --    Basophil % % 1.8 1.7  --        Metabolic Panel (last 72 hours):  Recent Labs   Lab Result Units 04/21/24  0647 04/22/24  0454   Sodium Level mmol/L 135* 138   Potassium Level mmol/L 4.0 4.2   Chloride mmol/L 102 105   Carbon Dioxide mmol/L 24 25   Glucose Level mg/dL 119* 138*   Blood Urea Nitrogen mg/dL 13.0 18.4   Creatinine mg/dL 0.88 0.82   Magnesium Level mg/dL  --  1.90       Microbiologic Results:  Microbiology Results (last 7 days)       ** No results found for the last 168 hours. **

## 2024-04-23 NOTE — PROGRESS NOTES
Inpatient Nutrition Assessment    Admit Date: 3/15/2024   Total duration of encounter: 39 days   Patient Age: 69 y.o.    Nutrition Recommendation/Prescription     -Continue Heart Healthy Diet as tolerated.    -Encourage Pierre BID for wound healing.   -Continue MVI with minerals as medically feasible.   -Monitor wt, labs, and intake. Obtain and document a more recent measured weight.     Communication of Recommendations: reviewed with patient    Nutrition Assessment     Malnutrition Assessment/Nutrition-Focused Physical Exam    Malnutrition Context: chronic illness (03/20/24 1317)  Malnutrition Level: severe (03/20/24 1317)  Energy Intake (Malnutrition): less than 75% for greater than or equal to 3 months (03/20/24 1317)  Weight Loss (Malnutrition): greater than 7.5% in 3 months (03/20/24 1317)  Subcutaneous Fat (Malnutrition): severe depletion (03/20/24 1317)  Orbital Region (Subcutaneous Fat Loss): severe depletion  Upper Arm Region (Subcutaneous Fat Loss): severe depletion     Muscle Mass (Malnutrition): severe depletion (03/20/24 1317)  [place] Region (Muscle Loss): severe depletion                       Fluid Accumulation (Malnutrition): other (see comments) (does not meet criteria) (03/20/24 1317)  Hand  Strength, Left (Malnutrition): unable to evaluate (03/20/24 1317)  Hand  Strength, Right (Malnutrition): unable to evaluate (03/20/24 1317)  A minimum of two characteristics is recommended for diagnosis of either severe or non-severe malnutrition.    Chart Review    Reason Seen: continuous nutrition monitoring and follow-up    Malnutrition Screening Tool Results   Have you recently lost weight without trying?: No  Have you been eating poorly because of a decreased appetite?: No   MST Score: 0   Diagnosis:  Stage 4 pressure ulcer to sacrum  Unstageable pressure ulcers to bilateral heels  Open wound/ulcers to right lateral lower leg and left lateral lower leg  Peripheral arterial disease - with history of  vascular intervention with Dr. Queen (most recent angio 3/13/24)  PVD  History of polymicrobial sacral wound culture  History of polymicrobial wound culture to leg wound  DM2  Anemia  History of smoking  Low prealbumin     Relevant Medical History: HTN, COPD, DM2 with neuropathy, PAD, chronic venous hypertension     Scheduled Medications:  amLODIPine, 10 mg, Daily  cilostazoL, 50 mg, BID  docusate sodium, 100 mg, BID  DULoxetine, 30 mg, Daily  ferrous sulfate, 1 tablet, Daily  gabapentin, 300 mg, TID  gabapentin, 600 mg, QHS  LIDOcaine (PF) 10 mg/ml (1%), 1 mL, Once  LIDOcaine, 1 patch, Q24H  losartan, 100 mg, Daily  multivitamin, 1 tablet, Daily  oxyCODONE, 10 mg, Q4H  piperacillin-tazobactam (Zosyn) IV (PEDS and ADULTS) (extended infusion is not appropriate), 4.5 g, Q8H  polyethylene glycol, 17 g, Daily  sodium chloride 0.9%, 10 mL, Q6H  tamsulosin, 0.4 mg, Daily  vancomycin (VANCOCIN) IV (PEDS and ADULTS), 1,000 mg, Q18H    Continuous Infusions:   PRN Medications:   Review      Calorie Containing IV Medications: no significant kcals from medications at this time    Recent Labs   Lab 04/18/24  0007 04/21/24  0647 04/21/24  2157 04/22/24  0454   * 135*  --  138   K 3.7 4.0  --  4.2   CALCIUM 8.8 8.6*  --  9.1   MG  --   --   --  1.90   CHLORIDE 104 102  --  105   CO2 25 24  --  25   BUN 13.1 13.0  --  18.4   CREATININE 0.65* 0.88  --  0.82   EGFRNORACEVR >60 >60  --  >60   GLUCOSE 141* 119*  --  138*   BILITOT 0.2  --   --   --    ALKPHOS 87  --   --   --    ALT 11  --   --   --    AST 13  --   --   --    ALBUMIN 2.2*  --   --   --    WBC 9.01  9.01 9.38 9.47 8.27   HGB 8.0* 6.5* 10.6* 9.3*   HCT 26.1* 21.5* 32.1* 28.0*     Nutrition Orders:  Diet Adult Regular      Appetite/Oral Intake: good/% of meals  Factors Affecting Nutritional Intake: none identified  Food/Jainism/Cultural Preferences: none reported  Food Allergies: no known food allergies  Last Bowel Movement: 04/21/24  Wound(s):     Altered  "Skin Integrity 24 1351 Sacral spine #4-Tissue loss description: Full thickness       Altered Skin Integrity 24 0800 Left anterior;lower Leg Arterial Ulcer-Tissue loss description: Full thickness heel ulcers, sacrum pressure ulcers    Comments    3/20: Pt reports current good intake/appetite but intake the past few months has been poor 2/2 disliking food from NH. Pt reports usual wt of ~170 lbs. Pt reports he would like oral supplements.     3/22: Pt reports good appetite/intake of meals, denies n/v; drinking some of the oral supplement but not taking the Pierre.     3/26: Pt NPO;  plans to go to OR today.     3/28: Pt eating well; denies n/v; states that he is not drinking oral supplement 2/2 being too full after he eats his meals; will d/c per his request. Pt is receptive to trying to drink the Pierre for wound healing.     : Pt continues with good appetite/intake; tolerating diet. Pt reports that he likes the Pierre but is not taking it regularly- encouraged to take for wound healing and pt was receptive.     : Pt reports good appetite and PO intake of meals. Not drinking much Pierre, large supply in his room noted. Pt denies GI complaints.     : Pt sleeping during rounds; eating very well per RN.     : Pt reports good appetite/intake; denies n/v; taking the Pierre.     : Pt still eating 100%.    : Pt reports good appetite/intake.     : Pt sleeping during time of rounds; observed 100% of lunch tray consumed.     Anthropometrics    Height: 5' 9.02" (175.3 cm), Height Method: Stated  Last Weight: 68 kg (150 lb) (24 1315), Weight Method: Standard Scale  BMI (Calculated): 22.1  BMI Classification: normal (BMI 18.5-24.9)        Ideal Body Weight (IBW), Male: 160.12 lb     % Ideal Body Weight, Male (lb): 93.68 %                 Usual Body Weight (UBW), k.27 kg  % Usual Body Weight: 88.24  % Weight Change From Usual Weight: -11.95 %  Usual Weight Provided By: patient    Wt " Readings from Last 5 Encounters:   03/20/24 68 kg (150 lb)   03/13/24 68.5 kg (151 lb)   01/22/24 63.5 kg (140 lb)   07/28/23 72.6 kg (160 lb)   06/22/23 77.1 kg (170 lb)     Weight Change(s) Since Admission:   Wt Readings from Last 1 Encounters:   03/20/24 1315 68 kg (150 lb)   03/15/24 1621 68 kg (150 lb)   03/15/24 0307 68 kg (150 lb)   Admit Weight: 68 kg (150 lb) (03/15/24 0307), Weight Method: Stated  3/28-4/23: no new wt noted    Estimated Needs    Weight Used For Calorie Calculations: 68 kg (149 lb 14.6 oz)  Energy Calorie Requirements (kcal): 9045-3365 kcal (30-35 kcal/kg)  Energy Need Method: Kcal/kg  Weight Used For Protein Calculations: 68 kg (149 lb 14.6 oz)  Protein Requirements: 102 gm (1.5g/kg)  Fluid Requirements (mL): 2040 mL    Enteral Nutrition     Patient not receiving enteral nutrition at this time.    Parenteral Nutrition     Patient not receiving parenteral nutrition support at this time.    Evaluation of Received Nutrient Intake    Calories: meeting estimated needs  Protein: meeting estimated needs    Patient Education     Not applicable.    Nutrition Diagnosis     PES: Increased nutrient needs (protein) related to increased protein energy demand for wound healing as evidenced by stage 4 pressure ulcer. (active)     PES: Severe chronic disease or condition related malnutrition related to suboptimal protein/energy intake as evidenced by less than 75% needs met for greater than or equal to 3 months, severe fat depletion, severe muscle depletion, and greater than 7.5% weight loss in 3 months. (active)    Nutrition Interventions     Intervention(s): general/healthful diet, commercial beverage, commercial food, multivitamin/mineral supplement therapy, and collaboration with other providers    Goal: Meet greater than 80% of nutritional needs by follow-up. (goal progressing)  Goal: Maintain weight throughout hospitalization. (goal progressing)    Nutrition Goals & Monitoring     Dietitian will  monitor: energy intake and weight    Nutrition Risk/Follow-Up: high (follow-up in 1-4 days)   Please consult if re-assessment needed sooner.

## 2024-04-23 NOTE — PROGRESS NOTES
ASP documentation:    Plan per ID:  Continue Vancomycin and Zosyn, plan end date of 04/26, following inflammatory markers  -Remains afebrile without leukocytosis

## 2024-04-23 NOTE — PROGRESS NOTES
Acute Care Surgery   Progress Note  Admit Date: 3/15/2024  HD#39  POD#2 Days Post-Op    Subjective:   Interval history:  s/p L AKA on 4/21, taken back to OR due to bleeding from wound vac. AFENRIQUE. This AM, wound vac with good seal 300 serosanguinous output. Pain appropriately controlled.     Home Meds:  Current Outpatient Medications   Medication Instructions    acetaminophen (TYLENOL) 650 mg, Oral, Every 6 hours PRN    albuterol (PROVENTIL/VENTOLIN HFA) 90 mcg/actuation inhaler Inhalation, 4 times daily PRN    aluminum & magnesium hydroxide-simethicone (MYLANTA MAX STRENGTH) 400-400-40 mg/5 mL suspension Oral, Every 6 hours PRN    amLODIPine (NORVASC) 10 mg, Oral, Daily    aspirin 81 mg, Oral, Daily    cilostazoL (PLETAL) 50 mg, Oral, 2 times daily    clopidogreL (PLAVIX) 75 mg, Oral, Daily    dextrose 5 % in water (D5W) PgBk 100 mL with piperacillin-tazobactam 4.5 gram SolR 4.5 g 4.5 g, Intravenous, Every 8 hours    docusate sodium (COLACE) 100 mg, Oral, 2 times daily    DULoxetine (CYMBALTA) 30 mg, Oral, Daily    enoxaparin (LOVENOX) 40 mg, Subcutaneous, Every 12 hours    ferrous sulfate 325 mg, Oral, Daily    gabapentin (NEURONTIN) 300 mg, Oral, 3 times daily    gabapentin (NEURONTIN) 600 mg, Oral, Nightly    LIDOcaine (LIDODERM) 5 % 1 patch, Transdermal, Every 24 hours (non-standard times), Remove & Discard patch within 12 hours or as directed by MD    losartan (COZAAR) 100 mg, Oral, Daily    melatonin (MELATIN) 6 mg, Oral, Nightly PRN    multivitamin (THERAGRAN) per tablet 1 tablet, Oral, Daily    oxyCODONE (ROXICODONE) 10 mg, Oral, Every 4 hours    polyethylene glycol (GLYCOLAX) 17 g, Oral, Daily    simethicone (MYLICON) 80 mg, Oral, Every 6 hours PRN    vancomycin HCl in 5 % dextrose (VANCOMYCIN IN DEXTROSE 5 %) 1 gram/250 mL Soln 1,000 mg, Intravenous, Every 12 hours      Scheduled Meds:  Current Facility-Administered Medications   Medication Dose Route Frequency    amLODIPine  10 mg Oral Daily     cilostazoL  50 mg Oral BID    docusate sodium  100 mg Oral BID    DULoxetine  30 mg Oral Daily    ferrous sulfate  1 tablet Oral Daily    gabapentin  300 mg Oral TID    gabapentin  600 mg Oral QHS    LIDOcaine (PF) 10 mg/ml (1%)  1 mL Other Once    LIDOcaine  1 patch Transdermal Q24H    losartan  100 mg Oral Daily    multivitamin  1 tablet Oral Daily    oxyCODONE  10 mg Oral Q4H    piperacillin-tazobactam (Zosyn) IV (PEDS and ADULTS) (extended infusion is not appropriate)  4.5 g Intravenous Q8H    polyethylene glycol  17 g Oral Daily    sodium chloride 0.9%  10 mL Intravenous Q6H    tamsulosin  0.4 mg Oral Daily    vancomycin (VANCOCIN) IV (PEDS and ADULTS)  1,000 mg Intravenous Q18H     Continuous Infusions:  Current Facility-Administered Medications   Medication Dose Route Frequency Last Rate Last Admin     PRN Meds:  Current Facility-Administered Medications:     0.9%  NaCl infusion (for blood administration), , Intravenous, Q24H PRN    0.9%  NaCl infusion (for blood administration), , Intravenous, Q24H PRN    0.9%  NaCl infusion (for blood administration), , Intravenous, Q24H PRN    0.9%  NaCl infusion (for blood administration), , Intravenous, Q24H PRN    0.9%  NaCl infusion (for blood administration), , Intravenous, Q24H PRN    acetaminophen, 650 mg, Oral, Q6H PRN    albuterol, 2 puff, Inhalation, QID PRN    aluminum-magnesium hydroxide-simethicone, 15 mL, Oral, Q6H PRN    HYDROcodone-acetaminophen, 1 tablet, Oral, Q4H PRN    melatonin, 6 mg, Oral, Nightly PRN    simethicone, 80 mg, Oral, Q6H PRN    Flushing PICC/Midline Protocol, , , Until Discontinued **AND** sodium chloride 0.9%, 10 mL, Intravenous, Q6H **AND** sodium chloride 0.9%, 10 mL, Intravenous, PRN    Pharmacy to dose Vancomycin consult, , , Once **AND** vancomycin - pharmacy to dose, , Intravenous, pharmacy to manage frequency     Objective:     VITAL SIGNS: 24 HR MIN & MAX LAST   Temp  Min: 98.2 °F (36.8 °C)  Max: 99.8 °F (37.7 °C)  99 °F (37.2  "°C)   BP  Min: 106/53  Max: 143/65  127/62    Pulse  Min: 78  Max: 95  95    Resp  Min: 17  Max: 18  18    SpO2  Min: 91 %  Max: 95 %  (!) 91 %      HT: 5' 9.02" (175.3 cm)  WT: 68 kg (150 lb)  BMI: 22.1     Intake/output:  Intake/Output - Last 3 Shifts         04/21 0700 04/22 0659 04/22 0700 04/23 0659 04/23 0700 04/24 0659    P.O. 960 150     Blood 1273      IV Piggyback 1923 200     Total Intake(mL/kg) 4156 (61.1) 350 (5.1)     Urine (mL/kg/hr) 1500 (0.9) 300 (0.2)     Other 525 350     Stool 0 0     Total Output 2025 650     Net +2131 -300            Urine Occurrence  600 x     Stool Occurrence 1 x 0 x             Intake/Output Summary (Last 24 hours) at 4/23/2024 1030  Last data filed at 4/23/2024 0623  Gross per 24 hour   Intake 350 ml   Output 650 ml   Net -300 ml           Lines/drains/airway:  PICC Double Lumen 03/28/24 1244 right basilic (Active)   $ PICC Line Charges (Upon insertion) Bedside Insertion Performed Pt < 5 Years Old (no subq port/pump or image guidance);Catheter - Double Lumen (Supply) 04/06/24 0929   Line Necessity Review Poor venous access 04/15/24 1600   Site Assessment No redness;No swelling;No warmth 04/20/24 0400   Extremity Assessment Distal to IV No abnormal discoloration;No redness;No swelling;No warmth 04/20/24 0400   Line Securement Device Secured with sutureless device 04/20/24 0400   Dressing Type CHG impregnated dressing/sponge 04/20/24 0400   Dressing Status Clean;Dry;Intact 04/20/24 0400   Dressing Intervention Integrity maintained 04/20/24 0400   Date on Dressing 04/17/24 04/20/24 0400   Dressing Due to be Changed 04/24/24 04/20/24 0400   Distal Patency/Care flushed w/o difficulty 04/20/24 0400   Proximal 1 Patency/Care flushed w/o difficulty 04/20/24 0400   Number of days: 22       Physical examination:  Gen: NAD, AAOx3, answering questions appropriately  HEENT: moist mucous membranes  CV: RR  Resp: no increased work of breathing  Abd: soft, non-distended  Ext: L AKA with " wound vac in place, good seal  Neuro: CN II-XII grossly intact    Labs:  No new x24h    Imaging:  CT Knee Without Contrast Left   Final Result      1. No significant joint effusion   2. Nonfocal soft tissue swelling   3. Severe bony demineralization   4. Arthritis   5. The preliminary and final reports are concordant.         Electronically signed by: Allyson Pierce   Date:    04/10/2024   Time:    07:35      X-Ray Knee Complete 4 or More Views Left   Final Result      The bones are demineralized.      Degenerative change at the knee with joint effusion.         Electronically signed by: Allyson Pierce   Date:    03/30/2024   Time:    11:16      X-Ray Chest 1 View for Line/Tube Placement   Final Result      Right PICC tip overlies the mid SVC.         Electronically signed by: Gentry Welsh   Date:    03/28/2024   Time:    13:26      X-Ray Tibia Fibula 2 View Right   Final Result      X-Ray Tibia Fibula 2 View Left   Final Result      X-Ray Calcaneus 2 View Right   Final Result      Diffuse mottled appearance to all of the tarsal bones as well as the calcaneus with mild periosteal elevation seen along the posteroinferior aspect of the calcaneus.  Osteomyelitis should be excluded.         Electronically signed by: Mak Merchant   Date:    03/19/2024   Time:    17:42      X-Ray Calcaneus 2 View Left   Final Result         I have reviewed all pertinent imaging results/findings within the past 24 hours.    Assessment & Plan:   69M PMH COPD, HTN, DM, neuropathy, resident of [facility] presented via EMS, adm 3/15/24 with sacral wound with osteomyelitis, chronic BLE wounds, pain to LLE. Surgery consulted for L AKA.   Now s/p L AKA 4/21 with take back for bleeding.    - Plan for return to OR this week for closure when able  - OR timing pending  - Continue wound vac to reduce edema prior to formalization  - Regular diet  - Daily H/H  - Remainder of care per primary    Tonia Pelayo MD  [employer] Surgery  PGY2  04/23/2024  6:19 AM

## 2024-04-23 NOTE — PLAN OF CARE
Problem: Adult Inpatient Plan of Care  Goal: Plan of Care Review  Outcome: Progressing  Goal: Patient-Specific Goal (Individualized)  Outcome: Progressing  Goal: Absence of Hospital-Acquired Illness or Injury  Outcome: Progressing  Goal: Optimal Comfort and Wellbeing  Outcome: Progressing  Goal: Readiness for Transition of Care  Outcome: Progressing     Problem: Impaired Wound Healing  Goal: Optimal Wound Healing  Outcome: Progressing     Problem: Skin Injury Risk Increased  Goal: Skin Health and Integrity  Outcome: Progressing     Problem: Fall Injury Risk  Goal: Absence of Fall and Fall-Related Injury  Outcome: Progressing     Problem: Infection  Goal: Absence of Infection Signs and Symptoms  Outcome: Progressing

## 2024-04-23 NOTE — PLAN OF CARE
Spoke to patient sister Andreea. She has spoken to patient. They would like a referral sent to [facility]. [facility] [place] notified.

## 2024-04-23 NOTE — PT/OT/SLP EVAL
Occupational Therapy   Evaluation and Discharge Note    Name: Shaheen Christie  MRN: [id number]  Admitting Diagnosis: sacral wound evaluation, osteomyelitis   Recent Surgery: Procedure(s) (LRB):  DEBRIDEMENT, AMPUTATION SITE (N/A)  REPLACEMENT, WOUND VAC (Left) 2 Days Post-Op    Recommendations:     Discharge therapy intensity: No Therapy Indicated   Discharge Equipment Recommendations:    Barriers to discharge:  None    Assessment:     Shaheen Christie is a 69 y.o. male with a medical diagnosis of would to R heel, sacral wound with osteomyelitis, wound to L foot with osteomyelitis s/p L AKA. On eval, patient presents with significant deconditioning and pain which greatly impacts ability and willingness to mobilize. Pt reports being primarily bed bound and dependent with ADLs at NH prior and hoyered lifted to w/c at baseline. Pt is not appropriate for skilled acute OT services at this time due being at baseline in terms of mobility and with poor rehab potential. Pt also declined further participation in therapy at this time 2/2 pain.   Plan:     OT to sign off as acute OT services are not warranted at this time.  Please re-consult if situation changes during this hospitalization.    Plan of Care Reviewed with: patient    Subjective     Chief Complaint: Pain at buttocks   Patient/Family Comments/goals: Pt wants to not have pain     Occupational Profile:  Living Environment: NH   Previous level of function: Dependent with ADLs and t/fs   Equipment Used at Home: wheelchair, hospital bed, lift device  Assistance upon Discharge: NH staff    Pain/Comfort:  Pain Rating 1: 8/10  Location - Side 1: Left  Location - Orientation 1: proximal  Pain Rating Post-Intervention 1: 10/10    Patients cultural, spiritual, Sabianism conflicts given the current situation: no    Objective:     OT communicated with RN prior to session.      Patient was found left sidelying with wound vacs x2, PICC upon OT entry to room.    General Precautions:  Standard,     Bed Mobility:    Patient completed Rolling/Turning to Left with  moderate assistance  Patient completed Rolling/Turning to Right with minimum assistance  Patient completed Supine to Sit with total assistance---did not achieve full seated position       Activities of Daily Living:  Grooming: maximal assistance    Lower Body Dressing: total assistance    Toileting: total assistance        Functional Cognition:  Orientation: oriented to Person, Place, Time, and Situation  Memory: Impaired STM    Visual Perceptual Skills:  Intact    Upper Extremity Function:  Right Upper Extremity:   WFL    Left Upper Extremity:  WFL    Balance:   Static sitting balance: Impaired. Total assist to sit. Pt with strong posterior and R lean     Therapeutic Positioning  Risk for acquired pressure injuries is significantly increased due to impaired mobility, incontinence, altered skin already present, and severity of deficits resulting in prolonged immobility .    OT interventions performed during the course of today's session in an effort to prevent and/or reduce acquired pressure injuries:   Education was provided on benefits of and recommendations for therapeutic positioning  Positioning recommendations were communicated to care team     Skin assessment: full body skin assessment was performed    Findings: known area of altered skin integrity at R heel and sacrum     OT recommendations for therapeutic positioning throughout hospitalization:   Follow [facility] Pressure Injury Prevention Protocol  [employer]      Patient Education:  Patient provided with verbal education education regarding OT role/goals/POC and pressure ulcer prevention.  Understanding was verbalized.     Patient left right sidelying with all lines intact and CNA present to perform bath. Communicated to CNA that pt was found L sidelying upon arrival.     History:     Past Medical History:   Diagnosis Date    COPD (chronic obstructive pulmonary disease)      Depression     Hypertension     Neuropathy          Past Surgical History:   Procedure Laterality Date    ABOVE-KNEE AMPUTATION Left 4/20/2024    Procedure: AMPUTATION, ABOVE KNEE;  Surgeon: Serge Brambila Jr., MD;  Location: [facility];  Service: General;  Laterality: Left;    angiogram Bilateral     stents placed in left leg    anterior neck surgery      APPLICATION OF WOUND VACUUM-ASSISTED CLOSURE DEVICE Left 4/20/2024    Procedure: APPLICATION, WOUND VAC;  Surgeon: Serge Brambila Jr., MD;  Location: [facility];  Service: General;  Laterality: Left;    ARTHROTOMY OF ANKLE Left 6/28/2023    Procedure: ARTHROTOMY, ANKLE;  Surgeon: Tom Nichole DPM;  Location: [facility];  Service: Podiatry;  Laterality: Left;    cataracts Left     CHOLECYSTECTOMY      DEBRIDEMENT OF AMPUTATION SITE N/A 4/21/2024    Procedure: DEBRIDEMENT, AMPUTATION SITE;  Surgeon: Serge Brambila Jr., MD;  Location: [facility];  Service: General;  Laterality: N/A;    DEBRIDEMENT OF FOOT Bilateral 3/26/2024    Procedure: DEBRIDEMENT, FOOT;  Surgeon: Tom Nichole DPM;  Location: [facility];  Service: Podiatry;  Laterality: Bilateral;    EYE SURGERY Left     HAND SURGERY Left     broken bone    herina repair      INCISION AND DRAINAGE, LOWER EXTREMITY Left 7/28/2023    Procedure: INCISION AND DRAINAGE, LOWER EXTREMITY;  Surgeon: Avinash Garces MD;  Location: [facility];  Service: Orthopedics;  Laterality: Left;  Supine, vascular bed, bone foam  last case  cysto tubing, laparoscopic trocar, experience, vanc, hemovac drain    KNEE SURGERY Bilateral     PERIPHERAL ANGIOGRAPHY N/A 3/13/2024    Procedure: Peripheral angiography;  Surgeon: Deven Queen MD;  Location: [facility] CATH LAB;  Service: Cardiology;  Laterality: N/A;  MICHELLE ANGIO W/ ANEST.    posterior neck surgery      REPLACEMENT OF WOUND VACUUM-ASSISTED CLOSURE DEVICE Left 4/21/2024    Procedure: REPLACEMENT, WOUND VAC;  Surgeon: Serge Brambila Jr., MD;  Location: [facility];  Service:  General;  Laterality: Left;    SPINE SURGERY      TOTAL REPLACEMENT OF BOTH HIP JOINTS USING COMPUTER-ASSISTED NAVIGATION Bilateral        Time Tracking:     OT Date of Treatment: 04/23/24  OT Start Time: 0935  OT Stop Time: 0950  OT Total Time (min): 15 min    Billable Minutes:Evaluation 15 minutes     4/23/2024

## 2024-04-23 NOTE — PROGRESS NOTES
[facility]                       [address] [address]    PATIENT NAME:       HAWA KUMAR  YOB: 1955  CSN:                [id number]   MRN:                [id number]  ADMIT DATE:         03/15/2024 03:09:00  PHYSICIAN:          Tom Nichole DPM                            PROGRESS NOTE    DATE:  04/23/2024 00:00:00    SUBJECTIVE:  The patient was seen today.  He is doing about the same.  No major   issues with the left stump today.  Pains have been controlled.  No fevers or   chills.    PHYSICAL EXAMINATION:  VITAL SIGNS:  Stable and he is afebrile.    VAC is in place on the left lower extremity.  The right leg and heel continue to   improve.  Still with some serosanguineous exudate, but no evidence of any overt   infection.  Margins are viable.  No palpable pedal pulses.  Some contracture   and rigidity.    ASSESSMENT:    1. Chronic right lower extremity wounds.  2. Status post left AKA.    PLAN:  Continue with current care.  Dressings were changed.  Tentative plans to   formalize the left amputation this week.        ______________________________  Tom Nichole DPM    GAS/AQS  DD:  04/23/2024  Time:  03:05PM  DT:  04/23/2024  Time:  04:03PM  Job #:  [id number]      PROGRESS NOTE

## 2024-04-23 NOTE — CONSULTS
[facility] - 8th Floor Med Surg  Wound Care    Patient Name:  Shaheen Christie   MRN:  [id number]  Date: 4/23/2024  Diagnosis: Osteomyelitis    History:     Past Medical History:   Diagnosis Date    COPD (chronic obstructive pulmonary disease)     Depression     Hypertension     Neuropathy        Social History     Socioeconomic History    Marital status:    Occupational History    Occupation: retired   Tobacco Use    Smoking status: Former     Average packs/day: 0.5 packs/day for 15.0 years (7.5 ttl pk-yrs)     Types: Cigarettes     Start date: 2008    Smokeless tobacco: Never   Substance and Sexual Activity    Alcohol use: Yes     Alcohol/week: 3.0 - 6.0 standard drinks of alcohol     Types: 3 - 6 Cans of beer per week    Drug use: Yes     Types: Marijuana    Sexual activity: Not Currently       Precautions:     Allergies as of 03/15/2024    (No Known Allergies)       [facility] Assessment Details/Treatment   Wound care consulted for Left AKA stump. Family at bedside. s/p L AKA on 4/21, taken back to OR due to bleeding from wound vac. Wound vac dressing is currently on and intact. Discussed with nurse Maurer who has reached out to surgery. Patient refusing for sacral wound vac to be changed today. Lory Mckeon NP was notified. Will try again tomorrow.       04/23/2024

## 2024-04-23 NOTE — PT/OT/SLP EVAL
Physical Therapy Evaluation and Discharge Note    Patient Name:  Shaheen Christie   MRN:  [id number]    Recommendations:     Discharge therapy intensity: No Therapy Indicated   Discharge Equipment Recommendations: none   Barriers to discharge: Ongoing medical needs    Assessment:     Shaheen Christie is a 69 y.o. male admitted with a medical diagnosis of osteomyelitis LLE. S/p left AKA. Patient is a NH resident and is dependent at baseline. Patient currently functioning at his baseline. No therapy indicated. We are signing off.     Recent Surgery: Procedure(s) (LRB):  DEBRIDEMENT, AMPUTATION SITE (N/A)  REPLACEMENT, WOUND VAC (Left) 2 Days Post-Op    Plan:     During this hospitalization, patient does not require further acute PT services.  Please re-consult if situation changes.      Subjective     Chief Complaint: pain  Patient/Family Comments/goals: none  Pain/Comfort:  Pain Rating 1: 8/10  Location 1: hip (left hip and sacrum)  Pain Addressed 1: Reposition, Distraction  Pain Rating Post-Intervention 1: 10/10    Patients cultural, spiritual, Jewish conflicts given the current situation: no    Living Environment:  Nursing home  Prior to admission, patients level of function was dependent.  Equipment used at home: wheelchair, hospital bed, lift device.  DME owned (not currently used): none.  Upon discharge, patient will have assistance from NH staff.    Objective:     Communicated with nurse prior to session.  Patient found supine with telemetry, wound vac, peripheral IV upon PT entry to room.    General Precautions: Standard, fall    Orthopedic Precautions:N/A   Braces: N/A  Respiratory Status: Room air    Exams:  Cognitive Exam:  Patient is oriented to Person  Sensation: -       Intact  RLE ROM: WFL  RLE Strength: Did not accurately assess. Severe weakness present.   LLE ROM: WFL  LLE Strength: Did not accurately assess. Severe weakness present.  Skin integrity: known sacral wound    Functional Mobility:  Bed  Mobility:  Rolling Left: MOD A  Rolling Right: MIN A  Scooting: total assistance  Supine to Sit: total assistance  Sit to Supine: total assistance  Balance: poor    AM-PAC 6 CLICK MOBILITY  Total Score:8     Education Provided:  Role and goals of PT, transfer training, bed mobility, balance training, safety awareness, assistive device, strengthening exercises      Patient left right sidelying with all lines intact, call button in reach, wedge under L side, and CNA present.    GOALS:   Multidisciplinary Problems       Physical Therapy Goals       Not on file                    History:     Past Medical History:   Diagnosis Date    COPD (chronic obstructive pulmonary disease)     Depression     Hypertension     Neuropathy        Past Surgical History:   Procedure Laterality Date    ABOVE-KNEE AMPUTATION Left 4/20/2024    Procedure: AMPUTATION, ABOVE KNEE;  Surgeon: Serge Brambila Jr., MD;  Location: [facility];  Service: General;  Laterality: Left;    angiogram Bilateral     stents placed in left leg    anterior neck surgery      APPLICATION OF WOUND VACUUM-ASSISTED CLOSURE DEVICE Left 4/20/2024    Procedure: APPLICATION, WOUND VAC;  Surgeon: Serge Brambila Jr., MD;  Location: [facility];  Service: General;  Laterality: Left;    ARTHROTOMY OF ANKLE Left 6/28/2023    Procedure: ARTHROTOMY, ANKLE;  Surgeon: Tom Nichole DPM;  Location: [facility] OR;  Service: Podiatry;  Laterality: Left;    cataracts Left     CHOLECYSTECTOMY      DEBRIDEMENT OF AMPUTATION SITE N/A 4/21/2024    Procedure: DEBRIDEMENT, AMPUTATION SITE;  Surgeon: Serge Brambila Jr., MD;  Location: [facility];  Service: General;  Laterality: N/A;    DEBRIDEMENT OF FOOT Bilateral 3/26/2024    Procedure: DEBRIDEMENT, FOOT;  Surgeon: Tom Nichole DPM;  Location: [facility] OR;  Service: Podiatry;  Laterality: Bilateral;    EYE SURGERY Left     HAND SURGERY Left     broken bone    herina repair      INCISION AND DRAINAGE, LOWER EXTREMITY Left 7/28/2023     Procedure: INCISION AND DRAINAGE, LOWER EXTREMITY;  Surgeon: Avinash Garces MD;  Location: [facility];  Service: Orthopedics;  Laterality: Left;  Supine, vascular bed, bone foam  last case  cysto tubing, laparoscopic trocar, experience, vanc, hemovac drain    KNEE SURGERY Bilateral     PERIPHERAL ANGIOGRAPHY N/A 3/13/2024    Procedure: Peripheral angiography;  Surgeon: Deven Queen MD;  Location: [facility] CATH LAB;  Service: Cardiology;  Laterality: N/A;  MICHELLE ANGIO W/ ANEST.    posterior neck surgery      REPLACEMENT OF WOUND VACUUM-ASSISTED CLOSURE DEVICE Left 4/21/2024    Procedure: REPLACEMENT, WOUND VAC;  Surgeon: Serge Brambila Jr., MD;  Location: [facility];  Service: General;  Laterality: Left;    SPINE SURGERY      TOTAL REPLACEMENT OF BOTH HIP JOINTS USING COMPUTER-ASSISTED NAVIGATION Bilateral        Time Tracking:     PT Received On: 04/23/24  PT Start Time: 0940     PT Stop Time: 1000  PT Total Time (min): 20 min     Billable Minutes: Evaluation 20 minutes      04/23/2024

## 2024-04-24 ENCOUNTER — ANESTHESIA (OUTPATIENT)
Dept: SURGERY | Facility: HOSPITAL | Age: 69
DRG: 616 | End: 2024-04-24
Payer: MEDICARE

## 2024-04-24 ENCOUNTER — ANESTHESIA EVENT (OUTPATIENT)
Dept: SURGERY | Facility: HOSPITAL | Age: 69
DRG: 616 | End: 2024-04-24
Payer: MEDICARE

## 2024-04-24 LAB
POCT GLUCOSE: 118 MG/DL (ref 70–110)
POCT GLUCOSE: 141 MG/DL (ref 70–110)
PSYCHE PATHOLOGY RESULT: NORMAL

## 2024-04-24 PROCEDURE — D9220A PRA ANESTHESIA: Mod: CRNA,,,

## 2024-04-24 PROCEDURE — 36000711: Performed by: STUDENT IN AN ORGANIZED HEALTH CARE EDUCATION/TRAINING PROGRAM

## 2024-04-24 PROCEDURE — 27201423 OPTIME MED/SURG SUP & DEVICES STERILE SUPPLY: Performed by: STUDENT IN AN ORGANIZED HEALTH CARE EDUCATION/TRAINING PROGRAM

## 2024-04-24 PROCEDURE — 25000003 PHARM REV CODE 250

## 2024-04-24 PROCEDURE — D9220A PRA ANESTHESIA: Mod: ANES,,, | Performed by: ANESTHESIOLOGY

## 2024-04-24 PROCEDURE — 63600175 PHARM REV CODE 636 W HCPCS: Performed by: ANESTHESIOLOGY

## 2024-04-24 PROCEDURE — 21400001 HC TELEMETRY ROOM

## 2024-04-24 PROCEDURE — 25000003 PHARM REV CODE 250: Performed by: PODIATRIST

## 2024-04-24 PROCEDURE — 71000039 HC RECOVERY, EACH ADD'L HOUR: Performed by: STUDENT IN AN ORGANIZED HEALTH CARE EDUCATION/TRAINING PROGRAM

## 2024-04-24 PROCEDURE — 88307 TISSUE EXAM BY PATHOLOGIST: CPT | Performed by: STUDENT IN AN ORGANIZED HEALTH CARE EDUCATION/TRAINING PROGRAM

## 2024-04-24 PROCEDURE — 99900031 HC PATIENT EDUCATION (STAT)

## 2024-04-24 PROCEDURE — 36000710: Performed by: STUDENT IN AN ORGANIZED HEALTH CARE EDUCATION/TRAINING PROGRAM

## 2024-04-24 PROCEDURE — 94799 UNLISTED PULMONARY SVC/PX: CPT

## 2024-04-24 PROCEDURE — 37000009 HC ANESTHESIA EA ADD 15 MINS: Performed by: STUDENT IN AN ORGANIZED HEALTH CARE EDUCATION/TRAINING PROGRAM

## 2024-04-24 PROCEDURE — 25000003 PHARM REV CODE 250: Performed by: INTERNAL MEDICINE

## 2024-04-24 PROCEDURE — 0Y6D0Z2 DETACHMENT AT LEFT UPPER LEG, MID, OPEN APPROACH: ICD-10-PCS | Performed by: STUDENT IN AN ORGANIZED HEALTH CARE EDUCATION/TRAINING PROGRAM

## 2024-04-24 PROCEDURE — 11000001 HC ACUTE MED/SURG PRIVATE ROOM

## 2024-04-24 PROCEDURE — 63600175 PHARM REV CODE 636 W HCPCS: Performed by: INTERNAL MEDICINE

## 2024-04-24 PROCEDURE — 99900035 HC TECH TIME PER 15 MIN (STAT)

## 2024-04-24 PROCEDURE — 27590 AMPUTATE LEG AT THIGH: CPT | Mod: LT,,, | Performed by: STUDENT IN AN ORGANIZED HEALTH CARE EDUCATION/TRAINING PROGRAM

## 2024-04-24 PROCEDURE — 37000008 HC ANESTHESIA 1ST 15 MINUTES: Performed by: STUDENT IN AN ORGANIZED HEALTH CARE EDUCATION/TRAINING PROGRAM

## 2024-04-24 PROCEDURE — 63600175 PHARM REV CODE 636 W HCPCS

## 2024-04-24 PROCEDURE — 88311 DECALCIFY TISSUE: CPT

## 2024-04-24 PROCEDURE — 71000033 HC RECOVERY, INTIAL HOUR: Performed by: STUDENT IN AN ORGANIZED HEALTH CARE EDUCATION/TRAINING PROGRAM

## 2024-04-24 PROCEDURE — A4216 STERILE WATER/SALINE, 10 ML: HCPCS | Performed by: INTERNAL MEDICINE

## 2024-04-24 RX ORDER — OXYCODONE HYDROCHLORIDE 5 MG/1
5 TABLET ORAL
Status: DISCONTINUED | OUTPATIENT
Start: 2024-04-24 | End: 2024-04-24 | Stop reason: HOSPADM

## 2024-04-24 RX ORDER — KETOROLAC TROMETHAMINE 30 MG/ML
INJECTION, SOLUTION INTRAMUSCULAR; INTRAVENOUS
Status: DISCONTINUED | OUTPATIENT
Start: 2024-04-24 | End: 2024-04-24

## 2024-04-24 RX ORDER — PROPOFOL 10 MG/ML
VIAL (ML) INTRAVENOUS
Status: DISCONTINUED | OUTPATIENT
Start: 2024-04-24 | End: 2024-04-24

## 2024-04-24 RX ORDER — ROCURONIUM BROMIDE 10 MG/ML
INJECTION, SOLUTION INTRAVENOUS
Status: DISCONTINUED | OUTPATIENT
Start: 2024-04-24 | End: 2024-04-24

## 2024-04-24 RX ORDER — ONDANSETRON HYDROCHLORIDE 2 MG/ML
INJECTION, SOLUTION INTRAVENOUS
Status: DISCONTINUED | OUTPATIENT
Start: 2024-04-24 | End: 2024-04-24

## 2024-04-24 RX ORDER — ONDANSETRON HYDROCHLORIDE 2 MG/ML
4 INJECTION, SOLUTION INTRAVENOUS DAILY PRN
Status: DISCONTINUED | OUTPATIENT
Start: 2024-04-24 | End: 2024-04-24 | Stop reason: HOSPADM

## 2024-04-24 RX ORDER — FENTANYL CITRATE 50 UG/ML
INJECTION, SOLUTION INTRAMUSCULAR; INTRAVENOUS
Status: DISCONTINUED | OUTPATIENT
Start: 2024-04-24 | End: 2024-04-24

## 2024-04-24 RX ORDER — MIDAZOLAM HYDROCHLORIDE 1 MG/ML
INJECTION INTRAMUSCULAR; INTRAVENOUS
Status: DISCONTINUED | OUTPATIENT
Start: 2024-04-24 | End: 2024-04-24

## 2024-04-24 RX ORDER — LIDOCAINE HYDROCHLORIDE 20 MG/ML
INJECTION, SOLUTION EPIDURAL; INFILTRATION; INTRACAUDAL; PERINEURAL
Status: DISCONTINUED | OUTPATIENT
Start: 2024-04-24 | End: 2024-04-24

## 2024-04-24 RX ORDER — DEXAMETHASONE SODIUM PHOSPHATE 4 MG/ML
INJECTION, SOLUTION INTRA-ARTICULAR; INTRALESIONAL; INTRAMUSCULAR; INTRAVENOUS; SOFT TISSUE
Status: DISCONTINUED | OUTPATIENT
Start: 2024-04-24 | End: 2024-04-24

## 2024-04-24 RX ORDER — VASOPRESSIN 20 [USP'U]/ML
INJECTION, SOLUTION INTRAMUSCULAR; SUBCUTANEOUS
Status: DISCONTINUED | OUTPATIENT
Start: 2024-04-24 | End: 2024-04-24

## 2024-04-24 RX ORDER — PHENYLEPHRINE HYDROCHLORIDE 10 MG/ML
INJECTION INTRAVENOUS
Status: DISCONTINUED | OUTPATIENT
Start: 2024-04-24 | End: 2024-04-24

## 2024-04-24 RX ORDER — DIPHENHYDRAMINE HYDROCHLORIDE 50 MG/ML
25 INJECTION INTRAMUSCULAR; INTRAVENOUS EVERY 6 HOURS PRN
Status: DISCONTINUED | OUTPATIENT
Start: 2024-04-24 | End: 2024-04-24 | Stop reason: HOSPADM

## 2024-04-24 RX ORDER — HYDROMORPHONE HYDROCHLORIDE 2 MG/ML
0.2 INJECTION, SOLUTION INTRAMUSCULAR; INTRAVENOUS; SUBCUTANEOUS EVERY 5 MIN PRN
Status: COMPLETED | OUTPATIENT
Start: 2024-04-24 | End: 2024-04-24

## 2024-04-24 RX ORDER — HALOPERIDOL 5 MG/ML
0.5 INJECTION INTRAMUSCULAR EVERY 10 MIN PRN
Status: DISCONTINUED | OUTPATIENT
Start: 2024-04-24 | End: 2024-04-24 | Stop reason: HOSPADM

## 2024-04-24 RX ADMIN — PHENYLEPHRINE HYDROCHLORIDE 200 MCG: 10 INJECTION INTRAVENOUS at 12:04

## 2024-04-24 RX ADMIN — HYDROMORPHONE HYDROCHLORIDE 0.2 MG: 2 INJECTION INTRAMUSCULAR; INTRAVENOUS; SUBCUTANEOUS at 02:04

## 2024-04-24 RX ADMIN — PIPERACILLIN SODIUM AND TAZOBACTAM SODIUM 4.5 G: 4; .5 INJECTION, POWDER, LYOPHILIZED, FOR SOLUTION INTRAVENOUS at 07:04

## 2024-04-24 RX ADMIN — PIPERACILLIN SODIUM AND TAZOBACTAM SODIUM 4.5 G: 4; .5 INJECTION, POWDER, LYOPHILIZED, FOR SOLUTION INTRAVENOUS at 12:04

## 2024-04-24 RX ADMIN — LOSARTAN POTASSIUM 100 MG: 50 TABLET, FILM COATED ORAL at 08:04

## 2024-04-24 RX ADMIN — ROCURONIUM BROMIDE 50 MG: 10 SOLUTION INTRAVENOUS at 12:04

## 2024-04-24 RX ADMIN — DULOXETINE HYDROCHLORIDE 30 MG: 30 CAPSULE, DELAYED RELEASE ORAL at 08:04

## 2024-04-24 RX ADMIN — KETOROLAC TROMETHAMINE 30 MG: 30 INJECTION, SOLUTION INTRAMUSCULAR; INTRAVENOUS at 01:04

## 2024-04-24 RX ADMIN — VASOPRESSIN 1 UNITS: 20 INJECTION INTRAVENOUS at 12:04

## 2024-04-24 RX ADMIN — HYDROMORPHONE HYDROCHLORIDE 0.2 MG: 2 INJECTION INTRAMUSCULAR; INTRAVENOUS; SUBCUTANEOUS at 03:04

## 2024-04-24 RX ADMIN — OXYCODONE HYDROCHLORIDE 10 MG: 10 TABLET ORAL at 08:04

## 2024-04-24 RX ADMIN — GABAPENTIN 300 MG: 300 CAPSULE ORAL at 02:04

## 2024-04-24 RX ADMIN — OXYCODONE HYDROCHLORIDE 10 MG: 10 TABLET ORAL at 12:04

## 2024-04-24 RX ADMIN — DOCUSATE SODIUM 100 MG: 100 CAPSULE, LIQUID FILLED ORAL at 08:04

## 2024-04-24 RX ADMIN — DEXAMETHASONE SODIUM PHOSPHATE 4 MG: 4 INJECTION, SOLUTION INTRA-ARTICULAR; INTRALESIONAL; INTRAMUSCULAR; INTRAVENOUS; SOFT TISSUE at 12:04

## 2024-04-24 RX ADMIN — SODIUM CHLORIDE, SODIUM GLUCONATE, SODIUM ACETATE, POTASSIUM CHLORIDE AND MAGNESIUM CHLORIDE: 526; 502; 368; 37; 30 INJECTION, SOLUTION INTRAVENOUS at 12:04

## 2024-04-24 RX ADMIN — SODIUM CHLORIDE, PRESERVATIVE FREE 10 ML: 5 INJECTION INTRAVENOUS at 12:04

## 2024-04-24 RX ADMIN — CILOSTAZOL 50 MG: 50 TABLET ORAL at 08:04

## 2024-04-24 RX ADMIN — GABAPENTIN 300 MG: 300 CAPSULE ORAL at 08:04

## 2024-04-24 RX ADMIN — PIPERACILLIN SODIUM AND TAZOBACTAM SODIUM 4.5 G: 4; .5 INJECTION, POWDER, LYOPHILIZED, FOR SOLUTION INTRAVENOUS at 04:04

## 2024-04-24 RX ADMIN — TAMSULOSIN HYDROCHLORIDE 0.4 MG: 0.4 CAPSULE ORAL at 08:04

## 2024-04-24 RX ADMIN — VANCOMYCIN HYDROCHLORIDE 1000 MG: 1 INJECTION, POWDER, LYOPHILIZED, FOR SOLUTION INTRAVENOUS at 04:04

## 2024-04-24 RX ADMIN — SODIUM CHLORIDE, PRESERVATIVE FREE 10 ML: 5 INJECTION INTRAVENOUS at 07:04

## 2024-04-24 RX ADMIN — LIDOCAINE 5% 1 PATCH: 700 PATCH TOPICAL at 09:04

## 2024-04-24 RX ADMIN — SODIUM CHLORIDE, PRESERVATIVE FREE 10 ML: 5 INJECTION INTRAVENOUS at 04:04

## 2024-04-24 RX ADMIN — GABAPENTIN 600 MG: 300 CAPSULE ORAL at 08:04

## 2024-04-24 RX ADMIN — FENTANYL CITRATE 50 MCG: 50 INJECTION, SOLUTION INTRAMUSCULAR; INTRAVENOUS at 12:04

## 2024-04-24 RX ADMIN — OXYCODONE HYDROCHLORIDE 10 MG: 10 TABLET ORAL at 07:04

## 2024-04-24 RX ADMIN — FERROUS SULFATE TAB 325 MG (65 MG ELEMENTAL FE) 1 EACH: 325 (65 FE) TAB at 08:04

## 2024-04-24 RX ADMIN — OXYCODONE HYDROCHLORIDE 10 MG: 10 TABLET ORAL at 04:04

## 2024-04-24 RX ADMIN — SODIUM CHLORIDE, PRESERVATIVE FREE 10 ML: 5 INJECTION INTRAVENOUS at 11:04

## 2024-04-24 RX ADMIN — SUGAMMADEX 200 MG: 100 INJECTION, SOLUTION INTRAVENOUS at 01:04

## 2024-04-24 RX ADMIN — PROPOFOL 100 MG: 10 INJECTION, EMULSION INTRAVENOUS at 12:04

## 2024-04-24 RX ADMIN — VANCOMYCIN HYDROCHLORIDE 1000 MG: 1 INJECTION, POWDER, LYOPHILIZED, FOR SOLUTION INTRAVENOUS at 08:04

## 2024-04-24 RX ADMIN — MIDAZOLAM HYDROCHLORIDE 2 MG: 1 INJECTION, SOLUTION INTRAMUSCULAR; INTRAVENOUS at 12:04

## 2024-04-24 RX ADMIN — LIDOCAINE HYDROCHLORIDE 80 MG: 20 INJECTION, SOLUTION EPIDURAL; INFILTRATION; INTRACAUDAL; PERINEURAL at 12:04

## 2024-04-24 RX ADMIN — THERA TABS 1 TABLET: TAB at 08:04

## 2024-04-24 RX ADMIN — AMLODIPINE BESYLATE 10 MG: 5 TABLET ORAL at 09:04

## 2024-04-24 RX ADMIN — PIPERACILLIN SODIUM AND TAZOBACTAM SODIUM 4.5 G: 4; .5 INJECTION, POWDER, LYOPHILIZED, FOR SOLUTION INTRAVENOUS at 11:04

## 2024-04-24 RX ADMIN — FENTANYL CITRATE 50 MCG: 50 INJECTION, SOLUTION INTRAMUSCULAR; INTRAVENOUS at 01:04

## 2024-04-24 RX ADMIN — ONDANSETRON 4 MG: 2 INJECTION INTRAMUSCULAR; INTRAVENOUS at 01:04

## 2024-04-24 RX ADMIN — VASOPRESSIN 1 UNITS: 20 INJECTION INTRAVENOUS at 01:04

## 2024-04-24 NOTE — PROGRESS NOTES
[facility]                       [address] [address]    PATIENT NAME:       HAWA KUMAR  YOB: 1955  CSN:                [id number]   MRN:                [id number]  ADMIT DATE:         03/15/2024 03:09:00  PHYSICIAN:          Tom Nichole DPM                            PROGRESS NOTE    DATE:  04/24/2024 00:00:00    SUBJECTIVE:  The patient was seen today.  Tentative plans are for formalization   of his AKA tomorrow.  Otherwise, he is doing about the same.  No major issues   reported today.  No fevers.    PHYSICAL EXAMINATION:  VITAL SIGNS:  Stable.  He is afebrile.    His labs reviewed, nothing posted for today.    VAC in place left lower extremity.  Right foot dressings are intact and dry.    Wounds are stable.  Continued decrease in size, but slowly.  Some exudate   persists.  No fluctuance.  No crepitation.  No bone exposure.    ASSESSMENT:    1. Right lower extremity wounds, currently slowly improving.  2. Status post left above-knee amputation.    PLAN:  Continue with current care.        ______________________________  Tom Nichole DPM    GAS/AQS  DD:  04/24/2024  Time:  01:51PM  DT:  04/24/2024  Time:  04:44PM  Job #:  [id number]      PROGRESS NOTE

## 2024-04-24 NOTE — PROGRESS NOTES
Infectious Diseases Progress Note  69-year-old male, nursing home resident, with past medical history of HTN, COPD, DM type 2, with neuropathy, is admitted to [facility] on 03/15/2024 sent via EMS for sacral wound evaluation, apparently diagnosed with osteomyelitis involving the sacrum on 02/27/2024 with plan to be admitted to LTAC which had not been successful as an outpatient.  On presentation he was noted to have no fevers and no leukocytosis, anemic with low albumin.  Blood cultures have been negative.  Review of his records show a 02/08/2024 left leg wound culture with Pseudomonas, Providencia, ESBL Proteus and 11/30/2023 sacral wound culture with MRSA, Providencia, Enterococcus and Pseudomonas.  2/27 MRI of the pelvis shows osteomyelitis involving the sacrococcygeal bone as well as right trochanteric bursitis which may be septic.  He was seen by surgery team with no plan for surgical intervention.    He is on antibiotic coverage with vancomycin and Zosyn.     Subjective:  Lying in bed in no acute distress. No new complaints voiced.  Low-grade fevers noted    Past Medical History:   Diagnosis Date    COPD (chronic obstructive pulmonary disease)     Depression     Hypertension     Neuropathy      Past Surgical History:   Procedure Laterality Date    ABOVE-KNEE AMPUTATION Left 4/20/2024    Procedure: AMPUTATION, ABOVE KNEE;  Surgeon: Serge Brambila Jr., MD;  Location: [facility];  Service: General;  Laterality: Left;    angiogram Bilateral     stents placed in left leg    anterior neck surgery      APPLICATION OF WOUND VACUUM-ASSISTED CLOSURE DEVICE Left 4/20/2024    Procedure: APPLICATION, WOUND VAC;  Surgeon: Serge Brambila Jr., MD;  Location: [facility] OR;  Service: General;  Laterality: Left;    ARTHROTOMY OF ANKLE Left 6/28/2023    Procedure: ARTHROTOMY, ANKLE;  Surgeon: Tom Nichole DPM;  Location: [facility] OR;  Service: Podiatry;  Laterality: Left;    cataracts Left      CHOLECYSTECTOMY      DEBRIDEMENT OF AMPUTATION SITE N/A 4/21/2024    Procedure: DEBRIDEMENT, AMPUTATION SITE;  Surgeon: Serge Brambila Jr., MD;  Location: [facility] OR;  Service: General;  Laterality: N/A;    DEBRIDEMENT OF FOOT Bilateral 3/26/2024    Procedure: DEBRIDEMENT, FOOT;  Surgeon: Tom Nichole DPM;  Location: [facility] OR;  Service: Podiatry;  Laterality: Bilateral;    EYE SURGERY Left     HAND SURGERY Left     broken bone    herina repair      INCISION AND DRAINAGE, LOWER EXTREMITY Left 7/28/2023    Procedure: INCISION AND DRAINAGE, LOWER EXTREMITY;  Surgeon: Avinash Garces MD;  Location: [facility] OR;  Service: Orthopedics;  Laterality: Left;  Supine, vascular bed, bone foam  last case  cysto tubing, laparoscopic trocar, experience, vanc, hemovac drain    KNEE SURGERY Bilateral     PERIPHERAL ANGIOGRAPHY N/A 3/13/2024    Procedure: Peripheral angiography;  Surgeon: Deven Queen MD;  Location: [facility] CATH LAB;  Service: Cardiology;  Laterality: N/A;  MICHELLE ANGIO W/ ANEST.    posterior neck surgery      REPLACEMENT OF WOUND VACUUM-ASSISTED CLOSURE DEVICE Left 4/21/2024    Procedure: REPLACEMENT, WOUND VAC;  Surgeon: Serge Brambila Jr., MD;  Location: [facility];  Service: General;  Laterality: Left;    SPINE SURGERY      TOTAL REPLACEMENT OF BOTH HIP JOINTS USING COMPUTER-ASSISTED NAVIGATION Bilateral      Social History     Socioeconomic History    Marital status:    Occupational History    Occupation: retired   Tobacco Use    Smoking status: Former     Average packs/day: 0.5 packs/day for 15.0 years (7.5 ttl pk-yrs)     Types: Cigarettes     Start date: 2008    Smokeless tobacco: Never   Substance and Sexual Activity    Alcohol use: Yes     Alcohol/week: 3.0 - 6.0 standard drinks of alcohol     Types: 3 - 6 Cans of beer per week    Drug use: Yes     Types: Marijuana    Sexual activity: Not Currently       ROS  Constitutional:  Positive for malaise/fatigue.   HENT: Negative.     Respiratory: Negative.      Gastrointestinal: Negative.    Genitourinary: Negative.    Musculoskeletal: Positive for L knee pain   Skin: Multiple pressure wounds   Neurological:  Positive for weakness.   Endo/Heme/Allergies: Negative.    Psychiatric/Behavioral: Negative.     All other Systems review done and negative.    Review of patient's allergies indicates:  No Known Allergies      Scheduled Meds:  Current Facility-Administered Medications   Medication Dose Route Frequency    amLODIPine  10 mg Oral Daily    cilostazoL  50 mg Oral BID    docusate sodium  100 mg Oral BID    DULoxetine  30 mg Oral Daily    ferrous sulfate  1 tablet Oral Daily    gabapentin  300 mg Oral TID    gabapentin  600 mg Oral QHS    LIDOcaine (PF) 10 mg/ml (1%)  1 mL Other Once    LIDOcaine  1 patch Transdermal Q24H    losartan  100 mg Oral Daily    multivitamin  1 tablet Oral Daily    oxyCODONE  10 mg Oral Q4H    piperacillin-tazobactam (Zosyn) IV (PEDS and ADULTS) (extended infusion is not appropriate)  4.5 g Intravenous Q8H    polyethylene glycol  17 g Oral Daily    sodium chloride 0.9%  10 mL Intravenous Q6H    tamsulosin  0.4 mg Oral Daily    vancomycin (VANCOCIN) IV (PEDS and ADULTS)  1,000 mg Intravenous Q18H     Continuous Infusions:  Current Facility-Administered Medications   Medication Dose Route Frequency Last Rate Last Admin     PRN Meds:    Current Facility-Administered Medications:     0.9%  NaCl infusion (for blood administration), , Intravenous, Q24H PRN    0.9%  NaCl infusion (for blood administration), , Intravenous, Q24H PRN    0.9%  NaCl infusion (for blood administration), , Intravenous, Q24H PRN    0.9%  NaCl infusion (for blood administration), , Intravenous, Q24H PRN    0.9%  NaCl infusion (for blood administration), , Intravenous, Q24H PRN    acetaminophen, 650 mg, Oral, Q6H PRN    albuterol, 2 puff, Inhalation, QID PRN    aluminum-magnesium hydroxide-simethicone, 15 mL, Oral, Q6H PRN    HYDROcodone-acetaminophen, 1 tablet, Oral, Q4H PRN     "melatonin, 6 mg, Oral, Nightly PRN    simethicone, 80 mg, Oral, Q6H PRN    Flushing PICC/Midline Protocol, , , Until Discontinued **AND** sodium chloride 0.9%, 10 mL, Intravenous, Q6H **AND** sodium chloride 0.9%, 10 mL, Intravenous, PRN    Pharmacy to dose Vancomycin consult, , , Once **AND** vancomycin - pharmacy to dose, , Intravenous, pharmacy to manage frequency      Objective:  BP (!) 126/57   Pulse (!) 123   Temp 97.5 °F (36.4 °C) (Oral)   Resp 18   Ht 5' 9.02" (1.753 m)   Wt 68 kg (150 lb)   SpO2 (!) 89%   BMI 22.14 kg/m²     Physical Exam:   Physical Exam  Vitals reviewed.   Constitutional:       General: He is not in acute distress.  HENT:      Head: Normocephalic and atraumatic.   Cardiovascular:      Rate and Rhythm: Normal rate and regular rhythm.      Heart sounds: Normal heart sounds.   Pulmonary:      Effort: Pulmonary effort is normal. No respiratory distress.      Breath sounds: Normal breath sounds.   Abdominal:      General: Bowel sounds are normal. There is no distension.      Palpations: Abdomen is soft.      Tenderness: There is no abdominal tenderness.   Musculoskeletal:         General: No deformity.      Cervical back: Neck supple.   Skin:     Findings: No erythema or rash.      Comments: Wound vac to sacrum  Neurological:      Mental Status: He is alert and oriented to person, place, and time.   Psychiatric:      Comments: Calm and cooperative     Imaging  Imaging Results    None          Lab Review   Recent Results (from the past 24 hour(s))   POCT glucose    Collection Time: 04/24/24 10:24 AM   Result Value Ref Range    POCT Glucose 118 (H) 70 - 110 mg/dL   POCT glucose    Collection Time: 04/24/24  4:26 PM   Result Value Ref Range    POCT Glucose 141 (H) 70 - 110 mg/dL           Assessment/Plan:  1. Chronic sacral pressure wound with osteomyelitis  2. History of polymicrobial sacral wound culture - MRSA, Providencia, Pseudomonas, Enterococcus  3.  Chronic left lower extremity " wound with history of polymicrobial wound culture-Pseudomonas, ESBL Proteus, Providencia  4. Multiple pressure wounds  5.  Diabetes type 2  6.  Anemia  7.  Protein calorie malnutrition   8.  L knee pain     -Continue Vancomycin and Zosyn, plan end date of 04/26, following inflammatory markers  -Low grade fevers noted without leukocytosis, follow  -CT L knee without contrast today 4/10 with no joint effusion, nonfocal soft tissue swelling, severe bone demineralization, arthritis  -Seen by Ortho, inputs noted. S/P L knee aspiration on 3/26   -3/26 left heel tissue cultures with few ESBL Proteus mirabilis  -4/9 ESR 76 from 29 and CRP 72.10 from 39.20  -3/15 blood cultures negative  -2/8 left leg wound cultures with many Pseudomonas, Providencia, Proteus  -11/30/2023 sacral wound with many Providencia, MRSA, Enterococcus, Pseudomonas  -Pt c/o severe L knee pain and wants L AKA. Surgery re-consulted, inputs noted including plans for L AKA in am 4/21  -Podiatry on board s/p debridement 3/26, cultures revealed ESBL Proteus  -We will continue aggressive wound care per Podiatry and the wound care team  -Discussed with patient and nursing staff. Case management working on placement. Disposition per primary team

## 2024-04-24 NOTE — ANESTHESIA PREPROCEDURE EVALUATION
04/24/2024  Shaheen Christie is a 69 y.o., male.    Patient with severe PVD; S/P AKA, now for formal closure of AKA. Patient has been transfused this admission.    Pre-op Assessment    I have reviewed the Patient Summary Reports.     I have reviewed the Nursing Notes. I have reviewed the NPO Status.   I have reviewed the Medications.     Review of Systems  Anesthesia Hx:  No problems with previous Anesthesia                Social:  Smoker       Hematology/Oncology:       -- Anemia (Last Hgb 9.3 4/22):                                  Cardiovascular:  Exercise tolerance: poor   Hypertension          PVD    ECG has been reviewed.                          Pulmonary:   COPD                     Musculoskeletal:  Arthritis                   Physical Exam  General: Alert, Cooperative and Oriented    Airway:  Mallampati: II   Mouth Opening: Normal  TM Distance: Normal  Tongue: Normal  Neck ROM: Normal ROM  Intubation     Date/Time: 4/21/2024 6:42 PM     Performed by: Gelacio Person CRNA  Authorized by: Bud Higginbotham Jr., MD    Intubation:     Induction:  Intravenous    Intubated:  Postinduction    Mask Ventilation:  Easy mask    Attempts:  1    Attempted By:  CRNA    Method of Intubation:  Direct    Blade:  Phillip 2    Laryngeal View Grade: Grade I - full view of cords      Difficult Airway Encountered?: No      Complications:  None    Airway Device:  Oral endotracheal tube    Airway Device Size:  7.5    Style/Cuff Inflation:  Cuffed (inflated to minimal occlusive pressure)    Tube secured:  22    Secured at:  The lips    Placement Verified By:  Capnometry    Complicating Factors:  None    Findings Post-Intubation:  BS equal bilateral    Dental:  Edentulous        Anesthesia Plan  Type of Anesthesia, risks & benefits discussed:    Anesthesia Type: Gen ETT  Intra-op Monitoring Plan: Standard ASA Monitors  Post Op  Pain Control Plan: multimodal analgesia  Induction:  IV  Airway Plan: Direct, Post-Induction  Informed Consent: Informed consent signed with the Patient and all parties understand the risks and agree with anesthesia plan.  All questions answered. Patient consented to blood products? Yes  ASA Score: 3  Day of Surgery Review of History & Physical: H&P Update referred to the surgeon/provider.I have interviewed and examined the patient. I have reviewed the patient's H&P dated: There are no significant changes.     Ready For Surgery From Anesthesia Perspective.     .

## 2024-04-24 NOTE — TRANSFER OF CARE
"Anesthesia Transfer of Care Note    Patient: Shaheen Christie    Procedure(s) Performed: Procedure(s) (LRB):  AMPUTATION, ABOVE KNEE (formalization) (Left)    Patient location: PACU    Anesthesia Type: general    Transport from OR: Transported from OR on room air with adequate spontaneous ventilation    Post pain: adequate analgesia    Post assessment: no apparent anesthetic complications    Post vital signs: stable    Level of consciousness: awake and alert    Nausea/Vomiting: no nausea/vomiting    Complications: none    Transfer of care protocol was followed      Last vitals: Visit Vitals  /61   Pulse 86   Temp 36.8 °C (98.3 °F) (Oral)   Resp 15   Ht 5' 9.02" (1.753 m)   Wt 68 kg (150 lb)   SpO2 (!) 92%   BMI 22.14 kg/m²     "

## 2024-04-24 NOTE — PROGRESS NOTES
[facility] - 8th Floor [facility]  Progress Note    Patient Name: Shaheen Christie  MRN: [id number]  Patient Class: IP- Inpatient   Admission Date: 3/15/2024  Length of Stay: 40 days  Attending Physician: Enzo Thibodeaux MD  Primary Care Provider: Viktor Russ MD        Subjective:     Principal Problem:Osteomyelitis    Interval History:  Patient taken to the OR due to leakage from the wound back.  Been readjusted and no more leakage.  Infectious Disease wants to continue IV antibiotics for 4 more day.    Review of Systems   All other systems reviewed and are negative.    Objective:     Vital Signs (Most Recent):  Temp: 97.5 °F (36.4 °C) (04/24/24 0446)  Pulse: 76 (04/24/24 0446)  Resp: 18 (04/24/24 0446)  BP: (!) 109/58 (04/24/24 0446)  SpO2: (!) 94 % (04/24/24 0446) Vital Signs (24h Range):  Temp:  [97.5 °F (36.4 °C)-100.2 °F (37.9 °C)] 97.5 °F (36.4 °C)  Pulse:  [76-95] 76  Resp:  [15-20] 18  SpO2:  [91 %-94 %] 94 %  BP: (109-155)/(44-69) 109/58     Weight: 68 kg (150 lb)  Body mass index is 22.14 kg/m².    Intake/Output Summary (Last 24 hours) at 4/24/2024 0611  Last data filed at 4/24/2024 0551  Gross per 24 hour   Intake 1280 ml   Output 4050 ml   Net -2770 ml      Physical Exam  HENT:      Head: Normocephalic and atraumatic.      Right Ear: External ear normal.      Left Ear: External ear normal.      Mouth/Throat:      Mouth: Mucous membranes are dry.   Cardiovascular:      Rate and Rhythm: Normal rate and regular rhythm.      Pulses: Normal pulses.      Heart sounds: Normal heart sounds.   Abdominal:      General: Bowel sounds are normal.      Palpations: Abdomen is soft.   Musculoskeletal:      Cervical back: Neck supple.   Skin:     General: Skin is warm and dry.   Neurological:      Mental Status: He is alert. Mental status is at baseline.   Psychiatric:         Mood and Affect: Mood normal.           Overview/Hospital Course:  Repeat blood work for tomorrow  "continue pain management and wound care.  Plan will be to discharge him in about 4 days back to the nursing home.    Significant Labs: All pertinent labs within the past 24 hours have been reviewed.  BMP: No results for input(s): "GLU", "NA", "K", "CL", "CO2", "BUN", "CREATININE", "CALCIUM", "MG" in the last 48 hours.  CBC: No results for input(s): "WBC", "HGB", "HCT", "PLT" in the last 48 hours.    Significant Imaging: I have reviewed all pertinent imaging results/findings within the past 24 hours.    Assessment/Plan:      Active Diagnoses:    Diagnosis Date Noted POA    PRINCIPAL PROBLEM:  Osteomyelitis [M86.9] 03/15/2024 Yes    Knee effusion, left [M25.462] 03/31/2024 No    Pressure injury of sacral region, stage 4 [L89.154] 03/19/2024 Yes    Skin ulcer of right lower leg [L97.919] 03/19/2024 Yes    Skin ulcer of left lower leg [L97.929] 03/19/2024 Yes    Unstageable pressure ulcer of right heel [L89.610] 03/19/2024 Yes    Unstageable pressure ulcer of left heel [L89.620] 03/19/2024 Yes      Problems Resolved During this Admission:     VTE Risk Mitigation (From admission, onward)      None               Enzo Thibodeaux MD  Department of Hospital Medicine   [facility] - 8th Floor Med Surg    "

## 2024-04-24 NOTE — PROGRESS NOTES
Met patient in his room today for wound Vac change to sacrum, he states he does not want it changed today due to pain to left lower extremity with repositioning. States he will allow for it to be changed tomorrow.   He also refused the vac change on yesterday 4/23/24.  No acute distress at present complains of phantom pain.

## 2024-04-24 NOTE — PROGRESS NOTES
Acute Care Surgery   Progress Note  Admit Date: 3/15/2024  HD#40  POD#3 Days Post-Op    Subjective:   Interval history:  s/p L AKA on 4/21, taken back to OR due to bleeding from wound vac.    Afebrile, soft BP this morning, otherwise vitals stable  LLE pain was bothering pt yesterday, but has since improved  No other complaints    Home Meds:  Current Outpatient Medications   Medication Instructions    acetaminophen (TYLENOL) 650 mg, Oral, Every 6 hours PRN    albuterol (PROVENTIL/VENTOLIN HFA) 90 mcg/actuation inhaler Inhalation, 4 times daily PRN    aluminum & magnesium hydroxide-simethicone (MYLANTA MAX STRENGTH) 400-400-40 mg/5 mL suspension Oral, Every 6 hours PRN    amLODIPine (NORVASC) 10 mg, Oral, Daily    aspirin 81 mg, Oral, Daily    cilostazoL (PLETAL) 50 mg, Oral, 2 times daily    clopidogreL (PLAVIX) 75 mg, Oral, Daily    dextrose 5 % in water (D5W) PgBk 100 mL with piperacillin-tazobactam 4.5 gram SolR 4.5 g 4.5 g, Intravenous, Every 8 hours    docusate sodium (COLACE) 100 mg, Oral, 2 times daily    DULoxetine (CYMBALTA) 30 mg, Oral, Daily    enoxaparin (LOVENOX) 40 mg, Subcutaneous, Every 12 hours    ferrous sulfate 325 mg, Oral, Daily    gabapentin (NEURONTIN) 300 mg, Oral, 3 times daily    gabapentin (NEURONTIN) 600 mg, Oral, Nightly    LIDOcaine (LIDODERM) 5 % 1 patch, Transdermal, Every 24 hours (non-standard times), Remove & Discard patch within 12 hours or as directed by MD    losartan (COZAAR) 100 mg, Oral, Daily    melatonin (MELATIN) 6 mg, Oral, Nightly PRN    multivitamin (THERAGRAN) per tablet 1 tablet, Oral, Daily    oxyCODONE (ROXICODONE) 10 mg, Oral, Every 4 hours    polyethylene glycol (GLYCOLAX) 17 g, Oral, Daily    simethicone (MYLICON) 80 mg, Oral, Every 6 hours PRN    vancomycin HCl in 5 % dextrose (VANCOMYCIN IN DEXTROSE 5 %) 1 gram/250 mL Soln 1,000 mg, Intravenous, Every 12 hours      Scheduled Meds:  Current Facility-Administered Medications   Medication Dose Route Frequency     amLODIPine  10 mg Oral Daily    cilostazoL  50 mg Oral BID    docusate sodium  100 mg Oral BID    DULoxetine  30 mg Oral Daily    ferrous sulfate  1 tablet Oral Daily    gabapentin  300 mg Oral TID    gabapentin  600 mg Oral QHS    LIDOcaine (PF) 10 mg/ml (1%)  1 mL Other Once    LIDOcaine  1 patch Transdermal Q24H    losartan  100 mg Oral Daily    multivitamin  1 tablet Oral Daily    oxyCODONE  10 mg Oral Q4H    piperacillin-tazobactam (Zosyn) IV (PEDS and ADULTS) (extended infusion is not appropriate)  4.5 g Intravenous Q8H    polyethylene glycol  17 g Oral Daily    sodium chloride 0.9%  10 mL Intravenous Q6H    tamsulosin  0.4 mg Oral Daily    vancomycin (VANCOCIN) IV (PEDS and ADULTS)  1,000 mg Intravenous Q18H     Continuous Infusions:  Current Facility-Administered Medications   Medication Dose Route Frequency Last Rate Last Admin     PRN Meds:  Current Facility-Administered Medications:     0.9%  NaCl infusion (for blood administration), , Intravenous, Q24H PRN    0.9%  NaCl infusion (for blood administration), , Intravenous, Q24H PRN    0.9%  NaCl infusion (for blood administration), , Intravenous, Q24H PRN    0.9%  NaCl infusion (for blood administration), , Intravenous, Q24H PRN    0.9%  NaCl infusion (for blood administration), , Intravenous, Q24H PRN    acetaminophen, 650 mg, Oral, Q6H PRN    albuterol, 2 puff, Inhalation, QID PRN    aluminum-magnesium hydroxide-simethicone, 15 mL, Oral, Q6H PRN    HYDROcodone-acetaminophen, 1 tablet, Oral, Q4H PRN    melatonin, 6 mg, Oral, Nightly PRN    simethicone, 80 mg, Oral, Q6H PRN    Flushing PICC/Midline Protocol, , , Until Discontinued **AND** sodium chloride 0.9%, 10 mL, Intravenous, Q6H **AND** sodium chloride 0.9%, 10 mL, Intravenous, PRN    Pharmacy to dose Vancomycin consult, , , Once **AND** vancomycin - pharmacy to dose, , Intravenous, pharmacy to manage frequency     Objective:     VITAL SIGNS: 24 HR MIN & MAX LAST   Temp  Min: 97.5 °F (36.4 °C)   "Max: 100.2 °F (37.9 °C)  98 °F (36.7 °C)   BP  Min: 109/59  Max: 155/69  (!) 109/59    Pulse  Min: 76  Max: 93  81    Resp  Min: 14  Max: 20  18    SpO2  Min: 91 %  Max: 95 %  95 %      HT: 5' 9.02" (175.3 cm)  WT: 68 kg (150 lb)  BMI: 22.1     Intake/output:  Intake/Output - Last 3 Shifts         04/22 0700 04/23 0659 04/23 0700 04/24 0659 04/24 0700 04/25 0659    P.O. 150 1280     Blood       IV Piggyback 200      Total Intake(mL/kg) 350 (5.1) 1280 (18.8)     Urine (mL/kg/hr) 300 (0.2) 3000 (1.8)     Other 350 725     Stool 0 0     Total Output 650 3725     Net -300 -2445            Urine Occurrence 600 x 1 x     Stool Occurrence 0 x 3 x             Intake/Output Summary (Last 24 hours) at 4/24/2024 0853  Last data filed at 4/24/2024 0551  Gross per 24 hour   Intake 1280 ml   Output 2725 ml   Net -1445 ml           Lines/drains/airway:  PICC Double Lumen 03/28/24 1244 right basilic (Active)   $ PICC Line Charges (Upon insertion) Bedside Insertion Performed Pt < 5 Years Old (no subq port/pump or image guidance);Catheter - Double Lumen (Supply) 04/06/24 0929   Line Necessity Review Poor venous access 04/15/24 1600   Site Assessment No redness;No swelling;No warmth 04/20/24 0400   Extremity Assessment Distal to IV No abnormal discoloration;No redness;No swelling;No warmth 04/20/24 0400   Line Securement Device Secured with sutureless device 04/20/24 0400   Dressing Type CHG impregnated dressing/sponge 04/20/24 0400   Dressing Status Clean;Dry;Intact 04/20/24 0400   Dressing Intervention Integrity maintained 04/20/24 0400   Date on Dressing 04/17/24 04/20/24 0400   Dressing Due to be Changed 04/24/24 04/20/24 0400   Distal Patency/Care flushed w/o difficulty 04/20/24 0400   Proximal 1 Patency/Care flushed w/o difficulty 04/20/24 0400   Number of days: 22       Physical examination:  Gen: NAD, AAOx3, answering questions appropriately  HEENT: moist mucous membranes  CV: RR  Resp: no increased work of breathing  Abd: " non-distended  Ext: L AKA with wound vac in place x2 good seal  Neuro: CN II-XII grossly intact    Labs:  No new x24h    Imaging:  CT Knee Without Contrast Left   Final Result      1. No significant joint effusion   2. Nonfocal soft tissue swelling   3. Severe bony demineralization   4. Arthritis   5. The preliminary and final reports are concordant.         Electronically signed by: Allyson Pierce   Date:    04/10/2024   Time:    07:35      X-Ray Knee Complete 4 or More Views Left   Final Result      The bones are demineralized.      Degenerative change at the knee with joint effusion.         Electronically signed by: Allyson Pierce   Date:    03/30/2024   Time:    11:16      X-Ray Chest 1 View for Line/Tube Placement   Final Result      Right PICC tip overlies the mid SVC.         Electronically signed by: Gentry Welsh   Date:    03/28/2024   Time:    13:26      X-Ray Tibia Fibula 2 View Right   Final Result      X-Ray Tibia Fibula 2 View Left   Final Result      X-Ray Calcaneus 2 View Right   Final Result      Diffuse mottled appearance to all of the tarsal bones as well as the calcaneus with mild periosteal elevation seen along the posteroinferior aspect of the calcaneus.  Osteomyelitis should be excluded.         Electronically signed by: Mak Merchant   Date:    03/19/2024   Time:    17:42      X-Ray Calcaneus 2 View Left   Final Result         I have reviewed all pertinent imaging results/findings within the past 24 hours.    Assessment & Plan:   69M PMH COPD, HTN, DM, neuropathy, [facility] EMS, adm 3/15/24 with sacral wound with osteomyelitis, chronic BLE wounds, pain to LLE. Surgery consulted for L AKA. Now s/p L husam AKA 4/20 c/b bleeding s/p return to OR for hemostasis on 4/21.    - Plan for return to OR tomorrow for formalization of AKA  - Continue wound vac to reduce edema prior to formalization  - wound care to attempt vac change today. Will follow up and  attempt to meet with them to look at amputation site vs pictures after vac change  - Regular diet ok from surgery standpoint  - NPO after midnight tonight for surgery tomorrow  - Daily H/H  - Remainder of care per primary    Karuna Gaona MD  [employer] Surgery PGY4  04/24/2024

## 2024-04-24 NOTE — ANESTHESIA PROCEDURE NOTES
Intubation    Date/Time: 4/24/2024 12:33 PM    Performed by: Vicky Boyce CRNA  Authorized by: Parish Montes MD    Intubation:     Induction:  Intravenous    Intubated:  Postinduction    Mask Ventilation:  N/a    Attempts:  1    Attempted By:  CRNA    Method of Intubation:  Direct    Blade:  Phillip 2    Laryngeal View Grade: Grade I - full view of cords      Difficult Airway Encountered?: No      Complications:  None    Airway Device:  Oral endotracheal tube    Airway Device Size:  7.5    Style/Cuff Inflation:  Cuffed (inflated to minimal occlusive pressure)    Inflation Amount (mL):  5    Tube secured:  23    Secured at:  The lips    Placement Verified By:  Capnometry    Complicating Factors:  None    Findings Post-Intubation:  BS equal bilateral and atraumatic/condition of teeth unchanged

## 2024-04-25 LAB
ABO + RH BLD: NORMAL
ALBUMIN SERPL-MCNC: 2.2 G/DL (ref 3.4–4.8)
ALBUMIN/GLOB SERPL: 0.8 RATIO (ref 1.1–2)
ALP SERPL-CCNC: 80 UNIT/L (ref 40–150)
ALT SERPL-CCNC: 15 UNIT/L (ref 0–55)
AST SERPL-CCNC: 17 UNIT/L (ref 5–34)
BASOPHILS # BLD AUTO: 0.07 X10(3)/MCL
BASOPHILS NFR BLD AUTO: 0.7 %
BILIRUB SERPL-MCNC: 0.3 MG/DL
BLD PROD TYP BPU: NORMAL
BLOOD UNIT EXPIRATION DATE: NORMAL
BLOOD UNIT TYPE CODE: 6200
BUN SERPL-MCNC: 8.4 MG/DL (ref 8.4–25.7)
CALCIUM SERPL-MCNC: 8.1 MG/DL (ref 8.8–10)
CHLORIDE SERPL-SCNC: 103 MMOL/L (ref 98–107)
CO2 SERPL-SCNC: 25 MMOL/L (ref 23–31)
CREAT SERPL-MCNC: 0.67 MG/DL (ref 0.73–1.18)
CROSSMATCH INTERPRETATION: NORMAL
DISPENSE STATUS: NORMAL
EOSINOPHIL # BLD AUTO: 0.07 X10(3)/MCL (ref 0–0.9)
EOSINOPHIL NFR BLD AUTO: 0.7 %
ERYTHROCYTE [DISTWIDTH] IN BLOOD BY AUTOMATED COUNT: 16.5 % (ref 11.5–17)
GFR SERPLBLD CREATININE-BSD FMLA CKD-EPI: >60 MLS/MIN/1.73/M2
GLOBULIN SER-MCNC: 2.9 GM/DL (ref 2.4–3.5)
GLUCOSE SERPL-MCNC: 110 MG/DL (ref 82–115)
HCT VFR BLD AUTO: 27.5 % (ref 42–52)
HGB BLD-MCNC: 8.6 G/DL (ref 14–18)
IMM GRANULOCYTES # BLD AUTO: 0.03 X10(3)/MCL (ref 0–0.04)
IMM GRANULOCYTES NFR BLD AUTO: 0.3 %
LYMPHOCYTES # BLD AUTO: 1.1 X10(3)/MCL (ref 0.6–4.6)
LYMPHOCYTES NFR BLD AUTO: 11.7 %
MCH RBC QN AUTO: 27.1 PG (ref 27–31)
MCHC RBC AUTO-ENTMCNC: 31.3 G/DL (ref 33–36)
MCV RBC AUTO: 86.8 FL (ref 80–94)
MONOCYTES # BLD AUTO: 1.47 X10(3)/MCL (ref 0.1–1.3)
MONOCYTES NFR BLD AUTO: 15.6 %
NEUTROPHILS # BLD AUTO: 6.69 X10(3)/MCL (ref 2.1–9.2)
NEUTROPHILS NFR BLD AUTO: 71 %
NRBC BLD AUTO-RTO: 0 %
PLATELET # BLD AUTO: 348 X10(3)/MCL (ref 130–400)
PMV BLD AUTO: 8.3 FL (ref 7.4–10.4)
POTASSIUM SERPL-SCNC: 4 MMOL/L (ref 3.5–5.1)
PREALB SERPL-MCNC: 11 MG/DL (ref 16–42)
PROT SERPL-MCNC: 5.1 GM/DL (ref 5.8–7.6)
RBC # BLD AUTO: 3.17 X10(6)/MCL (ref 4.7–6.1)
SODIUM SERPL-SCNC: 134 MMOL/L (ref 136–145)
UNIT NUMBER: NORMAL
VANCOMYCIN TROUGH SERPL-MCNC: 17.5 UG/ML (ref 15–20)
WBC # SPEC AUTO: 9.43 X10(3)/MCL (ref 4.5–11.5)

## 2024-04-25 PROCEDURE — 85025 COMPLETE CBC W/AUTO DIFF WBC: CPT | Performed by: INTERNAL MEDICINE

## 2024-04-25 PROCEDURE — 94760 N-INVAS EAR/PLS OXIMETRY 1: CPT

## 2024-04-25 PROCEDURE — 94799 UNLISTED PULMONARY SVC/PX: CPT

## 2024-04-25 PROCEDURE — 99900035 HC TECH TIME PER 15 MIN (STAT)

## 2024-04-25 PROCEDURE — 21400001 HC TELEMETRY ROOM

## 2024-04-25 PROCEDURE — 99233 SBSQ HOSP IP/OBS HIGH 50: CPT | Mod: ,,,

## 2024-04-25 PROCEDURE — 99900031 HC PATIENT EDUCATION (STAT)

## 2024-04-25 PROCEDURE — 80053 COMPREHEN METABOLIC PANEL: CPT | Performed by: INTERNAL MEDICINE

## 2024-04-25 PROCEDURE — 25000003 PHARM REV CODE 250: Performed by: PODIATRIST

## 2024-04-25 PROCEDURE — 36415 COLL VENOUS BLD VENIPUNCTURE: CPT | Performed by: INTERNAL MEDICINE

## 2024-04-25 PROCEDURE — A4216 STERILE WATER/SALINE, 10 ML: HCPCS | Performed by: INTERNAL MEDICINE

## 2024-04-25 PROCEDURE — 36415 COLL VENOUS BLD VENIPUNCTURE: CPT

## 2024-04-25 PROCEDURE — 63600175 PHARM REV CODE 636 W HCPCS: Performed by: INTERNAL MEDICINE

## 2024-04-25 PROCEDURE — 11000001 HC ACUTE MED/SURG PRIVATE ROOM

## 2024-04-25 PROCEDURE — 84134 ASSAY OF PREALBUMIN: CPT

## 2024-04-25 PROCEDURE — 25000003 PHARM REV CODE 250: Performed by: INTERNAL MEDICINE

## 2024-04-25 PROCEDURE — 80202 ASSAY OF VANCOMYCIN: CPT | Performed by: INTERNAL MEDICINE

## 2024-04-25 PROCEDURE — 97606 NEG PRS WND THER DME>50 SQCM: CPT

## 2024-04-25 RX ADMIN — GABAPENTIN 300 MG: 300 CAPSULE ORAL at 08:04

## 2024-04-25 RX ADMIN — PIPERACILLIN SODIUM AND TAZOBACTAM SODIUM 4.5 G: 4; .5 INJECTION, POWDER, LYOPHILIZED, FOR SOLUTION INTRAVENOUS at 10:04

## 2024-04-25 RX ADMIN — CILOSTAZOL 50 MG: 50 TABLET ORAL at 08:04

## 2024-04-25 RX ADMIN — OXYCODONE HYDROCHLORIDE 10 MG: 10 TABLET ORAL at 12:04

## 2024-04-25 RX ADMIN — SODIUM CHLORIDE, PRESERVATIVE FREE 10 ML: 5 INJECTION INTRAVENOUS at 12:04

## 2024-04-25 RX ADMIN — LOSARTAN POTASSIUM 100 MG: 50 TABLET, FILM COATED ORAL at 08:04

## 2024-04-25 RX ADMIN — PIPERACILLIN SODIUM AND TAZOBACTAM SODIUM 4.5 G: 4; .5 INJECTION, POWDER, LYOPHILIZED, FOR SOLUTION INTRAVENOUS at 03:04

## 2024-04-25 RX ADMIN — OXYCODONE HYDROCHLORIDE 10 MG: 10 TABLET ORAL at 03:04

## 2024-04-25 RX ADMIN — AMLODIPINE BESYLATE 10 MG: 5 TABLET ORAL at 08:04

## 2024-04-25 RX ADMIN — OXYCODONE HYDROCHLORIDE 10 MG: 10 TABLET ORAL at 04:04

## 2024-04-25 RX ADMIN — PIPERACILLIN SODIUM AND TAZOBACTAM SODIUM 4.5 G: 4; .5 INJECTION, POWDER, LYOPHILIZED, FOR SOLUTION INTRAVENOUS at 06:04

## 2024-04-25 RX ADMIN — VANCOMYCIN HYDROCHLORIDE 1000 MG: 1 INJECTION, POWDER, LYOPHILIZED, FOR SOLUTION INTRAVENOUS at 03:04

## 2024-04-25 RX ADMIN — GABAPENTIN 300 MG: 300 CAPSULE ORAL at 03:04

## 2024-04-25 RX ADMIN — SODIUM CHLORIDE, PRESERVATIVE FREE 10 ML: 5 INJECTION INTRAVENOUS at 06:04

## 2024-04-25 RX ADMIN — FERROUS SULFATE TAB 325 MG (65 MG ELEMENTAL FE) 1 EACH: 325 (65 FE) TAB at 08:04

## 2024-04-25 RX ADMIN — THERA TABS 1 TABLET: TAB at 08:04

## 2024-04-25 RX ADMIN — LIDOCAINE 5% 1 PATCH: 700 PATCH TOPICAL at 11:04

## 2024-04-25 RX ADMIN — OXYCODONE HYDROCHLORIDE 10 MG: 10 TABLET ORAL at 08:04

## 2024-04-25 RX ADMIN — DULOXETINE HYDROCHLORIDE 30 MG: 30 CAPSULE, DELAYED RELEASE ORAL at 08:04

## 2024-04-25 RX ADMIN — GABAPENTIN 600 MG: 300 CAPSULE ORAL at 08:04

## 2024-04-25 RX ADMIN — SODIUM CHLORIDE, PRESERVATIVE FREE 10 ML: 5 INJECTION INTRAVENOUS at 11:04

## 2024-04-25 RX ADMIN — TAMSULOSIN HYDROCHLORIDE 0.4 MG: 0.4 CAPSULE ORAL at 08:04

## 2024-04-25 RX ADMIN — DOCUSATE SODIUM 100 MG: 100 CAPSULE, LIQUID FILLED ORAL at 08:04

## 2024-04-25 NOTE — PROGRESS NOTES
[facility]                       [address] [address]    PATIENT NAME:       HAWA KUMAR  YOB: 1955  CSN:                [id number]   MRN:                [id number]  ADMIT DATE:         03/15/2024 03:09:00  PHYSICIAN:          Tom Nichole DPM                            PROGRESS NOTE    DATE:  04/25/2024 00:00:00    SUBJECTIVE:  The patient was seen today.  He is doing well, not voicing any   complaints.  I had his wound VAC changed out on the sacral area.  Having a   little bit of some phantom pains on the left side, but controlled.  No other   issues.    PHYSICAL EXAMINATION:  VITAL SIGNS:  Stable and afebrile.    Photos reviewed of the sacral area.  Wounds looked clean and viable.  Left stump   is dressed.  Right leg wounds slowly improving, primarily clean, granular,   still with some moderate serous exudate.  No pus or odor.  No bone exposure.    Diminished pedal pulses.  Contractures.    ASSESSMENT:    1. Chronic right lower extremity wounds status post debridement.  2. Status post left above-knee amputation.  3. Sacral decubitus with osteomyelitis.    PLAN:  Continue with current care.  Dressings were changed.  Continue   offloading.        ______________________________  Tom Nichole DPM    GAS/AQS  DD:  04/25/2024  Time:  02:56PM  DT:  04/25/2024  Time:  04:52PM  Job #:  [id number]      PROGRESS NOTE

## 2024-04-25 NOTE — PROGRESS NOTES
[facility] - 8th [facility] Medicine  Progress Note    Patient Name: Shaheen Christie  MRN: [id number]  Patient Class: IP- Inpatient   Admission Date: 3/15/2024  Length of Stay: 41 days  Attending Physician: Enzo Thibodeaux MD  Primary Care Provider: Viktor Russ MD        Subjective:     Principal Problem:Osteomyelitis    Interval History: No new issues    Review of Systems   All other systems reviewed and are negative.    Objective:     Vital Signs (Most Recent):  Temp: 98.7 °F (37.1 °C) (04/25/24 0403)  Pulse: 87 (04/25/24 0403)  Resp: 18 (04/25/24 0428)  BP: (!) 109/57 (04/25/24 0403)  SpO2: (!) 93 % (04/25/24 0403) Vital Signs (24h Range):  Temp:  [97.4 °F (36.3 °C)-98.7 °F (37.1 °C)] 98.7 °F (37.1 °C)  Pulse:  [] 87  Resp:  [13-18] 18  SpO2:  [89 %-97 %] 93 %  BP: ()/(53-83) 109/57     Weight: 68 kg (150 lb)  Body mass index is 22.14 kg/m².    Intake/Output Summary (Last 24 hours) at 4/25/2024 0714  Last data filed at 4/24/2024 2036  Gross per 24 hour   Intake 940 ml   Output 550 ml   Net 390 ml      Physical Exam  HENT:      Head: Normocephalic and atraumatic.      Right Ear: External ear normal.      Left Ear: External ear normal.      Mouth/Throat:      Mouth: Mucous membranes are dry.   Eyes:      Extraocular Movements: Extraocular movements intact.   Cardiovascular:      Rate and Rhythm: Normal rate and regular rhythm.      Pulses: Normal pulses.      Heart sounds: Normal heart sounds.   Pulmonary:      Effort: Pulmonary effort is normal.      Breath sounds: Normal breath sounds.   Abdominal:      General: Abdomen is flat.   Musculoskeletal:      Cervical back: Neck supple.   Skin:     General: Skin is warm and dry.   Neurological:      Mental Status: He is alert. Mental status is at baseline.           Overview/Hospital Course: Dtable. Continue wound care and antibiotics    Significant Labs: All pertinent labs within the past 24 hours have been  reviewed.  BMP:   Recent Labs   Lab 04/25/24  0016   *   K 4.0   CO2 25   BUN 8.4   CREATININE 0.67*   CALCIUM 8.1*     CBC:   Recent Labs   Lab 04/25/24  0016   WBC 9.43   HGB 8.6*   HCT 27.5*        CMP:   Recent Labs   Lab 04/25/24  0016   *   K 4.0   CO2 25   BUN 8.4   CREATININE 0.67*   CALCIUM 8.1*   ALBUMIN 2.2*   BILITOT 0.3   ALKPHOS 80   AST 17   ALT 15       Significant Imaging: I have reviewed all pertinent imaging results/findings within the past 24 hours.    Assessment/Plan:      Active Diagnoses:    Diagnosis Date Noted POA    PRINCIPAL PROBLEM:  Osteomyelitis [M86.9] 03/15/2024 Yes    Knee effusion, left [M25.462] 03/31/2024 No    Pressure injury of sacral region, stage 4 [L89.154] 03/19/2024 Yes    Skin ulcer of right lower leg [L97.919] 03/19/2024 Yes    Skin ulcer of left lower leg [L97.929] 03/19/2024 Yes    Unstageable pressure ulcer of right heel [L89.610] 03/19/2024 Yes    Unstageable pressure ulcer of left heel [L89.620] 03/19/2024 Yes      Problems Resolved During this Admission:     VTE Risk Mitigation (From admission, onward)      None               Enzo Thibodeaux MD  Department of Hospital Medicine   [facility] - 8th Floor Med Surg

## 2024-04-25 NOTE — PROGRESS NOTES
Inpatient Nutrition Assessment    Admit Date: 3/15/2024   Total duration of encounter: 41 days   Patient Age: 69 y.o.    Nutrition Recommendation/Prescription     -Continue Heart Healthy Diet as tolerated.    -Encourage Pierre BID for wound healing.   -Continue MVI with minerals as medically feasible.   -Monitor wt, labs, and intake. Obtain and document a more recent measured weight.     Communication of Recommendations: reviewed with patient    Nutrition Assessment     Malnutrition Assessment/Nutrition-Focused Physical Exam    Malnutrition Context: chronic illness (03/20/24 1317)  Malnutrition Level: severe (03/20/24 1317)  Energy Intake (Malnutrition): less than 75% for greater than or equal to 3 months (03/20/24 1317)  Weight Loss (Malnutrition): greater than 7.5% in 3 months (03/20/24 1317)  Subcutaneous Fat (Malnutrition): severe depletion (03/20/24 1317)  Orbital Region (Subcutaneous Fat Loss): severe depletion  Upper Arm Region (Subcutaneous Fat Loss): severe depletion     Muscle Mass (Malnutrition): severe depletion (03/20/24 1317)  [place] Region (Muscle Loss): severe depletion                       Fluid Accumulation (Malnutrition): other (see comments) (does not meet criteria) (03/20/24 1317)  Hand  Strength, Left (Malnutrition): unable to evaluate (03/20/24 1317)  Hand  Strength, Right (Malnutrition): unable to evaluate (03/20/24 1317)  A minimum of two characteristics is recommended for diagnosis of either severe or non-severe malnutrition.    Chart Review    Reason Seen: continuous nutrition monitoring and follow-up    Malnutrition Screening Tool Results   Have you recently lost weight without trying?: No  Have you been eating poorly because of a decreased appetite?: No   MST Score: 0   Diagnosis:  Stage 4 pressure ulcer to sacrum  Unstageable pressure ulcers to bilateral heels  Open wound/ulcers to right lateral lower leg and left lateral lower leg  Peripheral arterial disease - with history of  vascular intervention with Dr. Queen (most recent angio 3/13/24)  PVD  History of polymicrobial sacral wound culture  History of polymicrobial wound culture to leg wound  DM2  Anemia  History of smoking  Low prealbumin     Relevant Medical History: HTN, COPD, DM2 with neuropathy, PAD, chronic venous hypertension     Scheduled Medications:  amLODIPine, 10 mg, Daily  cilostazoL, 50 mg, BID  docusate sodium, 100 mg, BID  DULoxetine, 30 mg, Daily  ferrous sulfate, 1 tablet, Daily  gabapentin, 300 mg, TID  gabapentin, 600 mg, QHS  LIDOcaine (PF) 10 mg/ml (1%), 1 mL, Once  LIDOcaine, 1 patch, Q24H  losartan, 100 mg, Daily  multivitamin, 1 tablet, Daily  oxyCODONE, 10 mg, Q4H  piperacillin-tazobactam (Zosyn) IV (PEDS and ADULTS) (extended infusion is not appropriate), 4.5 g, Q8H  polyethylene glycol, 17 g, Daily  sodium chloride 0.9%, 10 mL, Q6H  tamsulosin, 0.4 mg, Daily  vancomycin (VANCOCIN) IV (PEDS and ADULTS), 1,000 mg, Q18H    Continuous Infusions:   PRN Medications:   Review      Calorie Containing IV Medications: no significant kcals from medications at this time    Recent Labs   Lab 04/21/24  0647 04/21/24  2157 04/22/24  0454 04/25/24  0016   *  --  138 134*   K 4.0  --  4.2 4.0   CALCIUM 8.6*  --  9.1 8.1*   MG  --   --  1.90  --    CHLORIDE 102  --  105 103   CO2 24  --  25 25   BUN 13.0  --  18.4 8.4   CREATININE 0.88  --  0.82 0.67*   EGFRNORACEVR >60  --  >60 >60   GLUCOSE 119*  --  138* 110   BILITOT  --   --   --  0.3   ALKPHOS  --   --   --  80   ALT  --   --   --  15   AST  --   --   --  17   ALBUMIN  --   --   --  2.2*   WBC 9.38 9.47 8.27 9.43   HGB 6.5* 10.6* 9.3* 8.6*   HCT 21.5* 32.1* 28.0* 27.5*     Nutrition Orders:  Diet diabetic 2000 Calorie      Appetite/Oral Intake: good/% of meals  Factors Affecting Nutritional Intake: none identified  Food/Holiness/Cultural Preferences: none reported  Food Allergies: no known food allergies  Last Bowel Movement: 04/23/24  Wound(s):     Altered  "Skin Integrity 01/22/24 1351 Sacral spine #4-Tissue loss description: Full thickness  [REMOVED]      Altered Skin Integrity 03/16/24 0800 Left anterior;lower Leg Arterial Ulcer-Tissue loss description: Full thickness heel ulcers, sacrum pressure ulcers    Comments    3/20: Pt reports current good intake/appetite but intake the past few months has been poor 2/2 disliking food from NH. Pt reports usual wt of ~170 lbs. Pt reports he would like oral supplements.     3/22: Pt reports good appetite/intake of meals, denies n/v; drinking some of the oral supplement but not taking the Pierre.     3/26: Pt NPO;  plans to go to OR today.     3/28: Pt eating well; denies n/v; states that he is not drinking oral supplement 2/2 being too full after he eats his meals; will d/c per his request. Pt is receptive to trying to drink the Pierre for wound healing.     4/1: Pt continues with good appetite/intake; tolerating diet. Pt reports that he likes the Pierre but is not taking it regularly- encouraged to take for wound healing and pt was receptive.     4/4: Pt reports good appetite and PO intake of meals. Not drinking much Pierre, large supply in his room noted. Pt denies GI complaints.     4/8: Pt sleeping during rounds; eating very well per RN.     4/12: Pt reports good appetite/intake; denies n/v; taking the Pierre.     4/16: Pt still eating 100%.    4/19: Pt reports good appetite/intake.     4/23: Pt sleeping during time of rounds; observed 100% of lunch tray consumed.     4/25: Pt reports fair appetite for the past few days, states he has been eating a little more than half of his meals. Pt drinking >50% of his Pierre.     Anthropometrics    Height: 5' 9.02" (175.3 cm), Height Method: Stated  Last Weight: 68 kg (150 lb) (03/20/24 1315), Weight Method: Standard Scale  BMI (Calculated): 22.1  BMI Classification: normal (BMI 18.5-24.9)        Ideal Body Weight (IBW), Male: 160.12 lb     % Ideal Body Weight, Male (lb): 93.68 %            "      Usual Body Weight (UBW), k.27 kg  % Usual Body Weight: 88.24  % Weight Change From Usual Weight: -11.95 %  Usual Weight Provided By: patient    Wt Readings from Last 5 Encounters:   24 68 kg (150 lb)   24 68.5 kg (151 lb)   24 63.5 kg (140 lb)   23 72.6 kg (160 lb)   23 77.1 kg (170 lb)     Weight Change(s) Since Admission:   Wt Readings from Last 1 Encounters:   24 1315 68 kg (150 lb)   03/15/24 1621 68 kg (150 lb)   03/15/24 0307 68 kg (150 lb)   Admit Weight: 68 kg (150 lb) (03/15/24 0307), Weight Method: Stated  3/28-: no new wt noted    Estimated Needs    Weight Used For Calorie Calculations: 68 kg (149 lb 14.6 oz)  Energy Calorie Requirements (kcal): 2328-0724 kcal (30-35 kcal/kg)  Energy Need Method: Kcal/kg  Weight Used For Protein Calculations: 68 kg (149 lb 14.6 oz)  Protein Requirements: 102 gm (1.5g/kg)  Fluid Requirements (mL): 2040 mL    Enteral Nutrition     Patient not receiving enteral nutrition at this time.    Parenteral Nutrition     Patient not receiving parenteral nutrition support at this time.    Evaluation of Received Nutrient Intake    Calories: meeting estimated needs  Protein: meeting estimated needs    Patient Education     Not applicable.    Nutrition Diagnosis     PES: Increased nutrient needs (protein) related to increased protein energy demand for wound healing as evidenced by stage 4 pressure ulcer. (active)     PES: Severe chronic disease or condition related malnutrition related to suboptimal protein/energy intake as evidenced by less than 75% needs met for greater than or equal to 3 months, severe fat depletion, severe muscle depletion, and greater than 7.5% weight loss in 3 months. (active)    Nutrition Interventions     Intervention(s): general/healthful diet, commercial beverage, commercial food, multivitamin/mineral supplement therapy, and collaboration with other providers    Goal: Meet greater than 80% of nutritional needs  by follow-up. (goal progressing)  Goal: Maintain weight throughout hospitalization. (goal progressing)    Nutrition Goals & Monitoring     Dietitian will monitor: energy intake and weight    Nutrition Risk/Follow-Up: high (follow-up in 1-4 days)   Please consult if re-assessment needed sooner.

## 2024-04-25 NOTE — PROGRESS NOTES
[facility] - 8th Floor Med Surg  Wound Care    Patient Name:  Shaheen Christie   MRN:  [id number]  Date: 4/25/2024  Diagnosis: Osteomyelitis    History:     Past Medical History:   Diagnosis Date    COPD (chronic obstructive pulmonary disease)     Depression     Hypertension     Neuropathy        Social History     Socioeconomic History    Marital status:    Occupational History    Occupation: retired   Tobacco Use    Smoking status: Former     Average packs/day: 0.5 packs/day for 15.0 years (7.5 ttl pk-yrs)     Types: Cigarettes     Start date: 2008    Smokeless tobacco: Never   Substance and Sexual Activity    Alcohol use: Yes     Alcohol/week: 3.0 - 6.0 standard drinks of alcohol     Types: 3 - 6 Cans of beer per week    Drug use: Yes     Types: Marijuana    Sexual activity: Not Currently       Precautions:     Allergies as of 03/15/2024    (No Known Allergies)       WOC Assessment Details/Treatment        04/25/24 0942   WOCN Assessment   Visit Date 04/25/24   Visit Time 0942   Consult Type Follow Up   WOCN Speciality Wound   WOCN List wound vac   Procedure wound vac   Intervention changed   Teaching on-going   Skin Interventions   Device Skin Pressure Protection absorbent pad utilized/changed   Pressure Reduction Devices specialty bed utilized        Altered Skin Integrity 01/22/24 1351 Sacral spine #4   Date First Assessed/Time First Assessed: 01/22/24 1351   Altered Skin Integrity Present on Admission - Did Patient arrive to the hospital with altered skin?: yes  Location: Sacral spine  Wound Number: #[id number]  Is this injury device related?: No   Wound Image    Dressing Appearance Dry;Intact;Clean   Drainage Amount Moderate   Drainage Characteristics/Odor Serosanguineous   Appearance Red;Yellow   Tissue loss description Full thickness   Red (%), Wound Tissue Color 75 %   Yellow (%), Wound Tissue Color 25 %   Periwound Area Moist;Redness   Wound Edges Irregular   Wound Length (cm) 8 cm   Wound Width  (cm) 18 cm   Wound Depth (cm) 1 cm   Wound Volume (cm^3) 144 cm^3   Wound Surface Area (cm^2) 144 cm^2   Undermining (depth (cm)/location) 1cm from 1-3 o clock   Care Cleansed with:;Antimicrobial agent   Dressing Applied        Negative Pressure Wound Therapy  03/28/24   Placement Date: 03/28/24   Location: Sacral Spine   NPWT Type Vacuum Therapy   Therapy Setting NPWT Continuous therapy   Pressure Setting NPWT 125 mmHg   Therapy Interventions NPWT Dressing changed   Sponges Inserted NPWT Black  (versatel)   Sponges Removed NPWT Black  (versatel)     Lory ORTIZ at bedside for evaluation. NPWT reapplied to sacral ulcer. Excellent seal noted at 125 mm/ hg continuous. Nursing to continue with preventative measures. On BRADEN mattress. Will Follow up.     04/25/2024

## 2024-04-25 NOTE — PROGRESS NOTES
[facility] - 8th Floor Med Surg  Wound Care  Progress Note    Patient Name: Shaheen Christie  MRN: [id number]  Admission Date: 3/15/2024  Hospital Length of Stay: 41 days  Attending Physician: Enzo Thibodeaux MD  Primary Care Provider: Viktor Russ MD     Subjective:     HPI:  Wound medicine Re-check - chronic sacral pressure ulcer with osteomyelitis       Wound recheck done today, 4/25/24. Met patient in his room, 804, awake, alert and agreeable to wound assessment and treatment. Accompanied by WOCN for wound vac change. Lying on low air loss mattress,  without heel lift boots. Left AKA, dressing clean and intact. RLE dressings intact with moderate exudate on bandage, not yet changed today.  Leg wounds not visualized RLE followed by podiatry, formalization of left AKA on 4/24/24. Complains of phantom pain to left lower extremity. No acute distress.         Hospital Course:   No notes on file      Follow-up For: Procedure(s) (LRB):  AMPUTATION, ABOVE KNEE (formalization) (Left)    Post-Operative Day: 1 Day Post-Op    Scheduled Meds:  Current Facility-Administered Medications   Medication Dose Route Frequency    amLODIPine  10 mg Oral Daily    cilostazoL  50 mg Oral BID    docusate sodium  100 mg Oral BID    DULoxetine  30 mg Oral Daily    ferrous sulfate  1 tablet Oral Daily    gabapentin  300 mg Oral TID    gabapentin  600 mg Oral QHS    LIDOcaine (PF) 10 mg/ml (1%)  1 mL Other Once    LIDOcaine  1 patch Transdermal Q24H    losartan  100 mg Oral Daily    multivitamin  1 tablet Oral Daily    oxyCODONE  10 mg Oral Q4H    piperacillin-tazobactam (Zosyn) IV (PEDS and ADULTS) (extended infusion is not appropriate)  4.5 g Intravenous Q8H    polyethylene glycol  17 g Oral Daily    sodium chloride 0.9%  10 mL Intravenous Q6H    tamsulosin  0.4 mg Oral Daily    vancomycin (VANCOCIN) IV (PEDS and ADULTS)  1,000 mg Intravenous Q18H     Continuous Infusions:  Current Facility-Administered Medications    Medication Dose Route Frequency Last Rate Last Admin     PRN Meds:  Current Facility-Administered Medications:     0.9%  NaCl infusion (for blood administration), , Intravenous, Q24H PRN    0.9%  NaCl infusion (for blood administration), , Intravenous, Q24H PRN    0.9%  NaCl infusion (for blood administration), , Intravenous, Q24H PRN    0.9%  NaCl infusion (for blood administration), , Intravenous, Q24H PRN    0.9%  NaCl infusion (for blood administration), , Intravenous, Q24H PRN    acetaminophen, 650 mg, Oral, Q6H PRN    albuterol, 2 puff, Inhalation, QID PRN    aluminum-magnesium hydroxide-simethicone, 15 mL, Oral, Q6H PRN    HYDROcodone-acetaminophen, 1 tablet, Oral, Q4H PRN    melatonin, 6 mg, Oral, Nightly PRN    simethicone, 80 mg, Oral, Q6H PRN    Flushing PICC/Midline Protocol, , , Until Discontinued **AND** sodium chloride 0.9%, 10 mL, Intravenous, Q6H **AND** sodium chloride 0.9%, 10 mL, Intravenous, PRN    Pharmacy to dose Vancomycin consult, , , Once **AND** vancomycin - pharmacy to dose, , Intravenous, pharmacy to manage frequency    Review of Systems   Constitutional:  Positive for activity change and fatigue. Negative for chills and diaphoresis.   Musculoskeletal:  Positive for arthralgias and myalgias.   Skin:  Positive for wound.     Objective:     Vital Signs (Most Recent):  Temp: 99.1 °F (37.3 °C) (04/25/24 0753)  Pulse: 97 (04/25/24 0753)  Resp: 18 (04/25/24 0838)  BP: (!) 107/58 (04/25/24 0838)  SpO2: (!) 94 % (04/25/24 0838) Vital Signs (24h Range):  Temp:  [97.4 °F (36.3 °C)-99.1 °F (37.3 °C)] 99.1 °F (37.3 °C)  Pulse:  [] 97  Resp:  [13-18] 18  SpO2:  [89 %-97 %] 94 %  BP: ()/(53-83) 107/58     Weight: 68 kg (150 lb)  Body mass index is 22.14 kg/m².     Physical Exam  Vitals reviewed.   Constitutional:       General: He is awake.      Appearance: Normal appearance. He is well-developed. He is ill-appearing (chronic). He is not toxic-appearing.      Comments: Muscle atrophy of  Right lower extremity. Appears older than stated age     HENT:      Head: Normocephalic and atraumatic.      Nose: Nose normal.   Cardiovascular:      Rate and Rhythm: Normal rate.   Pulmonary:      Effort: Pulmonary effort is normal. No respiratory distress.   Skin:     General: Skin is warm and dry.      Capillary Refill: Capillary refill takes less than 2 seconds.      Findings: Wound present.             Comments: Large stage 4 sacral wound extending laterally to the left. Healthy red granulation tissue to left side of wound bed, right side with pink edematous appearing tissue. Wound bed with <25 % yellow slough. No purulent drainage or odor. Cecilia wound margins without erythema, bruising, signs of cellulitis.     Right lower leg dressings intact with moderate drainage to posterior lower leg; wounds not visualized    Left AKA - surgical wound with dressings clean, dry and intact.    Neurological:      General: No focal deficit present.      Mental Status: He is alert and oriented to person, place, and time. Mental status is at baseline.   Psychiatric:         Mood and Affect: Mood normal.         Behavior: Behavior normal. Behavior is cooperative.       Sacrum: 8 x 18 x 1 x cm with undermining from 12 o'clock to 3 o'clock deepest at 1 o'clock 1 cm          Laboratory:  A1C:   Recent Labs   Lab 02/02/24  0445   HGBA1C 5.0       BMP:   Recent Labs   Lab 04/22/24  0454 04/25/24  0016    134*   K 4.2 4.0   CO2 25 25   BUN 18.4 8.4   CREATININE 0.82 0.67*   CALCIUM 9.1 8.1*   MG 1.90  --        CBC:   Recent Labs   Lab 04/25/24  0016   WBC 9.43   RBC 3.17*   HGB 8.6*   HCT 27.5*      MCV 86.8   MCH 27.1   MCHC 31.3*     CMP:   Recent Labs   Lab 04/25/24  0016   CALCIUM 8.1*   ALBUMIN 2.2*   *   K 4.0   CO2 25   BUN 8.4   CREATININE 0.67*   ALKPHOS 80   ALT 15   AST 17   BILITOT 0.3       LFTs:   Recent Labs   Lab 04/25/24  0016   ALT 15   AST 17   ALKPHOS 80   BILITOT 0.3   ALBUMIN 2.2*        Microbiology Results (last 7 days)       ** No results found for the last 168 hours. **          Specimen (24h ago, onward)       Start     Ordered    04/24/24 1337  Specimen to Pathology  RELEASE UPON ORDERING        References:    Click here for ordering Quick Tip   Question:  Release to patient  Answer:  Immediate    04/24/24 1337                      Diagnostic Results:  I have reviewed all pertinent imaging results/findings within the past 24 hours.        Assessment/Plan:       Stage 4 pressure ulcer to sacrum - with evidence of osteomyelitis on MRI of 2/27/24. admitted for initiation of IV antibiotics. Wound VAC placed on 3/28/24. ID guiding antibiotic stewardship  Unstageable pressure ulcers to bilateral heels - Podiatry consulted and following - surgical debridement on 3/26/24  Open wound/ulcers to right lateral lower leg and left lateral lower leg - podiatry following  S/P Left AKA done on 4/21/24 with formalization on 4/24/24.   Peripheral arterial disease - with history of vascular intervention with Dr. Queen (most recent angio 3/13/24; medical management)   PVD  History of polymicrobial sacral wound culture  History of polymicrobial wound culture to leg wound  DM2  Anemia  History of smoking  Low prealbumin      PLAN:     Chart reviewed, patient examined and wounds assessed.  Opinion: The patient has a large stage 4 pressure ulcer to his sacrum that does not appear to be infected;  Wound vac initiated on 3/28/24; tolerating well. Overall wound looks good with some edematous pink tissue on right side of wound and increased amount of undermining since last assessment. Patient refused wound vac change this week and had 3 surgeries within the past week. Will continue with wound vac. Encouraged frequent repositioning and offloading sacrum at all times.    RLE Dressing in place followed by Podiatry - did not visualize wounds today.   Left AKA stump surgical site with dressing clean dry and intact at  present - wound not visualized at this time.   Wound care orders: Wound Vac  Monitor for signs & symptoms of deterioration  Offloading of sacrum/buttocks/heels at all times: BRADEN mattress, turning q 2 hrs; use of wedges and heel offloading devices to be used at all times while in bed; ( SALONI Arellano).   Incontinence: control moisture/wound contamination: No briefs.   Nutrition: Continue aggressive nutritional support, protein supplementation along with vitamin and mineral supplements  and pierre to support wound healing; Low prealbumin - 13.8 on 3/19/24 and rechecked on 4/1/24 - 12.6, most recent 10.5 on 4/11/24; follow dietitian recommendation; encourage to drink Pierre; reports good appetite  Diabetes: A1C good  Will try to follow weekly while admitted, but every nurse assigned to patient on every shift of every day needs to address daily wound care dressing changes and offloading modalities including using heel offloading devices, wedges, BRADEN mattress etc              The time spent including preparing to see the patient, obtaining patient history and assessment, evaluation of the plan of care, patient/caregiver counseling and education, orders, documentation, coordination of care, and other professional medical management activities for today's encounter was 45 minutes.       ANGEL Chavira  Wound Care  [facility] - 8th Floor Med Surg

## 2024-04-25 NOTE — SUBJECTIVE & OBJECTIVE
Subjective:     HPI:  Wound medicine Re-check - chronic sacral pressure ulcer with osteomyelitis       Wound recheck done today, 4/25/24. Met patient in his room, 804, awake, alert and agreeable to wound assessment and treatment. Accompanied by WOCN for wound vac change. Lying on low air loss mattress,  without heel lift boots. Left AKA, dressing clean and intact. RLE dressings intact with moderate exudate on bandage, not yet changed today.  Leg wounds not visualized RLE followed by podiatry, formalization of left AKA on 4/24/24. Complains of phantom pain to left lower extremity. No acute distress.         Hospital Course:   No notes on file      Follow-up For: Procedure(s) (LRB):  AMPUTATION, ABOVE KNEE (formalization) (Left)    Post-Operative Day: 1 Day Post-Op    Scheduled Meds:  Current Facility-Administered Medications   Medication Dose Route Frequency    amLODIPine  10 mg Oral Daily    cilostazoL  50 mg Oral BID    docusate sodium  100 mg Oral BID    DULoxetine  30 mg Oral Daily    ferrous sulfate  1 tablet Oral Daily    gabapentin  300 mg Oral TID    gabapentin  600 mg Oral QHS    LIDOcaine (PF) 10 mg/ml (1%)  1 mL Other Once    LIDOcaine  1 patch Transdermal Q24H    losartan  100 mg Oral Daily    multivitamin  1 tablet Oral Daily    oxyCODONE  10 mg Oral Q4H    piperacillin-tazobactam (Zosyn) IV (PEDS and ADULTS) (extended infusion is not appropriate)  4.5 g Intravenous Q8H    polyethylene glycol  17 g Oral Daily    sodium chloride 0.9%  10 mL Intravenous Q6H    tamsulosin  0.4 mg Oral Daily    vancomycin (VANCOCIN) IV (PEDS and ADULTS)  1,000 mg Intravenous Q18H     Continuous Infusions:  Current Facility-Administered Medications   Medication Dose Route Frequency Last Rate Last Admin     PRN Meds:  Current Facility-Administered Medications:     0.9%  NaCl infusion (for blood administration), , Intravenous, Q24H PRN    0.9%  NaCl infusion (for blood administration), , Intravenous, Q24H PRN    0.9%  NaCl  infusion (for blood administration), , Intravenous, Q24H PRN    0.9%  NaCl infusion (for blood administration), , Intravenous, Q24H PRN    0.9%  NaCl infusion (for blood administration), , Intravenous, Q24H PRN    acetaminophen, 650 mg, Oral, Q6H PRN    albuterol, 2 puff, Inhalation, QID PRN    aluminum-magnesium hydroxide-simethicone, 15 mL, Oral, Q6H PRN    HYDROcodone-acetaminophen, 1 tablet, Oral, Q4H PRN    melatonin, 6 mg, Oral, Nightly PRN    simethicone, 80 mg, Oral, Q6H PRN    Flushing PICC/Midline Protocol, , , Until Discontinued **AND** sodium chloride 0.9%, 10 mL, Intravenous, Q6H **AND** sodium chloride 0.9%, 10 mL, Intravenous, PRN    Pharmacy to dose Vancomycin consult, , , Once **AND** vancomycin - pharmacy to dose, , Intravenous, pharmacy to manage frequency    Review of Systems   Constitutional:  Positive for activity change and fatigue. Negative for chills and diaphoresis.   Musculoskeletal:  Positive for arthralgias and myalgias.   Skin:  Positive for wound.     Objective:     Vital Signs (Most Recent):  Temp: 99.1 °F (37.3 °C) (04/25/24 0753)  Pulse: 97 (04/25/24 0753)  Resp: 18 (04/25/24 0838)  BP: (!) 107/58 (04/25/24 0838)  SpO2: (!) 94 % (04/25/24 0838) Vital Signs (24h Range):  Temp:  [97.4 °F (36.3 °C)-99.1 °F (37.3 °C)] 99.1 °F (37.3 °C)  Pulse:  [] 97  Resp:  [13-18] 18  SpO2:  [89 %-97 %] 94 %  BP: ()/(53-83) 107/58     Weight: 68 kg (150 lb)  Body mass index is 22.14 kg/m².     Physical Exam  Vitals reviewed.   Constitutional:       General: He is awake.      Appearance: Normal appearance. He is well-developed. He is ill-appearing (chronic). He is not toxic-appearing.      Comments: Muscle atrophy of Right lower extremity. Appears older than stated age     HENT:      Head: Normocephalic and atraumatic.      Nose: Nose normal.   Cardiovascular:      Rate and Rhythm: Normal rate.   Pulmonary:      Effort: Pulmonary effort is normal. No respiratory distress.   Skin:      General: Skin is warm and dry.      Capillary Refill: Capillary refill takes less than 2 seconds.      Findings: Wound present.             Comments: Large stage 4 sacral wound extending laterally to the left. Healthy red granulation tissue to wound bed with <25 % yellow slough. No purulent drainage or odor. Cecilia wound margins without erythema, bruising, signs of cellulitis.     Right lower leg dressings intact with moderate drainage to posterior lower leg; wounds not visualized    Left AKA - surgical wound with dressings clean, dry and intact.    Neurological:      General: No focal deficit present.      Mental Status: He is alert and oriented to person, place, and time. Mental status is at baseline.   Psychiatric:         Mood and Affect: Mood normal.         Behavior: Behavior normal. Behavior is cooperative.       Sacrum: 8 x 18 x 1 x cm with undermining from 12 o'clock to 3 o'clock deepest at 1 o'clock 1 cm          Laboratory:  A1C:   Recent Labs   Lab 02/02/24  0445   HGBA1C 5.0       BMP:   Recent Labs   Lab 04/22/24  0454 04/25/24  0016    134*   K 4.2 4.0   CO2 25 25   BUN 18.4 8.4   CREATININE 0.82 0.67*   CALCIUM 9.1 8.1*   MG 1.90  --        CBC:   Recent Labs   Lab 04/25/24  0016   WBC 9.43   RBC 3.17*   HGB 8.6*   HCT 27.5*      MCV 86.8   MCH 27.1   MCHC 31.3*     CMP:   Recent Labs   Lab 04/25/24  0016   CALCIUM 8.1*   ALBUMIN 2.2*   *   K 4.0   CO2 25   BUN 8.4   CREATININE 0.67*   ALKPHOS 80   ALT 15   AST 17   BILITOT 0.3       LFTs:   Recent Labs   Lab 04/25/24  0016   ALT 15   AST 17   ALKPHOS 80   BILITOT 0.3   ALBUMIN 2.2*       Microbiology Results (last 7 days)       ** No results found for the last 168 hours. **          Specimen (24h ago, onward)       Start     Ordered    04/24/24 8370  Specimen to Pathology  RELEASE UPON ORDERING        References:    Click here for ordering Quick Tip   Question:  Release to patient  Answer:  Immediate    04/24/24 3599                       Diagnostic Results:  I have reviewed all pertinent imaging results/findings within the past 24 hours.

## 2024-04-25 NOTE — PROGRESS NOTES
Pharmacokinetic Assessment Follow Up: IV Vancomycin    Vancomycin serum concentration assessment(s):    The trough level was drawn correctly and can be used to guide therapy at this time. The measurement is within the desired definitive target range of 15 to 20 mcg/mL.    Vancomycin Regimen Plan:    Continue regimen to Vancomycin 1000 mg IV every 18 hours with next serum trough concentration measured at 1400 prior to the dose on 04/30/2024  - if vancomycin continues (reordered) past current stop date of 04/26/24 @ 1058.    Drug levels (last 3 results):  Recent Labs   Lab Result Units 04/23/24  0805 04/25/24  1132   Vancomycin Trough ug/ml 16.1 17.5       Pharmacy will continue to follow and monitor vancomycin.    Please contact pharmacy at extension [phone] for questions regarding this assessment.    Thank you for the consult,   Renetta Barron       Patient brief summary:  Shaheen Christie is a 69 y.o. male initiated on antimicrobial therapy with IV Vancomycin for treatment of bone/joint infection    The patient's current regimen is vancomycin 1000 mg q18h    Drug Allergies:   Review of patient's allergies indicates:  No Known Allergies    Actual Body Weight:   68 kg    Renal Function:   Estimated Creatinine Clearance: 97.3 mL/min (A) (based on SCr of 0.67 mg/dL (L)).,     Dialysis Method (if applicable):  N/A    CBC (last 72 hours):  Recent Labs   Lab Result Units 04/25/24  0016   WBC x10(3)/mcL 9.43   Hgb g/dL 8.6*   Hct % 27.5*   Platelet x10(3)/mcL 348   Mono % % 15.6   Eos % % 0.7   Basophil % % 0.7       Metabolic Panel (last 72 hours):  Recent Labs   Lab Result Units 04/25/24  0016   Sodium Level mmol/L 134*   Potassium Level mmol/L 4.0   Chloride mmol/L 103   Carbon Dioxide mmol/L 25   Glucose Level mg/dL 110   Blood Urea Nitrogen mg/dL 8.4   Creatinine mg/dL 0.67*   Albumin Level g/dL 2.2*   Bilirubin Total mg/dL 0.3   Alkaline Phosphatase unit/L 80   Aspartate Aminotransferase unit/L 17   Alanine  Aminotransferase unit/L 15       Vancomycin Administrations:  vancomycin given in the last 96 hours                     vancomycin in dextrose 5 % 1 gram/250 mL IVPB 1,000 mg (mg) 1,000 mg New Bag 04/24/24 2036     1,000 mg New Bag  0423     1,000 mg New Bag 04/23/24 0907     1,000 mg New Bag 04/22/24 1529     1,000 mg New Bag 04/21/24 2244                    Microbiologic Results:  Microbiology Results (last 7 days)       ** No results found for the last 168 hours. **

## 2024-04-26 LAB — PSYCHE PATHOLOGY RESULT: NORMAL

## 2024-04-26 PROCEDURE — 25000003 PHARM REV CODE 250: Performed by: PODIATRIST

## 2024-04-26 PROCEDURE — 94799 UNLISTED PULMONARY SVC/PX: CPT

## 2024-04-26 PROCEDURE — 11000001 HC ACUTE MED/SURG PRIVATE ROOM

## 2024-04-26 PROCEDURE — 99900031 HC PATIENT EDUCATION (STAT)

## 2024-04-26 PROCEDURE — 63600175 PHARM REV CODE 636 W HCPCS: Performed by: INTERNAL MEDICINE

## 2024-04-26 PROCEDURE — 25000003 PHARM REV CODE 250: Performed by: INTERNAL MEDICINE

## 2024-04-26 PROCEDURE — 99900035 HC TECH TIME PER 15 MIN (STAT)

## 2024-04-26 PROCEDURE — 21400001 HC TELEMETRY ROOM

## 2024-04-26 PROCEDURE — A4216 STERILE WATER/SALINE, 10 ML: HCPCS | Performed by: INTERNAL MEDICINE

## 2024-04-26 RX ADMIN — OXYCODONE HYDROCHLORIDE 10 MG: 10 TABLET ORAL at 11:04

## 2024-04-26 RX ADMIN — PIPERACILLIN SODIUM AND TAZOBACTAM SODIUM 4.5 G: 4; .5 INJECTION, POWDER, LYOPHILIZED, FOR SOLUTION INTRAVENOUS at 06:04

## 2024-04-26 RX ADMIN — HYDROCODONE BITARTRATE AND ACETAMINOPHEN 1 TABLET: 5; 325 TABLET ORAL at 02:04

## 2024-04-26 RX ADMIN — FERROUS SULFATE TAB 325 MG (65 MG ELEMENTAL FE) 1 EACH: 325 (65 FE) TAB at 09:04

## 2024-04-26 RX ADMIN — SODIUM CHLORIDE, PRESERVATIVE FREE 10 ML: 5 INJECTION INTRAVENOUS at 05:04

## 2024-04-26 RX ADMIN — GABAPENTIN 600 MG: 300 CAPSULE ORAL at 08:04

## 2024-04-26 RX ADMIN — THERA TABS 1 TABLET: TAB at 09:04

## 2024-04-26 RX ADMIN — CILOSTAZOL 50 MG: 50 TABLET ORAL at 07:04

## 2024-04-26 RX ADMIN — TAMSULOSIN HYDROCHLORIDE 0.4 MG: 0.4 CAPSULE ORAL at 09:04

## 2024-04-26 RX ADMIN — OXYCODONE HYDROCHLORIDE 10 MG: 10 TABLET ORAL at 07:04

## 2024-04-26 RX ADMIN — OXYCODONE HYDROCHLORIDE 10 MG: 10 TABLET ORAL at 04:04

## 2024-04-26 RX ADMIN — OXYCODONE HYDROCHLORIDE 10 MG: 10 TABLET ORAL at 12:04

## 2024-04-26 RX ADMIN — SODIUM CHLORIDE, PRESERVATIVE FREE 10 ML: 5 INJECTION INTRAVENOUS at 12:04

## 2024-04-26 RX ADMIN — GABAPENTIN 300 MG: 300 CAPSULE ORAL at 09:04

## 2024-04-26 RX ADMIN — CILOSTAZOL 50 MG: 50 TABLET ORAL at 09:04

## 2024-04-26 RX ADMIN — VANCOMYCIN HYDROCHLORIDE 1000 MG: 1 INJECTION, POWDER, LYOPHILIZED, FOR SOLUTION INTRAVENOUS at 09:04

## 2024-04-26 RX ADMIN — DOCUSATE SODIUM 100 MG: 100 CAPSULE, LIQUID FILLED ORAL at 09:04

## 2024-04-26 RX ADMIN — DULOXETINE HYDROCHLORIDE 30 MG: 30 CAPSULE, DELAYED RELEASE ORAL at 09:04

## 2024-04-26 RX ADMIN — SODIUM CHLORIDE, PRESERVATIVE FREE 10 ML: 5 INJECTION INTRAVENOUS at 11:04

## 2024-04-26 RX ADMIN — GABAPENTIN 300 MG: 300 CAPSULE ORAL at 07:04

## 2024-04-26 RX ADMIN — LIDOCAINE 5% 1 PATCH: 700 PATCH TOPICAL at 09:04

## 2024-04-26 RX ADMIN — PIPERACILLIN SODIUM AND TAZOBACTAM SODIUM 4.5 G: 4; .5 INJECTION, POWDER, LYOPHILIZED, FOR SOLUTION INTRAVENOUS at 04:04

## 2024-04-26 RX ADMIN — SODIUM CHLORIDE, PRESERVATIVE FREE 10 ML: 5 INJECTION INTRAVENOUS at 06:04

## 2024-04-26 RX ADMIN — GABAPENTIN 300 MG: 300 CAPSULE ORAL at 04:04

## 2024-04-26 NOTE — PROGRESS NOTES
Acute Care Surgery   Progress Note  Admit Date: 3/15/2024  HD#42  POD#2 Days Post-Op    Subjective:   Interval history:  AF VSS  Pain controlled  Dressing taken down today    Home Meds:  Current Outpatient Medications   Medication Instructions    acetaminophen (TYLENOL) 650 mg, Oral, Every 6 hours PRN    albuterol (PROVENTIL/VENTOLIN HFA) 90 mcg/actuation inhaler Inhalation, 4 times daily PRN    aluminum & magnesium hydroxide-simethicone (MYLANTA MAX STRENGTH) 400-400-40 mg/5 mL suspension Oral, Every 6 hours PRN    amLODIPine (NORVASC) 10 mg, Oral, Daily    aspirin 81 mg, Oral, Daily    cilostazoL (PLETAL) 50 mg, Oral, 2 times daily    clopidogreL (PLAVIX) 75 mg, Oral, Daily    dextrose 5 % in water (D5W) PgBk 100 mL with piperacillin-tazobactam 4.5 gram SolR 4.5 g 4.5 g, Intravenous, Every 8 hours    docusate sodium (COLACE) 100 mg, Oral, 2 times daily    DULoxetine (CYMBALTA) 30 mg, Oral, Daily    enoxaparin (LOVENOX) 40 mg, Subcutaneous, Every 12 hours    ferrous sulfate 325 mg, Oral, Daily    gabapentin (NEURONTIN) 300 mg, Oral, 3 times daily    gabapentin (NEURONTIN) 600 mg, Oral, Nightly    LIDOcaine (LIDODERM) 5 % 1 patch, Transdermal, Every 24 hours (non-standard times), Remove & Discard patch within 12 hours or as directed by MD    losartan (COZAAR) 100 mg, Oral, Daily    melatonin (MELATIN) 6 mg, Oral, Nightly PRN    multivitamin (THERAGRAN) per tablet 1 tablet, Oral, Daily    oxyCODONE (ROXICODONE) 10 mg, Oral, Every 4 hours    polyethylene glycol (GLYCOLAX) 17 g, Oral, Daily    simethicone (MYLICON) 80 mg, Oral, Every 6 hours PRN    vancomycin HCl in 5 % dextrose (VANCOMYCIN IN DEXTROSE 5 %) 1 gram/250 mL Soln 1,000 mg, Intravenous, Every 12 hours      Scheduled Meds:  Current Facility-Administered Medications   Medication Dose Route Frequency    amLODIPine  10 mg Oral Daily    cilostazoL  50 mg Oral BID    docusate sodium  100 mg Oral BID    DULoxetine  30 mg Oral Daily    ferrous sulfate  1 tablet  Oral Daily    gabapentin  300 mg Oral TID    gabapentin  600 mg Oral QHS    LIDOcaine (PF) 10 mg/ml (1%)  1 mL Other Once    LIDOcaine  1 patch Transdermal Q24H    losartan  100 mg Oral Daily    multivitamin  1 tablet Oral Daily    oxyCODONE  10 mg Oral Q4H    piperacillin-tazobactam (Zosyn) IV (PEDS and ADULTS) (extended infusion is not appropriate)  4.5 g Intravenous Q8H    polyethylene glycol  17 g Oral Daily    sodium chloride 0.9%  10 mL Intravenous Q6H    tamsulosin  0.4 mg Oral Daily    vancomycin (VANCOCIN) IV (PEDS and ADULTS)  1,000 mg Intravenous Q18H     Continuous Infusions:  Current Facility-Administered Medications   Medication Dose Route Frequency Last Rate Last Admin     PRN Meds:  Current Facility-Administered Medications:     0.9%  NaCl infusion (for blood administration), , Intravenous, Q24H PRN    0.9%  NaCl infusion (for blood administration), , Intravenous, Q24H PRN    0.9%  NaCl infusion (for blood administration), , Intravenous, Q24H PRN    0.9%  NaCl infusion (for blood administration), , Intravenous, Q24H PRN    0.9%  NaCl infusion (for blood administration), , Intravenous, Q24H PRN    acetaminophen, 650 mg, Oral, Q6H PRN    albuterol, 2 puff, Inhalation, QID PRN    aluminum-magnesium hydroxide-simethicone, 15 mL, Oral, Q6H PRN    HYDROcodone-acetaminophen, 1 tablet, Oral, Q4H PRN    melatonin, 6 mg, Oral, Nightly PRN    simethicone, 80 mg, Oral, Q6H PRN    Flushing PICC/Midline Protocol, , , Until Discontinued **AND** sodium chloride 0.9%, 10 mL, Intravenous, Q6H **AND** sodium chloride 0.9%, 10 mL, Intravenous, PRN    Pharmacy to dose Vancomycin consult, , , Once **AND** vancomycin - pharmacy to dose, , Intravenous, pharmacy to manage frequency     Objective:     VITAL SIGNS: 24 HR MIN & MAX LAST   Temp  Min: 97.9 °F (36.6 °C)  Max: 101.3 °F (38.5 °C)  97.9 °F (36.6 °C)   BP  Min: 90/52  Max: 137/57  (!) 90/52    Pulse  Min: 85  Max: 101  91    Resp  Min: 16  Max: 18  18    SpO2  Min: 88  "%  Max: 94 %  (!) 89 %      HT: 5' 9.02" (175.3 cm)  WT: 68 kg (150 lb)  BMI: 22.1     Intake/output:  Intake/Output - Last 3 Shifts         04/24 0700 04/25 0659 04/25 0700 04/26 0659 04/26 0700 04/27 0659    P.O. 240 840     IV Piggyback 700      Total Intake(mL/kg) 940 (13.8) 840 (12.4)     Urine (mL/kg/hr) 550 (0.3) 1600 (1)     Other       Stool  0     Total Output 550 1600     Net +390 -760            Urine Occurrence  2 x     Stool Occurrence  2 x             Intake/Output Summary (Last 24 hours) at 4/26/2024 0752  Last data filed at 4/26/2024 0653  Gross per 24 hour   Intake 840 ml   Output 1600 ml   Net -760 ml           Lines/drains/airway:  PICC Double Lumen 03/28/24 1244 right basilic (Active)   $ PICC Line Charges (Upon insertion) Bedside Insertion Performed Pt < 5 Years Old (no subq port/pump or image guidance);Catheter - Double Lumen (Supply) 04/06/24 0929   Line Necessity Review Longterm central access required 04/22/24 2117   Site Assessment No drainage;No redness;No swelling;No warmth 04/25/24 2000   Extremity Assessment Distal to IV No abnormal discoloration;No redness;No swelling;No warmth 04/25/24 2000   Line Securement Device Secured with sutureless device 04/25/24 2000   Dressing Type CHG impregnated dressing/sponge 04/25/24 2000   Dressing Status Clean;Dry;Intact 04/25/24 2000   Dressing Intervention Integrity maintained 04/25/24 2000   Date on Dressing 04/24/24 04/25/24 2000   Dressing Due to be Changed 05/01/24 04/25/24 2000   Distal Patency/Care infusing 04/25/24 2000   Proximal 1 Patency/Care infusing 04/25/24 2000   Number of days: 28       Physical examination:  Gen: NAD, AAOx3, answering questions appropriately  HEENT: JESUS ALBERTO  CV: RR  Resp: NWOB  Ext: L aka stump healthy appearing    Labs:  Renal:  Recent Labs     04/25/24  0016   BUN 8.4   CREATININE 0.67*     No results for input(s): "LACTIC" in the last 72 hours.  ALYX:  Recent Labs     04/25/24  0016   *   K 4.0   CO2 25 " "  CALCIUM 8.1*   ALBUMIN 2.2*   BILITOT 0.3   AST 17   ALKPHOS 80   ALT 15     Heme:  Recent Labs     04/25/24  0016   HGB 8.6*   HCT 27.5*        ID:  Recent Labs     04/25/24  0016   WBC 9.43     CBG:  Recent Labs     04/25/24  0016   GLUCOSE 110      Cardiovascular:  No results for input(s): "TROPONINI", "CKTOTAL", "CKMB", "BNP" in the last 168 hours.  ABG:  No results for input(s): "PH", "PO2", "PCO2", "HCO3", "BE" in the last 168 hours.   I have reviewed all pertinent lab results within the past 24 hours.    Imaging:  CT Knee Without Contrast Left   Final Result      1. No significant joint effusion   2. Nonfocal soft tissue swelling   3. Severe bony demineralization   4. Arthritis   5. The preliminary and final reports are concordant.         Electronically signed by: Allyson Pierce   Date:    04/10/2024   Time:    07:35      X-Ray Knee Complete 4 or More Views Left   Final Result      The bones are demineralized.      Degenerative change at the knee with joint effusion.         Electronically signed by: Allyson Pierce   Date:    03/30/2024   Time:    11:16      X-Ray Chest 1 View for Line/Tube Placement   Final Result      Right PICC tip overlies the mid SVC.         Electronically signed by: Gentry Welsh   Date:    03/28/2024   Time:    13:26      X-Ray Tibia Fibula 2 View Right   Final Result      X-Ray Tibia Fibula 2 View Left   Final Result      X-Ray Calcaneus 2 View Right   Final Result      Diffuse mottled appearance to all of the tarsal bones as well as the calcaneus with mild periosteal elevation seen along the posteroinferior aspect of the calcaneus.  Osteomyelitis should be excluded.         Electronically signed by: Mak Merchant   Date:    03/19/2024   Time:    17:42      X-Ray Calcaneus 2 View Left   Final Result         I have reviewed all pertinent imaging results/findings within the past 24 hours.    Micro/Path/Other:  Microbiology Results (last 7 days)       ** No results found " for the last 168 hours. **           Pathology Results  (Last 7 days)                 04/20/24 0940  Specimen to Pathology Final result             Assessment & Plan:   69M PMH COPD, HTN, DM, neuropathy, resident of [facility] presented via EMS, adm 3/15/24 with sacral wound with osteomyelitis, chronic BLE wounds, pain to LLE. Surgery consulted for L AKA. Now s/p L husam AKA 4/20 c/b bleeding s/p return to OR for hemostasis on 4/21. S/P AKA formalization at 4/24.     - Dressing down today, no need for further dressing  - Will arrange follow up in clinic to staple removal    - Please call surgery with further questions/concerns    Sudarshan Rachel MD  PGY1

## 2024-04-26 NOTE — PLAN OF CARE
Problem: Adult Inpatient Plan of Care  Goal: Plan of Care Review  4/26/2024 1142 by Tawanda Rangel LPN  Outcome: Progressing  4/26/2024 1142 by Tawanda Rangel LPN  Outcome: Progressing  Goal: Patient-Specific Goal (Individualized)  4/26/2024 1142 by Tawanda Rangel LPN  Outcome: Progressing  4/26/2024 1142 by Tawanda Rangel LPN  Outcome: Progressing  Goal: Absence of Hospital-Acquired Illness or Injury  4/26/2024 1142 by Tawanda Rangel LPN  Outcome: Progressing  4/26/2024 1142 by Tawanda Rangel LPN  Outcome: Progressing  Goal: Optimal Comfort and Wellbeing  4/26/2024 1142 by Tawanda Rangel LPN  Outcome: Progressing  4/26/2024 1142 by Tawanda Rangel LPN  Outcome: Progressing  Goal: Readiness for Transition of Care  4/26/2024 1142 by Tawanda Rangel LPN  Outcome: Progressing  4/26/2024 1142 by Tawanda Rangel LPN  Outcome: Progressing     Problem: Impaired Wound Healing  Goal: Optimal Wound Healing  4/26/2024 1142 by Tawanda Rangel LPN  Outcome: Progressing  4/26/2024 1142 by Tawanda Rangel LPN  Outcome: Progressing     Problem: Skin Injury Risk Increased  Goal: Skin Health and Integrity  4/26/2024 1142 by Tawanda Rangel LPN  Outcome: Progressing  4/26/2024 1142 by Tawanda Rangel LPN  Outcome: Progressing     Problem: Fall Injury Risk  Goal: Absence of Fall and Fall-Related Injury  4/26/2024 1142 by Tawanda Rangel LPN  Outcome: Progressing  4/26/2024 1142 by Tawanda Rangel LPN  Outcome: Progressing     Problem: Infection  Goal: Absence of Infection Signs and Symptoms  4/26/2024 1142 by Tawanda Rangel LPN  Outcome: Progressing  4/26/2024 1142 by Tawanda Rangel LPN  Outcome: Progressing     Problem: Pain Acute  Goal: Optimal Pain Control and Function  4/26/2024 1142 by Tawanda Rangel LPN  Outcome: Progressing  4/26/2024 1142 by Tawanda Rangel LPN  Outcome: Progressing

## 2024-04-26 NOTE — PROGRESS NOTES
[facility]                       [address] [place] [address]    PATIENT NAME:       HAWA KUMAR  YOB: 1955  CSN:                [id number]   MRN:                [id number]  ADMIT DATE:         03/15/2024 03:09:00  PHYSICIAN:          Tom Nichole DPM                            PROGRESS NOTE    DATE:  04/26/2024 00:00:00    SUBJECTIVE:  The patient is seen today.  He is doing well, overall better.    Pains have been controlled on that left side.  Still with some intermittent   pain.  Tolerating dressing changes to the sacral and right lower extremity   areas.    PHYSICAL EXAMINATION:  VITAL SIGNS:  Stable and afebrile.    Dressings are clean, dry, intact.  No significant bleeding or drainage at this   point.  Good capillary refill to the toe.  Contracture of the right ankle.    ASSESSMENT:  Chronic right lower extremity wounds, slowly improving.    PLAN:  Continue with current care.  Continue offloading.        ______________________________  Tom Nichole DPM    GAS/AQS  DD:  04/26/2024  Time:  03:40PM  DT:  04/26/2024  Time:  04:31PM  Job #:  [id number]      PROGRESS NOTE

## 2024-04-26 NOTE — PLAN OF CARE
Problem: Adult Inpatient Plan of Care  Goal: Plan of Care Review  Outcome: Progressing  Goal: Patient-Specific Goal (Individualized)  Outcome: Progressing  Goal: Absence of Hospital-Acquired Illness or Injury  Outcome: Progressing  Goal: Optimal Comfort and Wellbeing  Outcome: Progressing  Goal: Readiness for Transition of Care  Outcome: Progressing     Problem: Impaired Wound Healing  Goal: Optimal Wound Healing  Outcome: Progressing     Problem: Skin Injury Risk Increased  Goal: Skin Health and Integrity  Outcome: Progressing     Problem: Fall Injury Risk  Goal: Absence of Fall and Fall-Related Injury  Outcome: Progressing     Problem: Infection  Goal: Absence of Infection Signs and Symptoms  Outcome: Progressing     Problem: Pain Acute  Goal: Optimal Pain Control and Function  Outcome: Progressing

## 2024-04-26 NOTE — PROGRESS NOTES
Infectious Diseases Progress Note  69-year-old male, nursing home resident, with past medical history of HTN, COPD, DM type 2, with neuropathy, is admitted to [facility] on 03/15/2024 sent via EMS for sacral wound evaluation, apparently diagnosed with osteomyelitis involving the sacrum on 02/27/2024 with plan to be admitted to LTAC which had not been successful as an outpatient.  On presentation he was noted to have no fevers and no leukocytosis, anemic with low albumin.  Blood cultures have been negative.  Review of his records show a 02/08/2024 left leg wound culture with Pseudomonas, Providencia, ESBL Proteus and 11/30/2023 sacral wound culture with MRSA, Providencia, Enterococcus and Pseudomonas.  2/27 MRI of the pelvis shows osteomyelitis involving the sacrococcygeal bone as well as right trochanteric bursitis which may be septic.  He was seen by surgery team with no plan for surgical intervention.    He is on antibiotic coverage with vancomycin and Zosyn.     Subjective:  Lying in bed in no acute distress. No new complaints voiced.  No fevers noted. Doing about the same    Past Surgical History:   Procedure Laterality Date    ABOVE-KNEE AMPUTATION Left 4/20/2024    Procedure: AMPUTATION, ABOVE KNEE;  Surgeon: Serge Brambila Jr., MD;  Location: [facility] OR;  Service: General;  Laterality: Left;    ABOVE-KNEE AMPUTATION Left 4/24/2024    Procedure: AMPUTATION, ABOVE KNEE (formalization);  Surgeon: Delon Draper MD;  Location: [facility] OR;  Service: General;  Laterality: Left;    angiogram Bilateral     stents placed in left leg    anterior neck surgery      APPLICATION OF WOUND VACUUM-ASSISTED CLOSURE DEVICE Left 4/20/2024    Procedure: APPLICATION, WOUND VAC;  Surgeon: Serge Brambila Jr., MD;  Location: [facility] OR;  Service: General;  Laterality: Left;    ARTHROTOMY OF ANKLE Left 6/28/2023    Procedure: ARTHROTOMY, ANKLE;  Surgeon: Tom Nichole DPM;  Location: [facility] OR;  Service:  Podiatry;  Laterality: Left;    cataracts Left     CHOLECYSTECTOMY      DEBRIDEMENT OF AMPUTATION SITE N/A 4/21/2024    Procedure: DEBRIDEMENT, AMPUTATION SITE;  Surgeon: Serge Brambila Jr., MD;  Location: [facility];  Service: General;  Laterality: N/A;    DEBRIDEMENT OF FOOT Bilateral 3/26/2024    Procedure: DEBRIDEMENT, FOOT;  Surgeon: Tom Nichole DPM;  Location: [facility] OR;  Service: Podiatry;  Laterality: Bilateral;    EYE SURGERY Left     HAND SURGERY Left     broken bone    herina repair      INCISION AND DRAINAGE, LOWER EXTREMITY Left 7/28/2023    Procedure: INCISION AND DRAINAGE, LOWER EXTREMITY;  Surgeon: Avinash Garces MD;  Location: [facility] OR;  Service: Orthopedics;  Laterality: Left;  Supine, vascular bed, bone foam  last case  cysto tubing, laparoscopic trocar, experience, vanc, hemovac drain    KNEE SURGERY Bilateral     PERIPHERAL ANGIOGRAPHY N/A 3/13/2024    Procedure: Peripheral angiography;  Surgeon: Deven Queen MD;  Location: [facility] CATH LAB;  Service: Cardiology;  Laterality: N/A;  MICHELLE ANGIO W/ ANEST.    posterior neck surgery      REPLACEMENT OF WOUND VACUUM-ASSISTED CLOSURE DEVICE Left 4/21/2024    Procedure: REPLACEMENT, WOUND VAC;  Surgeon: Serge Brambila Jr., MD;  Location: [facility];  Service: General;  Laterality: Left;    SPINE SURGERY      TOTAL REPLACEMENT OF BOTH HIP JOINTS USING COMPUTER-ASSISTED NAVIGATION Bilateral      Social History     Socioeconomic History    Marital status:    Occupational History    Occupation: retired   Tobacco Use    Smoking status: Former     Average packs/day: 0.5 packs/day for 15.0 years (7.5 ttl pk-yrs)     Types: Cigarettes     Start date: 2008    Smokeless tobacco: Never   Substance and Sexual Activity    Alcohol use: Yes     Alcohol/week: 3.0 - 6.0 standard drinks of alcohol     Types: 3 - 6 Cans of beer per week    Drug use: Yes     Types: Marijuana    Sexual activity: Not Currently       ROS  Constitutional:  Positive for  malaise/fatigue.   HENT: Negative.     Respiratory: Negative.     Gastrointestinal: Negative.    Genitourinary: Negative.    Musculoskeletal: Positive for L knee pain   Skin: Multiple pressure wounds   Neurological:  Positive for weakness.   Endo/Heme/Allergies: Negative.    Psychiatric/Behavioral: Negative.     All other Systems review done and negative.    Review of patient's allergies indicates:  No Known Allergies      Scheduled Meds:  Current Facility-Administered Medications   Medication Dose Route Frequency    amLODIPine  10 mg Oral Daily    cilostazoL  50 mg Oral BID    docusate sodium  100 mg Oral BID    DULoxetine  30 mg Oral Daily    ferrous sulfate  1 tablet Oral Daily    gabapentin  300 mg Oral TID    gabapentin  600 mg Oral QHS    LIDOcaine (PF) 10 mg/ml (1%)  1 mL Other Once    LIDOcaine  1 patch Transdermal Q24H    losartan  100 mg Oral Daily    multivitamin  1 tablet Oral Daily    oxyCODONE  10 mg Oral Q4H    piperacillin-tazobactam (Zosyn) IV (PEDS and ADULTS) (extended infusion is not appropriate)  4.5 g Intravenous Q8H    polyethylene glycol  17 g Oral Daily    sodium chloride 0.9%  10 mL Intravenous Q6H    tamsulosin  0.4 mg Oral Daily    vancomycin (VANCOCIN) IV (PEDS and ADULTS)  1,000 mg Intravenous Q18H     Continuous Infusions:  Current Facility-Administered Medications   Medication Dose Route Frequency Last Rate Last Admin     PRN Meds:    Current Facility-Administered Medications:     0.9%  NaCl infusion (for blood administration), , Intravenous, Q24H PRN    0.9%  NaCl infusion (for blood administration), , Intravenous, Q24H PRN    0.9%  NaCl infusion (for blood administration), , Intravenous, Q24H PRN    0.9%  NaCl infusion (for blood administration), , Intravenous, Q24H PRN    0.9%  NaCl infusion (for blood administration), , Intravenous, Q24H PRN    acetaminophen, 650 mg, Oral, Q6H PRN    albuterol, 2 puff, Inhalation, QID PRN    aluminum-magnesium hydroxide-simethicone, 15 mL, Oral,  "Q6H PRN    HYDROcodone-acetaminophen, 1 tablet, Oral, Q4H PRN    melatonin, 6 mg, Oral, Nightly PRN    simethicone, 80 mg, Oral, Q6H PRN    Flushing PICC/Midline Protocol, , , Until Discontinued **AND** sodium chloride 0.9%, 10 mL, Intravenous, Q6H **AND** sodium chloride 0.9%, 10 mL, Intravenous, PRN    Pharmacy to dose Vancomycin consult, , , Once **AND** vancomycin - pharmacy to dose, , Intravenous, pharmacy to manage frequency      Objective:  BP (!) 134/56   Pulse 101   Temp 98.8 °F (37.1 °C) (Oral)   Resp 18   Ht 5' 9.02" (1.753 m)   Wt 68 kg (150 lb)   SpO2 (!) 93%   BMI 22.14 kg/m²     Physical Exam:   Physical Exam  Vitals reviewed.   Constitutional:       General: He is not in acute distress.  HENT:      Head: Normocephalic and atraumatic.   Cardiovascular:      Rate and Rhythm: Normal rate and regular rhythm.      Heart sounds: Normal heart sounds.   Pulmonary:      Effort: Pulmonary effort is normal. No respiratory distress.      Breath sounds: Normal breath sounds.   Abdominal:      General: Bowel sounds are normal. There is no distension.      Palpations: Abdomen is soft.      Tenderness: There is no abdominal tenderness.   Musculoskeletal:         General: No deformity.      Cervical back: Neck supple.   Skin:     Findings: No erythema or rash.      Comments: Wound vac to sacrum  Neurological:      Mental Status: He is alert and oriented to person, place, and time.   Psychiatric:      Comments: Calm and cooperative     Imaging  Imaging Results    None          Lab Review   Recent Results (from the past 24 hour(s))   Comprehensive Metabolic Panel    Collection Time: 04/25/24 12:16 AM   Result Value Ref Range    Sodium Level 134 (L) 136 - 145 mmol/L    Potassium Level 4.0 3.5 - 5.1 mmol/L    Chloride 103 98 - 107 mmol/L    Carbon Dioxide 25 23 - 31 mmol/L    Glucose Level 110 82 - 115 mg/dL    Blood Urea Nitrogen 8.4 8.4 - 25.7 mg/dL    Creatinine 0.67 (L) 0.73 - 1.18 mg/dL    Calcium Level Total " 8.1 (L) 8.8 - 10.0 mg/dL    Protein Total 5.1 (L) 5.8 - 7.6 gm/dL    Albumin Level 2.2 (L) 3.4 - 4.8 g/dL    Globulin 2.9 2.4 - 3.5 gm/dL    Albumin/Globulin Ratio 0.8 (L) 1.1 - 2.0 ratio    Bilirubin Total 0.3 <=1.5 mg/dL    Alkaline Phosphatase 80 40 - 150 unit/L    Alanine Aminotransferase 15 0 - 55 unit/L    Aspartate Aminotransferase 17 5 - 34 unit/L    eGFR >60 mls/min/1.73/m2   CBC with Differential    Collection Time: 04/25/24 12:16 AM   Result Value Ref Range    WBC 9.43 4.50 - 11.50 x10(3)/mcL    RBC 3.17 (L) 4.70 - 6.10 x10(6)/mcL    Hgb 8.6 (L) 14.0 - 18.0 g/dL    Hct 27.5 (L) 42.0 - 52.0 %    MCV 86.8 80.0 - 94.0 fL    MCH 27.1 27.0 - 31.0 pg    MCHC 31.3 (L) 33.0 - 36.0 g/dL    RDW 16.5 11.5 - 17.0 %    Platelet 348 130 - 400 x10(3)/mcL    MPV 8.3 7.4 - 10.4 fL    Neut % 71.0 %    Lymph % 11.7 %    Mono % 15.6 %    Eos % 0.7 %    Basophil % 0.7 %    Lymph # 1.10 0.6 - 4.6 x10(3)/mcL    Neut # 6.69 2.1 - 9.2 x10(3)/mcL    Mono # 1.47 (H) 0.1 - 1.3 x10(3)/mcL    Eos # 0.07 0 - 0.9 x10(3)/mcL    Baso # 0.07 <=0.2 x10(3)/mcL    IG# 0.03 0 - 0.04 x10(3)/mcL    IG% 0.3 %    NRBC% 0.0 %   VANCOMYCIN, TROUGH    Collection Time: 04/25/24 11:32 AM   Result Value Ref Range    Vancomycin Trough 17.5 15.0 - 20.0 ug/ml   Prealbumin    Collection Time: 04/25/24 11:32 AM   Result Value Ref Range    Prealbumin 11.0 (L) 16.0 - 42.0 mg/dL           Assessment/Plan:  1. Chronic sacral pressure wound with osteomyelitis  2. History of polymicrobial sacral wound culture - MRSA, Providencia, Pseudomonas, Enterococcus  3.  Chronic left lower extremity wound with history of polymicrobial wound culture-Pseudomonas, ESBL Proteus, Providencia  4. Multiple pressure wounds  5.  Diabetes type 2  6.  Anemia  7.  Protein calorie malnutrition   8.  L knee pain     -Continue Vancomycin and Zosyn, plan end date of 04/26, following inflammatory markers  -No fevers noted and no leukocytosis  -CT L knee without contrast today 4/10 with no  joint effusion, nonfocal soft tissue swelling, severe bone demineralization, arthritis  -Seen by Ortho, inputs noted. S/P L knee aspiration on 3/26   -3/26 left heel tissue cultures with few ESBL Proteus mirabilis  -4/9 ESR 76 from 29 and CRP 72.10 from 39.20  -3/15 blood cultures negative  -2/8 left leg wound cultures with many Pseudomonas, Providencia, Proteus  -11/30/2023 sacral wound with many Providencia, MRSA, Enterococcus, Pseudomonas  -Pt c/o severe L knee pain and wants L AKA. Surgery re-consulted, inputs noted including plans for L AKA in am 4/21  -Podiatry on board s/p debridement 3/26, cultures revealed ESBL Proteus  -We will continue aggressive wound care per Podiatry and the wound care team  -Discussed with patient and nursing staff. Case management working on placement. Disposition per primary team

## 2024-04-27 PROCEDURE — 25000003 PHARM REV CODE 250: Performed by: INTERNAL MEDICINE

## 2024-04-27 PROCEDURE — 25000003 PHARM REV CODE 250: Performed by: PODIATRIST

## 2024-04-27 PROCEDURE — 99900031 HC PATIENT EDUCATION (STAT)

## 2024-04-27 PROCEDURE — A4216 STERILE WATER/SALINE, 10 ML: HCPCS | Performed by: INTERNAL MEDICINE

## 2024-04-27 PROCEDURE — 21400001 HC TELEMETRY ROOM

## 2024-04-27 PROCEDURE — 11000001 HC ACUTE MED/SURG PRIVATE ROOM

## 2024-04-27 PROCEDURE — 99900035 HC TECH TIME PER 15 MIN (STAT)

## 2024-04-27 PROCEDURE — 94799 UNLISTED PULMONARY SVC/PX: CPT

## 2024-04-27 RX ADMIN — SODIUM CHLORIDE, PRESERVATIVE FREE 10 ML: 5 INJECTION INTRAVENOUS at 05:04

## 2024-04-27 RX ADMIN — OXYCODONE HYDROCHLORIDE 10 MG: 10 TABLET ORAL at 08:04

## 2024-04-27 RX ADMIN — SODIUM CHLORIDE, PRESERVATIVE FREE 10 ML: 5 INJECTION INTRAVENOUS at 12:04

## 2024-04-27 RX ADMIN — FERROUS SULFATE TAB 325 MG (65 MG ELEMENTAL FE) 1 EACH: 325 (65 FE) TAB at 08:04

## 2024-04-27 RX ADMIN — GABAPENTIN 300 MG: 300 CAPSULE ORAL at 04:04

## 2024-04-27 RX ADMIN — HYDROCODONE BITARTRATE AND ACETAMINOPHEN 1 TABLET: 5; 325 TABLET ORAL at 02:04

## 2024-04-27 RX ADMIN — GABAPENTIN 600 MG: 300 CAPSULE ORAL at 08:04

## 2024-04-27 RX ADMIN — GABAPENTIN 300 MG: 300 CAPSULE ORAL at 08:04

## 2024-04-27 RX ADMIN — LOSARTAN POTASSIUM 100 MG: 50 TABLET, FILM COATED ORAL at 08:04

## 2024-04-27 RX ADMIN — CILOSTAZOL 50 MG: 50 TABLET ORAL at 08:04

## 2024-04-27 RX ADMIN — OXYCODONE HYDROCHLORIDE 10 MG: 10 TABLET ORAL at 04:04

## 2024-04-27 RX ADMIN — TAMSULOSIN HYDROCHLORIDE 0.4 MG: 0.4 CAPSULE ORAL at 08:04

## 2024-04-27 RX ADMIN — AMLODIPINE BESYLATE 10 MG: 5 TABLET ORAL at 08:04

## 2024-04-27 RX ADMIN — DOCUSATE SODIUM 100 MG: 100 CAPSULE, LIQUID FILLED ORAL at 08:04

## 2024-04-27 RX ADMIN — LIDOCAINE 5% 1 PATCH: 700 PATCH TOPICAL at 10:04

## 2024-04-27 RX ADMIN — SODIUM CHLORIDE, PRESERVATIVE FREE 10 ML: 5 INJECTION INTRAVENOUS at 06:04

## 2024-04-27 RX ADMIN — OXYCODONE HYDROCHLORIDE 10 MG: 10 TABLET ORAL at 12:04

## 2024-04-27 RX ADMIN — THERA TABS 1 TABLET: TAB at 08:04

## 2024-04-27 RX ADMIN — DULOXETINE HYDROCHLORIDE 30 MG: 30 CAPSULE, DELAYED RELEASE ORAL at 08:04

## 2024-04-27 NOTE — PROGRESS NOTES
[facility] - 8th Floor [facility] Medicine  Progress Note    Patient Name: Shaheen Christie  MRN: [id number]  Patient Class: IP- Inpatient   Admission Date: 3/15/2024  Length of Stay: 43 days  Attending Physician: Enzo Thibodeaux MD  Primary Care Provider: Viktor Russ MD        Subjective:     Principal Problem:Osteomyelitis    Interval History:  Patient off antibiotics awaiting placement nursing home by Monday    Review of Systems   Constitutional:  Negative for activity change and appetite change.   HENT:  Negative for congestion.    Respiratory:  Negative for cough, chest tightness and shortness of breath.    Cardiovascular:  Negative for chest pain.   Gastrointestinal:  Negative for abdominal pain, constipation, diarrhea and nausea.   Genitourinary:  Negative for difficulty urinating.   Musculoskeletal:  Negative for arthralgias.   Skin:  Negative for rash.   Neurological:  Negative for dizziness and headaches.   Psychiatric/Behavioral:  Negative for agitation, behavioral problems and confusion.      Objective:     Vital Signs (Most Recent):  Temp: 98.4 °F (36.9 °C) (04/27/24 0408)  Pulse: 70 (04/27/24 0408)  Resp: 16 (04/27/24 0409)  BP: (!) 103/56 (04/27/24 0408)  SpO2: (!) 93 % (04/27/24 0408) Vital Signs (24h Range):  Temp:  [98.1 °F (36.7 °C)-98.6 °F (37 °C)] 98.4 °F (36.9 °C)  Pulse:  [70-93] 70  Resp:  [16-20] 16  SpO2:  [90 %-93 %] 93 %  BP: (103-126)/(47-68) 103/56     Weight: 68 kg (150 lb)  Body mass index is 22.14 kg/m².    Intake/Output Summary (Last 24 hours) at 4/27/2024 0734  Last data filed at 4/26/2024 1500  Gross per 24 hour   Intake 240 ml   Output 475 ml   Net -235 ml      Physical Exam  Nursing note reviewed: Appears much better since the last time I saw.   Constitutional:       General: He is not in acute distress.     Appearance: Normal appearance. He is not ill-appearing or toxic-appearing.   HENT:      Head: Normocephalic and atraumatic.   Neck:       Comments: General appearance of the neck is normal  Cardiovascular:      Rate and Rhythm: Normal rate and regular rhythm.      Heart sounds: No murmur heard.     No gallop.   Pulmonary:      Effort: Pulmonary effort is normal.      Breath sounds: Normal breath sounds. No wheezing, rhonchi or rales.   Abdominal:      General: Abdomen is flat.      Palpations: Abdomen is soft.      Tenderness: There is no abdominal tenderness.   Musculoskeletal:         General: No swelling or tenderness.      Comments: Above the knee amputation left-sided healing well  Right foot bandaged   Skin:     General: Skin is warm and dry.   Neurological:      General: No focal deficit present.      Mental Status: He is alert and oriented to person, place, and time.   Psychiatric:         Mood and Affect: Mood normal.         Behavior: Behavior normal.         Thought Content: Thought content normal.           Overview/Hospital Course:  Patient feeling extremely well good appetite vital signs have stable    Significant Labs: All pertinent labs within the past 24 hours have been reviewed.  Recent Lab Results       None            Significant Imaging: I have reviewed all pertinent imaging results/findings within the past 24 hours.    Assessment/Plan:    Patient is doing extremely well now that antibiotics stopped hopefully fever plan to discharge home on Monday  Active Diagnoses:    Diagnosis Date Noted POA    PRINCIPAL PROBLEM:  Osteomyelitis [M86.9] 03/15/2024 Yes    Knee effusion, left [M25.462] 03/31/2024 No    Pressure injury of sacral region, stage 4 [L89.154] 03/19/2024 Yes    Skin ulcer of right lower leg [L97.919] 03/19/2024 Yes    Skin ulcer of left lower leg [L97.929] 03/19/2024 Yes    Unstageable pressure ulcer of right heel [L89.610] 03/19/2024 Yes    Unstageable pressure ulcer of left heel [L89.620] 03/19/2024 Yes      Problems Resolved During this Admission:     VTE Risk Mitigation (From admission, onward)      None                Shaheen Talamantes MD  Department of Hospital Medicine   [facility] - 8th Floor Med Surg

## 2024-04-27 NOTE — PROGRESS NOTES
Infectious Diseases Progress Note  69-year-old male, nursing home resident, with past medical history of HTN, COPD, DM type 2, with neuropathy, is admitted to [facility] on 03/15/2024 sent via EMS for sacral wound evaluation, apparently diagnosed with osteomyelitis involving the sacrum on 02/27/2024 with plan to be admitted to LTAC which had not been successful as an outpatient.  On presentation he was noted to have no fevers and no leukocytosis, anemic with low albumin.  Blood cultures have been negative.  Review of his records show a 02/08/2024 left leg wound culture with Pseudomonas, Providencia, ESBL Proteus and 11/30/2023 sacral wound culture with MRSA, Providencia, Enterococcus and Pseudomonas.  2/27 MRI of the pelvis shows osteomyelitis involving the sacrococcygeal bone as well as right trochanteric bursitis which may be septic.  He was seen by surgery team with no plan for surgical intervention.    He is on antibiotic coverage with vancomycin and Zosyn.     Subjective:  Lying in bed in no acute distress. No new complaints voiced.  Fever spike yesterday noted. Doing about the same otherwise    Past Surgical History:   Procedure Laterality Date    ABOVE-KNEE AMPUTATION Left 4/20/2024    Procedure: AMPUTATION, ABOVE KNEE;  Surgeon: Serge Brambila Jr., MD;  Location: [facility];  Service: General;  Laterality: Left;    ABOVE-KNEE AMPUTATION Left 4/24/2024    Procedure: AMPUTATION, ABOVE KNEE (formalization);  Surgeon: Delon Draper MD;  Location: [facility] OR;  Service: General;  Laterality: Left;    angiogram Bilateral     stents placed in left leg    anterior neck surgery      APPLICATION OF WOUND VACUUM-ASSISTED CLOSURE DEVICE Left 4/20/2024    Procedure: APPLICATION, WOUND VAC;  Surgeon: Serge Brambila Jr., MD;  Location: [facility] OR;  Service: General;  Laterality: Left;    ARTHROTOMY OF ANKLE Left 6/28/2023    Procedure: ARTHROTOMY, ANKLE;  Surgeon: Tom Nichole DPM;   Location: [facility];  Service: Podiatry;  Laterality: Left;    cataracts Left     CHOLECYSTECTOMY      DEBRIDEMENT OF AMPUTATION SITE N/A 4/21/2024    Procedure: DEBRIDEMENT, AMPUTATION SITE;  Surgeon: Serge Brambila Jr., MD;  Location: [facility] OR;  Service: General;  Laterality: N/A;    DEBRIDEMENT OF FOOT Bilateral 3/26/2024    Procedure: DEBRIDEMENT, FOOT;  Surgeon: Tom Nichole DPM;  Location: [facility] OR;  Service: Podiatry;  Laterality: Bilateral;    EYE SURGERY Left     HAND SURGERY Left     broken bone    herina repair      INCISION AND DRAINAGE, LOWER EXTREMITY Left 7/28/2023    Procedure: INCISION AND DRAINAGE, LOWER EXTREMITY;  Surgeon: Avinash Garces MD;  Location: [facility] OR;  Service: Orthopedics;  Laterality: Left;  Supine, vascular bed, bone foam  last case  cysto tubing, laparoscopic trocar, experience, vanc, hemovac drain    KNEE SURGERY Bilateral     PERIPHERAL ANGIOGRAPHY N/A 3/13/2024    Procedure: Peripheral angiography;  Surgeon: Deven Queen MD;  Location: [facility] CATH LAB;  Service: Cardiology;  Laterality: N/A;  MICHELLE ANGIO W/ ANEST.    posterior neck surgery      REPLACEMENT OF WOUND VACUUM-ASSISTED CLOSURE DEVICE Left 4/21/2024    Procedure: REPLACEMENT, WOUND VAC;  Surgeon: Serge Brambila Jr., MD;  Location: [facility];  Service: General;  Laterality: Left;    SPINE SURGERY      TOTAL REPLACEMENT OF BOTH HIP JOINTS USING COMPUTER-ASSISTED NAVIGATION Bilateral      Social History     Socioeconomic History    Marital status:    Occupational History    Occupation: retired   Tobacco Use    Smoking status: Former     Average packs/day: 0.5 packs/day for 15.0 years (7.5 ttl pk-yrs)     Types: Cigarettes     Start date: 2008    Smokeless tobacco: Never   Substance and Sexual Activity    Alcohol use: Yes     Alcohol/week: 3.0 - 6.0 standard drinks of alcohol     Types: 3 - 6 Cans of beer per week    Drug use: Yes     Types: Marijuana    Sexual activity: Not Currently        ROS  Constitutional:  Positive for malaise/fatigue.   HENT: Negative.     Respiratory: Negative.     Gastrointestinal: Negative.    Genitourinary: Negative.    Musculoskeletal: Positive for L knee pain   Skin: Multiple pressure wounds   Neurological:  Positive for weakness.   Endo/Heme/Allergies: Negative.    Psychiatric/Behavioral: Negative.     All other Systems review done and negative.    Review of patient's allergies indicates:  No Known Allergies      Scheduled Meds:  Current Facility-Administered Medications   Medication Dose Route Frequency    amLODIPine  10 mg Oral Daily    cilostazoL  50 mg Oral BID    docusate sodium  100 mg Oral BID    DULoxetine  30 mg Oral Daily    ferrous sulfate  1 tablet Oral Daily    gabapentin  300 mg Oral TID    gabapentin  600 mg Oral QHS    LIDOcaine (PF) 10 mg/ml (1%)  1 mL Other Once    LIDOcaine  1 patch Transdermal Q24H    losartan  100 mg Oral Daily    multivitamin  1 tablet Oral Daily    oxyCODONE  10 mg Oral Q4H    piperacillin-tazobactam (Zosyn) IV (PEDS and ADULTS) (extended infusion is not appropriate)  4.5 g Intravenous Q8H    polyethylene glycol  17 g Oral Daily    sodium chloride 0.9%  10 mL Intravenous Q6H    tamsulosin  0.4 mg Oral Daily     Continuous Infusions:  Current Facility-Administered Medications   Medication Dose Route Frequency Last Rate Last Admin     PRN Meds:    Current Facility-Administered Medications:     0.9%  NaCl infusion (for blood administration), , Intravenous, Q24H PRN    0.9%  NaCl infusion (for blood administration), , Intravenous, Q24H PRN    0.9%  NaCl infusion (for blood administration), , Intravenous, Q24H PRN    0.9%  NaCl infusion (for blood administration), , Intravenous, Q24H PRN    0.9%  NaCl infusion (for blood administration), , Intravenous, Q24H PRN    acetaminophen, 650 mg, Oral, Q6H PRN    albuterol, 2 puff, Inhalation, QID PRN    aluminum-magnesium hydroxide-simethicone, 15 mL, Oral, Q6H PRN     "HYDROcodone-acetaminophen, 1 tablet, Oral, Q4H PRN    melatonin, 6 mg, Oral, Nightly PRN    simethicone, 80 mg, Oral, Q6H PRN    Flushing PICC/Midline Protocol, , , Until Discontinued **AND** sodium chloride 0.9%, 10 mL, Intravenous, Q6H **AND** sodium chloride 0.9%, 10 mL, Intravenous, PRN      Objective:  BP (!) 126/53   Pulse 87   Temp 98.6 °F (37 °C) (Oral)   Resp 18   Ht 5' 9.02" (1.753 m)   Wt 68 kg (150 lb)   SpO2 (!) 91%   BMI 22.14 kg/m²     Physical Exam:   Physical Exam  Vitals reviewed.   Constitutional:       General: He is not in acute distress.  HENT:      Head: Normocephalic and atraumatic.   Cardiovascular:      Rate and Rhythm: Normal rate and regular rhythm.      Heart sounds: Normal heart sounds.   Pulmonary:      Effort: Pulmonary effort is normal. No respiratory distress.      Breath sounds: Normal breath sounds.   Abdominal:      General: Bowel sounds are normal. There is no distension.      Palpations: Abdomen is soft.      Tenderness: There is no abdominal tenderness.   Musculoskeletal:         General: No deformity.      Cervical back: Neck supple.   Skin:     Findings: No erythema or rash.      Comments: Wound vac to sacrum  Neurological:      Mental Status: He is alert and oriented to person, place, and time.   Psychiatric:      Comments: Calm and cooperative     Imaging  Imaging Results    None          Lab Review   No results found for this or any previous visit (from the past 24 hour(s)).          Assessment/Plan:  1. Chronic sacral pressure wound with osteomyelitis  2. History of polymicrobial sacral wound culture - MRSA, Providencia, Pseudomonas, Enterococcus  3.  Chronic left lower extremity wound with history of polymicrobial wound culture-Pseudomonas, ESBL Proteus, Providencia  4. Multiple pressure wounds  5.  Diabetes type 2  6.  Anemia  7.  Protein calorie malnutrition   8.  L knee pain     -Completing course of Vancomycin and Zosyn today, 04/26  -Fevers noted and no " leukocytosis, follow  -CT L knee without contrast today 4/10 with no joint effusion, nonfocal soft tissue swelling, severe bone demineralization, arthritis  -Seen by Ortho, inputs noted. 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 Case management working on placement. Disposition per primary team

## 2024-04-27 NOTE — PLAN OF CARE
Problem: Adult Inpatient Plan of Care  Goal: Plan of Care Review  Outcome: Met  Goal: Patient-Specific Goal (Individualized)  Outcome: Met  Goal: Absence of Hospital-Acquired Illness or Injury  Outcome: Met  Goal: Optimal Comfort and Wellbeing  Outcome: Met  Goal: Readiness for Transition of Care  Outcome: Met     Problem: Impaired Wound Healing  Goal: Optimal Wound Healing  Outcome: Met     Problem: Skin Injury Risk Increased  Goal: Skin Health and Integrity  Outcome: Met     Problem: Fall Injury Risk  Goal: Absence of Fall and Fall-Related Injury  Outcome: Met     Problem: Infection  Goal: Absence of Infection Signs and Symptoms  Outcome: Met     Problem: Pain Acute  Goal: Optimal Pain Control and Function  Outcome: Met

## 2024-04-28 PROCEDURE — 94799 UNLISTED PULMONARY SVC/PX: CPT | Mod: XB

## 2024-04-28 PROCEDURE — 94760 N-INVAS EAR/PLS OXIMETRY 1: CPT

## 2024-04-28 PROCEDURE — 99900031 HC PATIENT EDUCATION (STAT)

## 2024-04-28 PROCEDURE — 25000003 PHARM REV CODE 250: Performed by: PODIATRIST

## 2024-04-28 PROCEDURE — 11000001 HC ACUTE MED/SURG PRIVATE ROOM

## 2024-04-28 PROCEDURE — 99900035 HC TECH TIME PER 15 MIN (STAT)

## 2024-04-28 PROCEDURE — A4216 STERILE WATER/SALINE, 10 ML: HCPCS | Performed by: INTERNAL MEDICINE

## 2024-04-28 PROCEDURE — 25000003 PHARM REV CODE 250: Performed by: INTERNAL MEDICINE

## 2024-04-28 RX ADMIN — DOCUSATE SODIUM 100 MG: 100 CAPSULE, LIQUID FILLED ORAL at 09:04

## 2024-04-28 RX ADMIN — DOCUSATE SODIUM 100 MG: 100 CAPSULE, LIQUID FILLED ORAL at 08:04

## 2024-04-28 RX ADMIN — THERA TABS 1 TABLET: TAB at 09:04

## 2024-04-28 RX ADMIN — TAMSULOSIN HYDROCHLORIDE 0.4 MG: 0.4 CAPSULE ORAL at 09:04

## 2024-04-28 RX ADMIN — FERROUS SULFATE TAB 325 MG (65 MG ELEMENTAL FE) 1 EACH: 325 (65 FE) TAB at 09:04

## 2024-04-28 RX ADMIN — LIDOCAINE 5% 1 PATCH: 700 PATCH TOPICAL at 09:04

## 2024-04-28 RX ADMIN — CILOSTAZOL 50 MG: 50 TABLET ORAL at 08:04

## 2024-04-28 RX ADMIN — GABAPENTIN 600 MG: 300 CAPSULE ORAL at 08:04

## 2024-04-28 RX ADMIN — SODIUM CHLORIDE, PRESERVATIVE FREE 10 ML: 5 INJECTION INTRAVENOUS at 12:04

## 2024-04-28 RX ADMIN — CILOSTAZOL 50 MG: 50 TABLET ORAL at 09:04

## 2024-04-28 RX ADMIN — OXYCODONE HYDROCHLORIDE 10 MG: 10 TABLET ORAL at 12:04

## 2024-04-28 RX ADMIN — GABAPENTIN 300 MG: 300 CAPSULE ORAL at 08:04

## 2024-04-28 RX ADMIN — OXYCODONE HYDROCHLORIDE 10 MG: 10 TABLET ORAL at 08:04

## 2024-04-28 RX ADMIN — SODIUM CHLORIDE, PRESERVATIVE FREE 10 ML: 5 INJECTION INTRAVENOUS at 06:04

## 2024-04-28 RX ADMIN — LOSARTAN POTASSIUM 100 MG: 50 TABLET, FILM COATED ORAL at 09:04

## 2024-04-28 RX ADMIN — OXYCODONE HYDROCHLORIDE 10 MG: 10 TABLET ORAL at 04:04

## 2024-04-28 RX ADMIN — DULOXETINE HYDROCHLORIDE 30 MG: 30 CAPSULE, DELAYED RELEASE ORAL at 09:04

## 2024-04-28 RX ADMIN — AMLODIPINE BESYLATE 10 MG: 5 TABLET ORAL at 09:04

## 2024-04-28 RX ADMIN — GABAPENTIN 300 MG: 300 CAPSULE ORAL at 09:04

## 2024-04-28 RX ADMIN — GABAPENTIN 300 MG: 300 CAPSULE ORAL at 03:04

## 2024-04-28 RX ADMIN — SODIUM CHLORIDE, PRESERVATIVE FREE 10 ML: 5 INJECTION INTRAVENOUS at 05:04

## 2024-04-28 NOTE — PROGRESS NOTES
[facility] 8th Floor [facility] Medicine  Progress Note    Patient Name: Shaheen Christie  MRN: [id number]  Patient Class: IP- Inpatient   Admission Date: 3/15/2024  Length of Stay: 44 days  Attending Physician: Enzo Thibodeaux MD  Primary Care Provider: Viktor Russ MD        Subjective:     Principal Problem:Osteomyelitis    Interval History:  Basically no changes    Review of Systems   Constitutional:  Negative for activity change and appetite change.   Respiratory:  Negative for shortness of breath and wheezing.    Cardiovascular:  Negative for chest pain.   Gastrointestinal:  Negative for abdominal pain, constipation, diarrhea and nausea.   Musculoskeletal:  Positive for arthralgias (Pain ongoing in the left hip).   Skin:  Positive for wound (wounds continue the same). Negative for rash.   Neurological:  Negative for headaches.   Psychiatric/Behavioral:  Negative for agitation, behavioral problems and confusion.      Objective:     Vital Signs (Most Recent):  Temp: 98.6 °F (37 °C) (04/28/24 0430)  Pulse: 88 (04/28/24 0430)  Resp: 20 (04/28/24 0430)  BP: 128/68 (04/28/24 0430)  SpO2: (!) 93 % (04/28/24 0430) Vital Signs (24h Range):  Temp:  [97.9 °F (36.6 °C)-99.6 °F (37.6 °C)] 98.6 °F (37 °C)  Pulse:  [79-88] 88  Resp:  [16-20] 20  SpO2:  [91 %-93 %] 93 %  BP: (115-153)/(51-70) 128/68     Weight: 68 kg (150 lb)  Body mass index is 22.14 kg/m².    Intake/Output Summary (Last 24 hours) at 4/28/2024 0550  Last data filed at 4/27/2024 2300  Gross per 24 hour   Intake 1500 ml   Output 1125 ml   Net 375 ml      Physical Exam  Vitals and nursing note reviewed.   Constitutional:       General: He is not in acute distress.     Appearance: Normal appearance. He is not ill-appearing.   HENT:      Head: Normocephalic and atraumatic.   Neck:      Comments: Neck appears normal  Cardiovascular:      Rate and Rhythm: Normal rate and regular rhythm.      Heart sounds: No murmur heard.     No  friction rub. No gallop.   Pulmonary:      Effort: Pulmonary effort is normal.      Breath sounds: Normal breath sounds. No wheezing, rhonchi or rales.   Abdominal:      General: Abdomen is flat. There is no distension.      Palpations: Abdomen is soft.      Tenderness: There is no abdominal tenderness. There is no guarding.   Musculoskeletal:         General: No swelling or deformity.      Comments: Left AKA   Skin:     General: Skin is warm and dry.   Neurological:      Mental Status: He is alert.      Cranial Nerves: No cranial nerve deficit.   Psychiatric:         Mood and Affect: Mood normal.         Behavior: Behavior normal.         Thought Content: Thought content normal.         Judgment: Judgment normal.           Overview/Hospital Course:  Patient tolerated surgery well awaiting discharge    Significant Labs: All pertinent labs within the past 24 hours have been reviewed.  Recent Lab Results       None            Significant Imaging: I have reviewed all pertinent imaging results/findings within the past 24 hours.    Assessment/Plan:    Has completed antibiotics hopefully be discharged in a.m.  Active Diagnoses:    Diagnosis Date Noted POA    PRINCIPAL PROBLEM:  Osteomyelitis [M86.9] 03/15/2024 Yes    Knee effusion, left [M25.462] 03/31/2024 No    Pressure injury of sacral region, stage 4 [L89.154] 03/19/2024 Yes    Skin ulcer of right lower leg [L97.919] 03/19/2024 Yes    Skin ulcer of left lower leg [L97.929] 03/19/2024 Yes    Unstageable pressure ulcer of right heel [L89.610] 03/19/2024 Yes    Unstageable pressure ulcer of left heel [L89.620] 03/19/2024 Yes      Problems Resolved During this Admission:     VTE Risk Mitigation (From admission, onward)      None               Shaheen Talamantes MD  Department of Hospital Medicine   [facility] - 8th Floor Med Surg

## 2024-04-28 NOTE — PROGRESS NOTES
[facility]                       [address] [address]    PATIENT NAME:       HAWA KUMAR  YOB: 1955  CSN:                [id number]   MRN:                [id number]  ADMIT DATE:         03/15/2024 03:09:00  PHYSICIAN:          Tom Nichole DPM                            PROGRESS NOTE    DATE:  04/28/2024 00:00:00    SUBJECTIVE:  The patient is seen today.  Not voicing any complaints.  Plans for   discharge tomorrow.  Currently off antibiotics.    PHYSICAL EXAMINATION:  VITAL SIGNS:  Stable and afebrile.      No labs posted for today.    Extremity remains unchanged on that right side.  Noted contracture at the ankle.    Wounds are fibrogranular.  Scant exudate.  No fluctuance, crepitation, or   bogginess.  No stanley necrosis or bone exposure.  No palpable pedal pulses.    Left stump not evaluated.    ASSESSMENT:  Chronic right lower extremity wounds status post debridement.    PLAN:  Continue with current care.  From my standpoint upon discharge, the   patient needs to have Dakin's wet-to-dry dressings done on a daily basis along   with strict offloading.  I am unsure as to what arrangements are being made to   follow up with Wound Care.  Continue with all other care in the meantime.    Dressings were changed.        ______________________________  Tom Nichole DPM    GAS/AQS  DD:  04/28/2024  Time:  09:56AM  DT:  04/28/2024  Time:  10:08AM  Job #:  [id number]      PROGRESS NOTE

## 2024-04-29 PROCEDURE — 99900031 HC PATIENT EDUCATION (STAT)

## 2024-04-29 PROCEDURE — 25000003 PHARM REV CODE 250: Performed by: INTERNAL MEDICINE

## 2024-04-29 PROCEDURE — A4216 STERILE WATER/SALINE, 10 ML: HCPCS | Performed by: INTERNAL MEDICINE

## 2024-04-29 PROCEDURE — 94799 UNLISTED PULMONARY SVC/PX: CPT | Mod: XB

## 2024-04-29 PROCEDURE — 11000001 HC ACUTE MED/SURG PRIVATE ROOM

## 2024-04-29 PROCEDURE — 25000003 PHARM REV CODE 250: Performed by: PODIATRIST

## 2024-04-29 RX ADMIN — GABAPENTIN 300 MG: 300 CAPSULE ORAL at 08:04

## 2024-04-29 RX ADMIN — CILOSTAZOL 50 MG: 50 TABLET ORAL at 08:04

## 2024-04-29 RX ADMIN — TAMSULOSIN HYDROCHLORIDE 0.4 MG: 0.4 CAPSULE ORAL at 09:04

## 2024-04-29 RX ADMIN — OXYCODONE HYDROCHLORIDE 10 MG: 10 TABLET ORAL at 03:04

## 2024-04-29 RX ADMIN — GABAPENTIN 300 MG: 300 CAPSULE ORAL at 09:04

## 2024-04-29 RX ADMIN — DOCUSATE SODIUM 100 MG: 100 CAPSULE, LIQUID FILLED ORAL at 09:04

## 2024-04-29 RX ADMIN — LIDOCAINE 5% 1 PATCH: 700 PATCH TOPICAL at 09:04

## 2024-04-29 RX ADMIN — AMLODIPINE BESYLATE 10 MG: 5 TABLET ORAL at 09:04

## 2024-04-29 RX ADMIN — SODIUM CHLORIDE, PRESERVATIVE FREE 10 ML: 5 INJECTION INTRAVENOUS at 11:04

## 2024-04-29 RX ADMIN — OXYCODONE HYDROCHLORIDE 10 MG: 10 TABLET ORAL at 08:04

## 2024-04-29 RX ADMIN — FERROUS SULFATE TAB 325 MG (65 MG ELEMENTAL FE) 1 EACH: 325 (65 FE) TAB at 09:04

## 2024-04-29 RX ADMIN — SODIUM CHLORIDE, PRESERVATIVE FREE 10 ML: 5 INJECTION INTRAVENOUS at 12:04

## 2024-04-29 RX ADMIN — OXYCODONE HYDROCHLORIDE 10 MG: 10 TABLET ORAL at 11:04

## 2024-04-29 RX ADMIN — CILOSTAZOL 50 MG: 50 TABLET ORAL at 09:04

## 2024-04-29 RX ADMIN — LOSARTAN POTASSIUM 100 MG: 50 TABLET, FILM COATED ORAL at 09:04

## 2024-04-29 RX ADMIN — THERA TABS 1 TABLET: TAB at 09:04

## 2024-04-29 RX ADMIN — OXYCODONE HYDROCHLORIDE 10 MG: 10 TABLET ORAL at 12:04

## 2024-04-29 RX ADMIN — OXYCODONE HYDROCHLORIDE 10 MG: 10 TABLET ORAL at 07:04

## 2024-04-29 RX ADMIN — OXYCODONE HYDROCHLORIDE 10 MG: 10 TABLET ORAL at 04:04

## 2024-04-29 RX ADMIN — GABAPENTIN 300 MG: 300 CAPSULE ORAL at 03:04

## 2024-04-29 RX ADMIN — GABAPENTIN 600 MG: 300 CAPSULE ORAL at 08:04

## 2024-04-29 RX ADMIN — DULOXETINE HYDROCHLORIDE 30 MG: 30 CAPSULE, DELAYED RELEASE ORAL at 09:04

## 2024-04-29 NOTE — PLAN OF CARE
[facility] sent updated clinicals to [facility] via [place]. Patient may return when medically stable and specifics are taken care of.

## 2024-04-29 NOTE — OP NOTE
AMPUTATION, ABOVE KNEE (formalization)  Procedure Note    Shaheen Christie  4/24/2024    Pre-op Diagnosis: Knee effusion, left [M25.462]  PAD (peripheral artery disease) [I73.9]       Post-op Diagnosis: same    Procedure(s):  AMPUTATION, ABOVE KNEE (formalization)    Surgeon(s):  Delon Draper MD    Anesthesia: General    Staff:   Circulator: Trudy Baeza RN  Scrub Person: Zachery Aaron CST; Glo Robbins ST    Estimated Blood Loss: minimal               Specimens: left femur bone      Drains: None    Operative Technique:  Risks and benefits were discussed with patient and family at bedside, informed consent signed.  Patient was taken to the OR and placed supine, endotracheal intubation was successful.  The left leg was then prepped and draped in sterile fashion.  A time out was completed to confirm correct patient, procedure, and all staff was in agreement.      The wound was inspected and noted to be hemostatic.  It was then irrigated with a copious amount of sterile saline.  The bone appeared to be longer then needed so the powered saw was used for the amputation of the femur.  Additional muscle was taken to allow for a tension free closure.  Meticulous hemostasis was obtained with electrocautery. With meticulous hemostasis multiple interrupted 0 Vicryl sutures were placed to approximate the anterior and posterior fascia. Subsequently 2-0 Vicryl suture was used for the subcutaneous tissue followed by closure with skin staples everting the skin under no tension. A sterile dressing was applied and the patient tolerated the procedure well.    SURGEON:  Delon Draper MD    Date: 4/24/2024  Time: 2:09 PM

## 2024-04-29 NOTE — PROGRESS NOTES
[facility] - 8th Floor [facility] Medicine  Progress Note    Patient Name: Shaheen Christie  MRN: [id number]  Patient Class: IP- Inpatient   Admission Date: 3/15/2024  Length of Stay: 45 days  Attending Physician: Enzo Thibodeaux MD  Primary Care Provider: Viktor Russ MD        Subjective:     Principal Problem:Osteomyelitis    Interval History:  No new issues.    Review of Systems   All other systems reviewed and are negative.    Objective:     Vital Signs (Most Recent):  Temp: 98.4 °F (36.9 °C) (04/29/24 1512)  Pulse: 81 (04/29/24 1512)  Resp: 18 (04/29/24 1534)  BP: 122/65 (04/29/24 1512)  SpO2: 96 % (04/29/24 1512) Vital Signs (24h Range):  Temp:  [97.5 °F (36.4 °C)-99.2 °F (37.3 °C)] 98.4 °F (36.9 °C)  Pulse:  [81-89] 81  Resp:  [16-20] 18  SpO2:  [92 %-96 %] 96 %  BP: (108-131)/(55-79) 122/65     Weight: 68 kg (150 lb)  Body mass index is 22.14 kg/m².    Intake/Output Summary (Last 24 hours) at 4/29/2024 1615  Last data filed at 4/29/2024 1502  Gross per 24 hour   Intake 880 ml   Output 1000 ml   Net -120 ml      Physical Exam  HENT:      Head: Normocephalic and atraumatic.      Right Ear: External ear normal.      Left Ear: External ear normal.   Eyes:      Extraocular Movements: Extraocular movements intact.   Cardiovascular:      Rate and Rhythm: Normal rate and regular rhythm.      Pulses: Normal pulses.      Heart sounds: Normal heart sounds.   Musculoskeletal:      Cervical back: Neck supple.   Skin:     General: Skin is warm.   Neurological:      General: No focal deficit present.      Mental Status: He is alert and oriented to person, place, and time. Mental status is at baseline.   Psychiatric:         Mood and Affect: Mood normal.         Behavior: Behavior normal.           Overview/Hospital Course:  Patient could not be discharged due to inability to do narcotic through the electronic medical records.  Be physically year and provide a hard script.  He will be  "leaving tomorrow.    Significant Labs: All pertinent labs within the past 24 hours have been reviewed.  CBC: No results for input(s): "WBC", "HGB", "HCT", "PLT" in the last 48 hours.  CMP: No results for input(s): "NA", "K", "CL", "CO2", "GLU", "BUN", "CREATININE", "CALCIUM", "PROT", "ALBUMIN", "BILITOT", "ALKPHOS", "AST", "ALT", "ANIONGAP", "EGFRNONAA" in the last 48 hours.    Invalid input(s): "ESTGFAFRICA"    Significant Imaging: I have reviewed all pertinent imaging results/findings within the past 24 hours.    Assessment/Plan:      Active Diagnoses:    Diagnosis Date Noted POA    PRINCIPAL PROBLEM:  Osteomyelitis [M86.9] 03/15/2024 Yes    Knee effusion, left [M25.462] 03/31/2024 No    Pressure injury of sacral region, stage 4 [L89.154] 03/19/2024 Yes    Skin ulcer of right lower leg [L97.919] 03/19/2024 Yes    Skin ulcer of left lower leg [L97.929] 03/19/2024 Yes    Unstageable pressure ulcer of right heel [L89.610] 03/19/2024 Yes    Unstageable pressure ulcer of left heel [L89.620] 03/19/2024 Yes      Problems Resolved During this Admission:     VTE Risk Mitigation (From admission, onward)      None               Enzo Thibodeaux MD  Department of Hospital Medicine   [facility] - 8th Floor Med Surg    "

## 2024-04-29 NOTE — PROGRESS NOTES
[facility]                       [address] [address]    PATIENT NAME:       HAWA CHRISTIE  YOB: 1955  CSN:                [id number]   MRN:                [id number]  ADMIT DATE:         03/15/2024 03:09:00  PHYSICIAN:          Tom Nichole DPM                            PROGRESS NOTE    DATE:  04/29/2024 00:00:00    SUBJECTIVE:  Mr. Christie is seen today.  His discharge is being held until   tomorrow.  He is not voicing any complaints.  No other issues.    PHYSICAL EXAMINATION:  VITAL SIGNS:  Stable and he is currently afebrile.    Dressings are intact to right lower extremity.  Wounds remain about the same.    Still with some mild-to-moderate exudate.  No odor.  No fluctuance.  No   crepitation.  No bogginess to the area.    ASSESSMENT:  Chronic right lower extremity wound status post debridement, slowly   improving.    PLAN:  Dressings changed.  Again recommend Dakin's, wet-to-dry dressing changes   daily and strict offloading.        ______________________________  Tom Nichole DPM    GAS/AQS  DD:  04/29/2024  Time:  03:57PM  DT:  04/29/2024  Time:  05:16PM  Job #:  [id number]      PROGRESS NOTE

## 2024-04-29 NOTE — PROGRESS NOTES
Inpatient Nutrition Assessment    Admit Date: 3/15/2024   Total duration of encounter: 45 days   Patient Age: 69 y.o.    Nutrition Recommendation/Prescription     -Continue Heart Healthy Diet as tolerated.    -Encourage Pierre BID for wound healing.   -Continue MVI with minerals as medically feasible.   -Monitor wt, labs, and intake. Obtain and document a more recent measured weight.     Communication of Recommendations: reviewed with patient    Nutrition Assessment     Malnutrition Assessment/Nutrition-Focused Physical Exam    Malnutrition Context: chronic illness (03/20/24 1317)  Malnutrition Level: severe (03/20/24 1317)  Energy Intake (Malnutrition): less than 75% for greater than or equal to 3 months (03/20/24 1317)  Weight Loss (Malnutrition): greater than 7.5% in 3 months (03/20/24 1317)  Subcutaneous Fat (Malnutrition): severe depletion (03/20/24 1317)  Orbital Region (Subcutaneous Fat Loss): severe depletion  Upper Arm Region (Subcutaneous Fat Loss): severe depletion     Muscle Mass (Malnutrition): severe depletion (03/20/24 1317)  Upper Tract Region (Muscle Loss): severe depletion                       Fluid Accumulation (Malnutrition): other (see comments) (does not meet criteria) (03/20/24 1317)  Hand  Strength, Left (Malnutrition): unable to evaluate (03/20/24 1317)  Hand  Strength, Right (Malnutrition): unable to evaluate (03/20/24 1317)  A minimum of two characteristics is recommended for diagnosis of either severe or non-severe malnutrition.    Chart Review    Reason Seen: continuous nutrition monitoring and follow-up    Malnutrition Screening Tool Results   Have you recently lost weight without trying?: No  Have you been eating poorly because of a decreased appetite?: No   MST Score: 0   Diagnosis:  Stage 4 pressure ulcer to sacrum  Unstageable pressure ulcers to bilateral heels  Open wound/ulcers to right lateral lower leg and left lateral lower leg  Peripheral arterial disease - with history of  vascular intervention with Dr. Queen (most recent angio 3/13/24)  PVD  History of polymicrobial sacral wound culture  History of polymicrobial wound culture to leg wound  DM2  Anemia  History of smoking  Low prealbumin     Relevant Medical History: HTN, COPD, DM2 with neuropathy, PAD, chronic venous hypertension     Scheduled Medications:  amLODIPine, 10 mg, Daily  cilostazoL, 50 mg, BID  docusate sodium, 100 mg, BID  DULoxetine, 30 mg, Daily  ferrous sulfate, 1 tablet, Daily  gabapentin, 300 mg, TID  gabapentin, 600 mg, QHS  LIDOcaine (PF) 10 mg/ml (1%), 1 mL, Once  LIDOcaine, 1 patch, Q24H  losartan, 100 mg, Daily  multivitamin, 1 tablet, Daily  oxyCODONE, 10 mg, Q4H  polyethylene glycol, 17 g, Daily  sodium chloride 0.9%, 10 mL, Q6H  tamsulosin, 0.4 mg, Daily    Continuous Infusions:   PRN Medications:   Review      Calorie Containing IV Medications: no significant kcals from medications at this time    Recent Labs   Lab 04/25/24  0016 04/25/24  1132   *  --    K 4.0  --    CALCIUM 8.1*  --    CHLORIDE 103  --    CO2 25  --    BUN 8.4  --    CREATININE 0.67*  --    EGFRNORACEVR >60  --    GLUCOSE 110  --    BILITOT 0.3  --    ALKPHOS 80  --    ALT 15  --    AST 17  --    ALBUMIN 2.2*  --    PREALB  --  11.0*   WBC 9.43  --    HGB 8.6*  --    HCT 27.5*  --      Nutrition Orders:  Diet diabetic 2000 Calorie      Appetite/Oral Intake: good/% of meals  Factors Affecting Nutritional Intake: none identified  Food/Alevism/Cultural Preferences: none reported  Food Allergies: no known food allergies  Last Bowel Movement: 04/27/24  Wound(s):     Altered Skin Integrity 01/22/24 1351 Sacral spine #4-Tissue loss description: Full thickness  [REMOVED]      Altered Skin Integrity 03/16/24 0800 Left anterior;lower Leg Arterial Ulcer-Tissue loss description: Full thickness heel ulcers, sacrum pressure ulcers    Comments    3/20: Pt reports current good intake/appetite but intake the past few months has been poor 2/2  "disliking food from NH. Pt reports usual wt of ~170 lbs. Pt reports he would like oral supplements.     3/22: Pt reports good appetite/intake of meals, denies n/v; drinking some of the oral supplement but not taking the Pierre.     3/26: Pt NPO;  plans to go to OR today.     3/28: Pt eating well; denies n/v; states that he is not drinking oral supplement 2/ being too full after he eats his meals; will d/c per his request. Pt is receptive to trying to drink the Pierre for wound healing.     : Pt continues with good appetite/intake; tolerating diet. Pt reports that he likes the Pierre but is not taking it regularly- encouraged to take for wound healing and pt was receptive.     : Pt reports good appetite and PO intake of meals. Not drinking much Pierre, large supply in his room noted. Pt denies GI complaints.     : Pt sleeping during rounds; eating very well per RN.     : Pt reports good appetite/intake; denies n/v; taking the Pierre.     : Pt still eating 100%.    : Pt reports good appetite/intake.     : Pt sleeping during time of rounds; observed 100% of lunch tray consumed.     : Pt reports fair appetite for the past few days, states he has been eating a little more than half of his meals. Pt drinking >50% of his Pierre.     : Per nurse, pt is still eating well; expected d/c today.     Anthropometrics    Height: 5' 9.02" (175.3 cm), Height Method: Stated  Last Weight: 68 kg (150 lb) (24 1315), Weight Method: Standard Scale  BMI (Calculated): 22.1  BMI Classification: normal (BMI 18.5-24.9)        Ideal Body Weight (IBW), Male: 160.12 lb     % Ideal Body Weight, Male (lb): 93.68 %                 Usual Body Weight (UBW), k.27 kg  % Usual Body Weight: 88.24  % Weight Change From Usual Weight: -11.95 %  Usual Weight Provided By: patient    Wt Readings from Last 5 Encounters:   24 68 kg (150 lb)   24 68.5 kg (151 lb)   24 63.5 kg (140 lb)   23 72.6 kg (160 " lb)   06/22/23 77.1 kg (170 lb)     Weight Change(s) Since Admission:   Wt Readings from Last 1 Encounters:   03/20/24 1315 68 kg (150 lb)   03/15/24 1621 68 kg (150 lb)   03/15/24 0307 68 kg (150 lb)   Admit Weight: 68 kg (150 lb) (03/15/24 0307), Weight Method: Stated  3/28-4/23: no new wt noted    Estimated Needs    Weight Used For Calorie Calculations: 68 kg (149 lb 14.6 oz)  Energy Calorie Requirements (kcal): 6424-9738 kcal (30-35 kcal/kg)  Energy Need Method: Kcal/kg  Weight Used For Protein Calculations: 68 kg (149 lb 14.6 oz)  Protein Requirements: 102 gm (1.5g/kg)  Fluid Requirements (mL): 2040 mL    Enteral Nutrition     Patient not receiving enteral nutrition at this time.    Parenteral Nutrition     Patient not receiving parenteral nutrition support at this time.    Evaluation of Received Nutrient Intake    Calories: meeting estimated needs  Protein: meeting estimated needs    Patient Education     Not applicable.    Nutrition Diagnosis     PES: Increased nutrient needs (protein) related to increased protein energy demand for wound healing as evidenced by stage 4 pressure ulcer. (active)     PES: Severe chronic disease or condition related malnutrition related to suboptimal protein/energy intake as evidenced by less than 75% needs met for greater than or equal to 3 months, severe fat depletion, severe muscle depletion, and greater than 7.5% weight loss in 3 months. (active)    Nutrition Interventions     Intervention(s): general/healthful diet, commercial beverage, commercial food, multivitamin/mineral supplement therapy, and collaboration with other providers    Goal: Meet greater than 80% of nutritional needs by follow-up. (goal progressing)  Goal: Maintain weight throughout hospitalization. (goal progressing)    Nutrition Goals & Monitoring     Dietitian will monitor: energy intake and weight    Nutrition Risk/Follow-Up: high (follow-up in 1-4 days)   Please consult if re-assessment needed sooner.

## 2024-04-30 VITALS
RESPIRATION RATE: 18 BRPM | DIASTOLIC BLOOD PRESSURE: 81 MMHG | WEIGHT: 150 LBS | OXYGEN SATURATION: 95 % | HEART RATE: 92 BPM | HEIGHT: 69 IN | SYSTOLIC BLOOD PRESSURE: 141 MMHG | BODY MASS INDEX: 22.22 KG/M2 | TEMPERATURE: 98 F

## 2024-04-30 PROCEDURE — 63600175 PHARM REV CODE 636 W HCPCS: Performed by: INTERNAL MEDICINE

## 2024-04-30 PROCEDURE — 25000003 PHARM REV CODE 250: Performed by: PODIATRIST

## 2024-04-30 PROCEDURE — 25000003 PHARM REV CODE 250: Performed by: INTERNAL MEDICINE

## 2024-04-30 RX ORDER — HYDROMORPHONE HYDROCHLORIDE 2 MG/ML
2 INJECTION, SOLUTION INTRAMUSCULAR; INTRAVENOUS; SUBCUTANEOUS ONCE
Status: COMPLETED | OUTPATIENT
Start: 2024-04-30 | End: 2024-04-30

## 2024-04-30 RX ADMIN — DOCUSATE SODIUM 100 MG: 100 CAPSULE, LIQUID FILLED ORAL at 08:04

## 2024-04-30 RX ADMIN — FERROUS SULFATE TAB 325 MG (65 MG ELEMENTAL FE) 1 EACH: 325 (65 FE) TAB at 08:04

## 2024-04-30 RX ADMIN — GABAPENTIN 300 MG: 300 CAPSULE ORAL at 08:04

## 2024-04-30 RX ADMIN — TAMSULOSIN HYDROCHLORIDE 0.4 MG: 0.4 CAPSULE ORAL at 08:04

## 2024-04-30 RX ADMIN — HYDROMORPHONE HYDROCHLORIDE 2 MG: 2 INJECTION INTRAMUSCULAR; INTRAVENOUS; SUBCUTANEOUS at 02:04

## 2024-04-30 RX ADMIN — OXYCODONE HYDROCHLORIDE 10 MG: 10 TABLET ORAL at 12:04

## 2024-04-30 RX ADMIN — LIDOCAINE 5% 1 PATCH: 700 PATCH TOPICAL at 08:04

## 2024-04-30 RX ADMIN — OXYCODONE HYDROCHLORIDE 10 MG: 10 TABLET ORAL at 08:04

## 2024-04-30 RX ADMIN — THERA TABS 1 TABLET: TAB at 08:04

## 2024-04-30 RX ADMIN — DULOXETINE HYDROCHLORIDE 30 MG: 30 CAPSULE, DELAYED RELEASE ORAL at 08:04

## 2024-04-30 RX ADMIN — OXYCODONE HYDROCHLORIDE 10 MG: 10 TABLET ORAL at 04:04

## 2024-04-30 NOTE — DISCHARGE SUMMARY
[facility] - 8th Floor [facility] Surg  [facility] Medicine  Discharge Summary      Patient Name: Shaheen Christie  MRN: [id number]  Admission Date: 3/15/2024  Hospital Length of Stay: 46 days  Discharge Date and Time:  04/30/2024 5:58 AM  Attending Physician: Enzo Thibodeaux MD   Discharging Provider: Enzo Thibodeaux MD  Discharge Provider Team: Networked reference to record PCT   Primary Care Provider: Viktor Russ MD        HPI:  Patient came to the hospital with open wound that worsened during his nursing home stay.  He had progressive osteomyelitis.  Patient admitted for wound care and IV antibiotic.    Procedure(s) (LRB):  AMPUTATION, ABOVE KNEE (formalization) (Left)      Hospital Course:  The patient was seen by podiatrist in her course was seen by wound care.  The recommendation was to try to get the patient to LTAC for IV antibiotics.  IV antibiotics recommended by Infectious Disease.  However the patient's insurance did not approve for LTAC.  Patient did not need frequent debridement and of course tendons to his wound.  Ultimately the pain was so much in the wound not healing that a amputation above the knee was done since we did not have good vasculature.  He did have a wound VAC which General surgery attended to and did have some bleeding from time to time with necessitated the transfusion of blood products.  He is fairly controlled everything is stable.  We will proceed with discharge back to nursing home with follow-up as an outpatient with wound care and with General surgery.  They want he will need some more surgeries.  Hard script for pain medication is on the chart.    Consults:   Consults (From admission, onward)          Status Ordering Provider     Inpatient consult to General Surgery  Once        Provider:  Sudarshan Brandon MD    Completed VALENTINO CUETO     Inpatient consult to Orthopedics  Once        Provider:  Jorge Drew MD    Completed VALENTINO CUETO      Inpatient consult to Orthopedics  Once        Provider:  Jorge Rizo MD    Completed HAWA RIZO     Inpatient consult to PICC team ([facility])  Once        Provider:  (Not yet assigned)    Acknowledged PAULETTE YOUSIF     Inpatient consult to Cardiology  Once        Provider:  Deven Queen MD    Completed VALENTINO NICHOLE     Inpatient consult to Podiatry  Once        Provider:  Valentino Nichole DPM    Acknowledged VALENTINO NICHOLE     Inpatient consult to Wound Care Physician  Once        Provider:  Lizabeth Mckeon MD    Completed PAULETTE YOUSIF     Inpatient consult to Social Work/Case Management  Once        Provider:  (Not yet assigned)    Acknowledged VALENTINO NICHOLE     Inpatient consult to Infectious Diseases  Once        Provider:  Alexis Ibrahim MD    Completed PAULETTE YOUSIF     Inpatient consult to General surgery  Once        Provider:  Delon Draper MD    Completed ULI ADLER            Final Active Diagnoses:    Diagnosis Date Noted POA    PRINCIPAL PROBLEM:  Osteomyelitis [M86.9] 03/15/2024 Yes    Knee effusion, left [M25.462] 03/31/2024 No    Pressure injury of sacral region, stage 4 [L89.154] 03/19/2024 Yes    Skin ulcer of right lower leg [L97.919] 03/19/2024 Yes    Skin ulcer of left lower leg [L97.929] 03/19/2024 Yes    Unstageable pressure ulcer of right heel [L89.610] 03/19/2024 Yes    Unstageable pressure ulcer of left heel [L89.620] 03/19/2024 Yes      Problems Resolved During this Admission:      Discharged Condition: stable    Disposition: Nursing Facility    Follow Up:   Follow-up Information       Deven Queen MD Follow up on 4/12/2024.    Specialty: Cardiology  Why: @ 10:00AM  Contact information:  [address] [place] [id number]  [phone]               Surgery, [facility]. Call on 5/7/2024.    Why: [address]    5/7/24 @ 1030  Contact information:  [place] [address]  [phone]                            Patient Instructions:   No discharge procedures on file.  Medications:  Reconciled Home Medications:      Medication List        START taking these medications      enoxaparin 40 mg/0.4 mL Syrg  Commonly known as: LOVENOX  Inject 0.4 mLs (40 mg total) into the skin every 12 (twelve) hours.            CONTINUE taking these medications      albuterol 90 mcg/actuation inhaler  Commonly known as: PROVENTIL/VENTOLIN HFA  Inhale into the lungs 4 (four) times daily as needed.     aluminum & magnesium hydroxide-simethicone 400-400-40 mg/5 mL suspension  Commonly known as: MYLANTA MAX STRENGTH  Take by mouth every 6 (six) hours as needed for Indigestion.     amLODIPine 10 MG tablet  Commonly known as: NORVASC  Take 10 mg by mouth once daily.     aspirin 81 MG Chew  Take 81 mg by mouth once daily.     cilostazoL 50 MG Tab  Commonly known as: PLETAL  Take 50 mg by mouth 2 (two) times daily.     clopidogreL 75 mg tablet  Commonly known as: PLAVIX  Take 75 mg by mouth once daily.     docusate sodium 100 MG capsule  Commonly known as: COLACE  Take 100 mg by mouth 2 (two) times daily.     DULoxetine 30 MG capsule  Commonly known as: CYMBALTA  Take 30 mg by mouth once daily.     ferrous sulfate 325 (65 FE) MG EC tablet  Take 325 mg by mouth once daily.     * gabapentin 300 MG capsule  Commonly known as: NEURONTIN  Take 1 capsule (300 mg total) by mouth 3 (three) times daily.     * gabapentin 600 MG tablet  Commonly known as: NEURONTIN  Take 600 mg by mouth every evening.     LIDOcaine 5 %  Commonly known as: LIDODERM  Place 1 patch onto the skin every 24 hours. Remove & Discard patch within 12 hours or as directed by MD     losartan 100 MG tablet  Commonly known as: COZAAR  Take 100 mg by mouth once daily.     melatonin 3 mg tablet  Commonly known as: MELATIN  Take 6 mg by mouth nightly as needed for Insomnia.     multivitamin per tablet  Commonly known as: THERAGRAN  Take 1 tablet by mouth once daily.     oxyCODONE 10 mg Tab  "immediate release tablet  Commonly known as: ROXICODONE  Take 10 mg by mouth every 4 (four) hours.     polyethylene glycol 17 gram Pwpk  Commonly known as: GLYCOLAX  Take 17 g by mouth once daily.     simethicone 80 MG chewable tablet  Commonly known as: MYLICON  Take 80 mg by mouth every 6 (six) hours as needed for Flatulence.     TylenoL 325 MG tablet  Generic drug: acetaminophen  Take 650 mg by mouth every 6 (six) hours as needed for Pain.           * This list has 2 medication(s) that are the same as other medications prescribed for you. Read the directions carefully, and ask your doctor or other care provider to review them with you.                  Significant Diagnostic Studies: Labs: CBC No results for input(s): "WBC", "HGB", "HCT", "PLT" in the last 48 hours.    Pending Diagnostic Studies:       None          Indwelling Lines/Drains at time of discharge:   Lines/Drains/Airways       None                   Time spent on the discharge of patient: 20 minutes         Enzo Thibodeaux MD  Department of Hospital Medicine  [facility] - 8th Floor Med Surg      "

## 2024-04-30 NOTE — PROGRESS NOTES
Infectious Diseases Progress Note  69-year-old male, nursing home resident, with past medical history of HTN, COPD, DM type 2, with neuropathy, is admitted to [facility] on 03/15/2024 sent via EMS for sacral wound evaluation, apparently diagnosed with osteomyelitis involving the sacrum on 02/27/2024 with plan to be admitted to LTAC which had not been successful as an outpatient.  On presentation he was noted to have no fevers and no leukocytosis, anemic with low albumin.  Blood cultures have been negative.  Review of his records show a 02/08/2024 left leg wound culture with Pseudomonas, Providencia, ESBL Proteus and 11/30/2023 sacral wound culture with MRSA, Providencia, Enterococcus and Pseudomonas.  2/27 MRI of the pelvis shows osteomyelitis involving the sacrococcygeal bone as well as right trochanteric bursitis which may be septic.  He was seen by surgery team with no plan for surgical intervention.    He is off antimicrobials.     Subjective:  Lying in bed in no acute distress. No new complaints voiced.  No fevers. Doing about the same    Past Surgical History:   Procedure Laterality Date    ABOVE-KNEE AMPUTATION Left 4/20/2024    Procedure: AMPUTATION, ABOVE KNEE;  Surgeon: Serge Brambila Jr., MD;  Location: [facility] OR;  Service: General;  Laterality: Left;    ABOVE-KNEE AMPUTATION Left 4/24/2024    Procedure: AMPUTATION, ABOVE KNEE (formalization);  Surgeon: Delon Draper MD;  Location: [facility] OR;  Service: General;  Laterality: Left;    angiogram Bilateral     stents placed in left leg    anterior neck surgery      APPLICATION OF WOUND VACUUM-ASSISTED CLOSURE DEVICE Left 4/20/2024    Procedure: APPLICATION, WOUND VAC;  Surgeon: Serge Brambila Jr., MD;  Location: [facility] OR;  Service: General;  Laterality: Left;    ARTHROTOMY OF ANKLE Left 6/28/2023    Procedure: ARTHROTOMY, ANKLE;  Surgeon: Tom Nichole DPM;  Location: [facility] OR;  Service: Podiatry;  Laterality: Left;     cataracts Left     CHOLECYSTECTOMY      DEBRIDEMENT OF AMPUTATION SITE N/A 4/21/2024    Procedure: DEBRIDEMENT, AMPUTATION SITE;  Surgeon: Serge Brambila Jr., MD;  Location: [facility];  Service: General;  Laterality: N/A;    DEBRIDEMENT OF FOOT Bilateral 3/26/2024    Procedure: DEBRIDEMENT, FOOT;  Surgeon: Tom Nichole DPM;  Location: [facility];  Service: Podiatry;  Laterality: Bilateral;    EYE SURGERY Left     HAND SURGERY Left     broken bone    herina repair      INCISION AND DRAINAGE, LOWER EXTREMITY Left 7/28/2023    Procedure: INCISION AND DRAINAGE, LOWER EXTREMITY;  Surgeon: Avinash Garces MD;  Location: [facility] OR;  Service: Orthopedics;  Laterality: Left;  Supine, vascular bed, bone foam  last case  cysto tubing, laparoscopic trocar, experience, vanc, hemovac drain    KNEE SURGERY Bilateral     PERIPHERAL ANGIOGRAPHY N/A 3/13/2024    Procedure: Peripheral angiography;  Surgeon: Deven Queen MD;  Location: [facility] CATH LAB;  Service: Cardiology;  Laterality: N/A;  MICHELLE ANGIO W/ ANEST.    posterior neck surgery      REPLACEMENT OF WOUND VACUUM-ASSISTED CLOSURE DEVICE Left 4/21/2024    Procedure: REPLACEMENT, WOUND VAC;  Surgeon: Serge Brambila Jr., MD;  Location: [facility];  Service: General;  Laterality: Left;    SPINE SURGERY      TOTAL REPLACEMENT OF BOTH HIP JOINTS USING COMPUTER-ASSISTED NAVIGATION Bilateral      Social History     Socioeconomic History    Marital status:    Occupational History    Occupation: retired   Tobacco Use    Smoking status: Former     Average packs/day: 0.5 packs/day for 15.0 years (7.5 ttl pk-yrs)     Types: Cigarettes     Start date: 2008    Smokeless tobacco: Never   Substance and Sexual Activity    Alcohol use: Yes     Alcohol/week: 3.0 - 6.0 standard drinks of alcohol     Types: 3 - 6 Cans of beer per week    Drug use: Yes     Types: Marijuana    Sexual activity: Not Currently       ROS  Constitutional:  Positive for malaise/fatigue.   HENT: Negative.      Respiratory: Negative.     Gastrointestinal: Negative.    Genitourinary: Negative.    Musculoskeletal: Positive for L knee pain   Skin: Multiple pressure wounds   Neurological:  Positive for weakness.   Endo/Heme/Allergies: Negative.    Psychiatric/Behavioral: Negative.     All other Systems review done and negative.    Review of patient's allergies indicates:  No Known Allergies      Scheduled Meds:  Current Facility-Administered Medications   Medication Dose Route Frequency    amLODIPine  10 mg Oral Daily    cilostazoL  50 mg Oral BID    docusate sodium  100 mg Oral BID    DULoxetine  30 mg Oral Daily    ferrous sulfate  1 tablet Oral Daily    gabapentin  300 mg Oral TID    gabapentin  600 mg Oral QHS    LIDOcaine (PF) 10 mg/ml (1%)  1 mL Other Once    LIDOcaine  1 patch Transdermal Q24H    losartan  100 mg Oral Daily    multivitamin  1 tablet Oral Daily    oxyCODONE  10 mg Oral Q4H    polyethylene glycol  17 g Oral Daily    sodium chloride 0.9%  10 mL Intravenous Q6H    tamsulosin  0.4 mg Oral Daily     Continuous Infusions:  Current Facility-Administered Medications   Medication Dose Route Frequency Last Rate Last Admin     PRN Meds:    Current Facility-Administered Medications:     0.9%  NaCl infusion (for blood administration), , Intravenous, Q24H PRN    0.9%  NaCl infusion (for blood administration), , Intravenous, Q24H PRN    0.9%  NaCl infusion (for blood administration), , Intravenous, Q24H PRN    0.9%  NaCl infusion (for blood administration), , Intravenous, Q24H PRN    0.9%  NaCl infusion (for blood administration), , Intravenous, Q24H PRN    acetaminophen, 650 mg, Oral, Q6H PRN    albuterol, 2 puff, Inhalation, QID PRN    aluminum-magnesium hydroxide-simethicone, 15 mL, Oral, Q6H PRN    HYDROcodone-acetaminophen, 1 tablet, Oral, Q4H PRN    melatonin, 6 mg, Oral, Nightly PRN    simethicone, 80 mg, Oral, Q6H PRN    Flushing PICC/Midline Protocol, , , Until Discontinued **AND** sodium chloride 0.9%, 10  "mL, Intravenous, Q6H **AND** sodium chloride 0.9%, 10 mL, Intravenous, PRN      Objective:  /67   Pulse 77   Temp 98.6 °F (37 °C) (Oral)   Resp 20   Ht 5' 9.02" (1.753 m)   Wt 68 kg (150 lb)   SpO2 (!) 93%   BMI 22.14 kg/m²     Physical Exam:   Physical Exam  Vitals reviewed.   Constitutional:       General: He is not in acute distress.  HENT:      Head: Normocephalic and atraumatic.   Cardiovascular:      Rate and Rhythm: Normal rate and regular rhythm.      Heart sounds: Normal heart sounds.   Pulmonary:      Effort: Pulmonary effort is normal. No respiratory distress.      Breath sounds: Normal breath sounds.   Abdominal:      General: Bowel sounds are normal. There is no distension.      Palpations: Abdomen is soft.      Tenderness: There is no abdominal tenderness.   Musculoskeletal:         General: No deformity.      Cervical back: Neck supple.   Skin:     Findings: No erythema or rash.      Comments: Wound vac to sacrum  Neurological:      Mental Status: He is alert and oriented to person, place, and time.   Psychiatric:      Comments: Calm and cooperative     Imaging  Imaging Results    None          Lab Review   No results found for this or any previous visit (from the past 24 hour(s)).          Assessment/Plan:  1. Chronic sacral pressure wound with osteomyelitis  2. History of polymicrobial sacral wound culture - MRSA, Providencia, Pseudomonas, Enterococcus  3.  Chronic left lower extremity wound with history of polymicrobial wound culture-Pseudomonas, ESBL Proteus, Providencia  4. Multiple pressure wounds  5.  Diabetes type 2  6.  Anemia  7.  Protein calorie malnutrition   8.  L knee pain     -Monitor off antibiotics  -No fevers noted and no leukocytosis, follow  -CT L knee without contrast today 4/10 with no joint effusion, nonfocal soft tissue swelling, severe bone demineralization, arthritis  -Seen by Ortho, inputs noted. S/P L knee aspiration on 3/26   -3/26 left heel tissue cultures " with few ESBL Proteus mirabilis  -4/9 ESR 76 from 29 and CRP 72.10 from 39.20  -3/15 blood cultures negative  -2/8 left leg wound cultures with many Pseudomonas, Providencia, Proteus  -11/30/2023 sacral wound with many Providencia, MRSA, Enterococcus, Pseudomonas  -S/p L guillotine AKA 4/20 and AKA formalization 4/24  -Surgery plan for follow up in the clinic for staple removal noted  -Podiatry on board s/p debridement 3/26, cultures revealed ESBL Proteus  -We will continue aggressive wound care per Podiatry and the wound care team  -Discussed with patient and nursing staff.  Discharge plan per primary team noted

## 2024-04-30 NOTE — NURSING
DC note: Patient transferring to [facility]; report called to MONCHO Julian. All questions answered. NAD noted.

## 2024-05-01 ENCOUNTER — LAB REQUISITION (OUTPATIENT)
Dept: LAB | Facility: HOSPITAL | Age: 69
End: 2024-05-01
Payer: MEDICARE

## 2024-05-01 DIAGNOSIS — E88.09 OTHER DISORDERS OF PLASMA-PROTEIN METABOLISM, NOT ELSEWHERE CLASSIFIED: ICD-10-CM

## 2024-05-01 DIAGNOSIS — L89.154 PRESSURE ULCER OF SACRAL REGION, STAGE 4: ICD-10-CM

## 2024-05-01 LAB — PREALB SERPL-MCNC: 13.3 MG/DL (ref 16–42)

## 2024-05-01 PROCEDURE — 84134 ASSAY OF PREALBUMIN: CPT | Performed by: FAMILY MEDICINE

## 2024-05-03 ENCOUNTER — LAB REQUISITION (OUTPATIENT)
Dept: LAB | Facility: HOSPITAL | Age: 69
End: 2024-05-03
Payer: MEDICARE

## 2024-05-03 ENCOUNTER — PATIENT OUTREACH (OUTPATIENT)
Dept: ADMINISTRATIVE | Facility: CLINIC | Age: 69
End: 2024-05-03
Payer: MEDICARE

## 2024-05-03 DIAGNOSIS — M86.9 OSTEOMYELITIS, UNSPECIFIED: ICD-10-CM

## 2024-05-03 LAB
ALBUMIN SERPL-MCNC: 2.3 G/DL (ref 3.4–4.8)
ALBUMIN/GLOB SERPL: 0.7 RATIO (ref 1.1–2)
ALP SERPL-CCNC: 101 UNIT/L (ref 40–150)
ALT SERPL-CCNC: 19 UNIT/L (ref 0–55)
AST SERPL-CCNC: 16 UNIT/L (ref 5–34)
BILIRUB SERPL-MCNC: 0.2 MG/DL
BUN SERPL-MCNC: 20.7 MG/DL (ref 8.4–25.7)
CALCIUM SERPL-MCNC: 8.6 MG/DL (ref 8.8–10)
CHLORIDE SERPL-SCNC: 104 MMOL/L (ref 98–107)
CO2 SERPL-SCNC: 22 MMOL/L (ref 23–31)
CREAT SERPL-MCNC: 0.76 MG/DL (ref 0.73–1.18)
CRP SERPL HS-MCNC: 50.46 MG/L
ERYTHROCYTE [DISTWIDTH] IN BLOOD BY AUTOMATED COUNT: 16.8 % (ref 11.5–17)
ERYTHROCYTE [SEDIMENTATION RATE] IN BLOOD: 43 MM/HR (ref 0–15)
GFR SERPLBLD CREATININE-BSD FMLA CKD-EPI: >60 MLS/MIN/1.73/M2
GLOBULIN SER-MCNC: 3.4 GM/DL (ref 2.4–3.5)
GLUCOSE SERPL-MCNC: 111 MG/DL (ref 82–115)
HCT VFR BLD AUTO: 29.5 % (ref 42–52)
HGB BLD-MCNC: 9.2 G/DL (ref 14–18)
MCH RBC QN AUTO: 27.1 PG (ref 27–31)
MCHC RBC AUTO-ENTMCNC: 31.2 G/DL (ref 33–36)
MCV RBC AUTO: 87 FL (ref 80–94)
NRBC BLD AUTO-RTO: 0 %
PLATELET # BLD AUTO: 572 X10(3)/MCL (ref 130–400)
PMV BLD AUTO: 8.5 FL (ref 7.4–10.4)
POTASSIUM SERPL-SCNC: 4.2 MMOL/L (ref 3.5–5.1)
PROT SERPL-MCNC: 5.7 GM/DL (ref 5.8–7.6)
RBC # BLD AUTO: 3.39 X10(6)/MCL (ref 4.7–6.1)
SODIUM SERPL-SCNC: 134 MMOL/L (ref 136–145)
WBC # SPEC AUTO: 10.88 X10(3)/MCL (ref 4.5–11.5)

## 2024-05-03 PROCEDURE — 80053 COMPREHEN METABOLIC PANEL: CPT | Performed by: FAMILY MEDICINE

## 2024-05-03 PROCEDURE — 86141 C-REACTIVE PROTEIN HS: CPT | Performed by: FAMILY MEDICINE

## 2024-05-03 PROCEDURE — 85027 COMPLETE CBC AUTOMATED: CPT | Performed by: FAMILY MEDICINE

## 2024-05-03 PROCEDURE — 85652 RBC SED RATE AUTOMATED: CPT | Performed by: FAMILY MEDICINE

## 2024-05-06 NOTE — ANESTHESIA POSTPROCEDURE EVALUATION
Anesthesia Post Evaluation    Patient: Shaheen Chritsie    Procedure(s) Performed: Procedure(s) (LRB):  AMPUTATION, ABOVE KNEE (formalization) (Left)    Final Anesthesia Type: general      Patient location during evaluation: PACU  Patient participation: Yes- Able to Participate  Level of consciousness: awake  Post-procedure vital signs: reviewed and stable  Pain management: adequate  Airway patency: patent  ISAAC mitigation strategies: Extubation while patient is awake  PONV status at discharge: No PONV  Anesthetic complications: no      Cardiovascular status: blood pressure returned to baseline  Respiratory status: spontaneous ventilation  Hydration status: euvolemic  Follow-up not needed.              Vitals Value Taken Time   /81 04/30/24 1134   Temp 36.4 °C (97.6 °F) 04/30/24 1134   Pulse 92 04/30/24 1134   Resp 18 04/30/24 1408   SpO2 95 % 04/30/24 1134         Event Time   Out of Recovery 04/24/2024 15:20:00         Pain/Khadra Score: No data recorded

## 2024-05-07 ENCOUNTER — OFFICE VISIT (OUTPATIENT)
Dept: SURGERY | Facility: CLINIC | Age: 69
End: 2024-05-07
Payer: MEDICARE

## 2024-05-07 DIAGNOSIS — L97.923 SKIN ULCER OF LEFT LOWER LEG WITH NECROSIS OF MUSCLE: Primary | ICD-10-CM

## 2024-05-07 NOTE — PROGRESS NOTES
ACS Clinic Follow Up Note    HPI/Interval History: Patient is a 69M with history of chronic lower extremity wounds s/p left husam WILDA on 4/20 and formalization on 4/24. States he has been doing well since he was discharged, no concerns. Reports minimal drainage from wound. Denies fever, chills, chest pain, SOB, nausea, vomiting.     ROS negative other than those noted above in the HPI    Physical Examination:  Gen: NAD, AAOx3, answering questions appropriately, wheelchair bound  HEENT: normocephalic, nontraumatic  CV: RR  Resp: NWOB  Abd: S/NT/ND  Ext: moving all extremities spontaneously and purposefully. L AKA stump incision appears benign with staples in place. Some erythema around incision where tape was. Notable edema of the LLE stump  Neuro: CN II-XII grossly intact    Labs: No new labs    Imaging: No new imaging    Surgical Pathology  4/20: FINAL DIAGNOSIS   LEFT ABOVE-THE-KNEE AMPUTATION:   - SKIN ULCERATION WITH ASSOCIATED ACUTE, CHRONIC AND GANGRENOUS INFLAMMATION.   - ATHEROSCLEROSIS.   - MEDICAL DEVICE (VASCULAR STENT).   - VIABLE MARGINS.     4/24: FINAL DIAGNOSIS   LEFT LEG BONE:   - FRAGMENTS OF BENIGN TRABECULAR BONE.  - FRESH BLOOD CLOT WITH ADMIXED INFLAMMATORY CELLS.   - NO ATYPICAL OR MALIGNANT CELLS IDENTIFIED.     Assessment/Plan:  Patient is a 69M with history of chronic lower extremity wounds s/p left husam WILDA on 4/20 and formalization on 4/24.      - Dressing removed today. Only need to put something over the wound if he begins to have any sort of irritation from his clothes, sheets, etc.   - Patient appears to have notable edema of the LLE AKA stump. Have recommended he begin received some diuretics for this. Will send this recommendation to his facility.   - Return to clinic in 2-3 weeks for reevaluation and staple removal.     Shanika Armstrong, DO  [facility], PGY1

## 2024-05-08 ENCOUNTER — LAB REQUISITION (OUTPATIENT)
Dept: LAB | Facility: HOSPITAL | Age: 69
End: 2024-05-08
Payer: MEDICARE

## 2024-05-08 DIAGNOSIS — L89.154 PRESSURE ULCER OF SACRAL REGION, STAGE 4: ICD-10-CM

## 2024-05-08 LAB — PREALB SERPL-MCNC: 12.4 MG/DL (ref 16–42)

## 2024-05-08 PROCEDURE — 84134 ASSAY OF PREALBUMIN: CPT | Performed by: FAMILY MEDICINE

## 2024-05-15 ENCOUNTER — LAB REQUISITION (OUTPATIENT)
Dept: LAB | Facility: HOSPITAL | Age: 69
End: 2024-05-15
Payer: MEDICARE

## 2024-05-15 DIAGNOSIS — R63.6 UNDERWEIGHT: ICD-10-CM

## 2024-05-15 LAB — PREALB SERPL-MCNC: 12 MG/DL (ref 16–42)

## 2024-05-15 PROCEDURE — 84134 ASSAY OF PREALBUMIN: CPT | Performed by: FAMILY MEDICINE

## 2024-05-22 ENCOUNTER — LAB REQUISITION (OUTPATIENT)
Dept: LAB | Facility: HOSPITAL | Age: 69
End: 2024-05-22
Payer: MEDICARE

## 2024-05-22 DIAGNOSIS — Z89.612 ACQUIRED ABSENCE OF LEFT LEG ABOVE KNEE: ICD-10-CM

## 2024-05-22 LAB — PREALB SERPL-MCNC: 11.5 MG/DL (ref 16–42)

## 2024-05-22 PROCEDURE — 84134 ASSAY OF PREALBUMIN: CPT | Performed by: FAMILY MEDICINE

## 2024-05-29 ENCOUNTER — LAB REQUISITION (OUTPATIENT)
Dept: LAB | Facility: HOSPITAL | Age: 69
End: 2024-05-29
Payer: MEDICARE

## 2024-05-29 DIAGNOSIS — R41.0 DISORIENTATION, UNSPECIFIED: ICD-10-CM

## 2024-05-29 LAB
ALBUMIN SERPL-MCNC: 2.4 G/DL (ref 3.4–4.8)
ALBUMIN/GLOB SERPL: 0.7 RATIO (ref 1.1–2)
ALP SERPL-CCNC: 128 UNIT/L (ref 40–150)
ALT SERPL-CCNC: 19 UNIT/L (ref 0–55)
ANION GAP SERPL CALC-SCNC: 9 MEQ/L
AST SERPL-CCNC: 22 UNIT/L (ref 5–34)
BASOPHILS # BLD AUTO: 0.09 X10(3)/MCL
BASOPHILS NFR BLD AUTO: 0.9 %
BILIRUB SERPL-MCNC: 0.4 MG/DL
BILIRUB UR QL STRIP.AUTO: NEGATIVE
BUN SERPL-MCNC: 13.6 MG/DL (ref 8.4–25.7)
CALCIUM SERPL-MCNC: 8.4 MG/DL (ref 8.8–10)
CHLORIDE SERPL-SCNC: 103 MMOL/L (ref 98–107)
CLARITY UR: CLEAR
CO2 SERPL-SCNC: 23 MMOL/L (ref 23–31)
COLOR UR AUTO: NORMAL
CREAT SERPL-MCNC: 0.73 MG/DL (ref 0.73–1.18)
CREAT/UREA NIT SERPL: 19
CRP SERPL HS-MCNC: 144.23 MG/L
EOSINOPHIL # BLD AUTO: 0.35 X10(3)/MCL (ref 0–0.9)
EOSINOPHIL NFR BLD AUTO: 3.7 %
ERYTHROCYTE [DISTWIDTH] IN BLOOD BY AUTOMATED COUNT: 16.1 % (ref 11.5–17)
ERYTHROCYTE [SEDIMENTATION RATE] IN BLOOD: 51 MM/HR (ref 0–15)
GFR SERPLBLD CREATININE-BSD FMLA CKD-EPI: >60 ML/MIN/1.73/M2
GLOBULIN SER-MCNC: 3.6 GM/DL (ref 2.4–3.5)
GLUCOSE SERPL-MCNC: 101 MG/DL (ref 82–115)
GLUCOSE UR QL STRIP: NEGATIVE
HCT VFR BLD AUTO: 24.7 % (ref 42–52)
HGB BLD-MCNC: 7.6 G/DL (ref 14–18)
HGB UR QL STRIP: NEGATIVE
IMM GRANULOCYTES # BLD AUTO: 0.02 X10(3)/MCL (ref 0–0.04)
IMM GRANULOCYTES NFR BLD AUTO: 0.2 %
KETONES UR QL STRIP: NEGATIVE
LEUKOCYTE ESTERASE UR QL STRIP: NEGATIVE
LYMPHOCYTES # BLD AUTO: 1.67 X10(3)/MCL (ref 0.6–4.6)
LYMPHOCYTES NFR BLD AUTO: 17.5 %
MCH RBC QN AUTO: 26 PG (ref 27–31)
MCHC RBC AUTO-ENTMCNC: 30.8 G/DL (ref 33–36)
MCV RBC AUTO: 84.6 FL (ref 80–94)
MONOCYTES # BLD AUTO: 1.11 X10(3)/MCL (ref 0.1–1.3)
MONOCYTES NFR BLD AUTO: 11.6 %
NEUTROPHILS # BLD AUTO: 6.3 X10(3)/MCL (ref 2.1–9.2)
NEUTROPHILS NFR BLD AUTO: 66.1 %
NITRITE UR QL STRIP: NEGATIVE
NRBC BLD AUTO-RTO: 0 %
PH UR STRIP: 6 [PH]
PLATELET # BLD AUTO: 586 X10(3)/MCL (ref 130–400)
PMV BLD AUTO: 8.7 FL (ref 7.4–10.4)
POTASSIUM SERPL-SCNC: 4.2 MMOL/L (ref 3.5–5.1)
PREALB SERPL-MCNC: 9.7 MG/DL (ref 16–42)
PROT SERPL-MCNC: 6 GM/DL (ref 5.8–7.6)
PROT UR QL STRIP: NEGATIVE
RBC # BLD AUTO: 2.92 X10(6)/MCL (ref 4.7–6.1)
SODIUM SERPL-SCNC: 135 MMOL/L (ref 136–145)
SP GR UR STRIP.AUTO: <=1.005 (ref 1–1.03)
UROBILINOGEN UR STRIP-ACNC: 0.2
WBC # SPEC AUTO: 9.54 X10(3)/MCL (ref 4.5–11.5)

## 2024-05-29 PROCEDURE — 85025 COMPLETE CBC W/AUTO DIFF WBC: CPT | Performed by: FAMILY MEDICINE

## 2024-05-29 PROCEDURE — 81003 URINALYSIS AUTO W/O SCOPE: CPT | Performed by: FAMILY MEDICINE

## 2024-05-29 PROCEDURE — 85652 RBC SED RATE AUTOMATED: CPT | Performed by: FAMILY MEDICINE

## 2024-05-29 PROCEDURE — 86141 C-REACTIVE PROTEIN HS: CPT | Performed by: FAMILY MEDICINE

## 2024-05-29 PROCEDURE — 80053 COMPREHEN METABOLIC PANEL: CPT | Performed by: FAMILY MEDICINE

## 2024-05-29 PROCEDURE — 84134 ASSAY OF PREALBUMIN: CPT | Performed by: FAMILY MEDICINE

## 2024-05-29 PROCEDURE — 87077 CULTURE AEROBIC IDENTIFY: CPT | Performed by: FAMILY MEDICINE

## 2024-05-29 PROCEDURE — 87086 URINE CULTURE/COLONY COUNT: CPT | Performed by: FAMILY MEDICINE

## 2024-06-01 LAB
BACTERIA UR CULT: ABNORMAL
BACTERIA UR CULT: ABNORMAL

## 2024-06-03 ENCOUNTER — LAB REQUISITION (OUTPATIENT)
Dept: LAB | Facility: HOSPITAL | Age: 69
End: 2024-06-03
Payer: MEDICARE

## 2024-06-03 DIAGNOSIS — Z29.9 ENCOUNTER FOR PROPHYLACTIC MEASURES, UNSPECIFIED: ICD-10-CM

## 2024-06-03 PROCEDURE — 87186 SC STD MICRODIL/AGAR DIL: CPT | Mod: 91 | Performed by: FAMILY MEDICINE

## 2024-06-03 PROCEDURE — 87077 CULTURE AEROBIC IDENTIFY: CPT | Performed by: FAMILY MEDICINE

## 2024-06-03 PROCEDURE — 87184 SC STD DISK METHOD PER PLATE: CPT | Mod: 91 | Performed by: FAMILY MEDICINE

## 2024-06-05 ENCOUNTER — LAB REQUISITION (OUTPATIENT)
Dept: LAB | Facility: HOSPITAL | Age: 69
End: 2024-06-05
Payer: MEDICARE

## 2024-06-05 DIAGNOSIS — L89.612 PRESSURE ULCER OF RIGHT HEEL, STAGE 2: ICD-10-CM

## 2024-06-05 DIAGNOSIS — R33.9 RETENTION OF URINE, UNSPECIFIED: ICD-10-CM

## 2024-06-05 LAB
BACTERIA #/AREA URNS AUTO: ABNORMAL /HPF
BILIRUB UR QL STRIP.AUTO: NEGATIVE
CAOX CRY UR QL COMP ASSIST: ABNORMAL
CLARITY UR: CLEAR
COLOR UR AUTO: YELLOW
GLUCOSE UR QL STRIP: NEGATIVE
HGB UR QL STRIP: ABNORMAL
KETONES UR QL STRIP: NEGATIVE
LEUKOCYTE ESTERASE UR QL STRIP: ABNORMAL
NITRITE UR QL STRIP: NEGATIVE
PH UR STRIP: 6.5 [PH]
PREALB SERPL-MCNC: 11.1 MG/DL (ref 16–42)
PROT UR QL STRIP: ABNORMAL
RBC #/AREA URNS AUTO: ABNORMAL /HPF
SP GR UR STRIP.AUTO: 1.02 (ref 1–1.03)
SQUAMOUS #/AREA URNS AUTO: ABNORMAL /HPF
UROBILINOGEN UR STRIP-ACNC: 2
WBC #/AREA URNS AUTO: ABNORMAL /HPF

## 2024-06-05 PROCEDURE — 81001 URINALYSIS AUTO W/SCOPE: CPT | Performed by: FAMILY MEDICINE

## 2024-06-05 PROCEDURE — 84134 ASSAY OF PREALBUMIN: CPT | Performed by: FAMILY MEDICINE

## 2024-06-05 PROCEDURE — 87086 URINE CULTURE/COLONY COUNT: CPT | Performed by: FAMILY MEDICINE

## 2024-06-07 LAB — BACTERIA UR CULT: NO GROWTH

## 2024-06-08 ENCOUNTER — HOSPITAL ENCOUNTER (EMERGENCY)
Facility: HOSPITAL | Age: 69
Discharge: HOME | End: 2024-06-08
Attending: STUDENT IN AN ORGANIZED HEALTH CARE EDUCATION/TRAINING PROGRAM
Payer: MEDICARE

## 2024-06-08 VITALS
WEIGHT: 142 LBS | HEART RATE: 90 BPM | RESPIRATION RATE: 18 BRPM | BODY MASS INDEX: 21.03 KG/M2 | HEIGHT: 69 IN | OXYGEN SATURATION: 96 % | DIASTOLIC BLOOD PRESSURE: 48 MMHG | SYSTOLIC BLOOD PRESSURE: 118 MMHG | TEMPERATURE: 98 F

## 2024-06-08 DIAGNOSIS — Z51.89 VISIT FOR WOUND CHECK: Primary | ICD-10-CM

## 2024-06-08 LAB
ALBUMIN SERPL-MCNC: 2.2 G/DL (ref 3.4–4.8)
ALBUMIN/GLOB SERPL: 0.5 RATIO (ref 1.1–2)
ALP SERPL-CCNC: 121 UNIT/L (ref 40–150)
ALT SERPL-CCNC: 20 UNIT/L (ref 0–55)
ANION GAP SERPL CALC-SCNC: 9 MEQ/L
AST SERPL-CCNC: 18 UNIT/L (ref 5–34)
BASOPHILS # BLD AUTO: 0.08 X10(3)/MCL
BASOPHILS NFR BLD AUTO: 0.8 %
BILIRUB SERPL-MCNC: 0.2 MG/DL
BUN SERPL-MCNC: 16.9 MG/DL (ref 8.4–25.7)
CALCIUM SERPL-MCNC: 8.7 MG/DL (ref 8.8–10)
CHLORIDE SERPL-SCNC: 108 MMOL/L (ref 98–107)
CO2 SERPL-SCNC: 23 MMOL/L (ref 23–31)
CREAT SERPL-MCNC: 0.57 MG/DL (ref 0.73–1.18)
CREAT/UREA NIT SERPL: 30
EOSINOPHIL # BLD AUTO: 0.52 X10(3)/MCL (ref 0–0.9)
EOSINOPHIL NFR BLD AUTO: 5.4 %
ERYTHROCYTE [DISTWIDTH] IN BLOOD BY AUTOMATED COUNT: 16.7 % (ref 11.5–17)
FLUAV AG UPPER RESP QL IA.RAPID: NOT DETECTED
FLUBV AG UPPER RESP QL IA.RAPID: NOT DETECTED
GFR SERPLBLD CREATININE-BSD FMLA CKD-EPI: >60 ML/MIN/1.73/M2
GLOBULIN SER-MCNC: 4.3 GM/DL (ref 2.4–3.5)
GLUCOSE SERPL-MCNC: 99 MG/DL (ref 82–115)
HCT VFR BLD AUTO: 24.4 % (ref 42–52)
HGB BLD-MCNC: 7.4 G/DL (ref 14–18)
IMM GRANULOCYTES # BLD AUTO: 0.05 X10(3)/MCL (ref 0–0.04)
IMM GRANULOCYTES NFR BLD AUTO: 0.5 %
LYMPHOCYTES # BLD AUTO: 1.54 X10(3)/MCL (ref 0.6–4.6)
LYMPHOCYTES NFR BLD AUTO: 16.1 %
MCH RBC QN AUTO: 25.8 PG (ref 27–31)
MCHC RBC AUTO-ENTMCNC: 30.3 G/DL (ref 33–36)
MCV RBC AUTO: 85 FL (ref 80–94)
MONOCYTES # BLD AUTO: 0.71 X10(3)/MCL (ref 0.1–1.3)
MONOCYTES NFR BLD AUTO: 7.4 %
NEUTROPHILS # BLD AUTO: 6.68 X10(3)/MCL (ref 2.1–9.2)
NEUTROPHILS NFR BLD AUTO: 69.8 %
NRBC BLD AUTO-RTO: 0 %
PLATELET # BLD AUTO: 580 X10(3)/MCL (ref 130–400)
PMV BLD AUTO: 7.7 FL (ref 7.4–10.4)
POTASSIUM SERPL-SCNC: 3.5 MMOL/L (ref 3.5–5.1)
PROT SERPL-MCNC: 6.5 GM/DL (ref 5.8–7.6)
RBC # BLD AUTO: 2.87 X10(6)/MCL (ref 4.7–6.1)
SARS-COV-2 RNA RESP QL NAA+PROBE: NOT DETECTED
SODIUM SERPL-SCNC: 140 MMOL/L (ref 136–145)
WBC # SPEC AUTO: 9.58 X10(3)/MCL (ref 4.5–11.5)

## 2024-06-08 PROCEDURE — 80053 COMPREHEN METABOLIC PANEL: CPT | Performed by: STUDENT IN AN ORGANIZED HEALTH CARE EDUCATION/TRAINING PROGRAM

## 2024-06-08 PROCEDURE — 99284 EMERGENCY DEPT VISIT MOD MDM: CPT | Mod: 25

## 2024-06-08 PROCEDURE — 0240U COVID/FLU A&B PCR: CPT | Performed by: STUDENT IN AN ORGANIZED HEALTH CARE EDUCATION/TRAINING PROGRAM

## 2024-06-08 PROCEDURE — 85025 COMPLETE CBC W/AUTO DIFF WBC: CPT | Performed by: STUDENT IN AN ORGANIZED HEALTH CARE EDUCATION/TRAINING PROGRAM

## 2024-06-08 NOTE — ED PROVIDER NOTES
Encounter Date: 6/8/2024    SCRIBE #1 NOTE: I, Darrel Apodaca, am scribing for, and in the presence of,  Gael Maria IV, MD. I have scribed the following portions of the note - Other sections scribed: HPI, ROS, PE.       History     Chief Complaint   Patient presents with    Wound Check     C/O pressure wounds to buttocks. Recently started oral abx. Has wound vac for wound to coccyx. +pain. Sent from [facility]     70 y/o male with a hx of COPD, HTN, and neuropathy [facility]. Pt states he has had an infected bedsore on his buttocks for 6 months and has a wound vac placed. Pt states he received 1 dose of oral antibiotics. He notes he was sent to the ED because he has 6 difference strains of bacteria in the wound and the NH is unable to treat them. Pt complains of gradually worsening pain but denies any fever or vomiting.     The history is provided by the patient and medical records. No  was used.     Review of patient's allergies indicates:  No Known Allergies  Past Medical History:   Diagnosis Date    COPD (chronic obstructive pulmonary disease)     Depression     Hypertension     Neuropathy      Past Surgical History:   Procedure Laterality Date    ABOVE-KNEE AMPUTATION Left 4/20/2024    Procedure: AMPUTATION, ABOVE KNEE;  Surgeon: Serge Brambila Jr., MD;  Location: [facility];  Service: General;  Laterality: Left;    ABOVE-KNEE AMPUTATION Left 4/24/2024    Procedure: AMPUTATION, ABOVE KNEE (formalization);  Surgeon: Delon Draper MD;  Location: [facility] OR;  Service: General;  Laterality: Left;    angiogram Bilateral     stents placed in left leg    anterior neck surgery      APPLICATION OF WOUND VACUUM-ASSISTED CLOSURE DEVICE Left 4/20/2024    Procedure: APPLICATION, WOUND VAC;  Surgeon: Serge Brambila Jr., MD;  Location: [facility] OR;  Service: General;  Laterality: Left;    ARTHROTOMY OF ANKLE Left 6/28/2023    Procedure: ARTHROTOMY,  ANKLE;  Surgeon: Tom Nichole DPM;  Location: [facility];  Service: Podiatry;  Laterality: Left;    cataracts Left     CHOLECYSTECTOMY      DEBRIDEMENT OF AMPUTATION SITE N/A 4/21/2024    Procedure: DEBRIDEMENT, AMPUTATION SITE;  Surgeon: Serge Brambila Jr., MD;  Location: [facility] OR;  Service: General;  Laterality: N/A;    DEBRIDEMENT OF FOOT Bilateral 3/26/2024    Procedure: DEBRIDEMENT, FOOT;  Surgeon: Tom Nichole DPM;  Location: [facility] OR;  Service: Podiatry;  Laterality: Bilateral;    EYE SURGERY Left     HAND SURGERY Left     broken bone    herina repair      INCISION AND DRAINAGE, LOWER EXTREMITY Left 7/28/2023    Procedure: INCISION AND DRAINAGE, LOWER EXTREMITY;  Surgeon: Avinash Garces MD;  Location: [facility];  Service: Orthopedics;  Laterality: Left;  Supine, vascular bed, bone foam  last case  cysto tubing, laparoscopic trocar, experience, vanc, hemovac drain    KNEE SURGERY Bilateral     PERIPHERAL ANGIOGRAPHY N/A 3/13/2024    Procedure: Peripheral angiography;  Surgeon: Deven Queen MD;  Location: [facility] CATH LAB;  Service: Cardiology;  Laterality: N/A;  MICHELLE ANGIO W/ ANEST.    posterior neck surgery      REPLACEMENT OF WOUND VACUUM-ASSISTED CLOSURE DEVICE Left 4/21/2024    Procedure: REPLACEMENT, WOUND VAC;  Surgeon: Serge Brambila Jr., MD;  Location: [facility];  Service: General;  Laterality: Left;    SPINE SURGERY      TOTAL REPLACEMENT OF BOTH HIP JOINTS USING COMPUTER-ASSISTED NAVIGATION Bilateral      Family History   Problem Relation Name Age of Onset    Heart disease Mother      Cancer Father      Lung disease Father       Social History     Tobacco Use    Smoking status: Former     Average packs/day: 0.5 packs/day for 15.0 years (7.5 ttl pk-yrs)     Types: Cigarettes     Start date: 2008    Smokeless tobacco: Never   Substance Use Topics    Alcohol use: Yes     Alcohol/week: 3.0 - 6.0 standard drinks of alcohol     Types: 3 - 6 Cans of beer per week    Drug use: Yes     Types:  Marijuana     Review of Systems   Constitutional:  Negative for chills and fever.   HENT:  Negative for congestion, rhinorrhea and sore throat.    Eyes:  Negative for visual disturbance.   Respiratory:  Negative for cough and shortness of breath.    Cardiovascular:  Negative for chest pain.   Gastrointestinal:  Negative for abdominal pain, nausea and vomiting.   Genitourinary:  Negative for dysuria and hematuria.   Musculoskeletal:  Negative for joint swelling.   Skin:  Positive for wound (sacrum). Negative for rash.   Neurological:  Negative for weakness.   Psychiatric/Behavioral:  Negative for confusion.    All other systems reviewed and are negative.      Physical Exam     Initial Vitals [06/08/24 1500]   BP Pulse Resp Temp SpO2   119/68 85 16 98.2 °F (36.8 °C) 97 %      MAP       --         Physical Exam    Nursing note and vitals reviewed.  Constitutional: He is not diaphoretic. No distress.   HENT:   Head: Normocephalic and atraumatic.   Neck: Neck supple.   Normal range of motion.  Cardiovascular:  Normal rate and regular rhythm.           Pulmonary/Chest: Breath sounds normal. No respiratory distress.   Abdominal: Abdomen is soft. He exhibits no distension. There is no abdominal tenderness.   Musculoskeletal:         General: No edema.      Cervical back: Normal range of motion and neck supple.      Comments: Wound vac in place for sacral wound. Surrounding skin is clean and intact     Neurological: He is alert and oriented to person, place, and time. He has normal strength. No cranial nerve deficit or sensory deficit.   Skin: Skin is warm. Capillary refill takes less than 2 seconds.   Psychiatric: He has a normal mood and affect.         ED Course   Procedures  Labs Reviewed   COMPREHENSIVE METABOLIC PANEL - Abnormal; Notable for the following components:       Result Value    Chloride 108 (*)     Creatinine 0.57 (*)     Calcium 8.7 (*)     Albumin 2.2 (*)     Globulin 4.3 (*)     Albumin/Globulin Ratio 0.5  (*)     All other components within normal limits   CBC WITH DIFFERENTIAL - Abnormal; Notable for the following components:    RBC 2.87 (*)     Hgb 7.4 (*)     Hct 24.4 (*)     MCH 25.8 (*)     MCHC 30.3 (*)     Platelet 580 (*)     IG# 0.05 (*)     All other components within normal limits   COVID/FLU A&B PCR - Normal    Narrative:     The Xpert Xpress SARS-CoV-2/FLU/RSV plus is a rapid, multiplexed real-time PCR test intended for the simultaneous qualitative detection and differentiation of SARS-CoV-2, Influenza A, Influenza B, and respiratory syncytial virus (RSV) viral RNA in either nasopharyngeal swab or nasal swab specimens.         CBC W/ AUTO DIFFERENTIAL    Narrative:     The following orders were created for panel order CBC auto differential.  Procedure                               Abnormality         Status                     ---------                               -----------         ------                     CBC with Differential[[id number]]       Abnormal            Final result                 Please view results for these tests on the individual orders.          Imaging Results              X-Ray Chest 1 View (Final result)  Result time 06/08/24 17:11:25      Final result by Kartik Chavez MD (06/08/24 17:11:25)                   Impression:      As above.      Electronically signed by: Kartik Chavez  Date:    06/08/2024  Time:    17:11               Narrative:    EXAMINATION:  XR CHEST 1 VIEW    CLINICAL HISTORY:  hypoxia;    TECHNIQUE:  Single view of the chest    COMPARISON:  03/28/2024    FINDINGS:  Emphysematous changes with subsegmental atelectatic changes at the left base noted.  No pneumothorax.  No acute osseous abnormality.                                       Medications - No data to display  Medical Decision Making  69-year-old sent [facility] abnormal wound culture?  No acute complaints patient has a chronic wound to his sacrum with a wound VAC in  place has full wound care at [facility] is already on p.o. and IV antibiotics that were started today but nursing sent him here for unclear reasons I discussed this with the nurse she states that she sent the patient here because he requested to come here  However patient has no complaints on my exam is awake alert oriented  His wound VAC is in place  Discussed all of this with Dr. Nino on-call for the [facility] home physician who agrees that they have full capacity to start treatment of wound culture results at the nursing home facility and that patient does not need to be admitted or have any further workup here will discharge at this time    Problems Addressed:  Visit for wound check: undiagnosed new problem with uncertain prognosis    Amount and/or Complexity of Data Reviewed  Labs: ordered.  Radiology: ordered.  Discussion of management or test interpretation with external provider(s): Discussed with Dr. Nino see ED course    Risk  OTC drugs.  Decision regarding hospitalization.            Scribe Attestation:   Scribe #1: I performed the above scribed service and the documentation accurately describes the services I performed. I attest to the accuracy of the note.    Attending Attestation:           Physician Attestation for Scribe:  Physician Attestation Statement for Scribe #1: I, Gael Maria IV, MD, reviewed documentation, as scribed by Darrel Apodaca in my presence, and it is both accurate and complete.             ED Course as of 06/08/24 2355   Sat Jun 08, 2024   1811 Paged Dr. Lokesh Arango [LH]   1822 Dr. Thibodeaux states that patient does not need to be admitted and these antibiotics can be prescribed at facility [AC]   1843 I did get on the phone with the nurse at the nursing facility [facility] - she reports that the patient was sent here for the wound culture? They have the capability to give IV abx at that facility, any IV abx per Dr. Thibodeaux. Will discharge at this time as rest of medical workup is  negative and patient is Aox4, well appearing with no complaints [AC]      ED Course User Index  [AC] Gael Maria IV, MD  [LH] Darrel Apodaca                           Clinical Impression:  Final diagnoses:  [Z51.89] Visit for wound check (Primary)          ED Disposition Condition    Discharge Stable          ED Prescriptions    None       Follow-up Information       Follow up With Specialties Details Why Contact Info    [facility] - Emergency Dept Emergency Medicine Go to  If symptoms worsen [address] [id number] [phone]    Viktor Russ MD Family Medicine Schedule an appointment as soon as possible for a visit   [address] [facility] [id number] [phone]      Primary care physician  Schedule an appointment as soon as possible for a visit   Follow up with you primary care physician.   If you do not have a primary care physician call [phone] to schedule an appointment.    [facility] - Emergency Dept Emergency Medicine Go to  If symptoms worsen [address] [id number] [phone]             Gael Maria IV, MD  06/08/24 0373

## 2024-06-08 NOTE — ED NOTES
Spoke with [facility] and spoke with [facility]. Requesting to go to ER and progressing getting worse. Oral doxy.

## 2024-06-09 LAB
BACTERIA WND CULT: ABNORMAL

## 2024-06-09 NOTE — DISCHARGE INSTRUCTIONS
Thanks for letting use take care of you today! It is our goal to give you courteous care and to keep you comfortable and informed. If you have any questions before you leave I will be happy to try and answer them.     Advice after your visit:  Your visit in the emergency department is NOT definitive care - please follow-up with your primary care doctor and/or specialist within 1-2 days. If you do not have a primary care physician call [phone] to schedule an appointment. Please return if you have any worsening in your condition or if you have any other concerns.    Return to the emergency department if any worsening symptoms including fever, chest pain, difficulty breathing, weakness, numbness, tingling, nausea, vomiting, inability to eat, drink or take your medication, or any other new symptoms or concerns arise.      Please signup for MyChart as noted below in your paperwork to review all labwork, imaging results, and any other incidental findings from today's visit.     If you had radiology exams like an XRAY or CT in the emergency Department the interpreation on them may be preliminary - there may be less time sensitive findings on the reports please obtain these reports within 24 hours from the hospital or by using your out on your mobile phone to access records.  Bring these to your primary care doctor and/or specialist for further review of incidental findings.    Please review any LAB WORK from your visit today with your primary care physician.    If you were prescribed OPIATE PAIN MEDICATION - please understand of these medications can be addictive, you may fill less of the prescription was written for, you do not have to take the full prescription.  You may discard what you do not use.  Please seek help if you feel you are having problems with addiction.  Do not drive or operate heavy machinery if you are taking sedating medications.  Do not mix these medications with alcohol.      If you had a SPLINT  placed in the emergency department if you have severe pain numbness tingling or discoloration of year digits please remove the splint and return to the emergency department for further evaluation as this may represent a sign of compromise to the nerves or blood vessels due to swelling.    If you had SUTURES in the emergency department please have them removed in the prescribed time frame typically within 7-14 days.  You may shower but please do not bathe or swim.  Keep the wounds clean and dry and covered with a clean dressing.  Please return if he have any signs of infection like redness or drainage or pain at the suture site.    Please take the full course of  any ANTIBIOTICS you were prescribed - incomplete courses of antibiotics can cause resistance to antibiotics in the future which will make it difficult to treat any infections you may have.

## 2024-06-10 ENCOUNTER — LAB REQUISITION (OUTPATIENT)
Dept: LAB | Facility: HOSPITAL | Age: 69
End: 2024-06-10
Payer: MEDICARE

## 2024-06-10 DIAGNOSIS — R39.2 EXTRARENAL UREMIA: ICD-10-CM

## 2024-06-10 LAB
BASOPHILS # BLD AUTO: 0.08 X10(3)/MCL
BASOPHILS NFR BLD AUTO: 0.9 %
CRP SERPL HS-MCNC: 54.65 MG/L
EOSINOPHIL # BLD AUTO: 0.85 X10(3)/MCL (ref 0–0.9)
EOSINOPHIL NFR BLD AUTO: 9.3 %
ERYTHROCYTE [DISTWIDTH] IN BLOOD BY AUTOMATED COUNT: 16.3 % (ref 11.5–17)
ERYTHROCYTE [SEDIMENTATION RATE] IN BLOOD: 44 MM/HR (ref 0–15)
FERRITIN SERPL-MCNC: 583.23 NG/ML (ref 21.81–274.66)
FOLATE SERPL-MCNC: 10 NG/ML (ref 7–31.4)
HCT VFR BLD AUTO: 23.2 % (ref 42–52)
HGB BLD-MCNC: 7.2 G/DL (ref 14–18)
IMM GRANULOCYTES # BLD AUTO: 0.05 X10(3)/MCL (ref 0–0.04)
IMM GRANULOCYTES NFR BLD AUTO: 0.5 %
IRON SATN MFR SERPL: 12 % (ref 20–50)
IRON SERPL-MCNC: 18 UG/DL (ref 65–175)
LYMPHOCYTES # BLD AUTO: 1.18 X10(3)/MCL (ref 0.6–4.6)
LYMPHOCYTES NFR BLD AUTO: 12.9 %
MCH RBC QN AUTO: 25.9 PG (ref 27–31)
MCHC RBC AUTO-ENTMCNC: 31 G/DL (ref 33–36)
MCV RBC AUTO: 83.5 FL (ref 80–94)
MONOCYTES # BLD AUTO: 0.66 X10(3)/MCL (ref 0.1–1.3)
MONOCYTES NFR BLD AUTO: 7.2 %
NEUTROPHILS # BLD AUTO: 6.36 X10(3)/MCL (ref 2.1–9.2)
NEUTROPHILS NFR BLD AUTO: 69.2 %
NRBC BLD AUTO-RTO: 0 %
PLATELET # BLD AUTO: 621 X10(3)/MCL (ref 130–400)
PMV BLD AUTO: 8.1 FL (ref 7.4–10.4)
RBC # BLD AUTO: 2.78 X10(6)/MCL (ref 4.7–6.1)
RET# (OHS): 0.07 X10E6/UL (ref 0.03–0.1)
RETICULOCYTE COUNT AUTOMATED (OLG): 2.53 % (ref 1.1–2.1)
TIBC SERPL-MCNC: 136 UG/DL (ref 69–240)
TIBC SERPL-MCNC: 154 UG/DL (ref 250–450)
TRANSFERRIN SERPL-MCNC: 142 MG/DL (ref 163–344)
WBC # SPEC AUTO: 9.18 X10(3)/MCL (ref 4.5–11.5)

## 2024-06-10 PROCEDURE — 85045 AUTOMATED RETICULOCYTE COUNT: CPT | Performed by: FAMILY MEDICINE

## 2024-06-10 PROCEDURE — 85652 RBC SED RATE AUTOMATED: CPT | Performed by: FAMILY MEDICINE

## 2024-06-10 PROCEDURE — 83540 ASSAY OF IRON: CPT | Performed by: FAMILY MEDICINE

## 2024-06-10 PROCEDURE — 85025 COMPLETE CBC W/AUTO DIFF WBC: CPT | Performed by: FAMILY MEDICINE

## 2024-06-10 PROCEDURE — 82746 ASSAY OF FOLIC ACID SERUM: CPT | Performed by: FAMILY MEDICINE

## 2024-06-10 PROCEDURE — 86141 C-REACTIVE PROTEIN HS: CPT | Performed by: FAMILY MEDICINE

## 2024-06-10 PROCEDURE — 82728 ASSAY OF FERRITIN: CPT | Performed by: FAMILY MEDICINE

## 2024-06-12 ENCOUNTER — LAB REQUISITION (OUTPATIENT)
Dept: LAB | Facility: HOSPITAL | Age: 69
End: 2024-06-12
Payer: MEDICARE

## 2024-06-12 DIAGNOSIS — R79.9 ABNORMAL FINDING OF BLOOD CHEMISTRY, UNSPECIFIED: ICD-10-CM

## 2024-06-12 LAB
ALBUMIN SERPL-MCNC: 2 G/DL (ref 3.4–4.8)
ALBUMIN/GLOB SERPL: 0.6 RATIO (ref 1.1–2)
ALP SERPL-CCNC: 103 UNIT/L (ref 40–150)
ALT SERPL-CCNC: 16 UNIT/L (ref 0–55)
ANION GAP SERPL CALC-SCNC: 8 MEQ/L
AST SERPL-CCNC: 14 UNIT/L (ref 5–34)
BILIRUB SERPL-MCNC: 0.2 MG/DL
BUN SERPL-MCNC: 13 MG/DL (ref 8.4–25.7)
CALCIUM SERPL-MCNC: 8.1 MG/DL (ref 8.8–10)
CHLORIDE SERPL-SCNC: 110 MMOL/L (ref 98–107)
CO2 SERPL-SCNC: 21 MMOL/L (ref 23–31)
CREAT SERPL-MCNC: 0.55 MG/DL (ref 0.73–1.18)
CREAT/UREA NIT SERPL: 24
ERYTHROCYTE [DISTWIDTH] IN BLOOD BY AUTOMATED COUNT: 16.6 % (ref 11.5–17)
GFR SERPLBLD CREATININE-BSD FMLA CKD-EPI: >60 ML/MIN/1.73/M2
GLOBULIN SER-MCNC: 3.3 GM/DL (ref 2.4–3.5)
GLUCOSE SERPL-MCNC: 105 MG/DL (ref 82–115)
HCT VFR BLD AUTO: 21.7 % (ref 42–52)
HGB BLD-MCNC: 6.6 G/DL (ref 14–18)
MCH RBC QN AUTO: 25.8 PG (ref 27–31)
MCHC RBC AUTO-ENTMCNC: 30.4 G/DL (ref 33–36)
MCV RBC AUTO: 84.8 FL (ref 80–94)
NRBC BLD AUTO-RTO: 0 %
PLATELET # BLD AUTO: 530 X10(3)/MCL (ref 130–400)
PMV BLD AUTO: 8.2 FL (ref 7.4–10.4)
POTASSIUM SERPL-SCNC: 3.4 MMOL/L (ref 3.5–5.1)
PREALB SERPL-MCNC: 12.2 MG/DL (ref 16–42)
PROT SERPL-MCNC: 5.3 GM/DL (ref 5.8–7.6)
RBC # BLD AUTO: 2.56 X10(6)/MCL (ref 4.7–6.1)
SODIUM SERPL-SCNC: 139 MMOL/L (ref 136–145)
WBC # SPEC AUTO: 8.57 X10(3)/MCL (ref 4.5–11.5)

## 2024-06-12 PROCEDURE — 85027 COMPLETE CBC AUTOMATED: CPT | Performed by: FAMILY MEDICINE

## 2024-06-12 PROCEDURE — 84134 ASSAY OF PREALBUMIN: CPT | Performed by: FAMILY MEDICINE

## 2024-06-12 PROCEDURE — 80053 COMPREHEN METABOLIC PANEL: CPT | Performed by: FAMILY MEDICINE

## 2024-06-19 ENCOUNTER — LAB REQUISITION (OUTPATIENT)
Dept: LAB | Facility: HOSPITAL | Age: 69
End: 2024-06-19
Payer: MEDICARE

## 2024-06-19 DIAGNOSIS — L89.154 PRESSURE ULCER OF SACRAL REGION, STAGE 4: ICD-10-CM

## 2024-06-19 LAB — PREALB SERPL-MCNC: 15.3 MG/DL (ref 16–42)

## 2024-06-19 PROCEDURE — 84134 ASSAY OF PREALBUMIN: CPT | Performed by: FAMILY MEDICINE

## 2024-06-26 ENCOUNTER — LAB REQUISITION (OUTPATIENT)
Dept: LAB | Facility: HOSPITAL | Age: 69
End: 2024-06-26
Payer: MEDICARE

## 2024-06-26 DIAGNOSIS — L89.102 PRESSURE ULCER OF UNSPECIFIED PART OF BACK, STAGE 2: ICD-10-CM

## 2024-06-26 DIAGNOSIS — R89.9 ABNORMAL LABORATORY TEST: Primary | ICD-10-CM

## 2024-06-26 LAB — PREALB SERPL-MCNC: 14.1 MG/DL (ref 16–42)

## 2024-06-26 PROCEDURE — 84134 ASSAY OF PREALBUMIN: CPT | Performed by: FAMILY MEDICINE

## 2024-07-03 ENCOUNTER — HOSPITAL ENCOUNTER (EMERGENCY)
Facility: HOSPITAL | Age: 69
Discharge: NURSING, REHAB OR LONG-TERM CARE | End: 2024-07-03
Attending: INTERNAL MEDICINE
Payer: MEDICARE

## 2024-07-03 VITALS
TEMPERATURE: 98 F | DIASTOLIC BLOOD PRESSURE: 64 MMHG | SYSTOLIC BLOOD PRESSURE: 106 MMHG | BODY MASS INDEX: 21.03 KG/M2 | OXYGEN SATURATION: 93 % | WEIGHT: 142 LBS | RESPIRATION RATE: 18 BRPM | HEART RATE: 86 BPM | HEIGHT: 69 IN

## 2024-07-03 DIAGNOSIS — L98.429 SKIN ULCER OF SACRUM, UNSPECIFIED ULCER STAGE: ICD-10-CM

## 2024-07-03 DIAGNOSIS — M79.89 RIGHT LEG SWELLING: Primary | ICD-10-CM

## 2024-07-03 DIAGNOSIS — R69 CHRONICALLY ILL: ICD-10-CM

## 2024-07-03 DIAGNOSIS — D63.8 ANEMIA OF CHRONIC DISEASE: ICD-10-CM

## 2024-07-03 DIAGNOSIS — M79.89 LEG SWELLING: ICD-10-CM

## 2024-07-03 LAB
ABS NEUT CALC (OHS): 3.37 X10(3)/MCL (ref 2.1–9.2)
ALBUMIN SERPL-MCNC: 2.8 G/DL (ref 3.4–4.8)
ALBUMIN/GLOB SERPL: 0.6 RATIO (ref 1.1–2)
ALP SERPL-CCNC: 105 UNIT/L (ref 40–150)
ALT SERPL-CCNC: 11 UNIT/L (ref 0–55)
ANION GAP SERPL CALC-SCNC: 11 MEQ/L
ANISOCYTOSIS BLD QL SMEAR: ABNORMAL
APTT PPP: 32.8 SECONDS (ref 23.4–33.9)
AST SERPL-CCNC: 17 UNIT/L (ref 5–34)
BILIRUB SERPL-MCNC: 0.2 MG/DL
BUN SERPL-MCNC: 19.8 MG/DL (ref 8.4–25.7)
CALCIUM SERPL-MCNC: 10.1 MG/DL (ref 8.8–10)
CHLORIDE SERPL-SCNC: 103 MMOL/L (ref 98–107)
CO2 SERPL-SCNC: 24 MMOL/L (ref 23–31)
CREAT SERPL-MCNC: 0.84 MG/DL (ref 0.73–1.18)
CREAT/UREA NIT SERPL: 24
CRP SERPL HS-MCNC: 39.27 MG/L
EOSINOPHIL NFR BLD MANUAL: 0.83 X10(3)/MCL (ref 0–0.9)
EOSINOPHIL NFR BLD MANUAL: 14 % (ref 0–8)
ERYTHROCYTE [DISTWIDTH] IN BLOOD BY AUTOMATED COUNT: 16.5 % (ref 11.5–17)
ERYTHROCYTE [SEDIMENTATION RATE] IN BLOOD: 55 MM/HR (ref 0–15)
GFR SERPLBLD CREATININE-BSD FMLA CKD-EPI: >60 ML/MIN/1.73/M2
GLOBULIN SER-MCNC: 4.6 GM/DL (ref 2.4–3.5)
GLUCOSE SERPL-MCNC: 100 MG/DL (ref 82–115)
GROUP & RH: NORMAL
HCT VFR BLD AUTO: 28.1 % (ref 42–52)
HGB BLD-MCNC: 8.3 G/DL (ref 14–18)
HYPOCHROMIA BLD QL SMEAR: ABNORMAL
INDIRECT COOMBS: NORMAL
INR PPP: 1 (ref 2–3)
LACTATE SERPL-SCNC: 1.5 MMOL/L (ref 0.5–2.2)
LYMPHOCYTES NFR BLD MANUAL: 1.54 X10(3)/MCL
LYMPHOCYTES NFR BLD MANUAL: 26 % (ref 13–40)
MCH RBC QN AUTO: 24.6 PG (ref 27–31)
MCHC RBC AUTO-ENTMCNC: 29.5 G/DL (ref 33–36)
MCV RBC AUTO: 83.1 FL (ref 80–94)
MONOCYTES NFR BLD MANUAL: 0.18 X10(3)/MCL (ref 0.1–1.3)
MONOCYTES NFR BLD MANUAL: 3 % (ref 2–11)
NEUTROPHILS NFR BLD MANUAL: 57 % (ref 47–80)
NRBC BLD AUTO-RTO: 0 %
PLATELET # BLD AUTO: 431 X10(3)/MCL (ref 130–400)
PLATELET # BLD EST: ADEQUATE 10*3/UL
PMV BLD AUTO: 8.3 FL (ref 7.4–10.4)
POIKILOCYTOSIS BLD QL SMEAR: ABNORMAL
POTASSIUM SERPL-SCNC: 4 MMOL/L (ref 3.5–5.1)
PROT SERPL-MCNC: 7.4 GM/DL (ref 5.8–7.6)
PROTHROMBIN TIME: 13.8 SECONDS (ref 11.7–14.5)
RBC # BLD AUTO: 3.38 X10(6)/MCL (ref 4.7–6.1)
RBC MORPH BLD: ABNORMAL
SODIUM SERPL-SCNC: 138 MMOL/L (ref 136–145)
SPECIMEN OUTDATE: NORMAL
WBC # BLD AUTO: 5.91 X10(3)/MCL (ref 4.5–11.5)

## 2024-07-03 PROCEDURE — 86901 BLOOD TYPING SEROLOGIC RH(D): CPT | Performed by: INTERNAL MEDICINE

## 2024-07-03 PROCEDURE — 85652 RBC SED RATE AUTOMATED: CPT | Performed by: INTERNAL MEDICINE

## 2024-07-03 PROCEDURE — 85610 PROTHROMBIN TIME: CPT | Performed by: INTERNAL MEDICINE

## 2024-07-03 PROCEDURE — 83605 ASSAY OF LACTIC ACID: CPT | Performed by: INTERNAL MEDICINE

## 2024-07-03 PROCEDURE — 87040 BLOOD CULTURE FOR BACTERIA: CPT | Performed by: INTERNAL MEDICINE

## 2024-07-03 PROCEDURE — 99284 EMERGENCY DEPT VISIT MOD MDM: CPT | Mod: 25

## 2024-07-03 PROCEDURE — 85007 BL SMEAR W/DIFF WBC COUNT: CPT | Performed by: INTERNAL MEDICINE

## 2024-07-03 PROCEDURE — 86900 BLOOD TYPING SEROLOGIC ABO: CPT | Performed by: INTERNAL MEDICINE

## 2024-07-03 PROCEDURE — 85027 COMPLETE CBC AUTOMATED: CPT | Performed by: INTERNAL MEDICINE

## 2024-07-03 PROCEDURE — 86850 RBC ANTIBODY SCREEN: CPT | Performed by: INTERNAL MEDICINE

## 2024-07-03 PROCEDURE — 85730 THROMBOPLASTIN TIME PARTIAL: CPT | Performed by: INTERNAL MEDICINE

## 2024-07-03 PROCEDURE — 80053 COMPREHEN METABOLIC PANEL: CPT | Performed by: INTERNAL MEDICINE

## 2024-07-03 PROCEDURE — 86141 C-REACTIVE PROTEIN HS: CPT | Performed by: INTERNAL MEDICINE

## 2024-07-03 RX ORDER — DOXYCYCLINE 100 MG/1
100 CAPSULE ORAL EVERY 12 HOURS
Qty: 20 CAPSULE | Refills: 0 | Status: SHIPPED | OUTPATIENT
Start: 2024-07-03 | End: 2024-07-13

## 2024-07-03 NOTE — ED NOTES
[facility] resident with swelling to rt leg which is hot to touch (r/o dvt). Hx of osteomyelitis. Wound vac in use to sacral wound.

## 2024-07-03 NOTE — ED PROVIDER NOTES
CODE STATUS: DNR      Source of History:  Patient, no limitations    Chief complaint:  Joint Swelling (Pt coming from [facility] with c/o swelling to right upper leg that is hot to the touch. Sent to r/o DVT. )      HPI:  Shaheen Christie is a 69 y.o. male presenting with Joint Swelling (Pt coming from [facility] with c/o swelling to right upper leg that is hot to the touch. Sent to r/o DVT. )       68yo M resident of [facility], hx of Neuropathy, COPD, HTN, CAD, PAD, osteomyelitis of sacrum 2/27/24 with continued  unstageable sacral ulcer managed by wound care, left AKA 4/20/24, chronic anemia, presents for DVT rule out of right thigh swelling and warmth. Pt has no complaints and does not seem pleased to be back at the hospital, does agree to labs and if necessary admission. No fever. No chest pain. No shortness of breath.    This patient presents complaining of swelling to the left upper thigh. Onset of symptoms was gradual starting a few days ago and has been gradually worsening since that time. The patient does not recall history of trauma to the area. The patient has not had a history of blood clots or coagulopathy. There is a history of recent surgery. The patient denies chest pain, hemoptysis, shortness of breath, and cough        Review of Systems   Constitutional symptoms:  Negative except as documented in HPI.   Skin symptoms:  Negative except as documented in HPI.   HEENT symptoms:  Negative except as documented in HPI.   Respiratory symptoms:  Negative except as documented in HPI.   Cardiovascular symptoms:  Negative except as documented in HPI.   Gastrointestinal symptoms:  Negative except as documented in HPI.    Genitourinary symptoms:  Negative except as documented in HPI.   Musculoskeletal symptoms:  Negative except as documented in HPI.   Neurologic symptoms:  Negative except as documented in HPI.   Psychiatric symptoms:  Negative except as documented in HPI.   Allergy/immunologic  symptoms:  Negative except as documented in HPI.             Additional review of systems information: All other systems reviewed and otherwise negative.      Review of patient's allergies indicates:  No Known Allergies    PMH:  As per HPI and below:    Past Medical History:   Diagnosis Date    COPD (chronic obstructive pulmonary disease)     Depression     Hypertension     Neuropathy        Family History   Problem Relation Name Age of Onset    Heart disease Mother      Cancer Father      Lung disease Father         Past Surgical History:   Procedure Laterality Date    ABOVE-KNEE AMPUTATION Left 4/20/2024    Procedure: AMPUTATION, ABOVE KNEE;  Surgeon: Serge Brambila Jr., MD;  Location: [facility] OR;  Service: General;  Laterality: Left;    ABOVE-KNEE AMPUTATION Left 4/24/2024    Procedure: AMPUTATION, ABOVE KNEE (formalization);  Surgeon: Delon Draper MD;  Location: [facility] OR;  Service: General;  Laterality: Left;    angiogram Bilateral     stents placed in left leg    anterior neck surgery      APPLICATION OF WOUND VACUUM-ASSISTED CLOSURE DEVICE Left 4/20/2024    Procedure: APPLICATION, WOUND VAC;  Surgeon: Serge Brambila Jr., MD;  Location: [facility];  Service: General;  Laterality: Left;    ARTHROTOMY OF ANKLE Left 6/28/2023    Procedure: ARTHROTOMY, ANKLE;  Surgeon: Tom Nichole DPM;  Location: [facility] OR;  Service: Podiatry;  Laterality: Left;    cataracts Left     CHOLECYSTECTOMY      DEBRIDEMENT OF AMPUTATION SITE N/A 4/21/2024    Procedure: DEBRIDEMENT, AMPUTATION SITE;  Surgeon: Serge Brambila Jr., MD;  Location: [facility];  Service: General;  Laterality: N/A;    DEBRIDEMENT OF FOOT Bilateral 3/26/2024    Procedure: DEBRIDEMENT, FOOT;  Surgeon: Tom Nichole DPM;  Location: [facility] OR;  Service: Podiatry;  Laterality: Bilateral;    EYE SURGERY Left     HAND SURGERY Left     broken bone    herina repair      INCISION AND DRAINAGE, LOWER EXTREMITY Left 7/28/2023    Procedure: INCISION AND DRAINAGE,  "LOWER EXTREMITY;  Surgeon: Avinash Garces MD;  Location: [facility];  Service: Orthopedics;  Laterality: Left;  Supine, vascular bed, bone foam  last case  cysto tubing, laparoscopic trocar, experience, vanc, hemovac drain    KNEE SURGERY Bilateral     PERIPHERAL ANGIOGRAPHY N/A 3/13/2024    Procedure: Peripheral angiography;  Surgeon: Deven Queen MD;  Location: [facility] CATH LAB;  Service: Cardiology;  Laterality: N/A;  MICHELLE ANGIO W/ ANEST.    posterior neck surgery      REPLACEMENT OF WOUND VACUUM-ASSISTED CLOSURE DEVICE Left 4/21/2024    Procedure: REPLACEMENT, WOUND VAC;  Surgeon: Serge Brambila Jr., MD;  Location: [facility];  Service: General;  Laterality: Left;    SPINE SURGERY      TOTAL REPLACEMENT OF BOTH HIP JOINTS USING COMPUTER-ASSISTED NAVIGATION Bilateral        Social History     Tobacco Use    Smoking status: Former     Average packs/day: 0.5 packs/day for 15.0 years (7.5 ttl pk-yrs)     Types: Cigarettes     Start date: 2008    Smokeless tobacco: Never   Substance Use Topics    Alcohol use: Yes     Alcohol/week: 3.0 - 6.0 standard drinks of alcohol     Types: 3 - 6 Cans of beer per week    Drug use: Yes     Types: Marijuana       Patient Active Problem List   Diagnosis    Skin ulcer of left ankle    PAD (peripheral artery disease)    Benign hypertension    Cigar smoker    Hyperlipidemia    Other chronic pain    Cigarette nicotine dependence without complication    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    General:  Alert, no acute distress. Chronically " ill appearing  Skin: Normal for Ethnic Origin, No cyanosis, mild redness and swelling to right upper thigh, left AKA dressing in place with weeping discharge through dressing, unstageable sacral ulcer with feces in wound (see pictures below)  HEENT: Normocephalic and atraumatic, Vision unchanged  Cardiovascular:  Regular rate and rhythm  Chest Wall: No deformity, equal chest rise  Respiratory:  Lungs are clear to auscultation, respirations are non-labored.    Musculoskeletal:  no acute changes, left AKA, chronic sacral osteo   Gastrointestinal:  Soft, Non distended  Neurological:  Alert and oriented, normal motor observed, normal speech observed.    Psychiatric:  Cooperative, appropriate mood & affect.       Media Information    File Link    Scan on 7/3/2024  5:20 PM by Asia Mclaughlin RN     Media Information    File Link    Scan on 7/3/2024  5:20 PM by Asia Mclaughlin, RN            Labs that have been ordered have been independently reviewed and interpreted by myself.     Old Chart Reviewed.      Initial Impression/ Differential Dx:  Peripheral edema, uncontrolled hypertension, renal insufficiency, liver failure, DVT, cellulitis, edema related to trauma, hypothyroidism      MDM:      Reviewed Nurses Note.    Reviewed Pertinent old records.    Orders Placed This Encounter    Blood Culture #1 **CANNOT BE ORDERED STAT**    Blood Culture #2 **CANNOT BE ORDERED STAT**    US Lower Extremity Veins Right    CBC Auto Differential    Comprehensive Metabolic Panel    Protime-INR    APTT    Sedimentation Rate    CRP, High Sensitivity    Lactic Acid, Plasma    CBC with Differential    Manual Differential    Type & Screen    Insert peripheral IV    doxycycline (MONODOX) 100 MG capsule                    Labs Reviewed   COMPREHENSIVE METABOLIC PANEL - Abnormal; Notable for the following components:       Result Value    Calcium 10.1 (*)     Albumin 2.8 (*)     Globulin 4.6 (*)     Albumin/Globulin Ratio 0.6 (*)     All other  components within normal limits   PROTIME-INR - Abnormal; Notable for the following components:    INR 1.0 (*)     All other components within normal limits   SEDIMENTATION RATE, AUTOMATED - Abnormal; Notable for the following components:    Sed Rate 55 (*)     All other components within normal limits   HIGH SENSITIVITY CRP - Abnormal; Notable for the following components:    C-Reactive Protein High Sensitivity 39.27 (*)     All other components within normal limits   CBC WITH DIFFERENTIAL - Abnormal; Notable for the following components:    RBC 3.38 (*)     Hgb 8.3 (*)     Hct 28.1 (*)     MCH 24.6 (*)     MCHC 29.5 (*)     Platelet 431 (*)     All other components within normal limits   MANUAL DIFFERENTIAL - Abnormal; Notable for the following components:    Eosinophils % 14 (*)     RBC Morph Abnormal (*)     Anisocytosis 1+ (*)     Poikilocytosis 1+ (*)     Hypochromasia 1+ (*)     All other components within normal limits   APTT - Normal   LACTIC ACID, PLASMA - Normal   BLOOD CULTURE OLG   BLOOD CULTURE OLG   CBC W/ AUTO DIFFERENTIAL    Narrative:     The following orders were created for panel order CBC Auto Differential.  Procedure                               Abnormality         Status                     ---------                               -----------         ------                     CBC with Differential[[id number]]       Abnormal            Final result               Manual Differential[[id number]]         Abnormal            Final result                 Please view results for these tests on the individual orders.   TYPE & SCREEN          US Lower Extremity Veins Right    (Results Pending)        Admission on 07/03/2024   Component Date Value Ref Range Status    Sodium 07/03/2024 138  136 - 145 mmol/L Final    Potassium 07/03/2024 4.0  3.5 - 5.1 mmol/L Final    Chloride 07/03/2024 103  98 - 107 mmol/L Final    CO2 07/03/2024 24  23 - 31 mmol/L Final    Glucose 07/03/2024 100  82 - 115 mg/dL Final     Blood Urea Nitrogen 07/03/2024 19.8  8.4 - 25.7 mg/dL Final    Creatinine 07/03/2024 0.84  0.73 - 1.18 mg/dL Final    Calcium 07/03/2024 10.1 (H)  8.8 - 10.0 mg/dL Final    Protein Total 07/03/2024 7.4  5.8 - 7.6 gm/dL Final    Albumin 07/03/2024 2.8 (L)  3.4 - 4.8 g/dL Final    Globulin 07/03/2024 4.6 (H)  2.4 - 3.5 gm/dL Final    Albumin/Globulin Ratio 07/03/2024 0.6 (L)  1.1 - 2.0 ratio Final    Bilirubin Total 07/03/2024 0.2  <=1.5 mg/dL Final    ALP 07/03/2024 105  40 - 150 unit/L Final    ALT 07/03/2024 11  0 - 55 unit/L Final    AST 07/03/2024 17  5 - 34 unit/L Final    eGFR 07/03/2024 >60  mL/min/1.73/m2 Final    Anion Gap 07/03/2024 11.0  mEq/L Final    BUN/Creatinine Ratio 07/03/2024 24   Final    PT 07/03/2024 13.8  11.7 - 14.5 seconds Final    INR 07/03/2024 1.0 (L)  2.0 - 3.0 Final    PTT 07/03/2024 32.8  23.4 - 33.9 seconds Final    Sed Rate 07/03/2024 55 (H)  0 - 15 mm/hr Final    C-Reactive Protein High Sensitivity 07/03/2024 39.27 (H)  <=5.00 mg/L Final    Group & Rh 07/03/2024 A POS   Final    Indirect Jose GEL 07/03/2024 NEG   Final    Specimen Outdate 07/03/2024 07/06/2024 23:59   Final    Lactic Acid Level 07/03/2024 1.5  0.5 - 2.2 mmol/L Final    WBC 07/03/2024 5.91  4.50 - 11.50 x10(3)/mcL Final    RBC 07/03/2024 3.38 (L)  4.70 - 6.10 x10(6)/mcL Final    Hgb 07/03/2024 8.3 (L)  14.0 - 18.0 g/dL Final    Hct 07/03/2024 28.1 (L)  42.0 - 52.0 % Final    MCV 07/03/2024 83.1  80.0 - 94.0 fL Final    MCH 07/03/2024 24.6 (L)  27.0 - 31.0 pg Final    MCHC 07/03/2024 29.5 (L)  33.0 - 36.0 g/dL Final    RDW 07/03/2024 16.5  11.5 - 17.0 % Final    Platelet 07/03/2024 431 (H)  130 - 400 x10(3)/mcL Final    MPV 07/03/2024 8.3  7.4 - 10.4 fL Final    NRBC% 07/03/2024 0.0  % Final    Neutrophils % 07/03/2024 57  47 - 80 % Final    Lymphs % 07/03/2024 26  13 - 40 % Final    Monocytes % 07/03/2024 3  2 - 11 % Final    Eosinophils % 07/03/2024 14 (H)  0 - 8 % Final    Neutrophils Abs Calc 07/03/2024 3.3687   2.1 - 9.2 x10(3)/mcL Final    Lymphs Abs 07/03/2024 1.5366  0.6 - 4.6 x10(3)/mcL Final    Eosinophils Abs 07/03/2024 0.8274  0 - 0.9 x10(3)/mcL Final    Monocytes Abs 07/03/2024 0.1773  0.1 - 1.3 x10(3)/mcL Final    Platelets 07/03/2024 Adequate  Normal, Adequate Final    RBC Morph 07/03/2024 Abnormal (A)  Normal Final    Anisocytosis 07/03/2024 1+ (A)  (none) Final    Poikilocytosis 07/03/2024 1+ (A)  (none) Final    Hypochromasia 07/03/2024 1+ (A)  (none) Final       Imaging Results              US Lower Extremity Veins Right (In process)                                    ED Course as of 07/03/24 1910 Wed Jul 03, 2024   1759 Hemoglobin(!): 8.3 [MP]   1759 Hematocrit(!): 28.1 [MP]   1814 Lactic Acid Level: 1.5 [MP]   1814 CRP, High Sensitivity(!): 39.27  Trending down [MP]   1851 US tech here however system down [MP]   1909 Patient is chronically ill with surprisingly good labs today. Sent for DVT rule out which was completed during this ED visit. Pt wishes for discharge back to NH  [MP]      ED Course User Index  [MP] Clinton Aponte DO                        Diagnostic Impression:    1. Right leg swelling    2. Leg swelling    3. Chronically ill    4. Skin ulcer of sacrum, unspecified ulcer stage    5. Anemia of chronic disease         ED Disposition Condition    Discharge to Nursing Home Stable             Follow-up Information       [facility] - Emergency Dept.    Specialty: Emergency Medicine  Why: If symptoms worsen  Contact information:  [address]  [facility] [id number] [phone]                            ED Prescriptions       Medication Sig Dispense Start Date End Date Auth. Provider    doxycycline (MONODOX) 100 MG capsule Take 1 capsule (100 mg total) by mouth every 12 (twelve) hours. for 10 days 20 capsule 7/3/2024 7/13/2024 Clinton Aponte DO          Follow-up Information       Follow up With Specialties Details Why Contact Info     [facility] - Emergency Dept Emergency Medicine  If symptoms worsen [id number]  [facility] [id number] [phone]             Clinton Aponte,   07/03/24 1911

## 2024-07-04 NOTE — ED NOTES
Patient Report given to MONCHO Vences at [facility] [phone]. [facility] I also reported to Ru that the patients sacral wound that has a wound vac needs to be re-evaluated. I relayed that the sponge is not covering the entire sacral wound and needs evaluation especially d/t pt being diapered and on arrival pt had feces traveling up to part of wound that wasnt covered. Ru stated she would put a note in patients chart for wound care to follow up.

## 2024-07-08 LAB
BACTERIA BLD CULT: NORMAL
BACTERIA BLD CULT: NORMAL

## 2024-07-15 NOTE — PROGRESS NOTES
Subjective:       Patient ID: Shaheen Christie is a 69 y.o. male.    Chief Complaint: New Patient    Diagnosis:   Normocytic Anemia  Thrombocytosis  Osteomyelitis    Current Treatment: None    Treatment History: N/A    HPI:  70yo M presents in July '24 for evaluation of severe normocytic anemia and thrombocytosis. Recent labs with hgb of 7.2 and platelets near 550K. CMP largely unremarkable.  His anemia is chronic and has been fluctuating between his numerous hospitalizations and infections over the last year. 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     Today he has no complaints. He doesn't recall if he has ever had a blood transfusion. He is chronically tired but denies any new or worsening symptoms including shortness of breath. He has no obvious signs of bleeding. He smokes daily.     I discussed his CBC findings and how they differ from expected values. We discussed the possible etiology for his findings including both benign and malignant causes. I explained the role for initial evaluation with blood work and possibly secondary evaluation with a bone marrow biopsy. All questions were answered at the time of the visit. He is agreeable to proceed with the plan.         Past Medical History:   Diagnosis Date    COPD (chronic obstructive pulmonary disease)     Depression     Hypertension     Neuropathy       Past Surgical History:   Procedure Laterality Date    ABOVE-KNEE AMPUTATION Left 4/20/2024    Procedure: AMPUTATION, ABOVE KNEE;  Surgeon: Serge Brambila Jr., MD;  Location: [facility];  Service: General;  Laterality: Left;    ABOVE-KNEE AMPUTATION Left 4/24/2024    Procedure: AMPUTATION, ABOVE KNEE (formalization);  Surgeon: Delon Draper MD;  Location: [facility];  Service: General;  Laterality: Left;    angiogram Bilateral     stents placed in left leg    anterior neck surgery      APPLICATION  OF WOUND VACUUM-ASSISTED CLOSURE DEVICE Left 4/20/2024    Procedure: APPLICATION, WOUND VAC;  Surgeon: Serge Brambila Jr., MD;  Location: [facility] OR;  Service: General;  Laterality: Left;    ARTHROTOMY OF ANKLE Left 6/28/2023    Procedure: ARTHROTOMY, ANKLE;  Surgeon: Tom Nichole DPM;  Location: [facility] OR;  Service: Podiatry;  Laterality: Left;    cataracts Left     CHOLECYSTECTOMY      DEBRIDEMENT OF AMPUTATION SITE N/A 4/21/2024    Procedure: DEBRIDEMENT, AMPUTATION SITE;  Surgeon: Serge Brambila Jr., MD;  Location: [facility] OR;  Service: General;  Laterality: N/A;    DEBRIDEMENT OF FOOT Bilateral 3/26/2024    Procedure: DEBRIDEMENT, FOOT;  Surgeon: Tom Nichole DPM;  Location: [facility];  Service: Podiatry;  Laterality: Bilateral;    EYE SURGERY Left     HAND SURGERY Left     broken bone    herina repair      INCISION AND DRAINAGE, LOWER EXTREMITY Left 7/28/2023    Procedure: INCISION AND DRAINAGE, LOWER EXTREMITY;  Surgeon: Avinash Garces MD;  Location: [facility];  Service: Orthopedics;  Laterality: Left;  Supine, vascular bed, bone foam  last case  cysto tubing, laparoscopic trocar, experience, vanc, hemovac drain    KNEE SURGERY Bilateral     PERIPHERAL ANGIOGRAPHY N/A 3/13/2024    Procedure: Peripheral angiography;  Surgeon: Deven Queen MD;  Location: [facility] CATH LAB;  Service: Cardiology;  Laterality: N/A;  MICHELLE ANGIO W/ ANEST.    posterior neck surgery      REPLACEMENT OF WOUND VACUUM-ASSISTED CLOSURE DEVICE Left 4/21/2024    Procedure: REPLACEMENT, WOUND VAC;  Surgeon: Serge Brambila Jr., MD;  Location: [facility];  Service: General;  Laterality: Left;    SPINE SURGERY      TOTAL REPLACEMENT OF BOTH HIP JOINTS USING COMPUTER-ASSISTED NAVIGATION Bilateral      Social History     Socioeconomic History    Marital status:    Occupational History    Occupation: retired   Tobacco Use    Smoking status: Former     Average packs/day: 0.5 packs/day for 15.0 years (7.5 ttl pk-yrs)     Types:  Cigarettes     Start date: 2008    Smokeless tobacco: Never   Substance and Sexual Activity    Alcohol use: Yes     Alcohol/week: 3.0 - 6.0 standard drinks of alcohol     Types: 3 - 6 Cans of beer per week    Drug use: Yes     Types: Marijuana    Sexual activity: Not Currently      Family History   Problem Relation Name Age of Onset    Heart disease Mother      Cancer Father      Lung disease Father        Review of patient's allergies indicates:  No Known Allergies   Review of Systems   Constitutional:  Negative for activity change, appetite change, chills, fatigue and fever.   HENT:  Negative for sore throat.    Eyes:  Negative for visual disturbance.   Respiratory:  Negative for cough and shortness of breath.    Cardiovascular:  Negative for chest pain.   Gastrointestinal:  Negative for abdominal pain, constipation, diarrhea, nausea and vomiting.   Endocrine: Negative for polyuria.   Genitourinary:  Negative for dysuria.   Musculoskeletal:  Negative for back pain.   Integumentary:  Positive for wound. Negative for rash.   Allergic/Immunologic: Negative for frequent infections.   Neurological:  Negative for weakness and headaches.   Hematological:  Negative for adenopathy. Does not bruise/bleed easily.         Objective:      Vitals:    07/24/24 1317   BP: 99/60   Pulse: 66   Resp: 18   Temp: 97.6 °F (36.4 °C)       Physical Exam  Constitutional:       Appearance: He is ill-appearing.      Comments: In wheelchair   HENT:      Head: Normocephalic and atraumatic.      Nose: Nose normal.      Mouth/Throat:      Mouth: Mucous membranes are moist.      Pharynx: Oropharynx is clear.   Eyes:      Extraocular Movements: Extraocular movements intact.      Conjunctiva/sclera: Conjunctivae normal.      Pupils: Pupils are equal, round, and reactive to light.   Cardiovascular:      Rate and Rhythm: Normal rate and regular rhythm.      Pulses: Normal pulses.      Heart sounds: Normal heart sounds.   Pulmonary:      Effort:  Pulmonary effort is normal.      Breath sounds: Normal breath sounds.   Abdominal:      General: There is no distension.      Palpations: Abdomen is soft.   Musculoskeletal:         General: Deformity present.      Cervical back: Normal range of motion and neck supple.      Comments: Left AKA. Right leg bandaged and elevated in chair   Lymphadenopathy:      Cervical: No cervical adenopathy.   Skin:     General: Skin is warm and dry.      Coloration: Skin is pale.      Findings: No bruising.   Neurological:      General: No focal deficit present.      Mental Status: He is oriented to person, place, and time.         LABS AND IMAGING REVIEWED IN EPIC          Assessment:   Severe normocytic anemia - Will evaluate for both benign and malignant causes. Suspect a component of BAN and inflammation induced given medical history and recent surgeries.     Thrombocytosis - Suspect reactive in the setting of chronic infection/inflammation. Will evaluate further.         Plan:       - Labs today  - RTC 3-4 weeks for MD visit, labs same day     I spent a total of 55 minutes on the day of the visit.This includes face to face time and non-face to face time preparing to see the patient (eg, review of tests), obtaining and/or reviewing separately obtained history, documenting clinical information in the electronic or other health record, independently interpreting results and communicating results to the patient/family/caregiver, or care coordinator.      Elizabeth Kennedy LeJeune, MD  Hematology/Oncology   [facility]

## 2024-07-24 ENCOUNTER — OFFICE VISIT (OUTPATIENT)
Dept: HEMATOLOGY/ONCOLOGY | Facility: CLINIC | Age: 69
End: 2024-07-24
Payer: MEDICARE

## 2024-07-24 ENCOUNTER — TELEPHONE (OUTPATIENT)
Dept: HEMATOLOGY/ONCOLOGY | Facility: CLINIC | Age: 69
End: 2024-07-24
Payer: MEDICARE

## 2024-07-24 VITALS
BODY MASS INDEX: 21.92 KG/M2 | WEIGHT: 148 LBS | SYSTOLIC BLOOD PRESSURE: 99 MMHG | DIASTOLIC BLOOD PRESSURE: 60 MMHG | OXYGEN SATURATION: 99 % | HEART RATE: 66 BPM | TEMPERATURE: 98 F | RESPIRATION RATE: 18 BRPM | HEIGHT: 69 IN

## 2024-07-24 DIAGNOSIS — D64.9 ANEMIA, UNSPECIFIED TYPE: ICD-10-CM

## 2024-07-24 DIAGNOSIS — F17.290 CIGAR SMOKER: ICD-10-CM

## 2024-07-24 DIAGNOSIS — L89.154 PRESSURE INJURY OF SACRAL REGION, STAGE 4: ICD-10-CM

## 2024-07-24 DIAGNOSIS — R89.9 ABNORMAL LABORATORY TEST: ICD-10-CM

## 2024-07-24 DIAGNOSIS — D75.839 THROMBOCYTOSIS: Primary | ICD-10-CM

## 2024-07-24 LAB
ALBUMIN SERPL-MCNC: 2.6 G/DL (ref 3.4–4.8)
ALBUMIN/GLOB SERPL: 0.6 RATIO (ref 1.1–2)
ALP SERPL-CCNC: 133 UNIT/L (ref 40–150)
ALT SERPL-CCNC: 16 UNIT/L (ref 0–55)
ANION GAP SERPL CALC-SCNC: 5 MEQ/L
AST SERPL-CCNC: 12 UNIT/L (ref 5–34)
BASOPHILS # BLD AUTO: 0.1 X10(3)/MCL
BASOPHILS NFR BLD AUTO: 1.1 %
BILIRUB SERPL-MCNC: 0.1 MG/DL
BUN SERPL-MCNC: 24.8 MG/DL (ref 8.4–25.7)
CALCIUM SERPL-MCNC: 9.6 MG/DL (ref 8.8–10)
CHLORIDE SERPL-SCNC: 107 MMOL/L (ref 98–107)
CO2 SERPL-SCNC: 28 MMOL/L (ref 23–31)
CREAT SERPL-MCNC: 0.7 MG/DL (ref 0.73–1.18)
CREAT/UREA NIT SERPL: 35
EOSINOPHIL # BLD AUTO: 0.79 X10(3)/MCL (ref 0–0.9)
EOSINOPHIL NFR BLD AUTO: 8.7 %
ERYTHROCYTE [DISTWIDTH] IN BLOOD BY AUTOMATED COUNT: 16.9 % (ref 11.5–17)
FERRITIN SERPL-MCNC: 410.91 NG/ML (ref 21.81–274.66)
FOLATE SERPL-MCNC: 8.3 NG/ML (ref 7–31.4)
GFR SERPLBLD CREATININE-BSD FMLA CKD-EPI: >60 ML/MIN/1.73/M2
GLOBULIN SER-MCNC: 4.1 GM/DL (ref 2.4–3.5)
GLUCOSE SERPL-MCNC: 107 MG/DL (ref 82–115)
HCT VFR BLD AUTO: 28.2 % (ref 42–52)
HGB BLD-MCNC: 8.3 G/DL (ref 14–18)
IMM GRANULOCYTES # BLD AUTO: 0.02 X10(3)/MCL (ref 0–0.04)
IMM GRANULOCYTES NFR BLD AUTO: 0.2 %
IRON SATN MFR SERPL: 14 % (ref 20–50)
IRON SERPL-MCNC: 24 UG/DL (ref 65–175)
LDH SERPL-CCNC: 125 U/L (ref 125–220)
LYMPHOCYTES # BLD AUTO: 1.66 X10(3)/MCL (ref 0.6–4.6)
LYMPHOCYTES NFR BLD AUTO: 18.3 %
MCH RBC QN AUTO: 24.4 PG (ref 27–31)
MCHC RBC AUTO-ENTMCNC: 29.4 G/DL (ref 33–36)
MCV RBC AUTO: 82.9 FL (ref 80–94)
MONOCYTES # BLD AUTO: 0.58 X10(3)/MCL (ref 0.1–1.3)
MONOCYTES NFR BLD AUTO: 6.4 %
NEUTROPHILS # BLD AUTO: 5.92 X10(3)/MCL (ref 2.1–9.2)
NEUTROPHILS NFR BLD AUTO: 65.3 %
PLATELET # BLD AUTO: 514 X10(3)/MCL (ref 130–400)
PMV BLD AUTO: 7.4 FL (ref 7.4–10.4)
POTASSIUM SERPL-SCNC: 4.8 MMOL/L (ref 3.5–5.1)
PROT SERPL-MCNC: 6.7 GM/DL (ref 5.8–7.6)
RBC # BLD AUTO: 3.4 X10(6)/MCL (ref 4.7–6.1)
RET# (OHS): 0.05 X10E6/UL (ref 0.03–0.1)
RETICULOCYTE COUNT AUTOMATED (OLG): 1.62 % (ref 1.1–2.1)
SODIUM SERPL-SCNC: 140 MMOL/L (ref 136–145)
TIBC SERPL-MCNC: 152 UG/DL (ref 69–240)
TIBC SERPL-MCNC: 176 UG/DL (ref 250–450)
TRANSFERRIN SERPL-MCNC: 176 MG/DL (ref 163–344)
TSH SERPL-ACNC: 5.14 UIU/ML (ref 0.35–4.94)
VIT B12 SERPL-MCNC: 337 PG/ML (ref 213–816)
WBC # BLD AUTO: 9.07 X10(3)/MCL (ref 4.5–11.5)

## 2024-07-24 PROCEDURE — 99999 PR PBB SHADOW E&M-EST. PATIENT-LVL V: CPT | Mod: PBBFAC,,, | Performed by: STUDENT IN AN ORGANIZED HEALTH CARE EDUCATION/TRAINING PROGRAM

## 2024-07-24 PROCEDURE — 3044F HG A1C LEVEL LT 7.0%: CPT | Mod: CPTII,S$GLB,, | Performed by: STUDENT IN AN ORGANIZED HEALTH CARE EDUCATION/TRAINING PROGRAM

## 2024-07-24 PROCEDURE — 83550 IRON BINDING TEST: CPT | Performed by: STUDENT IN AN ORGANIZED HEALTH CARE EDUCATION/TRAINING PROGRAM

## 2024-07-24 PROCEDURE — 85025 COMPLETE CBC W/AUTO DIFF WBC: CPT | Performed by: STUDENT IN AN ORGANIZED HEALTH CARE EDUCATION/TRAINING PROGRAM

## 2024-07-24 PROCEDURE — 82746 ASSAY OF FOLIC ACID SERUM: CPT | Performed by: STUDENT IN AN ORGANIZED HEALTH CARE EDUCATION/TRAINING PROGRAM

## 2024-07-24 PROCEDURE — 3074F SYST BP LT 130 MM HG: CPT | Mod: CPTII,S$GLB,, | Performed by: STUDENT IN AN ORGANIZED HEALTH CARE EDUCATION/TRAINING PROGRAM

## 2024-07-24 PROCEDURE — 3008F BODY MASS INDEX DOCD: CPT | Mod: CPTII,S$GLB,, | Performed by: STUDENT IN AN ORGANIZED HEALTH CARE EDUCATION/TRAINING PROGRAM

## 2024-07-24 PROCEDURE — 85045 AUTOMATED RETICULOCYTE COUNT: CPT | Performed by: STUDENT IN AN ORGANIZED HEALTH CARE EDUCATION/TRAINING PROGRAM

## 2024-07-24 PROCEDURE — 80053 COMPREHEN METABOLIC PANEL: CPT | Performed by: STUDENT IN AN ORGANIZED HEALTH CARE EDUCATION/TRAINING PROGRAM

## 2024-07-24 PROCEDURE — 82607 VITAMIN B-12: CPT | Performed by: STUDENT IN AN ORGANIZED HEALTH CARE EDUCATION/TRAINING PROGRAM

## 2024-07-24 PROCEDURE — 83615 LACTATE (LD) (LDH) ENZYME: CPT | Performed by: STUDENT IN AN ORGANIZED HEALTH CARE EDUCATION/TRAINING PROGRAM

## 2024-07-24 PROCEDURE — 1157F ADVNC CARE PLAN IN RCRD: CPT | Mod: CPTII,S$GLB,, | Performed by: STUDENT IN AN ORGANIZED HEALTH CARE EDUCATION/TRAINING PROGRAM

## 2024-07-24 PROCEDURE — 99204 OFFICE O/P NEW MOD 45 MIN: CPT | Mod: S$GLB,,, | Performed by: STUDENT IN AN ORGANIZED HEALTH CARE EDUCATION/TRAINING PROGRAM

## 2024-07-24 PROCEDURE — 36415 COLL VENOUS BLD VENIPUNCTURE: CPT | Performed by: STUDENT IN AN ORGANIZED HEALTH CARE EDUCATION/TRAINING PROGRAM

## 2024-07-24 PROCEDURE — 4010F ACE/ARB THERAPY RXD/TAKEN: CPT | Mod: CPTII,S$GLB,, | Performed by: STUDENT IN AN ORGANIZED HEALTH CARE EDUCATION/TRAINING PROGRAM

## 2024-07-24 PROCEDURE — 3078F DIAST BP <80 MM HG: CPT | Mod: CPTII,S$GLB,, | Performed by: STUDENT IN AN ORGANIZED HEALTH CARE EDUCATION/TRAINING PROGRAM

## 2024-07-24 PROCEDURE — 82525 ASSAY OF COPPER: CPT | Performed by: STUDENT IN AN ORGANIZED HEALTH CARE EDUCATION/TRAINING PROGRAM

## 2024-07-24 PROCEDURE — 1160F RVW MEDS BY RX/DR IN RCRD: CPT | Mod: CPTII,S$GLB,, | Performed by: STUDENT IN AN ORGANIZED HEALTH CARE EDUCATION/TRAINING PROGRAM

## 2024-07-24 PROCEDURE — 83540 ASSAY OF IRON: CPT | Performed by: STUDENT IN AN ORGANIZED HEALTH CARE EDUCATION/TRAINING PROGRAM

## 2024-07-24 PROCEDURE — 82728 ASSAY OF FERRITIN: CPT | Performed by: STUDENT IN AN ORGANIZED HEALTH CARE EDUCATION/TRAINING PROGRAM

## 2024-07-24 PROCEDURE — 84443 ASSAY THYROID STIM HORMONE: CPT | Performed by: STUDENT IN AN ORGANIZED HEALTH CARE EDUCATION/TRAINING PROGRAM

## 2024-07-24 PROCEDURE — 1125F AMNT PAIN NOTED PAIN PRSNT: CPT | Mod: CPTII,S$GLB,, | Performed by: STUDENT IN AN ORGANIZED HEALTH CARE EDUCATION/TRAINING PROGRAM

## 2024-07-24 PROCEDURE — 1159F MED LIST DOCD IN RCRD: CPT | Mod: CPTII,S$GLB,, | Performed by: STUDENT IN AN ORGANIZED HEALTH CARE EDUCATION/TRAINING PROGRAM

## 2024-07-25 PROBLEM — D64.9 ANEMIA: Status: ACTIVE | Noted: 2024-07-25

## 2024-07-25 PROBLEM — D75.839 THROMBOCYTOSIS: Status: ACTIVE | Noted: 2024-07-25

## 2024-07-25 LAB — COPPER SERPL-MCNC: 189 MCG/DL (ref 73–129)

## 2024-07-29 ENCOUNTER — LAB REQUISITION (OUTPATIENT)
Dept: LAB | Facility: HOSPITAL | Age: 69
End: 2024-07-29
Payer: MEDICARE

## 2024-07-29 DIAGNOSIS — R79.9 ABNORMAL FINDING OF BLOOD CHEMISTRY, UNSPECIFIED: ICD-10-CM

## 2024-07-29 LAB
ERYTHROCYTE [DISTWIDTH] IN BLOOD BY AUTOMATED COUNT: 17.3 % (ref 11.5–17)
HCT VFR BLD AUTO: 21.6 % (ref 42–52)
HGB BLD-MCNC: 6.4 G/DL (ref 14–18)
MCH RBC QN AUTO: 24.2 PG (ref 27–31)
MCHC RBC AUTO-ENTMCNC: 29.6 G/DL (ref 33–36)
MCV RBC AUTO: 81.5 FL (ref 80–94)
NRBC BLD AUTO-RTO: 0 %
PLATELET # BLD AUTO: 472 X10(3)/MCL (ref 130–400)
PMV BLD AUTO: 8.2 FL (ref 7.4–10.4)
RBC # BLD AUTO: 2.65 X10(6)/MCL (ref 4.7–6.1)
WBC # BLD AUTO: 7.11 X10(3)/MCL (ref 4.5–11.5)

## 2024-07-29 PROCEDURE — 85027 COMPLETE CBC AUTOMATED: CPT | Performed by: FAMILY MEDICINE

## 2024-08-08 ENCOUNTER — LAB REQUISITION (OUTPATIENT)
Dept: LAB | Facility: HOSPITAL | Age: 69
End: 2024-08-08
Payer: MEDICARE

## 2024-08-08 DIAGNOSIS — Z29.9 ENCOUNTER FOR PROPHYLACTIC MEASURES, UNSPECIFIED: ICD-10-CM

## 2024-08-08 LAB
PSA SERPL-MCNC: 0.28 NG/ML
TSH SERPL-ACNC: 5.52 UIU/ML (ref 0.35–4.94)

## 2024-08-08 PROCEDURE — 84443 ASSAY THYROID STIM HORMONE: CPT | Performed by: FAMILY MEDICINE

## 2024-08-08 PROCEDURE — 84153 ASSAY OF PSA TOTAL: CPT | Performed by: FAMILY MEDICINE

## 2024-08-12 DIAGNOSIS — R89.9 ABNORMAL LABORATORY TEST: Primary | ICD-10-CM

## 2024-08-12 DIAGNOSIS — D64.9 ANEMIA, UNSPECIFIED TYPE: ICD-10-CM

## 2024-08-12 DIAGNOSIS — D75.839 THROMBOCYTOSIS: ICD-10-CM

## 2024-08-21 ENCOUNTER — LAB REQUISITION (OUTPATIENT)
Dept: LAB | Facility: HOSPITAL | Age: 69
End: 2024-08-21
Payer: MEDICARE

## 2024-08-21 DIAGNOSIS — L89.154 PRESSURE ULCER OF SACRAL REGION, STAGE 4: ICD-10-CM

## 2024-08-21 LAB — PREALB SERPL-MCNC: 17 MG/DL (ref 16–42)

## 2024-08-21 PROCEDURE — 84134 ASSAY OF PREALBUMIN: CPT | Performed by: FAMILY MEDICINE

## 2024-09-25 ENCOUNTER — LAB REQUISITION (OUTPATIENT)
Dept: LAB | Facility: HOSPITAL | Age: 69
End: 2024-09-25
Payer: MEDICARE

## 2024-09-25 DIAGNOSIS — L89.154 PRESSURE ULCER OF SACRAL REGION, STAGE 4: ICD-10-CM

## 2024-09-25 LAB — PREALB SERPL-MCNC: 16.3 MG/DL (ref 16–42)

## 2024-09-25 PROCEDURE — 84134 ASSAY OF PREALBUMIN: CPT | Performed by: FAMILY MEDICINE

## 2024-10-22 PROBLEM — Z51.5 COMFORT MEASURES ONLY STATUS: Status: ACTIVE | Noted: 2024-01-01

## (undated) DEVICE — PAD ABD 8X10 STERILE

## (undated) DEVICE — BANDAGE COMPR 6IN HOOK 6INX5YD

## (undated) DEVICE — SUT 1 8-18IN VICRYL PLUS CT

## (undated) DEVICE — BANDAGE ELASTIC 3IN ACE NS

## (undated) DEVICE — DRAPE EXTREMITY HVY DTY REINF

## (undated) DEVICE — SUT VICRYL CTD 2-0 GI 27 SH

## (undated) DEVICE — CANISTER INFOV.A.C WOUND 500ML

## (undated) DEVICE — SUT COATED VICRYL 3-0 1X27

## (undated) DEVICE — BANDAGE ESMARK 4INX3YD

## (undated) DEVICE — DRESSING TELFA N ADH 3X8IN

## (undated) DEVICE — ELECTRODE PATIENT RETURN DISP

## (undated) DEVICE — KIT SURGICAL TURNOVER

## (undated) DEVICE — DRESSING N ADH OIL EMUL 3X3

## (undated) DEVICE — SUT SILK 0 STRANDS 30IN BLK

## (undated) DEVICE — SUT 0 VICRYL PLUS CT-1 27IN

## (undated) DEVICE — STAPLER SKIN PROXIMATE WIDE

## (undated) DEVICE — BANDAGE CONFORM STRTCH 1IN

## (undated) DEVICE — DRESSING XEROFORM FOIL PK 1X8

## (undated) DEVICE — GAUZE SPONGE 4X4 12PLY

## (undated) DEVICE — BLADE NARROW LONG 25.0MMX5.5MM

## (undated) DEVICE — APPLICATOR CHLORAPREP ORN 26ML

## (undated) DEVICE — GLOVE SURG PLYSPHRN ORTH SZ 8

## (undated) DEVICE — COVER FULLGUARD SHOE HIGH-TOP

## (undated) DEVICE — SUT 2-0 12-18IN SILK

## (undated) DEVICE — GLOVE PROTEXIS BLUE LATEX 8

## (undated) DEVICE — TRAY CATH FOL SIL URIMTR 16FR

## (undated) DEVICE — KIT DRSNG GRNUFM MED 18X12X5CM

## (undated) DEVICE — GLOVE PROTEXIS HYDROGEL SZ7.5

## (undated) DEVICE — SUT ETHILON 4-0 PS4 18 BLK

## (undated) DEVICE — DRESSING GAUZE XEROFORM 5X9

## (undated) DEVICE — PADDING 4X4YD SPECIALIST100

## (undated) DEVICE — TROCAR ENDOPATH XCEL 5X100MM

## (undated) DEVICE — SOL NACL IRR 3000ML

## (undated) DEVICE — IMMOBILIZER KNEE 22IN

## (undated) DEVICE — CHLORAPREP W TINT 26ML APPL

## (undated) DEVICE — CULTSWAB+ AMIES W/O CHARC DBL

## (undated) DEVICE — NDL HYPO REG 25G X 1 1/2

## (undated) DEVICE — BANDAGE FLEX-MASTER CLP 6X11YD

## (undated) DEVICE — STOCKINETTE IMPERVIOUS 16X54IN

## (undated) DEVICE — STOCKINETTE IMPERVIOUS LARGE

## (undated) DEVICE — SUT ETHILON 3-0 PS2 18 BLK

## (undated) DEVICE — Device

## (undated) DEVICE — IRRIGATION SET Y-TYPE TUR/BLAD

## (undated) DEVICE — DRAPE STERI U-SHAPED 47X51IN

## (undated) DEVICE — CULTSWAB+ AMIES W/O CHARC SNG

## (undated) DEVICE — SUT BONE WAX 2.5 GRMS 12/BX

## (undated) DEVICE — SPONGE GAUZE 4X4 12 PLY STRL

## (undated) DEVICE — TRAY SKIN SCRUB WET PREMIUM

## (undated) DEVICE — TAPE SILK 3IN

## (undated) DEVICE — DRAPE T LIMB BILATERAL STERILE

## (undated) DEVICE — NDL HYPO 22GX1 1/2 SYR 10ML LL

## (undated) DEVICE — BLADE SURG STAINLESS STEEL #15

## (undated) DEVICE — SPONGE DERMACEA GAUZE 4X4

## (undated) DEVICE — SPONGE LAP 18X18 PREWASHED

## (undated) DEVICE — DRESSING GAUZE OIL EMUL 3X8

## (undated) DEVICE — GLOVE PROTEXIS LTX MICRO 8

## (undated) DEVICE — TOURNIQUET SB QC DP 34X4IN

## (undated) DEVICE — GLOVE BIOGEL ORTHOPEDIC 7.5

## (undated) DEVICE — DRESSING XEROFORM 5X9IN

## (undated) DEVICE — SET CYSTO IRR DRP CHMBR 84IN

## (undated) DEVICE — PAD ABDOMINAL STERILE 8X10IN

## (undated) DEVICE — BANDAGE ACE NON LATEX 3IN

## (undated) DEVICE — BOWL STERILE LARGE 32OZ

## (undated) DEVICE — GOWN SMARTSLEEVE AAMI LVL4 XL

## (undated) DEVICE — DECANTER FLUID TRNSF WHITE 9IN

## (undated) DEVICE — SOL NACL IRR 1000ML BTL

## (undated) DEVICE — BANDAGE ACE DOUBLE STER 6IN

## (undated) DEVICE — GLOVE PROTEXIS PI CRM 7

## (undated) DEVICE — BLADE SAW SAG 22.13MM 0.92MM

## (undated) DEVICE — SUT BLK MONO 3-0 CUT 18IN F

## (undated) DEVICE — BANDAGE KERLIX AMD

## (undated) DEVICE — GLOVE PROTEXIS BLUE LATEX 7

## (undated) DEVICE — DRESSING GRANUFOAM LARGE VAC

## (undated) DEVICE — SOL NORMAL USPCA 0.9%

## (undated) DEVICE — SUT 0 8-27IN VICRYL PL CT-1

## (undated) DEVICE — SUT SILK 2-0 SH 18IN BLACK

## (undated) DEVICE — SUT VICRYL PLUS 2-0 CT1 18

## (undated) DEVICE — SPONGE KERLIX ANTIMIC 6X6.75IN

## (undated) DEVICE — GLOVE PROTEXIS LTX MICRO  7.5

## (undated) DEVICE — PADDDING CAST WEBRIL 4YDX3IN

## (undated) DEVICE — BANDAGE VELCLOSE ELAS 4INX5YD

## (undated) DEVICE — BNDG COFLEX FOAM LF2 ST 4X5YD

## (undated) DEVICE — DRAPE ORTH SPLIT 77X108IN

## (undated) DEVICE — HEMOSTAT SURGICEL 4X8IN

## (undated) DEVICE — DRAPE MINI C ARM STERILE

## (undated) DEVICE — BANDAGE GAUZE COT STRL 4.5X4.1

## (undated) DEVICE — ELECTRODE REM POLYHESIVE II

## (undated) DEVICE — SUT VICRYL 2-0 36 CT-1

## (undated) DEVICE — SHEET DRAPE MEDIUM

## (undated) DEVICE — GLOVE PROTEXIS BLUE LATEX 7.5

## (undated) DEVICE — SWAB CULTURETTE SINGLE

## (undated) DEVICE — XPERIENCE

## (undated) DEVICE — SPONGE X-RAY LAP DETCT 18X18IN